# Patient Record
Sex: FEMALE | Race: BLACK OR AFRICAN AMERICAN | NOT HISPANIC OR LATINO | Employment: OTHER | URBAN - METROPOLITAN AREA
[De-identification: names, ages, dates, MRNs, and addresses within clinical notes are randomized per-mention and may not be internally consistent; named-entity substitution may affect disease eponyms.]

---

## 2023-10-02 ENCOUNTER — HOSPITAL ENCOUNTER (EMERGENCY)
Facility: HOSPITAL | Age: 33
Discharge: HOME | End: 2023-10-02
Attending: EMERGENCY MEDICINE
Payer: MEDICAID

## 2023-10-02 VITALS
OXYGEN SATURATION: 97 % | SYSTOLIC BLOOD PRESSURE: 147 MMHG | TEMPERATURE: 97.3 F | WEIGHT: 170 LBS | HEART RATE: 80 BPM | RESPIRATION RATE: 18 BRPM | DIASTOLIC BLOOD PRESSURE: 100 MMHG | BODY MASS INDEX: 32.1 KG/M2 | HEIGHT: 61 IN

## 2023-10-02 DIAGNOSIS — L73.2 SUPPURATIVE HIDRADENITIS: Primary | ICD-10-CM

## 2023-10-02 PROCEDURE — 2500000001 HC RX 250 WO HCPCS SELF ADMINISTERED DRUGS (ALT 637 FOR MEDICARE OP)

## 2023-10-02 PROCEDURE — 99284 EMERGENCY DEPT VISIT MOD MDM: CPT | Performed by: EMERGENCY MEDICINE

## 2023-10-02 PROCEDURE — 2500000004 HC RX 250 GENERAL PHARMACY W/ HCPCS (ALT 636 FOR OP/ED)

## 2023-10-02 PROCEDURE — 99283 EMERGENCY DEPT VISIT LOW MDM: CPT | Performed by: EMERGENCY MEDICINE

## 2023-10-02 RX ORDER — OXYCODONE AND ACETAMINOPHEN 5; 325 MG/1; MG/1
TABLET ORAL
Status: COMPLETED
Start: 2023-10-02 | End: 2023-10-02

## 2023-10-02 RX ORDER — CLINDAMYCIN HYDROCHLORIDE 150 MG/1
300 CAPSULE ORAL 2 TIMES DAILY
Qty: 28 CAPSULE | Refills: 0 | Status: SHIPPED | OUTPATIENT
Start: 2023-10-02 | End: 2023-10-09

## 2023-10-02 RX ORDER — CLINDAMYCIN HYDROCHLORIDE 300 MG/1
CAPSULE ORAL
Status: COMPLETED
Start: 2023-10-02 | End: 2023-10-02

## 2023-10-02 RX ORDER — SULFAMETHOXAZOLE AND TRIMETHOPRIM 800; 160 MG/1; MG/1
TABLET ORAL
Status: COMPLETED
Start: 2023-10-02 | End: 2023-10-02

## 2023-10-02 RX ORDER — SULFAMETHOXAZOLE AND TRIMETHOPRIM 800; 160 MG/1; MG/1
1 TABLET ORAL 2 TIMES DAILY
Qty: 28 TABLET | Refills: 0 | Status: SHIPPED | OUTPATIENT
Start: 2023-10-02 | End: 2023-10-16

## 2023-10-02 RX ORDER — CLINDAMYCIN HYDROCHLORIDE 300 MG/1
300 CAPSULE ORAL ONCE
Status: COMPLETED | OUTPATIENT
Start: 2023-10-02 | End: 2023-10-02

## 2023-10-02 RX ORDER — OXYCODONE AND ACETAMINOPHEN 5; 325 MG/1; MG/1
1 TABLET ORAL ONCE
Status: COMPLETED | OUTPATIENT
Start: 2023-10-02 | End: 2023-10-02

## 2023-10-02 RX ORDER — DOXYCYCLINE 100 MG/1
TABLET ORAL
Qty: 28 TABLET | Refills: 0 | Status: SHIPPED | OUTPATIENT
Start: 2023-10-02 | End: 2024-04-29 | Stop reason: HOSPADM

## 2023-10-02 RX ORDER — OXYCODONE AND ACETAMINOPHEN 10; 325 MG/1; MG/1
1 TABLET ORAL EVERY 6 HOURS PRN
Qty: 5 TABLET | Refills: 0 | Status: SHIPPED | OUTPATIENT
Start: 2023-10-02 | End: 2023-10-05

## 2023-10-02 RX ORDER — SULFAMETHOXAZOLE AND TRIMETHOPRIM 800; 160 MG/1; MG/1
1 TABLET ORAL ONCE
Status: COMPLETED | OUTPATIENT
Start: 2023-10-02 | End: 2023-10-02

## 2023-10-02 RX ADMIN — CLINDAMYCIN HYDROCHLORIDE 300 MG: 300 CAPSULE ORAL at 18:39

## 2023-10-02 RX ADMIN — OXYCODONE AND ACETAMINOPHEN 1 TABLET: 5; 325 TABLET ORAL at 18:38

## 2023-10-02 RX ADMIN — SULFAMETHOXAZOLE AND TRIMETHOPRIM 1 TABLET: 800; 160 TABLET ORAL at 18:39

## 2023-10-02 RX ADMIN — OXYCODONE HYDROCHLORIDE AND ACETAMINOPHEN 1 TABLET: 5; 325 TABLET ORAL at 18:38

## 2023-10-02 ASSESSMENT — LIFESTYLE VARIABLES
HAVE PEOPLE ANNOYED YOU BY CRITICIZING YOUR DRINKING: NO
HAVE YOU EVER FELT YOU SHOULD CUT DOWN ON YOUR DRINKING: NO
EVER FELT BAD OR GUILTY ABOUT YOUR DRINKING: NO

## 2023-10-02 ASSESSMENT — PAIN SCALES - GENERAL: PAINLEVEL_OUTOF10: 9

## 2023-10-02 ASSESSMENT — PAIN - FUNCTIONAL ASSESSMENT: PAIN_FUNCTIONAL_ASSESSMENT: 0-10

## 2023-10-02 ASSESSMENT — COLUMBIA-SUICIDE SEVERITY RATING SCALE - C-SSRS
2. HAVE YOU ACTUALLY HAD ANY THOUGHTS OF KILLING YOURSELF?: NO
1. IN THE PAST MONTH, HAVE YOU WISHED YOU WERE DEAD OR WISHED YOU COULD GO TO SLEEP AND NOT WAKE UP?: NO
6. HAVE YOU EVER DONE ANYTHING, STARTED TO DO ANYTHING, OR PREPARED TO DO ANYTHING TO END YOUR LIFE?: NO

## 2023-10-02 ASSESSMENT — PAIN DESCRIPTION - LOCATION: LOCATION: LEG

## 2023-10-02 NOTE — ED PROVIDER NOTES
HPI   Chief Complaint   Patient presents with    Leg Pain       HPI     Patient is a 33-year-old female with history of hidradenitis suppurativa, lupus, sickle cell anemia who presents with chronic pain.  Patient states she does not have a PCP and ran out of her antibiotic prescriptions for hidradenitis suppurativa approximately 3 days ago.  She states she was seen at [facility] a few weeks ago where they refilled her prescriptions.  She states she called someone to schedule a PCP appointment today however has not heard back.  She does not see a surgeon as well.  She states she has draining wounds of her bilateral axilla, buttock area.  She denies any fevers, chills.  She states she alternates between doxycycline and clindamycin and takes Bactrim daily.  She has been on these medications for 13 years she says.    External records reviewed: Multiple ED visits for similar complaints in the past few weeks                             Patient History   Past Medical History:   Diagnosis Date    Hidradenitis suppurativa 10/29/2020    Hidradenitis    Lupus (CMS/Formerly [facility])     Sickle cell anemia (CMS/Formerly [facility])      Past Surgical History:   Procedure Laterality Date    OTHER SURGICAL HISTORY  09/19/2022    Arm surgery     No family history on file.  Social History     Tobacco Use    Smoking status: Never    Smokeless tobacco: Never   Substance Use Topics    Alcohol use: Not on file    Drug use: Not on file       Physical Exam   ED Triage Vitals [10/02/23 1545]   Temp Heart Rate Resp BP   36.3 °C (97.3 °F) 76 20 114/75      SpO2 Temp src Heart Rate Source Patient Position   100 % -- -- --      BP Location FiO2 (%)     -- --       Physical Exam  Vitals and nursing note reviewed.   Constitutional:       General: She is not in acute distress.     Appearance: Normal appearance.   HENT:      Head: Normocephalic and atraumatic.   Eyes:      Conjunctiva/sclera: Conjunctivae normal.   Cardiovascular:      Rate and Rhythm: Normal rate and regular  rhythm.   Pulmonary:      Effort: Pulmonary effort is normal. No respiratory distress.   Abdominal:      General: Abdomen is flat.      Palpations: Abdomen is soft.   Musculoskeletal:         General: Normal range of motion.      Cervical back: Normal range of motion and neck supple.   Skin:     General: Skin is warm and dry.      Findings: Abscess and rash present. Rash is pustular.      Comments: Abscess and rash to bilateral axilla with minimal drainage and between the buttock region, no crepitus or bleeding   Neurological:      General: No focal deficit present.      Mental Status: She is alert and oriented to person, place, and time. Mental status is at baseline.   Psychiatric:         Mood and Affect: Mood normal.         Behavior: Behavior normal.         ED Course & MDM   Diagnoses as of 10/02/23 1836   Suppurative hidradenitis     Patient was seen and evaluated by the attending physician.     Medical Decision Making  Patient is a 33-year-old female who presents with chronic pain from hidradenitis suppurativa.  Vital signs stable, patient does not appear septic.  Based off stable clinical presentation and chronicity of wounds, I do not believe patient warrants any further labs or imaging today.  Unable to corroborate outpatient medications from EMR due to EMR transition however patient states she takes oxycodone 10-3 25, clindamycin 300 mg, doxycycline 400 mg and Bactrim double strength.  She states this week she is on clindamycin.  I prescribed these medications and gave dose of clindamycin, bactrim and pain medication in ED.  I discussed extensively with patient need for PCP follow-up and attempted to coordinate initial PCP visit in ED.  I also provided outpatient resources for scheduling with pain medicine.    Procedure  Procedures     Britni Andrew DO  Resident  10/02/23 1836

## 2023-10-02 NOTE — ED TRIAGE NOTES
PT having SSC in legs, arm and abdomen/chest since yesterday. PT also need referral for pain management clinic

## 2023-10-02 NOTE — ED TRIAGE NOTES
PT having flare up of Hidradenitis suppurativa , having pain in legs and axilla. Needs referral for pain management

## 2023-10-16 ENCOUNTER — TELEPHONE (OUTPATIENT)
Dept: PRIMARY CARE | Facility: CLINIC | Age: 33
End: 2023-10-16
Payer: MEDICAID

## 2023-10-20 ENCOUNTER — HOSPITAL ENCOUNTER (EMERGENCY)
Facility: HOSPITAL | Age: 33
Discharge: HOME | End: 2023-10-20
Attending: EMERGENCY MEDICINE
Payer: MEDICAID

## 2023-10-20 ENCOUNTER — APPOINTMENT (OUTPATIENT)
Dept: RADIOLOGY | Facility: HOSPITAL | Age: 33
End: 2023-10-20
Payer: MEDICAID

## 2023-10-20 VITALS
TEMPERATURE: 98.3 F | HEIGHT: 62 IN | SYSTOLIC BLOOD PRESSURE: 134 MMHG | RESPIRATION RATE: 17 BRPM | BODY MASS INDEX: 32.2 KG/M2 | WEIGHT: 175 LBS | DIASTOLIC BLOOD PRESSURE: 89 MMHG | HEART RATE: 109 BPM | OXYGEN SATURATION: 99 %

## 2023-10-20 LAB
ANION GAP SERPL CALC-SCNC: 14 MMOL/L (ref 10–20)
APPEARANCE UR: CLEAR
BASOPHILS # BLD AUTO: 0.04 X10*3/UL (ref 0–0.1)
BASOPHILS NFR BLD AUTO: 0.3 %
BILIRUB UR STRIP.AUTO-MCNC: NEGATIVE MG/DL
BUN SERPL-MCNC: 14 MG/DL (ref 6–23)
CALCIUM SERPL-MCNC: 8.9 MG/DL (ref 8.6–10.3)
CHLORIDE SERPL-SCNC: 102 MMOL/L (ref 98–107)
CO2 SERPL-SCNC: 23 MMOL/L (ref 21–32)
COLOR UR: NORMAL
CREAT SERPL-MCNC: 1.12 MG/DL (ref 0.5–1.05)
EOSINOPHIL # BLD AUTO: 0.16 X10*3/UL (ref 0–0.7)
EOSINOPHIL NFR BLD AUTO: 1.1 %
ERYTHROCYTE [DISTWIDTH] IN BLOOD BY AUTOMATED COUNT: 14.1 % (ref 11.5–14.5)
GFR SERPL CREATININE-BSD FRML MDRD: 66 ML/MIN/1.73M*2
GLUCOSE SERPL-MCNC: 88 MG/DL (ref 74–99)
GLUCOSE UR STRIP.AUTO-MCNC: NEGATIVE MG/DL
HCG UR QL IA.RAPID: NEGATIVE
HCT VFR BLD AUTO: 34.9 % (ref 36–46)
HGB BLD-MCNC: 11.5 G/DL (ref 12–16)
IMM GRANULOCYTES # BLD AUTO: 0.07 X10*3/UL (ref 0–0.7)
IMM GRANULOCYTES NFR BLD AUTO: 0.5 % (ref 0–0.9)
KETONES UR STRIP.AUTO-MCNC: NEGATIVE MG/DL
LEUKOCYTE ESTERASE UR QL STRIP.AUTO: NEGATIVE
LYMPHOCYTES # BLD AUTO: 3.52 X10*3/UL (ref 1.2–4.8)
LYMPHOCYTES NFR BLD AUTO: 23.8 %
MCH RBC QN AUTO: 24.9 PG (ref 26–34)
MCHC RBC AUTO-ENTMCNC: 33 G/DL (ref 32–36)
MCV RBC AUTO: 76 FL (ref 80–100)
MONOCYTES # BLD AUTO: 1.08 X10*3/UL (ref 0.1–1)
MONOCYTES NFR BLD AUTO: 7.3 %
NEUTROPHILS # BLD AUTO: 9.95 X10*3/UL (ref 1.2–7.7)
NEUTROPHILS NFR BLD AUTO: 67 %
NITRITE UR QL STRIP.AUTO: NEGATIVE
NRBC BLD-RTO: 0 /100 WBCS (ref 0–0)
PH UR STRIP.AUTO: 5 [PH]
PLATELET # BLD AUTO: 322 X10*3/UL (ref 150–450)
PMV BLD AUTO: 9.3 FL (ref 7.5–11.5)
POTASSIUM SERPL-SCNC: 3.8 MMOL/L (ref 3.5–5.3)
PROT UR STRIP.AUTO-MCNC: NEGATIVE MG/DL
RBC # BLD AUTO: 4.61 X10*6/UL (ref 4–5.2)
RBC # UR STRIP.AUTO: NEGATIVE /UL
SODIUM SERPL-SCNC: 135 MMOL/L (ref 136–145)
SP GR UR STRIP.AUTO: 1.01
UROBILINOGEN UR STRIP.AUTO-MCNC: <2 MG/DL
WBC # BLD AUTO: 14.8 X10*3/UL (ref 4.4–11.3)

## 2023-10-20 PROCEDURE — 80048 BASIC METABOLIC PNL TOTAL CA: CPT | Performed by: PHYSICIAN ASSISTANT

## 2023-10-20 PROCEDURE — 2550000001 HC RX 255 CONTRASTS: Performed by: EMERGENCY MEDICINE

## 2023-10-20 PROCEDURE — 74177 CT ABD & PELVIS W/CONTRAST: CPT | Mod: FOREIGN READ | Performed by: RADIOLOGY

## 2023-10-20 PROCEDURE — 2500000004 HC RX 250 GENERAL PHARMACY W/ HCPCS (ALT 636 FOR OP/ED): Performed by: PHYSICIAN ASSISTANT

## 2023-10-20 PROCEDURE — 85025 COMPLETE CBC W/AUTO DIFF WBC: CPT | Performed by: PHYSICIAN ASSISTANT

## 2023-10-20 PROCEDURE — 81025 URINE PREGNANCY TEST: CPT | Performed by: PHYSICIAN ASSISTANT

## 2023-10-20 PROCEDURE — 36415 COLL VENOUS BLD VENIPUNCTURE: CPT | Performed by: PHYSICIAN ASSISTANT

## 2023-10-20 PROCEDURE — 71260 CT THORAX DX C+: CPT

## 2023-10-20 PROCEDURE — 71260 CT THORAX DX C+: CPT | Mod: FOREIGN READ | Performed by: RADIOLOGY

## 2023-10-20 PROCEDURE — 96361 HYDRATE IV INFUSION ADD-ON: CPT

## 2023-10-20 PROCEDURE — 99284 EMERGENCY DEPT VISIT MOD MDM: CPT | Performed by: EMERGENCY MEDICINE

## 2023-10-20 PROCEDURE — 2500000001 HC RX 250 WO HCPCS SELF ADMINISTERED DRUGS (ALT 637 FOR MEDICARE OP): Performed by: PHYSICIAN ASSISTANT

## 2023-10-20 PROCEDURE — 81003 URINALYSIS AUTO W/O SCOPE: CPT | Performed by: PHYSICIAN ASSISTANT

## 2023-10-20 PROCEDURE — 96374 THER/PROPH/DIAG INJ IV PUSH: CPT

## 2023-10-20 RX ORDER — KETOROLAC TROMETHAMINE 30 MG/ML
30 INJECTION, SOLUTION INTRAMUSCULAR; INTRAVENOUS ONCE
Status: COMPLETED | OUTPATIENT
Start: 2023-10-20 | End: 2023-10-20

## 2023-10-20 RX ORDER — ALPRAZOLAM 0.5 MG/1
0.25 TABLET ORAL ONCE
Status: COMPLETED | OUTPATIENT
Start: 2023-10-20 | End: 2023-10-20

## 2023-10-20 RX ADMIN — SODIUM CHLORIDE 1000 ML: 9 INJECTION, SOLUTION INTRAVENOUS at 06:39

## 2023-10-20 RX ADMIN — ALPRAZOLAM 0.25 MG: 0.5 TABLET ORAL at 05:39

## 2023-10-20 RX ADMIN — KETOROLAC TROMETHAMINE 30 MG: 30 INJECTION, SOLUTION INTRAMUSCULAR; INTRAVENOUS at 05:39

## 2023-10-20 RX ADMIN — IOHEXOL 75 ML: 350 INJECTION, SOLUTION INTRAVENOUS at 07:01

## 2023-10-20 ASSESSMENT — PAIN DESCRIPTION - ORIENTATION
ORIENTATION_2: LEFT
ORIENTATION_3: LEFT
ORIENTATION: LOWER

## 2023-10-20 ASSESSMENT — PAIN SCALES - GENERAL
PAINLEVEL_OUTOF10: 9

## 2023-10-20 ASSESSMENT — PAIN DESCRIPTION - LOCATION
LOCATION_2: LEG
LOCATION_3: BREAST
LOCATION: BACK

## 2023-10-20 ASSESSMENT — COLUMBIA-SUICIDE SEVERITY RATING SCALE - C-SSRS
6. HAVE YOU EVER DONE ANYTHING, STARTED TO DO ANYTHING, OR PREPARED TO DO ANYTHING TO END YOUR LIFE?: NO
1. IN THE PAST MONTH, HAVE YOU WISHED YOU WERE DEAD OR WISHED YOU COULD GO TO SLEEP AND NOT WAKE UP?: NO
2. HAVE YOU ACTUALLY HAD ANY THOUGHTS OF KILLING YOURSELF?: NO

## 2023-10-20 ASSESSMENT — PAIN DESCRIPTION - FREQUENCY: FREQUENCY: INTERMITTENT

## 2023-10-20 ASSESSMENT — PAIN DESCRIPTION - DESCRIPTORS
DESCRIPTORS_2: SHOOTING
DESCRIPTORS: SHARP
DESCRIPTORS_3: OTHER (COMMENT)

## 2023-10-20 ASSESSMENT — PAIN DESCRIPTION - PAIN TYPE: TYPE: ACUTE PAIN

## 2023-10-20 ASSESSMENT — PAIN - FUNCTIONAL ASSESSMENT: PAIN_FUNCTIONAL_ASSESSMENT: 0-10
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
## 2023-10-20 NOTE — ED TRIAGE NOTES
Patient arrived to ED via EMS from home with concern for lump to left breast, wants US. Patient has had mammogram and was told there is a mass. Patient c/o left leg pain that radiates up and down leg. Patient c/o low back pain since yesterday. Patient also reports swelling in abd x2 days ago. Patient wants scans and US to find out if she has breast ca. Patient is stable at this time.

## 2023-11-10 ENCOUNTER — HOSPITAL ENCOUNTER (EMERGENCY)
Facility: HOSPITAL | Age: 33
Discharge: HOME | End: 2023-11-10
Attending: STUDENT IN AN ORGANIZED HEALTH CARE EDUCATION/TRAINING PROGRAM
Payer: MEDICAID

## 2023-11-10 VITALS
DIASTOLIC BLOOD PRESSURE: 70 MMHG | HEIGHT: 63 IN | OXYGEN SATURATION: 99 % | RESPIRATION RATE: 70 BRPM | WEIGHT: 175 LBS | SYSTOLIC BLOOD PRESSURE: 124 MMHG | BODY MASS INDEX: 31.01 KG/M2 | HEART RATE: 80 BPM | TEMPERATURE: 97.4 F

## 2023-11-10 DIAGNOSIS — L73.2 HIDRADENITIS SUPPURATIVA OF MULTIPLE SITES: Primary | ICD-10-CM

## 2023-11-10 DIAGNOSIS — R11.2 NAUSEA AND VOMITING, UNSPECIFIED VOMITING TYPE: ICD-10-CM

## 2023-11-10 LAB
ALBUMIN SERPL BCP-MCNC: 3.9 G/DL (ref 3.4–5)
ALP SERPL-CCNC: 61 U/L (ref 33–110)
ALT SERPL W P-5'-P-CCNC: 10 U/L (ref 7–45)
ANION GAP SERPL CALC-SCNC: 12 MMOL/L (ref 10–20)
AST SERPL W P-5'-P-CCNC: 11 U/L (ref 9–39)
B-HCG SERPL-ACNC: <2 MIU/ML
BASOPHILS # BLD AUTO: 0.03 X10*3/UL (ref 0–0.1)
BASOPHILS NFR BLD AUTO: 0.4 %
BILIRUB SERPL-MCNC: 0.4 MG/DL (ref 0–1.2)
BUN SERPL-MCNC: 10 MG/DL (ref 6–23)
CALCIUM SERPL-MCNC: 9 MG/DL (ref 8.6–10.3)
CHLORIDE SERPL-SCNC: 105 MMOL/L (ref 98–107)
CO2 SERPL-SCNC: 23 MMOL/L (ref 21–32)
CREAT SERPL-MCNC: 0.71 MG/DL (ref 0.5–1.05)
EOSINOPHIL # BLD AUTO: 0.12 X10*3/UL (ref 0–0.7)
EOSINOPHIL NFR BLD AUTO: 1.4 %
ERYTHROCYTE [DISTWIDTH] IN BLOOD BY AUTOMATED COUNT: 15.1 % (ref 11.5–14.5)
GFR SERPL CREATININE-BSD FRML MDRD: >90 ML/MIN/1.73M*2
GLUCOSE SERPL-MCNC: 88 MG/DL (ref 74–99)
HCT VFR BLD AUTO: 37.5 % (ref 36–46)
HGB BLD-MCNC: 12.4 G/DL (ref 12–16)
IMM GRANULOCYTES # BLD AUTO: 0.04 X10*3/UL (ref 0–0.7)
IMM GRANULOCYTES NFR BLD AUTO: 0.5 % (ref 0–0.9)
LYMPHOCYTES # BLD AUTO: 2.21 X10*3/UL (ref 1.2–4.8)
LYMPHOCYTES NFR BLD AUTO: 26.4 %
MCH RBC QN AUTO: 25.2 PG (ref 26–34)
MCHC RBC AUTO-ENTMCNC: 33.1 G/DL (ref 32–36)
MCV RBC AUTO: 76 FL (ref 80–100)
MONOCYTES # BLD AUTO: 0.5 X10*3/UL (ref 0.1–1)
MONOCYTES NFR BLD AUTO: 6 %
NEUTROPHILS # BLD AUTO: 5.48 X10*3/UL (ref 1.2–7.7)
NEUTROPHILS NFR BLD AUTO: 65.3 %
NRBC BLD-RTO: 0 /100 WBCS (ref 0–0)
PLATELET # BLD AUTO: 357 X10*3/UL (ref 150–450)
POTASSIUM SERPL-SCNC: 4.2 MMOL/L (ref 3.5–5.3)
PROT SERPL-MCNC: 7.1 G/DL (ref 6.4–8.2)
RBC # BLD AUTO: 4.92 X10*6/UL (ref 4–5.2)
SODIUM SERPL-SCNC: 136 MMOL/L (ref 136–145)
WBC # BLD AUTO: 8.4 X10*3/UL (ref 4.4–11.3)

## 2023-11-10 PROCEDURE — 80053 COMPREHEN METABOLIC PANEL: CPT | Performed by: STUDENT IN AN ORGANIZED HEALTH CARE EDUCATION/TRAINING PROGRAM

## 2023-11-10 PROCEDURE — 99284 EMERGENCY DEPT VISIT MOD MDM: CPT | Mod: 25

## 2023-11-10 PROCEDURE — 96374 THER/PROPH/DIAG INJ IV PUSH: CPT

## 2023-11-10 PROCEDURE — 84702 CHORIONIC GONADOTROPIN TEST: CPT | Performed by: STUDENT IN AN ORGANIZED HEALTH CARE EDUCATION/TRAINING PROGRAM

## 2023-11-10 PROCEDURE — 99285 EMERGENCY DEPT VISIT HI MDM: CPT | Performed by: STUDENT IN AN ORGANIZED HEALTH CARE EDUCATION/TRAINING PROGRAM

## 2023-11-10 PROCEDURE — 36415 COLL VENOUS BLD VENIPUNCTURE: CPT | Performed by: STUDENT IN AN ORGANIZED HEALTH CARE EDUCATION/TRAINING PROGRAM

## 2023-11-10 PROCEDURE — 2500000004 HC RX 250 GENERAL PHARMACY W/ HCPCS (ALT 636 FOR OP/ED): Performed by: STUDENT IN AN ORGANIZED HEALTH CARE EDUCATION/TRAINING PROGRAM

## 2023-11-10 PROCEDURE — 96375 TX/PRO/DX INJ NEW DRUG ADDON: CPT

## 2023-11-10 PROCEDURE — 85025 COMPLETE CBC W/AUTO DIFF WBC: CPT | Performed by: STUDENT IN AN ORGANIZED HEALTH CARE EDUCATION/TRAINING PROGRAM

## 2023-11-10 RX ORDER — ONDANSETRON 4 MG/1
4 TABLET, FILM COATED ORAL EVERY 6 HOURS
Qty: 12 TABLET | Refills: 0 | Status: SHIPPED | OUTPATIENT
Start: 2023-11-10 | End: 2023-11-13

## 2023-11-10 RX ORDER — PREDNISONE 20 MG/1
60 TABLET ORAL DAILY
Qty: 12 TABLET | Refills: 0 | Status: SHIPPED | OUTPATIENT
Start: 2023-11-11 | End: 2023-11-15

## 2023-11-10 RX ORDER — FENTANYL CITRATE 50 UG/ML
50 INJECTION, SOLUTION INTRAMUSCULAR; INTRAVENOUS ONCE
Status: COMPLETED | OUTPATIENT
Start: 2023-11-10 | End: 2023-11-10

## 2023-11-10 RX ORDER — ONDANSETRON HYDROCHLORIDE 2 MG/ML
4 INJECTION, SOLUTION INTRAVENOUS ONCE
Status: COMPLETED | OUTPATIENT
Start: 2023-11-10 | End: 2023-11-10

## 2023-11-10 RX ORDER — PREDNISONE 20 MG/1
60 TABLET ORAL ONCE
Status: DISCONTINUED | OUTPATIENT
Start: 2023-11-10 | End: 2023-11-10 | Stop reason: HOSPADM

## 2023-11-10 RX ADMIN — ONDANSETRON 4 MG: 2 INJECTION INTRAMUSCULAR; INTRAVENOUS at 10:57

## 2023-11-10 RX ADMIN — SODIUM CHLORIDE 1000 ML: 9 INJECTION, SOLUTION INTRAVENOUS at 10:56

## 2023-11-10 RX ADMIN — FENTANYL CITRATE 50 MCG: 50 INJECTION INTRAMUSCULAR; INTRAVENOUS at 10:56

## 2023-11-10 ASSESSMENT — PAIN SCALES - GENERAL
PAINLEVEL_OUTOF10: 9
PAINLEVEL_OUTOF10: 9

## 2023-11-10 NOTE — ED PROVIDER NOTES
EMERGENCY MEDICINE EVALUATION NOTE    History of Present Illness     Chief Complaint:   Chief Complaint   Patient presents with    lupus flare-up       HPI: Sandrita Adams is a 33 y.o. female with past medical history of lupus and hidradenitis suppurativa who presents with complaint of nausea, vomiting, and malaise.  Patient states over the past 2 to 3 days she has had worsening signs of possible infection.  She states she does have multiple open wounds involving her bilateral axilla as well as bilateral groin from her hidradenitis suppurativa.  She states she is on daily Bactrim and is alternate every 2 weeks with clindamycin and doxycycline for prophylaxis treatment.  She states she is following up with a dermatologist and has had multiple surgeries in regards to her hidradenitis.  She does believe is a possible infection admit some pus drainage from her left axilla.  Denies any fever, chills, abdominal pain.  She has also been nausea and mild episodes of nonbloody nonbilious emesis throughout today.  Denies any dysuria, materia, chest pain, shortness of breath, cough.    Previous History     Past Medical History:   Diagnosis Date    Hidradenitis suppurativa 10/29/2020    Hidradenitis    Lupus (CMS/HCC)      Past Surgical History:   Procedure Laterality Date    OTHER SURGICAL HISTORY  09/19/2022    Arm surgery     Social History     Tobacco Use    Smoking status: Some Days     Years: 20     Types: Cigarettes    Smokeless tobacco: Never     No family history on file.  Allergies   Allergen Reactions    Suboxone [Buprenorphine-Naloxone] Anaphylaxis, Hives and Itching    Haldol [Haloperidol] Psychosis    Keflex [Cephalexin] Psychosis    Reglan [Metoclopramide Hcl] Psychosis     Current Outpatient Medications   Medication Instructions    doxycycline (Adoxa) 100 mg tablet Take 2 tablets by mouth two times daily with a full glass of water and do not lie down for at least 30 minutes after. Alternate every 7 days with  clindamycin    ondansetron (ZOFRAN) 4 mg, oral, Every 6 hours    [START ON 11/11/2023] predniSONE (DELTASONE) 60 mg, oral, Daily       Physical Exam     Appearance: Alert, oriented , cooperative,  in acute distress. Well nourished & well hydrated.     Skin: Intact,  dry skin, rash, petechiae or purpura.  Multiple lesions and open wounds mainly involving the bilateral axilla and bilateral groin as well as upper buttocks, scarring involving the same areas, minimal warmth, erythema, and no significant purulent expressible drainage noted.     Eyes: PERRLA, EOMs intact,  Conjunctiva pink with no redness or exudates. Cornea & anterior chamber are clear, Eyelids without lesions. No scleral icterus.      ENT: Hearing grossly intact. External auditory canals patent, tympanic membranes intact with visible landmarks. Nares patent, mucus membranes moist. Dentition without lesions. Pharynx clear, uvula midline.      Neck: Supple, without meningismus. Thyroid not palpable. Trachea at midline. No lymphadenopathy.     Pulmonary: Clear bilaterally with good chest wall excursion. No rales, rhonchi or wheezing. No accessory muscle use or stridor.     Cardiac: Normal S1, S2 without murmur, rub, gallop or extrasystole. No JVD, Carotids without bruits.     Abdomen: Soft, nontender, active bowel sounds.  No palpable organomegaly.  No rebound or guarding.  No CVA tenderness.     Genitourinary: Exam deferred.     Musculoskeletal: Full range of motion. no pain, edema, or deformity. Pulses full and equal. No cyanosis or clubbing.      Neurological:  Cranial nerves II through XII are grossly intact, finger-nose touch is normal, normal sensation, no weakness, no focal findings identified.     Psychiatric: Appropriate mood and affect.      Results     Labs Reviewed   CBC WITH AUTO DIFFERENTIAL - Abnormal       Result Value    WBC 8.4      nRBC 0.0      RBC 4.92      Hemoglobin 12.4      Hematocrit 37.5      MCV 76 (*)     MCH 25.2 (*)     MCHC  "33.1      RDW 15.1 (*)     Platelets 357      Neutrophils % 65.3      Immature Granulocytes %, Automated 0.5      Lymphocytes % 26.4      Monocytes % 6.0      Eosinophils % 1.4      Basophils % 0.4      Neutrophils Absolute 5.48      Immature Granulocytes Absolute, Automated 0.04      Lymphocytes Absolute 2.21      Monocytes Absolute 0.50      Eosinophils Absolute 0.12      Basophils Absolute 0.03     COMPREHENSIVE METABOLIC PANEL - Normal    Glucose 88      Sodium 136      Potassium 4.2      Chloride 105      Bicarbonate 23      Anion Gap 12      Urea Nitrogen 10      Creatinine 0.71      eGFR >90      Calcium 9.0      Albumin 3.9      Alkaline Phosphatase 61      Total Protein 7.1      AST 11      Bilirubin, Total 0.4      ALT 10     HUMAN CHORIONIC GONADOTROPIN, SERUM QUANTITATIVE - Normal    HCG, Beta-Quantitative <2      Narrative:      Total HCG measurement is performed using the [employer]   Immunoassay which detects intact HCG and free beta HCG subunit.    This test is not indicated for use as a tumor marker.   HCG testing is performed using a different test methodology at [facility] than other [facility]. Direct result comparison   should only be made within the same method.         No orders to display         ED Course & Medical Decision Making     Medications   predniSONE (Deltasone) tablet 60 mg (has no administration in time range)   ondansetron (Zofran) injection 4 mg (4 mg intravenous Given 11/10/23 1057)   fentaNYL PF (Sublimaze) injection 50 mcg (50 mcg intravenous Given 11/10/23 1056)   sodium chloride 0.9 % bolus 1,000 mL (0 mL intravenous Stopped 11/10/23 1126)     Diagnoses as of 11/10/23 1156   Hidradenitis suppurativa of multiple sites   Nausea and vomiting, unspecified vomiting type     Heart Rate:  [80]   Temp:  [36.3 °C (97.4 °F)]   Resp:  [16]   BP: (125)/(73)   Height:  [160 cm (5' 3\")]   Weight:  [79.4 kg (175 lb)]   SpO2:  [99 %]      Sandrita S " Bryan is a 33 y.o. female with past medical history of lupus and hidradenitis suppurativa who presents with complaint of nausea, vomiting, and malaise.  Patient on examination otherwise is hemodynamically stable and afebrile.  She is well-appearing.  She does have significant hidradenitis suppurativa noted in the bilateral axillas as well as the bilateral groin, minimal active drainage noted although tender throughout these areas.  No significant warmth or erythema.  Low concern for active infection at this time although this is considered with possible underlying multiple small abscesses.  She has had surgery involving the sites although no need for incision and drainage.  She most likely needs to follow-up with a general surgeon and does have a dermatology follow-up this upcoming December for possible surgical management.  Low concern for sepsis although considered.  Patient given 1 L of normal saline as well as fentanyl and Zofran for treatment.  Initial lab work reassuring with no significant leukocytosis, anemia, lecture light abnormality, renal dysfunction.  Glucose normal.  Beta-hCG negative.  Patient did have relief in reexamination.  Low suspicion for active infection.  She is already on a course of daily Bactrim and alternating with clindamycin and doxycycline.  No need for additional antibiotic treatment.  She was given a treatment with prednisone as this could be related to the lupus flare or hidradenitis suppurativa flare.  She was informed she will need follow-up for possible surgical management with her dermatologist and general surgery and does have plain for this soon.  Otherwise will be discharged home with strict return precautions and primary care follow-up.  Given 5-day burst of steroids for home treatment as well as additional Zofran.  No reproducible abdominal pain looking for intra-abdominal pathology.  LFTs normal.    Procedures   Procedures    Diagnosis     1. Hidradenitis suppurativa  of multiple sites    2. Nausea and vomiting, unspecified vomiting type        Disposition     DISCHARGE.  The patient is discharged back to their place of residence.  Discharge diagnosis, instructions and plan were discussed and understood. At the time of discharge the patient was comfortable and was in no apparent distress. Patient is aware of diagnostic uncertainty and was notified though testing is negative here, there is a very small chance that pathology may be missed.  The patient understands these risks and the patient /family understood to return immediately to the emergency department if the symptoms worsen or if they have any additional concerns.    FOLLOW UP  Primary care provider in 1-2 days.        ED Prescriptions       Medication Sig Dispense Start Date End Date Auth. Provider    predniSONE (Deltasone) 20 mg tablet Take 3 tablets (60 mg) by mouth once daily for 4 days. Do not start before November 11, 2023. 12 tablet 11/11/2023 11/15/2023 Steve Borges,     ondansetron (Zofran) 4 mg tablet Take 1 tablet (4 mg) by mouth every 6 hours for 3 days. 12 tablet 11/10/2023 11/13/2023 Steve Borges, DO Steve Borges, DO  11/10/23 1156       Steve Borges, DO  11/10/23 1156       Steve Borges, DO  11/10/23 1157

## 2023-11-10 NOTE — DISCHARGE INSTRUCTIONS
You have been seen at a [facility].  Please follow-up with your primary care provider in the next 1 to 2 days for further evaluation and routine follow-up.  Please return to the emergency room if having any worsening symptoms.  Please follow-up with any specialists if discussed during your emergency room stay.

## 2023-11-10 NOTE — ED TRIAGE NOTES
Pt to ED for lupus flare-up, uncontrolled pain, and vomiting. Pt also states she has open wounds under her arm and on her leg.

## 2023-11-13 ENCOUNTER — APPOINTMENT (OUTPATIENT)
Dept: RADIOLOGY | Facility: HOSPITAL | Age: 33
End: 2023-11-13
Payer: MEDICAID

## 2023-11-13 ENCOUNTER — HOSPITAL ENCOUNTER (OUTPATIENT)
Facility: HOSPITAL | Age: 33
Setting detail: OBSERVATION
Discharge: HOME | End: 2023-11-13
Attending: EMERGENCY MEDICINE | Admitting: INTERNAL MEDICINE
Payer: MEDICAID

## 2023-11-13 VITALS
HEART RATE: 78 BPM | BODY MASS INDEX: 32.1 KG/M2 | DIASTOLIC BLOOD PRESSURE: 86 MMHG | TEMPERATURE: 99.1 F | WEIGHT: 170 LBS | OXYGEN SATURATION: 99 % | HEIGHT: 61 IN | RESPIRATION RATE: 18 BRPM | SYSTOLIC BLOOD PRESSURE: 102 MMHG

## 2023-11-13 DIAGNOSIS — L03.113 CELLULITIS OF ARM, RIGHT: Primary | ICD-10-CM

## 2023-11-13 DIAGNOSIS — Z78.9 FAILURE OF OUTPATIENT TREATMENT: ICD-10-CM

## 2023-11-13 PROBLEM — R52 INTRACTABLE PAIN: Status: ACTIVE | Noted: 2023-11-13

## 2023-11-13 LAB
ALBUMIN SERPL BCP-MCNC: 3.8 G/DL (ref 3.4–5)
ALP SERPL-CCNC: 58 U/L (ref 33–110)
ALT SERPL W P-5'-P-CCNC: 11 U/L (ref 7–45)
AMPHETAMINES UR QL SCN: ABNORMAL
ANION GAP SERPL CALC-SCNC: 12 MMOL/L (ref 10–20)
APPEARANCE UR: CLEAR
AST SERPL W P-5'-P-CCNC: 15 U/L (ref 9–39)
BARBITURATES UR QL SCN: ABNORMAL
BASOPHILS # BLD AUTO: 0.02 X10*3/UL (ref 0–0.1)
BASOPHILS NFR BLD AUTO: 0.2 %
BENZODIAZ UR QL SCN: ABNORMAL
BILIRUB SERPL-MCNC: 0.4 MG/DL (ref 0–1.2)
BILIRUB UR STRIP.AUTO-MCNC: NEGATIVE MG/DL
BUN SERPL-MCNC: 15 MG/DL (ref 6–23)
BZE UR QL SCN: ABNORMAL
CALCIUM SERPL-MCNC: 8.7 MG/DL (ref 8.6–10.3)
CANNABINOIDS UR QL SCN: ABNORMAL
CHLORIDE SERPL-SCNC: 108 MMOL/L (ref 98–107)
CO2 SERPL-SCNC: 21 MMOL/L (ref 21–32)
COLOR UR: NORMAL
CREAT SERPL-MCNC: 0.84 MG/DL (ref 0.5–1.05)
EOSINOPHIL # BLD AUTO: 0.05 X10*3/UL (ref 0–0.7)
EOSINOPHIL NFR BLD AUTO: 0.6 %
ERYTHROCYTE [DISTWIDTH] IN BLOOD BY AUTOMATED COUNT: 15.6 % (ref 11.5–14.5)
FENTANYL+NORFENTANYL UR QL SCN: ABNORMAL
GFR SERPL CREATININE-BSD FRML MDRD: >90 ML/MIN/1.73M*2
GLUCOSE SERPL-MCNC: 92 MG/DL (ref 74–99)
GLUCOSE UR STRIP.AUTO-MCNC: NEGATIVE MG/DL
HCG UR QL IA.RAPID: NEGATIVE
HCT VFR BLD AUTO: 38.4 % (ref 36–46)
HGB BLD-MCNC: 12.5 G/DL (ref 12–16)
IMM GRANULOCYTES # BLD AUTO: 0.02 X10*3/UL (ref 0–0.7)
IMM GRANULOCYTES NFR BLD AUTO: 0.2 % (ref 0–0.9)
KETONES UR STRIP.AUTO-MCNC: NEGATIVE MG/DL
LEUKOCYTE ESTERASE UR QL STRIP.AUTO: NEGATIVE
LYMPHOCYTES # BLD AUTO: 2.26 X10*3/UL (ref 1.2–4.8)
LYMPHOCYTES NFR BLD AUTO: 27.5 %
MCH RBC QN AUTO: 25.4 PG (ref 26–34)
MCHC RBC AUTO-ENTMCNC: 32.6 G/DL (ref 32–36)
MCV RBC AUTO: 78 FL (ref 80–100)
MONOCYTES # BLD AUTO: 0.62 X10*3/UL (ref 0.1–1)
MONOCYTES NFR BLD AUTO: 7.5 %
NEUTROPHILS # BLD AUTO: 5.25 X10*3/UL (ref 1.2–7.7)
NEUTROPHILS NFR BLD AUTO: 64 %
NITRITE UR QL STRIP.AUTO: NEGATIVE
NRBC BLD-RTO: 0 /100 WBCS (ref 0–0)
OPIATES UR QL SCN: ABNORMAL
OXYCODONE+OXYMORPHONE UR QL SCN: ABNORMAL
PCP UR QL SCN: ABNORMAL
PH UR STRIP.AUTO: 5.5 [PH]
PLATELET # BLD AUTO: 343 X10*3/UL (ref 150–450)
POTASSIUM SERPL-SCNC: 4.3 MMOL/L (ref 3.5–5.3)
PROT SERPL-MCNC: 7.3 G/DL (ref 6.4–8.2)
PROT UR STRIP.AUTO-MCNC: NEGATIVE MG/DL
RBC # BLD AUTO: 4.93 X10*6/UL (ref 4–5.2)
RBC # UR STRIP.AUTO: NEGATIVE /UL
SODIUM SERPL-SCNC: 137 MMOL/L (ref 136–145)
SP GR UR STRIP.AUTO: 1.02
UROBILINOGEN UR STRIP.AUTO-MCNC: NORMAL MG/DL
WBC # BLD AUTO: 8.2 X10*3/UL (ref 4.4–11.3)

## 2023-11-13 PROCEDURE — 80307 DRUG TEST PRSMV CHEM ANLYZR: CPT | Performed by: NURSE PRACTITIONER

## 2023-11-13 PROCEDURE — 2500000001 HC RX 250 WO HCPCS SELF ADMINISTERED DRUGS (ALT 637 FOR MEDICARE OP): Performed by: NURSE PRACTITIONER

## 2023-11-13 PROCEDURE — 99285 EMERGENCY DEPT VISIT HI MDM: CPT | Mod: 25 | Performed by: EMERGENCY MEDICINE

## 2023-11-13 PROCEDURE — 99239 HOSP IP/OBS DSCHRG MGMT >30: CPT | Performed by: NURSE PRACTITIONER

## 2023-11-13 PROCEDURE — A4217 STERILE WATER/SALINE, 500 ML: HCPCS | Performed by: NURSE PRACTITIONER

## 2023-11-13 PROCEDURE — 2500000004 HC RX 250 GENERAL PHARMACY W/ HCPCS (ALT 636 FOR OP/ED): Performed by: EMERGENCY MEDICINE

## 2023-11-13 PROCEDURE — 93971 EXTREMITY STUDY: CPT | Performed by: STUDENT IN AN ORGANIZED HEALTH CARE EDUCATION/TRAINING PROGRAM

## 2023-11-13 PROCEDURE — 2500000004 HC RX 250 GENERAL PHARMACY W/ HCPCS (ALT 636 FOR OP/ED): Performed by: NURSE PRACTITIONER

## 2023-11-13 PROCEDURE — 81003 URINALYSIS AUTO W/O SCOPE: CPT | Mod: 59 | Performed by: EMERGENCY MEDICINE

## 2023-11-13 PROCEDURE — 96376 TX/PRO/DX INJ SAME DRUG ADON: CPT

## 2023-11-13 PROCEDURE — 80053 COMPREHEN METABOLIC PANEL: CPT | Performed by: EMERGENCY MEDICINE

## 2023-11-13 PROCEDURE — 36415 COLL VENOUS BLD VENIPUNCTURE: CPT | Performed by: EMERGENCY MEDICINE

## 2023-11-13 PROCEDURE — G0378 HOSPITAL OBSERVATION PER HR: HCPCS

## 2023-11-13 PROCEDURE — 93971 EXTREMITY STUDY: CPT

## 2023-11-13 PROCEDURE — 81025 URINE PREGNANCY TEST: CPT | Performed by: EMERGENCY MEDICINE

## 2023-11-13 PROCEDURE — 96361 HYDRATE IV INFUSION ADD-ON: CPT

## 2023-11-13 PROCEDURE — 96375 TX/PRO/DX INJ NEW DRUG ADDON: CPT

## 2023-11-13 PROCEDURE — 96365 THER/PROPH/DIAG IV INF INIT: CPT

## 2023-11-13 PROCEDURE — 85025 COMPLETE CBC W/AUTO DIFF WBC: CPT | Performed by: EMERGENCY MEDICINE

## 2023-11-13 PROCEDURE — 99222 1ST HOSP IP/OBS MODERATE 55: CPT | Performed by: NURSE PRACTITIONER

## 2023-11-13 RX ORDER — ONDANSETRON 4 MG/1
4 TABLET, FILM COATED ORAL EVERY 8 HOURS PRN
Status: DISCONTINUED | OUTPATIENT
Start: 2023-11-13 | End: 2023-11-13 | Stop reason: HOSPADM

## 2023-11-13 RX ORDER — ZIPRASIDONE MESYLATE 20 MG/ML
10 INJECTION, POWDER, LYOPHILIZED, FOR SOLUTION INTRAMUSCULAR ONCE
Status: DISCONTINUED | OUTPATIENT
Start: 2023-11-13 | End: 2023-11-13

## 2023-11-13 RX ORDER — ONDANSETRON HYDROCHLORIDE 2 MG/ML
4 INJECTION, SOLUTION INTRAVENOUS EVERY 8 HOURS PRN
Status: DISCONTINUED | OUTPATIENT
Start: 2023-11-13 | End: 2023-11-13 | Stop reason: HOSPADM

## 2023-11-13 RX ORDER — OXYCODONE HYDROCHLORIDE 5 MG/1
5 TABLET ORAL EVERY 6 HOURS PRN
Status: DISCONTINUED | OUTPATIENT
Start: 2023-11-13 | End: 2023-11-13

## 2023-11-13 RX ORDER — ENOXAPARIN SODIUM 100 MG/ML
40 INJECTION SUBCUTANEOUS EVERY 24 HOURS
Status: DISCONTINUED | OUTPATIENT
Start: 2023-11-13 | End: 2023-11-13 | Stop reason: HOSPADM

## 2023-11-13 RX ORDER — KETOROLAC TROMETHAMINE 30 MG/ML
30 INJECTION, SOLUTION INTRAMUSCULAR; INTRAVENOUS EVERY 6 HOURS PRN
Status: DISCONTINUED | OUTPATIENT
Start: 2023-11-13 | End: 2023-11-13 | Stop reason: HOSPADM

## 2023-11-13 RX ORDER — ONDANSETRON HYDROCHLORIDE 2 MG/ML
4 INJECTION, SOLUTION INTRAVENOUS ONCE
Status: COMPLETED | OUTPATIENT
Start: 2023-11-13 | End: 2023-11-13

## 2023-11-13 RX ORDER — PANTOPRAZOLE SODIUM 40 MG/1
40 TABLET, DELAYED RELEASE ORAL
Status: DISCONTINUED | OUTPATIENT
Start: 2023-11-14 | End: 2023-11-13 | Stop reason: HOSPADM

## 2023-11-13 RX ORDER — MORPHINE SULFATE 4 MG/ML
4 INJECTION, SOLUTION INTRAMUSCULAR; INTRAVENOUS ONCE
Status: COMPLETED | OUTPATIENT
Start: 2023-11-13 | End: 2023-11-13

## 2023-11-13 RX ORDER — GABAPENTIN 400 MG/1
800 CAPSULE ORAL EVERY 8 HOURS SCHEDULED
Status: DISCONTINUED | OUTPATIENT
Start: 2023-11-13 | End: 2023-11-13 | Stop reason: HOSPADM

## 2023-11-13 RX ORDER — ACETAMINOPHEN 325 MG/1
650 TABLET ORAL ONCE
Status: COMPLETED | OUTPATIENT
Start: 2023-11-13 | End: 2023-11-13

## 2023-11-13 RX ORDER — POLYETHYLENE GLYCOL 3350 17 G/17G
17 POWDER, FOR SOLUTION ORAL DAILY PRN
Status: DISCONTINUED | OUTPATIENT
Start: 2023-11-13 | End: 2023-11-13 | Stop reason: HOSPADM

## 2023-11-13 RX ORDER — VANCOMYCIN HYDROCHLORIDE 1 G/200ML
1000 INJECTION, SOLUTION INTRAVENOUS EVERY 12 HOURS
Status: DISCONTINUED | OUTPATIENT
Start: 2023-11-14 | End: 2023-11-13 | Stop reason: HOSPADM

## 2023-11-13 RX ORDER — DOCUSATE SODIUM 100 MG/1
100 CAPSULE, LIQUID FILLED ORAL 2 TIMES DAILY
Status: DISCONTINUED | OUTPATIENT
Start: 2023-11-13 | End: 2023-11-13 | Stop reason: HOSPADM

## 2023-11-13 RX ORDER — OXYCODONE HYDROCHLORIDE 5 MG/1
10 TABLET ORAL ONCE
Status: COMPLETED | OUTPATIENT
Start: 2023-11-13 | End: 2023-11-13

## 2023-11-13 RX ADMIN — OXYCODONE HYDROCHLORIDE 5 MG: 5 TABLET ORAL at 20:21

## 2023-11-13 RX ADMIN — OXYCODONE HYDROCHLORIDE 5 MG: 5 TABLET ORAL at 13:14

## 2023-11-13 RX ADMIN — OXYCODONE HYDROCHLORIDE 10 MG: 5 TABLET ORAL at 21:25

## 2023-11-13 RX ADMIN — PIPERACILLIN SODIUM AND TAZOBACTAM SODIUM 3.38 G: 3; .375 INJECTION, SOLUTION INTRAVENOUS at 15:23

## 2023-11-13 RX ADMIN — VANCOMYCIN HYDROCHLORIDE 1250 MG: 10 INJECTION, POWDER, LYOPHILIZED, FOR SOLUTION INTRAVENOUS at 16:33

## 2023-11-13 RX ADMIN — ONDANSETRON 4 MG: 2 INJECTION INTRAMUSCULAR; INTRAVENOUS at 10:04

## 2023-11-13 RX ADMIN — ACETAMINOPHEN 650 MG: 325 TABLET ORAL at 21:25

## 2023-11-13 RX ADMIN — SODIUM CHLORIDE 1000 ML: 9 INJECTION, SOLUTION INTRAVENOUS at 10:05

## 2023-11-13 RX ADMIN — MORPHINE SULFATE 4 MG: 4 INJECTION, SOLUTION INTRAMUSCULAR; INTRAVENOUS at 10:04

## 2023-11-13 RX ADMIN — MORPHINE SULFATE 4 MG: 4 INJECTION, SOLUTION INTRAMUSCULAR; INTRAVENOUS at 11:31

## 2023-11-13 RX ADMIN — GABAPENTIN 800 MG: 400 CAPSULE ORAL at 15:22

## 2023-11-13 SDOH — HEALTH STABILITY: MENTAL HEALTH: HOW MANY STANDARD DRINKS CONTAINING ALCOHOL DO YOU HAVE ON A TYPICAL DAY?: PATIENT DOES NOT DRINK

## 2023-11-13 SDOH — ECONOMIC STABILITY: INCOME INSECURITY: IN THE PAST 12 MONTHS, HAS THE ELECTRIC, GAS, OIL, OR WATER COMPANY THREATENED TO SHUT OFF SERVICE IN YOUR HOME?: YES

## 2023-11-13 SDOH — SOCIAL STABILITY: SOCIAL INSECURITY: HAVE YOU HAD THOUGHTS OF HARMING ANYONE ELSE?: NO

## 2023-11-13 SDOH — ECONOMIC STABILITY: INCOME INSECURITY: HOW HARD IS IT FOR YOU TO PAY FOR THE VERY BASICS LIKE FOOD, HOUSING, MEDICAL CARE, AND HEATING?: SOMEWHAT HARD

## 2023-11-13 SDOH — ECONOMIC STABILITY: INCOME INSECURITY: IN THE LAST 12 MONTHS, WAS THERE A TIME WHEN YOU WERE NOT ABLE TO PAY THE MORTGAGE OR RENT ON TIME?: NO

## 2023-11-13 SDOH — SOCIAL STABILITY: SOCIAL INSECURITY
WITHIN THE LAST YEAR, HAVE YOU BEEN KICKED, HIT, SLAPPED, OR OTHERWISE PHYSICALLY HURT BY YOUR PARTNER OR EX-PARTNER?: YES

## 2023-11-13 SDOH — SOCIAL STABILITY: SOCIAL INSECURITY: DO YOU FEEL UNSAFE GOING BACK TO THE PLACE WHERE YOU ARE LIVING?: NO

## 2023-11-13 SDOH — SOCIAL STABILITY: SOCIAL NETWORK: ARE YOU MARRIED, WIDOWED, DIVORCED, SEPARATED, NEVER MARRIED, OR LIVING WITH A PARTNER?: SEPARATED

## 2023-11-13 SDOH — SOCIAL STABILITY: SOCIAL NETWORK
DO YOU BELONG TO ANY CLUBS OR ORGANIZATIONS SUCH AS CHURCH GROUPS UNIONS, FRATERNAL OR ATHLETIC GROUPS, OR SCHOOL GROUPS?: NO

## 2023-11-13 SDOH — SOCIAL STABILITY: SOCIAL INSECURITY: WITHIN THE LAST YEAR, HAVE YOU BEEN AFRAID OF YOUR PARTNER OR EX-PARTNER?: YES

## 2023-11-13 SDOH — SOCIAL STABILITY: SOCIAL NETWORK
IN A TYPICAL WEEK, HOW MANY TIMES DO YOU TALK ON THE PHONE WITH FAMILY, FRIENDS, OR NEIGHBORS?: MORE THAN THREE TIMES A WEEK

## 2023-11-13 SDOH — ECONOMIC STABILITY: HOUSING INSECURITY
IN THE LAST 12 MONTHS, WAS THERE A TIME WHEN YOU DID NOT HAVE A STEADY PLACE TO SLEEP OR SLEPT IN A SHELTER (INCLUDING NOW)?: NO

## 2023-11-13 SDOH — HEALTH STABILITY: MENTAL HEALTH: HOW OFTEN DO YOU HAVE A DRINK CONTAINING ALCOHOL?: NEVER

## 2023-11-13 SDOH — ECONOMIC STABILITY: FOOD INSECURITY: WITHIN THE PAST 12 MONTHS, THE FOOD YOU BOUGHT JUST DIDN'T LAST AND YOU DIDN'T HAVE MONEY TO GET MORE.: SOMETIMES TRUE

## 2023-11-13 SDOH — SOCIAL STABILITY: SOCIAL INSECURITY: HAS ANYONE EVER THREATENED TO HURT YOUR FAMILY OR YOUR PETS?: NO

## 2023-11-13 SDOH — SOCIAL STABILITY: SOCIAL INSECURITY: WERE YOU ABLE TO COMPLETE ALL THE BEHAVIORAL HEALTH SCREENINGS?: NO

## 2023-11-13 SDOH — ECONOMIC STABILITY: HOUSING INSECURITY: IN THE LAST 12 MONTHS, HOW MANY PLACES HAVE YOU LIVED?: 2

## 2023-11-13 SDOH — SOCIAL STABILITY: SOCIAL INSECURITY
WITHIN THE LAST YEAR, HAVE TO BEEN RAPED OR FORCED TO HAVE ANY KIND OF SEXUAL ACTIVITY BY YOUR PARTNER OR EX-PARTNER?: YES

## 2023-11-13 SDOH — ECONOMIC STABILITY: FOOD INSECURITY: WITHIN THE PAST 12 MONTHS, YOU WORRIED THAT YOUR FOOD WOULD RUN OUT BEFORE YOU GOT MONEY TO BUY MORE.: SOMETIMES TRUE

## 2023-11-13 SDOH — HEALTH STABILITY: MENTAL HEALTH
STRESS IS WHEN SOMEONE FEELS TENSE, NERVOUS, ANXIOUS, OR CAN'T SLEEP AT NIGHT BECAUSE THEIR MIND IS TROUBLED. HOW STRESSED ARE YOU?: RATHER MUCH

## 2023-11-13 SDOH — HEALTH STABILITY: MENTAL HEALTH: HOW OFTEN DO YOU HAVE 6 OR MORE DRINKS ON ONE OCCASION?: NEVER

## 2023-11-13 SDOH — SOCIAL STABILITY: SOCIAL INSECURITY: WITHIN THE LAST YEAR, HAVE YOU BEEN HUMILIATED OR EMOTIONALLY ABUSED IN OTHER WAYS BY YOUR PARTNER OR EX-PARTNER?: YES

## 2023-11-13 SDOH — HEALTH STABILITY: PHYSICAL HEALTH: ON AVERAGE, HOW MANY DAYS PER WEEK DO YOU ENGAGE IN MODERATE TO STRENUOUS EXERCISE (LIKE A BRISK WALK)?: 0 DAYS

## 2023-11-13 SDOH — SOCIAL STABILITY: SOCIAL NETWORK: HOW OFTEN DO YOU GET TOGETHER WITH FRIENDS OR RELATIVES?: MORE THAN THREE TIMES A WEEK

## 2023-11-13 SDOH — SOCIAL STABILITY: SOCIAL NETWORK: HOW OFTEN DO YOU ATTENT MEETINGS OF THE CLUB OR ORGANIZATION YOU BELONG TO?: NEVER

## 2023-11-13 SDOH — SOCIAL STABILITY: SOCIAL INSECURITY: ARE YOU OR HAVE YOU BEEN THREATENED OR ABUSED PHYSICALLY, EMOTIONALLY, OR SEXUALLY BY ANYONE?: NO

## 2023-11-13 SDOH — ECONOMIC STABILITY: TRANSPORTATION INSECURITY
IN THE PAST 12 MONTHS, HAS THE LACK OF TRANSPORTATION KEPT YOU FROM MEDICAL APPOINTMENTS OR FROM GETTING MEDICATIONS?: YES

## 2023-11-13 SDOH — SOCIAL STABILITY: SOCIAL INSECURITY: DO YOU FEEL ANYONE HAS EXPLOITED OR TAKEN ADVANTAGE OF YOU FINANCIALLY OR OF YOUR PERSONAL PROPERTY?: NO

## 2023-11-13 SDOH — SOCIAL STABILITY: SOCIAL INSECURITY: ABUSE: ADULT

## 2023-11-13 SDOH — SOCIAL STABILITY: SOCIAL INSECURITY: ARE THERE ANY APPARENT SIGNS OF INJURIES/BEHAVIORS THAT COULD BE RELATED TO ABUSE/NEGLECT?: NO

## 2023-11-13 SDOH — HEALTH STABILITY: PHYSICAL HEALTH: ON AVERAGE, HOW MANY MINUTES DO YOU ENGAGE IN EXERCISE AT THIS LEVEL?: 0 MIN

## 2023-11-13 SDOH — SOCIAL STABILITY: SOCIAL INSECURITY: DOES ANYONE TRY TO KEEP YOU FROM HAVING/CONTACTING OTHER FRIENDS OR DOING THINGS OUTSIDE YOUR HOME?: NO

## 2023-11-13 SDOH — SOCIAL STABILITY: SOCIAL NETWORK: HOW OFTEN DO YOU ATTEND CHURCH OR RELIGIOUS SERVICES?: NEVER

## 2023-11-13 ASSESSMENT — COGNITIVE AND FUNCTIONAL STATUS - GENERAL
MOBILITY SCORE: 24
MOBILITY SCORE: 24
DAILY ACTIVITIY SCORE: 24
DAILY ACTIVITIY SCORE: 24
PATIENT BASELINE BEDBOUND: NO

## 2023-11-13 ASSESSMENT — LIFESTYLE VARIABLES
AUDIT-C TOTAL SCORE: 0
HOW MANY STANDARD DRINKS CONTAINING ALCOHOL DO YOU HAVE ON A TYPICAL DAY: PATIENT DOES NOT DRINK
AUDIT-C TOTAL SCORE: 0
SKIP TO QUESTIONS 9-10: 1
SUBSTANCE_ABUSE_PAST_12_MONTHS: NO
HOW OFTEN DO YOU HAVE 6 OR MORE DRINKS ON ONE OCCASION: NEVER
SKIP TO QUESTIONS 9-10: 1
PRESCIPTION_ABUSE_PAST_12_MONTHS: NO
AUDIT-C TOTAL SCORE: 0
HOW OFTEN DO YOU HAVE A DRINK CONTAINING ALCOHOL: NEVER

## 2023-11-13 ASSESSMENT — PAIN SCALES - GENERAL
PAINLEVEL_OUTOF10: 8
PAINLEVEL_OUTOF10: 10 - WORST POSSIBLE PAIN
PAINLEVEL_OUTOF10: 9
PAINLEVEL_OUTOF10: 7
PAINLEVEL_OUTOF10: 8

## 2023-11-13 ASSESSMENT — ACTIVITIES OF DAILY LIVING (ADL)
ADEQUATE_TO_COMPLETE_ADL: YES
WALKS IN HOME: INDEPENDENT
HEARING - LEFT EAR: FUNCTIONAL
JUDGMENT_ADEQUATE_SAFELY_COMPLETE_DAILY_ACTIVITIES: YES
LACK_OF_TRANSPORTATION: YES
HEARING - RIGHT EAR: FUNCTIONAL
TOILETING: INDEPENDENT
GROOMING: INDEPENDENT
DRESSING YOURSELF: INDEPENDENT
FEEDING YOURSELF: INDEPENDENT
BATHING: INDEPENDENT
PATIENT'S MEMORY ADEQUATE TO SAFELY COMPLETE DAILY ACTIVITIES?: YES

## 2023-11-13 ASSESSMENT — PAIN DESCRIPTION - DESCRIPTORS: DESCRIPTORS: BURNING

## 2023-11-13 ASSESSMENT — PAIN - FUNCTIONAL ASSESSMENT
PAIN_FUNCTIONAL_ASSESSMENT: 0-10

## 2023-11-13 ASSESSMENT — COLUMBIA-SUICIDE SEVERITY RATING SCALE - C-SSRS
1. IN THE PAST MONTH, HAVE YOU WISHED YOU WERE DEAD OR WISHED YOU COULD GO TO SLEEP AND NOT WAKE UP?: NO
2. HAVE YOU ACTUALLY HAD ANY THOUGHTS OF KILLING YOURSELF?: NO
6. HAVE YOU EVER DONE ANYTHING, STARTED TO DO ANYTHING, OR PREPARED TO DO ANYTHING TO END YOUR LIFE?: NO

## 2023-11-13 ASSESSMENT — PATIENT HEALTH QUESTIONNAIRE - PHQ9
2. FEELING DOWN, DEPRESSED OR HOPELESS: NOT AT ALL
1. LITTLE INTEREST OR PLEASURE IN DOING THINGS: NOT AT ALL
SUM OF ALL RESPONSES TO PHQ9 QUESTIONS 1 & 2: 0

## 2023-11-13 ASSESSMENT — PAIN DESCRIPTION - ORIENTATION: ORIENTATION: RIGHT

## 2023-11-13 ASSESSMENT — PAIN DESCRIPTION - LOCATION: LOCATION: OTHER (COMMENT)

## 2023-11-13 ASSESSMENT — ENCOUNTER SYMPTOMS
WOUND: 1
MYALGIAS: 1

## 2023-11-13 ASSESSMENT — PAIN DESCRIPTION - PAIN TYPE: TYPE: ACUTE PAIN

## 2023-11-13 NOTE — PROGRESS NOTES
Transitional Care Coordination Progress Note:  Plan per Medical/Surgical team: treatment of CP with morphine, IV fluids  Status:obs  Payor source: [place] medicaid  Discharge disposition: home with dependent daughter  Potential Barriers: HX of lupus flares  ADOD: 11/14/2023  JOE Good RN, BSN Transitional Care Coordinator ED# [phone]      11/13/23 1149   Discharge Planning   Living Arrangements Children   Support Systems Parent   Assistance Needed cardio work up   Type of Residence Private residence   Number of Stairs to Enter Residence 12   Number of Stairs Within Residence 0   Do you have animals or pets at home? No   Home or Post Acute Services None   Patient expects to be discharged to: home with dependent daughter   Does the patient need discharge transport arranged? Yes   RoundTrip coordination needed? Yes   Has discharge transport been arranged? No   Financial Resource Strain   How hard is it for you to pay for the very basics like food, housing, medical care, and heating? Somewhat   Housing Stability   In the last 12 months, was there a time when you were not able to pay the mortgage or rent on time? N   In the last 12 months, how many places have you lived? 2   In the last 12 months, was there a time when you did not have a steady place to sleep or slept in a shelter (including now)? N   Transportation Needs   In the past 12 months, has lack of transportation kept you from medical appointments or from getting medications? yes   In the past 12 months, has lack of transportation kept you from meetings, work, or from getting things needed for daily living? Yes

## 2023-11-13 NOTE — ED TRIAGE NOTES
Pt BIBA with the R-upper ext pain, groin pain and pain in the axilla described as a 10/10 burning pain. Pt states that she is having a lupus flare up and her pain has become unbearable.

## 2023-11-13 NOTE — NURSING NOTE
1300 patient arrived to floor. C/O 8/10 pain. Medicated per mar. NP aware of pain and request for stronger meds. Call light within reach. All safety measures in place.

## 2023-11-13 NOTE — ED PROVIDER NOTES
HPI   Chief Complaint   Patient presents with   • Pain       Chief complaint: Arm pain    History of present illness: Patient is a 33-year-old female with history of head adenitis suppurativa, chronic pain presenting to the emergency department with complaints of worsening pain and swelling in the right upper extremity.  According to the patient, she has a long history of hidradenitis suppurativa.  The patient states that she has been taking antibiotics as prescribed.  The patient states that over the past several days she has noticed that she has had pain and swelling in her right upper extremity.  The patient denies any fever with this.  She has complaints of diffuse malaise.  Concern, the patient presents to the emergency department for further evaluation.        History provided by:  Patient   used: No                        Friendship Coma Scale Score: 15                  Patient History   Past Medical History:   Diagnosis Date   • Hidradenitis suppurativa 10/29/2020    Hidradenitis   • Lupus (CMS/HCC)      Past Surgical History:   Procedure Laterality Date   • OTHER SURGICAL HISTORY  09/19/2022    Arm surgery     No family history on file.  Social History     Tobacco Use   • Smoking status: Some Days     Years: 20     Types: Cigarettes   • Smokeless tobacco: Never   Substance Use Topics   • Alcohol use: Not on file   • Drug use: Not on file       Physical Exam   ED Triage Vitals [11/13/23 0815]   Temp Heart Rate Resp BP   36.7 °C (98.1 °F) 76 20 121/82      SpO2 Temp Source Heart Rate Source Patient Position   98 % Temporal -- --      BP Location FiO2 (%)     -- --       Physical Exam  Vitals and nursing note reviewed.   Constitutional:       General: She is not in acute distress.     Appearance: She is well-developed.   HENT:      Head: Normocephalic and atraumatic.   Eyes:      Conjunctiva/sclera: Conjunctivae normal.   Cardiovascular:      Rate and Rhythm: Normal rate and regular rhythm.       Heart sounds: No murmur heard.  Pulmonary:      Effort: Pulmonary effort is normal. No respiratory distress.      Breath sounds: Normal breath sounds.   Abdominal:      Palpations: Abdomen is soft.      Tenderness: There is no abdominal tenderness.   Musculoskeletal:         General: No swelling.      Cervical back: Neck supple.   Skin:     General: Skin is warm and dry.      Capillary Refill: Capillary refill takes less than 2 seconds.      Comments: Has erythema of her right upper extremity extending to the level of the elbow.   Neurological:      Mental Status: She is alert.   Psychiatric:         Mood and Affect: Mood normal.         ED Course & [facility]   Diagnoses as of 11/16/23 1407   Cellulitis of arm, right   Failure of outpatient treatment       Medical Decision Making  Medical decision making: Patient remained stable throughout her time in the emergency department.  CBC demonstrated no significant abnormalities.  Patient's Chem-7 was within normal limits.  Beta-hCG was normal.  Urinalysis was unremarkable.  Meanwhile venous duplex of the patient's right upper extremity demonstrated no significant abnormalities including no evidence of DVT of the right upper extremity.    Patient presents to the emergency department with complaints of pain and swelling of her right upper extremity.  Work-up above demonstrated no significant abnormalities.  Given the patient's complaints with antibiotics as an outpatient and given her worsening cellulitis of the right upper extremity, I feel the patient requires admission to the hospital for further evaluation and therapy.  I spoke with the hospitalist regarding this patient's case they expressed understanding and agreement with this course of action.  The patient was then admitted to the hospital in otherwise stable condition.    Amount and/or Complexity of Data Reviewed  Labs: ordered. Decision-making details documented in ED Course.  Radiology: ordered. Decision-making  details documented in ED Course.        Procedure  Procedures     Dakota Vergara MD  11/16/23 1820
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
## 2023-11-13 NOTE — H&P
History Of Present Illness  Sandrita Adams is a 33 y.o. female presenting with intractable pain and hidradentis suppurativa. She reports she has chronic pain due to the hydradentis suppurativa and the lupus. She reports she did come to the ER on Friday due to worsening pain and was discharged, however due to worsening pain she came back. She reports this morning her right arm was immobilized as she was unable to move due to pain. She endorses purulent drainage from abscesses. She endorses following with ID, and is currently on bactrim daily and alternating clindamycin and doxycyline every 2 weeks, reports she has been on this regimen for 13 years. She does appear to have chronic pain also, she does have a lengthy OARRS report showing 21 prescribing providers and 20 different pharmacies over the past two years. It does appear she was Rx'd suboxone in October of this year. It also appears she has seen pain management several times through the years. She saw Dr. Murphy in 2020 who recommended therapy and non narcotic options. She also saw Pain Management at [place] in 2021 who recommended that chronic opioid therapy is not appropriate for patient and can potentially worsen chronic pain, also questioned psychiatric comorbidity contributing.      Past Medical History  Past Medical History:   Diagnosis Date    Hidradenitis suppurativa 10/29/2020    Hidradenitis    Lupus (CMS/[facility])        Surgical History  Past Surgical History:   Procedure Laterality Date    OTHER SURGICAL HISTORY  09/19/2022    Arm surgery        Social History  She reports that she has been smoking cigarettes. She has never used smokeless tobacco. No history on file for alcohol use and drug use.    Family History  No family history on file.     Allergies  Suboxone [buprenorphine-naloxone], Clarithromycin, Levofloxacin, Clindamycin, Haloperidol, Keflex [cephalexin], and Reglan [metoclopramide hcl]    Review of Systems   Musculoskeletal:   "Positive for myalgias.   Skin:  Positive for wound.   All other systems reviewed and are negative.       Physical Exam  Constitutional:       Appearance: Normal appearance.   Cardiovascular:      Rate and Rhythm: Normal rate and regular rhythm.      Pulses: Normal pulses.      Heart sounds: Normal heart sounds. No murmur heard.     No gallop.   Pulmonary:      Effort: Pulmonary effort is normal. No respiratory distress.      Breath sounds: Normal breath sounds. No wheezing or rhonchi.   Abdominal:      General: Abdomen is flat. There is no distension.      Palpations: Abdomen is soft.      Tenderness: There is no abdominal tenderness. There is no guarding.   Musculoskeletal:         General: Normal range of motion.   Skin:     General: Skin is warm.      Capillary Refill: Capillary refill takes less than 2 seconds.      Findings: Abscess present.             Comments: To groin, right axilla, and groin    Neurological:      Mental Status: She is alert and oriented to person, place, and time.   Psychiatric:         Mood and Affect: Mood normal.         Thought Content: Thought content normal.         Judgment: Judgment normal.          Last Recorded Vitals  Blood pressure 126/77, pulse 68, temperature 36.7 °C (98.1 °F), temperature source Temporal, resp. rate 18, height 1.549 m (5' 1\"), weight 77.1 kg (170 lb), last menstrual period 10/06/2023, SpO2 100 %.    Relevant Results  Scheduled medications  docusate sodium, 100 mg, oral, BID  enoxaparin, 40 mg, subcutaneous, q24h  gabapentin, 800 mg, oral, q8h ANDREA  [START ON 11/14/2023] pantoprazole, 40 mg, oral, Daily before breakfast  piperacillin-tazobactam, 3.375 g, intravenous, q6h      Continuous medications     PRN medications  PRN medications: ketorolac, ondansetron **OR** ondansetron, oxyCODONE, polyethylene glycol    Vascular US upper extremity venous duplex right    Result Date: 11/13/2023  Interpreted By:  Art King, STUDY: VASC US UPPER EXTREMITY VENOUS " DUPLEX RIGHT  11/13/2023 10:21 am   INDICATION: 32 y/o   F with  Signs/Symptoms:Edema. Pain..   COMPARISON: None.   ACCESSION NUMBER(S): [id number]   ORDERING CLINICIAN: KEN ANDRES   TECHNIQUE: Routine ultrasound of the  right upper extremity was performed with duplex Doppler (color and spectral) evaluation.   Static images were obtained for remote interpretation.   FINDINGS: UPPER EXTREMITY VEINS:  Examination was performed with color and duplex Doppler.   The internal jugular, subclavian, and veins are patent and free of thrombus. Limited compression of the right axillary vein due to pain. However there is normal Doppler flow. The visualized brachiocephalic veins are patent.  The brachial, basilic, and cephalic veins are patent and compressible.       Negative study.  No thrombus in the central veins of the  right upper extremity.   MACRO: None   Signed by: Art King 11/13/2023 10:40 AM Dictation workstation:   [id number]    BI US breast complete left    Result Date: 10/30/2023  EXAM: Left US BREAST/AXILLA LEFT CLINICAL HISTORY: Patient is 33 years old and is seen for diagnostic exam. The patient has no personal history of cancer. The patient has the following family history of breast cancer:  female cousin, at age 45, breast cancer. COMPARISON: The present examination has been compared to a prior imaging study performed at [facility] on 10/12/2023. TECHNIQUE: Handheld real-time ultrasound study was done by registered technologist with images submitted for review and dictation. Examination was done in a directed fashion of the site(s) of interest. ULTRASOUND FINDINGS: Additional evaluation was performed for the palpable lump left breast for which the patient presented for a mammogram on 10/12/2023. There is no sonographic correlate in the area of the palpable lump in the left breast at 2 o'clock, 7 cm from the nipple. The area is soft on elastography. IMPRESSION: There is  no evidence of malignancy. No findings to explain the left palpable lump.  Recommend clinical follow-up. A return to screening at age 38 is recommended. BI-RADS Category 1: Negative    CT chest abdomen pelvis w IV contrast    Result Date: 10/20/2023  STUDY: CT Chest, Abdomen, and Pelvis with IV Contrast; 10/20/2023 7:02 AM INDICATION: Back and abdominal pain.  Breast mass. COMPARISON: CT AP 6/22/23, 4/24/23. ACCESSION NUMBER(S): [id number] ORDERING CLINICIAN: FADI RENEE TECHNIQUE: CT of the chest, abdomen, and pelvis was performed.  Contiguous axial images were obtained at 3 mm slice thickness through the chest, abdomen, and pelvis.  Coronal and sagittal reconstructions at 3 mm slice thickness were performed.  Omnipaque 350 100 mL was administered intravenously. FINDINGS: CHEST: MEDIASTINUM: The root of the mediastinum appears to be normal.  The thyroid gland region normal.  There appears to be some soft tissue in the anterior mediastinum that is triangular in shape and has the appearance of thymus but is more prominent than one normally sees in a patient this age. No significant adenopathy---- there are some small lymph nodes noted in the pretracheal region, precarinal region and just to the left of the aorta.  No subcarinal adenopathy.  No cardiomegaly.  No distinct pericardial effusion or cardiac chamber enlargement.  The airway appears normal LUNGS/PLEURA: No pneumothorax or effusion.  No focal pulmonary nodular opacity but there are some areas of patchy both linear and groundglass opacities scattered throughout the lung.  Nonspecific but can be seen with areas of hypersensitivity pneumonia ,  Covid pneumonia sequela and pneumonitis.  Even things such as hot tub lung  can present in this fashion.  Correlate with vaping  history. LYMPH NODES: Thoracic lymph nodes are not enlarged.--- However, scattered thoracic lymph nodes are identified. Bony Thorax: No fracture no focal bone lesion Bilateral dense  breast shadows with no distinct evidence of asymmetry of density in this patient reportedly has a history of breast nodules. ABDOMEN:  LIVER: No hepatomegaly.  Smooth surface contour.  Normal attenuation.  BILE DUCTS: No intrahepatic or extrahepatic biliary ductal dilatation.  GALLBLADDER: The gallbladder is normal appearing STOMACH: No abnormalities identified.  PANCREAS: No masses or ductal dilatation.  SPLEEN: No splenomegaly or focal splenic lesion.  ADRENAL GLANDS: No thickening or nodules.  KIDNEYS AND URETERS: Kidneys are normal in size and location.  No renal or ureteral calculi.  PELVIS:  BLADDER: No abnormalities identified.  No abnormal density  REPRODUCTIVE ORGANS: Uterus appears to be normal.  There is adnexal asymmetry ---There is a  prominent cystic lesion at the level of the right adnexa measuring as much as 3 x 5 cm.  Probable small cyst of the left ovary is well and there is free fluid slightly more than one typically sees for physiologic amounts.  The uterus appears to be normal as does the region of the endometrial area---- no gross abnormality at the level the cervix  BOWEL: No abnormalities identified.  Moderate stool throughout the colon--- no significant diverticulosis/diverticulitis.  VESSELS: No abnormalities identified.  Abdominal aorta is normal in caliber.  PERITONEUM/RETROPERITONEUM/LYMPH NODES: Free fluid noted in the depths of the pelvis somewhat more than physiologic amounts.   No pneumoperitoneum. No retroperitoneal lymphadenopathy.  ABDOMINAL WALL: No abnormalities identified. SOFT TISSUES: Scattered lymph nodes at the level of the inguinal region bilaterally BONES: Normal alignment of the lumbar vertebral bodies.  At most some very mild degenerative disc changes and facet degenerative changes.  Bony confines of the pelvis appear normal.    Chest CT: Mildly prominent thymus for age---- this appears be somewhat more prominent than it was on the previous chest CT scan Bilaterally  dense breasts with no distinct CT evidence of asymmetry---- correlate with mammography Scattered small lymph nodes of the mediastinum ---none appear to be size significant No distinct cardiomegaly or pericardial effusion ---minimal calcifications of the aorta incidentally noted Patchy areas of linear and groundglass opacities scattered about both lungs.  This is nonspecific but can be seen with things such as hypersensitivity pneumonitis, infectious pneumonitis and Covid sequela to name a few.  Correlate with fever history or environmental exposure history.  Correlate with fever history. No focal bony abnormality is identified.  No focal rib abnormality or thoracic spine abnormality seen ----at most minimal thoracic spine degenerative changes identified. Abd/Pelvis: There appears to be some prominence of the right adnexa with a dominant low-density area in the right adnexa likely cystic or complex cyst.  This measures over 5 x 3 cm and warrants follow-up to ensure resolution Suspect small probably follicular cysts of the left ovary There is a small amount of free fluid in the cul-de-sac slightly more than physiologic amounts No peribowel inflammatory process region of the appendix is normal Adenopathy at the level of both inguinal regions slightly more than one typically sees.  Question reactive adenopathy No retroperitoneal adenopathy is identified. No free air or bowel obstructive changes Signed by Garrick Ibarra MD     Results for orders placed or performed during the hospital encounter of 11/13/23 (from the past 24 hour(s))   Urinalysis with Reflex Microscopic   Result Value Ref Range    Color, Urine Straw Straw, Yellow    Appearance, Urine Clear Clear    Specific Gravity, Urine 1.025 1.005 - 1.035    pH, Urine 5.5 5.0, 5.5, 6.0, 6.5, 7.0, 7.5, 8.0    Protein, Urine NEGATIVE NEGATIVE, 10 (TRACE) mg/dL    Glucose, Urine NEGATIVE NEGATIVE mg/dL    Blood, Urine NEGATIVE NEGATIVE    Ketones, Urine NEGATIVE  NEGATIVE mg/dL    Bilirubin, Urine NEGATIVE NEGATIVE    Urobilinogen, Urine NORM NORM mg/dL    Nitrite, Urine NEGATIVE NEGATIVE    Leukocyte Esterase, Urine NEGATIVE NEGATIVE   hCG, Urine, Qualitative   Result Value Ref Range    HCG, Urine NEGATIVE NEGATIVE   CBC and Auto Differential   Result Value Ref Range    WBC 8.2 4.4 - 11.3 x10*3/uL    nRBC 0.0 0.0 - 0.0 /100 WBCs    RBC 4.93 4.00 - 5.20 x10*6/uL    Hemoglobin 12.5 12.0 - 16.0 g/dL    Hematocrit 38.4 36.0 - 46.0 %    MCV 78 (L) 80 - 100 fL    MCH 25.4 (L) 26.0 - 34.0 pg    MCHC 32.6 32.0 - 36.0 g/dL    RDW 15.6 (H) 11.5 - 14.5 %    Platelets 343 150 - 450 x10*3/uL    Neutrophils % 64.0 40.0 - 80.0 %    Immature Granulocytes %, Automated 0.2 0.0 - 0.9 %    Lymphocytes % 27.5 13.0 - 44.0 %    Monocytes % 7.5 2.0 - 10.0 %    Eosinophils % 0.6 0.0 - 6.0 %    Basophils % 0.2 0.0 - 2.0 %    Neutrophils Absolute 5.25 1.20 - 7.70 x10*3/uL    Immature Granulocytes Absolute, Automated 0.02 0.00 - 0.70 x10*3/uL    Lymphocytes Absolute 2.26 1.20 - 4.80 x10*3/uL    Monocytes Absolute 0.62 0.10 - 1.00 x10*3/uL    Eosinophils Absolute 0.05 0.00 - 0.70 x10*3/uL    Basophils Absolute 0.02 0.00 - 0.10 x10*3/uL   Comprehensive metabolic panel   Result Value Ref Range    Glucose 92 74 - 99 mg/dL    Sodium 137 136 - 145 mmol/L    Potassium 4.3 3.5 - 5.3 mmol/L    Chloride 108 (H) 98 - 107 mmol/L    Bicarbonate 21 21 - 32 mmol/L    Anion Gap 12 10 - 20 mmol/L    Urea Nitrogen 15 6 - 23 mg/dL    Creatinine 0.84 0.50 - 1.05 mg/dL    eGFR >90 >60 mL/min/1.73m*2    Calcium 8.7 8.6 - 10.3 mg/dL    Albumin 3.8 3.4 - 5.0 g/dL    Alkaline Phosphatase 58 33 - 110 U/L    Total Protein 7.3 6.4 - 8.2 g/dL    AST 15 9 - 39 U/L    Bilirubin, Total 0.4 0.0 - 1.2 mg/dL    ALT 11 7 - 45 U/L                Assessment/Plan   Principal Problem:    Intractable pain    Sandrita Adams is a 33 y.o. female presenting with intractable pain and hidradentis suppurativa. She reports she has chronic pain  due to the hydradentis suppurativa and the lupus. She reports she did come to the ER on Friday due to worsening pain and was discharged, however due to worsening pain she came back. She reports this morning her right arm was immobilized as she was unable to move due to pain. She endorses purulent drainage from abscesses. She endorses following with ID, and is currently on bactrim daily and alternating clindamycin and doxycyline every 2 weeks, reports she has been on this regimen for 13 years. She does appear to have chronic pain also, she does have a lengthy OARRS report showing 21 prescribing providers and 20 different pharmacies over the past two years. It does appear she was Rx'd suboxone in October of this year. It also appears she has seen pain management several times through the years. She saw Dr. Murphy in 2020 who recommended therapy and non narcotic options. She also saw Pain Management at [place] in 2021 who recommended that chronic opioid therapy is not appropriate for patient and can potentially worsen chronic pain, also questioned psychiatric comorbidity contributing. She also did see pain management at [facility] on 10/30/23    PMHX lupus, hidradenitis suppurative, PTSCD, depression     Hidradentis suppurtiva  -acute on chronic  -endorses worsening pain and immobility to move right arm due to abscess in right axilla  -abscess with purulent drainage noted to right axilla. There is an open abscess underneath right buttock. No open abscess noted to groin, but did note what appeared to be dried white drainage matted in pubic hair  -has been on bactrim daily and doxy and jennifer rotating every 2 weeks for 13 years  -no erythema noted, but given worsening pain and purulent drainage will place on IV zosyn and vanco  -ID consulted, appreciate recs  -no leukocytosis, a febrile    Chronic pain  -5 mg oxycodone Q6 PRN and Q6 PRN toradol  -limit narcotics  -consider pain management consult     DVT  prophylaxis:  Lovenox, SCD    DC plan:  DC home when medically stable         I spent 60 minutes in the professional and overall care of this patient.      Maricruz Portillo, APRN-CNP

## 2023-11-13 NOTE — CARE PLAN
The patient's goals for the shift include Manage pain    The clinical goals for the shift include Pain management

## 2023-11-13 NOTE — PROGRESS NOTES
Home with daughter     11/13/23 3296   Current Planned Discharge Disposition   Current Planned Discharge Disposition Home

## 2023-11-14 NOTE — PROGRESS NOTES
"Vancomycin Dosing by Pharmacy- INITIAL    Sandrita Adams is a 33 y.o. year old female who Pharmacy has been consulted for vancomycin dosing for cellulitis, skin and soft tissue. Based on the patient's indication and renal status this patient will be dosed based on a goal AUC of 400-600.     Renal function is currently stable.    Visit Vitals  /77   Pulse 68   Temp 36.7 °C (98.1 °F) (Temporal)   Resp 18        Lab Results   Component Value Date    CREATININE 0.84 11/13/2023    CREATININE 0.71 11/10/2023    CREATININE 1.12 (H) 10/20/2023    CREATININE 0.87 09/24/2023    CREATININE 0.80 08/22/2023    CREATININE 0.81 08/20/2023    CREATININE 0.71 06/22/2023        Patient weight is No results found for: \"PTWEIGHT\"    No results found for: \"CULTURE\"     No intake/output data recorded.  [unfilled]    No results found for: \"PATIENTTEMP\"       Assessment/Plan     Patient has already been given a loading dose of 1250 mg.  Will initiate vancomycin maintenance,  1000 mg every 12 hours.    This dosing regimen is predicted by [employer] to result in the following pharmacokinetic parameters:  Exposure target: AUC24 (range)400-600 mg/L.hr   AUC24,ss: 459 mg/L.hr  Probability of AUC24 > 400: 64 %  Ctrough,ss: 13.9 mg/L  Probability of Ctrough,ss > 20: 22 %  Probability of nephrotoxicity (Lodise POP 2009): 9 %    Follow-up level will be ordered on 11/14 at 1000 unless clinically indicated sooner.  Will continue to monitor renal function daily while on vancomycin and order serum creatinine at least every 48 hours if not already ordered.  Follow for continued vancomycin needs, clinical response, and signs/symptoms of toxicity.       Dania Palmer [facility]       "

## 2023-11-14 NOTE — DISCHARGE INSTRUCTIONS
Follow up with pain management tomorrow as scheduled at [facility]  Continue on all home antibiotics as prescribed until your appointment tomorrow

## 2023-11-14 NOTE — DISCHARGE SUMMARY
Discharge Diagnosis  Intractable pain    Issues Requiring Follow-Up  Hydradenitis with pain     Discharge Meds     Your medication list        CONTINUE taking these medications        Instructions Last Dose Given Next Dose Due   doxycycline 100 mg tablet  Commonly known as: Adoxa      Take 2 tablets by mouth two times daily with a full glass of water and do not lie down for at least 30 minutes after. Alternate every 7 days with clindamycin       ondansetron 4 mg tablet  Commonly known as: Zofran      Take 1 tablet (4 mg) by mouth every 6 hours for 3 days.       predniSONE 20 mg tablet  Commonly known as: Deltasone      Take 3 tablets (60 mg) by mouth once daily for 4 days. Do not start before November 11, 2023.                Test Results Pending At Discharge  Pending Labs       No current pending labs.            Hospital Course   Hidradentis suppurtiva  -acute on chronic  -endorses worsening pain and immobility to move right arm due to abscess in right axilla  -abscess with purulent drainage noted to right axilla. There is an open abscess underneath right buttock. No open abscess noted to groin, but did note what appeared to be dried white drainage matted in pubic hair  -has been on bactrim daily and doxy and jennifer rotating every 2 weeks for 13 years  -no erythema noted, but given worsening pain and purulent drainage will place on IV zosyn and vanco  -ID consulted, appreciate recs  -no leukocytosis, a febrile     Chronic pain  -5 mg oxycodone Q6 PRN and Q6 PRN toradol  -limit narcotics  -consider pain management consult      DVT prophylaxis:  Lovenox, SCD    Labs/Testing reviewed    Patient was able to get an appointment tomorrow with her pain management at [facility] at 2 PM.  She was able to show me the appointment on her phone under her MyChart from [facility].  She does not want to miss this appointment so she would like to be discharged home.  White blood count is normal, lab work did not show any abnormal findings  consistent with any current infection.  Patient was given 1 more dose of pain medication before she left with no further refills of pain medication for home.  She will follow-up with her pain management doctor tomorrow.  In the meantime she will continue on the antibiotics she is supposed to be on using the daily Bactrim and the doxycycline until any further changes with her doctors at [facility].  Patient will not be forced to sign out AMA at this time due to having normal lab work and doctors appointments tomorrow.    * 45 minutes total spent on patient's care today; >50% time spent on counseling/coordination of care     Pertinent Physical Exam At Time of Discharge  Physical Exam  General: Vital signs stable, Pt is alert, no acute distress  Eyes: Conjunctiva normal, PERRL, EOMs intact  HENMT: Normocephalic, atraumatic, external ears and nose normal, no scars or masses.  No mastoid tenderness. Trachea is midline. No meningeal signs, negative Kernig and Brudzinski, moves neck freely.  No sinus tenderness  Resp: Respiratory effort is normal, no retractions, no stridor. Lungs CTA, no wheezes or rhonchi  CV: Heart is regular rate and rhythm.   Skin: POSITIVE hidradenitis under her axilla and in her groin with some open areas that are draining but no large abscess formations  Skel: full range of motion of upper and lower extremities.   Neuro: Normal gait, CN II-XII intact, no motor or sensory changes.  Psych: Alert and oriented ×3, judgment is appropriate, normal mood and affect    Outpatient Follow-Up  No future appointments.      Honey Griffiths, APRN-CNP

## 2023-12-18 ENCOUNTER — HOSPITAL ENCOUNTER (EMERGENCY)
Facility: HOSPITAL | Age: 33
Discharge: HOME | End: 2023-12-18
Attending: STUDENT IN AN ORGANIZED HEALTH CARE EDUCATION/TRAINING PROGRAM
Payer: MEDICAID

## 2023-12-18 ENCOUNTER — APPOINTMENT (OUTPATIENT)
Dept: RADIOLOGY | Facility: HOSPITAL | Age: 33
End: 2023-12-18
Payer: MEDICAID

## 2023-12-18 VITALS
HEIGHT: 62 IN | BODY MASS INDEX: 33.13 KG/M2 | DIASTOLIC BLOOD PRESSURE: 72 MMHG | RESPIRATION RATE: 16 BRPM | OXYGEN SATURATION: 98 % | SYSTOLIC BLOOD PRESSURE: 134 MMHG | WEIGHT: 180 LBS | TEMPERATURE: 97 F | HEART RATE: 61 BPM

## 2023-12-18 DIAGNOSIS — K59.00 CONSTIPATION, UNSPECIFIED CONSTIPATION TYPE: Primary | ICD-10-CM

## 2023-12-18 LAB
ALBUMIN SERPL BCP-MCNC: 3.8 G/DL (ref 3.4–5)
ALP SERPL-CCNC: 56 U/L (ref 33–110)
ALT SERPL W P-5'-P-CCNC: 14 U/L (ref 7–45)
ANION GAP SERPL CALC-SCNC: 9 MMOL/L (ref 10–20)
APPEARANCE UR: CLEAR
AST SERPL W P-5'-P-CCNC: 14 U/L (ref 9–39)
B-HCG SERPL-ACNC: <2 MIU/ML
BASOPHILS # BLD AUTO: 0.02 X10*3/UL (ref 0–0.1)
BASOPHILS NFR BLD AUTO: 0.3 %
BILIRUB SERPL-MCNC: 0.2 MG/DL (ref 0–1.2)
BILIRUB UR STRIP.AUTO-MCNC: NEGATIVE MG/DL
BUN SERPL-MCNC: 14 MG/DL (ref 6–23)
CALCIUM SERPL-MCNC: 9 MG/DL (ref 8.6–10.3)
CHLORIDE SERPL-SCNC: 105 MMOL/L (ref 98–107)
CO2 SERPL-SCNC: 24 MMOL/L (ref 21–32)
COLOR UR: NORMAL
CREAT SERPL-MCNC: 0.85 MG/DL (ref 0.5–1.05)
EOSINOPHIL # BLD AUTO: 0.05 X10*3/UL (ref 0–0.7)
EOSINOPHIL NFR BLD AUTO: 0.6 %
ERYTHROCYTE [DISTWIDTH] IN BLOOD BY AUTOMATED COUNT: 15.9 % (ref 11.5–14.5)
GFR SERPL CREATININE-BSD FRML MDRD: >90 ML/MIN/1.73M*2
GLUCOSE SERPL-MCNC: 94 MG/DL (ref 74–99)
GLUCOSE UR STRIP.AUTO-MCNC: NEGATIVE MG/DL
HCT VFR BLD AUTO: 38.4 % (ref 36–46)
HGB BLD-MCNC: 12.4 G/DL (ref 12–16)
IMM GRANULOCYTES # BLD AUTO: 0.01 X10*3/UL (ref 0–0.7)
IMM GRANULOCYTES NFR BLD AUTO: 0.1 % (ref 0–0.9)
KETONES UR STRIP.AUTO-MCNC: NEGATIVE MG/DL
LEUKOCYTE ESTERASE UR QL STRIP.AUTO: NEGATIVE
LIPASE SERPL-CCNC: 21 U/L (ref 9–82)
LYMPHOCYTES # BLD AUTO: 1.49 X10*3/UL (ref 1.2–4.8)
LYMPHOCYTES NFR BLD AUTO: 19.1 %
MCH RBC QN AUTO: 24.9 PG (ref 26–34)
MCHC RBC AUTO-ENTMCNC: 32.3 G/DL (ref 32–36)
MCV RBC AUTO: 77 FL (ref 80–100)
MONOCYTES # BLD AUTO: 0.67 X10*3/UL (ref 0.1–1)
MONOCYTES NFR BLD AUTO: 8.6 %
NEUTROPHILS # BLD AUTO: 5.55 X10*3/UL (ref 1.2–7.7)
NEUTROPHILS NFR BLD AUTO: 71.3 %
NITRITE UR QL STRIP.AUTO: NEGATIVE
NRBC BLD-RTO: 0 /100 WBCS (ref 0–0)
PH UR STRIP.AUTO: 6 [PH]
PLATELET # BLD AUTO: 304 X10*3/UL (ref 150–450)
POTASSIUM SERPL-SCNC: 4.3 MMOL/L (ref 3.5–5.3)
PROT SERPL-MCNC: 6.6 G/DL (ref 6.4–8.2)
PROT UR STRIP.AUTO-MCNC: NEGATIVE MG/DL
RBC # BLD AUTO: 4.97 X10*6/UL (ref 4–5.2)
RBC # UR STRIP.AUTO: NEGATIVE /UL
SODIUM SERPL-SCNC: 134 MMOL/L (ref 136–145)
SP GR UR STRIP.AUTO: 1.02
UROBILINOGEN UR STRIP.AUTO-MCNC: <2 MG/DL
WBC # BLD AUTO: 7.8 X10*3/UL (ref 4.4–11.3)

## 2023-12-18 PROCEDURE — 2500000001 HC RX 250 WO HCPCS SELF ADMINISTERED DRUGS (ALT 637 FOR MEDICARE OP): Performed by: STUDENT IN AN ORGANIZED HEALTH CARE EDUCATION/TRAINING PROGRAM

## 2023-12-18 PROCEDURE — 74177 CT ABD & PELVIS W/CONTRAST: CPT

## 2023-12-18 PROCEDURE — 99284 EMERGENCY DEPT VISIT MOD MDM: CPT | Performed by: STUDENT IN AN ORGANIZED HEALTH CARE EDUCATION/TRAINING PROGRAM

## 2023-12-18 PROCEDURE — 96375 TX/PRO/DX INJ NEW DRUG ADDON: CPT

## 2023-12-18 PROCEDURE — 96374 THER/PROPH/DIAG INJ IV PUSH: CPT | Mod: 59

## 2023-12-18 PROCEDURE — 85025 COMPLETE CBC W/AUTO DIFF WBC: CPT | Performed by: STUDENT IN AN ORGANIZED HEALTH CARE EDUCATION/TRAINING PROGRAM

## 2023-12-18 PROCEDURE — 2550000001 HC RX 255 CONTRASTS: Performed by: STUDENT IN AN ORGANIZED HEALTH CARE EDUCATION/TRAINING PROGRAM

## 2023-12-18 PROCEDURE — 2500000004 HC RX 250 GENERAL PHARMACY W/ HCPCS (ALT 636 FOR OP/ED): Performed by: STUDENT IN AN ORGANIZED HEALTH CARE EDUCATION/TRAINING PROGRAM

## 2023-12-18 PROCEDURE — 83690 ASSAY OF LIPASE: CPT | Performed by: STUDENT IN AN ORGANIZED HEALTH CARE EDUCATION/TRAINING PROGRAM

## 2023-12-18 PROCEDURE — 81003 URINALYSIS AUTO W/O SCOPE: CPT | Performed by: STUDENT IN AN ORGANIZED HEALTH CARE EDUCATION/TRAINING PROGRAM

## 2023-12-18 PROCEDURE — 84702 CHORIONIC GONADOTROPIN TEST: CPT | Performed by: STUDENT IN AN ORGANIZED HEALTH CARE EDUCATION/TRAINING PROGRAM

## 2023-12-18 PROCEDURE — 80053 COMPREHEN METABOLIC PANEL: CPT | Performed by: STUDENT IN AN ORGANIZED HEALTH CARE EDUCATION/TRAINING PROGRAM

## 2023-12-18 PROCEDURE — 36415 COLL VENOUS BLD VENIPUNCTURE: CPT | Performed by: STUDENT IN AN ORGANIZED HEALTH CARE EDUCATION/TRAINING PROGRAM

## 2023-12-18 RX ORDER — FENTANYL CITRATE 50 UG/ML
100 INJECTION, SOLUTION INTRAMUSCULAR; INTRAVENOUS ONCE
Status: COMPLETED | OUTPATIENT
Start: 2023-12-18 | End: 2023-12-18

## 2023-12-18 RX ORDER — SYRING-NEEDL,DISP,INSUL,0.3 ML 29 G X1/2"
296 SYRINGE, EMPTY DISPOSABLE MISCELLANEOUS ONCE
Status: COMPLETED | OUTPATIENT
Start: 2023-12-18 | End: 2023-12-18

## 2023-12-18 RX ORDER — BISACODYL 5 MG
10 TABLET, DELAYED RELEASE (ENTERIC COATED) ORAL ONCE
Status: COMPLETED | OUTPATIENT
Start: 2023-12-18 | End: 2023-12-18

## 2023-12-18 RX ORDER — POLYETHYLENE GLYCOL 3350 17 G/17G
17 POWDER, FOR SOLUTION ORAL ONCE
Status: COMPLETED | OUTPATIENT
Start: 2023-12-18 | End: 2023-12-18

## 2023-12-18 RX ORDER — KETOROLAC TROMETHAMINE 30 MG/ML
15 INJECTION, SOLUTION INTRAMUSCULAR; INTRAVENOUS ONCE
Status: COMPLETED | OUTPATIENT
Start: 2023-12-18 | End: 2023-12-18

## 2023-12-18 RX ORDER — ONDANSETRON HYDROCHLORIDE 2 MG/ML
4 INJECTION, SOLUTION INTRAVENOUS ONCE
Status: COMPLETED | OUTPATIENT
Start: 2023-12-18 | End: 2023-12-18

## 2023-12-18 RX ORDER — BISACODYL 5 MG
5 TABLET, DELAYED RELEASE (ENTERIC COATED) ORAL 2 TIMES DAILY
Qty: 14 TABLET | Refills: 0 | Status: SHIPPED | OUTPATIENT
Start: 2023-12-18 | End: 2023-12-25

## 2023-12-18 RX ORDER — POLYETHYLENE GLYCOL 3350 17 G/17G
17 POWDER, FOR SOLUTION ORAL DAILY
Qty: 51 G | Refills: 0 | Status: SHIPPED | OUTPATIENT
Start: 2023-12-18 | End: 2023-12-21

## 2023-12-18 RX ADMIN — ONDANSETRON 4 MG: 2 INJECTION INTRAMUSCULAR; INTRAVENOUS at 14:01

## 2023-12-18 RX ADMIN — KETOROLAC TROMETHAMINE 15 MG: 30 INJECTION, SOLUTION INTRAMUSCULAR at 14:01

## 2023-12-18 RX ADMIN — BE HEALTH MAGNESIUM CITRATE ORAL SOLUTION - LEMON 296 ML: 1.75 LIQUID ORAL at 14:01

## 2023-12-18 RX ADMIN — BISACODYL 10 MG: 5 TABLET, COATED ORAL at 16:19

## 2023-12-18 RX ADMIN — POLYETHYLENE GLYCOL 3350 17 G: 17 POWDER, FOR SOLUTION ORAL at 16:19

## 2023-12-18 RX ADMIN — FENTANYL CITRATE 100 MCG: 50 INJECTION INTRAMUSCULAR; INTRAVENOUS at 15:44

## 2023-12-18 RX ADMIN — IOHEXOL 75 ML: 350 INJECTION, SOLUTION INTRAVENOUS at 15:11

## 2023-12-18 ASSESSMENT — PAIN - FUNCTIONAL ASSESSMENT
PAIN_FUNCTIONAL_ASSESSMENT: 0-10
PAIN_FUNCTIONAL_ASSESSMENT: 0-10

## 2023-12-18 ASSESSMENT — PAIN SCALES - GENERAL
PAINLEVEL_OUTOF10: 9
PAINLEVEL_OUTOF10: 8
PAINLEVEL_OUTOF10: 0 - NO PAIN

## 2023-12-18 ASSESSMENT — PAIN DESCRIPTION - LOCATION: LOCATION: ABDOMEN

## 2023-12-18 NOTE — ED PROVIDER NOTES
EMERGENCY MEDICINE EVALUATION NOTE    History of Present Illness     Chief Complaint:   Chief Complaint   Patient presents with    Abdominal Pain    Nausea    Constipation       HPI: Sandrita Adams is a 33 y.o. female with past medical history of hidradenitis suppurativa and lupus who presents with complaint of abdominal pain, nausea, and constipation.  Patient states her last bowel movement was around 1 week ago.  She states she has been constipated since then and over the past 24 hours is developed worsening generalized abdominal pain and discomfort mainly in the lower abdomen as well as the rectum.  She states she was straining prior to arrival in the toilet.  She states she has tried drinking water and has not tried any other laxatives or medications for treatment.  She has been some nausea without emesis.  Denies any fever, chills, chest pain or shortness of breath, prior abdominal surgeries.  Denies current pregnancy, vaginal discharge, dysuria, hematuria.  Denies any hematochezia.    Previous History     Past Medical History:   Diagnosis Date    Hidradenitis suppurativa 10/29/2020    Hidradenitis    Lupus (CMS/HCC)      Past Surgical History:   Procedure Laterality Date    OTHER SURGICAL HISTORY  09/19/2022    Arm surgery     Social History     Tobacco Use    Smoking status: Some Days     Years: 20     Types: Cigarettes    Smokeless tobacco: Never     No family history on file.  Allergies   Allergen Reactions    Suboxone [Buprenorphine-Naloxone] Anaphylaxis, Hives and Itching    Clarithromycin Hives     Other reaction(s): Unknown    Levofloxacin Swelling     Ankle Joint/tendon pain    Clindamycin Hives     Pt sts doesnot allergy to clindamycin and does not get hives    Haloperidol Psychosis and Hallucinations     Psychosis     psychosis    Keflex [Cephalexin] Psychosis    Reglan [Metoclopramide Hcl] Psychosis     Current Outpatient Medications   Medication Instructions    bisacodyl (DULCOLAX) 5 mg, oral,  2 times daily, Do not crush, chew, or split.    doxycycline (Adoxa) 100 mg tablet Take 2 tablets by mouth two times daily with a full glass of water and do not lie down for at least 30 minutes after. Alternate every 7 days with clindamycin    polyethylene glycol (GLYCOLAX, MIRALAX) 17 g, oral, Daily       Physical Exam     Appearance: Alert, oriented , cooperative,  in acute distress. Well nourished & well hydrated.     Skin: Intact,  dry skin, no lesions, rash, petechiae or purpura.      Eyes: PERRLA, EOMs intact,  Conjunctiva pink with no redness or exudates. Cornea & anterior chamber are clear, Eyelids without lesions. No scleral icterus.      ENT: Hearing grossly intact. External auditory canals patent, tympanic membranes intact with visible landmarks. Nares patent, mucus membranes moist. Dentition without lesions. Pharynx clear, uvula midline.      Neck: Supple, without meningismus. Thyroid not palpable. Trachea at midline. No lymphadenopathy.     Pulmonary: Clear bilaterally with good chest wall excursion. No rales, rhonchi or wheezing. No accessory muscle use or stridor.     Cardiac: Normal S1, S2 without murmur, rub, gallop or extrasystole. No JVD, Carotids without bruits.     Abdomen: Soft, moderate generalized abdominal tenderness with worsening in the suprapubic region, active bowel sounds.  No palpable organomegaly.  No rebound or guarding.  No CVA tenderness.     Genitourinary: Exam deferred.     Musculoskeletal: Full range of motion. no pain, edema, or deformity. Pulses full and equal. No cyanosis or clubbing.      Neurological:  Cranial nerves II through XII are grossly intact, finger-nose touch is normal, normal sensation, no weakness, no focal findings identified.     Psychiatric: Appropriate mood and affect.      Results     Labs Reviewed   COMPREHENSIVE METABOLIC PANEL - Abnormal       Result Value    Glucose 94      Sodium 134 (*)     Potassium 4.3      Chloride 105      Bicarbonate 24      Anion  Gap 9 (*)     Urea Nitrogen 14      Creatinine 0.85      eGFR >90      Calcium 9.0      Albumin 3.8      Alkaline Phosphatase 56      Total Protein 6.6      AST 14      Bilirubin, Total 0.2      ALT 14     CBC WITH AUTO DIFFERENTIAL - Abnormal    WBC 7.8      nRBC 0.0      RBC 4.97      Hemoglobin 12.4      Hematocrit 38.4      MCV 77 (*)     MCH 24.9 (*)     MCHC 32.3      RDW 15.9 (*)     Platelets 304      Neutrophils % 71.3      Immature Granulocytes %, Automated 0.1      Lymphocytes % 19.1      Monocytes % 8.6      Eosinophils % 0.6      Basophils % 0.3      Neutrophils Absolute 5.55      Immature Granulocytes Absolute, Automated 0.01      Lymphocytes Absolute 1.49      Monocytes Absolute 0.67      Eosinophils Absolute 0.05      Basophils Absolute 0.02     LIPASE - Normal    Lipase 21      Narrative:     Venipuncture immediately after or during the administration of Metamizole may lead to falsely low results. Testing should be performed immediately prior to Metamizole dosing.   URINALYSIS WITH REFLEX MICROSCOPIC AND CULTURE - Normal    Color, Urine Straw      Appearance, Urine Clear      Specific Gravity, Urine 1.025      pH, Urine 6.0      Protein, Urine NEGATIVE      Glucose, Urine NEGATIVE      Blood, Urine NEGATIVE      Ketones, Urine NEGATIVE      Bilirubin, Urine NEGATIVE      Urobilinogen, Urine <2.0      Nitrite, Urine NEGATIVE      Leukocyte Esterase, Urine NEGATIVE     HUMAN CHORIONIC GONADOTROPIN, SERUM QUANTITATIVE - Normal    HCG, Beta-Quantitative <2      Narrative:      Total HCG measurement is performed using the [employer] [employer]   Immunoassay which detects intact HCG and free beta HCG subunit.    This test is not indicated for use as a tumor marker.   HCG testing is performed using a different test methodology at [facility] than other [facility]. Direct result comparison   should only be made within the same method.       URINALYSIS WITH REFLEX MICROSCOPIC AND  "CULTURE    Narrative:     The following orders were created for panel order Urinalysis with Reflex Microscopic and Culture.  Procedure                               Abnormality         Status                     ---------                               -----------         ------                     Urinalysis with Reflex M...[[id number]]  Normal              Final result               Extra Urine Gray Tube[[id number]]                            In process                   Please view results for these tests on the individual orders.   EXTRA URINE GRAY TUBE     CT abdomen pelvis w IV contrast   Final Result   Large amount of rectal sigmoid feces could indicate constipation.   There is no obstructive dilatation of bowel proximal to this level.             MACRO:   None.        Signed by: Sparkle Domingo 12/18/2023 3:42 PM   Dictation workstation:   [id number]            ED Course & Medical Decision Making     Medications   ondansetron (Zofran) injection 4 mg (4 mg intravenous Given 12/18/23 1401)   ketorolac (Toradol) injection 15 mg (15 mg intravenous Given 12/18/23 1401)   magnesium citrate solution 296 mL (296 mL oral Given 12/18/23 1401)   iohexol (OMNIPaque) 350 mg iodine/mL solution 75 mL (75 mL intravenous Given 12/18/23 1511)   fentaNYL PF (Sublimaze) injection 100 mcg (100 mcg intravenous Given 12/18/23 1544)   polyethylene glycol (Glycolax, Miralax) packet 17 g (17 g oral Given 12/18/23 1619)   bisacodyl (Dulcolax) EC tablet 10 mg (10 mg oral Given 12/18/23 1619)     Diagnoses as of 12/18/23 1657   Constipation, unspecified constipation type     Heart Rate:  [58-61]   Temp:  [36.1 °C (97 °F)]   Resp:  [16-20]   BP: (130-134)/(72-75)   Height:  [157.5 cm (5' 2\")]   Weight:  [81.6 kg (180 lb)]   SpO2:  [98 %]      Sandrita Adams is a 33 y.o. female with past medical history of hidradenitis suppurativa and lupus who presents with complaint of abdominal pain, nausea, and constipation.  Patient is " hemodynamically stable and afebrile.  Minimal abdominal pain initial examination.  Patient mainly here secondary to constipation which could be functional in nature although concern for possible underlying malignancy, small bowel obstruction, or other potential intra-abdominal pathology.  Patient was given Zofran, Toradol, fentanyl for initial pain control and treatment.  She also did receive magnesium citrate as well as MiraLAX, Dulcolax, and a enema.  Lab work was otherwise reassuring with no significant electrolyte [place] renal dysfunction.  No significant anemia or leukocytosis.  Beta-hCG testing negative.  LFTs and lipase normal.  CT scan the abdomen pelvis did show large amount of rectal sigmoid feces which could indicate constipation with no obstruction or significant rectal impaction noted.  Patient was informed this is most likely self-limiting and she was unable to have a bowel movement while in the emergency room.  She was agreeable to discharge home at this time.  Urinalysis negative for any significant acute findings.  She will be discharged with a bowel regimen with daily MiraLAX and Dulcolax and was informed to obtain over-the-counter enemas as needed.  Otherwise will be discharged home with strict return precautions and primary care follow-up.    Procedures   Procedures    Diagnosis     1. Constipation, unspecified constipation type        Disposition     DISCHARGE.  The patient is discharged back to their place of residence.  Discharge diagnosis, instructions and plan were discussed and understood. At the time of discharge the patient was comfortable and was in no apparent distress. Patient is aware of diagnostic uncertainty and was notified though testing is negative here, there is a very small chance that pathology may be missed.  The patient understands these risks and the patient /family understood to return immediately to the emergency department if the symptoms worsen or if they have any  additional concerns.    FOLLOW UP  Primary care provider in 1-2 days.        ED Prescriptions       Medication Sig Dispense Start Date End Date Auth. Provider    polyethylene glycol (Glycolax, Miralax) 17 gram/dose powder Take 17 g by mouth once daily for 3 days. 51 g 12/18/2023 12/21/2023 Steve Borges DO    bisacodyl (Dulcolax) 5 mg EC tablet Take 1 tablet (5 mg) by mouth 2 times a day for 7 days. Do not crush, chew, or split. 14 tablet 12/18/2023 12/25/2023 DO Steve Cho DO  12/18/23 2754

## 2023-12-18 NOTE — ED TRIAGE NOTES
Pt arrived to triage for abd pain, constipation, N w/oV. Constipation started approx a week ago, all other symptoms started today. VSS in triage at this time. Pt sts she has attempted OTC medications w/o relief.

## 2023-12-19 LAB — HOLD SPECIMEN: NORMAL

## 2024-01-21 ENCOUNTER — HOSPITAL ENCOUNTER (EMERGENCY)
Facility: HOSPITAL | Age: 34
Discharge: HOME | End: 2024-01-21
Attending: STUDENT IN AN ORGANIZED HEALTH CARE EDUCATION/TRAINING PROGRAM
Payer: MEDICAID

## 2024-01-21 ENCOUNTER — APPOINTMENT (OUTPATIENT)
Dept: RADIOLOGY | Facility: HOSPITAL | Age: 34
End: 2024-01-21
Payer: MEDICAID

## 2024-01-21 VITALS
SYSTOLIC BLOOD PRESSURE: 134 MMHG | RESPIRATION RATE: 18 BRPM | HEART RATE: 68 BPM | BODY MASS INDEX: 32.9 KG/M2 | OXYGEN SATURATION: 100 % | DIASTOLIC BLOOD PRESSURE: 86 MMHG | WEIGHT: 179.9 LBS | TEMPERATURE: 98.6 F

## 2024-01-21 DIAGNOSIS — R10.84 ABDOMINAL PAIN, GENERALIZED: Primary | ICD-10-CM

## 2024-01-21 DIAGNOSIS — K59.00 CONSTIPATION, UNSPECIFIED CONSTIPATION TYPE: ICD-10-CM

## 2024-01-21 LAB
ALBUMIN SERPL BCP-MCNC: 3.5 G/DL (ref 3.4–5)
ALP SERPL-CCNC: 64 U/L (ref 33–110)
ALT SERPL W P-5'-P-CCNC: 13 U/L (ref 7–45)
ANION GAP SERPL CALC-SCNC: 10 MMOL/L (ref 10–20)
APPEARANCE UR: CLEAR
AST SERPL W P-5'-P-CCNC: 13 U/L (ref 9–39)
B-HCG SERPL-ACNC: <2 MIU/ML
BASOPHILS # BLD AUTO: 0.03 X10*3/UL (ref 0–0.1)
BASOPHILS NFR BLD AUTO: 0.4 %
BILIRUB SERPL-MCNC: 0.3 MG/DL (ref 0–1.2)
BILIRUB UR STRIP.AUTO-MCNC: NEGATIVE MG/DL
BUN SERPL-MCNC: 10 MG/DL (ref 6–23)
CALCIUM SERPL-MCNC: 8.9 MG/DL (ref 8.6–10.3)
CHLORIDE SERPL-SCNC: 107 MMOL/L (ref 98–107)
CO2 SERPL-SCNC: 25 MMOL/L (ref 21–32)
COLOR UR: ABNORMAL
CREAT SERPL-MCNC: 0.82 MG/DL (ref 0.5–1.05)
EGFRCR SERPLBLD CKD-EPI 2021: >90 ML/MIN/1.73M*2
EOSINOPHIL # BLD AUTO: 0.08 X10*3/UL (ref 0–0.7)
EOSINOPHIL NFR BLD AUTO: 1 %
ERYTHROCYTE [DISTWIDTH] IN BLOOD BY AUTOMATED COUNT: 14.6 % (ref 11.5–14.5)
GLUCOSE SERPL-MCNC: 98 MG/DL (ref 74–99)
GLUCOSE UR STRIP.AUTO-MCNC: NEGATIVE MG/DL
HCT VFR BLD AUTO: 40.5 % (ref 36–46)
HGB BLD-MCNC: 13.4 G/DL (ref 12–16)
IMM GRANULOCYTES # BLD AUTO: 0.03 X10*3/UL (ref 0–0.7)
IMM GRANULOCYTES NFR BLD AUTO: 0.4 % (ref 0–0.9)
KETONES UR STRIP.AUTO-MCNC: NEGATIVE MG/DL
LEUKOCYTE ESTERASE UR QL STRIP.AUTO: NEGATIVE
LIPASE SERPL-CCNC: 14 U/L (ref 9–82)
LYMPHOCYTES # BLD AUTO: 1.51 X10*3/UL (ref 1.2–4.8)
LYMPHOCYTES NFR BLD AUTO: 18.5 %
MCH RBC QN AUTO: 24.6 PG (ref 26–34)
MCHC RBC AUTO-ENTMCNC: 33.1 G/DL (ref 32–36)
MCV RBC AUTO: 74 FL (ref 80–100)
MONOCYTES # BLD AUTO: 0.51 X10*3/UL (ref 0.1–1)
MONOCYTES NFR BLD AUTO: 6.3 %
NEUTROPHILS # BLD AUTO: 5.99 X10*3/UL (ref 1.2–7.7)
NEUTROPHILS NFR BLD AUTO: 73.4 %
NITRITE UR QL STRIP.AUTO: NEGATIVE
NRBC BLD-RTO: 0 /100 WBCS (ref 0–0)
PH UR STRIP.AUTO: 6 [PH]
PLATELET # BLD AUTO: 303 X10*3/UL (ref 150–450)
POTASSIUM SERPL-SCNC: 4.1 MMOL/L (ref 3.5–5.3)
PROT SERPL-MCNC: 6.8 G/DL (ref 6.4–8.2)
PROT UR STRIP.AUTO-MCNC: NEGATIVE MG/DL
RBC # BLD AUTO: 5.44 X10*6/UL (ref 4–5.2)
RBC # UR STRIP.AUTO: NEGATIVE /UL
SODIUM SERPL-SCNC: 138 MMOL/L (ref 136–145)
SP GR UR STRIP.AUTO: 1
UROBILINOGEN UR STRIP.AUTO-MCNC: <2 MG/DL
WBC # BLD AUTO: 8.2 X10*3/UL (ref 4.4–11.3)

## 2024-01-21 PROCEDURE — 2550000001 HC RX 255 CONTRASTS: Performed by: STUDENT IN AN ORGANIZED HEALTH CARE EDUCATION/TRAINING PROGRAM

## 2024-01-21 PROCEDURE — 74177 CT ABD & PELVIS W/CONTRAST: CPT

## 2024-01-21 PROCEDURE — 96374 THER/PROPH/DIAG INJ IV PUSH: CPT | Mod: 59 | Performed by: STUDENT IN AN ORGANIZED HEALTH CARE EDUCATION/TRAINING PROGRAM

## 2024-01-21 PROCEDURE — 36415 COLL VENOUS BLD VENIPUNCTURE: CPT | Performed by: STUDENT IN AN ORGANIZED HEALTH CARE EDUCATION/TRAINING PROGRAM

## 2024-01-21 PROCEDURE — 2500000005 HC RX 250 GENERAL PHARMACY W/O HCPCS: Performed by: STUDENT IN AN ORGANIZED HEALTH CARE EDUCATION/TRAINING PROGRAM

## 2024-01-21 PROCEDURE — 2500000004 HC RX 250 GENERAL PHARMACY W/ HCPCS (ALT 636 FOR OP/ED): Performed by: STUDENT IN AN ORGANIZED HEALTH CARE EDUCATION/TRAINING PROGRAM

## 2024-01-21 PROCEDURE — 83690 ASSAY OF LIPASE: CPT | Performed by: STUDENT IN AN ORGANIZED HEALTH CARE EDUCATION/TRAINING PROGRAM

## 2024-01-21 PROCEDURE — 81003 URINALYSIS AUTO W/O SCOPE: CPT | Performed by: STUDENT IN AN ORGANIZED HEALTH CARE EDUCATION/TRAINING PROGRAM

## 2024-01-21 PROCEDURE — 85025 COMPLETE CBC W/AUTO DIFF WBC: CPT | Performed by: STUDENT IN AN ORGANIZED HEALTH CARE EDUCATION/TRAINING PROGRAM

## 2024-01-21 PROCEDURE — 96376 TX/PRO/DX INJ SAME DRUG ADON: CPT | Mod: 59 | Performed by: STUDENT IN AN ORGANIZED HEALTH CARE EDUCATION/TRAINING PROGRAM

## 2024-01-21 PROCEDURE — 99284 EMERGENCY DEPT VISIT MOD MDM: CPT | Performed by: STUDENT IN AN ORGANIZED HEALTH CARE EDUCATION/TRAINING PROGRAM

## 2024-01-21 PROCEDURE — 84702 CHORIONIC GONADOTROPIN TEST: CPT | Performed by: STUDENT IN AN ORGANIZED HEALTH CARE EDUCATION/TRAINING PROGRAM

## 2024-01-21 PROCEDURE — 96375 TX/PRO/DX INJ NEW DRUG ADDON: CPT | Mod: 59 | Performed by: STUDENT IN AN ORGANIZED HEALTH CARE EDUCATION/TRAINING PROGRAM

## 2024-01-21 PROCEDURE — 74177 CT ABD & PELVIS W/CONTRAST: CPT | Performed by: RADIOLOGY

## 2024-01-21 PROCEDURE — 2500000001 HC RX 250 WO HCPCS SELF ADMINISTERED DRUGS (ALT 637 FOR MEDICARE OP): Performed by: STUDENT IN AN ORGANIZED HEALTH CARE EDUCATION/TRAINING PROGRAM

## 2024-01-21 PROCEDURE — 80053 COMPREHEN METABOLIC PANEL: CPT | Performed by: STUDENT IN AN ORGANIZED HEALTH CARE EDUCATION/TRAINING PROGRAM

## 2024-01-21 RX ORDER — MORPHINE SULFATE 4 MG/ML
4 INJECTION, SOLUTION INTRAMUSCULAR; INTRAVENOUS ONCE
Status: COMPLETED | OUTPATIENT
Start: 2024-01-21 | End: 2024-01-21

## 2024-01-21 RX ORDER — BISACODYL 5 MG
5 TABLET, DELAYED RELEASE (ENTERIC COATED) ORAL 2 TIMES DAILY
Qty: 14 TABLET | Refills: 0 | Status: SHIPPED | OUTPATIENT
Start: 2024-01-21 | End: 2024-01-28

## 2024-01-21 RX ORDER — ONDANSETRON HYDROCHLORIDE 2 MG/ML
4 INJECTION, SOLUTION INTRAVENOUS ONCE
Status: COMPLETED | OUTPATIENT
Start: 2024-01-21 | End: 2024-01-21

## 2024-01-21 RX ORDER — POLYETHYLENE GLYCOL 3350 17 G/17G
17 POWDER, FOR SOLUTION ORAL DAILY
Qty: 51 G | Refills: 0 | Status: SHIPPED | OUTPATIENT
Start: 2024-01-21 | End: 2024-01-24

## 2024-01-21 RX ORDER — SYRING-NEEDL,DISP,INSUL,0.3 ML 29 G X1/2"
296 SYRINGE, EMPTY DISPOSABLE MISCELLANEOUS ONCE
Status: COMPLETED | OUTPATIENT
Start: 2024-01-21 | End: 2024-01-21

## 2024-01-21 RX ADMIN — MORPHINE SULFATE 4 MG: 4 INJECTION, SOLUTION INTRAMUSCULAR; INTRAVENOUS at 15:47

## 2024-01-21 RX ADMIN — BE HEALTH MAGNESIUM CITRATE ORAL SOLUTION - LEMON 296 ML: 1.75 LIQUID ORAL at 11:44

## 2024-01-21 RX ADMIN — ONDANSETRON 4 MG: 2 INJECTION INTRAMUSCULAR; INTRAVENOUS at 12:48

## 2024-01-21 RX ADMIN — IOHEXOL 75 ML: 350 INJECTION, SOLUTION INTRAVENOUS at 14:22

## 2024-01-21 RX ADMIN — MORPHINE SULFATE 4 MG: 4 INJECTION, SOLUTION INTRAMUSCULAR; INTRAVENOUS at 12:48

## 2024-01-21 RX ADMIN — WITCH HAZEL 1 EACH: 500 SOLUTION RECTAL; TOPICAL at 14:07

## 2024-01-21 ASSESSMENT — PAIN - FUNCTIONAL ASSESSMENT
PAIN_FUNCTIONAL_ASSESSMENT: 0-10
PAIN_FUNCTIONAL_ASSESSMENT: 0-10

## 2024-01-21 ASSESSMENT — PAIN SCALES - GENERAL
PAINLEVEL_OUTOF10: 2
PAINLEVEL_OUTOF10: 4

## 2024-01-21 NOTE — DISCHARGE INSTRUCTIONS
You have been seen at a [facility].  Please follow-up with your primary care provider in the next 1 to 2 days for further evaluation and routine follow-up.  Please return to the emergency room if having any worsening symptoms.  Please follow-up with any specialists if discussed during your emergency room stay.

## 2024-01-21 NOTE — ED PROVIDER NOTES
EMERGENCY MEDICINE EVALUATION NOTE    History of Present Illness     Chief Complaint:   Chief Complaint   Patient presents with    Constipation     Patient has lupus takes oxycodone is now constipated       HPI: Sandrita Adams is a 33 y.o. female with past medical history of lupus and hidradenitis suppurativa who presents with complaint of constipation and abdominal pain.  Patient states he sees with constipation in the past.  She notes that she recently started Cosentyx and does take Trulicity for her hidradenitis.  He states since she has been on this medication she has had troubles with her bowel movements worsening constipation over the past several days.  He states significant straining and occasional bright red blood noted in her stool from straining.  She states she believes she is constipated and did try a laxative yesterday and this morning without any relief.  She does admit some diffuse abdominal pain mainly involving her lower abdomen.  Admits nausea without emesis.  Denies any fever, chills.  She does admit generalized malaise and fatigue.    Previous History     Past Medical History:   Diagnosis Date    Hidradenitis suppurativa 10/29/2020    Hidradenitis    Lupus (CMS/HCC)      Past Surgical History:   Procedure Laterality Date    OTHER SURGICAL HISTORY  09/19/2022    Arm surgery     Social History     Tobacco Use    Smoking status: Some Days     Years: 20     Types: Cigarettes    Smokeless tobacco: Never     No family history on file.  Allergies   Allergen Reactions    Suboxone [Buprenorphine-Naloxone] Anaphylaxis, Hives and Itching    Clarithromycin Hives     Other reaction(s): Unknown    Levofloxacin Swelling     Ankle Joint/tendon pain    Clindamycin Hives     Pt sts doesnot allergy to clindamycin and does not get hives    Haloperidol Psychosis and Hallucinations     Psychosis     psychosis    Keflex [Cephalexin] Psychosis    Reglan [Metoclopramide Hcl] Psychosis     Current Outpatient  Medications   Medication Instructions    bisacodyl (DULCOLAX) 5 mg, oral, 2 times daily, Do not crush, chew, or split.    doxycycline (Adoxa) 100 mg tablet Take 2 tablets by mouth two times daily with a full glass of water and do not lie down for at least 30 minutes after. Alternate every 7 days with clindamycin    polyethylene glycol (GLYCOLAX, MIRALAX) 17 g, oral, Daily    witch hazel-glycerin (Tucks) pad Topical, As needed       Physical Exam     Appearance: Alert, oriented , cooperative,  in acute distress. Well nourished & well hydrated.     Skin: Intact,  dry skin, no lesions, rash, petechiae or purpura.      Eyes: PERRLA, EOMs intact,  Conjunctiva pink with no redness or exudates. Cornea & anterior chamber are clear, Eyelids without lesions. No scleral icterus.      ENT: Hearing grossly intact. External auditory canals patent, tympanic membranes intact with visible landmarks. Nares patent, mucus membranes moist. Dentition without lesions. Pharynx clear, uvula midline.      Neck: Supple, without meningismus. Thyroid not palpable. Trachea at midline. No lymphadenopathy.     Pulmonary: Clear bilaterally with good chest wall excursion. No rales, rhonchi or wheezing. No accessory muscle use or stridor.     Cardiac: Normal S1, S2 without murmur, rub, gallop or extrasystole. No JVD, Carotids without bruits.     Abdomen: Soft, moderate generalized abdominal pain with worsening in the lower abdomen, active bowel sounds.  No palpable organomegaly.  No rebound or guarding.  No CVA tenderness.     Genitourinary: Exam deferred.     Musculoskeletal: Full range of motion. no pain, edema, or deformity. Pulses full and equal. No cyanosis or clubbing.      Neurological:  Cranial nerves II through XII are grossly intact, finger-nose touch is normal, normal sensation, no weakness, no focal findings identified.     Psychiatric: Appropriate mood and affect.      Results     Labs Reviewed   CBC WITH AUTO DIFFERENTIAL - Abnormal        Result Value    WBC 8.2      nRBC 0.0      RBC 5.44 (*)     Hemoglobin 13.4      Hematocrit 40.5      MCV 74 (*)     MCH 24.6 (*)     MCHC 33.1      RDW 14.6 (*)     Platelets 303      Neutrophils % 73.4      Immature Granulocytes %, Automated 0.4      Lymphocytes % 18.5      Monocytes % 6.3      Eosinophils % 1.0      Basophils % 0.4      Neutrophils Absolute 5.99      Immature Granulocytes Absolute, Automated 0.03      Lymphocytes Absolute 1.51      Monocytes Absolute 0.51      Eosinophils Absolute 0.08      Basophils Absolute 0.03     URINALYSIS WITH REFLEX CULTURE AND MICROSCOPIC - Abnormal    Color, Urine Straw      Appearance, Urine Clear      Specific Gravity, Urine 1.004 (*)     pH, Urine 6.0      Protein, Urine NEGATIVE      Glucose, Urine NEGATIVE      Blood, Urine NEGATIVE      Ketones, Urine NEGATIVE      Bilirubin, Urine NEGATIVE      Urobilinogen, Urine <2.0      Nitrite, Urine NEGATIVE      Leukocyte Esterase, Urine NEGATIVE     COMPREHENSIVE METABOLIC PANEL - Normal    Glucose 98      Sodium 138      Potassium 4.1      Chloride 107      Bicarbonate 25      Anion Gap 10      Urea Nitrogen 10      Creatinine 0.82      eGFR >90      Calcium 8.9      Albumin 3.5      Alkaline Phosphatase 64      Total Protein 6.8      AST 13      Bilirubin, Total 0.3      ALT 13     LIPASE - Normal    Lipase 14      Narrative:     Venipuncture immediately after or during the administration of Metamizole may lead to falsely low results. Testing should be performed immediately prior to Metamizole dosing.   HUMAN CHORIONIC GONADOTROPIN, SERUM QUANTITATIVE - Normal    HCG, Beta-Quantitative <2      Narrative:      Total HCG measurement is performed using the [employer] Access   Immunoassay which detects intact HCG and free beta HCG subunit.    This test is not indicated for use as a tumor marker.   HCG testing is performed using a different test methodology at [facility] than other [facility].  Direct result comparison   should only be made within the same method.       URINALYSIS WITH REFLEX CULTURE AND MICROSCOPIC    Narrative:     The following orders were created for panel order Urinalysis with Reflex Culture and Microscopic.  Procedure                               Abnormality         Status                     ---------                               -----------         ------                     Urinalysis with Reflex C...[[id number]]  Abnormal            Final result               Extra Urine Gray Tube[[id number]]                                                         Please view results for these tests on the individual orders.   EXTRA URINE GRAY TUBE     CT abdomen pelvis w IV contrast   Final Result   No CT evidence of an acute intra-abdominal or pelvic process. Small   amount of free fluid in the pelvis, most likely physiologic.        MACRO:   None.        Signed by: Dustin Tatum 1/21/2024 3:27 PM   Dictation workstation:   [id number]            ED Course & Medical Decision Making     Medications   magnesium citrate solution 296 mL (296 mL oral Given 1/21/24 1144)   morphine injection 4 mg (4 mg intravenous Given 1/21/24 1248)   ondansetron (Zofran) injection 4 mg (4 mg intravenous Given 1/21/24 1248)   witch hazel (Tucks) pads 1 each (1 each Topical Given 1/21/24 1407)   iohexol (OMNIPaque) 350 mg iodine/mL solution 75 mL (75 mL intravenous Given 1/21/24 1422)   morphine injection 4 mg (4 mg intravenous Given 1/21/24 1547)     Diagnoses as of 01/21/24 1631   Abdominal pain, generalized   Constipation, unspecified constipation type     Heart Rate:  [66-82]   Temp:  [37 °C (98.6 °F)]   Respirations:  [16-18]   BP: (122-135)/(82-86)   Weight:  [81.6 kg (179 lb 14.3 oz)]   Pulse Ox:  [98 %-100 %]       Sandrita Adams is a 33 y.o. female with past medical history of lupus and hidradenitis suppurativa who presents with complaint of constipation and abdominal pain.  Patient does have generalized  abdominal pain initial examination with minimal peritonitic findings.  She is hemodynamically stable and afebrile.  Differential includes but is not limited to constipation, functional, IBS, hemorrhoids, viscus perforation, or other intra-abdominal pathology.  Patient's lab work reassuring with no leukocytosis, anemia, electrolyte normality, renal dysfunction.  LFTs normal.  Lipase normal.  Beta-hCG negative.  Urinalysis also nonacute.  CT scan of the abdomen pelvis also was nonacute although did show mostly constipation involving the transverse colon.  Patient was given magnesium citrate as well as an enema and did have a small bowel movement.  Also given a Tucks pad with some relief.  Treated with morphine.  She did feel stable for home discharge at this time.  She was given MiraLAX and Dulcolax for home treatment and Tucks pads.  She was informed to use sitz bath's over-the-counter for treatment of her rectal discomfort and did receive a referral for GI follow-up as an outpatient for further evaluation.    Procedures   Procedures    Diagnosis     1. Abdominal pain, generalized    2. Constipation, unspecified constipation type        Disposition     DISCHARGE.  The patient is discharged back to their place of residence.  Discharge diagnosis, instructions and plan were discussed and understood. At the time of discharge the patient was comfortable and was in no apparent distress. Patient is aware of diagnostic uncertainty and was notified though testing is negative here, there is a very small chance that pathology may be missed.  The patient understands these risks and the patient /family understood to return immediately to the emergency department if the symptoms worsen or if they have any additional concerns.    FOLLOW UP  Primary care provider in 1-2 days. GI       ED Prescriptions       Medication Sig Dispense Start Date End Date Auth. Provider    polyethylene glycol (Glycolax, Miralax) 17 gram/dose powder Take  17 g by mouth once daily for 3 days. 51 g 1/21/2024 1/24/2024 Steve Borges DO    bisacodyl (Dulcolax) 5 mg EC tablet Take 1 tablet (5 mg) by mouth 2 times a day for 7 days. Do not crush, chew, or split. 14 tablet 1/21/2024 1/28/2024 Steve Borgse DO    witch hazel-glycerin (Tucks) pad Apply topically if needed for irritation. 15 each 1/21/2024 -- Steve Borges, DO Steve Borges DO  01/21/24 3582

## 2024-01-26 ENCOUNTER — APPOINTMENT (OUTPATIENT)
Dept: GASTROENTEROLOGY | Facility: CLINIC | Age: 34
End: 2024-01-26
Payer: MEDICAID

## 2024-03-26 ENCOUNTER — HOSPITAL ENCOUNTER (EMERGENCY)
Facility: HOSPITAL | Age: 34
Discharge: HOME | End: 2024-03-27
Attending: EMERGENCY MEDICINE
Payer: MEDICAID
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
## 2024-03-26 DIAGNOSIS — L73.2 HIDRADENITIS SUPPURATIVA: ICD-10-CM

## 2024-03-26 PROCEDURE — 99285 EMERGENCY DEPT VISIT HI MDM: CPT

## 2024-03-26 ASSESSMENT — PAIN DESCRIPTION - LOCATION: LOCATION: HEAD

## 2024-03-26 ASSESSMENT — PAIN SCALES - GENERAL: PAINLEVEL_OUTOF10: 5 - MODERATE PAIN

## 2024-03-26 ASSESSMENT — PAIN DESCRIPTION - PAIN TYPE: TYPE: ACUTE PAIN

## 2024-03-26 ASSESSMENT — PAIN DESCRIPTION - DESCRIPTORS: DESCRIPTORS: SHARP;ACHING;THROBBING

## 2024-03-26 ASSESSMENT — PAIN DESCRIPTION - FREQUENCY: FREQUENCY: CONSTANT/CONTINUOUS

## 2024-03-26 ASSESSMENT — PAIN DESCRIPTION - DIRECTION: RADIATING_TOWARDS: NECK

## 2024-03-26 ASSESSMENT — PAIN - FUNCTIONAL ASSESSMENT: PAIN_FUNCTIONAL_ASSESSMENT: 0-10

## 2024-03-27 ENCOUNTER — SPECIALTY PHARMACY (OUTPATIENT)
Dept: PHARMACY | Facility: CLINIC | Age: 34
End: 2024-03-27

## 2024-03-27 ENCOUNTER — APPOINTMENT (OUTPATIENT)
Dept: RADIOLOGY | Facility: HOSPITAL | Age: 34
End: 2024-03-27
Payer: MEDICAID

## 2024-03-27 VITALS
TEMPERATURE: 98.1 F | BODY MASS INDEX: 33.61 KG/M2 | HEIGHT: 61 IN | DIASTOLIC BLOOD PRESSURE: 85 MMHG | OXYGEN SATURATION: 99 % | SYSTOLIC BLOOD PRESSURE: 122 MMHG | WEIGHT: 178 LBS | HEART RATE: 78 BPM | RESPIRATION RATE: 20 BRPM

## 2024-03-27 LAB — HCG UR QL IA.RAPID: NEGATIVE

## 2024-03-27 PROCEDURE — 70450 CT HEAD/BRAIN W/O DYE: CPT

## 2024-03-27 PROCEDURE — 2500000001 HC RX 250 WO HCPCS SELF ADMINISTERED DRUGS (ALT 637 FOR MEDICARE OP): Performed by: EMERGENCY MEDICINE

## 2024-03-27 PROCEDURE — 72125 CT NECK SPINE W/O DYE: CPT | Performed by: RADIOLOGY

## 2024-03-27 PROCEDURE — 81025 URINE PREGNANCY TEST: CPT | Performed by: EMERGENCY MEDICINE

## 2024-03-27 PROCEDURE — 72125 CT NECK SPINE W/O DYE: CPT

## 2024-03-27 PROCEDURE — 70450 CT HEAD/BRAIN W/O DYE: CPT | Performed by: RADIOLOGY

## 2024-03-27 RX ORDER — OXYCODONE HYDROCHLORIDE 10 MG/1
10 TABLET ORAL EVERY 6 HOURS PRN
COMMUNITY
Start: 2024-01-04 | End: 2024-03-27 | Stop reason: SDUPTHER

## 2024-03-27 RX ORDER — OXYCODONE AND ACETAMINOPHEN 5; 325 MG/1; MG/1
1 TABLET ORAL ONCE
Status: COMPLETED | OUTPATIENT
Start: 2024-03-27 | End: 2024-03-27

## 2024-03-27 RX ORDER — DULAGLUTIDE 1.5 MG/.5ML
1.5 INJECTION, SOLUTION SUBCUTANEOUS
Qty: 2 ML | Refills: 0 | Status: SHIPPED | OUTPATIENT
Start: 2024-03-27

## 2024-03-27 RX ORDER — GABAPENTIN 800 MG/1
800 TABLET ORAL 3 TIMES DAILY
COMMUNITY
End: 2024-03-27 | Stop reason: SDUPTHER

## 2024-03-27 RX ORDER — SECUKINUMAB 150 MG/ML
300 INJECTION SUBCUTANEOUS ONCE
COMMUNITY
Start: 2024-02-26 | End: 2024-03-27 | Stop reason: SDUPTHER

## 2024-03-27 RX ORDER — DULAGLUTIDE 1.5 MG/.5ML
1.5 INJECTION, SOLUTION SUBCUTANEOUS
COMMUNITY
Start: 2023-11-12 | End: 2024-03-27 | Stop reason: SDUPTHER

## 2024-03-27 RX ORDER — SECUKINUMAB 150 MG/ML
300 INJECTION SUBCUTANEOUS ONCE
Qty: 2 ML | Refills: 0 | Status: SHIPPED | OUTPATIENT
Start: 2024-03-27 | End: 2024-03-27

## 2024-03-27 RX ORDER — AMOXICILLIN AND CLAVULANATE POTASSIUM 875; 125 MG/1; MG/1
1 TABLET, FILM COATED ORAL EVERY 12 HOURS
Qty: 14 TABLET | Refills: 0 | Status: SHIPPED | OUTPATIENT
Start: 2024-03-27 | End: 2024-04-06

## 2024-03-27 RX ORDER — OXYCODONE HYDROCHLORIDE 10 MG/1
10 TABLET ORAL EVERY 6 HOURS PRN
Qty: 12 TABLET | Refills: 0 | Status: SHIPPED | OUTPATIENT
Start: 2024-03-27 | End: 2024-03-30

## 2024-03-27 RX ORDER — GABAPENTIN 800 MG/1
800 TABLET ORAL 3 TIMES DAILY
Qty: 90 TABLET | Refills: 0 | Status: SHIPPED | OUTPATIENT
Start: 2024-03-27 | End: 2024-05-21

## 2024-03-27 RX ORDER — CLINDAMYCIN HYDROCHLORIDE 150 MG/1
450 CAPSULE ORAL 4 TIMES DAILY
Qty: 90 CAPSULE | Refills: 0 | Status: SHIPPED | OUTPATIENT
Start: 2024-03-27 | End: 2024-04-06

## 2024-03-27 RX ORDER — BACLOFEN 10 MG/1
20 TABLET ORAL ONCE
Status: COMPLETED | OUTPATIENT
Start: 2024-03-27 | End: 2024-03-27

## 2024-03-27 RX ADMIN — OXYCODONE HYDROCHLORIDE AND ACETAMINOPHEN 1 TABLET: 5; 325 TABLET ORAL at 02:10

## 2024-03-27 RX ADMIN — OXYCODONE HYDROCHLORIDE AND ACETAMINOPHEN 1 TABLET: 5; 325 TABLET ORAL at 01:12

## 2024-03-27 RX ADMIN — BACLOFEN 20 MG: 10 TABLET ORAL at 01:12

## 2024-03-27 RX ADMIN — OXYCODONE HYDROCHLORIDE AND ACETAMINOPHEN 1 TABLET: 5; 325 TABLET ORAL at 03:54

## 2024-03-27 ASSESSMENT — PAIN DESCRIPTION - LOCATION: LOCATION: NECK

## 2024-03-27 ASSESSMENT — PAIN SCALES - GENERAL: PAINLEVEL_OUTOF10: 5 - MODERATE PAIN

## 2024-03-27 ASSESSMENT — PAIN DESCRIPTION - PAIN TYPE: TYPE: ACUTE PAIN

## 2024-03-27 ASSESSMENT — PAIN - FUNCTIONAL ASSESSMENT: PAIN_FUNCTIONAL_ASSESSMENT: 0-10
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
## 2024-03-29 ENCOUNTER — HOSPITAL ENCOUNTER (EMERGENCY)
Facility: HOSPITAL | Age: 34
Discharge: HOME | End: 2024-03-29
Payer: MEDICAID

## 2024-03-29 ENCOUNTER — PHARMACY VISIT (OUTPATIENT)
Dept: PHARMACY | Facility: CLINIC | Age: 34
End: 2024-03-29
Payer: MEDICAID

## 2024-03-29 VITALS
SYSTOLIC BLOOD PRESSURE: 112 MMHG | RESPIRATION RATE: 16 BRPM | DIASTOLIC BLOOD PRESSURE: 74 MMHG | HEART RATE: 86 BPM | TEMPERATURE: 98.4 F | WEIGHT: 178 LBS | HEIGHT: 61 IN | BODY MASS INDEX: 33.61 KG/M2 | OXYGEN SATURATION: 99 %

## 2024-03-29 DIAGNOSIS — L73.2 HIDRADENITIS SUPPURATIVA: ICD-10-CM
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
## 2024-03-29 PROCEDURE — 99285 EMERGENCY DEPT VISIT HI MDM: CPT | Performed by: PHYSICIAN ASSISTANT

## 2024-03-29 PROCEDURE — 99284 EMERGENCY DEPT VISIT MOD MDM: CPT

## 2024-03-29 PROCEDURE — RXMED WILLOW AMBULATORY MEDICATION CHARGE

## 2024-03-29 PROCEDURE — 99283 EMERGENCY DEPT VISIT LOW MDM: CPT | Mod: 25

## 2024-03-29 PROCEDURE — 2500000001 HC RX 250 WO HCPCS SELF ADMINISTERED DRUGS (ALT 637 FOR MEDICARE OP): Mod: SE | Performed by: PHYSICIAN ASSISTANT

## 2024-03-29 RX ORDER — OXYCODONE AND ACETAMINOPHEN 5; 325 MG/1; MG/1
2 TABLET ORAL ONCE
Status: COMPLETED | OUTPATIENT
Start: 2024-03-29 | End: 2024-03-29

## 2024-03-29 RX ORDER — OXYCODONE AND ACETAMINOPHEN 10; 325 MG/1; MG/1
1 TABLET ORAL EVERY 6 HOURS PRN
Qty: 16 TABLET | Refills: 0 | Status: SHIPPED | OUTPATIENT
Start: 2024-03-29 | End: 2024-04-02

## 2024-03-29 RX ORDER — OXYCODONE AND ACETAMINOPHEN 5; 325 MG/1; MG/1
2 TABLET ORAL EVERY 6 HOURS PRN
Status: DISCONTINUED | OUTPATIENT
Start: 2024-03-29 | End: 2024-03-29

## 2024-03-29 RX ADMIN — OXYCODONE HYDROCHLORIDE AND ACETAMINOPHEN 2 TABLET: 5; 325 TABLET ORAL at 13:04

## 2024-03-29 ASSESSMENT — PAIN SCALES - GENERAL
PAINLEVEL_OUTOF10: 8
PAINLEVEL_OUTOF10: 8

## 2024-03-29 ASSESSMENT — COLUMBIA-SUICIDE SEVERITY RATING SCALE - C-SSRS
2. HAVE YOU ACTUALLY HAD ANY THOUGHTS OF KILLING YOURSELF?: NO
6. HAVE YOU EVER DONE ANYTHING, STARTED TO DO ANYTHING, OR PREPARED TO DO ANYTHING TO END YOUR LIFE?: NO
1. IN THE PAST MONTH, HAVE YOU WISHED YOU WERE DEAD OR WISHED YOU COULD GO TO SLEEP AND NOT WAKE UP?: NO

## 2024-03-29 ASSESSMENT — PAIN - FUNCTIONAL ASSESSMENT
PAIN_FUNCTIONAL_ASSESSMENT: 0-10
PAIN_FUNCTIONAL_ASSESSMENT: 0-10
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
## 2024-03-30 ENCOUNTER — HOSPITAL ENCOUNTER (EMERGENCY)
Facility: HOSPITAL | Age: 34
Discharge: HOME | End: 2024-03-30
Attending: EMERGENCY MEDICINE
Payer: MEDICAID

## 2024-03-30 ENCOUNTER — APPOINTMENT (OUTPATIENT)
Dept: RADIOLOGY | Facility: HOSPITAL | Age: 34
End: 2024-03-30
Payer: MEDICAID

## 2024-03-30 VITALS
HEIGHT: 61 IN | DIASTOLIC BLOOD PRESSURE: 94 MMHG | HEART RATE: 104 BPM | OXYGEN SATURATION: 97 % | SYSTOLIC BLOOD PRESSURE: 157 MMHG | BODY MASS INDEX: 33.61 KG/M2 | RESPIRATION RATE: 18 BRPM | WEIGHT: 178 LBS | TEMPERATURE: 95.3 F

## 2024-03-30 DIAGNOSIS — T71.194D STRANGULATION OR SUFFOCATION BY MEANS, UNDETERMINED WHETHER ACCIDENTALLY OR PURPOSELY INFLICTED, SUBSEQUENT ENCOUNTER: ICD-10-CM

## 2024-03-30 DIAGNOSIS — R11.0 NAUSEA: Primary | ICD-10-CM

## 2024-03-30 PROCEDURE — 70498 CT ANGIOGRAPHY NECK: CPT | Performed by: STUDENT IN AN ORGANIZED HEALTH CARE EDUCATION/TRAINING PROGRAM

## 2024-03-30 PROCEDURE — 2500000001 HC RX 250 WO HCPCS SELF ADMINISTERED DRUGS (ALT 637 FOR MEDICARE OP): Mod: SE | Performed by: PHYSICIAN ASSISTANT

## 2024-03-30 PROCEDURE — 2550000001 HC RX 255 CONTRASTS: Mod: SE | Performed by: EMERGENCY MEDICINE

## 2024-03-30 PROCEDURE — 70498 CT ANGIOGRAPHY NECK: CPT

## 2024-03-30 PROCEDURE — 2500000005 HC RX 250 GENERAL PHARMACY W/O HCPCS: Mod: SE | Performed by: PHYSICIAN ASSISTANT

## 2024-03-30 RX ORDER — ONDANSETRON 4 MG/1
4 TABLET, ORALLY DISINTEGRATING ORAL ONCE
Status: COMPLETED | OUTPATIENT
Start: 2024-03-30 | End: 2024-03-30

## 2024-03-30 RX ORDER — OXYCODONE AND ACETAMINOPHEN 5; 325 MG/1; MG/1
2 TABLET ORAL ONCE
Status: COMPLETED | OUTPATIENT
Start: 2024-03-30 | End: 2024-03-30

## 2024-03-30 RX ORDER — ONDANSETRON 4 MG/1
4 TABLET, FILM COATED ORAL EVERY 8 HOURS PRN
Qty: 9 TABLET | Refills: 0 | Status: SHIPPED | OUTPATIENT
Start: 2024-03-30 | End: 2024-04-02

## 2024-03-30 RX ADMIN — OXYCODONE HYDROCHLORIDE AND ACETAMINOPHEN 2 TABLET: 5; 325 TABLET ORAL at 02:16

## 2024-03-30 RX ADMIN — IOHEXOL 80 ML: 350 INJECTION, SOLUTION INTRAVENOUS at 03:37

## 2024-03-30 RX ADMIN — ONDANSETRON 4 MG: 4 TABLET, ORALLY DISINTEGRATING ORAL at 02:16

## 2024-03-30 NOTE — DISCHARGE INSTRUCTIONS
Please follow-up with the primary care doctor regarding your symptoms.  I have ordered a referral and provided contact information for primary care clinic here if a doctor is needed.  Please return to the emergency department for any new or worsening symptoms or for any other concerns.
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
## 2024-04-28 ENCOUNTER — HOSPITAL ENCOUNTER (OUTPATIENT)
Facility: HOSPITAL | Age: 34
Setting detail: OBSERVATION
LOS: 1 days | Discharge: HOME | End: 2024-04-29
Attending: STUDENT IN AN ORGANIZED HEALTH CARE EDUCATION/TRAINING PROGRAM | Admitting: STUDENT IN AN ORGANIZED HEALTH CARE EDUCATION/TRAINING PROGRAM
Payer: MEDICAID

## 2024-04-28 DIAGNOSIS — L73.2 HIDRADENITIS: Primary | ICD-10-CM

## 2024-04-28 LAB
ABO GROUP (TYPE) IN BLOOD: NORMAL
ANION GAP SERPL CALC-SCNC: 12 MMOL/L (ref 10–20)
ANTIBODY SCREEN: NORMAL
APTT PPP: 28 SECONDS (ref 27–38)
BASOPHILS # BLD AUTO: 0.04 X10*3/UL (ref 0–0.1)
BASOPHILS NFR BLD AUTO: 0.6 %
BUN SERPL-MCNC: 11 MG/DL (ref 6–23)
CALCIUM SERPL-MCNC: 8.8 MG/DL (ref 8.6–10.3)
CHLORIDE SERPL-SCNC: 110 MMOL/L (ref 98–107)
CO2 SERPL-SCNC: 21 MMOL/L (ref 21–32)
CREAT SERPL-MCNC: 0.93 MG/DL (ref 0.5–1.05)
EGFRCR SERPLBLD CKD-EPI 2021: 82 ML/MIN/1.73M*2
EOSINOPHIL # BLD AUTO: 0.08 X10*3/UL (ref 0–0.7)
EOSINOPHIL NFR BLD AUTO: 1.2 %
ERYTHROCYTE [DISTWIDTH] IN BLOOD BY AUTOMATED COUNT: 14.3 % (ref 11.5–14.5)
GLUCOSE SERPL-MCNC: 91 MG/DL (ref 74–99)
HCT VFR BLD AUTO: 38.2 % (ref 36–46)
HGB BLD-MCNC: 12.9 G/DL (ref 12–16)
IMM GRANULOCYTES # BLD AUTO: 0.01 X10*3/UL (ref 0–0.7)
IMM GRANULOCYTES NFR BLD AUTO: 0.1 % (ref 0–0.9)
INR PPP: 1.2 (ref 0.9–1.1)
LACTATE SERPL-SCNC: 1.3 MMOL/L (ref 0.4–2)
LYMPHOCYTES # BLD AUTO: 2.54 X10*3/UL (ref 1.2–4.8)
LYMPHOCYTES NFR BLD AUTO: 38 %
MAGNESIUM SERPL-MCNC: 2.1 MG/DL (ref 1.6–2.4)
MCH RBC QN AUTO: 25.8 PG (ref 26–34)
MCHC RBC AUTO-ENTMCNC: 33.8 G/DL (ref 32–36)
MCV RBC AUTO: 76 FL (ref 80–100)
MONOCYTES # BLD AUTO: 0.8 X10*3/UL (ref 0.1–1)
MONOCYTES NFR BLD AUTO: 12 %
NEUTROPHILS # BLD AUTO: 3.22 X10*3/UL (ref 1.2–7.7)
NEUTROPHILS NFR BLD AUTO: 48.1 %
NRBC BLD-RTO: 0 /100 WBCS (ref 0–0)
PHOSPHATE SERPL-MCNC: 2.6 MG/DL (ref 2.5–4.9)
PLATELET # BLD AUTO: 309 X10*3/UL (ref 150–450)
POTASSIUM SERPL-SCNC: 4.1 MMOL/L (ref 3.5–5.3)
PROTHROMBIN TIME: 13.9 SECONDS (ref 9.8–12.8)
RBC # BLD AUTO: 5 X10*6/UL (ref 4–5.2)
RH FACTOR (ANTIGEN D): NORMAL
SODIUM SERPL-SCNC: 139 MMOL/L (ref 136–145)
WBC # BLD AUTO: 6.7 X10*3/UL (ref 4.4–11.3)

## 2024-04-28 PROCEDURE — 96365 THER/PROPH/DIAG IV INF INIT: CPT | Mod: 59

## 2024-04-28 PROCEDURE — 2500000001 HC RX 250 WO HCPCS SELF ADMINISTERED DRUGS (ALT 637 FOR MEDICARE OP): Performed by: NURSE PRACTITIONER

## 2024-04-28 PROCEDURE — 85025 COMPLETE CBC W/AUTO DIFF WBC: CPT | Performed by: STUDENT IN AN ORGANIZED HEALTH CARE EDUCATION/TRAINING PROGRAM

## 2024-04-28 PROCEDURE — 84100 ASSAY OF PHOSPHORUS: CPT | Performed by: STUDENT IN AN ORGANIZED HEALTH CARE EDUCATION/TRAINING PROGRAM

## 2024-04-28 PROCEDURE — 83735 ASSAY OF MAGNESIUM: CPT | Performed by: STUDENT IN AN ORGANIZED HEALTH CARE EDUCATION/TRAINING PROGRAM

## 2024-04-28 PROCEDURE — 99285 EMERGENCY DEPT VISIT HI MDM: CPT | Mod: 25

## 2024-04-28 PROCEDURE — 96375 TX/PRO/DX INJ NEW DRUG ADDON: CPT | Mod: 59

## 2024-04-28 PROCEDURE — 1100000001 HC PRIVATE ROOM DAILY

## 2024-04-28 PROCEDURE — 2500000006 HC RX 250 W HCPCS SELF ADMINISTERED DRUGS (ALT 637 FOR ALL PAYERS): Performed by: NURSE PRACTITIONER

## 2024-04-28 PROCEDURE — 83605 ASSAY OF LACTIC ACID: CPT | Performed by: STUDENT IN AN ORGANIZED HEALTH CARE EDUCATION/TRAINING PROGRAM

## 2024-04-28 PROCEDURE — 86901 BLOOD TYPING SEROLOGIC RH(D): CPT | Performed by: STUDENT IN AN ORGANIZED HEALTH CARE EDUCATION/TRAINING PROGRAM

## 2024-04-28 PROCEDURE — 36415 COLL VENOUS BLD VENIPUNCTURE: CPT | Performed by: STUDENT IN AN ORGANIZED HEALTH CARE EDUCATION/TRAINING PROGRAM

## 2024-04-28 PROCEDURE — 85730 THROMBOPLASTIN TIME PARTIAL: CPT | Performed by: STUDENT IN AN ORGANIZED HEALTH CARE EDUCATION/TRAINING PROGRAM

## 2024-04-28 PROCEDURE — 85610 PROTHROMBIN TIME: CPT | Performed by: STUDENT IN AN ORGANIZED HEALTH CARE EDUCATION/TRAINING PROGRAM

## 2024-04-28 PROCEDURE — 80048 BASIC METABOLIC PNL TOTAL CA: CPT | Performed by: STUDENT IN AN ORGANIZED HEALTH CARE EDUCATION/TRAINING PROGRAM

## 2024-04-28 PROCEDURE — 99223 1ST HOSP IP/OBS HIGH 75: CPT | Performed by: NURSE PRACTITIONER

## 2024-04-28 PROCEDURE — 2500000004 HC RX 250 GENERAL PHARMACY W/ HCPCS (ALT 636 FOR OP/ED): Performed by: NURSE PRACTITIONER

## 2024-04-28 PROCEDURE — 96376 TX/PRO/DX INJ SAME DRUG ADON: CPT | Mod: 59

## 2024-04-28 PROCEDURE — 2500000004 HC RX 250 GENERAL PHARMACY W/ HCPCS (ALT 636 FOR OP/ED): Performed by: STUDENT IN AN ORGANIZED HEALTH CARE EDUCATION/TRAINING PROGRAM

## 2024-04-28 RX ORDER — VANCOMYCIN HYDROCHLORIDE 1 G/20ML
INJECTION, POWDER, LYOPHILIZED, FOR SOLUTION INTRAVENOUS DAILY PRN
Status: DISCONTINUED | OUTPATIENT
Start: 2024-04-28 | End: 2024-04-29

## 2024-04-28 RX ORDER — OXYCODONE HYDROCHLORIDE 5 MG/1
10 TABLET ORAL EVERY 4 HOURS PRN
Status: DISCONTINUED | OUTPATIENT
Start: 2024-04-28 | End: 2024-04-29

## 2024-04-28 RX ORDER — OXYCODONE HYDROCHLORIDE 5 MG/1
5 TABLET ORAL EVERY 6 HOURS PRN
Status: DISCONTINUED | OUTPATIENT
Start: 2024-04-28 | End: 2024-04-29

## 2024-04-28 RX ORDER — SULFAMETHOXAZOLE AND TRIMETHOPRIM 800; 160 MG/1; MG/1
1 TABLET ORAL 2 TIMES DAILY
Status: DISCONTINUED | OUTPATIENT
Start: 2024-04-28 | End: 2024-04-29 | Stop reason: HOSPADM

## 2024-04-28 RX ORDER — OXYCODONE AND ACETAMINOPHEN 10; 325 MG/1; MG/1
1 TABLET ORAL EVERY 8 HOURS PRN
COMMUNITY
Start: 2023-10-02 | End: 2024-05-14 | Stop reason: HOSPADM

## 2024-04-28 RX ORDER — VANCOMYCIN HYDROCHLORIDE 1 G/20ML
INJECTION, POWDER, LYOPHILIZED, FOR SOLUTION INTRAVENOUS DAILY PRN
Status: DISCONTINUED | OUTPATIENT
Start: 2024-04-28 | End: 2024-04-28

## 2024-04-28 RX ORDER — SULFAMETHOXAZOLE AND TRIMETHOPRIM 800; 160 MG/1; MG/1
1 TABLET ORAL 2 TIMES DAILY
COMMUNITY
End: 2024-05-14 | Stop reason: HOSPADM

## 2024-04-28 RX ORDER — HYDRALAZINE HYDROCHLORIDE 20 MG/ML
10 INJECTION INTRAMUSCULAR; INTRAVENOUS ONCE
Status: DISCONTINUED | OUTPATIENT
Start: 2024-04-28 | End: 2024-04-29

## 2024-04-28 RX ORDER — HYDROMORPHONE HYDROCHLORIDE 1 MG/ML
1 INJECTION, SOLUTION INTRAMUSCULAR; INTRAVENOUS; SUBCUTANEOUS ONCE
Status: COMPLETED | OUTPATIENT
Start: 2024-04-28 | End: 2024-04-28

## 2024-04-28 RX ORDER — GABAPENTIN 400 MG/1
800 CAPSULE ORAL 3 TIMES DAILY
Status: DISCONTINUED | OUTPATIENT
Start: 2024-04-28 | End: 2024-04-29 | Stop reason: HOSPADM

## 2024-04-28 RX ORDER — ENOXAPARIN SODIUM 100 MG/ML
40 INJECTION SUBCUTANEOUS EVERY 24 HOURS
Status: DISCONTINUED | OUTPATIENT
Start: 2024-04-29 | End: 2024-04-29 | Stop reason: HOSPADM

## 2024-04-28 RX ORDER — AMOXICILLIN AND CLAVULANATE POTASSIUM 875; 125 MG/1; MG/1
1 TABLET, FILM COATED ORAL 2 TIMES DAILY
COMMUNITY
Start: 2024-04-18 | End: 2024-05-14 | Stop reason: HOSPADM

## 2024-04-28 RX ORDER — DIPHENHYDRAMINE HYDROCHLORIDE 50 MG/ML
25 INJECTION INTRAMUSCULAR; INTRAVENOUS EVERY 8 HOURS PRN
Status: DISCONTINUED | OUTPATIENT
Start: 2024-04-28 | End: 2024-04-29 | Stop reason: HOSPADM

## 2024-04-28 RX ORDER — ONDANSETRON HYDROCHLORIDE 2 MG/ML
4 INJECTION, SOLUTION INTRAVENOUS EVERY 6 HOURS PRN
Status: DISCONTINUED | OUTPATIENT
Start: 2024-04-28 | End: 2024-04-29 | Stop reason: HOSPADM

## 2024-04-28 RX ORDER — VANCOMYCIN HYDROCHLORIDE 1 G/200ML
1000 INJECTION, SOLUTION INTRAVENOUS EVERY 12 HOURS
Status: DISCONTINUED | OUTPATIENT
Start: 2024-04-29 | End: 2024-04-29

## 2024-04-28 RX ORDER — ACETAMINOPHEN 325 MG/1
650 TABLET ORAL EVERY 4 HOURS PRN
Status: DISCONTINUED | OUTPATIENT
Start: 2024-04-28 | End: 2024-04-29 | Stop reason: HOSPADM

## 2024-04-28 RX ADMIN — PIPERACILLIN SODIUM AND TAZOBACTAM SODIUM 4.5 G: 4; .5 INJECTION, SOLUTION INTRAVENOUS at 19:32

## 2024-04-28 RX ADMIN — ACETAMINOPHEN 650 MG: 325 TABLET ORAL at 22:22

## 2024-04-28 RX ADMIN — VANCOMYCIN HYDROCHLORIDE 1500 MG: 500 INJECTION, POWDER, LYOPHILIZED, FOR SOLUTION INTRAVENOUS at 19:05

## 2024-04-28 RX ADMIN — HYDROMORPHONE HYDROCHLORIDE 1 MG: 1 INJECTION, SOLUTION INTRAMUSCULAR; INTRAVENOUS; SUBCUTANEOUS at 20:44

## 2024-04-28 RX ADMIN — HYDROMORPHONE HYDROCHLORIDE 0.2 MG: 0.2 INJECTION, SOLUTION INTRAMUSCULAR; INTRAVENOUS; SUBCUTANEOUS at 23:28

## 2024-04-28 RX ADMIN — SULFAMETHOXAZOLE AND TRIMETHOPRIM 1 TABLET: 800; 160 TABLET ORAL at 23:28

## 2024-04-28 RX ADMIN — GABAPENTIN 800 MG: 400 CAPSULE ORAL at 23:28

## 2024-04-28 RX ADMIN — OXYCODONE HYDROCHLORIDE 10 MG: 5 TABLET ORAL at 22:23

## 2024-04-28 RX ADMIN — HYDROMORPHONE HYDROCHLORIDE 1 MG: 1 INJECTION, SOLUTION INTRAMUSCULAR; INTRAVENOUS; SUBCUTANEOUS at 19:33

## 2024-04-28 RX ADMIN — DIPHENHYDRAMINE HYDROCHLORIDE 25 MG: 50 INJECTION, SOLUTION INTRAMUSCULAR; INTRAVENOUS at 23:39

## 2024-04-28 SDOH — SOCIAL STABILITY: SOCIAL INSECURITY: ABUSE: ADULT

## 2024-04-28 SDOH — SOCIAL STABILITY: SOCIAL INSECURITY: DO YOU FEEL UNSAFE GOING BACK TO THE PLACE WHERE YOU ARE LIVING?: NO

## 2024-04-28 SDOH — SOCIAL STABILITY: SOCIAL INSECURITY: WERE YOU ABLE TO COMPLETE ALL THE BEHAVIORAL HEALTH SCREENINGS?: YES

## 2024-04-28 SDOH — SOCIAL STABILITY: SOCIAL INSECURITY: ARE THERE ANY APPARENT SIGNS OF INJURIES/BEHAVIORS THAT COULD BE RELATED TO ABUSE/NEGLECT?: NO

## 2024-04-28 SDOH — ECONOMIC STABILITY: HOUSING INSECURITY
IN THE LAST 12 MONTHS, WAS THERE A TIME WHEN YOU DID NOT HAVE A STEADY PLACE TO SLEEP OR SLEPT IN A SHELTER (INCLUDING NOW)?: PATIENT DECLINED

## 2024-04-28 SDOH — SOCIAL STABILITY: SOCIAL INSECURITY: HAS ANYONE EVER THREATENED TO HURT YOUR FAMILY OR YOUR PETS?: NO

## 2024-04-28 SDOH — ECONOMIC STABILITY: INCOME INSECURITY: HOW HARD IS IT FOR YOU TO PAY FOR THE VERY BASICS LIKE FOOD, HOUSING, MEDICAL CARE, AND HEATING?: PATIENT DECLINED

## 2024-04-28 SDOH — SOCIAL STABILITY: SOCIAL INSECURITY: ARE YOU OR HAVE YOU BEEN THREATENED OR ABUSED PHYSICALLY, EMOTIONALLY, OR SEXUALLY BY ANYONE?: NO

## 2024-04-28 SDOH — ECONOMIC STABILITY: INCOME INSECURITY: IN THE LAST 12 MONTHS, WAS THERE A TIME WHEN YOU WERE NOT ABLE TO PAY THE MORTGAGE OR RENT ON TIME?: PATIENT DECLINED

## 2024-04-28 SDOH — SOCIAL STABILITY: SOCIAL INSECURITY: DOES ANYONE TRY TO KEEP YOU FROM HAVING/CONTACTING OTHER FRIENDS OR DOING THINGS OUTSIDE YOUR HOME?: NO

## 2024-04-28 SDOH — SOCIAL STABILITY: SOCIAL INSECURITY: DO YOU FEEL ANYONE HAS EXPLOITED OR TAKEN ADVANTAGE OF YOU FINANCIALLY OR OF YOUR PERSONAL PROPERTY?: NO

## 2024-04-28 SDOH — ECONOMIC STABILITY: HOUSING INSECURITY: IN THE LAST 12 MONTHS, HOW MANY PLACES HAVE YOU LIVED?: 1

## 2024-04-28 SDOH — ECONOMIC STABILITY: TRANSPORTATION INSECURITY
IN THE PAST 12 MONTHS, HAS THE LACK OF TRANSPORTATION KEPT YOU FROM MEDICAL APPOINTMENTS OR FROM GETTING MEDICATIONS?: PATIENT DECLINED

## 2024-04-28 SDOH — SOCIAL STABILITY: SOCIAL INSECURITY: HAVE YOU HAD THOUGHTS OF HARMING ANYONE ELSE?: NO

## 2024-04-28 SDOH — SOCIAL STABILITY: SOCIAL INSECURITY: HAVE YOU HAD ANY THOUGHTS OF HARMING ANYONE ELSE?: NO

## 2024-04-28 SDOH — ECONOMIC STABILITY: TRANSPORTATION INSECURITY
IN THE PAST 12 MONTHS, HAS LACK OF TRANSPORTATION KEPT YOU FROM MEETINGS, WORK, OR FROM GETTING THINGS NEEDED FOR DAILY LIVING?: PATIENT DECLINED

## 2024-04-28 ASSESSMENT — ACTIVITIES OF DAILY LIVING (ADL)
ADEQUATE_TO_COMPLETE_ADL: YES
BATHING: INDEPENDENT
WALKS IN HOME: INDEPENDENT
FEEDING YOURSELF: INDEPENDENT
JUDGMENT_ADEQUATE_SAFELY_COMPLETE_DAILY_ACTIVITIES: YES
TOILETING: INDEPENDENT
DRESSING YOURSELF: INDEPENDENT
PATIENT'S MEMORY ADEQUATE TO SAFELY COMPLETE DAILY ACTIVITIES?: YES
GROOMING: INDEPENDENT
HEARING - LEFT EAR: FUNCTIONAL
HEARING - RIGHT EAR: FUNCTIONAL

## 2024-04-28 ASSESSMENT — COGNITIVE AND FUNCTIONAL STATUS - GENERAL
DAILY ACTIVITIY SCORE: 24
MOBILITY SCORE: 24
PATIENT BASELINE BEDBOUND: NO

## 2024-04-28 ASSESSMENT — PAIN - FUNCTIONAL ASSESSMENT
PAIN_FUNCTIONAL_ASSESSMENT: 0-10

## 2024-04-28 ASSESSMENT — LIFESTYLE VARIABLES
AUDIT-C TOTAL SCORE: 0
SKIP TO QUESTIONS 9-10: 1
HOW MANY STANDARD DRINKS CONTAINING ALCOHOL DO YOU HAVE ON A TYPICAL DAY: PATIENT DOES NOT DRINK
HOW OFTEN DO YOU HAVE 6 OR MORE DRINKS ON ONE OCCASION: NEVER
HOW OFTEN DO YOU HAVE A DRINK CONTAINING ALCOHOL: NEVER
AUDIT-C TOTAL SCORE: 0

## 2024-04-28 ASSESSMENT — PATIENT HEALTH QUESTIONNAIRE - PHQ9
1. LITTLE INTEREST OR PLEASURE IN DOING THINGS: NOT AT ALL
SUM OF ALL RESPONSES TO PHQ9 QUESTIONS 1 & 2: 0
2. FEELING DOWN, DEPRESSED OR HOPELESS: NOT AT ALL

## 2024-04-28 ASSESSMENT — PAIN DESCRIPTION - LOCATION: LOCATION: ARM

## 2024-04-28 ASSESSMENT — PAIN SCALES - GENERAL
PAINLEVEL_OUTOF10: 7
PAINLEVEL_OUTOF10: 10 - WORST POSSIBLE PAIN
PAINLEVEL_OUTOF10: 8
PAINLEVEL_OUTOF10: 9

## 2024-04-28 ASSESSMENT — PAIN DESCRIPTION - ORIENTATION: ORIENTATION: RIGHT;LEFT

## 2024-04-28 NOTE — ED PROVIDER NOTES
HPI:    History provided by: Patient    33-year-old female with past medical history of hidradenitis suppurativa presented to the emergency department for worsening symptoms of her at bedtime.  Patient endorses is that she is chronically on Bactrim and doxycycline for this.  She notes that despite this, she has had worsening symptoms in her bilateral armpits and left thigh.  She has had swelling in her armpits, along with a significant amount of pain despite taking pain medications at home.  She notes that she had a fever of 101 °F yesterday.  She has not seen any drainage from her sites.  Pain is currently 10 out of 10 on the pain scale, sharp in nature, located in the bilateral armpits and left thigh.  Notes that she has not missed any antibiotic doses.  She had wound culture obtained which was positive for staph, in conjunction with the fevers that she is having along with significant pain she went to be evaluated.  She has had numerous surgeries in the past for her at bedtime, but nothing recently.  No other symptoms at this time.      ROS: All pertinent positives and negatives reviewed in HPI    PMHx: Reviewed  PSHx: Reviewed  Social: no Etoh, no tobacco, no social drugs  Social Determinants of Health impacting care: NA  Allergies: Reviewed in EMR  FMHx: Noncontributory to patient's chief complaint  Medications: Reviewed        Vital signs and triage note reviewed per nursing documentation    Physical Exam:     GEN: Sitting in no acute distress. Well-appearing, appears stated age  HEAD: Atraumatic, normocephalic  CVS: Regular rate and rhythm, no murmurs, gallops, or rubs  PULM: Clear to auscultation bilaterally. No wheezes, rales, rhonchi.   ABD: Soft, nontender to palpation. No rigidity, guarding, or tympany  EXT: fluctuant HS in bilateral axillae with no active drainage. Left thigh HS  NEURO: Alert, keenly responsive. Moving all 4 extremities spontaneously with normal strength. Normal sensation in all 4  extremities     Emergency Department Course    Medications Ordered: IV vancomycin, IV zosyn, IV Dilaudid, IV LR    EKG: NA    MDM      33-year-old female past medical history of hidradenitis suppurativa who is required multiple operative interventions presented to the emergency department for worsening pain, fevers and in the outpatient setting despite antibiotic therapy.  My assessment reveals an uncomfortable female borderline tachycardic on initial arrival.  Does have bilateral hidradenitis that is somewhat fluctuant on my assessment.  Also has some fluctuance in the left thigh.  Given Dilaudid for pain control, and given that she has had a fever, is tachycardic despite antibiotic therapy, we will broaden out her antibiotics to vancomycin and Zosyn.  Laboratory testing relatively reassuring, but given the patient's inadequate pain control at home, fever at home she will be admitted for symptomatic management, and surgery consult in the a.m.    Consultations:    General surgery, Dr. Santiago. Will consult on patient in AM    Medications Prescribed:    NA    Disposition:    Admitted       Rick Bee MD  04/29/24 2492

## 2024-04-29 ENCOUNTER — PREP FOR PROCEDURE (OUTPATIENT)
Dept: SURGERY | Facility: HOSPITAL | Age: 34
End: 2024-04-29

## 2024-04-29 VITALS
WEIGHT: 176 LBS | OXYGEN SATURATION: 100 % | HEIGHT: 61 IN | DIASTOLIC BLOOD PRESSURE: 111 MMHG | TEMPERATURE: 98.4 F | RESPIRATION RATE: 16 BRPM | HEART RATE: 95 BPM | SYSTOLIC BLOOD PRESSURE: 152 MMHG | BODY MASS INDEX: 33.23 KG/M2

## 2024-04-29 DIAGNOSIS — L73.2 HIDRADENITIS AXILLARIS: Primary | ICD-10-CM

## 2024-04-29 LAB
ANION GAP SERPL CALC-SCNC: 13 MMOL/L (ref 10–20)
BUN SERPL-MCNC: 8 MG/DL (ref 6–23)
CALCIUM SERPL-MCNC: 8.3 MG/DL (ref 8.6–10.3)
CHLORIDE SERPL-SCNC: 109 MMOL/L (ref 98–107)
CO2 SERPL-SCNC: 21 MMOL/L (ref 21–32)
CREAT SERPL-MCNC: 0.93 MG/DL (ref 0.5–1.05)
EGFRCR SERPLBLD CKD-EPI 2021: 82 ML/MIN/1.73M*2
ERYTHROCYTE [DISTWIDTH] IN BLOOD BY AUTOMATED COUNT: 14.2 % (ref 11.5–14.5)
GLUCOSE SERPL-MCNC: 81 MG/DL (ref 74–99)
HCT VFR BLD AUTO: 38 % (ref 36–46)
HGB BLD-MCNC: 12.8 G/DL (ref 12–16)
MCH RBC QN AUTO: 26 PG (ref 26–34)
MCHC RBC AUTO-ENTMCNC: 33.7 G/DL (ref 32–36)
MCV RBC AUTO: 77 FL (ref 80–100)
NRBC BLD-RTO: 0 /100 WBCS (ref 0–0)
PLATELET # BLD AUTO: 282 X10*3/UL (ref 150–450)
POTASSIUM SERPL-SCNC: 3.3 MMOL/L (ref 3.5–5.3)
RBC # BLD AUTO: 4.92 X10*6/UL (ref 4–5.2)
SODIUM SERPL-SCNC: 140 MMOL/L (ref 136–145)
WBC # BLD AUTO: 6.3 X10*3/UL (ref 4.4–11.3)

## 2024-04-29 PROCEDURE — 99239 HOSP IP/OBS DSCHRG MGMT >30: CPT | Performed by: STUDENT IN AN ORGANIZED HEALTH CARE EDUCATION/TRAINING PROGRAM

## 2024-04-29 PROCEDURE — 2500000004 HC RX 250 GENERAL PHARMACY W/ HCPCS (ALT 636 FOR OP/ED): Performed by: NURSE PRACTITIONER

## 2024-04-29 PROCEDURE — 2500000006 HC RX 250 W HCPCS SELF ADMINISTERED DRUGS (ALT 637 FOR ALL PAYERS): Performed by: STUDENT IN AN ORGANIZED HEALTH CARE EDUCATION/TRAINING PROGRAM

## 2024-04-29 PROCEDURE — 99232 SBSQ HOSP IP/OBS MODERATE 35: CPT | Performed by: SURGERY

## 2024-04-29 PROCEDURE — 2500000001 HC RX 250 WO HCPCS SELF ADMINISTERED DRUGS (ALT 637 FOR MEDICARE OP): Performed by: NURSE PRACTITIONER

## 2024-04-29 PROCEDURE — 36415 COLL VENOUS BLD VENIPUNCTURE: CPT | Performed by: NURSE PRACTITIONER

## 2024-04-29 PROCEDURE — 85027 COMPLETE CBC AUTOMATED: CPT | Performed by: NURSE PRACTITIONER

## 2024-04-29 PROCEDURE — 2500000001 HC RX 250 WO HCPCS SELF ADMINISTERED DRUGS (ALT 637 FOR MEDICARE OP): Performed by: STUDENT IN AN ORGANIZED HEALTH CARE EDUCATION/TRAINING PROGRAM

## 2024-04-29 PROCEDURE — 82374 ASSAY BLOOD CARBON DIOXIDE: CPT | Performed by: NURSE PRACTITIONER

## 2024-04-29 PROCEDURE — G0378 HOSPITAL OBSERVATION PER HR: HCPCS

## 2024-04-29 RX ORDER — CEFAZOLIN SODIUM 2 G/100ML
2 INJECTION, SOLUTION INTRAVENOUS EVERY 8 HOURS
Status: DISCONTINUED | OUTPATIENT
Start: 2024-04-29 | End: 2024-04-29 | Stop reason: HOSPADM

## 2024-04-29 RX ORDER — OXYCODONE HYDROCHLORIDE 5 MG/1
5 TABLET ORAL EVERY 4 HOURS PRN
Status: DISCONTINUED | OUTPATIENT
Start: 2024-04-29 | End: 2024-04-29 | Stop reason: HOSPADM

## 2024-04-29 RX ORDER — POTASSIUM CHLORIDE 20 MEQ/1
40 TABLET, EXTENDED RELEASE ORAL ONCE
Status: COMPLETED | OUTPATIENT
Start: 2024-04-29 | End: 2024-04-29

## 2024-04-29 RX ORDER — DOXYCYCLINE 100 MG/1
100 CAPSULE ORAL 2 TIMES DAILY
Qty: 14 CAPSULE | Refills: 0 | Status: SHIPPED | OUTPATIENT
Start: 2024-04-29 | End: 2024-05-14 | Stop reason: HOSPADM

## 2024-04-29 RX ORDER — OXYCODONE HYDROCHLORIDE 5 MG/1
10 TABLET ORAL EVERY 4 HOURS PRN
Status: DISCONTINUED | OUTPATIENT
Start: 2024-04-29 | End: 2024-04-29 | Stop reason: HOSPADM

## 2024-04-29 RX ORDER — SODIUM CHLORIDE, SODIUM LACTATE, POTASSIUM CHLORIDE, CALCIUM CHLORIDE 600; 310; 30; 20 MG/100ML; MG/100ML; MG/100ML; MG/100ML
100 INJECTION, SOLUTION INTRAVENOUS CONTINUOUS
Status: CANCELLED | OUTPATIENT
Start: 2024-04-29

## 2024-04-29 RX ADMIN — ACETAMINOPHEN 650 MG: 325 TABLET ORAL at 13:03

## 2024-04-29 RX ADMIN — HYDROMORPHONE HYDROCHLORIDE 0.5 MG: 1 INJECTION, SOLUTION INTRAMUSCULAR; INTRAVENOUS; SUBCUTANEOUS at 06:02

## 2024-04-29 RX ADMIN — PIPERACILLIN SODIUM AND TAZOBACTAM SODIUM 3.38 G: 3; .375 INJECTION, SOLUTION INTRAVENOUS at 06:02

## 2024-04-29 RX ADMIN — ACETAMINOPHEN 650 MG: 325 TABLET ORAL at 02:40

## 2024-04-29 RX ADMIN — POTASSIUM CHLORIDE 40 MEQ: 1500 TABLET, EXTENDED RELEASE ORAL at 08:44

## 2024-04-29 RX ADMIN — HYDROMORPHONE HYDROCHLORIDE 0.5 MG: 1 INJECTION, SOLUTION INTRAMUSCULAR; INTRAVENOUS; SUBCUTANEOUS at 11:04

## 2024-04-29 RX ADMIN — OXYCODONE HYDROCHLORIDE 10 MG: 5 TABLET ORAL at 02:40

## 2024-04-29 RX ADMIN — OXYCODONE HYDROCHLORIDE 10 MG: 5 TABLET ORAL at 13:00

## 2024-04-29 RX ADMIN — GABAPENTIN 800 MG: 400 CAPSULE ORAL at 08:44

## 2024-04-29 RX ADMIN — PIPERACILLIN SODIUM AND TAZOBACTAM SODIUM 3.38 G: 3; .375 INJECTION, SOLUTION INTRAVENOUS at 00:27

## 2024-04-29 RX ADMIN — OXYCODONE HYDROCHLORIDE 10 MG: 5 TABLET ORAL at 08:44

## 2024-04-29 ASSESSMENT — COGNITIVE AND FUNCTIONAL STATUS - GENERAL
MOBILITY SCORE: 24
DAILY ACTIVITIY SCORE: 24

## 2024-04-29 ASSESSMENT — PAIN DESCRIPTION - DESCRIPTORS: DESCRIPTORS: NUMBNESS

## 2024-04-29 ASSESSMENT — ENCOUNTER SYMPTOMS
ABDOMINAL PAIN: 0
NEUROLOGICAL NEGATIVE: 1
GASTROINTESTINAL NEGATIVE: 1
RESPIRATORY NEGATIVE: 1
FEVER: 1
ACTIVITY CHANGE: 1
APPETITE CHANGE: 1
FEVER: 0
PSYCHIATRIC NEGATIVE: 1
CARDIOVASCULAR NEGATIVE: 1
CHILLS: 1
WOUND: 1
SHORTNESS OF BREATH: 0
VOMITING: 0
ABDOMINAL DISTENTION: 0

## 2024-04-29 ASSESSMENT — PAIN - FUNCTIONAL ASSESSMENT
PAIN_FUNCTIONAL_ASSESSMENT: 0-10

## 2024-04-29 ASSESSMENT — PAIN SCALES - PAIN ASSESSMENT IN ADVANCED DEMENTIA (PAINAD): TOTALSCORE: MEDICATION (SEE MAR)

## 2024-04-29 ASSESSMENT — PAIN SCALES - GENERAL
PAINLEVEL_OUTOF10: 7
PAINLEVEL_OUTOF10: 6
PAINLEVEL_OUTOF10: 8

## 2024-04-29 ASSESSMENT — ACTIVITIES OF DAILY LIVING (ADL): LACK_OF_TRANSPORTATION: NO

## 2024-04-29 NOTE — PROGRESS NOTES
Vancomycin Dosing by Pharmacy- Cessation of Therapy    Consult to pharmacy for vancomycin dosing has been discontinued by the prescriber, pharmacy will sign off at this time.    Please call pharmacy if there are further questions or re-enter a consult if vancomycin is resumed.     Suha Leavitt, PharmD
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
## 2024-04-29 NOTE — H&P
History Of Present Illness  Sandrita Adams is a 33 y.o. female with past medical history of lupus, hidradenitis suppurativa presenting with worsening symptoms from hidradenitis suppurativa.  Patient reports that 2 days prior to arrival patient developed excruciating bilateral axillary pain as well as left groin pain, fever, nausea, as well as decrease in appetite.  Patient reports that she is not able to lift up her arm without severe pain and is affecting her daily life.  Patient feels that her symptoms are worse in her left axillary and left groin, reported multiple nodular-like areas.  Patient with no current drainage.  Patient states that she has been taking doxycycline, Bactrim and recently Augmentin.  Patient seen her infectious disease doctor 10 days ago, she showed me her MyChart as they took a culture and it did show it was positive for Staph aureus.     Past Medical History  She has a past medical history of Hidradenitis suppurativa (10/29/2020) and Lupus (Multi).    Surgical History  She has a past surgical history that includes Other surgical history (09/19/2022).     Social History  She reports that she has been smoking cigarettes. She has never used smokeless tobacco. No history on file for alcohol use and drug use.    Family History  No family history on file.     Allergies  Suboxone [buprenorphine-naloxone], Clarithromycin, Levofloxacin, Clindamycin, Haloperidol, Keflex [cephalexin], and Reglan [metoclopramide hcl]    Review of Systems   Constitutional:  Positive for activity change, appetite change, chills and fever.   HENT: Negative.     Respiratory: Negative.     Cardiovascular: Negative.    Gastrointestinal: Negative.    Skin:         Hydradenitis suppurativa      Neurological: Negative.    Psychiatric/Behavioral: Negative.          Physical Exam  HENT:      Head: Normocephalic.      Mouth/Throat:      Mouth: Mucous membranes are dry.   Cardiovascular:      Rate and Rhythm: Normal rate and  regular rhythm.   Pulmonary:      Effort: Pulmonary effort is normal.   Abdominal:      General: Bowel sounds are normal.      Palpations: Abdomen is soft.   Musculoskeletal:         General: Normal range of motion.      Cervical back: Neck supple.   Skin:     General: Skin is warm.      Comments: Hidradenitis suppurativa affecting bilateral axillary areas and left groin   Neurological:      General: No focal deficit present.      Mental Status: She is alert.   Psychiatric:         Mood and Affect: Mood normal.         Behavior: Behavior normal.          Last Recorded Vitals  BP (!) 170/98   Pulse 79   Temp 36.6 °C (97.9 °F) (Oral)   Resp 18   Wt 79.8 kg (176 lb)   SpO2 100%     Relevant Results      Results for orders placed or performed during the hospital encounter of 04/28/24 (from the past 24 hour(s))   CBC and Auto Differential   Result Value Ref Range    WBC 6.7 4.4 - 11.3 x10*3/uL    nRBC 0.0 0.0 - 0.0 /100 WBCs    RBC 5.00 4.00 - 5.20 x10*6/uL    Hemoglobin 12.9 12.0 - 16.0 g/dL    Hematocrit 38.2 36.0 - 46.0 %    MCV 76 (L) 80 - 100 fL    MCH 25.8 (L) 26.0 - 34.0 pg    MCHC 33.8 32.0 - 36.0 g/dL    RDW 14.3 11.5 - 14.5 %    Platelets 309 150 - 450 x10*3/uL    Neutrophils % 48.1 40.0 - 80.0 %    Immature Granulocytes %, Automated 0.1 0.0 - 0.9 %    Lymphocytes % 38.0 13.0 - 44.0 %    Monocytes % 12.0 2.0 - 10.0 %    Eosinophils % 1.2 0.0 - 6.0 %    Basophils % 0.6 0.0 - 2.0 %    Neutrophils Absolute 3.22 1.20 - 7.70 x10*3/uL    Immature Granulocytes Absolute, Automated 0.01 0.00 - 0.70 x10*3/uL    Lymphocytes Absolute 2.54 1.20 - 4.80 x10*3/uL    Monocytes Absolute 0.80 0.10 - 1.00 x10*3/uL    Eosinophils Absolute 0.08 0.00 - 0.70 x10*3/uL    Basophils Absolute 0.04 0.00 - 0.10 x10*3/uL   Basic metabolic panel   Result Value Ref Range    Glucose 91 74 - 99 mg/dL    Sodium 139 136 - 145 mmol/L    Potassium 4.1 3.5 - 5.3 mmol/L    Chloride 110 (H) 98 - 107 mmol/L    Bicarbonate 21 21 - 32 mmol/L    Anion  Gap 12 10 - 20 mmol/L    Urea Nitrogen 11 6 - 23 mg/dL    Creatinine 0.93 0.50 - 1.05 mg/dL    eGFR 82 >60 mL/min/1.73m*2    Calcium 8.8 8.6 - 10.3 mg/dL   Phosphorus   Result Value Ref Range    Phosphorus 2.6 2.5 - 4.9 mg/dL   Magnesium   Result Value Ref Range    Magnesium 2.10 1.60 - 2.40 mg/dL   Lactate   Result Value Ref Range    Lactate 1.3 0.4 - 2.0 mmol/L   Protime-INR   Result Value Ref Range    Protime 13.9 (H) 9.8 - 12.8 seconds    INR 1.2 (H) 0.9 - 1.1   aPTT   Result Value Ref Range    aPTT 28 27 - 38 seconds   Type And Screen   Result Value Ref Range    ABO TYPE A     Rh TYPE POS     ANTIBODY SCREEN NEG           Assessment/Plan   Principal Problem:    Hidradenitis  Worsening hidradenitis suppurativa, without outpatient treatment    Plan:  IV vancomycin/IV Zosyn, continue home Bactrim  ID consult  General surgery consult  Pain control/antiemetics  Patient on secukinumab was originally working well when first started in January this year, patient has appointment with derm on May 15th     DVT prophylaxis- Lovenox        Kota Ceballos, APRN-CNP

## 2024-04-29 NOTE — PROGRESS NOTES
04/29/24 0940   Discharge Planning   Living Arrangements Children   Support Systems Children   Assistance Needed Independent prior to admission   Type of Residence Private residence   Number of Stairs to Enter Residence 12   Number of Stairs Within Residence 0   Do you have animals or pets at home? No   Who is requesting discharge planning? Other (Comment)  ([facility] admission assessment)   Home or Post Acute Services None   Patient expects to be discharged to: Home   Does the patient need discharge transport arranged? No   Financial Resource Strain   How hard is it for you to pay for the very basics like food, housing, medical care, and heating? Pt Declined   Housing Stability   In the last 12 months, was there a time when you were not able to pay the mortgage or rent on time? Pt Declined   In the last 12 months, how many places have you lived? 1   In the last 12 months, was there a time when you did not have a steady place to sleep or slept in a shelter (including now)? Pt Declined   Transportation Needs   In the past 12 months, has lack of transportation kept you from medical appointments or from getting medications? no   In the past 12 months, has lack of transportation kept you from meetings, work, or from getting things needed for daily living? No     Met with patient at the bedside to discuss discharge plan.  She is admitted for hifradenitis.  Patient denies any need for homecare at this time.  PLAN: ID and surgery consult  DISP: home  ADOD: 2-3 days  BARRIERS: waiting on consults and recs.  .js

## 2024-04-29 NOTE — CONSULTS
"INFECTIOUS DISEASE INPATIENT INITIAL CONSULTATION    Referred By: Kota Ceballos    Reason For Consult: hidradenitis suppurativa flair with no imoprovement on po antibiotics     HPI:  This is a 33 y.o. female with PMH of hydradenitis suppurativa, lupus who presented with bilateral axillary pain, left groin pain, fever, nausea, poor PO intake.    Worsening symptoms over the last 2 days. Cannot lift arms due to pain. Has been on Doxycycline, Bactrim, and Augmentin. Saw Dr. Grigsby with ID at [facility] on 4/18. Is on Secukinumab for HS. Is on Augmentin 875mg BID for suppressive therapy. Grew MSSA from wound in axilla on 4/18/24.    Here is afebrile. WBC normal. Lactate normal. Is on IV Vanc/Zosyn now.    Allergies:  Suboxone [buprenorphine-naloxone], Clarithromycin, Levofloxacin, Clindamycin, Haloperidol, Keflex [cephalexin], and Reglan [metoclopramide hcl]     Vitals (Last 24 Hours):  Heart Rate:  [79-86]   Temp:  [36.4 °C (97.6 °F)-36.6 °C (97.9 °F)]   Resp:  [16-18]   BP: (129-170)/()   Height:  [154.9 cm (5' 1\")]   Weight:  [79.8 kg (176 lb)]   SpO2:  [99 %-100 %]      PHYSICAL EXAM:  Gen - NAD  Heart - RRR, no murmurs  Bilateral Axilla - dry but swollen glands tender to touch, mild warmth, no surrounding erythema  Lungs - clear bilaterally, no wheezing  Abd - soft, no ttp, BS present  Left Thigh - medial thigh with some induration/swelling/scarred areas but no erythema  Skin - no rash    MEDS:    Current Facility-Administered Medications:     acetaminophen (Tylenol) tablet 650 mg, 650 mg, oral, q4h PRN, ZAFAR Samaniego-CNP, 650 mg at 04/29/24 0240    diphenhydrAMINE (BENADryl) injection 25 mg, 25 mg, intravenous, q8h PRN, ZAFAR Samaniego-CNP, 25 mg at 04/28/24 1879    enoxaparin (Lovenox) syringe 40 mg, 40 mg, subcutaneous, q24h, DEBBY Samaniego    gabapentin (Neurontin) capsule 800 mg, 800 mg, oral, TID, DEBBY Samaniego, 800 mg at 04/28/24 2328    hydrALAZINE (Apresoline) injection 10 " mg, 10 mg, intravenous, Once, DEBBY Samaniego    HYDROmorphone (Dilaudid) injection 0.5 mg, 0.5 mg, intravenous, q4h PRN, DEBBY Samaniego, 0.5 mg at 04/29/24 0602    ondansetron (Zofran) injection 4 mg, 4 mg, intravenous, q6h PRN, DEBBY Samaniego    oxyCODONE (Roxicodone) immediate release tablet 10 mg, 10 mg, oral, q4h PRN, DEBBY Samaniego, 10 mg at 04/29/24 0240    oxyCODONE (Roxicodone) immediate release tablet 5 mg, 5 mg, oral, q6h PRN, DEBBY Samaniego    piperacillin-tazobactam-dextrose (Zosyn) IV 3.375 g, 3.375 g, intravenous, q6h, DEBBY Samaniego, Stopped at 04/29/24 0632    sulfamethoxazole-trimethoprim (Bactrim DS) 800-160 mg per tablet 1 tablet, 1 tablet, oral, BID, DEBBY Samaniego, 1 tablet at 04/28/24 2328    vancomycin (Vancocin) 1,000 mg in dextrose 5% water 200 mL, 1,000 mg, intravenous, q12h, DEBBY Samaniego    vancomycin (Vancocin) pharmacy to dose - pharmacy monitoring, , miscellaneous, Daily PRN, DEBBY Samaniego     LABS:  Lab Results   Component Value Date    WBC 6.3 04/29/2024    HGB 12.8 04/29/2024    HCT 38.0 04/29/2024    MCV 77 (L) 04/29/2024     04/29/2024      Results from last 72 hours   Lab Units 04/29/24  0508   SODIUM mmol/L 140   POTASSIUM mmol/L 3.3*   CHLORIDE mmol/L 109*   CO2 mmol/L 21   BUN mg/dL 8   CREATININE mg/dL 0.93   GLUCOSE mg/dL 81   CALCIUM mg/dL 8.3*   ANION GAP mmol/L 13   EGFR mL/min/1.73m*2 82         Estimated Creatinine Clearance: 80.8 mL/min (by C-G formula based on SCr of 0.93 mg/dL).    CRP   Date/Time Value Ref Range Status   02/21/2022 01:43 PM 0.88 mg/dL Final     Comment:     REF VALUE  < 1.00     11/30/2020 12:12 PM 0.55 mg/dL Final     Comment:     REF VALUE  < 1.00     11/13/2020 11:36 AM 0.58 mg/dL Final     Comment:     REF VALUE  < 1.00       Sedimentation Rate   Date/Time Value Ref Range Status   02/21/2022 01:43 PM 8 0 - 20 mm/h Final   08/26/2020 09:47 AM 19 0 - 20 mm/h  Final   07/31/2020 08:56 AM 16 0 - 20 mm/h Final       ASSESSMENT/PLAN:    HS Flare with Bilateral Axillary and Left Thigh Involvement  Abx Allergies - Keflex/Levofloxacin/Clarithromycin, limits abx options    Recent wound cx with MSSA. Stopped IV Vanc/Zosyn.    Can be on IV Cefazolin 2g Q8H here. Listed Keflex allergy but says hives when she was 13 yo and not sure if allergic anymore. Has tolerated penicillin based abx without issues.    She says she needs to leave today as he sister is ill and in another hospital, critically ill intubated in an ICU. She does not need to stay here from my perspective as is non-toxic appearing and labs are fine. I do think she might benefit from 1-2 days of IV abx though. However she is OK to leave on PO Doxycycline 100mg BID for 7D and should follow up with dermatology and her ID physician Dr. Grigsby.    Will sign off. Please call back with questions. Thanks!    Casey Castanon MD  [facility]  Office #[phone]

## 2024-04-29 NOTE — CARE PLAN
The patient's goals for the shift include      The clinical goals for the shift include Patient will be safe throughout shift      Problem: Pain  Goal: My pain/discomfort is manageable  4/29/2024 1216 by Elma Matute RN  Outcome: Met  4/29/2024 1216 by Elma Matute RN  Outcome: Progressing     Problem: Safety  Goal: Patient will be injury free during hospitalization  4/29/2024 1216 by Elma Matute RN  Outcome: Met  4/29/2024 1216 by Elma Matute RN  Outcome: Progressing     Problem: Daily Care  Goal: Daily care needs are met  4/29/2024 1216 by Elma Matute RN  Outcome: Met  4/29/2024 1216 by Elma Matute RN  Outcome: Progressing

## 2024-04-29 NOTE — PROGRESS NOTES
04/29/24 0943   Current Planned Discharge Disposition   Current Planned Discharge Disposition Home

## 2024-04-29 NOTE — CARE PLAN
Problem: Pain  Goal: My pain/discomfort is manageable  Outcome: Progressing     Problem: Safety  Goal: Patient will be injury free during hospitalization  Outcome: Progressing     Problem: Daily Care  Goal: Daily care needs are met  Outcome: Progressing     Problem: Psychosocial Needs  Goal: Demonstrates ability to cope with hospitalization/illness  Outcome: Progressing  Goal: Collaborate with me, my family, and caregiver to identify my specific goals  Outcome: Progressing

## 2024-04-29 NOTE — PROGRESS NOTES
"Sandrita Adams is a 33 y.o. female on day 1 of admission presenting with Hidradenitis.    Assessment/Plan   Patient with Rand stage III hidradenitis bilateral axillae.  Really does not need to be admitted for this.  My office can work on scheduling her for outpatient elective surgery sometime in the near future.  Patient understands that primary closure of the resultant defect may not be possible and that wound care and prolonged healing might be necessary given the extent of her disease.  I would advise feeding her and sending her home and my office will take it from there.    Subjective   Patient with long history of hidradenitis suppurativa bilateral axilla.  She has had some surgery done on the right side.  Currently not taking any biologics.  She has been on suppressive antibiotics for a long time.       Objective     Physical Exam  NAD  A&Ox3  Non icteric  Bilateral axilla with stage III hidradenitis by Rand classification.  No purulent abscess that needs to be drained    Last Recorded Vitals  Blood pressure 122/81, pulse 92, temperature 36.7 °C (98.1 °F), temperature source Temporal, resp. rate 17, height 1.549 m (5' 1\"), weight 79.8 kg (176 lb), SpO2 97%.  Intake/Output last 3 Shifts:  I/O last 3 completed shifts:  In: 360 (4.5 mL/kg) [P.O.:360]  Out: - (0 mL/kg)   Weight: 79.8 kg     Relevant Results    Scheduled medications  ceFAZolin, 2 g, intravenous, q8h  enoxaparin, 40 mg, subcutaneous, q24h  gabapentin, 800 mg, oral, TID  [Held by provider] sulfamethoxazole-trimethoprim, 1 tablet, oral, BID      Continuous medications     PRN medications  PRN medications: acetaminophen, diphenhydrAMINE, HYDROmorphone, ondansetron, oxyCODONE **OR** oxyCODONE    Results for orders placed or performed during the hospital encounter of 04/28/24 (from the past 24 hour(s))   CBC and Auto Differential   Result Value Ref Range    WBC 6.7 4.4 - 11.3 x10*3/uL    nRBC 0.0 0.0 - 0.0 /100 WBCs    RBC 5.00 4.00 - 5.20 " x10*6/uL    Hemoglobin 12.9 12.0 - 16.0 g/dL    Hematocrit 38.2 36.0 - 46.0 %    MCV 76 (L) 80 - 100 fL    MCH 25.8 (L) 26.0 - 34.0 pg    MCHC 33.8 32.0 - 36.0 g/dL    RDW 14.3 11.5 - 14.5 %    Platelets 309 150 - 450 x10*3/uL    Neutrophils % 48.1 40.0 - 80.0 %    Immature Granulocytes %, Automated 0.1 0.0 - 0.9 %    Lymphocytes % 38.0 13.0 - 44.0 %    Monocytes % 12.0 2.0 - 10.0 %    Eosinophils % 1.2 0.0 - 6.0 %    Basophils % 0.6 0.0 - 2.0 %    Neutrophils Absolute 3.22 1.20 - 7.70 x10*3/uL    Immature Granulocytes Absolute, Automated 0.01 0.00 - 0.70 x10*3/uL    Lymphocytes Absolute 2.54 1.20 - 4.80 x10*3/uL    Monocytes Absolute 0.80 0.10 - 1.00 x10*3/uL    Eosinophils Absolute 0.08 0.00 - 0.70 x10*3/uL    Basophils Absolute 0.04 0.00 - 0.10 x10*3/uL   Basic metabolic panel   Result Value Ref Range    Glucose 91 74 - 99 mg/dL    Sodium 139 136 - 145 mmol/L    Potassium 4.1 3.5 - 5.3 mmol/L    Chloride 110 (H) 98 - 107 mmol/L    Bicarbonate 21 21 - 32 mmol/L    Anion Gap 12 10 - 20 mmol/L    Urea Nitrogen 11 6 - 23 mg/dL    Creatinine 0.93 0.50 - 1.05 mg/dL    eGFR 82 >60 mL/min/1.73m*2    Calcium 8.8 8.6 - 10.3 mg/dL   Phosphorus   Result Value Ref Range    Phosphorus 2.6 2.5 - 4.9 mg/dL   Magnesium   Result Value Ref Range    Magnesium 2.10 1.60 - 2.40 mg/dL   Lactate   Result Value Ref Range    Lactate 1.3 0.4 - 2.0 mmol/L   Protime-INR   Result Value Ref Range    Protime 13.9 (H) 9.8 - 12.8 seconds    INR 1.2 (H) 0.9 - 1.1   aPTT   Result Value Ref Range    aPTT 28 27 - 38 seconds   Type And Screen   Result Value Ref Range    ABO TYPE A     Rh TYPE POS     ANTIBODY SCREEN NEG    CBC   Result Value Ref Range    WBC 6.3 4.4 - 11.3 x10*3/uL    nRBC 0.0 0.0 - 0.0 /100 WBCs    RBC 4.92 4.00 - 5.20 x10*6/uL    Hemoglobin 12.8 12.0 - 16.0 g/dL    Hematocrit 38.0 36.0 - 46.0 %    MCV 77 (L) 80 - 100 fL    MCH 26.0 26.0 - 34.0 pg    MCHC 33.7 32.0 - 36.0 g/dL    RDW 14.2 11.5 - 14.5 %    Platelets 282 150 - 450  x10*3/uL   Basic metabolic panel   Result Value Ref Range    Glucose 81 74 - 99 mg/dL    Sodium 140 136 - 145 mmol/L    Potassium 3.3 (L) 3.5 - 5.3 mmol/L    Chloride 109 (H) 98 - 107 mmol/L    Bicarbonate 21 21 - 32 mmol/L    Anion Gap 13 10 - 20 mmol/L    Urea Nitrogen 8 6 - 23 mg/dL    Creatinine 0.93 0.50 - 1.05 mg/dL    eGFR 82 >60 mL/min/1.73m*2    Calcium 8.3 (L) 8.6 - 10.3 mg/dL           I spent 25 minutes in the professional and overall care of this patient.      Ted Coronel MD

## 2024-04-29 NOTE — CONSULTS
"Vancomycin Dosing by Pharmacy- INITIAL    Sandrita Adams is a 33 y.o. year old female who Pharmacy has been consulted for vancomycin dosing for cellulitis, skin and soft tissue. Based on the patient's indication and renal status this patient will be dosed based on a goal AUC of 400-600.     Renal function is currently stable.    Visit Vitals  /78   Pulse 83   Temp 36.4 °C (97.6 °F)   Resp 16        Lab Results   Component Value Date    CREATININE 0.93 04/28/2024    CREATININE 0.82 01/21/2024    CREATININE 0.85 12/18/2023    CREATININE 0.84 11/13/2023        Patient weight is No results found for: \"PTWEIGHT\"    No results found for: \"CULTURE\"     No intake/output data recorded.  [unfilled]    No results found for: \"PATIENTTEMP\"       Assessment/Plan     Patient has already been given a loading dose of 1500 mg.  Will initiate vancomycin maintenance,  1000 mg every 12 hours.    This dosing regimen is predicted by [employer] to result in the following pharmacokinetic parameters:  Regimen: 1000 mg IV every 12 hours.  Start time: 07:05 on 04/29/2024  Exposure target: AUC24 (range)400-600 mg/L.hr   AUC24,ss: 485 mg/L.hr  Probability of AUC24 > 400: 70 %  Ctrough,ss: 15.1 mg/L  Probability of Ctrough,ss > 20: 27 %  Probability of nephrotoxicity (Lodise POP 2009): 10 %    Follow-up level will be ordered on 4/30 at am lab unless clinically indicated sooner.  Will continue to monitor renal function daily while on vancomycin and order serum creatinine at least every 48 hours if not already ordered.  Follow for continued vancomycin needs, clinical response, and signs/symptoms of toxicity.       Rico Hickman, PharmD       "

## 2024-04-29 NOTE — NURSING NOTE
Discharge instructions provided using teach back method. Pt's health related  risk factors discussed with pt. pt educated to look for any worsening sign and symptoms. Pt educated to seek medical attention if experience any medical emergency. Pt aware to follow up with outpatient clinics as scheduled. Home going meds reviewed with pt. Pt verbalized understanding of disposition and discharge instructions. All questions answered to patient's satisfaction and within nursing scope of practice. Vitals stable, BP evelated. Notified RN. IV removed.   Jadiel Moran Discharge R.N.

## 2024-04-30 ENCOUNTER — TELEPHONE (OUTPATIENT)
Dept: SURGERY | Facility: CLINIC | Age: 34
End: 2024-04-30

## 2024-04-30 ENCOUNTER — HOSPITAL ENCOUNTER (OUTPATIENT)
Facility: HOSPITAL | Age: 34
Setting detail: OUTPATIENT SURGERY
End: 2024-04-30
Attending: SURGERY | Admitting: SURGERY
Payer: MEDICAID

## 2024-04-30 PROBLEM — L73.2 HIDRADENITIS AXILLARIS: Status: ACTIVE | Noted: 2024-04-29

## 2024-04-30 NOTE — TELEPHONE ENCOUNTER
Called patient to schedule her for surgery. Patient agreed to surgery date , May 13, 2024 at [facility].

## 2024-05-10 ENCOUNTER — HOSPITAL ENCOUNTER (INPATIENT)
Facility: HOSPITAL | Age: 34
LOS: 4 days | Discharge: HOME | End: 2024-05-14
Attending: EMERGENCY MEDICINE | Admitting: INTERNAL MEDICINE
Payer: MEDICAID

## 2024-05-10 DIAGNOSIS — L73.2 HIDRADENITIS: ICD-10-CM

## 2024-05-10 DIAGNOSIS — L73.2 HIDRADENITIS AXILLARIS: ICD-10-CM

## 2024-05-10 DIAGNOSIS — L73.2 HIDRADENITIS SUPPURATIVA: Primary | ICD-10-CM

## 2024-05-10 LAB
ALBUMIN SERPL BCP-MCNC: 3.8 G/DL (ref 3.4–5)
ALP SERPL-CCNC: 63 U/L (ref 33–110)
ALT SERPL W P-5'-P-CCNC: 10 U/L (ref 7–45)
ANION GAP SERPL CALC-SCNC: 13 MMOL/L (ref 10–20)
AST SERPL W P-5'-P-CCNC: 15 U/L (ref 9–39)
BASOPHILS # BLD AUTO: 0.03 X10*3/UL (ref 0–0.1)
BASOPHILS NFR BLD AUTO: 0.4 %
BILIRUB SERPL-MCNC: 0.3 MG/DL (ref 0–1.2)
BUN SERPL-MCNC: 13 MG/DL (ref 6–23)
CALCIUM SERPL-MCNC: 9.2 MG/DL (ref 8.6–10.3)
CHLORIDE SERPL-SCNC: 105 MMOL/L (ref 98–107)
CO2 SERPL-SCNC: 23 MMOL/L (ref 21–32)
CREAT SERPL-MCNC: 0.74 MG/DL (ref 0.5–1.05)
EGFRCR SERPLBLD CKD-EPI 2021: >90 ML/MIN/1.73M*2
EOSINOPHIL # BLD AUTO: 0.12 X10*3/UL (ref 0–0.7)
EOSINOPHIL NFR BLD AUTO: 1.6 %
ERYTHROCYTE [DISTWIDTH] IN BLOOD BY AUTOMATED COUNT: 13.7 % (ref 11.5–14.5)
GLUCOSE SERPL-MCNC: 83 MG/DL (ref 74–99)
HCT VFR BLD AUTO: 40.9 % (ref 36–46)
HGB BLD-MCNC: 13.4 G/DL (ref 12–16)
IMM GRANULOCYTES # BLD AUTO: 0.02 X10*3/UL (ref 0–0.7)
IMM GRANULOCYTES NFR BLD AUTO: 0.3 % (ref 0–0.9)
LYMPHOCYTES # BLD AUTO: 1.59 X10*3/UL (ref 1.2–4.8)
LYMPHOCYTES NFR BLD AUTO: 21.2 %
MAGNESIUM SERPL-MCNC: 2.1 MG/DL (ref 1.6–2.4)
MCH RBC QN AUTO: 25.5 PG (ref 26–34)
MCHC RBC AUTO-ENTMCNC: 32.8 G/DL (ref 32–36)
MCV RBC AUTO: 78 FL (ref 80–100)
MONOCYTES # BLD AUTO: 0.62 X10*3/UL (ref 0.1–1)
MONOCYTES NFR BLD AUTO: 8.3 %
NEUTROPHILS # BLD AUTO: 5.12 X10*3/UL (ref 1.2–7.7)
NEUTROPHILS NFR BLD AUTO: 68.2 %
NRBC BLD-RTO: 0 /100 WBCS (ref 0–0)
PLATELET # BLD AUTO: 354 X10*3/UL (ref 150–450)
POTASSIUM SERPL-SCNC: 4.6 MMOL/L (ref 3.5–5.3)
PROT SERPL-MCNC: 6.9 G/DL (ref 6.4–8.2)
RBC # BLD AUTO: 5.25 X10*6/UL (ref 4–5.2)
SODIUM SERPL-SCNC: 136 MMOL/L (ref 136–145)
WBC # BLD AUTO: 7.5 X10*3/UL (ref 4.4–11.3)

## 2024-05-10 PROCEDURE — 2500000004 HC RX 250 GENERAL PHARMACY W/ HCPCS (ALT 636 FOR OP/ED): Mod: JZ | Performed by: INTERNAL MEDICINE

## 2024-05-10 PROCEDURE — 85025 COMPLETE CBC W/AUTO DIFF WBC: CPT | Performed by: STUDENT IN AN ORGANIZED HEALTH CARE EDUCATION/TRAINING PROGRAM

## 2024-05-10 PROCEDURE — 96376 TX/PRO/DX INJ SAME DRUG ADON: CPT

## 2024-05-10 PROCEDURE — 83735 ASSAY OF MAGNESIUM: CPT | Performed by: STUDENT IN AN ORGANIZED HEALTH CARE EDUCATION/TRAINING PROGRAM

## 2024-05-10 PROCEDURE — 2500000001 HC RX 250 WO HCPCS SELF ADMINISTERED DRUGS (ALT 637 FOR MEDICARE OP): Performed by: INTERNAL MEDICINE

## 2024-05-10 PROCEDURE — 87181 SC STD AGAR DILUTION PER AGT: CPT | Mod: AHULAB | Performed by: STUDENT IN AN ORGANIZED HEALTH CARE EDUCATION/TRAINING PROGRAM

## 2024-05-10 PROCEDURE — 96375 TX/PRO/DX INJ NEW DRUG ADDON: CPT

## 2024-05-10 PROCEDURE — 96365 THER/PROPH/DIAG IV INF INIT: CPT

## 2024-05-10 PROCEDURE — 2500000004 HC RX 250 GENERAL PHARMACY W/ HCPCS (ALT 636 FOR OP/ED): Performed by: STUDENT IN AN ORGANIZED HEALTH CARE EDUCATION/TRAINING PROGRAM

## 2024-05-10 PROCEDURE — 99285 EMERGENCY DEPT VISIT HI MDM: CPT | Mod: 25

## 2024-05-10 PROCEDURE — 1100000001 HC PRIVATE ROOM DAILY

## 2024-05-10 PROCEDURE — 80053 COMPREHEN METABOLIC PANEL: CPT | Performed by: STUDENT IN AN ORGANIZED HEALTH CARE EDUCATION/TRAINING PROGRAM

## 2024-05-10 PROCEDURE — 36415 COLL VENOUS BLD VENIPUNCTURE: CPT | Performed by: STUDENT IN AN ORGANIZED HEALTH CARE EDUCATION/TRAINING PROGRAM

## 2024-05-10 RX ORDER — ACETAMINOPHEN 650 MG/1
650 SUPPOSITORY RECTAL EVERY 4 HOURS PRN
Status: DISCONTINUED | OUTPATIENT
Start: 2024-05-10 | End: 2024-05-14 | Stop reason: HOSPADM

## 2024-05-10 RX ORDER — HYDROMORPHONE HYDROCHLORIDE 1 MG/ML
1 INJECTION, SOLUTION INTRAMUSCULAR; INTRAVENOUS; SUBCUTANEOUS ONCE
Status: COMPLETED | OUTPATIENT
Start: 2024-05-10 | End: 2024-05-10

## 2024-05-10 RX ORDER — ACETAMINOPHEN 325 MG/1
650 TABLET ORAL EVERY 4 HOURS PRN
Status: DISCONTINUED | OUTPATIENT
Start: 2024-05-10 | End: 2024-05-14 | Stop reason: HOSPADM

## 2024-05-10 RX ORDER — ONDANSETRON 4 MG/1
4 TABLET, ORALLY DISINTEGRATING ORAL EVERY 8 HOURS PRN
COMMUNITY

## 2024-05-10 RX ORDER — SECUKINUMAB 150 MG/ML
150 INJECTION SUBCUTANEOUS
COMMUNITY

## 2024-05-10 RX ORDER — ACETAMINOPHEN 160 MG/5ML
650 SOLUTION ORAL EVERY 4 HOURS PRN
Status: DISCONTINUED | OUTPATIENT
Start: 2024-05-10 | End: 2024-05-14 | Stop reason: HOSPADM

## 2024-05-10 RX ORDER — CEFAZOLIN SODIUM 2 G/100ML
2 INJECTION, SOLUTION INTRAVENOUS ONCE
Status: COMPLETED | OUTPATIENT
Start: 2024-05-10 | End: 2024-05-10

## 2024-05-10 RX ORDER — ONDANSETRON 4 MG/1
4 TABLET, ORALLY DISINTEGRATING ORAL EVERY 8 HOURS PRN
Status: DISCONTINUED | OUTPATIENT
Start: 2024-05-10 | End: 2024-05-14 | Stop reason: HOSPADM

## 2024-05-10 RX ORDER — POLYETHYLENE GLYCOL 3350 17 G/17G
17 POWDER, FOR SOLUTION ORAL DAILY
Status: DISCONTINUED | OUTPATIENT
Start: 2024-05-11 | End: 2024-05-11

## 2024-05-10 RX ORDER — CEFAZOLIN SODIUM 2 G/100ML
2 INJECTION, SOLUTION INTRAVENOUS EVERY 8 HOURS
Status: DISCONTINUED | OUTPATIENT
Start: 2024-05-10 | End: 2024-05-11

## 2024-05-10 RX ORDER — ACETAMINOPHEN 325 MG/1
975 TABLET ORAL ONCE
Status: COMPLETED | OUTPATIENT
Start: 2024-05-10 | End: 2024-05-10

## 2024-05-10 RX ORDER — GABAPENTIN 400 MG/1
800 CAPSULE ORAL 3 TIMES DAILY
Status: DISCONTINUED | OUTPATIENT
Start: 2024-05-10 | End: 2024-05-14 | Stop reason: HOSPADM

## 2024-05-10 RX ORDER — HYDROMORPHONE HYDROCHLORIDE 1 MG/ML
1 INJECTION, SOLUTION INTRAMUSCULAR; INTRAVENOUS; SUBCUTANEOUS EVERY 4 HOURS PRN
Status: DISCONTINUED | OUTPATIENT
Start: 2024-05-10 | End: 2024-05-13

## 2024-05-10 RX ORDER — KETOROLAC TROMETHAMINE 30 MG/ML
15 INJECTION, SOLUTION INTRAMUSCULAR; INTRAVENOUS ONCE
Status: COMPLETED | OUTPATIENT
Start: 2024-05-10 | End: 2024-05-10

## 2024-05-10 RX ADMIN — KETOROLAC TROMETHAMINE 15 MG: 30 INJECTION, SOLUTION INTRAMUSCULAR at 13:03

## 2024-05-10 RX ADMIN — CEFAZOLIN SODIUM 2 G: 2 INJECTION, SOLUTION INTRAVENOUS at 22:53

## 2024-05-10 RX ADMIN — HYDROMORPHONE HYDROCHLORIDE 1 MG: 1 INJECTION, SOLUTION INTRAMUSCULAR; INTRAVENOUS; SUBCUTANEOUS at 21:30

## 2024-05-10 RX ADMIN — HYDROMORPHONE HYDROCHLORIDE 0.5 MG: 1 INJECTION, SOLUTION INTRAMUSCULAR; INTRAVENOUS; SUBCUTANEOUS at 13:03

## 2024-05-10 RX ADMIN — HYDROMORPHONE HYDROCHLORIDE 1 MG: 1 INJECTION, SOLUTION INTRAMUSCULAR; INTRAVENOUS; SUBCUTANEOUS at 17:00

## 2024-05-10 RX ADMIN — HYDROMORPHONE HYDROCHLORIDE 0.5 MG: 1 INJECTION, SOLUTION INTRAMUSCULAR; INTRAVENOUS; SUBCUTANEOUS at 11:30

## 2024-05-10 RX ADMIN — ACETAMINOPHEN 975 MG: 325 TABLET ORAL at 13:03

## 2024-05-10 RX ADMIN — CEFAZOLIN SODIUM 2 G: 2 INJECTION, SOLUTION INTRAVENOUS at 11:30

## 2024-05-10 RX ADMIN — GABAPENTIN 800 MG: 400 CAPSULE ORAL at 22:03

## 2024-05-10 ASSESSMENT — PAIN SCALES - GENERAL
PAINLEVEL_OUTOF10: 7
PAINLEVEL_OUTOF10: 10 - WORST POSSIBLE PAIN
PAINLEVEL_OUTOF10: 9
PAINLEVEL_OUTOF10: 4

## 2024-05-10 ASSESSMENT — PAIN - FUNCTIONAL ASSESSMENT
PAIN_FUNCTIONAL_ASSESSMENT: 0-10

## 2024-05-10 ASSESSMENT — PAIN DESCRIPTION - DESCRIPTORS: DESCRIPTORS: BURNING;ACHING

## 2024-05-10 ASSESSMENT — ACTIVITIES OF DAILY LIVING (ADL): LACK_OF_TRANSPORTATION: NO

## 2024-05-10 NOTE — PROGRESS NOTES
05/10/24 1251   [facility] Disability Status   Are you deaf or do you have serious difficulty hearing? N   Are you blind or do you have serious difficulty seeing, even when wearing glasses? N   Because of a physical, mental, or emotional condition, do you have serious difficulty concentrating, remembering, or making decisions? (5 years old or older) N   Do you have serious difficulty walking or climbing stairs? N   Do you have serious difficulty dressing or bathing? N   Because of a physical, mental, or emotional condition, do you have serious difficulty doing errands alone such as visiting the doctor? N

## 2024-05-10 NOTE — PROGRESS NOTES
Pharmacy Medication History Review    Sandrita Adams is a 33 y.o. female admitted for No Principal Problem: There is no principal problem currently on the Problem List. Please update the Problem List and refresh.. Pharmacy reviewed the patient's [id number] medications and allergies for accuracy.    The list below reflectives the updated PTA list. Please review each medication in order reconciliation for additional clarification and justification.  Prior to Admission Medications   Prescriptions Last Dose Informant   albuterol sulfate (Proair Digihaler) 90 mcg/actuation aero powdr breath act w/sensor inhaler Unknown Self   Sig: Inhale 240 puffs every 4 hours if needed for wheezing.   amoxicillin-pot clavulanate (Augmentin) 875-125 mg tablet Unknown Self   Sig: Take 1 tablet by mouth twice a day.   doxycycline (Vibramycin) 100 mg capsule 5/9/2024 at am Self   Sig: Take 1 capsule (100 mg) by mouth 2 times a day for 7 days. Take with at least 8 ounces (large glass) of water, do not lie down for 30 minutes after   dulaglutide (Trulicity) 1.5 mg/0.5 mL pen injector injection 5/6/2024 Self   Sig: Inject 1.5 mg under the skin 1 (one) time per week.   gabapentin (Neurontin) 800 mg tablet 5/9/2024 Self   Sig: Take 1 tablet (800 mg) by mouth 3 times a day.   ondansetron ODT (Zofran-ODT) 4 mg disintegrating tablet Unknown Self   Sig: Take 1 tablet (4 mg) by mouth every 8 hours if needed for nausea or vomiting.   oxyCODONE-acetaminophen (Percocet)  mg tablet Unknown    Sig: Take 1 tablet by mouth every 8 hours if needed for severe pain (7 - 10).   secukinumab (Cosentyx) 150 mg/mL syringe Past Month Self   Sig: Inject 1 mL (150 mg) under the skin every 30 (thirty) days.   sulfamethoxazole-trimethoprim (Bactrim DS) 800-160 mg tablet 5/9/2024 at am Self   Sig: Take 1 tablet by mouth 2 times a day.   witch hazel-glycerin (Tucks) pad Unknown Self   Sig: Apply topically if needed for irritation.       Facility-Administered Medications: None         The list below reflectives the updated allergy list. Please review each documented allergy for additional clarification and justification.  Allergies  Reviewed by Yessi Villafana RN on 5/10/2024        Severity Reactions Comments    Suboxone [buprenorphine-naloxone] High Anaphylaxis, Hives, Itching     Clarithromycin Medium Hives Other reaction(s): Unknown    Levofloxacin Medium Swelling Ankle Joint/tendon pain    Clindamycin Not Specified Hives Pt sts doesnot allergy to clindamycin and does not get hives    Haloperidol Not Specified Psychosis, Hallucinations Psychosis  psychosis    Keflex [cephalexin] Not Specified Psychosis     Reglan [metoclopramide Hcl] Not Specified Psychosis             Below are additional concerns with the patient's PTA list.  Spoke to patient    Silvino Field

## 2024-05-10 NOTE — PROGRESS NOTES
home with dependent daughter     05/10/24 5933   Current Planned Discharge Disposition   Current Planned Discharge Disposition Home

## 2024-05-10 NOTE — PROGRESS NOTES
Transitional Care Coordination Progress Note:  Plan per Medical/Surgical team: treatment of left axilla wound with IV Dilaudid, toradol, IV ATB & ID consult  Status: ED  Payor source: [employer] medicaid  Discharge disposition: home with dependent daughter  Potential Barriers: HX of lupus flares & hidradenitis supprativa  ADOD: 5/12/2024  JOE Good RN, BSN Transitional Care Coordinator ED# [phone]     05/10/24 1251   Discharge Planning   Living Arrangements Children   Support Systems Friends/neighbors   Assistance Needed ATB plan per ID consult   Type of Residence Private residence   Number of Stairs to Enter Residence 12   Number of Stairs Within Residence 0   Home or Post Acute Services None   Patient expects to be discharged to: Home with daughter   Does the patient need discharge transport arranged? Yes   RoundTrip coordination needed? Yes   Has discharge transport been arranged? No   Financial Resource Strain   How hard is it for you to pay for the very basics like food, housing, medical care, and heating? Not hard   Housing Stability   In the last 12 months, was there a time when you were not able to pay the mortgage or rent on time? N   In the last 12 months, how many places have you lived? 1   In the last 12 months, was there a time when you did not have a steady place to sleep or slept in a shelter (including now)? N   Transportation Needs   In the past 12 months, has lack of transportation kept you from medical appointments or from getting medications? no   In the past 12 months, has lack of transportation kept you from meetings, work, or from getting things needed for daily living? No

## 2024-05-10 NOTE — ED PROVIDER NOTES
HPI  Patient is a 33-year-old female with PMH of Rand stage III hidradenitis suppurativa currently on Biologics (secukinumab), Bactrim, doxycycline, and clindamycin patient fibromyalgia presenting to the emergency department due to pain in the left axilla.  Reports that she has an active MSSA infection for which she is supposed to transition to clindamycin after finishing her course doxycycline.  She states that the pain is gotten so bad to the point where she is unable to function.  She does notice some drainage from the left axilla over the past 2 days.  Does report some subjective low-grade fevers at home.  She was recently admitted towards the end of April for similar presentation, however unfortunately had to leave prior to completing her treatment due to a critically ill family member outside of the state.  Unfortunately was not able to follow-up with general surgery as an outpatient due to a family emergency and states that it will be approximately 3 to 4 weeks before she can get established as an outpatient again.  She does report intermittent numbness and tingling down that left arm and is concerned that her abscess is pressing against her nerve.    Physical Exam  VITALS: Vital signs reviewed in nursing triage note, EMR flow sheets, and at patient's bedside  CONSTITUTIONAL: Well-appearing, in no apparent distress  HEAD: NCAT  EYES: PERRL, EOMI  NECK: Supple, non-tender, full ROM  CARD: RRR, no m/r/g, no JVD  RESP: LCTAB, speaking full sentences, no increased work of breathing, no use of accessory respiratory muscles  ABD: Bowel sounds present, non-distended, soft and non-tender, no palpable organomegaly, no masses, no guarding or rebound tenderness  BACK: No vertebral point or CVA tenderness, no obvious bony deformities, no spinal step-offs   EXT: Normal ROM in the RUE and bilateral lower extremities, abduction at the left shoulder limited secondary to pain but is otherwise 5/5, 5/5 strength with elbow  flexion/extension and wrist flexion/extension of the left upper extremity, multiple spots of purulence appreciated within the left axilla, considerably tender to palpation, fluctuant masses are appreciated  SKIN: Dry with appropriate turgor, no apparent rashes, suspicious lesions, or masses   NEURO: CNs II-XII grossly intact, AAOx4, sensation intact bilaterally, strength 5/5 on UEs and LEs bilaterally, speech is fluent and comprehensible  PSYCH: Appropriate mood and affect, no HI/SI, not responding to internal stimuli    Vitals:    05/10/24 1056   BP: 103/71   Pulse: 78   Resp: 18   Temp: 37.6 °C (99.6 °F)   SpO2: 100%       Assessment/Plan/MDM  Patient clinically stable with normal vital signs upon presentation to the emergency department.  Does have a Tmax of 100 °F, which is not yet febrile, however given her history of MSSA as well as the fact that she is on Biologics and chronic antibiotics, concern for possible development of systemic infection.  As the patient is now having considerable amounts of pain, will obtain IV access and provide with Dilaudid 0.5 mg IV for symptomatic control.  After chart review, it was noted that ID would like her to have had 48 hours of IV antibiotics which unfortunately not able to be done during her last admission due to her family emergency, therefore do believe that the patient would benefit from admission at this time.  Will obtain laboratory workup as well as a wound culture for potential updated antibiotic management.  Started on Ancef 2 g every 8 hours per most recent ID no will consult ID for inpatient management.  Do believe that her surgical management can be performed as an outpatient basis, however if her appointment time is prolonged she may require something more immediate.     Results  *See section(s) entitled ``Lab Results´´, ``Diagnostic Imaging Results Review´´ for entirety.  Notable results listed below  - Labs: I independently interpreted as laboratory workup  largely unremarkable    Diagnoses as of 05/10/24 1346   Hidradenitis suppurativa        ED Course/Progress  Patient remains clinical stable.  Did not achieve much relief from pain with initial Dilaudid therefore redosed with additional medication.  Will admit to medicine for continued management of the above as well as IV antibiotics.    Clinical Impression  Hidradenitis suppurativa    Dispo  Admit to medicine    Patient seen and discussed with attending physician Dr. [place].    Aric Villegas MD  Emergency Medicine, PGY-3    Was dictated using Dragon dictation. Please excuse any errors found in the note.     Aric Villegas MD  Resident  05/10/24 1127

## 2024-05-10 NOTE — ED TRIAGE NOTES
Pt arrived via EMS from home with chief c/o of H.S> in L arm. Pt states this has been ongoing and she is receiving treatment for it as well as taking percocet at home, last taken at 0400, but the pain and abscesses are worsening. She states yesterday she felt feverish/chills and faint. She has additional hx of lupus.

## 2024-05-11 LAB
ANION GAP SERPL CALC-SCNC: 11 MMOL/L (ref 10–20)
BUN SERPL-MCNC: 9 MG/DL (ref 6–23)
CALCIUM SERPL-MCNC: 8.8 MG/DL (ref 8.6–10.3)
CHLORIDE SERPL-SCNC: 109 MMOL/L (ref 98–107)
CO2 SERPL-SCNC: 25 MMOL/L (ref 21–32)
CREAT SERPL-MCNC: 0.78 MG/DL (ref 0.5–1.05)
EGFRCR SERPLBLD CKD-EPI 2021: >90 ML/MIN/1.73M*2
ERYTHROCYTE [DISTWIDTH] IN BLOOD BY AUTOMATED COUNT: 13.6 % (ref 11.5–14.5)
GLUCOSE SERPL-MCNC: 66 MG/DL (ref 74–99)
HCT VFR BLD AUTO: 40.3 % (ref 36–46)
HGB BLD-MCNC: 13.3 G/DL (ref 12–16)
MCH RBC QN AUTO: 26 PG (ref 26–34)
MCHC RBC AUTO-ENTMCNC: 33 G/DL (ref 32–36)
MCV RBC AUTO: 79 FL (ref 80–100)
NRBC BLD-RTO: 0 /100 WBCS (ref 0–0)
PLATELET # BLD AUTO: 365 X10*3/UL (ref 150–450)
POTASSIUM SERPL-SCNC: 4.4 MMOL/L (ref 3.5–5.3)
RBC # BLD AUTO: 5.11 X10*6/UL (ref 4–5.2)
SODIUM SERPL-SCNC: 141 MMOL/L (ref 136–145)
WBC # BLD AUTO: 6.7 X10*3/UL (ref 4.4–11.3)

## 2024-05-11 PROCEDURE — 2500000001 HC RX 250 WO HCPCS SELF ADMINISTERED DRUGS (ALT 637 FOR MEDICARE OP): Performed by: INTERNAL MEDICINE

## 2024-05-11 PROCEDURE — 1100000001 HC PRIVATE ROOM DAILY

## 2024-05-11 PROCEDURE — 2500000004 HC RX 250 GENERAL PHARMACY W/ HCPCS (ALT 636 FOR OP/ED): Mod: JZ | Performed by: PHARMACIST

## 2024-05-11 PROCEDURE — 36415 COLL VENOUS BLD VENIPUNCTURE: CPT | Performed by: INTERNAL MEDICINE

## 2024-05-11 PROCEDURE — 85027 COMPLETE CBC AUTOMATED: CPT | Performed by: INTERNAL MEDICINE

## 2024-05-11 PROCEDURE — 82374 ASSAY BLOOD CARBON DIOXIDE: CPT | Performed by: INTERNAL MEDICINE

## 2024-05-11 PROCEDURE — 2500000004 HC RX 250 GENERAL PHARMACY W/ HCPCS (ALT 636 FOR OP/ED): Performed by: INTERNAL MEDICINE

## 2024-05-11 RX ORDER — OXYCODONE AND ACETAMINOPHEN 5; 325 MG/1; MG/1
2 TABLET ORAL EVERY 4 HOURS PRN
Status: DISCONTINUED | OUTPATIENT
Start: 2024-05-11 | End: 2024-05-14 | Stop reason: HOSPADM

## 2024-05-11 RX ORDER — OXYCODONE AND ACETAMINOPHEN 5; 325 MG/1; MG/1
2 TABLET ORAL EVERY 6 HOURS PRN
Status: DISCONTINUED | OUTPATIENT
Start: 2024-05-11 | End: 2024-05-11

## 2024-05-11 RX ORDER — POLYETHYLENE GLYCOL 3350 17 G/17G
17 POWDER, FOR SOLUTION ORAL 2 TIMES DAILY
Status: DISCONTINUED | OUTPATIENT
Start: 2024-05-11 | End: 2024-05-14 | Stop reason: HOSPADM

## 2024-05-11 RX ORDER — MULTIVIT-MIN/IRON FUM/FOLIC AC 7.5 MG-4
1 TABLET ORAL DAILY
Status: DISCONTINUED | OUTPATIENT
Start: 2024-05-12 | End: 2024-05-14 | Stop reason: HOSPADM

## 2024-05-11 RX ORDER — HYDROXYZINE HYDROCHLORIDE 25 MG/1
25 TABLET, FILM COATED ORAL EVERY 6 HOURS PRN
Status: DISCONTINUED | OUTPATIENT
Start: 2024-05-11 | End: 2024-05-14 | Stop reason: HOSPADM

## 2024-05-11 RX ORDER — VANCOMYCIN HYDROCHLORIDE 1 G/20ML
INJECTION, POWDER, LYOPHILIZED, FOR SOLUTION INTRAVENOUS DAILY PRN
Status: DISCONTINUED | OUTPATIENT
Start: 2024-05-11 | End: 2024-05-13

## 2024-05-11 RX ADMIN — POLYETHYLENE GLYCOL 3350 17 G: 17 POWDER, FOR SOLUTION ORAL at 10:17

## 2024-05-11 RX ADMIN — PIPERACILLIN SODIUM AND TAZOBACTAM SODIUM 3.38 G: 3; .375 INJECTION, SOLUTION INTRAVENOUS at 10:19

## 2024-05-11 RX ADMIN — CEFAZOLIN SODIUM 2 G: 2 INJECTION, SOLUTION INTRAVENOUS at 06:23

## 2024-05-11 RX ADMIN — PIPERACILLIN SODIUM AND TAZOBACTAM SODIUM 3.38 G: 3; .375 INJECTION, SOLUTION INTRAVENOUS at 14:10

## 2024-05-11 RX ADMIN — GABAPENTIN 800 MG: 400 CAPSULE ORAL at 14:10

## 2024-05-11 RX ADMIN — OXYCODONE HYDROCHLORIDE AND ACETAMINOPHEN 2 TABLET: 5; 325 TABLET ORAL at 14:10

## 2024-05-11 RX ADMIN — OXYCODONE HYDROCHLORIDE AND ACETAMINOPHEN 2 TABLET: 5; 325 TABLET ORAL at 06:22

## 2024-05-11 RX ADMIN — HYDROXYZINE HYDROCHLORIDE 25 MG: 25 TABLET ORAL at 20:24

## 2024-05-11 RX ADMIN — POLYETHYLENE GLYCOL 3350 17 G: 17 POWDER, FOR SOLUTION ORAL at 20:25

## 2024-05-11 RX ADMIN — HYDROMORPHONE HYDROCHLORIDE 1 MG: 1 INJECTION, SOLUTION INTRAMUSCULAR; INTRAVENOUS; SUBCUTANEOUS at 08:13

## 2024-05-11 RX ADMIN — VANCOMYCIN HYDROCHLORIDE 1250 MG: 1.25 INJECTION, POWDER, LYOPHILIZED, FOR SOLUTION INTRAVENOUS at 11:26

## 2024-05-11 RX ADMIN — GABAPENTIN 800 MG: 400 CAPSULE ORAL at 20:24

## 2024-05-11 RX ADMIN — HYDROMORPHONE HYDROCHLORIDE 1 MG: 1 INJECTION, SOLUTION INTRAMUSCULAR; INTRAVENOUS; SUBCUTANEOUS at 23:44

## 2024-05-11 RX ADMIN — OXYCODONE HYDROCHLORIDE AND ACETAMINOPHEN 2 TABLET: 5; 325 TABLET ORAL at 20:24

## 2024-05-11 RX ADMIN — HYDROMORPHONE HYDROCHLORIDE 1 MG: 1 INJECTION, SOLUTION INTRAMUSCULAR; INTRAVENOUS; SUBCUTANEOUS at 18:31

## 2024-05-11 RX ADMIN — HYDROMORPHONE HYDROCHLORIDE 1 MG: 1 INJECTION, SOLUTION INTRAMUSCULAR; INTRAVENOUS; SUBCUTANEOUS at 13:09

## 2024-05-11 RX ADMIN — VANCOMYCIN HYDROCHLORIDE 1250 MG: 1.25 INJECTION, POWDER, LYOPHILIZED, FOR SOLUTION INTRAVENOUS at 23:44

## 2024-05-11 RX ADMIN — PIPERACILLIN SODIUM AND TAZOBACTAM SODIUM 3.38 G: 3; .375 INJECTION, SOLUTION INTRAVENOUS at 20:25

## 2024-05-11 RX ADMIN — HYDROMORPHONE HYDROCHLORIDE 1 MG: 1 INJECTION, SOLUTION INTRAMUSCULAR; INTRAVENOUS; SUBCUTANEOUS at 01:31

## 2024-05-11 RX ADMIN — GABAPENTIN 800 MG: 400 CAPSULE ORAL at 10:16

## 2024-05-11 SDOH — SOCIAL STABILITY: SOCIAL INSECURITY: DO YOU FEEL ANYONE HAS EXPLOITED OR TAKEN ADVANTAGE OF YOU FINANCIALLY OR OF YOUR PERSONAL PROPERTY?: NO

## 2024-05-11 SDOH — SOCIAL STABILITY: SOCIAL INSECURITY: DOES ANYONE TRY TO KEEP YOU FROM HAVING/CONTACTING OTHER FRIENDS OR DOING THINGS OUTSIDE YOUR HOME?: NO

## 2024-05-11 SDOH — SOCIAL STABILITY: SOCIAL INSECURITY: HAVE YOU HAD THOUGHTS OF HARMING ANYONE ELSE?: NO

## 2024-05-11 SDOH — SOCIAL STABILITY: SOCIAL INSECURITY: HAVE YOU HAD ANY THOUGHTS OF HARMING ANYONE ELSE?: NO

## 2024-05-11 SDOH — SOCIAL STABILITY: SOCIAL INSECURITY: HAS ANYONE EVER THREATENED TO HURT YOUR FAMILY OR YOUR PETS?: NO

## 2024-05-11 SDOH — SOCIAL STABILITY: SOCIAL INSECURITY: ABUSE: ADULT

## 2024-05-11 SDOH — SOCIAL STABILITY: SOCIAL INSECURITY: DO YOU FEEL UNSAFE GOING BACK TO THE PLACE WHERE YOU ARE LIVING?: NO

## 2024-05-11 SDOH — SOCIAL STABILITY: SOCIAL INSECURITY: ARE THERE ANY APPARENT SIGNS OF INJURIES/BEHAVIORS THAT COULD BE RELATED TO ABUSE/NEGLECT?: NO

## 2024-05-11 SDOH — SOCIAL STABILITY: SOCIAL INSECURITY: WERE YOU ABLE TO COMPLETE ALL THE BEHAVIORAL HEALTH SCREENINGS?: YES

## 2024-05-11 SDOH — SOCIAL STABILITY: SOCIAL INSECURITY: ARE YOU OR HAVE YOU BEEN THREATENED OR ABUSED PHYSICALLY, EMOTIONALLY, OR SEXUALLY BY ANYONE?: NO

## 2024-05-11 ASSESSMENT — PAIN SCALES - GENERAL
PAINLEVEL_OUTOF10: 5 - MODERATE PAIN
PAINLEVEL_OUTOF10: 9
PAINLEVEL_OUTOF10: 8
PAINLEVEL_OUTOF10: 9
PAINLEVEL_OUTOF10: 9
PAINLEVEL_OUTOF10: 8
PAINLEVEL_OUTOF10: 6
PAINLEVEL_OUTOF10: 9
PAINLEVEL_OUTOF10: 6
PAINLEVEL_OUTOF10: 4
PAINLEVEL_OUTOF10: 8

## 2024-05-11 ASSESSMENT — COGNITIVE AND FUNCTIONAL STATUS - GENERAL
DAILY ACTIVITIY SCORE: 24
DAILY ACTIVITIY SCORE: 24
MOBILITY SCORE: 24
PATIENT BASELINE BEDBOUND: NO
MOBILITY SCORE: 24

## 2024-05-11 ASSESSMENT — PAIN DESCRIPTION - ORIENTATION
ORIENTATION: LEFT

## 2024-05-11 ASSESSMENT — ACTIVITIES OF DAILY LIVING (ADL)
JUDGMENT_ADEQUATE_SAFELY_COMPLETE_DAILY_ACTIVITIES: YES
PATIENT'S MEMORY ADEQUATE TO SAFELY COMPLETE DAILY ACTIVITIES?: YES
DRESSING YOURSELF: INDEPENDENT
BATHING: INDEPENDENT
GROOMING: INDEPENDENT
TOILETING: INDEPENDENT
WALKS IN HOME: INDEPENDENT
HEARING - RIGHT EAR: FUNCTIONAL
ADEQUATE_TO_COMPLETE_ADL: YES
FEEDING YOURSELF: INDEPENDENT
HEARING - LEFT EAR: FUNCTIONAL

## 2024-05-11 ASSESSMENT — LIFESTYLE VARIABLES
AUDIT-C TOTAL SCORE: 0
HOW MANY STANDARD DRINKS CONTAINING ALCOHOL DO YOU HAVE ON A TYPICAL DAY: PATIENT DOES NOT DRINK
SKIP TO QUESTIONS 9-10: 1
HOW OFTEN DO YOU HAVE 6 OR MORE DRINKS ON ONE OCCASION: NEVER
AUDIT-C TOTAL SCORE: 0
SUBSTANCE_ABUSE_PAST_12_MONTHS: NO
HOW OFTEN DO YOU HAVE A DRINK CONTAINING ALCOHOL: NEVER

## 2024-05-11 ASSESSMENT — ENCOUNTER SYMPTOMS
GASTROINTESTINAL NEGATIVE: 1
CONSTITUTIONAL NEGATIVE: 1
MUSCULOSKELETAL NEGATIVE: 1
CARDIOVASCULAR NEGATIVE: 1
RESPIRATORY NEGATIVE: 1
COLOR CHANGE: 1
EYES NEGATIVE: 1
NEUROLOGICAL NEGATIVE: 1
WOUND: 1

## 2024-05-11 ASSESSMENT — PAIN - FUNCTIONAL ASSESSMENT
PAIN_FUNCTIONAL_ASSESSMENT: 0-10

## 2024-05-11 ASSESSMENT — PAIN DESCRIPTION - LOCATION
LOCATION: ARM

## 2024-05-11 ASSESSMENT — PAIN DESCRIPTION - DESCRIPTORS
DESCRIPTORS: ACHING;BURNING
DESCRIPTORS: BURNING;ACHING

## 2024-05-11 NOTE — CARE PLAN
The patient's goals for the shift include      The clinical goals for the shift include pain control      Problem: Pain - Adult  Goal: Verbalizes/displays adequate comfort level or baseline comfort level  Outcome: Progressing     Problem: Safety - Adult  Goal: Free from fall injury  Outcome: Progressing     Problem: Pain  Goal: Takes deep breaths with improved pain control throughout the shift  Outcome: Progressing  Goal: Turns in bed with improved pain control throughout the shift  Outcome: Progressing  Goal: Walks with improved pain control throughout the shift  Outcome: Progressing  Goal: Performs ADL's with improved pain control throughout shift  Outcome: Progressing  Goal: Participates in PT with improved pain control throughout the shift  Outcome: Progressing  Goal: Free from opioid side effects throughout the shift  Outcome: Progressing  Goal: Free from acute confusion related to pain meds throughout the shift  Outcome: Progressing

## 2024-05-11 NOTE — CONSULTS
"Vancomycin Dosing by Pharmacy- INITIAL    Sandrita Adams is a 33 y.o. year old female who Pharmacy has been consulted for vancomycin dosing for cellulitis, skin and soft tissue. Based on the patient's indication and renal status this patient will be dosed based on a goal AUC of 400-600.     Renal function is currently stable.    Visit Vitals  /83 (BP Location: Right arm, Patient Position: Sitting)   Pulse 90   Temp 36.9 °C (98.4 °F) (Oral)   Resp 18        Lab Results   Component Value Date    CREATININE 0.78 05/11/2024    CREATININE 0.74 05/10/2024    CREATININE 0.93 04/29/2024    CREATININE 0.93 04/28/2024        Patient weight is No results found for: \"PTWEIGHT\"    No results found for: \"CULTURE\"     I/O last 3 completed shifts:  In: 200 (2.5 mL/kg) [IV Piggyback:200]  Out: - (0 mL/kg)   Weight: 79.4 kg   [unfilled]    No results found for: \"PATIENTTEMP\"       Assessment/Plan     Patient will not be given a loading dose.  Will initiate vancomycin maintenance,  1250 mg every 12 hours.    This dosing regimen is predicted by [employer] to result in the following pharmacokinetic parameters:  Exposure target: AUC24 (range)400-600 mg/L.hr   AUC24,ss: 526 mg/L.hr  Probability of AUC24 > 400: 78 %  Ctrough,ss: 15.8 mg/L  Probability of Ctrough,ss > 20: 31 %  Probability of nephrotoxicity (Lodise POP 2009): 11 %    Follow-up level will be ordered on 5/12 at AM labs unless clinically indicated sooner.  Will continue to monitor renal function daily while on vancomycin and order serum creatinine at least every 48 hours if not already ordered.  Follow for continued vancomycin needs, clinical response, and signs/symptoms of toxicity.       Nelson Garcia, PharmD       "

## 2024-05-11 NOTE — CONSULTS
Infectious Diseases Initial Consultation    Referred by: Danial Brown  Physician seeing patient: Bita Patterson  Primary MD: Lane Grigsby MD  Reason For Consult: Hidradenitis flare    History Of Present Illness  Sandrita Adams is a pleasant, 33 y.o. female with a past medical history of hidradenitis suppurativa who presented to the ED 5/10 with left axillary pain. Patient reports she was recently started on secukinumab and was doing really well with regard to her hidradenitis from Jan-March 2024. When dosing was de-escalated to every 4 weeks, she started to have pain and swelling in axilla and groin area. Pain and swelling worst left axilla. She was hospitalized here at [facility] for this 4/28 and started on IV antibiotics, but she had to leave 4/29 because of family emergency. Patient reports she has been on augmentin and doxycycline since that hospitalization, but left axillary pain has worsened. She has purulent drainage from that area. WBC count 7. Tmax 99. She was admitted and started on IV cefazolin.      Past Medical History  She has a past medical history of Hidradenitis suppurativa (10/29/2020) and Lupus (Multi).    Surgical History  She has a past surgical history that includes Other surgical history (09/19/2022).     Social History  She reports that she has been smoking cigarettes. She has never used smokeless tobacco. No history on file for alcohol use and drug use.   Travel Screening       Question Response    Do you have any of the following new or worsening symptoms? None of these    In the last 10 days, have you been in contact with someone who was confirmed or suspected to have Coronavirus/COVID-19? No / Unsure    Have you had a COVID-19 viral test in the last 10 days? No    Have you traveled internationally or domestically in the last month? No          Travel History   Travel since 04/11/24    No documented travel since 04/11/24         Family History  No family history on  "file.    Allergies  Suboxone [buprenorphine-naloxone], Clarithromycin, Levofloxacin, Clindamycin, Haloperidol, Keflex [cephalexin], and Reglan [metoclopramide hcl]     There is no immunization history on file for this patient.    Review of Systems  No fevers, chills, N/V/D, abd pain, SOB, chest pain, headache, sore throat, dysuria. Otherwise ROS is negative     Physical Exam  Vitals: Reviewed   General: awake, alert, no distress  Eyes: PERRL, no scleral icterus  ENT: no oral thrush, no cervical adenopathy   CV: RRR, no murmur  Resp: lungs clear to auscultation bilaterally, normal respiratory effort   Abd: soft, nontender, nondistended  Ext: no edema, no joint swelling   Skin: swelling and purulent drainage with tenderness left axilla; some swelling present right axilla, but no obvious drainage, minimal tenderness with scarring present bilateral inner thighs/groin     Range of Vitals (last 24 hours)  Heart Rate:  [76-90]   Temp:  [36.7 °C (98 °F)-37.6 °C (99.6 °F)]   Resp:  [17-18]   BP: (103-131)/(71-83)   Height:  [154.9 cm (5' 1\")]   Weight:  [79.4 kg (175 lb)]   SpO2:  [97 %-100 %]     Relevant Results  Lab Results   Component Value Date    WBC 6.7 05/11/2024    HGB 13.3 05/11/2024    HCT 40.3 05/11/2024    MCV 79 (L) 05/11/2024     05/11/2024      Results from last 72 hours   Lab Units 05/11/24  0557   SODIUM mmol/L 141   POTASSIUM mmol/L 4.4   CHLORIDE mmol/L 109*   CO2 mmol/L 25   BUN mg/dL 9   CREATININE mg/dL 0.78   GLUCOSE mg/dL 66*   CALCIUM mg/dL 8.8   ANION GAP mmol/L 11   EGFR mL/min/1.73m*2 >90     Results from last 72 hours   Lab Units 05/10/24  1124   ALK PHOS U/L 63   BILIRUBIN TOTAL mg/dL 0.3   PROTEIN TOTAL g/dL 6.9   ALT U/L 10   AST U/L 15   ALBUMIN g/dL 3.8     Estimated Creatinine Clearance: 96 mL/min (by C-G formula based on SCr of 0.78 mg/dL).    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 Monitor for rash, diarrhea. Check Cr daily. Pharmacy to assist with dosing/monitoring.    Follow-up wound culture    Bita Patterson MD  [facility]  Phone: [phone]  Fax: [phone]

## 2024-05-11 NOTE — H&P
INTERNAL MEDICINE     HISTORY AND PHYSICAL      Chief Complaint:  Left axillary pain    History Of Present Illness  Sandrita Adams is a 33 y.o. female presenting with left axillary pain and purulent drainage.  Patient has a longstanding history of hidradenitis suppurativa.  She was started on Biologics earlier this year.  She was doing well but in the past few weeks has had some progressing lesions especially in her left axilla.  She has been recently on Bactrim, doxycycline, and clindamycin.  She was admitted to the hospital about 2 weeks ago and states that she had to leave prior to completion of care due to a family emergency.  She returned for continued symptoms and increased drainage from her left axilla.  She reports some low-grade fevers in recent time also.  She has significant pain in the groin and axilla bilaterally.  She was admitted for further treatment.     Past Medical History  She has a past medical history of Hidradenitis suppurativa (10/29/2020) and Lupus (Multi).    Surgical History  Multiple incision and drainage procedures and right axilla surgery.     Social History  She reports that she has been smoking cigarettes. She has never used smokeless tobacco. No history on file for alcohol use and drug use.    Family History  Hypertension     Allergies  Suboxone [buprenorphine-naloxone], Clarithromycin, Levofloxacin, Clindamycin, Haloperidol, Keflex [cephalexin], and Reglan [metoclopramide hcl]    Home Medications:  Prior to Admission medications    Medication Sig Start Date End Date Taking? Authorizing Provider   doxycycline (Vibramycin) 100 mg capsule Take 1 capsule (100 mg) by mouth 2 times a day for 7 days. Take with at least 8 ounces (large glass) of water, do not lie down for 30 minutes after 4/29/24 5/10/24 Yes Kashif Kraft MD   gabapentin (Neurontin) 800 mg tablet Take 1 tablet (800 mg) by mouth 3 times a day. 3/27/24 5/10/24 Yes Dakota Vergara MD   secukinumab (Cosentyx) 150 mg/mL  "syringe Inject 1 mL (150 mg) under the skin every 30 (thirty) days.   Yes Historical Provider, MD   sulfamethoxazole-trimethoprim (Bactrim DS) 800-160 mg tablet Take 1 tablet by mouth 2 times a day.   Yes Historical Provider, MD   albuterol sulfate (Proair Digihaler) 90 mcg/actuation aero powdr breath act w/sensor inhaler Inhale 240 puffs every 4 hours if needed for wheezing.    Historical Provider, MD   amoxicillin-pot clavulanate (Augmentin) 875-125 mg tablet Take 1 tablet by mouth twice a day. 4/18/24 8/16/24  Historical Provider, MD   dulaglutide (Trulicity) 1.5 mg/0.5 mL pen injector injection Inject 1.5 mg under the skin 1 (one) time per week. 3/27/24   Dakota Vergara MD   ondansetron ODT (Zofran-ODT) 4 mg disintegrating tablet Take 1 tablet (4 mg) by mouth every 8 hours if needed for nausea or vomiting.    Historical Provider, MD   witch hazel-glycerin (Tucks) pad Apply topically if needed for irritation. 1/21/24   Steve Borges, DO        Review of Systems   Constitutional: Negative.    HENT: Negative.     Eyes: Negative.    Respiratory: Negative.     Cardiovascular: Negative.    Gastrointestinal: Negative.    Musculoskeletal: Negative.    Skin:  Positive for color change and wound.   Neurological: Negative.        Last Recorded Vitals  Blood pressure 115/80, pulse 89, temperature 37 °C (98.6 °F), temperature source Oral, resp. rate 20, height 1.549 m (5' 1\"), weight 79.4 kg (175 lb), SpO2 98%.    Physical Exam      Constitutional:       Appearance: Young, overweight, in mild distress secondary to pain  HENT:      Head: Normocephalic and atraumatic.   Eyes:      Extraocular Movements: Extraocular movements intact.      Pupils: Pupils are equal, round, and reactive to light.   Cardiovascular:      Rate and Rhythm: Normal rate and regular rhythm.      Pulses: Normal pulses.      Heart sounds: Normal heart sounds.   Pulmonary:      Effort: Pulmonary effort is normal.      Breath sounds: Normal breath " sounds.   Abdominal:      General: Abdomen is flat. Bowel sounds are normal.      Palpations: Abdomen is soft.   Musculoskeletal:         General: Normal range of motion.      Cervical back: Normal range of motion and neck supple.   Skin:     General: Skin is warm and dry.  Tender hidradenitis lesions in bilateral axilla and bilateral inguinal regions.  Some fluctuance of left axillary lesions.  No expressible pus.  Neurological:      General: No focal deficit present.      Mental Status: Alert and oriented x 3.     Relevant Results    Results for orders placed or performed during the hospital encounter of 05/10/24 (from the past 24 hour(s))   CBC   Result Value Ref Range    WBC 6.7 4.4 - 11.3 x10*3/uL    nRBC 0.0 0.0 - 0.0 /100 WBCs    RBC 5.11 4.00 - 5.20 x10*6/uL    Hemoglobin 13.3 12.0 - 16.0 g/dL    Hematocrit 40.3 36.0 - 46.0 %    MCV 79 (L) 80 - 100 fL    MCH 26.0 26.0 - 34.0 pg    MCHC 33.0 32.0 - 36.0 g/dL    RDW 13.6 11.5 - 14.5 %    Platelets 365 150 - 450 x10*3/uL   Basic metabolic panel   Result Value Ref Range    Glucose 66 (L) 74 - 99 mg/dL    Sodium 141 136 - 145 mmol/L    Potassium 4.4 3.5 - 5.3 mmol/L    Chloride 109 (H) 98 - 107 mmol/L    Bicarbonate 25 21 - 32 mmol/L    Anion Gap 11 10 - 20 mmol/L    Urea Nitrogen 9 6 - 23 mg/dL    Creatinine 0.78 0.50 - 1.05 mg/dL    eGFR >90 >60 mL/min/1.73m*2    Calcium 8.8 8.6 - 10.3 mg/dL           Principal Problem:    Hidradenitis suppurativa        Problem list:      ASSESSMENT AND PLAN:    Hidradenitis suppurativa with likely abscess in left axilla -IV antibiotics as per ID recommendations.  Cultures pending.  Will consult general surgery.  SLE-continue with home regimen.  Axillary pain-analgesics as per orders.      Danial Brown MD  05/11/243:35 PM

## 2024-05-12 LAB
ANION GAP SERPL CALC-SCNC: 10 MMOL/L (ref 10–20)
BASOPHILS # BLD AUTO: 0.05 X10*3/UL (ref 0–0.1)
BASOPHILS NFR BLD AUTO: 0.8 %
BUN SERPL-MCNC: 13 MG/DL (ref 6–23)
CALCIUM SERPL-MCNC: 8 MG/DL (ref 8.6–10.3)
CHLORIDE SERPL-SCNC: 110 MMOL/L (ref 98–107)
CO2 SERPL-SCNC: 24 MMOL/L (ref 21–32)
CREAT SERPL-MCNC: 1 MG/DL (ref 0.5–1.05)
EGFRCR SERPLBLD CKD-EPI 2021: 75 ML/MIN/1.73M*2
EOSINOPHIL # BLD AUTO: 0.2 X10*3/UL (ref 0–0.7)
EOSINOPHIL NFR BLD AUTO: 3.1 %
ERYTHROCYTE [DISTWIDTH] IN BLOOD BY AUTOMATED COUNT: 13.8 % (ref 11.5–14.5)
GLUCOSE SERPL-MCNC: 89 MG/DL (ref 74–99)
HCT VFR BLD AUTO: 38.4 % (ref 36–46)
HGB BLD-MCNC: 12.6 G/DL (ref 12–16)
IMM GRANULOCYTES # BLD AUTO: 0.02 X10*3/UL (ref 0–0.7)
IMM GRANULOCYTES NFR BLD AUTO: 0.3 % (ref 0–0.9)
LYMPHOCYTES # BLD AUTO: 2.73 X10*3/UL (ref 1.2–4.8)
LYMPHOCYTES NFR BLD AUTO: 43 %
MCH RBC QN AUTO: 25.8 PG (ref 26–34)
MCHC RBC AUTO-ENTMCNC: 32.8 G/DL (ref 32–36)
MCV RBC AUTO: 79 FL (ref 80–100)
MONOCYTES # BLD AUTO: 0.64 X10*3/UL (ref 0.1–1)
MONOCYTES NFR BLD AUTO: 10.1 %
NEUTROPHILS # BLD AUTO: 2.71 X10*3/UL (ref 1.2–7.7)
NEUTROPHILS NFR BLD AUTO: 42.7 %
NRBC BLD-RTO: 0 /100 WBCS (ref 0–0)
PLATELET # BLD AUTO: 318 X10*3/UL (ref 150–450)
POTASSIUM SERPL-SCNC: 4 MMOL/L (ref 3.5–5.3)
RBC # BLD AUTO: 4.88 X10*6/UL (ref 4–5.2)
SODIUM SERPL-SCNC: 140 MMOL/L (ref 136–145)
VANCOMYCIN SERPL-MCNC: 18.7 UG/ML (ref 5–20)
WBC # BLD AUTO: 6.4 X10*3/UL (ref 4.4–11.3)

## 2024-05-12 PROCEDURE — 80202 ASSAY OF VANCOMYCIN: CPT | Performed by: PHARMACIST

## 2024-05-12 PROCEDURE — 2500000004 HC RX 250 GENERAL PHARMACY W/ HCPCS (ALT 636 FOR OP/ED): Performed by: INTERNAL MEDICINE

## 2024-05-12 PROCEDURE — 2500000001 HC RX 250 WO HCPCS SELF ADMINISTERED DRUGS (ALT 637 FOR MEDICARE OP): Performed by: INTERNAL MEDICINE

## 2024-05-12 PROCEDURE — 1100000001 HC PRIVATE ROOM DAILY

## 2024-05-12 PROCEDURE — 36415 COLL VENOUS BLD VENIPUNCTURE: CPT | Performed by: INTERNAL MEDICINE

## 2024-05-12 PROCEDURE — 2500000004 HC RX 250 GENERAL PHARMACY W/ HCPCS (ALT 636 FOR OP/ED): Performed by: PHARMACIST

## 2024-05-12 PROCEDURE — 99232 SBSQ HOSP IP/OBS MODERATE 35: CPT | Performed by: SURGERY

## 2024-05-12 PROCEDURE — 85025 COMPLETE CBC W/AUTO DIFF WBC: CPT | Performed by: INTERNAL MEDICINE

## 2024-05-12 PROCEDURE — 80048 BASIC METABOLIC PNL TOTAL CA: CPT | Performed by: PHARMACIST

## 2024-05-12 RX ADMIN — HYDROMORPHONE HYDROCHLORIDE 1 MG: 1 INJECTION, SOLUTION INTRAMUSCULAR; INTRAVENOUS; SUBCUTANEOUS at 13:29

## 2024-05-12 RX ADMIN — VANCOMYCIN HYDROCHLORIDE 1500 MG: 1.5 INJECTION, POWDER, LYOPHILIZED, FOR SOLUTION INTRAVENOUS at 11:37

## 2024-05-12 RX ADMIN — OXYCODONE HYDROCHLORIDE AND ACETAMINOPHEN 2 TABLET: 5; 325 TABLET ORAL at 01:32

## 2024-05-12 RX ADMIN — HYDROMORPHONE HYDROCHLORIDE 1 MG: 1 INJECTION, SOLUTION INTRAMUSCULAR; INTRAVENOUS; SUBCUTANEOUS at 20:20

## 2024-05-12 RX ADMIN — HYDROXYZINE HYDROCHLORIDE 25 MG: 25 TABLET ORAL at 04:44

## 2024-05-12 RX ADMIN — HYDROXYZINE HYDROCHLORIDE 25 MG: 25 TABLET ORAL at 14:59

## 2024-05-12 RX ADMIN — PIPERACILLIN SODIUM AND TAZOBACTAM SODIUM 3.38 G: 3; .375 INJECTION, SOLUTION INTRAVENOUS at 14:52

## 2024-05-12 RX ADMIN — HYDROMORPHONE HYDROCHLORIDE 1 MG: 1 INJECTION, SOLUTION INTRAMUSCULAR; INTRAVENOUS; SUBCUTANEOUS at 08:47

## 2024-05-12 RX ADMIN — GABAPENTIN 800 MG: 400 CAPSULE ORAL at 14:52

## 2024-05-12 RX ADMIN — GABAPENTIN 800 MG: 400 CAPSULE ORAL at 10:22

## 2024-05-12 RX ADMIN — Medication 1 TABLET: at 10:22

## 2024-05-12 RX ADMIN — PIPERACILLIN SODIUM AND TAZOBACTAM SODIUM 3.38 G: 3; .375 INJECTION, SOLUTION INTRAVENOUS at 03:04

## 2024-05-12 RX ADMIN — PIPERACILLIN SODIUM AND TAZOBACTAM SODIUM 3.38 G: 3; .375 INJECTION, SOLUTION INTRAVENOUS at 10:22

## 2024-05-12 RX ADMIN — GABAPENTIN 800 MG: 400 CAPSULE ORAL at 20:20

## 2024-05-12 RX ADMIN — OXYCODONE HYDROCHLORIDE AND ACETAMINOPHEN 2 TABLET: 5; 325 TABLET ORAL at 10:22

## 2024-05-12 RX ADMIN — OXYCODONE HYDROCHLORIDE AND ACETAMINOPHEN 2 TABLET: 5; 325 TABLET ORAL at 14:52

## 2024-05-12 RX ADMIN — HYDROMORPHONE HYDROCHLORIDE 1 MG: 1 INJECTION, SOLUTION INTRAMUSCULAR; INTRAVENOUS; SUBCUTANEOUS at 04:42

## 2024-05-12 RX ADMIN — POLYETHYLENE GLYCOL 3350 17 G: 17 POWDER, FOR SOLUTION ORAL at 10:22

## 2024-05-12 RX ADMIN — PIPERACILLIN SODIUM AND TAZOBACTAM SODIUM 3.38 G: 3; .375 INJECTION, SOLUTION INTRAVENOUS at 20:20

## 2024-05-12 ASSESSMENT — COGNITIVE AND FUNCTIONAL STATUS - GENERAL
DAILY ACTIVITIY SCORE: 24
MOBILITY SCORE: 24

## 2024-05-12 ASSESSMENT — PAIN - FUNCTIONAL ASSESSMENT
PAIN_FUNCTIONAL_ASSESSMENT: 0-10

## 2024-05-12 ASSESSMENT — PAIN SCALES - GENERAL
PAINLEVEL_OUTOF10: 6
PAINLEVEL_OUTOF10: 9
PAINLEVEL_OUTOF10: 6
PAINLEVEL_OUTOF10: 2
PAINLEVEL_OUTOF10: 8
PAINLEVEL_OUTOF10: 9
PAINLEVEL_OUTOF10: 9
PAINLEVEL_OUTOF10: 6

## 2024-05-12 ASSESSMENT — PAIN DESCRIPTION - LOCATION
LOCATION: ARM
LOCATION: ARM
LOCATION: ABDOMEN
LOCATION: ABDOMEN
LOCATION: ARM

## 2024-05-12 ASSESSMENT — PAIN DESCRIPTION - ORIENTATION
ORIENTATION: LEFT
ORIENTATION: LEFT

## 2024-05-12 NOTE — CARE PLAN
The patient's goals for the shift include  Pain control     The clinical goals for the shift include pain control    Problem: Pain - Adult  Goal: Verbalizes/displays adequate comfort level or baseline comfort level  Outcome: Progressing     Problem: Safety - Adult  Goal: Free from fall injury  Outcome: Progressing

## 2024-05-12 NOTE — PROGRESS NOTES
INTERNAL MEDICINE PROGRESS NOTE      HPI:    Patient reports minimal drainage from axillary lesions and increased pain.  She has had no fever.    Vital signs in last 24 hours:  Temp:  [36.4 °C (97.6 °F)-37 °C (98.6 °F)] 36.4 °C (97.6 °F)  Heart Rate:  [68-89] 68  Resp:  [18-20] 18  BP: (105-129)/(69-90) 105/69    Physical Examination:  Physical Exam     Constitutional:       Appearance: Young, overweight, in mild distress secondary to pain  HENT:      Head: Normocephalic and atraumatic.   Eyes:      Extraocular Movements: Extraocular movements intact.      Pupils: Pupils are equal, round, and reactive to light.   Cardiovascular:      Rate and Rhythm: Normal rate and regular rhythm.      Pulses: Normal pulses.      Heart sounds: Normal heart sounds.   Pulmonary:      Effort: Pulmonary effort is normal.      Breath sounds: Normal breath sounds.   Abdominal:      General: Abdomen is flat. Bowel sounds are normal.      Palpations: Abdomen is soft.   Musculoskeletal:         General: Normal range of motion.      Cervical back: Normal range of motion and neck supple.   Skin:     General: Skin is warm and dry.  Tender hidradenitis lesions in bilateral axilla and bilateral inguinal regions.  Some fluctuance of left axillary lesions.  No expressible pus.  Neurological:      General: No focal deficit present.      Mental Status: Alert and oriented x 3.       Medications:    Current Facility-Administered Medications:     acetaminophen (Tylenol) tablet 650 mg, 650 mg, oral, q4h PRN **OR** acetaminophen (Tylenol) oral liquid 650 mg, 650 mg, oral, q4h PRN **OR** acetaminophen (Tylenol) suppository 650 mg, 650 mg, rectal, q4h PRN, Danial Brown MD    gabapentin (Neurontin) capsule 800 mg, 800 mg, oral, TID, Danial Brown MD, 800 mg at 05/11/24 2024    HYDROmorphone (Dilaudid) injection 1 mg, 1 mg, intravenous, q4h PRN, Danial Brown MD, 1 mg at 05/12/24 0847    hydrOXYzine HCL (Atarax) tablet 25 mg, 25 mg,  oral, q6h PRN, Danial Brown MD, 25 mg at 05/12/24 0444    multivitamin with minerals 1 tablet, 1 tablet, oral, Daily, Danial Brown MD    ondansetron ODT (Zofran-ODT) disintegrating tablet 4 mg, 4 mg, oral, q8h PRN, Danial Brown MD    oxyCODONE-acetaminophen (Percocet) 5-325 mg per tablet 2 tablet, 2 tablet, oral, q4h PRN, Danial Brown MD, 2 tablet at 05/12/24 0132    piperacillin-tazobactam-dextrose (Zosyn) IV 3.375 g, 3.375 g, intravenous, q6h, Bita Patterson MD MPH, Stopped at 05/12/24 0334    polyethylene glycol (Glycolax, Miralax) packet 17 g, 17 g, oral, BID, Danial Brown MD, 17 g at 05/11/24 2025    vancomycin (Vancocin) 1,500 mg in dextrose 5%  mL, 1,500 mg, intravenous, q24h, Nelson Garcia, PharmD    vancomycin (Vancocin) pharmacy to dose - pharmacy monitoring, , miscellaneous, Daily PRN, Bita Patterson MD MPH    Laboratory Findings:  Lab Results   Component Value Date    WBC 6.4 05/12/2024    HGB 12.6 05/12/2024    HCT 38.4 05/12/2024    MCV 79 (L) 05/12/2024     05/12/2024     Lab Results   Component Value Date    INR 1.2 (H) 04/28/2024    INR 1.3 (H) 09/15/2022    INR 1.2 (H) 07/20/2021    PROTIME 13.9 (H) 04/28/2024    PROTIME 15.0 (H) 09/15/2022    PROTIME 14.0 (H) 07/20/2021     Lab Results   Component Value Date    GLUCOSE 89 05/12/2024    CALCIUM 8.0 (L) 05/12/2024     05/12/2024    K 4.0 05/12/2024    CO2 24 05/12/2024     (H) 05/12/2024    BUN 13 05/12/2024    CREATININE 1.00 05/12/2024       Assessment and Plan:     Hidradenitis suppurativa with likely abscess in left axilla -IV antibiotics as per orders, cultures pending.  General surgery input appreciated.  Will likely need transfer to have incision and drainage done.  SLE-continue with preadmission medications.  Axillary pain-analgesics as per orders.       Danial Brown MD  05/12/24  10:09 AM

## 2024-05-12 NOTE — PROGRESS NOTES
"Vancomycin Dosing by Pharmacy- FOLLOW UP    Sandrita Adams is a 33 y.o. year old female who Pharmacy has been consulted for vancomycin dosing for cellulitis, skin and soft tissue. Based on the patient's indication and renal status this patient is being dosed based on a goal AUC of 400-600.     Renal function is currently declining.    Current vancomycin dose: 1250 mg given every 24 hours    Estimated vancomycin AUC on current dose: 735 mg/L.hr     Visit Vitals  /69 (BP Location: Right arm, Patient Position: Lying)   Pulse 68   Temp 36.4 °C (97.6 °F) (Oral)   Resp 18        Lab Results   Component Value Date    CREATININE 1.00 05/12/2024    CREATININE 0.78 05/11/2024    CREATININE 0.74 05/10/2024    CREATININE 0.93 04/29/2024        Patient weight is No results found for: \"PTWEIGHT\"    No results found for: \"CULTURE\"     I/O last 3 completed shifts:  In: 450 (5.7 mL/kg) [IV Piggyback:450]  Out: - (0 mL/kg)   Weight: 79.4 kg   [unfilled]    No results found for: \"PATIENTTEMP\"     Assessment/Plan    Above goal AUC. Somewhat concerned for potentially developing ALESSANDRA. Orders placed for new vancomcyin regimen of 1500mg every 24 hours to begin at 0900.    This dosing regimen is predicted by [employer] to result in the following pharmacokinetic parameters:  Exposure target: AUC24 (range)400-600 mg/L.hr   AUC24,ss: 450 mg/L.hr  Probability of AUC24 > 400: 72 %  Ctrough,ss: 11.1 mg/L  Probability of Ctrough,ss > 20: 4 %  Probability of nephrotoxicity (Lodise POP 2009): 7 %    The next level will be obtained on 5/13 at AM labs. May be obtained sooner if clinically indicated.   Will continue to monitor renal function daily while on vancomycin and order serum creatinine at least every 48 hours if not already ordered.  Follow for continued vancomycin needs, clinical response, and signs/symptoms of toxicity.       Nelson Garcia, PharmD           "

## 2024-05-12 NOTE — PROGRESS NOTES
"Sandrita Adams is a 33 y.o. female on day 2 of admission presenting with Hidradenitis suppurativa.    Assessment/Plan   Patient will need to follow-up with either [facility] or [facility] for her elective surgery on her stage III hidradenitis of the bilateral axillae.  Unfortunately she is out of network for  and she will be able to get financial clearance for surgery    Subjective   Patient comes in with worsening drainage and pain related to her hidradenitis.  I had seen her on her last admission we had actually scheduled her for elective outpatient surgery.  Unfortunately her insurance deemed that she was out of network with  and refused to sign off on it.       Objective     Physical Exam  NAD  A&Ox3  Non icteric  Rand stage III hidradenitis bilateral axillae    Last Recorded Vitals  Blood pressure 105/69, pulse 68, temperature 36.4 °C (97.6 °F), temperature source Oral, resp. rate 18, height 1.549 m (5' 1\"), weight 79.4 kg (175 lb), SpO2 100%.  Intake/Output last 3 Shifts:  I/O last 3 completed shifts:  In: 450 (5.7 mL/kg) [IV Piggyback:450]  Out: - (0 mL/kg)   Weight: 79.4 kg     Relevant Results    Scheduled medications  gabapentin, 800 mg, oral, TID  multivitamin with minerals, 1 tablet, oral, Daily  piperacillin-tazobactam, 3.375 g, intravenous, q6h  polyethylene glycol, 17 g, oral, BID  vancomycin, 1,500 mg, intravenous, q24h      Continuous medications     PRN medications  PRN medications: acetaminophen **OR** acetaminophen **OR** acetaminophen, HYDROmorphone, hydrOXYzine HCL, ondansetron ODT, oxyCODONE-acetaminophen, vancomycin    Results for orders placed or performed during the hospital encounter of 05/10/24 (from the past 24 hour(s))   Vancomycin   Result Value Ref Range    Vancomycin 18.7 5.0 - 20.0 ug/mL   Basic Metabolic Panel   Result Value Ref Range    Glucose 89 74 - 99 mg/dL    Sodium 140 136 - 145 mmol/L    Potassium 4.0 3.5 - 5.3 mmol/L    Chloride 110 (H) 98 - 107 mmol/L "    Bicarbonate 24 21 - 32 mmol/L    Anion Gap 10 10 - 20 mmol/L    Urea Nitrogen 13 6 - 23 mg/dL    Creatinine 1.00 0.50 - 1.05 mg/dL    eGFR 75 >60 mL/min/1.73m*2    Calcium 8.0 (L) 8.6 - 10.3 mg/dL   CBC and Auto Differential   Result Value Ref Range    WBC 6.4 4.4 - 11.3 x10*3/uL    nRBC 0.0 0.0 - 0.0 /100 WBCs    RBC 4.88 4.00 - 5.20 x10*6/uL    Hemoglobin 12.6 12.0 - 16.0 g/dL    Hematocrit 38.4 36.0 - 46.0 %    MCV 79 (L) 80 - 100 fL    MCH 25.8 (L) 26.0 - 34.0 pg    MCHC 32.8 32.0 - 36.0 g/dL    RDW 13.8 11.5 - 14.5 %    Platelets 318 150 - 450 x10*3/uL    Neutrophils % 42.7 40.0 - 80.0 %    Immature Granulocytes %, Automated 0.3 0.0 - 0.9 %    Lymphocytes % 43.0 13.0 - 44.0 %    Monocytes % 10.1 2.0 - 10.0 %    Eosinophils % 3.1 0.0 - 6.0 %    Basophils % 0.8 0.0 - 2.0 %    Neutrophils Absolute 2.71 1.20 - 7.70 x10*3/uL    Immature Granulocytes Absolute, Automated 0.02 0.00 - 0.70 x10*3/uL    Lymphocytes Absolute 2.73 1.20 - 4.80 x10*3/uL    Monocytes Absolute 0.64 0.10 - 1.00 x10*3/uL    Eosinophils Absolute 0.20 0.00 - 0.70 x10*3/uL    Basophils Absolute 0.05 0.00 - 0.10 x10*3/uL           I spent 25 minutes in the professional and overall care of this patient.      Ted Coronel MD

## 2024-05-13 LAB
ANION GAP SERPL CALC-SCNC: 12 MMOL/L (ref 10–20)
BASOPHILS # BLD AUTO: 0.05 X10*3/UL (ref 0–0.1)
BASOPHILS NFR BLD AUTO: 0.9 %
BUN SERPL-MCNC: 17 MG/DL (ref 6–23)
CALCIUM SERPL-MCNC: 8.5 MG/DL (ref 8.6–10.3)
CHLORIDE SERPL-SCNC: 109 MMOL/L (ref 98–107)
CO2 SERPL-SCNC: 25 MMOL/L (ref 21–32)
CREAT SERPL-MCNC: 0.98 MG/DL (ref 0.5–1.05)
EGFRCR SERPLBLD CKD-EPI 2021: 77 ML/MIN/1.73M*2
EOSINOPHIL # BLD AUTO: 0.2 X10*3/UL (ref 0–0.7)
EOSINOPHIL NFR BLD AUTO: 3.7 %
ERYTHROCYTE [DISTWIDTH] IN BLOOD BY AUTOMATED COUNT: 13.9 % (ref 11.5–14.5)
GLUCOSE SERPL-MCNC: 79 MG/DL (ref 74–99)
HCT VFR BLD AUTO: 43.3 % (ref 36–46)
HGB BLD-MCNC: 14.1 G/DL (ref 12–16)
IMM GRANULOCYTES # BLD AUTO: 0.01 X10*3/UL (ref 0–0.7)
IMM GRANULOCYTES NFR BLD AUTO: 0.2 % (ref 0–0.9)
LYMPHOCYTES # BLD AUTO: 2.35 X10*3/UL (ref 1.2–4.8)
LYMPHOCYTES NFR BLD AUTO: 43.8 %
MCH RBC QN AUTO: 25.9 PG (ref 26–34)
MCHC RBC AUTO-ENTMCNC: 32.6 G/DL (ref 32–36)
MCV RBC AUTO: 79 FL (ref 80–100)
MONOCYTES # BLD AUTO: 0.49 X10*3/UL (ref 0.1–1)
MONOCYTES NFR BLD AUTO: 9.1 %
NEUTROPHILS # BLD AUTO: 2.26 X10*3/UL (ref 1.2–7.7)
NEUTROPHILS NFR BLD AUTO: 42.3 %
NRBC BLD-RTO: 0 /100 WBCS (ref 0–0)
PLATELET # BLD AUTO: 356 X10*3/UL (ref 150–450)
POTASSIUM SERPL-SCNC: 4.5 MMOL/L (ref 3.5–5.3)
RBC # BLD AUTO: 5.45 X10*6/UL (ref 4–5.2)
SODIUM SERPL-SCNC: 141 MMOL/L (ref 136–145)
VANCOMYCIN SERPL-MCNC: 7.9 UG/ML (ref 5–20)
WBC # BLD AUTO: 5.4 X10*3/UL (ref 4.4–11.3)

## 2024-05-13 PROCEDURE — 36415 COLL VENOUS BLD VENIPUNCTURE: CPT | Performed by: PHARMACIST

## 2024-05-13 PROCEDURE — 2500000001 HC RX 250 WO HCPCS SELF ADMINISTERED DRUGS (ALT 637 FOR MEDICARE OP): Performed by: INTERNAL MEDICINE

## 2024-05-13 PROCEDURE — 2500000004 HC RX 250 GENERAL PHARMACY W/ HCPCS (ALT 636 FOR OP/ED): Performed by: PHARMACIST

## 2024-05-13 PROCEDURE — 2500000004 HC RX 250 GENERAL PHARMACY W/ HCPCS (ALT 636 FOR OP/ED): Performed by: INTERNAL MEDICINE

## 2024-05-13 PROCEDURE — 1100000001 HC PRIVATE ROOM DAILY

## 2024-05-13 PROCEDURE — 80202 ASSAY OF VANCOMYCIN: CPT | Performed by: PHARMACIST

## 2024-05-13 PROCEDURE — 85025 COMPLETE CBC W/AUTO DIFF WBC: CPT | Performed by: INTERNAL MEDICINE

## 2024-05-13 PROCEDURE — 82374 ASSAY BLOOD CARBON DIOXIDE: CPT | Performed by: PHARMACIST

## 2024-05-13 RX ORDER — VANCOMYCIN HYDROCHLORIDE 1 G/200ML
1000 INJECTION, SOLUTION INTRAVENOUS EVERY 12 HOURS
Status: DISCONTINUED | OUTPATIENT
Start: 2024-05-13 | End: 2024-05-13

## 2024-05-13 RX ORDER — HYDROMORPHONE HYDROCHLORIDE 2 MG/ML
2 INJECTION, SOLUTION INTRAMUSCULAR; INTRAVENOUS; SUBCUTANEOUS EVERY 4 HOURS PRN
Status: DISCONTINUED | OUTPATIENT
Start: 2024-05-13 | End: 2024-05-14 | Stop reason: HOSPADM

## 2024-05-13 RX ADMIN — OXYCODONE HYDROCHLORIDE AND ACETAMINOPHEN 2 TABLET: 5; 325 TABLET ORAL at 11:04

## 2024-05-13 RX ADMIN — GABAPENTIN 800 MG: 400 CAPSULE ORAL at 15:15

## 2024-05-13 RX ADMIN — HYDROMORPHONE HYDROCHLORIDE 2 MG: 2 INJECTION INTRAMUSCULAR; INTRAVENOUS; SUBCUTANEOUS at 13:14

## 2024-05-13 RX ADMIN — GABAPENTIN 800 MG: 400 CAPSULE ORAL at 08:18

## 2024-05-13 RX ADMIN — OXYCODONE HYDROCHLORIDE AND ACETAMINOPHEN 2 TABLET: 5; 325 TABLET ORAL at 19:51

## 2024-05-13 RX ADMIN — OXYCODONE HYDROCHLORIDE AND ACETAMINOPHEN 2 TABLET: 5; 325 TABLET ORAL at 06:21

## 2024-05-13 RX ADMIN — AMPICILLIN SODIUM AND SULBACTAM SODIUM 3 G: 2; 1 INJECTION, POWDER, FOR SOLUTION INTRAMUSCULAR; INTRAVENOUS at 18:47

## 2024-05-13 RX ADMIN — GABAPENTIN 800 MG: 400 CAPSULE ORAL at 22:02

## 2024-05-13 RX ADMIN — AMPICILLIN SODIUM AND SULBACTAM SODIUM 3 G: 2; 1 INJECTION, POWDER, FOR SOLUTION INTRAMUSCULAR; INTRAVENOUS at 13:16

## 2024-05-13 RX ADMIN — PIPERACILLIN SODIUM AND TAZOBACTAM SODIUM 3.38 G: 3; .375 INJECTION, SOLUTION INTRAVENOUS at 02:53

## 2024-05-13 RX ADMIN — OXYCODONE HYDROCHLORIDE AND ACETAMINOPHEN 2 TABLET: 5; 325 TABLET ORAL at 15:13

## 2024-05-13 RX ADMIN — HYDROMORPHONE HYDROCHLORIDE 1 MG: 1 INJECTION, SOLUTION INTRAMUSCULAR; INTRAVENOUS; SUBCUTANEOUS at 03:17

## 2024-05-13 RX ADMIN — HYDROXYZINE HYDROCHLORIDE 25 MG: 25 TABLET ORAL at 17:57

## 2024-05-13 RX ADMIN — HYDROXYZINE HYDROCHLORIDE 25 MG: 25 TABLET ORAL at 09:36

## 2024-05-13 RX ADMIN — HYDROMORPHONE HYDROCHLORIDE 1 MG: 1 INJECTION, SOLUTION INTRAMUSCULAR; INTRAVENOUS; SUBCUTANEOUS at 08:19

## 2024-05-13 RX ADMIN — HYDROMORPHONE HYDROCHLORIDE 2 MG: 2 INJECTION INTRAMUSCULAR; INTRAVENOUS; SUBCUTANEOUS at 22:02

## 2024-05-13 RX ADMIN — HYDROMORPHONE HYDROCHLORIDE 2 MG: 2 INJECTION INTRAMUSCULAR; INTRAVENOUS; SUBCUTANEOUS at 17:51

## 2024-05-13 RX ADMIN — VANCOMYCIN HYDROCHLORIDE 1000 MG: 1 INJECTION, SOLUTION INTRAVENOUS at 09:33

## 2024-05-13 RX ADMIN — PIPERACILLIN SODIUM AND TAZOBACTAM SODIUM 3.38 G: 3; .375 INJECTION, SOLUTION INTRAVENOUS at 08:20

## 2024-05-13 RX ADMIN — POLYETHYLENE GLYCOL 3350 17 G: 17 POWDER, FOR SOLUTION ORAL at 08:18

## 2024-05-13 RX ADMIN — Medication 1 TABLET: at 08:18

## 2024-05-13 ASSESSMENT — PAIN DESCRIPTION - ORIENTATION
ORIENTATION: LEFT

## 2024-05-13 ASSESSMENT — PAIN DESCRIPTION - LOCATION
LOCATION: ARM

## 2024-05-13 ASSESSMENT — PAIN SCALES - GENERAL
PAINLEVEL_OUTOF10: 5 - MODERATE PAIN
PAINLEVEL_OUTOF10: 7
PAINLEVEL_OUTOF10: 9
PAINLEVEL_OUTOF10: 9
PAINLEVEL_OUTOF10: 8
PAINLEVEL_OUTOF10: 6
PAINLEVEL_OUTOF10: 7
PAINLEVEL_OUTOF10: 9
PAINLEVEL_OUTOF10: 8
PAINLEVEL_OUTOF10: 9
PAINLEVEL_OUTOF10: 7
PAINLEVEL_OUTOF10: 9

## 2024-05-13 ASSESSMENT — PAIN SCALES - PAIN ASSESSMENT IN ADVANCED DEMENTIA (PAINAD)
TOTALSCORE: MEDICATION (SEE MAR)

## 2024-05-13 ASSESSMENT — PAIN DESCRIPTION - DESCRIPTORS
DESCRIPTORS: ACHING

## 2024-05-13 NOTE — PROGRESS NOTES
"Vancomycin Dosing by Pharmacy- FOLLOW UP    Sandrita Adams is a 33 y.o. year old female who Pharmacy has been consulted for vancomycin dosing for cellulitis, skin and soft tissue. Based on the patient's indication and renal status this patient is being dosed based on a goal AUC of 400-600.     Renal function is currently stable.    Current vancomycin dose: 1500 mg given every 24 hours    Estimated vancomycin AUC on current dose: 337 mg/L.hr     Visit Vitals  /74 (BP Location: Right arm, Patient Position: Lying)   Pulse 72   Temp 36.8 °C (98.2 °F) (Oral)   Resp 18        Lab Results   Component Value Date    CREATININE 0.98 05/13/2024    CREATININE 1.00 05/12/2024    CREATININE 0.78 05/11/2024    CREATININE 0.74 05/10/2024        Patient weight is No results found for: \"PTWEIGHT\"    No results found for: \"CULTURE\"     I/O last 3 completed shifts:  In: 695 (8.8 mL/kg) [P.O.:595; IV Piggyback:100]  Out: - (0 mL/kg)   Weight: 79.4 kg   [unfilled]    No results found for: \"PATIENTTEMP\"     Assessment/Plan    Below goal AUC. Orders placed for new vancomcyin regimen of 1000 every 12 hours to begin at 0900.     This dosing regimen is predicted by [employer] to result in the following pharmacokinetic parameters:  Exposure target: AUC24 (range)400-600 mg/L.hr   AUC24,ss: 447 mg/L.hr  Probability of AUC24 > 400: 85 %  Ctrough,ss: 11.1 mg/L  Probability of Ctrough,ss > 20: 0 %  Probability of nephrotoxicity (Lodise POP 2009): 7 %  The next level will be obtained on 5/15 at 0500. May be obtained sooner if clinically indicated.   Will continue to monitor renal function daily while on vancomycin and order serum creatinine at least every 48 hours if not already ordered.  Follow for continued vancomycin needs, clinical response, and signs/symptoms of toxicity.       Suha Leavitt, PharmD           "

## 2024-05-13 NOTE — PROGRESS NOTES
INTERNAL MEDICINE PROGRESS NOTE      HPI:    Patient reports that pain is not well-controlled.  She has new lesions on her gluteal region and in her right axilla.    Vital signs in last 24 hours:  Temp:  [36.2 °C (97.2 °F)-36.8 °C (98.2 °F)] 36.2 °C (97.2 °F)  Heart Rate:  [64-76] 64  Resp:  [17-18] 17  BP: (105-117)/(69-74) 117/74    Physical Examination:  Physical Exam     Constitutional:       Appearance: Young, overweight, in mild distress secondary to pain  HENT:      Head: Normocephalic and atraumatic.   Eyes:      Extraocular Movements: Extraocular movements intact.      Pupils: Pupils are equal, round, and reactive to light.   Cardiovascular:      Rate and Rhythm: Normal rate and regular rhythm.      Pulses: Normal pulses.      Heart sounds: Normal heart sounds.   Pulmonary:      Effort: Pulmonary effort is normal.      Breath sounds: Normal breath sounds.   Abdominal:      General: Abdomen is flat. Bowel sounds are normal.      Palpations: Abdomen is soft.   Musculoskeletal:         General: Normal range of motion.      Cervical back: Normal range of motion and neck supple.   Skin:     General: Skin is warm and dry.  Tender hidradenitis lesions in bilateral axilla and bilateral inguinal regions.  Some fluctuance of left axillary lesions.  No expressible pus.  Increased tenderness in right axillary region, erythematous lesion on gluteus.  Neurological:      General: No focal deficit present.      Mental Status: Alert and oriented x 3.       Medications:    Current Facility-Administered Medications:     acetaminophen (Tylenol) tablet 650 mg, 650 mg, oral, q4h PRN **OR** acetaminophen (Tylenol) oral liquid 650 mg, 650 mg, oral, q4h PRN **OR** acetaminophen (Tylenol) suppository 650 mg, 650 mg, rectal, q4h PRN, Danial Brown MD    ampicillin-sulbactam (Unasyn) in sodium chloride 0.9 % 100 mL 3 g, 3 g, intravenous, q6h, Casey Castanon MD    gabapentin (Neurontin) capsule 800 mg, 800 mg, oral, TID,  Dnaial Brown MD, 800 mg at 05/13/24 0818    HYDROmorphone (Dilaudid) injection 1 mg, 1 mg, intravenous, q4h PRN, Danial Brown MD, 1 mg at 05/13/24 0819    hydrOXYzine HCL (Atarax) tablet 25 mg, 25 mg, oral, q6h PRN, Danial Brown MD, 25 mg at 05/13/24 0936    multivitamin with minerals 1 tablet, 1 tablet, oral, Daily, Danial Brown MD, 1 tablet at 05/13/24 0818    ondansetron ODT (Zofran-ODT) disintegrating tablet 4 mg, 4 mg, oral, q8h PRN, Danial Brown MD    oxyCODONE-acetaminophen (Percocet) 5-325 mg per tablet 2 tablet, 2 tablet, oral, q4h PRN, Danial Brown MD, 2 tablet at 05/13/24 1104    polyethylene glycol (Glycolax, Miralax) packet 17 g, 17 g, oral, BID, Danial Brown MD, 17 g at 05/13/24 0818    Laboratory Findings:  Lab Results   Component Value Date    WBC 5.4 05/13/2024    HGB 14.1 05/13/2024    HCT 43.3 05/13/2024    MCV 79 (L) 05/13/2024     05/13/2024     Lab Results   Component Value Date    INR 1.2 (H) 04/28/2024    INR 1.3 (H) 09/15/2022    INR 1.2 (H) 07/20/2021    PROTIME 13.9 (H) 04/28/2024    PROTIME 15.0 (H) 09/15/2022    PROTIME 14.0 (H) 07/20/2021     Lab Results   Component Value Date    GLUCOSE 79 05/13/2024    CALCIUM 8.5 (L) 05/13/2024     05/13/2024    K 4.5 05/13/2024    CO2 25 05/13/2024     (H) 05/13/2024    BUN 17 05/13/2024    CREATININE 0.98 05/13/2024       Assessment and Plan:     Hidradenitis suppurativa with likely abscess in left axilla -IV antibiotics to be modified per ID, cultures noted.  Will need excisional surgery at [facility] in the future.  Will need to continue with [facility] as outpatient.  SLE-continue with preadmission medications.  Axillary pain-analgesics modified as per orders.       Danial Brown MD  05/13/24  12:40 PM

## 2024-05-13 NOTE — PROGRESS NOTES
05/13/24 1200   Discharge Planning   Patient expects to be discharged to: home with daughter     Per notes patient is not med ready, would culture +, receiving IV Unasyn ADOD 24-48hrs, ID following, once med ready patient plans on being discharged home.

## 2024-05-13 NOTE — CARE PLAN
The patient's goals for the shift include  Pain control     The clinical goals for the shift include Pt will remain safe    Problem: Pain - Adult  Goal: Verbalizes/displays adequate comfort level or baseline comfort level  Outcome: Progressing     Problem: Safety - Adult  Goal: Free from fall injury  Outcome: Progressing     Problem: Chronic Conditions and Co-morbidities  Goal: Patient's chronic conditions and co-morbidity symptoms are monitored and maintained or improved  Outcome: Progressing

## 2024-05-13 NOTE — PROGRESS NOTES
"  INFECTIOUS DISEASE DAILY PROGRESS NOTE    SUBJECTIVE:    No overnight events. No new complaints. Afebrile. No rash/itching/diarrhea. Still with significant pain in the axilla bilaterally. No fevers. We discussed IV abx another day or so and then eventual plan for PO abx.    OBJECTIVE:  VITALS (Last 24 Hours)  /74 (BP Location: Right arm, Patient Position: Lying)   Pulse 64   Temp 36.2 °C (97.2 °F) (Temporal)   Resp 17   Ht 1.549 m (5' 1\")   Wt 79.4 kg (175 lb)   SpO2 100%   BMI 33.07 kg/m²     PHYSICAL EXAM:  Gen - NAD  Heart - RRR  Axilla - nodular scarring with small abscess formation  Skin - no rash    ABX: IV Vanc/Zosyn    LABS:  Lab Results   Component Value Date    WBC 5.4 05/13/2024    HGB 14.1 05/13/2024    HCT 43.3 05/13/2024    MCV 79 (L) 05/13/2024     05/13/2024     Lab Results   Component Value Date    GLUCOSE 79 05/13/2024    CALCIUM 8.5 (L) 05/13/2024     05/13/2024    K 4.5 05/13/2024    CO2 25 05/13/2024     (H) 05/13/2024    BUN 17 05/13/2024    CREATININE 0.98 05/13/2024     Results from last 72 hours   Lab Units 05/10/24  1124   ALK PHOS U/L 63   BILIRUBIN TOTAL mg/dL 0.3   PROTEIN TOTAL g/dL 6.9   ALT U/L 10   AST U/L 15   ALBUMIN g/dL 3.8     Estimated Creatinine Clearance: 76.4 mL/min (by C-G formula based on SCr of 0.98 mg/dL).      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  Hydradenitis Suppurativa  Immunocompromised on secukinumab    Changing to IV Unasyn 3g Q6H. Monitoring for adverse effects of abx such as rash/itching/diarrhea.    Eventually will need a surgical excision type procedure for HS but will have to be at another hospital apparently due to insurance coverage. No I/D needed currently.    Another day of IV abx, hopefully pain will improve.    Will follow. Thanks! D/w Dr. Kevin Castanon MD  [facility]  Office #[phone]      "
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
## 2024-05-14 VITALS
WEIGHT: 175 LBS | HEIGHT: 61 IN | SYSTOLIC BLOOD PRESSURE: 138 MMHG | HEART RATE: 95 BPM | BODY MASS INDEX: 33.04 KG/M2 | RESPIRATION RATE: 18 BRPM | TEMPERATURE: 98.2 F | OXYGEN SATURATION: 100 % | DIASTOLIC BLOOD PRESSURE: 103 MMHG

## 2024-05-14 LAB
ANION GAP SERPL CALC-SCNC: 12 MMOL/L (ref 10–20)
BASOPHILS # BLD AUTO: 0.05 X10*3/UL (ref 0–0.1)
BASOPHILS NFR BLD AUTO: 0.9 %
BUN SERPL-MCNC: 12 MG/DL (ref 6–23)
CALCIUM SERPL-MCNC: 7.9 MG/DL (ref 8.6–10.3)
CHLORIDE SERPL-SCNC: 108 MMOL/L (ref 98–107)
CO2 SERPL-SCNC: 24 MMOL/L (ref 21–32)
CREAT SERPL-MCNC: 0.87 MG/DL (ref 0.5–1.05)
EGFRCR SERPLBLD CKD-EPI 2021: 89 ML/MIN/1.73M*2
EOSINOPHIL # BLD AUTO: 0.2 X10*3/UL (ref 0–0.7)
EOSINOPHIL NFR BLD AUTO: 3.4 %
ERYTHROCYTE [DISTWIDTH] IN BLOOD BY AUTOMATED COUNT: 13.7 % (ref 11.5–14.5)
GLUCOSE SERPL-MCNC: 89 MG/DL (ref 74–99)
HCT VFR BLD AUTO: 38.4 % (ref 36–46)
HGB BLD-MCNC: 12.4 G/DL (ref 12–16)
IMM GRANULOCYTES # BLD AUTO: 0.02 X10*3/UL (ref 0–0.7)
IMM GRANULOCYTES NFR BLD AUTO: 0.3 % (ref 0–0.9)
LYMPHOCYTES # BLD AUTO: 3.2 X10*3/UL (ref 1.2–4.8)
LYMPHOCYTES NFR BLD AUTO: 54.7 %
MCH RBC QN AUTO: 25.3 PG (ref 26–34)
MCHC RBC AUTO-ENTMCNC: 32.3 G/DL (ref 32–36)
MCV RBC AUTO: 78 FL (ref 80–100)
MONOCYTES # BLD AUTO: 0.45 X10*3/UL (ref 0.1–1)
MONOCYTES NFR BLD AUTO: 7.7 %
NEUTROPHILS # BLD AUTO: 1.93 X10*3/UL (ref 1.2–7.7)
NEUTROPHILS NFR BLD AUTO: 33 %
NRBC BLD-RTO: 0 /100 WBCS (ref 0–0)
PLATELET # BLD AUTO: 354 X10*3/UL (ref 150–450)
POTASSIUM SERPL-SCNC: 3.9 MMOL/L (ref 3.5–5.3)
RBC # BLD AUTO: 4.9 X10*6/UL (ref 4–5.2)
SODIUM SERPL-SCNC: 140 MMOL/L (ref 136–145)
WBC # BLD AUTO: 5.9 X10*3/UL (ref 4.4–11.3)

## 2024-05-14 PROCEDURE — 2500000001 HC RX 250 WO HCPCS SELF ADMINISTERED DRUGS (ALT 637 FOR MEDICARE OP): Performed by: INTERNAL MEDICINE

## 2024-05-14 PROCEDURE — 36415 COLL VENOUS BLD VENIPUNCTURE: CPT | Performed by: INTERNAL MEDICINE

## 2024-05-14 PROCEDURE — 82374 ASSAY BLOOD CARBON DIOXIDE: CPT | Performed by: INTERNAL MEDICINE

## 2024-05-14 PROCEDURE — 2500000001 HC RX 250 WO HCPCS SELF ADMINISTERED DRUGS (ALT 637 FOR MEDICARE OP): Performed by: NURSE PRACTITIONER

## 2024-05-14 PROCEDURE — 99223 1ST HOSP IP/OBS HIGH 75: CPT | Performed by: NURSE PRACTITIONER

## 2024-05-14 PROCEDURE — 85025 COMPLETE CBC W/AUTO DIFF WBC: CPT | Performed by: INTERNAL MEDICINE

## 2024-05-14 PROCEDURE — 2500000004 HC RX 250 GENERAL PHARMACY W/ HCPCS (ALT 636 FOR OP/ED): Performed by: INTERNAL MEDICINE

## 2024-05-14 RX ORDER — NAPROXEN 500 MG/1
250 TABLET ORAL ONCE
Status: COMPLETED | OUTPATIENT
Start: 2024-05-14 | End: 2024-05-14

## 2024-05-14 RX ORDER — AMOXICILLIN AND CLAVULANATE POTASSIUM 875; 125 MG/1; MG/1
1 TABLET, FILM COATED ORAL EVERY 12 HOURS SCHEDULED
Qty: 28 TABLET | Refills: 0 | Status: SHIPPED | OUTPATIENT
Start: 2024-05-14 | End: 2024-05-28

## 2024-05-14 RX ORDER — AMOXICILLIN AND CLAVULANATE POTASSIUM 875; 125 MG/1; MG/1
1 TABLET, FILM COATED ORAL EVERY 12 HOURS SCHEDULED
Status: DISCONTINUED | OUTPATIENT
Start: 2024-05-14 | End: 2024-05-14 | Stop reason: HOSPADM

## 2024-05-14 RX ORDER — OXYCODONE AND ACETAMINOPHEN 5; 325 MG/1; MG/1
2 TABLET ORAL EVERY 4 HOURS PRN
Start: 2024-05-14 | End: 2024-05-21

## 2024-05-14 RX ORDER — HYDROMORPHONE HYDROCHLORIDE 2 MG/1
2 TABLET ORAL ONCE
Status: COMPLETED | OUTPATIENT
Start: 2024-05-14 | End: 2024-05-14

## 2024-05-14 RX ADMIN — HYDROMORPHONE HYDROCHLORIDE 2 MG: 2 INJECTION INTRAMUSCULAR; INTRAVENOUS; SUBCUTANEOUS at 02:23

## 2024-05-14 RX ADMIN — OXYCODONE HYDROCHLORIDE AND ACETAMINOPHEN 2 TABLET: 5; 325 TABLET ORAL at 00:29

## 2024-05-14 RX ADMIN — AMPICILLIN SODIUM AND SULBACTAM SODIUM 3 G: 2; 1 INJECTION, POWDER, FOR SOLUTION INTRAMUSCULAR; INTRAVENOUS at 06:49

## 2024-05-14 RX ADMIN — HYDROMORPHONE HYDROCHLORIDE 2 MG: 2 INJECTION INTRAMUSCULAR; INTRAVENOUS; SUBCUTANEOUS at 06:49

## 2024-05-14 RX ADMIN — AMPICILLIN SODIUM AND SULBACTAM SODIUM 3 G: 2; 1 INJECTION, POWDER, FOR SOLUTION INTRAMUSCULAR; INTRAVENOUS at 00:25

## 2024-05-14 RX ADMIN — POLYETHYLENE GLYCOL 3350 17 G: 17 POWDER, FOR SOLUTION ORAL at 09:23

## 2024-05-14 RX ADMIN — HYDROXYZINE HYDROCHLORIDE 25 MG: 25 TABLET ORAL at 04:54

## 2024-05-14 RX ADMIN — OXYCODONE HYDROCHLORIDE AND ACETAMINOPHEN 2 TABLET: 5; 325 TABLET ORAL at 13:34

## 2024-05-14 RX ADMIN — NAPROXEN 250 MG: 500 TABLET ORAL at 16:52

## 2024-05-14 RX ADMIN — GABAPENTIN 800 MG: 400 CAPSULE ORAL at 09:23

## 2024-05-14 RX ADMIN — OXYCODONE HYDROCHLORIDE AND ACETAMINOPHEN 2 TABLET: 5; 325 TABLET ORAL at 09:22

## 2024-05-14 RX ADMIN — GABAPENTIN 800 MG: 400 CAPSULE ORAL at 16:26

## 2024-05-14 RX ADMIN — Medication 1 TABLET: at 09:22

## 2024-05-14 RX ADMIN — HYDROXYZINE HYDROCHLORIDE 25 MG: 25 TABLET ORAL at 11:26

## 2024-05-14 RX ADMIN — HYDROMORPHONE HYDROCHLORIDE 2 MG: 2 INJECTION INTRAMUSCULAR; INTRAVENOUS; SUBCUTANEOUS at 11:14

## 2024-05-14 RX ADMIN — OXYCODONE HYDROCHLORIDE AND ACETAMINOPHEN 2 TABLET: 5; 325 TABLET ORAL at 04:53

## 2024-05-14 RX ADMIN — HYDROMORPHONE HYDROCHLORIDE 2 MG: 2 TABLET ORAL at 16:51

## 2024-05-14 ASSESSMENT — PAIN SCALES - GENERAL
PAINLEVEL_OUTOF10: 8
PAINLEVEL_OUTOF10: 9
PAINLEVEL_OUTOF10: 8
PAINLEVEL_OUTOF10: 9
PAINLEVEL_OUTOF10: 6
PAINLEVEL_OUTOF10: 8
PAINLEVEL_OUTOF10: 9
PAINLEVEL_OUTOF10: 7
PAINLEVEL_OUTOF10: 6
PAINLEVEL_OUTOF10: 7
PAINLEVEL_OUTOF10: 7

## 2024-05-14 ASSESSMENT — COGNITIVE AND FUNCTIONAL STATUS - GENERAL
MOBILITY SCORE: 24
DAILY ACTIVITIY SCORE: 24

## 2024-05-14 ASSESSMENT — PAIN - FUNCTIONAL ASSESSMENT
PAIN_FUNCTIONAL_ASSESSMENT: 0-10

## 2024-05-14 ASSESSMENT — PAIN DESCRIPTION - LOCATION
LOCATION: OTHER (COMMENT)
LOCATION: ARM
LOCATION: ARM
LOCATION: BUTTOCKS
LOCATION: ARM

## 2024-05-14 ASSESSMENT — PAIN SCALES - PAIN ASSESSMENT IN ADVANCED DEMENTIA (PAINAD)
TOTALSCORE: MEDICATION (SEE MAR)

## 2024-05-14 ASSESSMENT — PAIN DESCRIPTION - ORIENTATION
ORIENTATION: LEFT
ORIENTATION: RIGHT;LEFT
ORIENTATION: LEFT;RIGHT
ORIENTATION: RIGHT;LEFT
ORIENTATION: LEFT
ORIENTATION: RIGHT;LEFT

## 2024-05-14 NOTE — CARE PLAN
The patient's goals for the shift include  managing pain    The clinical goals for the shift include pain management & free from falls    Pt. making progress toward the following goals.
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
## 2024-05-14 NOTE — CARE PLAN
The patient's goals for the shift include  pain control     The clinical goals for the shift include Pt will remain HDS    Problem: Pain - Adult  Goal: Verbalizes/displays adequate comfort level or baseline comfort level  Outcome: Progressing     Problem: Safety - Adult  Goal: Free from fall injury  Outcome: Progressing

## 2024-05-14 NOTE — CONSULTS
Inpatient consult to Palliative Care  Consult performed by: DEBBY Baker  Consult ordered by: DEBBY Montejo  Reason for consult: Hidradenitis, acute on chronic pain      History Of Present Illness  Sandrita Adams is a 33 y.o. female with past medical history of Rand stage III hidradenitis suppurativa and lupus.  She presented to the hospital with worsening left axillary pain recently started on secukinumab and was doing really well with regard to her retinitis suppurative from January to March 2024 she reports she was de-escalated to 4 weeks she started having increased pain and swelling in the left axilla and her groin worst pain is in the axilla this erythema and swelling in this area into her scapula and the upper rib cage.  She has been having significantly increased pain since worsening of her infection.  She tells me she has had drainage from some of these lesions specifically in the left axilla less so in the right axilla under the abdominal pannus and on the buttocks.  OARRS has been reviewed she is on gabapentin 800 mg 3 times daily as well as Percocet 10 mg/325.  Percocet was filled 4/29 for 90 tablets 30 days, gabapentin was filled on 4/15 for 90 tablets 30 days.  She also tells me she was taking naproxen at home 250 mg twice a day alternating ice and warm compresses   Not taking the naproxen here.    She denied any fever chills no headache no dizziness no lightheadedness no appetite changes without any nausea or vomiting she tells me that there is no chest pain shortness of breath no coughing or wheezing no sputum expectoration no recent travel no sick contacts no easy bleeding and bruising no night sweats no dysphagia no odynophagia no appetite abnormalities or taste changes she has not lost any weight unintentionally.    Basically her only complaint at present is that she is having significant uncontrolled pain and she is worried about discharge pain meds plan.    She has  been receiving IV Dilaudid as well as oral Percocet here the Dilaudid works well but does not last she tells me the 5 mg/325 mg Percocet is not holding her.     States that she has a dermatology appointment with [facility] tomorrow.    She follows with liam Uriarte CNP  Follows with ID Dr Lane Grigsby seen 4/19/2024 was on augmentin 875 mg bid at that point with duration to be determined, was to follow up 1 month.     [facility] Dr Bobo telemedicine 4/16/2024  Recs at that time were to continue to follow with providers in [facility].     Dr Leila Mabry  pain medicine note 3/15/2024 reviewed     Dr Yony Coronel [facility] Surgery note reviewed from 5/12/2024   She was set for surgery but their were insurance issues, out of network.  Advised at that time she needs to follow up with [facility] or [facility].     Palliative care was consulted to assist with pain control.  Per my conversation with the nurse she can be discharged to when she is more comfortable and we have a proper follow-up plan.    She was examined in the complete presence of ANIBAL Covarrubias when I examined her lesions to her axilla, buttocks, inner thighs and under the pannus.       Symptoms (0 - 10, Best to Worst)  Fraser Symptom Assessment System  Pain Score: 6    BM in last 48 hours? yes    Emotional/Psychological/Spiritual Needs  Over the past two weeks, how often has the patient been bothered by having little interest or pleasure in doing things?  occurrence (last two weeks): not at all    Over the past two weeks, how often has the patient been bothered by feeling down, depressed, or hopeless?  occurrence (last two weeks): not at all     Personal/Social History   She reports that she has been smoking cigarettes. She has never used smokeless tobacco. No history on file for alcohol use and drug use.    Functional Status    Tells me she is disabled because of debilitating pain due to her hidradenitis.       Caregiving/Caregiver Support  Does the patient require assistance in some or all components of his care, including coordination of medical care? Yes  If Yes, which person serves that role?    Family  Caregiver emotional or practical needs:      Past Medical History  She has a past medical history of Hidradenitis suppurativa (10/29/2020) and Lupus (Multi).    Surgical History  She has a past surgical history that includes Other surgical history (09/19/2022).     Family History  No family history on file.  Allergies  Suboxone [buprenorphine-naloxone], Clarithromycin, Levofloxacin, Clindamycin, Haloperidol, Keflex [cephalexin], and Reglan [metoclopramide hcl]    Review of Systems  As per the HPI otherwise negative. 10 systems reviewed,     Physical Exam  Nurses notes and chart reviewed  Young -American female completely lucid ANO x 3 pleasant cooperative in significant discomfort from her lesions in her axilla buttocks and groin and under her pannus.  No confusion pleasant cooperative outside of the pain no other significant issues she complains of.  Normocephalic atraumatic pupils equal round reactive to light extraocular movements are intact there is no eye drainage or discharge nose was clear mucous membranes moist no erythema or exudates  Neck supple no obvious JVD no carotid bruits carotid pulses were +2 no cervical spine tenderness with palpation normal range of movement  Bilateral axilla have hidradenitis suppurative the left being worse than the right there seems to be erythema warmth and significant tenderness and swelling of the left axilla some swelling into the scapular region also some tenderness in the upper left rib cage, presently no active drainage from either the right or left axilla.  She also has lesions on her buttocks as well as under her abdominal pannus and inner thighs and groin.  *She was examined in the complete presence of ANIBAL Covarrubias when I examined her lesions to her right and  "left axilla, buttocks, inner thighs and under the pannus*  Lungs are clear to auscultation no accessory muscle use no conversational dyspnea no adventitious sounds.  Rhythm and rate regular S1-S2 without any murmurs gallops or rubs PMI was not displaced  Bowel sounds are positive no rebound or guarding no distention no tenderness  Lower extremities free of any significant edema supple calves pulses intact good cap refill in the fingertips  Neurologically she is completely intact no confusion moves all extremities able to ambulate without any issues stable gait no dizziness or lightheadedness  There is no focal weakness grasps are strong and therapeutic physical  Pleasant and cooperative but seems to be very focused on pain meds and pain control she seems to be quite uncomfortable she definitely winces when these lesions are palpated.    Last Recorded Vitals  Blood pressure (!) 138/103, pulse 95, temperature 36.8 °C (98.2 °F), temperature source Oral, resp. rate 18, height 1.549 m (5' 1\"), weight 79.4 kg (175 lb), SpO2 100%.    Relevant Results  No echocardiogram results found for the past 12 months    Current Facility-Administered Medications:     acetaminophen (Tylenol) tablet 650 mg, 650 mg, oral, q4h PRN **OR** acetaminophen (Tylenol) oral liquid 650 mg, 650 mg, oral, q4h PRN **OR** acetaminophen (Tylenol) suppository 650 mg, 650 mg, rectal, q4h PRN, Danial Brown MD    amoxicillin-pot clavulanate (Augmentin) 875-125 mg per tablet 1 tablet, 1 tablet, oral, q12h [facility], Casey Castanon MD    gabapentin (Neurontin) capsule 800 mg, 800 mg, oral, TID, Danial Brown MD, 800 mg at 05/14/24 1626    HYDROmorphone (Dilaudid) injection 2 mg, 2 mg, intravenous, q4h PRN, Danial Brown MD, 2 mg at 05/14/24 1114    hydrOXYzine HCL (Atarax) tablet 25 mg, 25 mg, oral, q6h PRN, Danial Brown MD, 25 mg at 05/14/24 1126    multivitamin with minerals 1 tablet, 1 tablet, oral, Daily, Danial Brown MD, 1 tablet at " 05/14/24 0922    ondansetron ODT (Zofran-ODT) disintegrating tablet 4 mg, 4 mg, oral, q8h PRN, Danial Brown MD    oxyCODONE-acetaminophen (Percocet) 5-325 mg per tablet 2 tablet, 2 tablet, oral, q4h PRN, Danial Brown MD, 2 tablet at 05/14/24 1334    polyethylene glycol (Glycolax, Miralax) packet 17 g, 17 g, oral, BID, Danial Brown MD, 17 g at 05/14/24 0923    Results for orders placed or performed during the hospital encounter of 05/10/24 (from the past 96 hour(s))   CBC   Result Value Ref Range    WBC 6.7 4.4 - 11.3 x10*3/uL    nRBC 0.0 0.0 - 0.0 /100 WBCs    RBC 5.11 4.00 - 5.20 x10*6/uL    Hemoglobin 13.3 12.0 - 16.0 g/dL    Hematocrit 40.3 36.0 - 46.0 %    MCV 79 (L) 80 - 100 fL    MCH 26.0 26.0 - 34.0 pg    MCHC 33.0 32.0 - 36.0 g/dL    RDW 13.6 11.5 - 14.5 %    Platelets 365 150 - 450 x10*3/uL   Basic metabolic panel   Result Value Ref Range    Glucose 66 (L) 74 - 99 mg/dL    Sodium 141 136 - 145 mmol/L    Potassium 4.4 3.5 - 5.3 mmol/L    Chloride 109 (H) 98 - 107 mmol/L    Bicarbonate 25 21 - 32 mmol/L    Anion Gap 11 10 - 20 mmol/L    Urea Nitrogen 9 6 - 23 mg/dL    Creatinine 0.78 0.50 - 1.05 mg/dL    eGFR >90 >60 mL/min/1.73m*2    Calcium 8.8 8.6 - 10.3 mg/dL   Vancomycin   Result Value Ref Range    Vancomycin 18.7 5.0 - 20.0 ug/mL   Basic Metabolic Panel   Result Value Ref Range    Glucose 89 74 - 99 mg/dL    Sodium 140 136 - 145 mmol/L    Potassium 4.0 3.5 - 5.3 mmol/L    Chloride 110 (H) 98 - 107 mmol/L    Bicarbonate 24 21 - 32 mmol/L    Anion Gap 10 10 - 20 mmol/L    Urea Nitrogen 13 6 - 23 mg/dL    Creatinine 1.00 0.50 - 1.05 mg/dL    eGFR 75 >60 mL/min/1.73m*2    Calcium 8.0 (L) 8.6 - 10.3 mg/dL   CBC and Auto Differential   Result Value Ref Range    WBC 6.4 4.4 - 11.3 x10*3/uL    nRBC 0.0 0.0 - 0.0 /100 WBCs    RBC 4.88 4.00 - 5.20 x10*6/uL    Hemoglobin 12.6 12.0 - 16.0 g/dL    Hematocrit 38.4 36.0 - 46.0 %    MCV 79 (L) 80 - 100 fL    MCH 25.8 (L) 26.0 - 34.0 pg    MCHC 32.8 32.0  - 36.0 g/dL    RDW 13.8 11.5 - 14.5 %    Platelets 318 150 - 450 x10*3/uL    Neutrophils % 42.7 40.0 - 80.0 %    Immature Granulocytes %, Automated 0.3 0.0 - 0.9 %    Lymphocytes % 43.0 13.0 - 44.0 %    Monocytes % 10.1 2.0 - 10.0 %    Eosinophils % 3.1 0.0 - 6.0 %    Basophils % 0.8 0.0 - 2.0 %    Neutrophils Absolute 2.71 1.20 - 7.70 x10*3/uL    Immature Granulocytes Absolute, Automated 0.02 0.00 - 0.70 x10*3/uL    Lymphocytes Absolute 2.73 1.20 - 4.80 x10*3/uL    Monocytes Absolute 0.64 0.10 - 1.00 x10*3/uL    Eosinophils Absolute 0.20 0.00 - 0.70 x10*3/uL    Basophils Absolute 0.05 0.00 - 0.10 x10*3/uL   Basic Metabolic Panel   Result Value Ref Range    Glucose 79 74 - 99 mg/dL    Sodium 141 136 - 145 mmol/L    Potassium 4.5 3.5 - 5.3 mmol/L    Chloride 109 (H) 98 - 107 mmol/L    Bicarbonate 25 21 - 32 mmol/L    Anion Gap 12 10 - 20 mmol/L    Urea Nitrogen 17 6 - 23 mg/dL    Creatinine 0.98 0.50 - 1.05 mg/dL    eGFR 77 >60 mL/min/1.73m*2    Calcium 8.5 (L) 8.6 - 10.3 mg/dL   Vancomycin   Result Value Ref Range    Vancomycin 7.9 5.0 - 20.0 ug/mL   CBC and Auto Differential   Result Value Ref Range    WBC 5.4 4.4 - 11.3 x10*3/uL    nRBC 0.0 0.0 - 0.0 /100 WBCs    RBC 5.45 (H) 4.00 - 5.20 x10*6/uL    Hemoglobin 14.1 12.0 - 16.0 g/dL    Hematocrit 43.3 36.0 - 46.0 %    MCV 79 (L) 80 - 100 fL    MCH 25.9 (L) 26.0 - 34.0 pg    MCHC 32.6 32.0 - 36.0 g/dL    RDW 13.9 11.5 - 14.5 %    Platelets 356 150 - 450 x10*3/uL    Neutrophils % 42.3 40.0 - 80.0 %    Immature Granulocytes %, Automated 0.2 0.0 - 0.9 %    Lymphocytes % 43.8 13.0 - 44.0 %    Monocytes % 9.1 2.0 - 10.0 %    Eosinophils % 3.7 0.0 - 6.0 %    Basophils % 0.9 0.0 - 2.0 %    Neutrophils Absolute 2.26 1.20 - 7.70 x10*3/uL    Immature Granulocytes Absolute, Automated 0.01 0.00 - 0.70 x10*3/uL    Lymphocytes Absolute 2.35 1.20 - 4.80 x10*3/uL    Monocytes Absolute 0.49 0.10 - 1.00 x10*3/uL    Eosinophils Absolute 0.20 0.00 - 0.70 x10*3/uL    Basophils Absolute  0.05 0.00 - 0.10 x10*3/uL   CBC and Auto Differential   Result Value Ref Range    WBC 5.9 4.4 - 11.3 x10*3/uL    nRBC 0.0 0.0 - 0.0 /100 WBCs    RBC 4.90 4.00 - 5.20 x10*6/uL    Hemoglobin 12.4 12.0 - 16.0 g/dL    Hematocrit 38.4 36.0 - 46.0 %    MCV 78 (L) 80 - 100 fL    MCH 25.3 (L) 26.0 - 34.0 pg    MCHC 32.3 32.0 - 36.0 g/dL    RDW 13.7 11.5 - 14.5 %    Platelets 354 150 - 450 x10*3/uL    Neutrophils % 33.0 40.0 - 80.0 %    Immature Granulocytes %, Automated 0.3 0.0 - 0.9 %    Lymphocytes % 54.7 13.0 - 44.0 %    Monocytes % 7.7 2.0 - 10.0 %    Eosinophils % 3.4 0.0 - 6.0 %    Basophils % 0.9 0.0 - 2.0 %    Neutrophils Absolute 1.93 1.20 - 7.70 x10*3/uL    Immature Granulocytes Absolute, Automated 0.02 0.00 - 0.70 x10*3/uL    Lymphocytes Absolute 3.20 1.20 - 4.80 x10*3/uL    Monocytes Absolute 0.45 0.10 - 1.00 x10*3/uL    Eosinophils Absolute 0.20 0.00 - 0.70 x10*3/uL    Basophils Absolute 0.05 0.00 - 0.10 x10*3/uL   Basic Metabolic Panel   Result Value Ref Range    Glucose 89 74 - 99 mg/dL    Sodium 140 136 - 145 mmol/L    Potassium 3.9 3.5 - 5.3 mmol/L    Chloride 108 (H) 98 - 107 mmol/L    Bicarbonate 24 21 - 32 mmol/L    Anion Gap 12 10 - 20 mmol/L    Urea Nitrogen 12 6 - 23 mg/dL    Creatinine 0.87 0.50 - 1.05 mg/dL    eGFR 89 >60 mL/min/1.73m*2    Calcium 7.9 (L) 8.6 - 10.3 mg/dL       Symptom Management  Pain: Significant primarily in the the axilla buttocks groin and under abdominal pannus worst pain is in the left axilla  Medications recommended for pain?  Yes oral 2 mg Dilaudid x 1 now naproxen 250 mg to be taken twice a day and give a dose now no further IV Dilaudid  She already has Percocet 10/3/2025 at home she can take an extra 1-2 tabs daily until seen by dermatology she has an appointment tomorrow I confirmed this with [facility] through the call service needs to follow-up with Brisa Uriarte CNP, ASAP the message through the answering service with my number so that she can call me to review the case  for good coordination of care.  Tiredness: Minimal  Nausea: Denied  Depression: Admits to but no present problem no SI or HI feels safe at home  Anxiety: Related to pain she tells me primarily  Drowsiness: Denied  Appetite: Good  Wellbeing: Tells me her overall wellbeing is good once her meds were working as they had been January through March before she had a flareup now not so much  Dyspnea: Denied any dyspnea  Intervention recommended for dyspnea?  no  Intervention recommended for constipation?  Denied any constipation No    Provider estimate of survival: 6+ months  Patient Prognostic Awareness: Yes - congruent with provider estimation    Patient/proxy preference for information  Prefers full information    Goals of Care  Main focus at this point is better pain control  She most certainly is in the aggressive disease modifying model of care    Is the patient hospice-eligible?   No  Was a discussion held re hospice services?   no  Was a decision made re hospice services?  Aggressive treatment model of care she is not appropriate for hospice        Assessment/Plan   Hidradenitis suppurative  -Antibiotics per infectious disease switching to p.o. Augmentin for discharge  Pain-primarily worsening of the left axilla pain, less so right axilla buttocks and under her pannus and her groin.  Lupus  Palliative care    CODE STATUS: Full code    Extensive conversation with the patient after detailed review of notes available in epic.  Basically comes in with worsening pain from flareup of hidradenitis suppurative received IV antibiotics got started on IV pain meds palliative medicine now asked to help out with pain control for discharge.    Tells me that the IV Dilaudid is helping well for her severe pain but it does not last long  The 5/325 mg Percocet was not holding her.  She takes percocet 10/325mg TID outpatient also on gabapentin 800 mg TID  Reviewed OARRS.    A long conversation with the patient that I really do not  "want to change her meds very much and she needs to follow with Brisa Uriarte CNP with whom she was prescribed Percocet with [facility].  Pt tells me she has been on \"basically everything for this\" and is presently miserable because of the flared up infection.     To that end I will give her a dose of oral Dilaudid 2 mg x 1 now started on naproxen 250 mg twice daily with a dose now (she tells me she is taking naproxen at home but has not done so inpatient) she can take this with food to prevent some GI upset  No more IV Dilaudid as she is going to be discharged home this evening   per my conversation with Mayela Sanchez CNP    Patient does have Percocet 10/325 mg at home, as well as the prescribed gabapentin I advised her that because she told me she [facility] she can take an extra 1-2 doses of this Percocet over the next day or 2 only to try and get through this acute pain due to infection.  We reviewed red flags in terms of excess sedation worsening constipation dizziness lightheadedness confusion etc..  Reviewed that we need to be careful with the total acetaminophen dose pertaining to her liver.  Advised her yet again that she needs to contact [facility] and follow-up with them ASAP regarding her medications pertaining to this pain plan.    She is advised that she must follow-up with Brisa Uriarte CNP regarding further prescribing pain meds.  She voices understanding and appreciates our involvement.  She can continue to use nonpharmacologic means such as warm compresses or ice packs whichever feels better to her.    Does tell me that she has not followed up with palliative outpatient at [facility] advised her I think this is fine she needs to follow-up with them.  Also needs to get in with surgery as she apparently has been scheduled for procedures with Dr. Coronel but due to insurance issues this was not able to move forward.    I have placed a call to [facility] to speak with Ivy Gee " and update her on the situation and for coordination of care    Discussed in detail with the nurse Adonay who is caring for her.    Total time over 90 minutes extensive chart review as well as lengthy conversation with the patient regarding current situation and pain plan I have also communicated with [facility] I left a message with their answering service and I spoke to directly they were going to send a message Ivy Uriarte CNP so that she may call me back and we may review the case    Case reviewed in detail with Mayela Sanchez CNP this afternoon, and early evening.    Very much for the consultation goals of care and pain plan established patient should be DC him this evening as per what I am told by nursing.    Confirmed that she has a dermatology appointment with [facility] tomorrow which is why the plan will be to DC tonight very important that she follow-up with them pertaining to the next steps with her hidradenitis suppurativa treatment(s).    Thank you for the consult  5:41 PM  I discussed with both Dr Brown and Mayela Sanchez CNP.    Anticipate dc this evening, no need for palliative follow up while here. Needs to follow up with [facility] team.     Mychal Fernandes, ZAFAR-CNP

## 2024-05-14 NOTE — PROGRESS NOTES
Sandrita Adams is a 33 y.o. female on day 4 of admission presenting with Hidradenitis suppurativa.      Subjective   C/o ongoing pain, little improvement in pain with atbs and IV analgesics.        Objective     Last Recorded Vitals  /75 (BP Location: Right arm, Patient Position: Lying)   Pulse 81   Temp 36.7 °C (98.1 °F) (Temporal)   Resp 18   Wt 79.4 kg (175 lb)   SpO2 98%   Intake/Output last 3 Shifts:  No intake or output data in the 24 hours ending 05/14/24 1125    Admission Weight  Weight: 79.4 kg (175 lb) (05/10/24 1056)    Daily Weight  05/10/24 : 79.4 kg (175 lb)    Image Results  CT angio neck  Narrative: Interpreted By:  Fito Pulido and Stephens Katherine   STUDY:  CT ANGIO NECK;  3/30/2024 3:32 am      INDICATION:  Signs/Symptoms:strangulation.      COMPARISON:  CT cervical spine 03/27/2024      ACCESSION NUMBER(S):  [id number]      ORDERING CLINICIAN:  JAGJIT COTO      TECHNIQUE:  80 mL Omnipaque 350 was administered intravenously and axial images  of the neck were acquired. Coronal and sagittal MIPs and 3-D  reconstructions were created on an independent workstation and  provided for review. The estimate of the degree of stenosis included  in this report is based on the NASCET method for calculating  stenosis, using the internal carotid artery distal to the stenosis as  the reference point.      FINDINGS:  The visualized intracranial circulation is unremarkable.      CTA neck:      Right carotid vessels: The common carotid artery is normal. The  carotid bifurcation is normal. The internal carotid artery in the  neck is normal. 0% stenosis per NASCET criteria.      Left carotid vessels: The common carotid artery is normal. The  carotid bifurcation is normal. The internal carotid artery in the  neck is normal. 0% stenosis per NASCET criteria.      Vertebral vessels:  The cervical vertebral arteries are patent and  normal in caliber.      No evidence of prevertebral edema and no  evidence of soft tissue  hematoma in the neck.. The visualized lungs are clear. There is  degenerative disc space narrowing and central disc protrusion at  C5-C6 contributing to mild-to-moderate central canal stenosis.      Impression: No flow-limiting stenosis or dissection in the neck.      Incidental note is made of a central disc protrusion at C5-C6 with  suspected mild-to-moderate central canal stenosis at that level, not  well evaluated on CT scan.      I personally reviewed the images/study and I agree with Liss Vicente DO's (radiology resident) findings as stated. This study  was interpreted at [place], [place].      MACRO:  None      Signed by: Fito Pulido 3/30/2024 5:06 AM  Dictation workstation:   [id number]    Physical Examination:  Physical Exam     Constitutional:       Appearance: Young, overweight, in mild distress secondary to pain  HENT:      Head: Normocephalic and atraumatic.   Eyes:      Extraocular Movements: Extraocular movements intact.      Pupils: Pupils are equal, round, and reactive to light.   Cardiovascular:      Rate and Rhythm: Normal rate and regular rhythm.      Pulses: Normal pulses.      Heart sounds: Normal heart sounds.   Pulmonary:      Effort: Pulmonary effort is normal.      Breath sounds: Normal breath sounds.   Abdominal:      General: Abdomen is flat. Bowel sounds are normal.      Palpations: Abdomen is soft.   Musculoskeletal:         General: Normal range of motion.      Cervical back: Normal range of motion and neck supple.   Skin:     General: Skin is warm and dry.  Tender hidradenitis lesions in bilateral axilla and bilateral inguinal regions.  No expressible pus.    Neurological:      General: No focal deficit present.      Mental Status: Alert and oriented x 3.               Assessment/Plan      Hidradenitis suppurativa with likely abscess in left axilla -IV antibiotics per ID, will start transfer to [facility]  for further sx eval/intervention. Patient is out of network at  and normally follows with [facility].   SLE-continue with current regimen.   Axillary pain-analgesics as ordered, pall med consulted for further help with pain control. Patient reports to following with [facility] outpatient.        DC planning bed availability and acceptance to [facility].             Mayela Sanchez, APRN-CNP

## 2024-05-14 NOTE — NURSING NOTE
1800 went over discharge paperwork with patient. Patient verbalizes understanding of where medication is being sent and when to take medications. Pt. was set up with an uber to go home.

## 2024-05-14 NOTE — PROGRESS NOTES
"  INFECTIOUS DISEASE DAILY PROGRESS NOTE    SUBJECTIVE:    No overnight issues. Has a few new areas of nodular tenderness - pannus area and also the left buttock. No drainage. Armpit feels full/swollen also. No fevers. Discussed change to PO abx. She is mainly requesting pain control.    OBJECTIVE:  VITALS (Last 24 Hours)  /75 (BP Location: Right arm, Patient Position: Lying)   Pulse 81   Temp 36.7 °C (98.1 °F) (Temporal)   Resp 18   Ht 1.549 m (5' 1\")   Wt 79.4 kg (175 lb)   SpO2 98%   BMI 33.07 kg/m²     PHYSICAL EXAM:  Gen - NAD  Heart - RRR  Axilla - nodular scarring with small abscess formation  Skin - no rash    ABX: IV Unasyn    LABS:  Lab Results   Component Value Date    WBC 5.9 05/14/2024    HGB 12.4 05/14/2024    HCT 38.4 05/14/2024    MCV 78 (L) 05/14/2024     05/14/2024     Lab Results   Component Value Date    GLUCOSE 89 05/14/2024    CALCIUM 7.9 (L) 05/14/2024     05/14/2024    K 3.9 05/14/2024    CO2 24 05/14/2024     (H) 05/14/2024    BUN 12 05/14/2024    CREATININE 0.87 05/14/2024           Estimated Creatinine Clearance: 86.1 mL/min (by C-G formula based on SCr of 0.87 mg/dL).      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  Hydradenitis Suppurativa  Immunocompromised on secukinumab    Will place on PO Augmentin 875mg BID for at least 2 weeks. I don't think she will improve with either a different medication for HS from dermatology or perhaps surgical procedure. Has appt with dermatology tomorrow. I think she should discharge with pain control to make to that appt and then determine further plans from there and maybe schedule appt for surgical intervention through [facility] soon.    Will sign off. Please call back with questions. Thanks! D/w RN    Casey Castanon MD  [facility]  Office #[phone]      "

## 2024-05-15 NOTE — DISCHARGE SUMMARY
Discharge Diagnosis  Hidradenitis suppurativa    Issues Requiring Follow-Up      Discharge Meds     Your medication list        START taking these medications        Instructions Last Dose Given Next Dose Due   oxyCODONE-acetaminophen 5-325 mg tablet  Commonly known as: Percocet  Replaces: oxyCODONE-acetaminophen  mg tablet      Take 2 tablets by mouth every 4 hours if needed for severe pain (7 - 10).              CHANGE how you take these medications        Instructions Last Dose Given Next Dose Due   amoxicillin-pot clavulanate 875-125 mg tablet  Commonly known as: Augmentin  What changed: when to take this      Take 1 tablet by mouth every 12 hours for 28 doses.              CONTINUE taking these medications        Instructions Last Dose Given Next Dose Due   Cosentyx 150 mg/mL syringe  Generic drug: secukinumab           gabapentin 800 mg tablet  Commonly known as: Neurontin      Take 1 tablet (800 mg) by mouth 3 times a day.       ondansetron ODT 4 mg disintegrating tablet  Commonly known as: Zofran-ODT           Trulicity 1.5 mg/0.5 mL pen injector injection  Generic drug: dulaglutide      Inject 1.5 mg under the skin 1 (one) time per week.       witch hazel-glycerin pad  Commonly known as: Tucks      Apply topically if needed for irritation.              STOP taking these medications      albuterol sulfate 90 mcg/actuation aero powdr breath act w/sensor inhaler  Commonly known as: Proair Digihaler        Bactrim -160 mg tablet  Generic drug: sulfamethoxazole-trimethoprim        doxycycline 100 mg capsule  Commonly known as: Vibramycin        oxyCODONE-acetaminophen  mg tablet  Commonly known as: Percocet  Replaced by: oxyCODONE-acetaminophen 5-325 mg tablet                  Where to Get Your Medications        These medications were sent to [id number] - [address] AT [address] [address]      Phone: [phone]    amoxicillin-pot clavulanate 875-125 mg tablet       Information about where to get these medications is not yet available    Ask your nurse or doctor about these medications  oxyCODONE-acetaminophen 5-325 mg tablet         Test Results Pending At Discharge  Pending Labs       Order Current Status    Tissue/Wound Culture/Smear Preliminary result            Hospital Course     Sandrita Adams is a 33 y.o. female presenting with left axillary pain and purulent drainage.  Patient has a longstanding history of hidradenitis suppurativa.  She was started on Biologics earlier this year.  She was doing well but in the past few weeks has had some progressing lesions especially in her left axilla.  She has been recently on Bactrim, doxycycline, and clindamycin.  She was admitted to the hospital about 2 weeks ago and states that she had to leave prior to completion of care due to a family emergency.  She returned for continued symptoms and increased drainage from her left axilla.  She reports some low-grade fevers in recent time also.  She has significant pain in the groin and axilla bilaterally.  She was admitted for further treatment.     The patient was seen by ID/timbo/pall med, she was given IV atbs and analgesics. She was transitioned to oral atbs, recommended outpatient follow up.     Pertinent Physical Exam At Time of Discharge      Outpatient Follow-Up  No future appointments.    Time and care for discharge management > 30 minutes.     Mayela Sanchez, ZAFAR-CNP

## 2024-05-16 LAB
BACTERIA SPEC CULT: ABNORMAL
BACTERIA SPEC CULT: ABNORMAL
GRAM STN SPEC: ABNORMAL
GRAM STN SPEC: ABNORMAL

## 2024-05-18 ENCOUNTER — HOSPITAL ENCOUNTER (OUTPATIENT)
Facility: HOSPITAL | Age: 34
Setting detail: OBSERVATION
Discharge: HOME | End: 2024-05-21
Attending: EMERGENCY MEDICINE | Admitting: INTERNAL MEDICINE
Payer: MEDICAID

## 2024-05-18 DIAGNOSIS — L73.2 HYDRADENITIS: ICD-10-CM

## 2024-05-18 DIAGNOSIS — L73.2 HIDRADENITIS: ICD-10-CM

## 2024-05-18 DIAGNOSIS — L73.2 HIDRADENITIS AXILLARIS: ICD-10-CM

## 2024-05-18 DIAGNOSIS — R50.9 FEVER, UNSPECIFIED FEVER CAUSE: ICD-10-CM

## 2024-05-18 DIAGNOSIS — R11.2 NAUSEA AND VOMITING, UNSPECIFIED VOMITING TYPE: ICD-10-CM

## 2024-05-18 DIAGNOSIS — L73.2 HIDRADENITIS SUPPURATIVA: ICD-10-CM

## 2024-05-18 DIAGNOSIS — D64.9 ANEMIA, UNSPECIFIED TYPE: Primary | ICD-10-CM

## 2024-05-18 LAB
ALBUMIN SERPL BCP-MCNC: 4.4 G/DL (ref 3.4–5)
ALP SERPL-CCNC: 70 U/L (ref 33–110)
ALT SERPL W P-5'-P-CCNC: 26 U/L (ref 7–45)
ANION GAP SERPL CALC-SCNC: 16 MMOL/L (ref 10–20)
AST SERPL W P-5'-P-CCNC: 28 U/L (ref 9–39)
BILIRUB SERPL-MCNC: 0.4 MG/DL (ref 0–1.2)
BUN SERPL-MCNC: 10 MG/DL (ref 6–23)
CALCIUM SERPL-MCNC: 9.6 MG/DL (ref 8.6–10.3)
CHLORIDE SERPL-SCNC: 102 MMOL/L (ref 98–107)
CO2 SERPL-SCNC: 21 MMOL/L (ref 21–32)
CREAT SERPL-MCNC: 0.86 MG/DL (ref 0.5–1.05)
EGFRCR SERPLBLD CKD-EPI 2021: 90 ML/MIN/1.73M*2
GLUCOSE SERPL-MCNC: 81 MG/DL (ref 74–99)
LIPASE SERPL-CCNC: 52 U/L (ref 9–82)
POTASSIUM SERPL-SCNC: 4.7 MMOL/L (ref 3.5–5.3)
PROT SERPL-MCNC: 8.3 G/DL (ref 6.4–8.2)
SODIUM SERPL-SCNC: 134 MMOL/L (ref 136–145)

## 2024-05-18 PROCEDURE — 36415 COLL VENOUS BLD VENIPUNCTURE: CPT | Performed by: EMERGENCY MEDICINE

## 2024-05-18 PROCEDURE — 83690 ASSAY OF LIPASE: CPT | Performed by: EMERGENCY MEDICINE

## 2024-05-18 PROCEDURE — 80053 COMPREHEN METABOLIC PANEL: CPT | Performed by: EMERGENCY MEDICINE

## 2024-05-18 PROCEDURE — 99285 EMERGENCY DEPT VISIT HI MDM: CPT

## 2024-05-18 PROCEDURE — 82728 ASSAY OF FERRITIN: CPT | Mod: AHULAB | Performed by: EMERGENCY MEDICINE

## 2024-05-18 PROCEDURE — 83540 ASSAY OF IRON: CPT | Performed by: EMERGENCY MEDICINE

## 2024-05-18 RX ORDER — ONDANSETRON HYDROCHLORIDE 2 MG/ML
4 INJECTION, SOLUTION INTRAVENOUS ONCE
Status: COMPLETED | OUTPATIENT
Start: 2024-05-18 | End: 2024-05-19

## 2024-05-18 ASSESSMENT — PAIN - FUNCTIONAL ASSESSMENT: PAIN_FUNCTIONAL_ASSESSMENT: 0-10

## 2024-05-18 ASSESSMENT — PAIN SCALES - GENERAL: PAINLEVEL_OUTOF10: 9

## 2024-05-18 ASSESSMENT — PAIN DESCRIPTION - PAIN TYPE: TYPE: ACUTE PAIN

## 2024-05-18 ASSESSMENT — PAIN DESCRIPTION - LOCATION: LOCATION: GENERALIZED

## 2024-05-18 NOTE — ED TRIAGE NOTES
Patient presents to ED c/o being weak, throwing up, fever. Patient states that she was here Tuesday for infections. Patient states that she was on antibiotics and is continuing to take them.

## 2024-05-19 ENCOUNTER — APPOINTMENT (OUTPATIENT)
Dept: RADIOLOGY | Facility: HOSPITAL | Age: 34
End: 2024-05-19
Payer: MEDICAID

## 2024-05-19 LAB
APPEARANCE UR: CLEAR
BILIRUB UR STRIP.AUTO-MCNC: NEGATIVE MG/DL
COLOR UR: YELLOW
ERYTHROCYTE [DISTWIDTH] IN BLOOD BY AUTOMATED COUNT: 13.5 % (ref 11.5–14.5)
FERRITIN SERPL-MCNC: 383 NG/ML (ref 8–150)
GLUCOSE UR STRIP.AUTO-MCNC: NORMAL MG/DL
HCG UR QL IA.RAPID: NEGATIVE
HCT VFR BLD AUTO: 29.7 % (ref 36–46)
HGB BLD-MCNC: 9.9 G/DL (ref 12–16)
IRON SATN MFR SERPL: 22 % (ref 25–45)
IRON SERPL-MCNC: 80 UG/DL (ref 35–150)
KETONES UR STRIP.AUTO-MCNC: NEGATIVE MG/DL
LEUKOCYTE ESTERASE UR QL STRIP.AUTO: NEGATIVE
MCH RBC QN AUTO: 26.4 PG (ref 26–34)
MCHC RBC AUTO-ENTMCNC: 33.3 G/DL (ref 32–36)
MCV RBC AUTO: 79 FL (ref 80–100)
NITRITE UR QL STRIP.AUTO: NEGATIVE
NRBC BLD-RTO: 0 /100 WBCS (ref 0–0)
PH UR STRIP.AUTO: 6 [PH]
PLATELET # BLD AUTO: 267 X10*3/UL (ref 150–450)
PROT UR STRIP.AUTO-MCNC: NEGATIVE MG/DL
RBC # BLD AUTO: 3.75 X10*6/UL (ref 4–5.2)
RBC # UR STRIP.AUTO: NEGATIVE /UL
SP GR UR STRIP.AUTO: 1.02
TIBC SERPL-MCNC: 365 UG/DL (ref 240–445)
UIBC SERPL-MCNC: 285 UG/DL (ref 110–370)
UROBILINOGEN UR STRIP.AUTO-MCNC: NORMAL MG/DL
WBC # BLD AUTO: 5.2 X10*3/UL (ref 4.4–11.3)

## 2024-05-19 PROCEDURE — 81025 URINE PREGNANCY TEST: CPT | Performed by: EMERGENCY MEDICINE

## 2024-05-19 PROCEDURE — 2500000001 HC RX 250 WO HCPCS SELF ADMINISTERED DRUGS (ALT 637 FOR MEDICARE OP): Performed by: EMERGENCY MEDICINE

## 2024-05-19 PROCEDURE — 74177 CT ABD & PELVIS W/CONTRAST: CPT

## 2024-05-19 PROCEDURE — 96376 TX/PRO/DX INJ SAME DRUG ADON: CPT | Mod: 59

## 2024-05-19 PROCEDURE — 2500000004 HC RX 250 GENERAL PHARMACY W/ HCPCS (ALT 636 FOR OP/ED): Performed by: EMERGENCY MEDICINE

## 2024-05-19 PROCEDURE — 36415 COLL VENOUS BLD VENIPUNCTURE: CPT | Performed by: EMERGENCY MEDICINE

## 2024-05-19 PROCEDURE — 2550000001 HC RX 255 CONTRASTS: Performed by: EMERGENCY MEDICINE

## 2024-05-19 PROCEDURE — 81003 URINALYSIS AUTO W/O SCOPE: CPT | Performed by: EMERGENCY MEDICINE

## 2024-05-19 PROCEDURE — 87040 BLOOD CULTURE FOR BACTERIA: CPT | Mod: AHULAB | Performed by: EMERGENCY MEDICINE

## 2024-05-19 PROCEDURE — 96375 TX/PRO/DX INJ NEW DRUG ADDON: CPT | Mod: 59

## 2024-05-19 PROCEDURE — 2500000004 HC RX 250 GENERAL PHARMACY W/ HCPCS (ALT 636 FOR OP/ED): Performed by: PHARMACIST

## 2024-05-19 PROCEDURE — 96365 THER/PROPH/DIAG IV INF INIT: CPT | Mod: 59

## 2024-05-19 PROCEDURE — 85027 COMPLETE CBC AUTOMATED: CPT | Performed by: EMERGENCY MEDICINE

## 2024-05-19 RX ORDER — VANCOMYCIN HYDROCHLORIDE 1 G/200ML
1000 INJECTION, SOLUTION INTRAVENOUS ONCE
Status: DISCONTINUED | OUTPATIENT
Start: 2024-05-19 | End: 2024-05-19 | Stop reason: DRUGHIGH

## 2024-05-19 RX ORDER — OXYCODONE AND ACETAMINOPHEN 5; 325 MG/1; MG/1
1 TABLET ORAL ONCE
Status: COMPLETED | OUTPATIENT
Start: 2024-05-19 | End: 2024-05-19

## 2024-05-19 RX ORDER — OXYCODONE AND ACETAMINOPHEN 5; 325 MG/1; MG/1
1 TABLET ORAL EVERY 6 HOURS PRN
Status: DISCONTINUED | OUTPATIENT
Start: 2024-05-19 | End: 2024-05-20

## 2024-05-19 RX ORDER — PROCHLORPERAZINE EDISYLATE 5 MG/ML
10 INJECTION INTRAMUSCULAR; INTRAVENOUS ONCE
Status: COMPLETED | OUTPATIENT
Start: 2024-05-19 | End: 2024-05-19

## 2024-05-19 RX ORDER — ONDANSETRON HYDROCHLORIDE 2 MG/ML
4 INJECTION, SOLUTION INTRAVENOUS ONCE
Status: COMPLETED | OUTPATIENT
Start: 2024-05-19 | End: 2024-05-19

## 2024-05-19 RX ORDER — MORPHINE SULFATE 4 MG/ML
4 INJECTION, SOLUTION INTRAMUSCULAR; INTRAVENOUS ONCE
Status: COMPLETED | OUTPATIENT
Start: 2024-05-19 | End: 2024-05-19

## 2024-05-19 RX ORDER — HYDROMORPHONE HYDROCHLORIDE 1 MG/ML
1 INJECTION, SOLUTION INTRAMUSCULAR; INTRAVENOUS; SUBCUTANEOUS ONCE
Status: COMPLETED | OUTPATIENT
Start: 2024-05-19 | End: 2024-05-19

## 2024-05-19 RX ADMIN — OXYCODONE HYDROCHLORIDE AND ACETAMINOPHEN 1 TABLET: 5; 325 TABLET ORAL at 16:34

## 2024-05-19 RX ADMIN — MORPHINE SULFATE 4 MG: 4 INJECTION, SOLUTION INTRAMUSCULAR; INTRAVENOUS at 09:56

## 2024-05-19 RX ADMIN — ONDANSETRON 4 MG: 2 INJECTION INTRAMUSCULAR; INTRAVENOUS at 02:36

## 2024-05-19 RX ADMIN — IOHEXOL 75 ML: 350 INJECTION, SOLUTION INTRAVENOUS at 03:40

## 2024-05-19 RX ADMIN — MORPHINE SULFATE 4 MG: 4 INJECTION, SOLUTION INTRAMUSCULAR; INTRAVENOUS at 02:36

## 2024-05-19 RX ADMIN — OXYCODONE HYDROCHLORIDE AND ACETAMINOPHEN 1 TABLET: 5; 325 TABLET ORAL at 21:10

## 2024-05-19 RX ADMIN — HYDROMORPHONE HYDROCHLORIDE 1 MG: 1 INJECTION, SOLUTION INTRAMUSCULAR; INTRAVENOUS; SUBCUTANEOUS at 04:11

## 2024-05-19 RX ADMIN — PROCHLORPERAZINE EDISYLATE 10 MG: 5 INJECTION INTRAMUSCULAR; INTRAVENOUS at 04:27

## 2024-05-19 RX ADMIN — VANCOMYCIN HYDROCHLORIDE 1500 MG: 500 INJECTION, POWDER, LYOPHILIZED, FOR SOLUTION INTRAVENOUS at 02:00

## 2024-05-19 RX ADMIN — ONDANSETRON 4 MG: 2 INJECTION INTRAMUSCULAR; INTRAVENOUS at 00:37

## 2024-05-19 ASSESSMENT — PAIN SCALES - GENERAL: PAINLEVEL_OUTOF10: 8

## 2024-05-19 NOTE — ED PROVIDER NOTES
Chief Complaint   Patient presents with    Vomiting     Patient states that she has been vomiting since this morning. Patient states that she's still able to take in as much fluids as she can.    Generalized Body Aches     Patient is stating that she has generalized body pains     History of Present Illness   Sandrita Adams   MRN [id number]    33-year-old presents for evaluation of multiple symptoms.  She describes fever at home associated with generalized weakness and fatigue, abdominal discomfort that she believes is due to multiple episodes of emesis, poorly controlled pain of both axilla that she attributes to hidradenitis despite taking oral antibiotics.  No chest pain or shortness of breath.      External records reviewed including discharge summary from 5/14/2024 when patient was admitted with hidradenitis worst left axillary with purulent drainage.  Previously had taking doxycycline, Bactrim, Augmentin.  Discharged with prescription for Augmentin twice daily for 14 days.    Physical Exam     ED Triage Vitals [05/18/24 1913]   Temperature Heart Rate Respirations BP   37.1 °C (98.7 °F) 72 20 (!) 124/111      Pulse Ox Temp Source Heart Rate Source Patient Position   99 % Oral -- Sitting      BP Location FiO2 (%)     Left arm --         General:  Lying prone on stretcher on initial evaluation. Nontoxic appearing.  Head: Atraumatic.  Eyes: No conjunctival injection.   Ears Nose Throat: Hearing grossly intact. No epistaxis. Oral mucosa moist.  Neck: Supple.  Cardiovascular: Extremities warm.   Pulmonary: No stridor. Normal respiratory effort.  Abdominal: No distention.  Soft.  No focal tenderness and no rebound or guarding.   Musculoskeletal: No deformity or joint swelling.  Skin: Skin changes consistent with chronic hidradenitis of both axilla without abnormal warmth or active drainage.  Psychiatric: Does not appear to respond to internal stimuli.  Neurologic: Alert. Follows directions. Face symmetric. No  aphasia or dysarthria. Symmetric movement of upper and lower extremities.  Ambulates with normal gait.    ED Course & Medical Decision Making   Initial vital signs within normal limits.  Patient nontoxic-appearing.  Examination of bilateral axilla notable for skin changes consistent with hidradenitis.  Tenderness but no active drainage.  Due to reported fever and systemic symptoms despite oral antibiotics, ordered blood cultures and vancomycin.  Labs notable for anemia hemoglobin 9.9 compared to 12.4 several days ago on 5/14/2024.  No reported or observed bleeding and no anticoagulation.  With regard to nausea and vomiting, abdominal exam was reassuring.  Symptoms addressed with multiple doses of Zofran, morphine, Dilaudid, Compazine.  CT demonstrated urinary bladder wall thickening however urinalysis was not consistent with cystitis.  Questionable slight gallbladder wall thickening however no focal right upper quadrant tenderness therefore lower suspicion for cholecystitis.  Plan for admission for poorly controlled symptoms, IV antibiotics for reported fever and systemic symptoms despite oral antibiotics while cultures pending, and further work up of anemia.    ED Course as of 05/19/24 0559   Sun May 19, 2024   0227 HEMOGLOBIN(!): 9.9  Hemoglobin 9.9 compared to 12.4 on 5/14/2024. [MC]      ED Course User Index  [MC] Merlin Stone MD         Diagnoses as of 05/19/24 0559   Anemia, unspecified type   Fever, unspecified fever cause   Hidradenitis   Nausea and vomiting, unspecified vomiting type            Merlin Stone MD  05/20/24 0563

## 2024-05-19 NOTE — PROGRESS NOTES
Patient is a 33-year-old female with a past medical history significant for hidradenitis suppurativa who presented for vomiting, generalized bodyaches.  She was initially seen and evaluated by Dr. Stone and signed out to me pending admission.  Please see his initial physician note for details.  Per my discussion with admitting team it appears that patient is covered for insurance only at [facility] and should be sent to [facility] as a had attempted to transfer her out during previous hospitalization to ensure proper coverage.  As such patient was discussed with Dr. Xiong at [facility] who accepted for transport.  Patient was redosed pain medication.  She remains in stable condition at this time awaiting transfer.      Ratna Aldridge MD

## 2024-05-20 PROCEDURE — 2500000001 HC RX 250 WO HCPCS SELF ADMINISTERED DRUGS (ALT 637 FOR MEDICARE OP): Performed by: EMERGENCY MEDICINE

## 2024-05-20 PROCEDURE — 2500000004 HC RX 250 GENERAL PHARMACY W/ HCPCS (ALT 636 FOR OP/ED): Performed by: EMERGENCY MEDICINE

## 2024-05-20 RX ORDER — OXYCODONE AND ACETAMINOPHEN 5; 325 MG/1; MG/1
2 TABLET ORAL EVERY 6 HOURS PRN
Status: DISCONTINUED | OUTPATIENT
Start: 2024-05-20 | End: 2024-05-21

## 2024-05-20 RX ORDER — OXYCODONE AND ACETAMINOPHEN 5; 325 MG/1; MG/1
2 TABLET ORAL ONCE
Status: COMPLETED | OUTPATIENT
Start: 2024-05-20 | End: 2024-05-20

## 2024-05-20 RX ADMIN — VANCOMYCIN HYDROCHLORIDE 1500 MG: 1.5 INJECTION, POWDER, LYOPHILIZED, FOR SOLUTION INTRAVENOUS at 12:40

## 2024-05-20 RX ADMIN — OXYCODONE HYDROCHLORIDE AND ACETAMINOPHEN 1 TABLET: 5; 325 TABLET ORAL at 04:04

## 2024-05-20 RX ADMIN — OXYCODONE HYDROCHLORIDE AND ACETAMINOPHEN 2 TABLET: 5; 325 TABLET ORAL at 18:49

## 2024-05-20 RX ADMIN — OXYCODONE HYDROCHLORIDE AND ACETAMINOPHEN 2 TABLET: 5; 325 TABLET ORAL at 12:05

## 2024-05-20 RX ADMIN — VANCOMYCIN HYDROCHLORIDE 1500 MG: 1.5 INJECTION, POWDER, LYOPHILIZED, FOR SOLUTION INTRAVENOUS at 02:20

## 2024-05-20 ASSESSMENT — ACTIVITIES OF DAILY LIVING (ADL): LACK_OF_TRANSPORTATION: NO

## 2024-05-20 ASSESSMENT — PAIN SCALES - GENERAL
PAINLEVEL_OUTOF10: 8
PAINLEVEL_OUTOF10: 9
PAINLEVEL_OUTOF10: 9

## 2024-05-20 ASSESSMENT — PAIN DESCRIPTION - PROGRESSION
CLINICAL_PROGRESSION: GRADUALLY WORSENING
CLINICAL_PROGRESSION: GRADUALLY WORSENING

## 2024-05-20 ASSESSMENT — PAIN - FUNCTIONAL ASSESSMENT
PAIN_FUNCTIONAL_ASSESSMENT: 0-10
PAIN_FUNCTIONAL_ASSESSMENT: 0-10

## 2024-05-20 NOTE — PROGRESS NOTES
Transitional Care Coordination Progress Note:  Plan per Medical/Surgical team: treatment of weakness, N/V, fever with IV ATB & percocet  Status: ED  [place] source[place]- [place], awaiting transfer to [facility]  Discharge disposition: home with dependent daughter  Potential Barriers: HX of lupus flares & hidradenitis supprativa  ADOD: 5/21/2024  JOE Good RN, BSN Transitional Care Coordinator ED# [phone]     05/20/24 1423   Discharge Planning   Living Arrangements Children   Support Systems Family members   Assistance Needed ATB & pain management   Type of Residence Private residence   Number of Stairs to Enter Residence 12   Number of Stairs Within Residence 0   Home or Post Acute Services Community services   Patient expects to be discharged to: home with dependent daughter   Does the patient need discharge transport arranged? Yes   RoundTrip coordination needed? Yes   Has discharge transport been arranged? No   Financial Resource Strain   How hard is it for you to pay for the very basics like food, housing, medical care, and heating? Not hard   Housing Stability   In the last 12 months, was there a time when you were not able to pay the mortgage or rent on time? N   In the last 12 months, how many places have you lived? 1   In the last 12 months, was there a time when you did not have a steady place to sleep or slept in a shelter (including now)? N   Transportation Needs   In the past 12 months, has lack of transportation kept you from medical appointments or from getting medications? no   In the past 12 months, has lack of transportation kept you from meetings, work, or from getting things needed for daily living? No

## 2024-05-21 ENCOUNTER — PHARMACY VISIT (OUTPATIENT)
Dept: PHARMACY | Facility: CLINIC | Age: 34
End: 2024-05-21
Payer: MEDICAID

## 2024-05-21 VITALS
OXYGEN SATURATION: 98 % | HEIGHT: 61 IN | WEIGHT: 175 LBS | TEMPERATURE: 97.3 F | DIASTOLIC BLOOD PRESSURE: 113 MMHG | HEART RATE: 73 BPM | RESPIRATION RATE: 18 BRPM | BODY MASS INDEX: 33.04 KG/M2 | SYSTOLIC BLOOD PRESSURE: 136 MMHG

## 2024-05-21 LAB
ALBUMIN SERPL BCP-MCNC: 4.2 G/DL (ref 3.4–5)
ALP SERPL-CCNC: 69 U/L (ref 33–110)
ALT SERPL W P-5'-P-CCNC: 16 U/L (ref 7–45)
ANION GAP SERPL CALC-SCNC: 13 MMOL/L (ref 10–20)
AST SERPL W P-5'-P-CCNC: 12 U/L (ref 9–39)
BASOPHILS # BLD AUTO: 0.03 X10*3/UL (ref 0–0.1)
BASOPHILS NFR BLD AUTO: 0.5 %
BILIRUB SERPL-MCNC: 0.4 MG/DL (ref 0–1.2)
BUN SERPL-MCNC: 13 MG/DL (ref 6–23)
CALCIUM SERPL-MCNC: 9.3 MG/DL (ref 8.6–10.3)
CHLORIDE SERPL-SCNC: 104 MMOL/L (ref 98–107)
CO2 SERPL-SCNC: 23 MMOL/L (ref 21–32)
CREAT SERPL-MCNC: 0.9 MG/DL (ref 0.5–1.05)
EGFRCR SERPLBLD CKD-EPI 2021: 86 ML/MIN/1.73M*2
EOSINOPHIL # BLD AUTO: 0.1 X10*3/UL (ref 0–0.7)
EOSINOPHIL NFR BLD AUTO: 1.8 %
ERYTHROCYTE [DISTWIDTH] IN BLOOD BY AUTOMATED COUNT: 13.1 % (ref 11.5–14.5)
GLUCOSE SERPL-MCNC: 101 MG/DL (ref 74–99)
HCT VFR BLD AUTO: 41.6 % (ref 36–46)
HGB BLD-MCNC: 14 G/DL (ref 12–16)
IMM GRANULOCYTES # BLD AUTO: 0.03 X10*3/UL (ref 0–0.7)
IMM GRANULOCYTES NFR BLD AUTO: 0.5 % (ref 0–0.9)
LYMPHOCYTES # BLD AUTO: 2.63 X10*3/UL (ref 1.2–4.8)
LYMPHOCYTES NFR BLD AUTO: 47.7 %
MCH RBC QN AUTO: 25.9 PG (ref 26–34)
MCHC RBC AUTO-ENTMCNC: 33.7 G/DL (ref 32–36)
MCV RBC AUTO: 77 FL (ref 80–100)
MONOCYTES # BLD AUTO: 0.39 X10*3/UL (ref 0.1–1)
MONOCYTES NFR BLD AUTO: 7.1 %
NEUTROPHILS # BLD AUTO: 2.33 X10*3/UL (ref 1.2–7.7)
NEUTROPHILS NFR BLD AUTO: 42.4 %
NRBC BLD-RTO: 0 /100 WBCS (ref 0–0)
PLATELET # BLD AUTO: 355 X10*3/UL (ref 150–450)
POTASSIUM SERPL-SCNC: 4.4 MMOL/L (ref 3.5–5.3)
PROT SERPL-MCNC: 7.4 G/DL (ref 6.4–8.2)
RBC # BLD AUTO: 5.4 X10*6/UL (ref 4–5.2)
SODIUM SERPL-SCNC: 136 MMOL/L (ref 136–145)
WBC # BLD AUTO: 5.5 X10*3/UL (ref 4.4–11.3)

## 2024-05-21 PROCEDURE — 2500000001 HC RX 250 WO HCPCS SELF ADMINISTERED DRUGS (ALT 637 FOR MEDICARE OP): Performed by: INTERNAL MEDICINE

## 2024-05-21 PROCEDURE — G0378 HOSPITAL OBSERVATION PER HR: HCPCS

## 2024-05-21 PROCEDURE — 2500000004 HC RX 250 GENERAL PHARMACY W/ HCPCS (ALT 636 FOR OP/ED): Performed by: INTERNAL MEDICINE

## 2024-05-21 PROCEDURE — 36415 COLL VENOUS BLD VENIPUNCTURE: CPT | Performed by: EMERGENCY MEDICINE

## 2024-05-21 PROCEDURE — 2500000001 HC RX 250 WO HCPCS SELF ADMINISTERED DRUGS (ALT 637 FOR MEDICARE OP): Performed by: EMERGENCY MEDICINE

## 2024-05-21 PROCEDURE — 96366 THER/PROPH/DIAG IV INF ADDON: CPT

## 2024-05-21 PROCEDURE — 85025 COMPLETE CBC W/AUTO DIFF WBC: CPT | Performed by: EMERGENCY MEDICINE

## 2024-05-21 PROCEDURE — 2500000004 HC RX 250 GENERAL PHARMACY W/ HCPCS (ALT 636 FOR OP/ED): Mod: JZ | Performed by: EMERGENCY MEDICINE

## 2024-05-21 PROCEDURE — 99222 1ST HOSP IP/OBS MODERATE 55: CPT | Performed by: INTERNAL MEDICINE

## 2024-05-21 PROCEDURE — RXMED WILLOW AMBULATORY MEDICATION CHARGE

## 2024-05-21 PROCEDURE — 80053 COMPREHEN METABOLIC PANEL: CPT | Performed by: EMERGENCY MEDICINE

## 2024-05-21 RX ORDER — KETOROLAC TROMETHAMINE 30 MG/ML
30 INJECTION, SOLUTION INTRAMUSCULAR; INTRAVENOUS ONCE
Status: COMPLETED | OUTPATIENT
Start: 2024-05-21 | End: 2024-05-21

## 2024-05-21 RX ORDER — GABAPENTIN 400 MG/1
800 CAPSULE ORAL 3 TIMES DAILY
Status: DISCONTINUED | OUTPATIENT
Start: 2024-05-21 | End: 2024-05-21 | Stop reason: HOSPADM

## 2024-05-21 RX ORDER — ACETAMINOPHEN 160 MG/5ML
650 SOLUTION ORAL EVERY 6 HOURS PRN
Status: DISCONTINUED | OUTPATIENT
Start: 2024-05-21 | End: 2024-05-21 | Stop reason: HOSPADM

## 2024-05-21 RX ORDER — AMOXICILLIN AND CLAVULANATE POTASSIUM 875; 125 MG/1; MG/1
1 TABLET, FILM COATED ORAL ONCE
Status: COMPLETED | OUTPATIENT
Start: 2024-05-21 | End: 2024-05-21

## 2024-05-21 RX ORDER — POLYETHYLENE GLYCOL 3350 17 G/17G
17 POWDER, FOR SOLUTION ORAL DAILY
Status: DISCONTINUED | OUTPATIENT
Start: 2024-05-21 | End: 2024-05-21 | Stop reason: HOSPADM

## 2024-05-21 RX ORDER — OXYCODONE HYDROCHLORIDE 10 MG/1
10 TABLET ORAL EVERY 8 HOURS PRN
Qty: 15 TABLET | Refills: 0 | Status: SHIPPED | OUTPATIENT
Start: 2024-05-21

## 2024-05-21 RX ORDER — OXYCODONE HYDROCHLORIDE 5 MG/1
10 TABLET ORAL EVERY 8 HOURS PRN
Status: DISCONTINUED | OUTPATIENT
Start: 2024-05-21 | End: 2024-05-21 | Stop reason: HOSPADM

## 2024-05-21 RX ORDER — GABAPENTIN 800 MG/1
800 TABLET ORAL 3 TIMES DAILY
Start: 2024-05-21

## 2024-05-21 RX ORDER — ONDANSETRON HYDROCHLORIDE 2 MG/ML
4 INJECTION, SOLUTION INTRAVENOUS EVERY 6 HOURS PRN
Status: DISCONTINUED | OUTPATIENT
Start: 2024-05-21 | End: 2024-05-21 | Stop reason: HOSPADM

## 2024-05-21 RX ORDER — CLINDAMYCIN PHOSPHATE 600 MG/50ML
600 INJECTION, SOLUTION INTRAVENOUS ONCE
Status: COMPLETED | OUTPATIENT
Start: 2024-05-21 | End: 2024-05-21

## 2024-05-21 RX ORDER — TALC
6 POWDER (GRAM) TOPICAL NIGHTLY
Status: DISCONTINUED | OUTPATIENT
Start: 2024-05-21 | End: 2024-05-21 | Stop reason: HOSPADM

## 2024-05-21 RX ORDER — HYDROMORPHONE HYDROCHLORIDE 1 MG/ML
1 INJECTION, SOLUTION INTRAMUSCULAR; INTRAVENOUS; SUBCUTANEOUS ONCE
Status: COMPLETED | OUTPATIENT
Start: 2024-05-21 | End: 2024-05-21

## 2024-05-21 RX ORDER — ACETAMINOPHEN 325 MG/1
975 TABLET ORAL EVERY 8 HOURS PRN
Status: DISCONTINUED | OUTPATIENT
Start: 2024-05-21 | End: 2024-05-21 | Stop reason: HOSPADM

## 2024-05-21 RX ORDER — ACETAMINOPHEN 650 MG/1
650 SUPPOSITORY RECTAL EVERY 4 HOURS PRN
Status: DISCONTINUED | OUTPATIENT
Start: 2024-05-21 | End: 2024-05-21 | Stop reason: HOSPADM

## 2024-05-21 RX ORDER — OXYCODONE HYDROCHLORIDE 5 MG/1
5 TABLET ORAL EVERY 8 HOURS PRN
Status: DISCONTINUED | OUTPATIENT
Start: 2024-05-21 | End: 2024-05-21

## 2024-05-21 RX ADMIN — CLINDAMYCIN PHOSPHATE 600 MG: 600 INJECTION, SOLUTION INTRAVENOUS at 09:33

## 2024-05-21 RX ADMIN — HYDROMORPHONE HYDROCHLORIDE 1 MG: 1 INJECTION, SOLUTION INTRAMUSCULAR; INTRAVENOUS; SUBCUTANEOUS at 11:09

## 2024-05-21 RX ADMIN — KETOROLAC TROMETHAMINE 30 MG: 30 INJECTION, SOLUTION INTRAMUSCULAR at 10:37

## 2024-05-21 RX ADMIN — OXYCODONE HYDROCHLORIDE 10 MG: 5 TABLET ORAL at 15:00

## 2024-05-21 RX ADMIN — GABAPENTIN 800 MG: 400 CAPSULE ORAL at 11:46

## 2024-05-21 RX ADMIN — AMOXICILLIN AND CLAVULANATE POTASSIUM 1 TABLET: 875; 125 TABLET, FILM COATED ORAL at 11:46

## 2024-05-21 RX ADMIN — OXYCODONE HYDROCHLORIDE AND ACETAMINOPHEN 2 TABLET: 5; 325 TABLET ORAL at 06:44

## 2024-05-21 RX ADMIN — OXYCODONE HYDROCHLORIDE AND ACETAMINOPHEN 2 TABLET: 5; 325 TABLET ORAL at 00:50

## 2024-05-21 RX ADMIN — ONDANSETRON 4 MG: 2 INJECTION INTRAMUSCULAR; INTRAVENOUS at 11:46

## 2024-05-21 RX ADMIN — GABAPENTIN 800 MG: 400 CAPSULE ORAL at 15:00

## 2024-05-21 ASSESSMENT — ENCOUNTER SYMPTOMS
MYALGIAS: 1
PSYCHIATRIC NEGATIVE: 1
RESPIRATORY NEGATIVE: 1
CARDIOVASCULAR NEGATIVE: 1
FATIGUE: 1
WOUND: 1
GASTROINTESTINAL NEGATIVE: 1
FEVER: 1
ACTIVITY CHANGE: 1
NEUROLOGICAL NEGATIVE: 1

## 2024-05-21 ASSESSMENT — PAIN SCALES - GENERAL
PAINLEVEL_OUTOF10: 9
PAINLEVEL_OUTOF10: 5 - MODERATE PAIN
PAINLEVEL_OUTOF10: 9
PAINLEVEL_OUTOF10: 10 - WORST POSSIBLE PAIN

## 2024-05-21 ASSESSMENT — PAIN - FUNCTIONAL ASSESSMENT
PAIN_FUNCTIONAL_ASSESSMENT: 0-10

## 2024-05-21 ASSESSMENT — PAIN DESCRIPTION - LOCATION
LOCATION: ARM
LOCATION: ARM

## 2024-05-21 ASSESSMENT — PAIN DESCRIPTION - ORIENTATION: ORIENTATION: RIGHT;LEFT

## 2024-05-21 NOTE — PROGRESS NOTES
Pharmacy Medication History Review   Spoke to the patient, no changes    Sandrita Adams is a 33 y.o. female admitted for Hidradenitis. Pharmacy reviewed the patient's [id number] medications and allergies for accuracy.    The list below reflectives the updated PTA list. Please review each medication in order reconciliation for additional clarification and justification.  Prior to Admission Medications   Prescriptions Last Dose Informant     amoxicillin-pot clavulanate (Augmentin) 875-125 mg tablet 5/20/2024      Sig: Take 1 tablet by mouth every 12 hours for 28 doses.   dulaglutide (Trulicity) 1.5 mg/0.5 mL pen injector injection 5/20/2024 Self     Sig: Inject 1.5 mg under the skin 1 (one) time per week.   gabapentin (Neurontin) 800 mg tablet 5/20/2024 Self     Sig: Take 1 tablet (800 mg) by mouth 3 times a day.   Patient taking differently: Take 1 tablet (800 mg) by mouth 3 times a day. Pt has a more current Rx   ondansetron ODT (Zofran-ODT) 4 mg disintegrating tablet  Self     Sig: Take 1 tablet (4 mg) by mouth every 8 hours if needed for nausea or vomiting.   oxyCODONE-acetaminophen (Percocet) 5-325 mg tablet       Sig: Take 2 tablets by mouth every 4 hours if needed for severe pain (7 - 10).   Patient taking differently: Take 2 tablets by mouth every 4 hours if needed for severe pain (7 - 10). Current Rx   secukinumab (Cosentyx) 150 mg/mL syringe  Self     Sig: Inject 1 mL (150 mg) under the skin every 30 (thirty) days.   witch hazel-glycerin (Tucks) pad  Self     Sig: Apply topically if needed for irritation.      Facility-Administered Medications: None       The list below reflectives the updated allergy list. Please review each documented allergy for additional clarification and justification.  Allergies  Reviewed by Serene Delong RN on 5/18/2024        Severity Reactions Comments    Suboxone [buprenorphine-naloxone] High Anaphylaxis, Hives, Itching     Clarithromycin Medium Hives Other  reaction(s): Unknown    Levofloxacin Medium Swelling Ankle Joint/tendon pain    Clindamycin Not Specified Hives Pt sts doesnot allergy to clindamycin and does not get hives    Haloperidol Not Specified Psychosis, Hallucinations Psychosis  psychosis    Keflex [cephalexin] Not Specified Psychosis     Reglan [metoclopramide Hcl] Not Specified Psychosis             Below are additional concerns with the patient's PTA list.      Honey Tavarez

## 2024-05-21 NOTE — DISCHARGE INSTR - OTHER ORDERS
[facility]  [address] [place] [address]   [phone]    [facility] [place]  [address] , [place], [place] [id number] [phone]    Please use HealthCare.gov to obtain affordable healthcare coverage- www.healthcare.gov       independent/supervision

## 2024-05-21 NOTE — H&P
History Of Present Illness  Sandrita Adams is a 33 y.o. female presenting with worsening axilla and groin pain.  Past Medical History:   Diagnosis Date    Hidradenitis suppurativa 10/29/2020    Hidradenitis    Lupus (Multi)      She was recently here for similar issues; cultures grew several bacteremia; ID recommended course of Augmentin on dc. Due to insurance issues, she was advised to follow up with her [facility] providers; being treated at  would lead to high bills for her. She has chronic pain (at least partly related to her hidradenitis) and follows with a pain management team; she gets oxycodone 10 mg every month. Since discharge last week, she has had ongoing pain; she has gone through her oxycodone faster than she usually does and has run out. She saw her dermatologist last week, who increased her biologic injection (Cosentyx) from every 4 weeks to every 2 weeks. She says she initially was taking it every week at beginning of the year and was doing much better; had gone back to work. Around March time, her symptoms worsened again; she thinks because she got an infection. Since then, she has not fully been feeling well. She came back to our ED on 5/18 for symptoms of: weakness, emesis, fever. Work-up of VS and labs have been unremarkable.     Past Surgical History:   Procedure Laterality Date    OTHER SURGICAL HISTORY  09/19/2022    Arm surgery     Social History     Tobacco Use    Smoking status: Former     Types: Cigarettes    Smokeless tobacco: Never   Substance Use Topics    Drug use: Never     No family history on file.  Allergies  Suboxone [buprenorphine-naloxone], Clarithromycin, Levofloxacin, Clindamycin, Haloperidol, Keflex [cephalexin], and Reglan [metoclopramide hcl]    Review of Systems   Constitutional:  Positive for activity change, fatigue and fever.   HENT: Negative.     Respiratory: Negative.     Cardiovascular: Negative.    Gastrointestinal: Negative.    Genitourinary: Negative.   "  Musculoskeletal:  Positive for myalgias.   Skin:  Positive for rash and wound.   Neurological: Negative.    Psychiatric/Behavioral: Negative.         Last Recorded Vitals  Blood pressure (!) 134/94, pulse 80, temperature 36.3 °C (97.3 °F), resp. rate 19, height 1.549 m (5' 1\"), weight 79.4 kg (175 lb), SpO2 99%.  Physical Exam  Cardiovascular:      Rate and Rhythm: Normal rate and regular rhythm.      Heart sounds: Normal heart sounds.   Pulmonary:      Breath sounds: Normal breath sounds.   Abdominal:      General: Bowel sounds are normal.      Palpations: Abdomen is soft.   Musculoskeletal:         General: Normal range of motion.      Comments: Tender nodules in both axillae and groins   Neurological:      General: No focal deficit present.      Mental Status: She is alert and oriented to person, place, and time.   Psychiatric:         Mood and Affect: Mood normal.           Relevant Results           Assessment/Plan   Principal Problem:    Hidradenitis  Active Problems:    Intractable pain  - discussed with ID; dont think her pain/symptoms are mainly driven by infection; can continue her Augmentin  - her pain is better today  - I told her ID would see her today, but she apparently left before they saw her  - will give short course of oxy till she gets to her pain provider later this week  - I do not see need for inpatient care at this time; she needs to follow up with her [facility] providers (derm, plastics) to determine plan for hidradenitis itself         I spent 50 minutes in the professional and overall care of this patient.      Jalen Pulido MD  "

## 2024-05-21 NOTE — PROGRESS NOTES
Transitional Care Coordination Progress Note:  Plan per Medical/Surgical team: treatment of weakness, N/V, fever with IV ATB, toradol & percocet, regular diet   Status: Observation   [place]or source: [place]- [place] of [place], awaiting transfer to [facility]  Discharge disposition: home with dependent daughter  Potential Barriers: HX of lupus flares & hidradenitis supprativa  ADOD: 5/21/2024  JOE Good RN, BSN Transitional Care Coordinator ED# [phone]    Reinforced need to follow up with in insurance network providers:    [facility]  [address], [place] [id number] [phone]    [facility] [place]  [address] [place] [address] [phone]    Please use HealthCare.gov to obtain affordable healthcare coverage- www.healthcare.gov     05/21/24 1051   Discharge Planning   Assistance Needed [facility] transfer, pain management

## 2024-05-21 NOTE — PROGRESS NOTES
Patient is a 33-year-old female with a past medical history significant for hidradenitis suppurativa who presented to the emergency room for worsening pain and concern for infection.  He had body pain and generalized bodyaches.  She was signed out to me again this morning awaiting a bed at [facility] which has yet to become available.  She continues to report a lot of discomfort and is receiving IV antibiotics.  I discussed her antibiotics with pharmacy this morning per our assessment of most recent available sensitivities will narrow down her spectrum to clindamycin as she states she has done well with it in the past.  We have contacted [facility] multiple times and they are advising patient not to leave the emergency room while they arrange for a bed.  They do not have any available beds at this time and patient has already been here for a prolonged period of time.  As such she is discussed with admission team and accepted by Dr. Pulido here at [facility] for further workup and management including infectious disease consult.    Ratna Aldridge MD

## 2024-05-23 LAB
BACTERIA BLD CULT: NORMAL
BACTERIA BLD CULT: NORMAL

## 2024-06-16 ENCOUNTER — HOSPITAL ENCOUNTER (OUTPATIENT)
Facility: HOSPITAL | Age: 34
Setting detail: OBSERVATION
End: 2024-06-16
Attending: INTERNAL MEDICINE | Admitting: INTERNAL MEDICINE
Payer: COMMERCIAL

## 2024-06-16 ENCOUNTER — APPOINTMENT (OUTPATIENT)
Dept: RADIOLOGY | Facility: HOSPITAL | Age: 34
End: 2024-06-16
Payer: COMMERCIAL

## 2024-06-16 VITALS
HEART RATE: 82 BPM | RESPIRATION RATE: 18 BRPM | SYSTOLIC BLOOD PRESSURE: 90 MMHG | WEIGHT: 173 LBS | DIASTOLIC BLOOD PRESSURE: 48 MMHG | HEIGHT: 61 IN | BODY MASS INDEX: 32.66 KG/M2 | TEMPERATURE: 97.3 F | OXYGEN SATURATION: 100 %

## 2024-06-16 DIAGNOSIS — L73.2 AXILLARY HIDRADENITIS SUPPURATIVA: Primary | ICD-10-CM

## 2024-06-16 DIAGNOSIS — L73.2 HIDRADENITIS SUPPURATIVA: ICD-10-CM

## 2024-06-16 DIAGNOSIS — O20.9 VAGINAL BLEEDING IN PREGNANCY, FIRST TRIMESTER (HHS-HCC): ICD-10-CM

## 2024-06-16 PROBLEM — M79.7 FIBROMYALGIA: Status: ACTIVE | Noted: 2024-06-16

## 2024-06-16 PROBLEM — E55.9 VITAMIN D DEFICIENCY: Status: ACTIVE | Noted: 2024-06-16

## 2024-06-16 PROBLEM — D64.9 ANEMIA: Status: ACTIVE | Noted: 2024-06-16

## 2024-06-16 PROBLEM — F43.10 POST TRAUMATIC STRESS DISORDER (PTSD): Status: ACTIVE | Noted: 2021-04-21

## 2024-06-16 PROBLEM — R52 INTRACTABLE PAIN: Status: RESOLVED | Noted: 2023-11-13 | Resolved: 2024-06-16

## 2024-06-16 PROBLEM — L03.90 CELLULITIS: Status: ACTIVE | Noted: 2024-06-16

## 2024-06-16 PROBLEM — M32.9 SLE (SYSTEMIC LUPUS ERYTHEMATOSUS) (MULTI): Status: ACTIVE | Noted: 2021-05-05

## 2024-06-16 PROBLEM — Z3A.01 5 WEEKS GESTATION OF PREGNANCY (HHS-HCC): Status: ACTIVE | Noted: 2024-06-16

## 2024-06-16 PROBLEM — J45.909 ASTHMA (HHS-HCC): Status: ACTIVE | Noted: 2024-06-16

## 2024-06-16 PROBLEM — N93.9 VAGINAL SPOTTING: Status: ACTIVE | Noted: 2024-06-16

## 2024-06-16 PROBLEM — L68.0 HIRSUTISM: Status: ACTIVE | Noted: 2021-01-12

## 2024-06-16 LAB
ABO GROUP (TYPE) IN BLOOD: NORMAL
ALBUMIN SERPL BCP-MCNC: 4.4 G/DL (ref 3.4–5)
ALP SERPL-CCNC: 70 U/L (ref 33–110)
ALT SERPL W P-5'-P-CCNC: 11 U/L (ref 7–45)
ANION GAP SERPL CALC-SCNC: 12 MMOL/L (ref 10–20)
ANTIBODY SCREEN: NORMAL
AST SERPL W P-5'-P-CCNC: 13 U/L (ref 9–39)
B-HCG SERPL-ACNC: 7515 MIU/ML
BACTERIA BLD CULT: NORMAL
BACTERIA BLD CULT: NORMAL
BASOPHILS # BLD AUTO: 0.03 X10*3/UL (ref 0–0.1)
BASOPHILS NFR BLD AUTO: 0.3 %
BILIRUB SERPL-MCNC: 0.3 MG/DL (ref 0–1.2)
BUN SERPL-MCNC: 12 MG/DL (ref 6–23)
CALCIUM SERPL-MCNC: 9.6 MG/DL (ref 8.6–10.3)
CHLORIDE SERPL-SCNC: 105 MMOL/L (ref 98–107)
CO2 SERPL-SCNC: 23 MMOL/L (ref 21–32)
CREAT SERPL-MCNC: 0.85 MG/DL (ref 0.5–1.05)
CRP SERPL-MCNC: 1.61 MG/DL
EGFRCR SERPLBLD CKD-EPI 2021: >90 ML/MIN/1.73M*2
EOSINOPHIL # BLD AUTO: 0.09 X10*3/UL (ref 0–0.7)
EOSINOPHIL NFR BLD AUTO: 0.8 %
ERYTHROCYTE [DISTWIDTH] IN BLOOD BY AUTOMATED COUNT: 14.9 % (ref 11.5–14.5)
ERYTHROCYTE [SEDIMENTATION RATE] IN BLOOD BY WESTERGREN METHOD: 27 MM/H (ref 0–20)
GLUCOSE SERPL-MCNC: 77 MG/DL (ref 74–99)
HCT VFR BLD AUTO: 37.6 % (ref 36–46)
HGB BLD-MCNC: 12.7 G/DL (ref 12–16)
IMM GRANULOCYTES # BLD AUTO: 0.04 X10*3/UL (ref 0–0.7)
IMM GRANULOCYTES NFR BLD AUTO: 0.4 % (ref 0–0.9)
LYMPHOCYTES # BLD AUTO: 2.49 X10*3/UL (ref 1.2–4.8)
LYMPHOCYTES NFR BLD AUTO: 23.3 %
MAGNESIUM SERPL-MCNC: 2.1 MG/DL (ref 1.6–2.4)
MCH RBC QN AUTO: 25.7 PG (ref 26–34)
MCHC RBC AUTO-ENTMCNC: 33.8 G/DL (ref 32–36)
MCV RBC AUTO: 76 FL (ref 80–100)
MONOCYTES # BLD AUTO: 0.76 X10*3/UL (ref 0.1–1)
MONOCYTES NFR BLD AUTO: 7.1 %
NEUTROPHILS # BLD AUTO: 7.27 X10*3/UL (ref 1.2–7.7)
NEUTROPHILS NFR BLD AUTO: 68.1 %
NRBC BLD-RTO: 0 /100 WBCS (ref 0–0)
PLATELET # BLD AUTO: 367 X10*3/UL (ref 150–450)
POTASSIUM SERPL-SCNC: 3.7 MMOL/L (ref 3.5–5.3)
PROT SERPL-MCNC: 8.4 G/DL (ref 6.4–8.2)
RBC # BLD AUTO: 4.94 X10*6/UL (ref 4–5.2)
RH FACTOR (ANTIGEN D): NORMAL
SODIUM SERPL-SCNC: 136 MMOL/L (ref 136–145)
WBC # BLD AUTO: 10.7 X10*3/UL (ref 4.4–11.3)

## 2024-06-16 PROCEDURE — 96365 THER/PROPH/DIAG IV INF INIT: CPT

## 2024-06-16 PROCEDURE — 84075 ASSAY ALKALINE PHOSPHATASE: CPT | Performed by: INTERNAL MEDICINE

## 2024-06-16 PROCEDURE — 96376 TX/PRO/DX INJ SAME DRUG ADON: CPT

## 2024-06-16 PROCEDURE — 76801 OB US < 14 WKS SINGLE FETUS: CPT | Performed by: RADIOLOGY

## 2024-06-16 PROCEDURE — 85025 COMPLETE CBC W/AUTO DIFF WBC: CPT | Performed by: INTERNAL MEDICINE

## 2024-06-16 PROCEDURE — 84702 CHORIONIC GONADOTROPIN TEST: CPT | Performed by: INTERNAL MEDICINE

## 2024-06-16 PROCEDURE — 2500000001 HC RX 250 WO HCPCS SELF ADMINISTERED DRUGS (ALT 637 FOR MEDICARE OP): Performed by: NURSE PRACTITIONER

## 2024-06-16 PROCEDURE — 99285 EMERGENCY DEPT VISIT HI MDM: CPT | Mod: 25

## 2024-06-16 PROCEDURE — 2500000004 HC RX 250 GENERAL PHARMACY W/ HCPCS (ALT 636 FOR OP/ED): Performed by: INTERNAL MEDICINE

## 2024-06-16 PROCEDURE — 36415 COLL VENOUS BLD VENIPUNCTURE: CPT | Performed by: INTERNAL MEDICINE

## 2024-06-16 PROCEDURE — 96367 TX/PROPH/DG ADDL SEQ IV INF: CPT

## 2024-06-16 PROCEDURE — 96375 TX/PRO/DX INJ NEW DRUG ADDON: CPT

## 2024-06-16 PROCEDURE — 80053 COMPREHEN METABOLIC PANEL: CPT | Performed by: INTERNAL MEDICINE

## 2024-06-16 PROCEDURE — 86901 BLOOD TYPING SEROLOGIC RH(D): CPT | Performed by: INTERNAL MEDICINE

## 2024-06-16 PROCEDURE — 85652 RBC SED RATE AUTOMATED: CPT | Performed by: INTERNAL MEDICINE

## 2024-06-16 PROCEDURE — G0378 HOSPITAL OBSERVATION PER HR: HCPCS

## 2024-06-16 PROCEDURE — 76817 TRANSVAGINAL US OBSTETRIC: CPT | Performed by: RADIOLOGY

## 2024-06-16 PROCEDURE — 86140 C-REACTIVE PROTEIN: CPT | Performed by: INTERNAL MEDICINE

## 2024-06-16 PROCEDURE — 2500000004 HC RX 250 GENERAL PHARMACY W/ HCPCS (ALT 636 FOR OP/ED): Performed by: NURSE PRACTITIONER

## 2024-06-16 PROCEDURE — 87040 BLOOD CULTURE FOR BACTERIA: CPT | Mod: AHULAB | Performed by: INTERNAL MEDICINE

## 2024-06-16 PROCEDURE — 83735 ASSAY OF MAGNESIUM: CPT | Performed by: INTERNAL MEDICINE

## 2024-06-16 PROCEDURE — 76801 OB US < 14 WKS SINGLE FETUS: CPT

## 2024-06-16 RX ORDER — POLYETHYLENE GLYCOL 3350 17 G/17G
17 POWDER, FOR SOLUTION ORAL DAILY PRN
Status: DISCONTINUED | OUTPATIENT
Start: 2024-06-16 | End: 2024-06-19 | Stop reason: HOSPADM

## 2024-06-16 RX ORDER — VANCOMYCIN HYDROCHLORIDE 1 G/200ML
1000 INJECTION, SOLUTION INTRAVENOUS EVERY 12 HOURS
Status: DISCONTINUED | OUTPATIENT
Start: 2024-06-17 | End: 2024-06-18

## 2024-06-16 RX ORDER — ACETAMINOPHEN 325 MG/1
650 TABLET ORAL EVERY 6 HOURS PRN
Status: DISCONTINUED | OUTPATIENT
Start: 2024-06-16 | End: 2024-06-18

## 2024-06-16 RX ORDER — OXYCODONE AND ACETAMINOPHEN 5; 325 MG/1; MG/1
1 TABLET ORAL EVERY 6 HOURS PRN
Status: DISCONTINUED | OUTPATIENT
Start: 2024-06-16 | End: 2024-06-17

## 2024-06-16 RX ORDER — VANCOMYCIN HYDROCHLORIDE 1 G/20ML
INJECTION, POWDER, LYOPHILIZED, FOR SOLUTION INTRAVENOUS DAILY PRN
Status: DISCONTINUED | OUTPATIENT
Start: 2024-06-16 | End: 2024-06-18

## 2024-06-16 RX ORDER — MORPHINE SULFATE 2 MG/ML
2 INJECTION, SOLUTION INTRAMUSCULAR; INTRAVENOUS EVERY 4 HOURS PRN
Status: DISCONTINUED | OUTPATIENT
Start: 2024-06-16 | End: 2024-06-17

## 2024-06-16 RX ORDER — MORPHINE SULFATE 4 MG/ML
4 INJECTION, SOLUTION INTRAMUSCULAR; INTRAVENOUS ONCE
Status: COMPLETED | OUTPATIENT
Start: 2024-06-16 | End: 2024-06-16

## 2024-06-16 RX ORDER — ONDANSETRON HYDROCHLORIDE 2 MG/ML
4 INJECTION, SOLUTION INTRAVENOUS EVERY 8 HOURS PRN
Status: DISCONTINUED | OUTPATIENT
Start: 2024-06-16 | End: 2024-06-19 | Stop reason: HOSPADM

## 2024-06-16 RX ORDER — TALC
3 POWDER (GRAM) TOPICAL NIGHTLY PRN
Status: DISCONTINUED | OUTPATIENT
Start: 2024-06-16 | End: 2024-06-17

## 2024-06-16 RX ORDER — VANCOMYCIN HYDROCHLORIDE 1 G/200ML
1000 INJECTION, SOLUTION INTRAVENOUS ONCE
Status: COMPLETED | OUTPATIENT
Start: 2024-06-16 | End: 2024-06-16

## 2024-06-16 RX ORDER — PANTOPRAZOLE SODIUM 40 MG/1
40 TABLET, DELAYED RELEASE ORAL
Status: DISCONTINUED | OUTPATIENT
Start: 2024-06-17 | End: 2024-06-19 | Stop reason: HOSPADM

## 2024-06-16 RX ORDER — GABAPENTIN 400 MG/1
800 CAPSULE ORAL 3 TIMES DAILY
Status: DISCONTINUED | OUTPATIENT
Start: 2024-06-16 | End: 2024-06-19 | Stop reason: HOSPADM

## 2024-06-16 RX ORDER — OXYCODONE AND ACETAMINOPHEN 10; 325 MG/1; MG/1
1 TABLET ORAL EVERY 8 HOURS PRN
COMMUNITY
Start: 2024-05-29 | End: 2024-06-28

## 2024-06-16 RX ORDER — OXYCODONE AND ACETAMINOPHEN 5; 325 MG/1; MG/1
2 TABLET ORAL EVERY 6 HOURS PRN
Status: DISCONTINUED | OUTPATIENT
Start: 2024-06-16 | End: 2024-06-17

## 2024-06-16 RX ADMIN — MORPHINE SULFATE 2 MG: 2 INJECTION, SOLUTION INTRAMUSCULAR; INTRAVENOUS at 19:31

## 2024-06-16 RX ADMIN — MORPHINE SULFATE 2 MG: 2 INJECTION, SOLUTION INTRAMUSCULAR; INTRAVENOUS at 23:34

## 2024-06-16 RX ADMIN — MORPHINE SULFATE 4 MG: 4 INJECTION, SOLUTION INTRAMUSCULAR; INTRAVENOUS at 15:26

## 2024-06-16 RX ADMIN — MORPHINE SULFATE 4 MG: 4 INJECTION, SOLUTION INTRAMUSCULAR; INTRAVENOUS at 14:02

## 2024-06-16 RX ADMIN — GABAPENTIN 800 MG: 400 CAPSULE ORAL at 21:29

## 2024-06-16 RX ADMIN — VANCOMYCIN HYDROCHLORIDE 1000 MG: 1 INJECTION, SOLUTION INTRAVENOUS at 19:01

## 2024-06-16 RX ADMIN — PIPERACILLIN SODIUM AND TAZOBACTAM SODIUM 3.38 G: 3; .375 INJECTION, SOLUTION INTRAVENOUS at 17:26

## 2024-06-16 RX ADMIN — Medication 3 MG: at 23:34

## 2024-06-16 RX ADMIN — OXYCODONE HYDROCHLORIDE AND ACETAMINOPHEN 2 TABLET: 5; 325 TABLET ORAL at 21:16

## 2024-06-16 RX ADMIN — PIPERACILLIN SODIUM AND TAZOBACTAM SODIUM 3.38 G: 3; .375 INJECTION, SOLUTION INTRAVENOUS at 23:13

## 2024-06-16 SDOH — SOCIAL STABILITY: SOCIAL INSECURITY: HAVE YOU HAD ANY THOUGHTS OF HARMING ANYONE ELSE?: NO

## 2024-06-16 SDOH — SOCIAL STABILITY: SOCIAL INSECURITY: DOES ANYONE TRY TO KEEP YOU FROM HAVING/CONTACTING OTHER FRIENDS OR DOING THINGS OUTSIDE YOUR HOME?: NO

## 2024-06-16 SDOH — SOCIAL STABILITY: SOCIAL INSECURITY: DO YOU FEEL UNSAFE GOING BACK TO THE PLACE WHERE YOU ARE LIVING?: NO

## 2024-06-16 SDOH — SOCIAL STABILITY: SOCIAL INSECURITY: DO YOU FEEL ANYONE HAS EXPLOITED OR TAKEN ADVANTAGE OF YOU FINANCIALLY OR OF YOUR PERSONAL PROPERTY?: NO

## 2024-06-16 SDOH — SOCIAL STABILITY: SOCIAL INSECURITY: HAVE YOU HAD THOUGHTS OF HARMING ANYONE ELSE?: NO

## 2024-06-16 SDOH — SOCIAL STABILITY: SOCIAL INSECURITY: HAS ANYONE EVER THREATENED TO HURT YOUR FAMILY OR YOUR PETS?: NO

## 2024-06-16 SDOH — SOCIAL STABILITY: SOCIAL INSECURITY: WERE YOU ABLE TO COMPLETE ALL THE BEHAVIORAL HEALTH SCREENINGS?: YES

## 2024-06-16 SDOH — SOCIAL STABILITY: SOCIAL INSECURITY: ARE YOU OR HAVE YOU BEEN THREATENED OR ABUSED PHYSICALLY, EMOTIONALLY, OR SEXUALLY BY ANYONE?: NO

## 2024-06-16 SDOH — SOCIAL STABILITY: SOCIAL INSECURITY: ARE THERE ANY APPARENT SIGNS OF INJURIES/BEHAVIORS THAT COULD BE RELATED TO ABUSE/NEGLECT?: NO

## 2024-06-16 SDOH — SOCIAL STABILITY: SOCIAL INSECURITY: ABUSE: ADULT

## 2024-06-16 ASSESSMENT — ENCOUNTER SYMPTOMS
CONFUSION: 0
DIZZINESS: 0
COUGH: 0
FACIAL SWELLING: 0
NUMBNESS: 0
SHORTNESS OF BREATH: 0
FEVER: 0
UNEXPECTED WEIGHT CHANGE: 0
NERVOUS/ANXIOUS: 0
NAUSEA: 0
ACTIVITY CHANGE: 0
AGITATION: 0
ABDOMINAL PAIN: 0
FREQUENCY: 0
DIFFICULTY URINATING: 0
LIGHT-HEADEDNESS: 0
CHILLS: 0
RHINORRHEA: 0
CONSTIPATION: 0
BRUISES/BLEEDS EASILY: 0
JOINT SWELLING: 0
BLOOD IN STOOL: 0
WOUND: 0
COLOR CHANGE: 0
EYE REDNESS: 0
ABDOMINAL DISTENTION: 0
SEIZURES: 0
MYALGIAS: 0
FATIGUE: 1
WHEEZING: 0
WEAKNESS: 0
TROUBLE SWALLOWING: 0
SINUS PRESSURE: 0
VOMITING: 0
HEMATURIA: 0
SORE THROAT: 0
DIARRHEA: 0
HEADACHES: 0
BACK PAIN: 0
CHEST TIGHTNESS: 0
DIAPHORESIS: 0
EYE DISCHARGE: 0
DYSURIA: 0
APPETITE CHANGE: 0
ARTHRALGIAS: 0
SLEEP DISTURBANCE: 0
FLANK PAIN: 0

## 2024-06-16 ASSESSMENT — COLUMBIA-SUICIDE SEVERITY RATING SCALE - C-SSRS
6. HAVE YOU EVER DONE ANYTHING, STARTED TO DO ANYTHING, OR PREPARED TO DO ANYTHING TO END YOUR LIFE?: NO
2. HAVE YOU ACTUALLY HAD ANY THOUGHTS OF KILLING YOURSELF?: NO
1. IN THE PAST MONTH, HAVE YOU WISHED YOU WERE DEAD OR WISHED YOU COULD GO TO SLEEP AND NOT WAKE UP?: NO

## 2024-06-16 ASSESSMENT — ACTIVITIES OF DAILY LIVING (ADL)
HEARING - LEFT EAR: FUNCTIONAL
TOILETING: INDEPENDENT
GROOMING: INDEPENDENT
DRESSING YOURSELF: INDEPENDENT
JUDGMENT_ADEQUATE_SAFELY_COMPLETE_DAILY_ACTIVITIES: YES
FEEDING YOURSELF: INDEPENDENT
ADEQUATE_TO_COMPLETE_ADL: YES
WALKS IN HOME: INDEPENDENT
LACK_OF_TRANSPORTATION: NO
PATIENT'S MEMORY ADEQUATE TO SAFELY COMPLETE DAILY ACTIVITIES?: YES
HEARING - RIGHT EAR: FUNCTIONAL
BATHING: INDEPENDENT

## 2024-06-16 ASSESSMENT — COGNITIVE AND FUNCTIONAL STATUS - GENERAL
MOBILITY SCORE: 24
PATIENT BASELINE BEDBOUND: NO
DAILY ACTIVITIY SCORE: 24

## 2024-06-16 ASSESSMENT — PAIN SCALES - GENERAL
PAINLEVEL_OUTOF10: 6
PAINLEVEL_OUTOF10: 9
PAINLEVEL_OUTOF10: 9
PAINLEVEL_OUTOF10: 10 - WORST POSSIBLE PAIN
PAINLEVEL_OUTOF10: 9
PAINLEVEL_OUTOF10: 6
PAINLEVEL_OUTOF10: 9

## 2024-06-16 ASSESSMENT — LIFESTYLE VARIABLES
SKIP TO QUESTIONS 9-10: 1
AUDIT-C TOTAL SCORE: 0
AUDIT-C TOTAL SCORE: 0
HOW OFTEN DO YOU HAVE 6 OR MORE DRINKS ON ONE OCCASION: NEVER
HOW MANY STANDARD DRINKS CONTAINING ALCOHOL DO YOU HAVE ON A TYPICAL DAY: PATIENT DOES NOT DRINK
HOW OFTEN DO YOU HAVE A DRINK CONTAINING ALCOHOL: NEVER

## 2024-06-16 ASSESSMENT — PAIN DESCRIPTION - ORIENTATION
ORIENTATION: RIGHT

## 2024-06-16 ASSESSMENT — PATIENT HEALTH QUESTIONNAIRE - PHQ9
1. LITTLE INTEREST OR PLEASURE IN DOING THINGS: NOT AT ALL
2. FEELING DOWN, DEPRESSED OR HOPELESS: NOT AT ALL
SUM OF ALL RESPONSES TO PHQ9 QUESTIONS 1 & 2: 0

## 2024-06-16 ASSESSMENT — PAIN - FUNCTIONAL ASSESSMENT
PAIN_FUNCTIONAL_ASSESSMENT: 0-10

## 2024-06-16 ASSESSMENT — PAIN DESCRIPTION - LOCATION
LOCATION: ARM

## 2024-06-16 NOTE — ED PROVIDER NOTES
HPI     CC: Hidradenitis Suppurativa     HPI: Sandrita Adams is a 33 y.o. female with a history of stage III hidradenitis suppurativa and SLE, currently approximately 5 WGA, presents with right axillary pain and vaginal spotting.  She states that she was told she was pregnant on 6/13 when she presented for spotting.  She had blood work and urine but not an ultrasound yet.  She has had daily vaginal spotting since 6/7.  She was told to stop her biologic, Cosentyx, due to the pregnancy.  She denies abdominal discomfort/cramping.  She denies abnormal vaginal discharge. She is on daily suppressive antibiotics for her hidradenitis suppurativa and is currently having a flare in her right axilla.  She has noticed swelling and severe pain, no drainage.  She denies systemic complaints of fever, chills, dizziness, or other complaints.  She is taking 12 mg Percocet  and gabapentin with no relief of pain.  She states they stopped draining her hidradenitis flares the emergency department because she keeps getting recurrent infections.  She has had multiple hospital admissions for similar presentations.      ROS: 10-point review of systems was performed and is otherwise negative except as noted in HPI.    Limitations to history: N/A    Independent Historians: N/A    External Records Reviewed: Multiple prior ED notes and hospitalizations    Past Medical History: Noncontributory except per HPI     Past Surgical History: Noncontributory except per HPI     Family History: Reviewed and noncontributory     Social History:  Denies tobacco. Denies ETOH. Denies illicit drugs.    Social Determinants Affecting Care: N/A    Allergies   Allergen Reactions    Suboxone [Buprenorphine-Naloxone] Anaphylaxis, Hives and Itching    Clarithromycin Hives     Other reaction(s): Unknown    Levofloxacin Swelling     Ankle Joint/tendon pain    Metoclopramide Hallucinations, Headache and Other     Psychosis     psychosis    Other Reaction(s):  "Hallucinations, Headache, Mental Status Change, Psychosis      psychosis      Psychosis    Clindamycin Hives     Pt sts doesnot allergy to clindamycin and does not get hives    Haloperidol Psychosis and Hallucinations     Psychosis     psychosis    Keflex [Cephalexin] Psychosis    Reglan [Metoclopramide Hcl] Psychosis       Home Meds:   Current Outpatient Medications   Medication Instructions    Cosentyx 150 mg, subcutaneous, Every 30 days    gabapentin (NEURONTIN) 800 mg, oral, 3 times daily, Pt has a more current Rx    ondansetron ODT (ZOFRAN-ODT) 4 mg, oral, Every 8 hours PRN    oxyCODONE (ROXICODONE) 10 mg, oral, Every 8 hours PRN    Trulicity 1.5 mg, subcutaneous, Once Weekly    witch hazel-glycerin (Tucks) pad Topical, As needed        Physical Exam     ED Triage Vitals [06/16/24 1315]   Temperature Heart Rate Respirations BP   37.8 °C (100 °F) 96 18 127/86      Pulse Ox Temp src Heart Rate Source Patient Position   100 % -- -- --      BP Location FiO2 (%)     -- --         Heart Rate:  [73-96]   Temperature:  [37.8 °C (100 °F)]   Respirations:  [16-18]   BP: (106-127)/()   Height:  [154.9 cm (5' 1\")]   Weight:  [78.5 kg (173 lb)]   Pulse Ox:  [98 %-100 %]      Physical Exam  Vitals and nursing note reviewed.     CONSTITUTIONAL: Well appearing, well nourished, in no acute distress.   HENMT: Head atraumatic. Airway patent. Nasal mucosa clear. Mouth with normal mucosa, clear oropharynx. Uvula midline. Neck supple.    EYES: Clear bilaterally, pupils equally round and reactive to light.   CARDIOVASCULAR: Normal rate, regular rhythm.  Heart sounds S1, S2.  No murmurs, rubs or gallops. Normal pulses. Capillary refill < 2 sec.   RESPIRATORY: No increased work of breathing. Breath sounds clear and equal bilaterally.  GASTROINTESTINAL: Abdomen soft, non-distended, non-tender. No rebound, no guarding. Normal bowel sounds. No palpable masses.  GENITOURINARY:  No CVA tenderness.  MUSCULOSKELETAL: Spine appears " normal, range of motion is not limited, no muscle or joint tenderness. No edema.   NEUROLOGICAL: Alert and oriented, no asymmetry, moving all extremities equally.  SKIN: Indurated, slightly focally fluctuant and tender right axilla, no drainage.  Additional areas of induration without tenderness or fluctuance in the left axilla and bilateral inguinal region.  Warm, dry and intact. No rash or notable lesions.  PSYCHIATRIC: Normal mood and affect.  HEME/LYMPH: No adenopathy or splenomegaly.    Diagnostic Results      ECG: None performed    Labs Reviewed   CBC WITH AUTO DIFFERENTIAL - Abnormal       Result Value    WBC 10.7      nRBC 0.0      RBC 4.94      Hemoglobin 12.7      Hematocrit 37.6      MCV 76 (*)     MCH 25.7 (*)     MCHC 33.8      RDW 14.9 (*)     Platelets 367      Neutrophils % 68.1      Immature Granulocytes %, Automated 0.4      Lymphocytes % 23.3      Monocytes % 7.1      Eosinophils % 0.8      Basophils % 0.3      Neutrophils Absolute 7.27      Immature Granulocytes Absolute, Automated 0.04      Lymphocytes Absolute 2.49      Monocytes Absolute 0.76      Eosinophils Absolute 0.09      Basophils Absolute 0.03     COMPREHENSIVE METABOLIC PANEL - Abnormal    Glucose 77      Sodium 136      Potassium 3.7      Chloride 105      Bicarbonate 23      Anion Gap 12      Urea Nitrogen 12      Creatinine 0.85      eGFR >90      Calcium 9.6      Albumin 4.4      Alkaline Phosphatase 70      Total Protein 8.4 (*)     AST 13      Bilirubin, Total 0.3      ALT 11     HUMAN CHORIONIC GONADOTROPIN, SERUM QUANTITATIVE - Abnormal    HCG, Beta-Quantitative 7,515 (*)     Narrative:      Total HCG measurement is performed using the [employer] Access   Immunoassay which detects intact HCG and free beta HCG subunit.    This test is not indicated for use as a tumor marker.   HCG testing is performed using a different test methodology at [facility] than other [facility]. Direct result comparison    should only be made within the same method.       SEDIMENTATION RATE, AUTOMATED - Abnormal    Sedimentation Rate 27 (*)    C-REACTIVE PROTEIN - Abnormal    C-Reactive Protein 1.61 (*)    MAGNESIUM - Normal    Magnesium 2.10     BLOOD CULTURE   BLOOD CULTURE   TYPE AND SCREEN    ABO TYPE A      Rh TYPE POS      ANTIBODY SCREEN NEG     URINALYSIS WITH REFLEX CULTURE AND MICROSCOPIC    Narrative:     The following orders were created for panel order Urinalysis with Reflex Culture and Microscopic.  Procedure                               Abnormality         Status                     ---------                               -----------         ------                     Urinalysis with Reflex C...[[id number]]                                                 Extra Urine Gray Tube[[id number]]                                                         Please view results for these tests on the individual orders.   URINALYSIS WITH REFLEX CULTURE AND MICROSCOPIC   EXTRA URINE GRAY TUBE         US PELVIS OB TRANSABDOMINAL W TRANSVAGINAL UP TO 1ST TRIMESTER   Final Result   1. Single intrauterine pregnancy noted with estimated gestational age   of 5 weeks, 3 days based on mean gestational sac diameter. A definite   fetal pole and or fetal heart tones not identified which may be   secondary to early gestational state.   2. Follow up examination is recommended at 18-20 weeks gestation for   evaluation of fetal anatomy.                  Signed by: Rachel Johns 6/16/2024 4:32 PM   Dictation workstation:   [id number]                    Monik Coma Scale Score: 15                  Procedure  Procedures    ED Course & MDM   Assessment/Plan:   Sandrita Adams is a 33 y.o. female with a history of stage III hidradenitis suppurativa and SLE, currently approximately 5 WGA, presents with right axillary pain and vaginal spotting.  Right axillary pain is concerning for hidradenitis suppurativa flare, currently off her Biologics due to  pregnancy.  She appears to have a flared hidradenitis lesion in the right axilla.  She is not interested in drainage in the ED at this time.  She is borderline febrile at 37.8, otherwise hemodynamically stable.  She is denying systemic complaints at this time.  Workup was initiated with labs, transvaginal ultrasound to assess the pregnancy given her daily spotting.  Differential for this is chemical pregnancy, threatened , or ectopic.  Treatment was initiated with 4 mg IV morphine. See below for details of ED course and ultimate disposition.    Medications   vancomycin (Vancocin) 1,000 mg in dextrose 5% water 200 mL (has no administration in time range)   morphine injection 4 mg (4 mg intravenous Given 24 1402)   morphine injection 4 mg (4 mg intravenous Given 24 1526)   piperacillin-tazobactam-dextrose (Zosyn) IV 3.375 g (0 g intravenous Stopped 24 1745)        ED Course as of 24   Sun 2024   1358 Surgery TIM spoke with Dr. Maria who states they won't plan any surgery for her. She may need to come in for IV antibiotics but that would not change the surgical plan.  [CG]   1459 Labs are notable for CBC without leukocytosis, normal H&H, normal platelets, CMP unremarkable, mildly elevated ESR 27 and CRP 1.61, Ro positive, hCG 7515 [CG]   1507 Spoke with ID Dr. Serrano who recommends admit for 24 hours of IV antibiotics vanc and zosyn. She has to stop doxycycline due to pregnancy. [CG]   1634 TVUS Single intrauterine pregnancy noted with estimated gestational age of 5 weeks, 3 days based on mean gestational sac diameter. A definite fetal pole and or fetal heart tones not identified which may be secondary to early gestational state.   [CG]   1805 Patient admitted to the observation unit for further management. [CG]      ED Course User Index  [CG] Arlen Gaviria MD         Diagnoses as of 24   Axillary hidradenitis suppurativa   Vaginal bleeding in pregnancy, first  trimester ([facility])       Disposition:   Admitted to OBS team, discussed differential and findings with patient as well as any family members at bedside.      ED Prescriptions    None         Arlen Gaviria MD  EM/IM/Peds    This note was dictated by speech recognition. Minor errors in transcription may be present.     Arlen Gaviria MD  06/16/24 2303

## 2024-06-16 NOTE — CONSULTS
Vancomycin Dosing by Pharmacy- INITIAL    Sandrita Adams is a 33 y.o. year old female who Pharmacy has been consulted for vancomycin dosing for cellulitis, skin and soft tissue. Based on the patient's indication and renal status this patient will be dosed based on a goal AUC of 400-600.     Renal function is currently stable.    Visit Vitals  /80   Pulse 82   Temp 37.8 °C (100 °F)   Resp 16        Lab Results   Component Value Date    CREATININE 0.85 2024    CREATININE 0.90 2024    CREATININE 0.86 2024    CREATININE 0.87 2024        Patient weight is as follows:   Vitals:    24 1315   Weight: 78.5 kg (173 lb)       Cultures:  No results found for the encounter in last 14 days.        I/O last 3 completed shifts:  In: 50 (0.6 mL/kg) [IV Piggyback:50]  Out: - (0 mL/kg)   Weight: 78.5 kg   I/O during current shift:  No intake/output data recorded.    Temp (24hrs), Av.8 °C (100 °F), Min:37.8 °C (100 °F), Max:37.8 °C (100 °F)         Assessment/Plan     Patient will not be given a loading dose.  Will initiate vancomycin maintenance,  1000 mg every 12 hours.    This dosing regimen is predicted by [employer] to result in the following pharmacokinetic parameters:  Loading dose: N/A  Regimen: 1000 mg IV every 12 hours.  Start time: 07:01 on 2024  Exposure target: AUC24 (range)400-600 mg/L.hr   AUC24,ss: 457 mg/L.hr  Probability of AUC24 > 400: 64 %  Ctrough,ss: 13.9 mg/L  Probability of Ctrough,ss > 20: 22 %  Probability of nephrotoxicity (Lodise POP ): 9 %      Follow-up level will be ordered on  at AM lab unless clinically indicated sooner.  Will continue to monitor renal function daily while on vancomycin and order serum creatinine at least every 48 hours if not already ordered.  Follow for continued vancomycin needs, clinical response, and signs/symptoms of toxicity.       [place], PharmD

## 2024-06-16 NOTE — ED TRIAGE NOTES
PT PRESENTS TO THE ED FOR Hidradenitis Suppurativa  FLARE UP. PT ENDORSES THAT IT IS IN HER RIGHT ARMPIT. PT ENDORSES THAT SHE IS ALSO 5 WEEKS PREGNANT AND HAD TO GET OFF HER BIOLOGICS DUE TO BEING PREGNANT. PT ENDORSES THAT SINCE THIS FLARE UP HAS STARTED. PT DENIES FEVERS OR CHILLS. PT DENIES CHEST PAIN OR SOB. PT ENDORSES THAT IT IS PAINFUL TO THE TOUCH. PT ENDORSES THAT SHE IS HAVING SMALL AMOUNTS OF SPOTTING NOTED.

## 2024-06-16 NOTE — H&P
[facility] OBSERVATION H&P    Admitting Physician: Aparna Blevins MD  Admitting Dx: Axillary hidradenitis suppurativa    HPI: Sandrita Adams is a 33 y.o. female, with a PMH of HS and SLE, who presented to [facility] ED on 2024 for HS flare in her R armpit. Patient is currently 5 weeks pregnant and off biologics [was on Cosentyx]. Since then she has noticed this flare with progressive worsening, area being tender to touch and swollen but wo drainage. She also reports mild vaginal spotting, without cramping or abd discomfort. Patient denies recent fever, chills, chest pain, palpations, leg edema, SOB, cough, abd pain, urinary sx, bloody vomit or stools,  headaches, lightheadedness or syncope, weakness, or trauma/travel/sick contacts. In addition, no severe abd cramping, large amounts of vaginal bleeding, or back/shoulder pain. Patient is on chronic suppressive abx for her HS and has also been taking Percocet and Gabapentin with no relief in pain. She has previously had these areas drained but not recently d/t recurrent infections and has had multiple admissions for similar presentation.    In the ED, VSS. Labs notable for mild elevation in CRP and no leukocytosis. Blood cx sent and patient given IV Vancomycin, as well as pain medication. Concern for persistent vaginal bleeding/spotting since , known +pregnancy, has seen OB as outpatient, reports heavier bleeding today with once small clot. , hCG level appropriately rising and TVUS normal for 5w gestation. Patient was admitted for further evaluation.    Subjective   Past Medical History:   Diagnosis Date    Hidradenitis suppurativa 10/29/2020    Hidradenitis    Lupus (Multi)      Past Surgical History:   Procedure Laterality Date    OTHER SURGICAL HISTORY  2022    Arm surgery     Social History     Tobacco Use    Smoking status: Former     Types: Cigarettes    Smokeless tobacco: Never   Substance Use Topics    Drug use: Never   No family history  on file.    Allergies   Allergen Reactions    Suboxone [Buprenorphine-Naloxone] Anaphylaxis, Hives and Itching    Clarithromycin Hives    Haloperidol Hallucinations and Psychosis    Keflex [Cephalexin] Psychosis    Levofloxacin Swelling     Ankle Joint/tendon pain    Metoclopramide Headache, Hallucinations and Psychosis    Reglan [Metoclopramide Hcl] Psychosis       Prior to Admission medications    Medication Sig Start Date End Date Taking? Authorizing Provider   dulaglutide (Trulicity) 1.5 mg/0.5 mL pen injector injection Inject 1.5 mg under the skin 1 (one) time per week. 3/27/24   Dakota Vergara MD   gabapentin (Neurontin) 800 mg tablet Take 1 tablet (800 mg) by mouth 3 times a day. Pt has a more current Rx 5/21/24   Jalen Pulido MD   ondansetron ODT (Zofran-ODT) 4 mg disintegrating tablet Take 1 tablet (4 mg) by mouth every 8 hours if needed for nausea or vomiting.    Historical Provider, MD   oxyCODONE (Roxicodone) 10 mg immediate release tablet Take 1 tablet (10 mg) by mouth every 8 hours if needed for severe pain (7 - 10) for up to 15 doses. 5/21/24   Jalen Pulido MD   secukinumab (Cosentyx) 150 mg/mL syringe Inject 1 mL (150 mg) under the skin every 30 (thirty) days.    Historical Provider, MD   witch hazel-glycerin (Tucks) pad Apply topically if needed for irritation. 1/21/24   Steve Borges, DO     Review of Systems   Constitutional:  Positive for fatigue. Negative for activity change, appetite change, chills, diaphoresis, fever and unexpected weight change.   HENT:  Negative for congestion, dental problem, facial swelling, mouth sores, nosebleeds, rhinorrhea, sinus pressure, sore throat and trouble swallowing.    Eyes:  Negative for discharge, redness and visual disturbance.   Respiratory:  Negative for cough, chest tightness, shortness of breath and wheezing.    Cardiovascular:  Negative for chest pain and leg swelling.   Gastrointestinal:  Negative for abdominal distention, abdominal pain,  "blood in stool, constipation, diarrhea, nausea and vomiting.   Endocrine: Negative for cold intolerance and heat intolerance.   Genitourinary:  Positive for vaginal bleeding. Negative for decreased urine volume, difficulty urinating, dysuria, flank pain, frequency, hematuria and urgency.   Musculoskeletal:  Negative for arthralgias, back pain, gait problem, joint swelling and myalgias.   Skin:  Negative for color change, pallor, rash and wound.        Swelling/pain R armpit   Allergic/Immunologic: Negative for immunocompromised state.   Neurological:  Negative for dizziness, seizures, syncope, weakness, light-headedness, numbness and headaches.   Hematological:  Does not bruise/bleed easily.   Psychiatric/Behavioral:  Negative for agitation, confusion and sleep disturbance. The patient is not nervous/anxious.    All other systems reviewed and are negative.        Objective   Heart Rate:  [73-96]   Temp:  [35.8 °C (96.4 °F)-37.8 °C (100 °F)]   Resp:  [16-18]   BP: (104-127)/()   Height:  [154.9 cm (5' 1\")]   Weight:  [78.5 kg (173 lb)]   SpO2:  [98 %-100 %]      Pain Score: 9   Vitals:    06/16/24 2133   Weight: 78.5 kg (173 lb)        Intake/Output Summary (Last 24 hours) at 6/16/2024 2222  Last data filed at 6/16/2024 2028  Gross per 24 hour   Intake 250 ml   Output --   Net 250 ml       Physical Exam  Vitals and nursing note reviewed.   Constitutional:       General: She is awake. She is not in acute distress.     Appearance: Normal appearance. She is not ill-appearing.   HENT:      Head: Normocephalic.      Right Ear: External ear normal.      Left Ear: External ear normal.      Nose: Nose normal.      Mouth/Throat:      Lips: Pink.      Mouth: Mucous membranes are moist.      Pharynx: Oropharynx is clear.   Eyes:      General: Lids are normal.      Pupils: Pupils are equal, round, and reactive to light.   Neck:      Vascular: No JVD.   Cardiovascular:      Rate and Rhythm: Normal rate and regular rhythm. " No extrasystoles are present.     Pulses: Normal pulses.      Heart sounds: Normal heart sounds, S1 normal and S2 normal. No murmur heard.  Pulmonary:      Effort: Pulmonary effort is normal. No respiratory distress.      Breath sounds: Normal breath sounds and air entry.   Abdominal:      General: Bowel sounds are normal. There is no distension.      Palpations: Abdomen is soft.      Tenderness: There is no abdominal tenderness.      Hernia: No hernia is present.   Musculoskeletal:         General: No swelling. Normal range of motion.      Cervical back: Normal range of motion and neck supple.      Right lower leg: No edema.      Left lower leg: No edema.   Skin:     General: Skin is warm and dry.      Findings: Erythema and lesion present.      Comments: R armpit with changes consistent with HS, tender with light palpation and mild erythema   Neurological:      General: No focal deficit present.      Mental Status: She is alert and oriented to person, place, and time.      Sensory: Sensation is intact.      Motor: Motor function is intact. No weakness.      Coordination: Coordination is intact.      Gait: Gait is intact.   Psychiatric:         Attention and Perception: Attention and perception normal.         Mood and Affect: Mood and affect normal.         Speech: Speech normal.         Behavior: Behavior normal. Behavior is cooperative.         Thought Content: Thought content normal.         Cognition and Memory: Cognition and memory normal.         Judgment: Judgment normal.       Medications  gabapentin, 800 mg, oral, TID  [START ON 6/17/2024] pantoprazole, 40 mg, oral, Daily before breakfast  [START ON 6/17/2024] prenatal vitamin (iron-folic), 1 tablet, oral, Daily  [START ON 6/17/2024] vancomycin, 1,000 mg, intravenous, q12h     PRN medications: acetaminophen, melatonin, morphine, ondansetron, oxyCODONE-acetaminophen **OR** oxyCODONE-acetaminophen, polyethylene glycol, vancomycin    Labs  Results for orders  placed or performed during the hospital encounter of 06/16/24 (from the past 24 hour(s))   CBC and Auto Differential   Result Value Ref Range    WBC 10.7 4.4 - 11.3 x10*3/uL    nRBC 0.0 0.0 - 0.0 /100 WBCs    RBC 4.94 4.00 - 5.20 x10*6/uL    Hemoglobin 12.7 12.0 - 16.0 g/dL    Hematocrit 37.6 36.0 - 46.0 %    MCV 76 (L) 80 - 100 fL    MCH 25.7 (L) 26.0 - 34.0 pg    MCHC 33.8 32.0 - 36.0 g/dL    RDW 14.9 (H) 11.5 - 14.5 %    Platelets 367 150 - 450 x10*3/uL    Neutrophils % 68.1 40.0 - 80.0 %    Immature Granulocytes %, Automated 0.4 0.0 - 0.9 %    Lymphocytes % 23.3 13.0 - 44.0 %    Monocytes % 7.1 2.0 - 10.0 %    Eosinophils % 0.8 0.0 - 6.0 %    Basophils % 0.3 0.0 - 2.0 %    Neutrophils Absolute 7.27 1.20 - 7.70 x10*3/uL    Immature Granulocytes Absolute, Automated 0.04 0.00 - 0.70 x10*3/uL    Lymphocytes Absolute 2.49 1.20 - 4.80 x10*3/uL    Monocytes Absolute 0.76 0.10 - 1.00 x10*3/uL    Eosinophils Absolute 0.09 0.00 - 0.70 x10*3/uL    Basophils Absolute 0.03 0.00 - 0.10 x10*3/uL   Comprehensive metabolic panel   Result Value Ref Range    Glucose 77 74 - 99 mg/dL    Sodium 136 136 - 145 mmol/L    Potassium 3.7 3.5 - 5.3 mmol/L    Chloride 105 98 - 107 mmol/L    Bicarbonate 23 21 - 32 mmol/L    Anion Gap 12 10 - 20 mmol/L    Urea Nitrogen 12 6 - 23 mg/dL    Creatinine 0.85 0.50 - 1.05 mg/dL    eGFR >90 >60 mL/min/1.73m*2    Calcium 9.6 8.6 - 10.3 mg/dL    Albumin 4.4 3.4 - 5.0 g/dL    Alkaline Phosphatase 70 33 - 110 U/L    Total Protein 8.4 (H) 6.4 - 8.2 g/dL    AST 13 9 - 39 U/L    Bilirubin, Total 0.3 0.0 - 1.2 mg/dL    ALT 11 7 - 45 U/L   Magnesium   Result Value Ref Range    Magnesium 2.10 1.60 - 2.40 mg/dL   Blood Culture    Specimen: Peripheral Venipuncture; Blood culture   Result Value Ref Range    Blood Culture Loaded on Instrument - Culture in progress    Blood Culture    Specimen: Peripheral Venipuncture; Blood culture   Result Value Ref Range    Blood Culture Loaded on Instrument - Culture in  progress    Human Chorionic Gonadotropin, Serum Quantitative   Result Value Ref Range    HCG, Beta-Quantitative 7,515 (H) <5 mIU/mL   Type And Screen   Result Value Ref Range    ABO TYPE A     Rh TYPE POS     ANTIBODY SCREEN NEG    Sedimentation rate, automated   Result Value Ref Range    Sedimentation Rate 27 (H) 0 - 20 mm/h   C-reactive protein   Result Value Ref Range    C-Reactive Protein 1.61 (H) <1.00 mg/dL     Imaging  US PELVIS OB TRANSABDOMINAL W TRANSVAGINAL UP TO 1ST TRIMESTER 2024  1. Single intrauterine pregnancy noted with estimated gestational age of 5 weeks, 3 days based on mean gestational sac diameter. A definite fetal pole and or fetal heart tones not identified which may be secondary to early gestational state.  2. Follow up examination is recommended at 18-20 weeks gestation for evaluation of fetal anatomy.      Assessment/Plan   Ms.Elexis MONICA Adams is a 33 y.o. female who p/w HS flare and is admitted for further management. She is currently 5w pregnant. PMH includes HS, SLE, PTSD, anemia, and fibromyalgia.    HS Axillaris Flare  -Acute worsening in the past few weeks since stopping Cosentyx  -Area tender but no areas with drainage or excessive erythema/edema  -Broad spectrum abx w/ IV Vancomycin and Zosyn  -ID consult  -Supportive care for pain    5 Weeks Gestation  Vaginal Spotting  -Hx of spontaneous  in   -Vaginal bleeding/spotting since , no other sx  -hCG appropriately increased, repeat in AM to trend  -TVUS as above  -OB consult if worsening bleeding occurs  -Prenatal vitamin ordered    Other comorbidities as above  -Continue meds as ordered and adjust based on clinical course       GI/VTE PPX: PPI, SCDs  Code Status: Full Code    Chart, medical history, and labs/testing reviewed in detail.   Case and plan of care to be discussed with attending provider.      Disposition: Discharge home once medically cleared and stable, pending ID evaluation    Laverne Bingham,  APRN-CNP   Observation/Internal Med TIM  [facility]  06/16/24  05:57 PM  Total time of 45 minutes spent on professional and overall care, with >50% of time dedicated to counseling/coordination of care.

## 2024-06-17 LAB
ANION GAP SERPL CALC-SCNC: 12 MMOL/L (ref 10–20)
APPEARANCE UR: ABNORMAL
B-HCG SERPL-ACNC: 7310 MIU/ML
BACTERIA #/AREA URNS AUTO: ABNORMAL /HPF
BILIRUB UR STRIP.AUTO-MCNC: NEGATIVE MG/DL
BUN SERPL-MCNC: 13 MG/DL (ref 6–23)
CALCIUM SERPL-MCNC: 8.4 MG/DL (ref 8.6–10.3)
CHLORIDE SERPL-SCNC: 107 MMOL/L (ref 98–107)
CO2 SERPL-SCNC: 20 MMOL/L (ref 21–32)
COLOR UR: ABNORMAL
CREAT SERPL-MCNC: 0.87 MG/DL (ref 0.5–1.05)
EGFRCR SERPLBLD CKD-EPI 2021: 89 ML/MIN/1.73M*2
ERYTHROCYTE [DISTWIDTH] IN BLOOD BY AUTOMATED COUNT: 15 % (ref 11.5–14.5)
GLUCOSE SERPL-MCNC: 121 MG/DL (ref 74–99)
GLUCOSE UR STRIP.AUTO-MCNC: NORMAL MG/DL
HCT VFR BLD AUTO: 35.6 % (ref 36–46)
HGB BLD-MCNC: 11.7 G/DL (ref 12–16)
HOLD SPECIMEN: NORMAL
KETONES UR STRIP.AUTO-MCNC: NEGATIVE MG/DL
LEUKOCYTE ESTERASE UR QL STRIP.AUTO: ABNORMAL
MCH RBC QN AUTO: 25.1 PG (ref 26–34)
MCHC RBC AUTO-ENTMCNC: 32.9 G/DL (ref 32–36)
MCV RBC AUTO: 76 FL (ref 80–100)
NITRITE UR QL STRIP.AUTO: NEGATIVE
NRBC BLD-RTO: 0 /100 WBCS (ref 0–0)
PH UR STRIP.AUTO: 5.5 [PH]
PLATELET # BLD AUTO: 319 X10*3/UL (ref 150–450)
POTASSIUM SERPL-SCNC: 3.5 MMOL/L (ref 3.5–5.3)
PROT UR STRIP.AUTO-MCNC: ABNORMAL MG/DL
RBC # BLD AUTO: 4.66 X10*6/UL (ref 4–5.2)
RBC # UR STRIP.AUTO: ABNORMAL /UL
RBC #/AREA URNS AUTO: >20 /HPF
SODIUM SERPL-SCNC: 135 MMOL/L (ref 136–145)
SP GR UR STRIP.AUTO: 1.02
SQUAMOUS #/AREA URNS AUTO: ABNORMAL /HPF
UROBILINOGEN UR STRIP.AUTO-MCNC: NORMAL MG/DL
VANCOMYCIN SERPL-MCNC: 5.7 UG/ML (ref 5–20)
WBC # BLD AUTO: 8.8 X10*3/UL (ref 4.4–11.3)
WBC #/AREA URNS AUTO: >50 /HPF
WBC CLUMPS #/AREA URNS AUTO: ABNORMAL /HPF

## 2024-06-17 PROCEDURE — 81001 URINALYSIS AUTO W/SCOPE: CPT | Performed by: INTERNAL MEDICINE

## 2024-06-17 PROCEDURE — G0378 HOSPITAL OBSERVATION PER HR: HCPCS

## 2024-06-17 PROCEDURE — 36415 COLL VENOUS BLD VENIPUNCTURE: CPT | Performed by: NURSE PRACTITIONER

## 2024-06-17 PROCEDURE — 84702 CHORIONIC GONADOTROPIN TEST: CPT | Performed by: NURSE PRACTITIONER

## 2024-06-17 PROCEDURE — 2500000004 HC RX 250 GENERAL PHARMACY W/ HCPCS (ALT 636 FOR OP/ED): Performed by: NURSE PRACTITIONER

## 2024-06-17 PROCEDURE — 80202 ASSAY OF VANCOMYCIN: CPT | Performed by: NURSE PRACTITIONER

## 2024-06-17 PROCEDURE — 2500000001 HC RX 250 WO HCPCS SELF ADMINISTERED DRUGS (ALT 637 FOR MEDICARE OP): Performed by: NURSE PRACTITIONER

## 2024-06-17 PROCEDURE — 85027 COMPLETE CBC AUTOMATED: CPT | Performed by: NURSE PRACTITIONER

## 2024-06-17 PROCEDURE — 99222 1ST HOSP IP/OBS MODERATE 55: CPT | Performed by: OBSTETRICS & GYNECOLOGY

## 2024-06-17 PROCEDURE — 2500000001 HC RX 250 WO HCPCS SELF ADMINISTERED DRUGS (ALT 637 FOR MEDICARE OP): Performed by: INTERNAL MEDICINE

## 2024-06-17 PROCEDURE — 87086 URINE CULTURE/COLONY COUNT: CPT | Mod: AHULAB | Performed by: INTERNAL MEDICINE

## 2024-06-17 PROCEDURE — 80048 BASIC METABOLIC PNL TOTAL CA: CPT | Performed by: NURSE PRACTITIONER

## 2024-06-17 PROCEDURE — 2500000004 HC RX 250 GENERAL PHARMACY W/ HCPCS (ALT 636 FOR OP/ED): Performed by: INTERNAL MEDICINE

## 2024-06-17 RX ORDER — SULFAMETHOXAZOLE AND TRIMETHOPRIM 800; 160 MG/1; MG/1
1 TABLET ORAL 2 TIMES DAILY
COMMUNITY
End: 2024-06-19 | Stop reason: HOSPADM

## 2024-06-17 RX ORDER — OXYCODONE HYDROCHLORIDE 5 MG/1
10 TABLET ORAL EVERY 6 HOURS PRN
Status: DISCONTINUED | OUTPATIENT
Start: 2024-06-17 | End: 2024-06-19 | Stop reason: HOSPADM

## 2024-06-17 RX ORDER — OXYCODONE HYDROCHLORIDE 5 MG/1
15 TABLET ORAL EVERY 6 HOURS PRN
Status: DISCONTINUED | OUTPATIENT
Start: 2024-06-17 | End: 2024-06-18

## 2024-06-17 RX ORDER — HYDROMORPHONE HYDROCHLORIDE 0.2 MG/ML
0.2 INJECTION INTRAMUSCULAR; INTRAVENOUS; SUBCUTANEOUS
Status: DISCONTINUED | OUTPATIENT
Start: 2024-06-17 | End: 2024-06-17

## 2024-06-17 RX ORDER — MULTIVIT-MIN/IRON FUM/FOLIC AC 7.5 MG-4
1 TABLET ORAL DAILY
COMMUNITY

## 2024-06-17 RX ORDER — HYDROMORPHONE HYDROCHLORIDE 1 MG/ML
1 INJECTION, SOLUTION INTRAMUSCULAR; INTRAVENOUS; SUBCUTANEOUS EVERY 4 HOURS PRN
Status: DISCONTINUED | OUTPATIENT
Start: 2024-06-17 | End: 2024-06-18

## 2024-06-17 RX ORDER — SODIUM CHLORIDE 9 MG/ML
100 INJECTION, SOLUTION INTRAVENOUS CONTINUOUS
Status: DISCONTINUED | OUTPATIENT
Start: 2024-06-17 | End: 2024-06-17

## 2024-06-17 RX ORDER — AMOXICILLIN AND CLAVULANATE POTASSIUM 875; 125 MG/1; MG/1
875 TABLET, FILM COATED ORAL 2 TIMES DAILY
COMMUNITY
End: 2024-06-19 | Stop reason: HOSPADM

## 2024-06-17 RX ORDER — HYDROMORPHONE HYDROCHLORIDE 1 MG/ML
0.6 INJECTION, SOLUTION INTRAMUSCULAR; INTRAVENOUS; SUBCUTANEOUS EVERY 4 HOURS PRN
Status: DISCONTINUED | OUTPATIENT
Start: 2024-06-17 | End: 2024-06-17

## 2024-06-17 RX ORDER — TALC
3 POWDER (GRAM) TOPICAL NIGHTLY
Status: DISCONTINUED | OUTPATIENT
Start: 2024-06-17 | End: 2024-06-19 | Stop reason: HOSPADM

## 2024-06-17 RX ORDER — MORPHINE SULFATE 2 MG/ML
1 INJECTION, SOLUTION INTRAMUSCULAR; INTRAVENOUS ONCE
Status: COMPLETED | OUTPATIENT
Start: 2024-06-17 | End: 2024-06-17

## 2024-06-17 RX ADMIN — HYDROMORPHONE HYDROCHLORIDE 1 MG: 1 INJECTION, SOLUTION INTRAMUSCULAR; INTRAVENOUS; SUBCUTANEOUS at 18:21

## 2024-06-17 RX ADMIN — MORPHINE SULFATE 1 MG: 2 INJECTION, SOLUTION INTRAMUSCULAR; INTRAVENOUS at 08:34

## 2024-06-17 RX ADMIN — VANCOMYCIN HYDROCHLORIDE 1000 MG: 1 INJECTION, SOLUTION INTRAVENOUS at 06:34

## 2024-06-17 RX ADMIN — OXYCODONE HYDROCHLORIDE 15 MG: 5 TABLET ORAL at 15:48

## 2024-06-17 RX ADMIN — PIPERACILLIN SODIUM AND TAZOBACTAM SODIUM 3.38 G: 3; .375 INJECTION, SOLUTION INTRAVENOUS at 16:13

## 2024-06-17 RX ADMIN — VANCOMYCIN HYDROCHLORIDE 1000 MG: 1 INJECTION, SOLUTION INTRAVENOUS at 19:59

## 2024-06-17 RX ADMIN — GABAPENTIN 800 MG: 400 CAPSULE ORAL at 08:32

## 2024-06-17 RX ADMIN — GABAPENTIN 800 MG: 400 CAPSULE ORAL at 20:00

## 2024-06-17 RX ADMIN — Medication 3 MG: at 22:23

## 2024-06-17 RX ADMIN — OXYCODONE HYDROCHLORIDE AND ACETAMINOPHEN 2 TABLET: 5; 325 TABLET ORAL at 03:52

## 2024-06-17 RX ADMIN — SODIUM CHLORIDE 100 ML/HR: 9 INJECTION, SOLUTION INTRAVENOUS at 08:36

## 2024-06-17 RX ADMIN — MORPHINE SULFATE 2 MG: 2 INJECTION, SOLUTION INTRAMUSCULAR; INTRAVENOUS at 04:36

## 2024-06-17 RX ADMIN — MORPHINE SULFATE 2 MG: 2 INJECTION, SOLUTION INTRAMUSCULAR; INTRAVENOUS at 08:32

## 2024-06-17 RX ADMIN — PANTOPRAZOLE SODIUM 40 MG: 40 TABLET, DELAYED RELEASE ORAL at 06:34

## 2024-06-17 RX ADMIN — OXYCODONE HYDROCHLORIDE 15 MG: 5 TABLET ORAL at 21:46

## 2024-06-17 RX ADMIN — GABAPENTIN 800 MG: 400 CAPSULE ORAL at 15:47

## 2024-06-17 RX ADMIN — HYDROMORPHONE HYDROCHLORIDE 0.6 MG: 1 INJECTION, SOLUTION INTRAMUSCULAR; INTRAVENOUS; SUBCUTANEOUS at 11:47

## 2024-06-17 RX ADMIN — HYDROMORPHONE HYDROCHLORIDE 1 MG: 1 INJECTION, SOLUTION INTRAMUSCULAR; INTRAVENOUS; SUBCUTANEOUS at 13:45

## 2024-06-17 RX ADMIN — SODIUM CHLORIDE, POTASSIUM CHLORIDE, SODIUM LACTATE AND CALCIUM CHLORIDE 500 ML: 600; 310; 30; 20 INJECTION, SOLUTION INTRAVENOUS at 13:38

## 2024-06-17 RX ADMIN — PIPERACILLIN SODIUM AND TAZOBACTAM SODIUM 3.38 G: 3; .375 INJECTION, SOLUTION INTRAVENOUS at 04:36

## 2024-06-17 RX ADMIN — ACETAMINOPHEN 650 MG: 325 TABLET ORAL at 19:59

## 2024-06-17 RX ADMIN — PRENATAL VIT W/ FE FUMARATE-FA TAB 27-0.8 MG 1 TABLET: 27-0.8 TAB at 08:31

## 2024-06-17 RX ADMIN — PIPERACILLIN SODIUM AND TAZOBACTAM SODIUM 3.38 G: 3; .375 INJECTION, SOLUTION INTRAVENOUS at 10:29

## 2024-06-17 RX ADMIN — OXYCODONE HYDROCHLORIDE AND ACETAMINOPHEN 2 TABLET: 5; 325 TABLET ORAL at 10:29

## 2024-06-17 ASSESSMENT — PAIN SCALES - GENERAL
PAINLEVEL_OUTOF10: 9
PAINLEVEL_OUTOF10: 9
PAINLEVEL_OUTOF10: 7
PAINLEVEL_OUTOF10: 7
PAINLEVEL_OUTOF10: 6
PAINLEVEL_OUTOF10: 8
PAINLEVEL_OUTOF10: 8
PAINLEVEL_OUTOF10: 9
PAINLEVEL_OUTOF10: 9
PAINLEVEL_OUTOF10: 10 - WORST POSSIBLE PAIN
PAINLEVEL_OUTOF10: 8
PAINLEVEL_OUTOF10: 5 - MODERATE PAIN
PAINLEVEL_OUTOF10: 4

## 2024-06-17 ASSESSMENT — PAIN DESCRIPTION - ORIENTATION
ORIENTATION: RIGHT

## 2024-06-17 ASSESSMENT — PAIN DESCRIPTION - LOCATION
LOCATION: ARM
LOCATION: HEAD

## 2024-06-17 ASSESSMENT — COGNITIVE AND FUNCTIONAL STATUS - GENERAL
DAILY ACTIVITIY SCORE: 24
MOBILITY SCORE: 24
DAILY ACTIVITIY SCORE: 24
MOBILITY SCORE: 24

## 2024-06-17 ASSESSMENT — PAIN SCALES - PAIN ASSESSMENT IN ADVANCED DEMENTIA (PAINAD)
BODYLANGUAGE: RELAXED
BREATHING: NORMAL
CONSOLABILITY: NO NEED TO CONSOLE
FACIALEXPRESSION: SMILING OR INEXPRESSIVE
TOTALSCORE: 0

## 2024-06-17 ASSESSMENT — PAIN SCALES - WONG BAKER: WONGBAKER_NUMERICALRESPONSE: HURTS LITTLE MORE

## 2024-06-17 ASSESSMENT — ACTIVITIES OF DAILY LIVING (ADL)
LACK_OF_TRANSPORTATION: NO
LACK_OF_TRANSPORTATION: NO

## 2024-06-17 ASSESSMENT — PAIN - FUNCTIONAL ASSESSMENT: PAIN_FUNCTIONAL_ASSESSMENT: 0-10

## 2024-06-17 NOTE — NURSING NOTE
Pt reports passing small clot (vaginally) while using the restroom. Pt states she's been spotting throughout the night.

## 2024-06-17 NOTE — PROGRESS NOTES
"Subjective:  Seen in am.   Requiring around the clock IV opioids for axillary pain (R>L).   Reports ongoing vaginal spotting and passing 2 clots yesterday and today.   Soft BP noted, pt asymptomatic.     Vitals (Last 24 Hours):  Heart Rate:  [70-82]   Temp:  [35.8 °C (96.4 °F)-36.6 °C (97.9 °F)]   Resp:  [16-24]   BP: ()/(48-80)   Height:  [154.9 cm (5' 1\")]   Weight:  [78.5 kg (173 lb)]   SpO2:  [99 %-100 %]     I have reviewed all imaging reports and labs pertinent to this visit / presenting problem    PHYSICAL EXAM:  Constitutional: NAD, alert and cooperative  Eyes: no icterus  ENMT: mucous membranes moist, no lesions  Head/Neck: supple  Respiratory/Thorax: CTA bilaterally, non-labored breathing, no cough, on RA  Cardiovascular: RRR, no murmurs heard  Gastrointestinal: ND/S/NT  : no Urban, no SP/flank discomfort  Musculoskeletal: no joint swelling, ROM intact  Extremities: mild RUE edema  Neurological: non-focal  Skin: bilat axillary HS lesions (draining on the right), L groin and buttock hyperpigmented lesions / scarring   Psych: calm, stable mood     MEDS:  Scheduled meds  gabapentin, 800 mg, oral, TID  pantoprazole, 40 mg, oral, Daily before breakfast  piperacillin-tazobactam, 3.375 g, intravenous, q6h  prenatal vitamin (iron-folic), 1 tablet, oral, Daily  vancomycin, 1,000 mg, intravenous, q12h    PRN meds  PRN medications: acetaminophen, HYDROmorphone, ondansetron, oxyCODONE, oxyCODONE, polyethylene glycol, vancomycin    ASSESSMENT/PLAN:  33 y.o. pregnant female with h/o HS on Cosentyx previously, SLE, PTSD, anemia, fibromyalgia, p/w   Right axillary HS flare. Pt is currently 5w pregnant.   Patient is on chronic suppressive abx for her HS and has also been taking Percocet and Gabapentin with no relief in pain at home.   In the ED, VSS. Labs notable for mild elevation in CRP and no leukocytosis. Blood cx sent and patient given IV Vancomycin, as well as pain medication. Concern for persistent vaginal " bleeding/spotting since , known +pregnancy, has seen OB as outpatient, reports heavier bleeding today with once small clot. , hCG level appropriately rising and TVUS normal for 5w gestation.      R axillary HS flare / abscess   -Acute worsening in the past few weeks since stopping Cosentyx  -Broad spectrum abx w/ IV Vancomycin and Zosyn, ID / surgery consult pending   -Supportive care for pain, continue gabapentin, opioid regimen adjusted after d/w pharmacy and obs MD, de-escalate as able, monitor BP closely   -monitor RUE (trace edema), consider further imaging based on course     Hypotension   -s/p LR bolus, BP improved at present, pt asymptomatic   -monitor closely on opioids      5 weeks gestation  Vaginal Spotting  -Hx of spontaneous  in   -Vaginal bleeding/spotting since , no other sx  -hCG and TVUS noted, Ob consulted  -Prenatal vitamin ordered  -mildly elevated BG noted, check A1c/FU with Ob as OP     Pyuria, possible contamination   -asymptomatic, already on abx      Other comorbidities as above  -Continue meds as ordered and adjust based on clinical course      GI/VTE PPX: PPI, SCDs  Code Status: Full Code     Chart, medical history, and labs/testing reviewed in detail.   Case and plan of care to be discussed with attending provider.      Disposition: Discharge home once medically cleared and stable, pending ID evaluation    Discussed with Dr. Pulido and the interdisciplinary team     Li Carreon, APRN-CNP

## 2024-06-17 NOTE — CONSULTS
Consults    Reason For Consult  IP GYN HPI/REASON FOR CONSULT: Threatened     History Of Present Illness  Sandrita Adams is a 33 y.o. female presenting with IP GYN HPI/REASON FOR CONSULT: Severe pain secondary to hidradenitis of bilateral axilla.  Patient has a long history and had been on Biologics in the past but stopped recently.  Patient had recent diagnosis of pregnancy in the women's health clinic.  Was told that she was approximately 5 weeks pregnant by dates.  During this hospitalization patient notes that she is passed a very large clot yesterday and had severe cramping which is since resolved.  Only some mild spotting since then.  This is an unplanned but wanted pregnancy .     Past Medical History  She has a past medical history of Hidradenitis suppurativa (10/29/2020) and Lupus (Multi).    Surgical History  She has a past surgical history that includes Other surgical history (2022).    OB History  OB History    Para Term  AB Living   2 0 0 0 1 0   SAB IAB Ectopic Multiple Live Births   1 0 0 0 0      # Outcome Date GA Lbr Anoop/2nd Weight Sex Delivery Anes PTL Lv   2 Current            1 SAB                Sexual History  Social History     Substance and Sexual Activity   Sexual Activity Not on file        Social History  She reports that she has quit smoking. Her smoking use included cigarettes. She has never used smokeless tobacco. She reports that she does not use drugs. No history on file for alcohol use.    Family History  No family history on file.     Allergies  Suboxone [buprenorphine-naloxone], Clarithromycin, Haloperidol, Keflex [cephalexin], Levofloxacin, Metoclopramide, and Reglan [metoclopramide hcl]    Review of Systems   Positive for axillary pain, vaginal bleeding and cramping.  All other systems negative    Physical Exam  Constitutional:       Appearance: Normal appearance. She is obese.   Cardiovascular:      Rate and Rhythm: Normal rate and regular rhythm.  "  Pulmonary:      Breath sounds: Normal breath sounds.   Abdominal:      General: Abdomen is flat. Bowel sounds are normal. There is no distension.      Palpations: Abdomen is soft.      Tenderness: There is no abdominal tenderness. There is no guarding.   Genitourinary:     Comments: No gross vaginal bleeding noted on pad.  Declining pelvic exam at this point.  Neurological:      General: No focal deficit present.      Mental Status: She is alert.   Psychiatric:         Mood and Affect: Mood normal.         Behavior: Behavior normal.         Thought Content: Thought content normal.         Judgment: Judgment normal.          Last Recorded Vitals  Blood pressure 104/67, pulse 76, temperature 36.6 °C (97.9 °F), temperature source Temporal, resp. rate 16, height 1.549 m (5' 1\"), weight 78.5 kg (173 lb), SpO2 100%.    Relevant Results  Ultrasound yesterday compatible with 5.3-week pregnancy.  Empty sac.  No fetal pole noted.  Quantitative hCG noted to have dropped from 7515 yesterday to 7310 today     Assessment/Plan     Patient admitted for treatment of bilateral hidradenitis in the axilla.  Extremely painful.  Being treated with antibiotics.  [facility] ago and told that she was approximately 5 weeks pregnant.  This is an unplanned but wanted pregnancy.  Since then patient notes she is passed a large clot of blood yesterday with a lot of cramping.  Cramping is resolved and she has noticed some very light spotting.  Ultrasound done yesterday shows a 5.3 cm empty sac with no pulse or heartbeat.  Quantitative hCG is also notable drop from approximately 70 500-70 300.  Discussed findings with patient.  With suspect this is probably missed  or recent nonviable pregnancy.  Patient currently stable is not bleeding.  Would recommend repeat hCG in 24 to 48 hours.  May be done as outpatient.  Should be seen in the office in 48 hours as well.  If levels dropping would recommend " either medical or surgical treatment.  This was discussed with patient.  Risks and benefits reviewed.  Patient may also elect to treat this conservatively and wait for spontaneous termination.  Should level start to rise would repeat ultrasound.  Patient may have this workup done as outpatient.  Will make appointment in office either at the [facility] or with our office in 48 hours    I spent 60 minutes in the professional and overall care of this patient.

## 2024-06-17 NOTE — PROGRESS NOTES
Vancomycin Dosing by Pharmacy- FOLLOW UP    Sandrita Adams is a 33 y.o. year old female who Pharmacy has been consulted for vancomycin dosing for cellulitis, skin and soft tissue. Based on the patient's indication and renal status this patient is being dosed based on a goal AUC of 400-600.     Renal function is currently stable.    Current vancomycin dose: 1000 mg given every 12 hours    Estimated vancomycin AUC on current dose: 451 mg/L.hr     Visit Vitals  BP 90/60 (BP Location: Left arm, Patient Position: Lying)   Pulse 72   Temp 35.8 °C (96.4 °F) (Temporal)   Resp 18        Lab Results   Component Value Date    CREATININE 0.87 2024    CREATININE 0.85 2024    CREATININE 0.90 2024    CREATININE 0.86 2024        Patient weight is as follows:   Vitals:    24 2133   Weight: 78.5 kg (173 lb)       Cultures:  No results found for the encounter in last 14 days.       I/O last 3 completed shifts:  In: 500 (6.4 mL/kg) [IV Piggyback:500]  Out: - (0 mL/kg)   Weight: 78.5 kg   I/O during current shift:  No intake/output data recorded.    Temp (24hrs), Av.4 °C (97.5 °F), Min:35.8 °C (96.4 °F), Max:37.8 °C (100 °F)      Assessment/Plan    Within goal AUC range. Continue current vancomycin regimen.    This dosing regimen is predicted by [employer] to result in the following pharmacokinetic parameters:  Exposure target: AUC24 (range)400-600 mg/L.hr   AUC24,ss: 451 mg/L.hr  Probability of AUC24 > 400: 70 %  Ctrough,ss: 13.9 mg/L  Probability of Ctrough,ss > 20: 14 %  Probability of nephrotoxicity (Lodise POP ): 9 %  The next level will be obtained on  at 0500. May be obtained sooner if clinically indicated.   Will continue to monitor renal function daily while on vancomycin and order serum creatinine at least every 48 hours if not already ordered.  Follow for continued vancomycin needs, clinical response, and signs/symptoms of toxicity.       Suha Leavitt, PharmD

## 2024-06-17 NOTE — PROGRESS NOTES
Transitional Care Coordination Progress Note:  Plan per Medical/Surgical team: treatment of Hidradenitis Suppurativa  flare with IV ATB, IV morphine, ID consult   Status: Observation  Payor source: [employer] dual complete  Discharge disposition: Home with daughter   Potential Barriers: 5 weeks pregnant   ADOD: 6/18/2024   JOE Good RN, BSN Transitional Care Coordinator ED# [phone]      06/17/24 0807   Discharge Planning   Living Arrangements Children   Support Systems Family members   Assistance Needed ATB plan, wound care, pain management   Type of Residence Private residence   Number of Stairs to Enter Residence 12   Number of Stairs Within Residence 0   Do you have animals or pets at home? Yes   Patient expects to be discharged to: Home with daughter   Does the patient need discharge transport arranged? No   Financial Resource Strain   How hard is it for you to pay for the very basics like food, housing, medical care, and heating? Not hard   Transportation Needs   In the past 12 months, has lack of transportation kept you from medical appointments or from getting medications? no   In the past 12 months, has lack of transportation kept you from meetings, work, or from getting things needed for daily living? No

## 2024-06-17 NOTE — PROGRESS NOTES
06/17/24 0807   ACS Disability Status   Are you deaf or do you have serious difficulty hearing? N   Are you blind or do you have serious difficulty seeing, even when wearing glasses? N   Because of a physical, mental, or emotional condition, do you have serious difficulty concentrating, remembering, or making decisions? (5 years old or older) N   Do you have serious difficulty walking or climbing stairs? N   Do you have serious difficulty dressing or bathing? N   Because of a physical, mental, or emotional condition, do you have serious difficulty doing errands alone such as visiting the doctor? N

## 2024-06-17 NOTE — PROGRESS NOTES
Sandrita Adams is a 33 y.o. female on day 0 of admission presenting with Hidradenitis axillaris.     06/17/24 1138   Discharge Planning   Living Arrangements Friends   Support Systems Friends/neighbors   Assistance Needed independent   Type of Residence Private residence   Number of Stairs to Enter Residence 12   Number of Stairs Within Residence 0   Do you have animals or pets at home? No   Who is requesting discharge planning? Provider   Home or Post Acute Services None   Patient expects to be discharged to: Home w friend   Does the patient need discharge transport arranged? No   Financial Resource Strain   How hard is it for you to pay for the very basics like food, housing, medical care, and heating? Not hard   Transportation Needs   In the past 12 months, has lack of transportation kept you from medical appointments or from getting medications? no   In the past 12 months, has lack of transportation kept you from meetings, work, or from getting things needed for daily living? No     Spoke with patient to discuss her preferences for care. Discussed how patient manages health at home. Lives at a friends home currently. Independent in all ADLS.  No home O2, dialysis, or assistive devices. Patient denies any problems getting to appointments or obtaining/affording medications.  No additional resources or needs identified.    Plan: concern for hidradenitis, awaiting ID recommendations as patient is 5 weeks pregnant. States she has an appointment with [facility] at the beginning of July, but has been spotting.   Status: obs  Payor: [employer]  Disposition: Home  Barrier: ID recs, pharmacy recs  ADOD:   today/tomorrow      Marni Gerardo RN

## 2024-06-17 NOTE — PROGRESS NOTES
Home with daughter      06/17/24 0807   Current Planned Discharge Disposition   Current Planned Discharge Disposition Home

## 2024-06-17 NOTE — CARE PLAN
The clinical goals for the shift include Pt will have decreased pain throughout shift <9/10 (Pt did not meet goal)     Problem: Safety - Adult  Goal: Free from fall injury  Outcome: Progressing     Problem: Fall/Injury  Goal: Not fall by end of shift  Outcome: Met  Goal: Be free from injury by end of the shift  Outcome: Met     Over the shift, the patient did not make progress toward the following goals. Barriers to progression include acute pain.     Problem: Pain - Adult  Goal: Verbalizes/displays adequate comfort level or baseline comfort level  Outcome: Not Progressing

## 2024-06-17 NOTE — TREATMENT PLAN
Discussed patient case with Dr. Maria yesterday. Due to her being 5 weeks pregnant we advised no surgical intervention at this time. Would likely be able to clear this flare with just ABX. She should follow -up Outpatient with Dr. Coronel to discuss surgical options down the road.

## 2024-06-18 PROBLEM — L73.2 AXILLARY HIDRADENITIS SUPPURATIVA: Status: ACTIVE | Noted: 2024-06-18

## 2024-06-18 LAB
ANION GAP SERPL CALC-SCNC: 11 MMOL/L (ref 10–20)
B-HCG SERPL-ACNC: 7877 MIU/ML
BACTERIA UR CULT: NORMAL
BUN SERPL-MCNC: 18 MG/DL (ref 6–23)
CALCIUM SERPL-MCNC: 8.6 MG/DL (ref 8.6–10.3)
CHLORIDE SERPL-SCNC: 106 MMOL/L (ref 98–107)
CO2 SERPL-SCNC: 22 MMOL/L (ref 21–32)
CREAT SERPL-MCNC: 1.05 MG/DL (ref 0.5–1.05)
EGFRCR SERPLBLD CKD-EPI 2021: 71 ML/MIN/1.73M*2
ERYTHROCYTE [DISTWIDTH] IN BLOOD BY AUTOMATED COUNT: 14.9 % (ref 11.5–14.5)
GLUCOSE SERPL-MCNC: 126 MG/DL (ref 74–99)
HCT VFR BLD AUTO: 34 % (ref 36–46)
HGB BLD-MCNC: 11.3 G/DL (ref 12–16)
MCH RBC QN AUTO: 25.8 PG (ref 26–34)
MCHC RBC AUTO-ENTMCNC: 33.2 G/DL (ref 32–36)
MCV RBC AUTO: 78 FL (ref 80–100)
NRBC BLD-RTO: 0 /100 WBCS (ref 0–0)
PLATELET # BLD AUTO: 305 X10*3/UL (ref 150–450)
POTASSIUM SERPL-SCNC: 3.9 MMOL/L (ref 3.5–5.3)
RBC # BLD AUTO: 4.38 X10*6/UL (ref 4–5.2)
SODIUM SERPL-SCNC: 135 MMOL/L (ref 136–145)
WBC # BLD AUTO: 7.4 X10*3/UL (ref 4.4–11.3)

## 2024-06-18 PROCEDURE — 2500000001 HC RX 250 WO HCPCS SELF ADMINISTERED DRUGS (ALT 637 FOR MEDICARE OP): Performed by: NURSE PRACTITIONER

## 2024-06-18 PROCEDURE — 99233 SBSQ HOSP IP/OBS HIGH 50: CPT | Performed by: OBSTETRICS & GYNECOLOGY

## 2024-06-18 PROCEDURE — 1100000001 HC PRIVATE ROOM DAILY

## 2024-06-18 PROCEDURE — 36415 COLL VENOUS BLD VENIPUNCTURE: CPT | Performed by: NURSE PRACTITIONER

## 2024-06-18 PROCEDURE — 84702 CHORIONIC GONADOTROPIN TEST: CPT | Performed by: NURSE PRACTITIONER

## 2024-06-18 PROCEDURE — 2500000004 HC RX 250 GENERAL PHARMACY W/ HCPCS (ALT 636 FOR OP/ED): Performed by: NURSE PRACTITIONER

## 2024-06-18 PROCEDURE — 2500000001 HC RX 250 WO HCPCS SELF ADMINISTERED DRUGS (ALT 637 FOR MEDICARE OP): Performed by: INTERNAL MEDICINE

## 2024-06-18 PROCEDURE — 85027 COMPLETE CBC AUTOMATED: CPT | Performed by: NURSE PRACTITIONER

## 2024-06-18 PROCEDURE — G0425 INPT/ED TELECONSULT30: HCPCS | Performed by: STUDENT IN AN ORGANIZED HEALTH CARE EDUCATION/TRAINING PROGRAM

## 2024-06-18 PROCEDURE — 80048 BASIC METABOLIC PNL TOTAL CA: CPT | Performed by: NURSE PRACTITIONER

## 2024-06-18 PROCEDURE — 2500000004 HC RX 250 GENERAL PHARMACY W/ HCPCS (ALT 636 FOR OP/ED): Performed by: INTERNAL MEDICINE

## 2024-06-18 RX ORDER — HYDROMORPHONE HYDROCHLORIDE 2 MG/1
1 TABLET ORAL EVERY 4 HOURS PRN
Status: DISCONTINUED | OUTPATIENT
Start: 2024-06-18 | End: 2024-06-18

## 2024-06-18 RX ORDER — ACETAMINOPHEN 325 MG/1
975 TABLET ORAL EVERY 8 HOURS
Status: DISCONTINUED | OUTPATIENT
Start: 2024-06-18 | End: 2024-06-19 | Stop reason: HOSPADM

## 2024-06-18 RX ORDER — CLINDAMYCIN HYDROCHLORIDE 150 MG/1
300 CAPSULE ORAL 2 TIMES DAILY
Status: DISCONTINUED | OUTPATIENT
Start: 2024-06-18 | End: 2024-06-19 | Stop reason: HOSPADM

## 2024-06-18 RX ORDER — HYDROMORPHONE HYDROCHLORIDE 2 MG/1
2 TABLET ORAL EVERY 4 HOURS PRN
Status: DISCONTINUED | OUTPATIENT
Start: 2024-06-18 | End: 2024-06-19 | Stop reason: HOSPADM

## 2024-06-18 RX ORDER — HYDROMORPHONE HYDROCHLORIDE 2 MG/1
2 TABLET ORAL EVERY 6 HOURS PRN
Status: DISCONTINUED | OUTPATIENT
Start: 2024-06-18 | End: 2024-06-18

## 2024-06-18 RX ORDER — HYDROMORPHONE HYDROCHLORIDE 2 MG/1
2 TABLET ORAL ONCE
Status: DISCONTINUED | OUTPATIENT
Start: 2024-06-18 | End: 2024-06-19 | Stop reason: HOSPADM

## 2024-06-18 RX ORDER — HYDROMORPHONE HYDROCHLORIDE 2 MG/1
2 TABLET ORAL ONCE
Status: COMPLETED | OUTPATIENT
Start: 2024-06-18 | End: 2024-06-18

## 2024-06-18 RX ORDER — HYDROMORPHONE HYDROCHLORIDE 1 MG/ML
1 INJECTION, SOLUTION INTRAMUSCULAR; INTRAVENOUS; SUBCUTANEOUS EVERY 4 HOURS PRN
Status: DISCONTINUED | OUTPATIENT
Start: 2024-06-18 | End: 2024-06-18

## 2024-06-18 RX ORDER — CHLORHEXIDINE GLUCONATE 40 MG/ML
SOLUTION TOPICAL DAILY
Status: DISCONTINUED | OUTPATIENT
Start: 2024-06-18 | End: 2024-06-19 | Stop reason: HOSPADM

## 2024-06-18 RX ADMIN — HYDROMORPHONE HYDROCHLORIDE 1 MG: 1 INJECTION, SOLUTION INTRAMUSCULAR; INTRAVENOUS; SUBCUTANEOUS at 08:30

## 2024-06-18 RX ADMIN — OXYCODONE HYDROCHLORIDE 10 MG: 5 TABLET ORAL at 02:23

## 2024-06-18 RX ADMIN — HYDROMORPHONE HYDROCHLORIDE 2 MG: 2 TABLET ORAL at 20:50

## 2024-06-18 RX ADMIN — OXYCODONE HYDROCHLORIDE 10 MG: 5 TABLET ORAL at 17:13

## 2024-06-18 RX ADMIN — GABAPENTIN 800 MG: 400 CAPSULE ORAL at 16:34

## 2024-06-18 RX ADMIN — HYDROMORPHONE HYDROCHLORIDE 1 MG: 1 INJECTION, SOLUTION INTRAMUSCULAR; INTRAVENOUS; SUBCUTANEOUS at 04:05

## 2024-06-18 RX ADMIN — PIPERACILLIN SODIUM AND TAZOBACTAM SODIUM 3.38 G: 3; .375 INJECTION, SOLUTION INTRAVENOUS at 11:00

## 2024-06-18 RX ADMIN — PIPERACILLIN SODIUM AND TAZOBACTAM SODIUM 3.38 G: 3; .375 INJECTION, SOLUTION INTRAVENOUS at 00:44

## 2024-06-18 RX ADMIN — HYDROMORPHONE HYDROCHLORIDE 1 MG: 1 INJECTION, SOLUTION INTRAMUSCULAR; INTRAVENOUS; SUBCUTANEOUS at 16:36

## 2024-06-18 RX ADMIN — Medication: at 22:22

## 2024-06-18 RX ADMIN — GABAPENTIN 800 MG: 400 CAPSULE ORAL at 20:51

## 2024-06-18 RX ADMIN — HYDROMORPHONE HYDROCHLORIDE 1 MG: 1 INJECTION, SOLUTION INTRAMUSCULAR; INTRAVENOUS; SUBCUTANEOUS at 00:43

## 2024-06-18 RX ADMIN — CLINDAMYCIN HYDROCHLORIDE 300 MG: 150 CAPSULE ORAL at 20:50

## 2024-06-18 RX ADMIN — OXYCODONE HYDROCHLORIDE 10 MG: 5 TABLET ORAL at 23:27

## 2024-06-18 RX ADMIN — PANTOPRAZOLE SODIUM 40 MG: 40 TABLET, DELAYED RELEASE ORAL at 08:29

## 2024-06-18 RX ADMIN — HYDROMORPHONE HYDROCHLORIDE 2 MG: 2 TABLET ORAL at 10:35

## 2024-06-18 RX ADMIN — PIPERACILLIN SODIUM AND TAZOBACTAM SODIUM 3.38 G: 3; .375 INJECTION, SOLUTION INTRAVENOUS at 06:02

## 2024-06-18 RX ADMIN — OXYCODONE HYDROCHLORIDE 15 MG: 5 TABLET ORAL at 12:49

## 2024-06-18 RX ADMIN — VANCOMYCIN HYDROCHLORIDE 1000 MG: 1 INJECTION, SOLUTION INTRAVENOUS at 06:45

## 2024-06-18 RX ADMIN — PRENATAL VIT W/ FE FUMARATE-FA TAB 27-0.8 MG 1 TABLET: 27-0.8 TAB at 08:29

## 2024-06-18 RX ADMIN — Medication 3 MG: at 20:51

## 2024-06-18 RX ADMIN — PIPERACILLIN SODIUM AND TAZOBACTAM SODIUM 3.38 G: 3; .375 INJECTION, SOLUTION INTRAVENOUS at 16:33

## 2024-06-18 RX ADMIN — ACETAMINOPHEN 975 MG: 325 TABLET ORAL at 20:51

## 2024-06-18 RX ADMIN — GABAPENTIN 800 MG: 400 CAPSULE ORAL at 08:29

## 2024-06-18 ASSESSMENT — PAIN DESCRIPTION - ORIENTATION
ORIENTATION: RIGHT
ORIENTATION: RIGHT;LEFT
ORIENTATION: RIGHT;LEFT
ORIENTATION: RIGHT
ORIENTATION: RIGHT;LEFT

## 2024-06-18 ASSESSMENT — COGNITIVE AND FUNCTIONAL STATUS - GENERAL
MOBILITY SCORE: 24
DAILY ACTIVITIY SCORE: 24

## 2024-06-18 ASSESSMENT — PAIN SCALES - GENERAL
PAINLEVEL_OUTOF10: 6
PAINLEVEL_OUTOF10: 10 - WORST POSSIBLE PAIN
PAINLEVEL_OUTOF10: 8
PAINLEVEL_OUTOF10: 9
PAINLEVEL_OUTOF10: 6
PAINLEVEL_OUTOF10: 5 - MODERATE PAIN

## 2024-06-18 ASSESSMENT — ENCOUNTER SYMPTOMS
WOUND: 1
ACTIVITY CHANGE: 1
FEVER: 0
CHILLS: 0

## 2024-06-18 ASSESSMENT — PAIN - FUNCTIONAL ASSESSMENT
PAIN_FUNCTIONAL_ASSESSMENT: 0-10

## 2024-06-18 ASSESSMENT — PAIN DESCRIPTION - LOCATION
LOCATION: ARM
LOCATION: ARM
LOCATION: SHOULDER
LOCATION: ARM

## 2024-06-18 ASSESSMENT — PAIN SCALES - WONG BAKER
WONGBAKER_NUMERICALRESPONSE: HURTS LITTLE MORE
WONGBAKER_NUMERICALRESPONSE: NO HURT

## 2024-06-18 NOTE — PROGRESS NOTES
"Sandrita Adams is a 33 y.o. female on day 0 of admission presenting with Hidradenitis axillaris.    Subjective   Patient still complain of axillary pain.  Is using pain medication.  On IV antibiotics.  Denies any vaginal bleeding or pain at this point in the lower pelvis     Objective     Physical Exam  Vital signs are stable  Lungs are clear to exam shows normal sinus rhythm  Abdomen without masses or tenderness.    Hidradenitis noted both axilla's.  Pelvic exam deferred  Extremities edema or cyanosis   Psychological assessment is normal    Last Recorded Vitals  Blood pressure 100/62, pulse 88, temperature 36.8 °C (98.2 °F), resp. rate 16, height 1.549 m (5' 1\"), weight 78.5 kg (173 lb), SpO2 99%.  Intake/Output last 3 Shifts:  I/O last 3 completed shifts:  In: 1345 (17.1 mL/kg) [I.V.:395 (5 mL/kg); IV Piggyback:950]  Out: - (0 mL/kg)   Weight: 78.5 kg     Relevant Results  hCG has increased this 7800 approximately     Assessment/Plan   Principal Problem:    Hidradenitis axillaris  Active Problems:    Hidradenitis suppurativa    Fibromyalgia    SLE (systemic lupus erythematosus) ([facility])    5 weeks gestation of pregnancy ([facility])    Vaginal spotting    Cellulitis    Patient admitted for treatment of axillary hidradenitis due to pain.  Patient also noted to be approximately 5 to 6 weeks pregnant by ultrasound with no definitive diagnosis of viable pregnancy.  Quantitative hCG has increased from approximately 70 300-70 800.  Short time interval.  Reviewed options with patient.  Would still be most likely a missed  however as the level is still rising would repeat in 48 hours to assess.  Would also repeat ultrasound.  If missed  is confirmed would offer either conservative management, medical therapy, surgical therapy, risks and benefits of each of these procedures reviewed with patient.  Patient will be followed up as an outpatient.  May come to the office Thursday or Friday for follow-up if " discharge.  30-minute total visit with exam and discussion patient as to options     Faraz Horowitz MD

## 2024-06-18 NOTE — PROGRESS NOTES
Sandrita Adams is a 33 y.o. female on day 0 of admission presenting with Hidradenitis axillaris.    Subjective      Reports ongoing pain.  Requiring frequent pain medications.  Feels that IV dilaudid doesn't last as long.  Had 2 mg PO dilaudid recommended by palliative care in the past, which helped.     Had lesion in right axilla open up yesterday, draining some.    Still with vaginal spotting. Denies pain or cramping.     Objective     Physical Exam  Vitals reviewed.   Constitutional:       General: She is not in acute distress.     Appearance: Normal appearance. She is not ill-appearing, toxic-appearing or diaphoretic.   Eyes:      General: No scleral icterus.     Conjunctiva/sclera: Conjunctivae normal.   Cardiovascular:      Rate and Rhythm: Normal rate and regular rhythm.      Pulses: Normal pulses.      Heart sounds: Normal heart sounds. No murmur heard.  Pulmonary:      Effort: Pulmonary effort is normal.      Breath sounds: Normal breath sounds and air entry.   Abdominal:      General: Abdomen is flat. Bowel sounds are normal.      Palpations: Abdomen is soft.      Tenderness: There is no abdominal tenderness.   Genitourinary:     Comments: + bright red vaginal spotting noted on underwear.   Musculoskeletal:         General: Normal range of motion.      Right lower leg: No edema.      Left lower leg: No edema.   Skin:     General: Skin is warm and dry.      Capillary Refill: Capillary refill takes less than 2 seconds.      Findings: Lesion present.      Comments: Abscesses clustered in right axilla.  One area open and draining trace amount purulent fluid.  Surrounding edema.     Left axilla with clustered nodular abscesses, tender to touch. No open areas or drainage.      Bilateral groin with small nodular areas, tender to touch. No open areas or drainage.    Neurological:      General: No focal deficit present.      Mental Status: She is alert and oriented to person, place, and time.      Motor: Motor  "function is intact.      Coordination: Coordination is intact.   Psychiatric:         Attention and Perception: Attention normal.         Mood and Affect: Mood normal.         Speech: Speech normal.         Behavior: Behavior normal. Behavior is cooperative.         Last Recorded Vitals  Blood pressure 120/66, pulse 78, temperature 36.6 °C (97.9 °F), resp. rate 16, height 1.549 m (5' 1\"), weight 78.5 kg (173 lb), SpO2 99%.  Intake/Output last 3 Shifts:  I/O last 3 completed shifts:  In: 1345 (17.1 mL/kg) [I.V.:395 (5 mL/kg); IV Piggyback:950]  Out: - (0 mL/kg)   Weight: 78.5 kg     Relevant Results                Scheduled medications  acetaminophen, 975 mg, oral, q8h  gabapentin, 800 mg, oral, TID  HYDROmorphone, 2 mg, oral, Once  melatonin, 3 mg, oral, Nightly  pantoprazole, 40 mg, oral, Daily before breakfast  piperacillin-tazobactam, 3.375 g, intravenous, q6h  prenatal vitamin (iron-folic), 1 tablet, oral, Daily  vancomycin, 1,000 mg, intravenous, q12h      Continuous medications     PRN medications  PRN medications: HYDROmorphone, ondansetron, oxyCODONE, polyethylene glycol, vancomycin     Results for orders placed or performed during the hospital encounter of 06/16/24 (from the past 24 hour(s))   CBC   Result Value Ref Range    WBC 7.4 4.4 - 11.3 x10*3/uL    nRBC 0.0 0.0 - 0.0 /100 WBCs    RBC 4.38 4.00 - 5.20 x10*6/uL    Hemoglobin 11.3 (L) 12.0 - 16.0 g/dL    Hematocrit 34.0 (L) 36.0 - 46.0 %    MCV 78 (L) 80 - 100 fL    MCH 25.8 (L) 26.0 - 34.0 pg    MCHC 33.2 32.0 - 36.0 g/dL    RDW 14.9 (H) 11.5 - 14.5 %    Platelets 305 150 - 450 x10*3/uL   Basic metabolic panel   Result Value Ref Range    Glucose 126 (H) 74 - 99 mg/dL    Sodium 135 (L) 136 - 145 mmol/L    Potassium 3.9 3.5 - 5.3 mmol/L    Chloride 106 98 - 107 mmol/L    Bicarbonate 22 21 - 32 mmol/L    Anion Gap 11 10 - 20 mmol/L    Urea Nitrogen 18 6 - 23 mg/dL    Creatinine 1.05 0.50 - 1.05 mg/dL    eGFR 71 >60 mL/min/1.73m*2    Calcium 8.6 8.6 - 10.3 " mg/dL   hCG, quantitative, pregnancy   Result Value Ref Range    HCG, Beta-Quantitative 7,877 (H) <5 mIU/mL     US PELVIS OB TRANSABDOMINAL W TRANSVAGINAL UP TO 1ST TRIMESTER    Result Date: 6/16/2024  Interpreted By:  Rachel Johns, STUDY: US PELVIS OB TRANSABDOMINAL W TRANSVAGINAL UP TO 1ST TRIMESTER 6/16/2024 2:58 pm   INDICATION: Signs/Symptoms:spotting during pregnancy.   COMPARISON: CT abdomen pelvis 05/19/2024   ACCESSION NUMBER(S): [id number]   ORDERING CLINICIAN: JOHNSON ADRIAN   TECHNIQUE: Grayscale  transabdominal and transvaginal pelvic ultrasound. Color, spectral, and M-Mode Doppler evaluation.   FINDINGS: UTERUS AND GESTATIONAL SAC: Intrauterine gestation(s):  Single. Yolk Sac:  Normal. Crown Rump Length: Fetal pole is not clearly identified. Mean gestational sac diameter measures up to 8 mm corresponding with estimated gestational age of 5 weeks, 3 days. Subchorionic hemorrhage:  None.   ADNEXA/OVARIES: The right ovary measures 2.9 x 2.2 x 2.8 cm. The left ovary measures up to 2.3 x 1.3 x 2.2 cm. Bilateral ovarian blood flow noted.   FREE FLUID:   None.       1. Single intrauterine pregnancy noted with estimated gestational age of 5 weeks, 3 days based on mean gestational sac diameter. A definite fetal pole and or fetal heart tones not identified which may be secondary to early gestational state. 2. Follow up examination is recommended at 18-20 weeks gestation for evaluation of fetal anatomy.       Signed by: Rachel Johns 6/16/2024 4:32 PM Dictation workstation:   [id number]                Assessment/Plan     33 y.o. pregnant female with h/o HS on Cosentyx previously, SLE, PTSD, anemia, fibromyalgia, p/w   Right axillary HS flare. Pt is currently 5w pregnant.   Patient is on chronic suppressive abx (augmentin 875 BID) for her HS and has also been taking Percocet and Gabapentin with no relief in pain at home.   In the ED, VSS. Labs notable for mild elevation in CRP and no leukocytosis. Blood  cx sent and patient given IV Vancomycin, as well as pain medication. Concern for persistent vaginal bleeding/spotting since , known +pregnancy, has seen OB as outpatient, reports heavier bleeding today with once small clot. , hCG level appropriately rising and TVUS normal for 5w gestation.      R axillary HS flare / abscess   -Acute worsening in the past few weeks since stopping Cosentyx  -Broad spectrum abx w/ IV Vancomycin and Zosyn  -appreciate ID consultation; difficult as antibiotics alone may not control the problem.  Will ask for dermatology input-->planned telehealth visit this afternoon   - evaluated by general surgery- no surgical plans due to pregnancy  -Supportive care for pain, continue gabapentin, opioid regimen adjusted to oral dilaudid and oxycodone, scheduled tylenol, de-escalate as able, monitor BP closely     5 weeks gestation  Vaginal Spotting  -Hx of spontaneous  in   -Vaginal bleeding/spotting since , no other sx  -hCG and TVUS noted  -OB consulted--possibly missed , though not confirmed-->trend HcG levels.  Repeat ultrasound in one week.   -Prenatal vitamin ordered  -mildly elevated BG noted, check A1c/FU with Ob as OP      Hypotension, resolved   -s/p LR bolus, BP improved at present, pt asymptomatic   -monitor closely on opioids     Pyuria, possible contamination   -asymptomatic, already on abx   -follow urine culture      Other comorbidities as above  -Continue meds as ordered and adjust based on clinical course      GI/VTE PPX: PPI, SCDs  Code Status: Full Code     Chart, medical history, and labs/testing reviewed in detail.   Case and plan of care to be discussed with attending provider.      Disposition: Discharge home once medically cleared and stable     Discussed with Dr. Pulido and the interdisciplinary team        I spent 50 minutes in the professional and overall care of this patient.      Gardenia Moseley, APRN-CNP

## 2024-06-18 NOTE — PROGRESS NOTES
Vancomycin Dosing by Pharmacy- Cessation of Therapy    Consult to pharmacy for vancomycin dosing has been discontinued by the prescriber, pharmacy will sign off at this time.    Please call pharmacy if there are further questions or re-enter a consult if vancomycin is resumed.     [place], PharmD

## 2024-06-18 NOTE — SIGNIFICANT EVENT
"Discussed plan of care with Dr. Garcia (dermatology at [facility]) after telehealth visit:    Derm recommendations:   \"We recommend starting Hibiclens daily and clindamycin 300mg BID x 3 months. We will try to get certolizumab approved (some case reports have some efficacy in HS), but will need prior authorization so this will be more of a long term treatment option that will be initiated outpatient. Patient has mentioned that her pain has been uncontrolled with recent flares, we would recommend engaging pain management/palliative during her hospitalization to discuss safe options for pain control during pregnancy.\"     Outpatient follow up to be arranged in 1-2 weeks post discharge.          "

## 2024-06-19 ENCOUNTER — APPOINTMENT (OUTPATIENT)
Dept: CARDIOLOGY | Facility: HOSPITAL | Age: 34
End: 2024-06-19
Payer: COMMERCIAL

## 2024-06-19 VITALS
RESPIRATION RATE: 18 BRPM | BODY MASS INDEX: 32.66 KG/M2 | HEIGHT: 61 IN | HEART RATE: 74 BPM | DIASTOLIC BLOOD PRESSURE: 74 MMHG | SYSTOLIC BLOOD PRESSURE: 108 MMHG | WEIGHT: 173 LBS | OXYGEN SATURATION: 100 % | TEMPERATURE: 97.9 F

## 2024-06-19 PROBLEM — L73.2 HIDRADENITIS SUPPURATIVA: Status: RESOLVED | Noted: 2024-05-10 | Resolved: 2024-06-19

## 2024-06-19 LAB
ANION GAP SERPL CALC-SCNC: 11 MMOL/L (ref 10–20)
B-HCG SERPL-ACNC: 9927 MIU/ML
BUN SERPL-MCNC: 16 MG/DL (ref 6–23)
CALCIUM SERPL-MCNC: 8.6 MG/DL (ref 8.6–10.3)
CHLORIDE SERPL-SCNC: 105 MMOL/L (ref 98–107)
CO2 SERPL-SCNC: 23 MMOL/L (ref 21–32)
CREAT SERPL-MCNC: 1.07 MG/DL (ref 0.5–1.05)
EGFRCR SERPLBLD CKD-EPI 2021: 70 ML/MIN/1.73M*2
ERYTHROCYTE [DISTWIDTH] IN BLOOD BY AUTOMATED COUNT: 15.1 % (ref 11.5–14.5)
GLUCOSE SERPL-MCNC: 115 MG/DL (ref 74–99)
HCT VFR BLD AUTO: 36.2 % (ref 36–46)
HGB BLD-MCNC: 11.6 G/DL (ref 12–16)
MCH RBC QN AUTO: 24.9 PG (ref 26–34)
MCHC RBC AUTO-ENTMCNC: 32 G/DL (ref 32–36)
MCV RBC AUTO: 78 FL (ref 80–100)
NRBC BLD-RTO: 0 /100 WBCS (ref 0–0)
PLATELET # BLD AUTO: 321 X10*3/UL (ref 150–450)
POTASSIUM SERPL-SCNC: 4 MMOL/L (ref 3.5–5.3)
RBC # BLD AUTO: 4.66 X10*6/UL (ref 4–5.2)
SODIUM SERPL-SCNC: 135 MMOL/L (ref 136–145)
WBC # BLD AUTO: 7.7 X10*3/UL (ref 4.4–11.3)

## 2024-06-19 PROCEDURE — 2500000001 HC RX 250 WO HCPCS SELF ADMINISTERED DRUGS (ALT 637 FOR MEDICARE OP): Performed by: NURSE PRACTITIONER

## 2024-06-19 PROCEDURE — 80048 BASIC METABOLIC PNL TOTAL CA: CPT | Performed by: NURSE PRACTITIONER

## 2024-06-19 PROCEDURE — 36415 COLL VENOUS BLD VENIPUNCTURE: CPT | Performed by: NURSE PRACTITIONER

## 2024-06-19 PROCEDURE — 93005 ELECTROCARDIOGRAM TRACING: CPT

## 2024-06-19 PROCEDURE — 84702 CHORIONIC GONADOTROPIN TEST: CPT | Performed by: INTERNAL MEDICINE

## 2024-06-19 PROCEDURE — 85027 COMPLETE CBC AUTOMATED: CPT | Performed by: NURSE PRACTITIONER

## 2024-06-19 RX ORDER — CLINDAMYCIN HYDROCHLORIDE 300 MG/1
300 CAPSULE ORAL 2 TIMES DAILY
Qty: 60 CAPSULE | Refills: 0 | Status: SHIPPED | OUTPATIENT
Start: 2024-06-19

## 2024-06-19 RX ORDER — CERTOLIZUMAB PEGOL 200 MG/ML
400 INJECTION, SOLUTION SUBCUTANEOUS ONCE
Qty: 2 ML | Refills: 0 | Status: SHIPPED | OUTPATIENT
Start: 2024-06-19 | End: 2024-06-19 | Stop reason: ALTCHOICE

## 2024-06-19 RX ORDER — HYDROMORPHONE HYDROCHLORIDE 2 MG/1
2 TABLET ORAL ONCE
Status: COMPLETED | OUTPATIENT
Start: 2024-06-19 | End: 2024-06-19

## 2024-06-19 RX ADMIN — ACETAMINOPHEN 975 MG: 325 TABLET ORAL at 06:37

## 2024-06-19 RX ADMIN — GABAPENTIN 800 MG: 400 CAPSULE ORAL at 09:45

## 2024-06-19 RX ADMIN — HYDROMORPHONE HYDROCHLORIDE 2 MG: 2 TABLET ORAL at 02:16

## 2024-06-19 RX ADMIN — HYDROMORPHONE HYDROCHLORIDE 2 MG: 2 TABLET ORAL at 06:37

## 2024-06-19 RX ADMIN — HYDROMORPHONE HYDROCHLORIDE 2 MG: 2 TABLET ORAL at 10:10

## 2024-06-19 RX ADMIN — PRENATAL VIT W/ FE FUMARATE-FA TAB 27-0.8 MG 1 TABLET: 27-0.8 TAB at 09:45

## 2024-06-19 RX ADMIN — PANTOPRAZOLE SODIUM 40 MG: 40 TABLET, DELAYED RELEASE ORAL at 06:37

## 2024-06-19 ASSESSMENT — PAIN - FUNCTIONAL ASSESSMENT
PAIN_FUNCTIONAL_ASSESSMENT: 0-10

## 2024-06-19 ASSESSMENT — PAIN SCALES - GENERAL
PAINLEVEL_OUTOF10: 6
PAINLEVEL_OUTOF10: 5 - MODERATE PAIN
PAINLEVEL_OUTOF10: 10 - WORST POSSIBLE PAIN
PAINLEVEL_OUTOF10: 9

## 2024-06-19 ASSESSMENT — PAIN DESCRIPTION - ORIENTATION
ORIENTATION: RIGHT
ORIENTATION: RIGHT

## 2024-06-19 ASSESSMENT — PAIN DESCRIPTION - LOCATION
LOCATION: SHOULDER
LOCATION: SHOULDER
LOCATION: ARM

## 2024-06-19 NOTE — NURSING NOTE
Discharge instructions provided using teach back method. Pt's health related  risk factors discussed with pt. pt educated to look for any worsening sign and symptoms. Pt educated to seek medical attention if experience any medical emergency. Pt aware to follow up with outpatient clinics as scheduled. Home going meds reviewed with pt. Pt verbalized understanding of disposition and discharge instructions. All questions answered to patient's satisfaction and within nursing scope of practice. Vitals stable, IV removed.  Pt requested pain med prior to leaving, bedside RN made aware. Pt also requested uber set up. Bedside RN and floor  aware to set up uber when pt is ready to leave.

## 2024-06-19 NOTE — CARE PLAN
Problem: Pain - Adult  Goal: Verbalizes/displays adequate comfort level or baseline comfort level  Outcome: Progressing     Problem: Safety - Adult  Goal: Free from fall injury  Outcome: Progressing     Problem: Discharge Planning  Goal: Discharge to home or other facility with appropriate resources  Outcome: Progressing     Problem: Fall/Injury  Goal: Not fall by end of shift  Outcome: Progressing  Goal: Be free from injury by end of the shift  Outcome: Progressing     Problem: Pain  Goal: Performs ADL's with improved pain control throughout shift  Outcome: Progressing  Goal: Free from opioid side effects throughout the shift  Outcome: Progressing     Problem: Safety - Adult  Goal: Free from fall injury  Outcome: Progressing     Problem: Discharge Planning  Goal: Discharge to home or other facility with appropriate resources  Outcome: Progressing     Problem: Fall/Injury  Goal: Not fall by end of shift  Outcome: Progressing     Problem: Fall/Injury  Goal: Be free from injury by end of the shift  Outcome: Progressing     Problem: Pain  Goal: Performs ADL's with improved pain control throughout shift  Outcome: Progressing     Problem: Pain  Goal: Free from opioid side effects throughout the shift  Outcome: Progressing   The patient's goals for the shift include      The clinical goals for the shift include Pt will remain hemodynamically stable

## 2024-06-19 NOTE — DISCHARGE SUMMARY
Discharge Diagnosis  Hidradenitis axillaris  Pregnancy status.        Issues Requiring Follow-Up  She already has an appointment with dermatologist.  PCP  She already has an appointment with gynecologist.      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  She felt better, she was discharged home, she said she has pain medication at home,.  She was explained that her kidney function needs to be repeated in 1 or 2 days, which she will do with her PCP,.  She says she will see her own dermatologist.  She also told me that she will see her phone gynecologist.      Pertinent Physical Exam At Time of Discharge  Physical Exam  Alert 23.  HEENT unremarkable.  Neck no JVD.  Heart S1-S2.  Lungs clear.  Abdomen soft nontender  EXTR no edema  Home Medications     Medication List      ASK your doctor about these medications     amoxicillin-pot clavulanate 875-125 mg tablet; Commonly known as:   Augmentin   gabapentin 800 mg tablet; Commonly known as: Neurontin; Take 1 tablet   (800 mg) by mouth 3 times a day. Pt has a more current Rx   multivitamin with minerals tablet   ondansetron ODT 4 mg disintegrating tablet; Commonly known as:   Zofran-ODT   oxyCODONE 10 mg immediate release tablet; Commonly known as: Roxicodone;   Take 1 tablet (10 mg) by mouth every 8 hours if needed for severe pain (7   - 10) for up to 15 doses.   oxyCODONE-acetaminophen  mg tablet; Commonly known as: Percocet   sulfamethoxazole-trimethoprim 800-160 mg tablet; Commonly known as:   Bactrim DS    Trulicity 1.5 mg/0.5 mL pen injector injection; Generic drug:   dulaglutide; Inject 1.5 mg under the skin 1 (one) time per week.   witch hazel-glycerin pad; Commonly known as: Tucks; Apply topically if   needed for irritation.       Outpatient Follow-Up  No future appointments.    Pancho Caldera MD

## 2024-06-19 NOTE — SIGNIFICANT EVENT
Consulted for pain management.  This AM, patient reports she is discharging, feels regimen is effective.  She is interested in establishing with out patient  palliative care non malignant clinic  which she has been told is starting sometime this summer at [facility].  She has an appt with [facility] on Monday but wants to switch to all  care as it is closer to home.  I encouraged her to call and make an appt with  Pain med ASAP but to keep [facility] appt as it is unlikely she will get into pain management that quickly.  She agreed.  As patient desires no changes to pain regimen, appears comfortable at rest and while moving about, and is expecting discharge today, will not write up as full consult.

## 2024-06-19 NOTE — CONSULTS
"Inpatient consult to Dermatology Telehealth  Consult performed by: Angi Garcia DO  Consult ordered by: ZAFAR Dumont-RANDI        DERMATOLOGY DEPARTMENT CONSULTATION NOTE  Name: Sandrita Adams  MRN: [id number]  : 1990    Reason for consultation: Hidradenitis flare in pregnancy    History of Present Illness  Sandrita Adams is a 33 y.o. female with a past medical history of hidradenitis suppurativa, SLE, PTSD, anemia, and fibromyalgia presented on 2024  with right axillary pain and vaginal spotting and was admitted for hidradenitis suppurativa flare. Dermatology was consulted for hidradenitis suppurativa flare during pregnancy.     Patient reports that she developed her first \"boil\" at 7 years of age.   She states that she was officially diagnosed with hidradenitis suppurativa in 2016. Her mother and father were alsoaffected by \"boils\". Patient currently follows with dermatology, Dr. Waters, at [facility]. She was last seen 2024. She has previously been treated with hibiclens, clindamycin, isotretinoin, infliximab, doxycycline, clindamycin + rifampin, augmentin, levofloxacin, intralesional kenalog, I&Ds, and excision. Patient has most recently been on Cosentyx. She reports the cosentyx was working well for her when she was taking it q2 weeks. Her last injection was 2 weeks ago. Patient has stopped cosentyx 2 weeks ago when she learned that she was pregnant.     Previous Treatment Summary:  1. Isotretinoin - helped acne but did not improve HS   2. Adalimumab - developed hives/bumps  3. Infliximab - missed 2 infusions due to personal family issues   4.  I&D  5. Excision  6. Apremilast: No improvement  7. Spironolactone - stopped due to higher potassium and tingling in hands/ arms. Note hx of PCOS and elevated testosterone.   8. Multiple antibiotics: Doxycycline, bactrim, clindamycin, levaquin, rifampin - none of these helped. Had previously been on antibiotics but they gave her " frequent yeast infections.   9. ILK    Patient has been experiencing vaginal bleeding. OBGYN is consulted and concerned for missed .     Review of Systems  Review of Systems   Constitutional:  Positive for activity change. Negative for chills and fever.   Skin:  Positive for wound.       Past Medical History  Past Medical History:   Diagnosis Date    Hidradenitis suppurativa 10/29/2020    Hidradenitis    Lupus (Multi)        Past Surgical History   has a past surgical history that includes Other surgical history (2022).     Allergies  Allergies   Allergen Reactions    Suboxone [Buprenorphine-Naloxone] Anaphylaxis, Hives and Itching    Clarithromycin Hives    Haloperidol Hallucinations and Psychosis    Keflex [Cephalexin] Psychosis    Levofloxacin Swelling     Ankle Joint/tendon pain    Metoclopramide Headache, Hallucinations and Psychosis    Reglan [Metoclopramide Hcl] Psychosis       Medications  Scheduled Meds: acetaminophen, 975 mg, oral, q8h  chlorhexidine, , Topical, Daily  clindamycin, 300 mg, oral, BID  gabapentin, 800 mg, oral, TID  HYDROmorphone, 2 mg, oral, Once  melatonin, 3 mg, oral, Nightly  pantoprazole, 40 mg, oral, Daily before breakfast  prenatal vitamin (iron-folic), 1 tablet, oral, Daily       Continuous Infusions:     PRN Meds: PRN medications: HYDROmorphone, ondansetron, oxyCODONE, polyethylene glycol     Family History  No family history on file.    Social History   reports that she has quit smoking. Her smoking use included cigarettes. She has never used smokeless tobacco. She reports that she does not use drugs. No history on file for alcohol use.     Objective    Vitals:    24 0738 24 1118 24 1546 24 2056   BP: 107/71 120/66 124/74 104/54   BP Location:   Left arm    Patient Position:   Lying    Pulse: 71 78 94 88   Resp: 17 16 18 18   Temp: 36 °C (96.8 °F) 36.6 °C (97.9 °F) 36.7 °C (98.1 °F) 36.6 °C (97.9 °F)   TempSrc:   Oral Oral   SpO2: 100% 99% 100%  100%   Weight:       Height:            Exam    GEN: no acute distress  NEURO: moving all extremities   EYES: conjunctiva and eyelids normal. No conjunctival injection or erosions appreciated  ENT:   - Lips: normal  NECK: normal and symmetric.   SKIN: A full body skin exam including scalp, face, eyes, ears, neck, trunk, bilateral upper & lower extremities, toenails and fingernails were examined with the following findings:  -On the bilateral axillae (R>L), there are erythematous to hyperpigmented nodules and scarred plaques. Right axilla is draining purulent fluid.   -On the bilateral medial upper thighs, there is scattered nodules and pitted scars.     Laboratory and Data  Results for orders placed or performed during the hospital encounter of 06/16/24 (from the past 24 hour(s))   CBC   Result Value Ref Range    WBC 7.4 4.4 - 11.3 x10*3/uL    nRBC 0.0 0.0 - 0.0 /100 WBCs    RBC 4.38 4.00 - 5.20 x10*6/uL    Hemoglobin 11.3 (L) 12.0 - 16.0 g/dL    Hematocrit 34.0 (L) 36.0 - 46.0 %    MCV 78 (L) 80 - 100 fL    MCH 25.8 (L) 26.0 - 34.0 pg    MCHC 33.2 32.0 - 36.0 g/dL    RDW 14.9 (H) 11.5 - 14.5 %    Platelets 305 150 - 450 x10*3/uL   Basic metabolic panel   Result Value Ref Range    Glucose 126 (H) 74 - 99 mg/dL    Sodium 135 (L) 136 - 145 mmol/L    Potassium 3.9 3.5 - 5.3 mmol/L    Chloride 106 98 - 107 mmol/L    Bicarbonate 22 21 - 32 mmol/L    Anion Gap 11 10 - 20 mmol/L    Urea Nitrogen 18 6 - 23 mg/dL    Creatinine 1.05 0.50 - 1.05 mg/dL    eGFR 71 >60 mL/min/1.73m*2    Calcium 8.6 8.6 - 10.3 mg/dL   hCG, quantitative, pregnancy   Result Value Ref Range    HCG, Beta-Quantitative 7,877 (H) <5 mIU/mL        Assessment/Plan   Sandrita Adams is a 33 y.o. female with a past medical history of hidradenitis suppurativa (on Cosentyx), SLE, PTSD, anemia, and fibromyalgia presented on 6/16/2024  with right axillary pain and vaginal spotting and was admitted for hidradenitis suppurativa flare. Dermatology was consulted  for hidradenitis suppurativa flare during pregnancy.     Differential diagnoses: Hidradenitis Suppurativa, Rand Stage 3 with flare in pregnancy.     Impression:      Hidradenitis suppurativa (HS), or acne inversa, is a chronic destructive inflammatory disorder of the terminal follicular epithelium. We discussed that HS treatment options during pregnancy are limited. Limited data is available on the safety of cosentyx during pregnancy. Discussed alternative options including low dose oral prednisone, ILK, and oral clindamycin. At this time, patient defers treatment with oral prednisone. May consider ILK in the outpatient setting for inflamed HS lesions, but unable to perform today due to virtual nature of the visit. Additionally, there are case reports that have shown efficacy of certolizumab in the treatment of HS. certolizumab has little to no transfer across the placenta. Will attempt approval for certrolizumab as a long-term treatment option for the patient during pregnancy.     Recommendations:  -Start Hibiclens daily.   -Start Clindamycin 300mg BID x 3 months.   -Case reports suggest efficacy of certolizumab in hidradenitis suppurativa with little to no transfer across the placenta. Will discuss with pharmacy team and attempt to obtain insurance approval for patient.   -Requested outpatient follow up in 1-2 weeks.     The patient was seen and discussed with attending physician Dr. Castanon. The assessment and plan was communicated to the care team.    Thank you for the consultation and for the opportunity to contribute to the care of this patient.      Angi Garcia,    PGY2, Dermatology  Epic chat (preferred)  Team pager [phone]     I saw and evaluated the patient. I personally obtained the key and critical portions of the history and physical exam or was physically present for key and critical portions performed by the resident. I reviewed the resident's documentation and discussed the patient with the  resident. I agree with the resident's medical decision making as documented in the note.    Ivan Castanon MD

## 2024-06-20 LAB
BACTERIA BLD CULT: NORMAL
BACTERIA BLD CULT: NORMAL

## 2024-06-22 ENCOUNTER — HOSPITAL ENCOUNTER (EMERGENCY)
Facility: HOSPITAL | Age: 34
Discharge: HOME | End: 2024-06-22
Attending: EMERGENCY MEDICINE
Payer: MEDICAID

## 2024-06-22 VITALS
SYSTOLIC BLOOD PRESSURE: 123 MMHG | BODY MASS INDEX: 32.02 KG/M2 | HEART RATE: 79 BPM | RESPIRATION RATE: 16 BRPM | TEMPERATURE: 99 F | WEIGHT: 174 LBS | HEIGHT: 62 IN | OXYGEN SATURATION: 100 % | DIASTOLIC BLOOD PRESSURE: 72 MMHG

## 2024-06-22 DIAGNOSIS — O46.90 VAGINAL BLEEDING IN PREGNANCY (HHS-HCC): ICD-10-CM

## 2024-06-22 DIAGNOSIS — G89.29 OTHER CHRONIC PAIN: ICD-10-CM

## 2024-06-22 DIAGNOSIS — L73.2 HIDRADENITIS SUPPURATIVA: Primary | ICD-10-CM

## 2024-06-22 LAB
ABO GROUP (TYPE) IN BLOOD: NORMAL
ALBUMIN SERPL BCP-MCNC: 4 G/DL (ref 3.4–5)
ALP SERPL-CCNC: 57 U/L (ref 33–110)
ALT SERPL W P-5'-P-CCNC: 22 U/L (ref 7–45)
ANION GAP SERPL CALC-SCNC: 14 MMOL/L (ref 10–20)
ANTIBODY SCREEN: NORMAL
AST SERPL W P-5'-P-CCNC: 13 U/L (ref 9–39)
B-HCG SERPL-ACNC: ABNORMAL MIU/ML
BASOPHILS # BLD AUTO: 0.04 X10*3/UL (ref 0–0.1)
BASOPHILS NFR BLD AUTO: 0.4 %
BILIRUB SERPL-MCNC: 0.2 MG/DL (ref 0–1.2)
BUN SERPL-MCNC: 10 MG/DL (ref 6–23)
CALCIUM SERPL-MCNC: 9.5 MG/DL (ref 8.6–10.3)
CHLORIDE SERPL-SCNC: 104 MMOL/L (ref 98–107)
CO2 SERPL-SCNC: 22 MMOL/L (ref 21–32)
CREAT SERPL-MCNC: 0.73 MG/DL (ref 0.5–1.05)
EGFRCR SERPLBLD CKD-EPI 2021: >90 ML/MIN/1.73M*2
EOSINOPHIL # BLD AUTO: 0.08 X10*3/UL (ref 0–0.7)
EOSINOPHIL NFR BLD AUTO: 0.8 %
ERYTHROCYTE [DISTWIDTH] IN BLOOD BY AUTOMATED COUNT: 14.6 % (ref 11.5–14.5)
GLUCOSE SERPL-MCNC: 87 MG/DL (ref 74–99)
HCT VFR BLD AUTO: 37 % (ref 36–46)
HGB BLD-MCNC: 12.3 G/DL (ref 12–16)
IMM GRANULOCYTES # BLD AUTO: 0.04 X10*3/UL (ref 0–0.7)
IMM GRANULOCYTES NFR BLD AUTO: 0.4 % (ref 0–0.9)
INR PPP: 1.1 (ref 0.9–1.1)
LYMPHOCYTES # BLD AUTO: 2.49 X10*3/UL (ref 1.2–4.8)
LYMPHOCYTES NFR BLD AUTO: 24.2 %
MCH RBC QN AUTO: 25.5 PG (ref 26–34)
MCHC RBC AUTO-ENTMCNC: 33.2 G/DL (ref 32–36)
MCV RBC AUTO: 77 FL (ref 80–100)
MONOCYTES # BLD AUTO: 1.07 X10*3/UL (ref 0.1–1)
MONOCYTES NFR BLD AUTO: 10.4 %
NEUTROPHILS # BLD AUTO: 6.57 X10*3/UL (ref 1.2–7.7)
NEUTROPHILS NFR BLD AUTO: 63.8 %
NRBC BLD-RTO: 0 /100 WBCS (ref 0–0)
PLATELET # BLD AUTO: 319 X10*3/UL (ref 150–450)
POTASSIUM SERPL-SCNC: 4 MMOL/L (ref 3.5–5.3)
PROT SERPL-MCNC: 7.6 G/DL (ref 6.4–8.2)
PROTHROMBIN TIME: 12.8 SECONDS (ref 9.8–12.8)
RBC # BLD AUTO: 4.83 X10*6/UL (ref 4–5.2)
RH FACTOR (ANTIGEN D): NORMAL
SODIUM SERPL-SCNC: 136 MMOL/L (ref 136–145)
WBC # BLD AUTO: 10.3 X10*3/UL (ref 4.4–11.3)

## 2024-06-22 PROCEDURE — 2500000002 HC RX 250 W HCPCS SELF ADMINISTERED DRUGS (ALT 637 FOR MEDICARE OP, ALT 636 FOR OP/ED): Performed by: EMERGENCY MEDICINE

## 2024-06-22 PROCEDURE — 85610 PROTHROMBIN TIME: CPT | Performed by: NURSE PRACTITIONER

## 2024-06-22 PROCEDURE — 80053 COMPREHEN METABOLIC PANEL: CPT | Performed by: NURSE PRACTITIONER

## 2024-06-22 PROCEDURE — 85025 COMPLETE CBC W/AUTO DIFF WBC: CPT | Performed by: NURSE PRACTITIONER

## 2024-06-22 PROCEDURE — 99284 EMERGENCY DEPT VISIT MOD MDM: CPT | Mod: 25

## 2024-06-22 PROCEDURE — 2500000004 HC RX 250 GENERAL PHARMACY W/ HCPCS (ALT 636 FOR OP/ED): Performed by: EMERGENCY MEDICINE

## 2024-06-22 PROCEDURE — 84702 CHORIONIC GONADOTROPIN TEST: CPT | Performed by: NURSE PRACTITIONER

## 2024-06-22 PROCEDURE — 36415 COLL VENOUS BLD VENIPUNCTURE: CPT | Performed by: NURSE PRACTITIONER

## 2024-06-22 PROCEDURE — 86901 BLOOD TYPING SEROLOGIC RH(D): CPT | Performed by: NURSE PRACTITIONER

## 2024-06-22 PROCEDURE — 96374 THER/PROPH/DIAG INJ IV PUSH: CPT

## 2024-06-22 RX ORDER — SULFAMETHOXAZOLE AND TRIMETHOPRIM 800; 160 MG/1; MG/1
1 TABLET ORAL 2 TIMES DAILY
Qty: 28 TABLET | Refills: 0 | OUTPATIENT
Start: 2024-06-22 | End: 2024-06-23

## 2024-06-22 RX ORDER — SULFAMETHOXAZOLE AND TRIMETHOPRIM 800; 160 MG/1; MG/1
1 TABLET ORAL ONCE
Status: COMPLETED | OUTPATIENT
Start: 2024-06-22 | End: 2024-06-22

## 2024-06-22 RX ORDER — HYDROMORPHONE HYDROCHLORIDE 1 MG/ML
1 INJECTION, SOLUTION INTRAMUSCULAR; INTRAVENOUS; SUBCUTANEOUS ONCE
Status: COMPLETED | OUTPATIENT
Start: 2024-06-22 | End: 2024-06-22

## 2024-06-22 RX ADMIN — HYDROMORPHONE HYDROCHLORIDE 1 MG: 1 INJECTION, SOLUTION INTRAMUSCULAR; INTRAVENOUS; SUBCUTANEOUS at 17:05

## 2024-06-22 RX ADMIN — SULFAMETHOXAZOLE AND TRIMETHOPRIM 1 TABLET: 800; 160 TABLET ORAL at 17:05

## 2024-06-22 ASSESSMENT — PAIN - FUNCTIONAL ASSESSMENT: PAIN_FUNCTIONAL_ASSESSMENT: 0-10

## 2024-06-22 ASSESSMENT — LIFESTYLE VARIABLES
TOTAL SCORE: 0
HAVE YOU EVER FELT YOU SHOULD CUT DOWN ON YOUR DRINKING: NO
HAVE PEOPLE ANNOYED YOU BY CRITICIZING YOUR DRINKING: NO
EVER FELT BAD OR GUILTY ABOUT YOUR DRINKING: NO
EVER HAD A DRINK FIRST THING IN THE MORNING TO STEADY YOUR NERVES TO GET RID OF A HANGOVER: NO

## 2024-06-22 ASSESSMENT — PAIN DESCRIPTION - LOCATION
LOCATION: GROIN
LOCATION: OTHER (COMMENT)

## 2024-06-22 ASSESSMENT — PAIN SCALES - PAIN ASSESSMENT IN ADVANCED DEMENTIA (PAINAD): TOTALSCORE: MEDICATION (SEE MAR)

## 2024-06-22 ASSESSMENT — PAIN SCALES - GENERAL
PAINLEVEL_OUTOF10: 9
PAINLEVEL_OUTOF10: 9

## 2024-06-22 NOTE — ED TRIAGE NOTES
Pt c/o several abscises in bilat axillary and groin area. Pt was on a biologic med but had to stop it due to being 7 weeks pregnant. Pt having 9/10 pain.
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
## 2024-06-23 ENCOUNTER — HOSPITAL ENCOUNTER (EMERGENCY)
Facility: HOSPITAL | Age: 34
Discharge: HOME | End: 2024-06-23
Attending: EMERGENCY MEDICINE
Payer: MEDICAID

## 2024-06-23 VITALS
BODY MASS INDEX: 29.71 KG/M2 | OXYGEN SATURATION: 98 % | HEIGHT: 64 IN | WEIGHT: 174 LBS | DIASTOLIC BLOOD PRESSURE: 84 MMHG | RESPIRATION RATE: 18 BRPM | SYSTOLIC BLOOD PRESSURE: 120 MMHG | HEART RATE: 57 BPM | TEMPERATURE: 97.9 F

## 2024-06-23 DIAGNOSIS — R11.0 NAUSEA: ICD-10-CM

## 2024-06-23 DIAGNOSIS — L73.2 HIDRADENITIS SUPPURATIVA: Primary | ICD-10-CM

## 2024-06-23 PROCEDURE — 99281 EMR DPT VST MAYX REQ PHY/QHP: CPT

## 2024-06-23 RX ORDER — SULFAMETHOXAZOLE AND TRIMETHOPRIM 800; 160 MG/1; MG/1
1 TABLET ORAL 2 TIMES DAILY
Qty: 20 TABLET | Refills: 0 | Status: SHIPPED | OUTPATIENT
Start: 2024-06-23

## 2024-06-23 RX ORDER — ONDANSETRON 4 MG/1
4 TABLET, ORALLY DISINTEGRATING ORAL EVERY 8 HOURS PRN
Qty: 16 TABLET | Refills: 0 | Status: SHIPPED | OUTPATIENT
Start: 2024-06-23

## 2024-06-23 ASSESSMENT — PAIN - FUNCTIONAL ASSESSMENT: PAIN_FUNCTIONAL_ASSESSMENT: 0-10

## 2024-06-23 ASSESSMENT — LIFESTYLE VARIABLES
EVER HAD A DRINK FIRST THING IN THE MORNING TO STEADY YOUR NERVES TO GET RID OF A HANGOVER: NO
EVER FELT BAD OR GUILTY ABOUT YOUR DRINKING: NO
HAVE PEOPLE ANNOYED YOU BY CRITICIZING YOUR DRINKING: NO
HAVE YOU EVER FELT YOU SHOULD CUT DOWN ON YOUR DRINKING: NO
TOTAL SCORE: 0

## 2024-06-23 ASSESSMENT — PAIN DESCRIPTION - LOCATION: LOCATION: ABDOMEN

## 2024-06-23 ASSESSMENT — PAIN SCALES - GENERAL: PAINLEVEL_OUTOF10: 1

## 2024-06-23 NOTE — ED PROVIDER NOTES
HPI   No chief complaint on file.      Sandrita is a 33-year-old woman who is pregnant and has history of hidradenitis suppurativa.  She was in the emergency room yesterday and was started on antibiotics.  She went back to [facility] and her roommate took her prescriptions.  She presents today saying that she is leaving [facility] because she has an appointment with her high risk OB doctor tomorrow and they cannot get her there.  She is interested in getting a paper prescription for Bactrim and Zofran.  She denies any fever chills cough or cold.  She denies any recent trauma or falls.                          [place] Coma Scale Score: 15                     Patient History   Past Medical History:   Diagnosis Date    Hidradenitis suppurativa 10/29/2020    Hidradenitis    Lupus (Multi)      Past Surgical History:   Procedure Laterality Date    OTHER SURGICAL HISTORY  09/19/2022    Arm surgery     No family history on file.  Social History     Tobacco Use    Smoking status: Former     Types: Cigarettes    Smokeless tobacco: Never   Vaping Use    Vaping status: Never Used   Substance Use Topics    Alcohol use: Not on file    Drug use: Never       Physical Exam   ED Triage Vitals   Temp Pulse Resp BP   -- -- -- --      SpO2 Temp src Heart Rate Source Patient Position   -- -- -- --      BP Location FiO2 (%)     -- --       Physical Exam  Constitutional:       Appearance: Normal appearance.   HENT:      Nose: Nose normal.   Eyes:      Conjunctiva/sclera: Conjunctivae normal.   Cardiovascular:      Rate and Rhythm: Normal rate and regular rhythm.   Pulmonary:      Effort: Pulmonary effort is normal.      Breath sounds: Normal breath sounds.   Abdominal:      Comments: Gravid   Musculoskeletal:      Comments: Moves all extremities no acute distress.   Neurological:      Mental Status: She is alert.         ED Course & MDM   ED Course as of 06/23/24 1717   Sun Jun 23, 2024 1714 Patient was worked up yesterday with full  evaluation.  Today she requests we write a prescription for the Bactrim and the Zofran since she is leaving [facility] and her prescriptions were taken by her roommate.  She is willing to allow me to write prescriptions but her ride is arriving and she must leave right away before we can do further tests and evaluation.  She has an appointment tomorrow with her high risk OB doctor.  She is eager to leave. [RZ]   1717 Patient had mentioned in passing that she had some light spotting yesterday.  I talked about doing a pelvic exam and labs.  She was initially considering it but then her ride arrived and she decided to leave.  I will honor her wishes and I wrote prescriptions per her request. [RZ]      ED Course User Index  [RZ] Lane Parker MD         Diagnoses as of 06/23/24 1717   Hidradenitis suppurativa   Nausea       Medical Decision Making      Procedure  Procedures     Lane Parker MD  06/23/24 1717

## 2024-06-25 ENCOUNTER — DOCUMENTATION (OUTPATIENT)
Dept: DERMATOLOGY | Facility: CLINIC | Age: 34
End: 2024-06-25
Payer: MEDICAID

## 2024-06-25 NOTE — PROGRESS NOTES
Update 7/3/24: appeal overturned and Cimzia approved until 9/29/24. Patient to fill with  Specialty and will  med from pharmacy on 7/5/24.     Update 7/1/24: Dr. Castanon would like to appeal. Appeal faxed to [facility]. Awaiting determination.     Cimzia denied by insurance for off-label use for HS. Per Dr. Garcia, will not pursue appeal at this time. Will update pharmacy if plan changes.

## 2024-07-02 PROCEDURE — RXMED WILLOW AMBULATORY MEDICATION CHARGE

## 2024-07-03 ENCOUNTER — SPECIALTY PHARMACY (OUTPATIENT)
Dept: PHARMACY | Facility: CLINIC | Age: 34
End: 2024-07-03

## 2024-07-09 ENCOUNTER — PHARMACY VISIT (OUTPATIENT)
Dept: PHARMACY | Facility: CLINIC | Age: 34
End: 2024-07-09
Payer: MEDICAID

## 2024-07-10 ENCOUNTER — SPECIALTY PHARMACY (OUTPATIENT)
Dept: PHARMACY | Facility: CLINIC | Age: 34
End: 2024-07-10

## 2024-07-10 NOTE — PROGRESS NOTES
[facility] Clinical Note    Sandrita Adams is a 33 y.o. female, who is on the specialty pharmacy service for management of:  Dermatology Core.    Sandrita Adams is taking: Cimzia.    Medication Receipt Date: 07/10/2024  Medication Start Date (planned or actual): 07/10/2024    Sandrita was contacted on 7/10/2024 at 2:38 PM for a virtual pharmacy visit with encounter number [phone] from:   [facility]  [address] [address]  Dept: [phone]  Dept Fax: [phone]    Sandrita was offered a Telemedicine Video visit or Telephone visit.  Sandrita consented to a telephone visit, which was performed.    The most recent encounter visit with the referring prescriber Dr. Ivan Castanon on 6/18/24 was reviewed.  Pharmacy will continue to collaborate in the care of this patient with the referring prescriber Dr. Ivan Castanon.    General Assessment      Impression/Plan  IMPRESSION/PLAN:  Is patient high risk (potential patients:  pregnancy, geriatric, pediatric)?  pregnant  Is laboratory follow-up needed? no  Is a clinical intervention needed? no  Next reassessment date? 4 months  Additional comments:    Refer to the encounter summary report for documentation details about patient counseling and education.      Medication Adherence    The importance of adherence was discussed with the patient and they were advised to take the medication as prescribed by their provider. Patient was encouraged to call their physician's office if they have a question regarding a missed dose.        Patient was advised to contact the pharmacy if there are any changes to their medication list, including prescriptions, OTC medications, herbal products, or supplements. Patient was advised of [facility]'s dispensing process, refill timeline, contact information ([phone]), and patient management follow up. Patient confirmed understanding  of education conducted during assessment. All patient questions and concerns were addressed to the best of my ability. Patient was encouraged to contact the specialty pharmacy with any questions or concerns.    Confirmed follow-up outreaches are properly scheduled and reviewed goals of therapy with the patient.        Adonay Hinds, PharmD

## 2024-07-15 PROCEDURE — 99285 EMERGENCY DEPT VISIT HI MDM: CPT

## 2024-07-15 ASSESSMENT — PAIN - FUNCTIONAL ASSESSMENT: PAIN_FUNCTIONAL_ASSESSMENT: 0-10

## 2024-07-15 ASSESSMENT — PAIN DESCRIPTION - PAIN TYPE: TYPE: ACUTE PAIN

## 2024-07-15 ASSESSMENT — PAIN SCALES - GENERAL: PAINLEVEL_OUTOF10: 10 - WORST POSSIBLE PAIN

## 2024-07-16 ENCOUNTER — HOSPITAL ENCOUNTER (OUTPATIENT)
Facility: HOSPITAL | Age: 34
Setting detail: OBSERVATION
LOS: 1 days | Discharge: HOME | End: 2024-07-16
Attending: EMERGENCY MEDICINE | Admitting: OBSTETRICS & GYNECOLOGY
Payer: MEDICAID

## 2024-07-16 ENCOUNTER — OFFICE VISIT (OUTPATIENT)
Dept: PAIN MEDICINE | Facility: CLINIC | Age: 34
End: 2024-07-16
Payer: MEDICAID

## 2024-07-16 ENCOUNTER — TELEPHONE (OUTPATIENT)
Dept: PAIN MEDICINE | Facility: CLINIC | Age: 34
End: 2024-07-16

## 2024-07-16 ENCOUNTER — APPOINTMENT (OUTPATIENT)
Dept: RADIOLOGY | Facility: HOSPITAL | Age: 34
End: 2024-07-16
Payer: MEDICAID

## 2024-07-16 VITALS
BODY MASS INDEX: 32.85 KG/M2 | HEIGHT: 61 IN | RESPIRATION RATE: 12 BRPM | OXYGEN SATURATION: 99 % | HEART RATE: 84 BPM | WEIGHT: 174 LBS | SYSTOLIC BLOOD PRESSURE: 124 MMHG | DIASTOLIC BLOOD PRESSURE: 79 MMHG | TEMPERATURE: 98.2 F

## 2024-07-16 VITALS
SYSTOLIC BLOOD PRESSURE: 125 MMHG | RESPIRATION RATE: 15 BRPM | TEMPERATURE: 97.5 F | HEART RATE: 84 BPM | DIASTOLIC BLOOD PRESSURE: 84 MMHG | BODY MASS INDEX: 32.85 KG/M2 | OXYGEN SATURATION: 100 % | WEIGHT: 174 LBS | HEIGHT: 61 IN

## 2024-07-16 DIAGNOSIS — L73.2 HIDRADENITIS SUPPURATIVA: ICD-10-CM

## 2024-07-16 DIAGNOSIS — O23.41 UTI (URINARY TRACT INFECTION) DURING PREGNANCY, FIRST TRIMESTER (HHS-HCC): ICD-10-CM

## 2024-07-16 DIAGNOSIS — F11.20 UNCOMPLICATED OPIOID DEPENDENCE (MULTI): ICD-10-CM

## 2024-07-16 DIAGNOSIS — Z3A.09 9 WEEKS GESTATION OF PREGNANCY (HHS-HCC): ICD-10-CM

## 2024-07-16 DIAGNOSIS — O23.00: Primary | ICD-10-CM

## 2024-07-16 DIAGNOSIS — Z79.891 CHRONICALLY ON OPIATE THERAPY: ICD-10-CM

## 2024-07-16 DIAGNOSIS — L73.2 HIDRADENITIS AXILLARIS: Primary | ICD-10-CM

## 2024-07-16 DIAGNOSIS — N39.0 COMPLICATED UTI (URINARY TRACT INFECTION): ICD-10-CM

## 2024-07-16 PROBLEM — M79.7 FIBROMYALGIA: Status: RESOLVED | Noted: 2024-06-16 | Resolved: 2024-07-16

## 2024-07-16 LAB
ALBUMIN SERPL BCP-MCNC: 4.3 G/DL (ref 3.4–5)
ALP SERPL-CCNC: 53 U/L (ref 33–110)
ALT SERPL W P-5'-P-CCNC: 13 U/L (ref 7–45)
ANION GAP SERPL CALC-SCNC: 12 MMOL/L (ref 10–20)
APPEARANCE UR: ABNORMAL
AST SERPL W P-5'-P-CCNC: 31 U/L (ref 9–39)
BASOPHILS # BLD AUTO: 0.04 X10*3/UL (ref 0–0.1)
BASOPHILS NFR BLD AUTO: 0.3 %
BILIRUB SERPL-MCNC: 0.1 MG/DL (ref 0–1.2)
BILIRUB UR STRIP.AUTO-MCNC: NEGATIVE MG/DL
BUN SERPL-MCNC: 15 MG/DL (ref 6–23)
CALCIUM SERPL-MCNC: 9.6 MG/DL (ref 8.6–10.6)
CHLORIDE SERPL-SCNC: 105 MMOL/L (ref 98–107)
CO2 SERPL-SCNC: 19 MMOL/L (ref 21–32)
COLOR UR: COLORLESS
CREAT SERPL-MCNC: 0.71 MG/DL (ref 0.5–1.05)
EGFRCR SERPLBLD CKD-EPI 2021: >90 ML/MIN/1.73M*2
EOSINOPHIL # BLD AUTO: 0.13 X10*3/UL (ref 0–0.7)
EOSINOPHIL NFR BLD AUTO: 1 %
ERYTHROCYTE [DISTWIDTH] IN BLOOD BY AUTOMATED COUNT: 14.6 % (ref 11.5–14.5)
GLUCOSE SERPL-MCNC: 105 MG/DL (ref 74–99)
GLUCOSE UR STRIP.AUTO-MCNC: NORMAL MG/DL
HCT VFR BLD AUTO: 35.1 % (ref 36–46)
HGB BLD-MCNC: 12.3 G/DL (ref 12–16)
HOLD SPECIMEN: NORMAL
IMM GRANULOCYTES # BLD AUTO: 0.08 X10*3/UL (ref 0–0.7)
IMM GRANULOCYTES NFR BLD AUTO: 0.6 % (ref 0–0.9)
KETONES UR STRIP.AUTO-MCNC: NEGATIVE MG/DL
LACTATE SERPL-SCNC: 0.6 MMOL/L (ref 0.4–2)
LEUKOCYTE ESTERASE UR QL STRIP.AUTO: ABNORMAL
LYMPHOCYTES # BLD AUTO: 4.59 X10*3/UL (ref 1.2–4.8)
LYMPHOCYTES NFR BLD AUTO: 34.7 %
MCH RBC QN AUTO: 25.8 PG (ref 26–34)
MCHC RBC AUTO-ENTMCNC: 35 G/DL (ref 32–36)
MCV RBC AUTO: 74 FL (ref 80–100)
MONOCYTES # BLD AUTO: 1.09 X10*3/UL (ref 0.1–1)
MONOCYTES NFR BLD AUTO: 8.2 %
NEUTROPHILS # BLD AUTO: 7.29 X10*3/UL (ref 1.2–7.7)
NEUTROPHILS NFR BLD AUTO: 55.2 %
NITRITE UR QL STRIP.AUTO: NEGATIVE
NRBC BLD-RTO: 0 /100 WBCS (ref 0–0)
PH UR STRIP.AUTO: 5.5 [PH]
PLATELET # BLD AUTO: 291 X10*3/UL (ref 150–450)
POTASSIUM SERPL-SCNC: 5.1 MMOL/L (ref 3.5–5.3)
PROT SERPL-MCNC: 7.8 G/DL (ref 6.4–8.2)
PROT UR STRIP.AUTO-MCNC: NEGATIVE MG/DL
RBC # BLD AUTO: 4.77 X10*6/UL (ref 4–5.2)
RBC # UR STRIP.AUTO: ABNORMAL /UL
RBC #/AREA URNS AUTO: >20 /HPF
SODIUM SERPL-SCNC: 131 MMOL/L (ref 136–145)
SP GR UR STRIP.AUTO: 1.01
SQUAMOUS #/AREA URNS AUTO: ABNORMAL /HPF
UROBILINOGEN UR STRIP.AUTO-MCNC: NORMAL MG/DL
WBC # BLD AUTO: 13.2 X10*3/UL (ref 4.4–11.3)
WBC #/AREA URNS AUTO: >50 /HPF

## 2024-07-16 PROCEDURE — 96365 THER/PROPH/DIAG IV INF INIT: CPT

## 2024-07-16 PROCEDURE — 2500000004 HC RX 250 GENERAL PHARMACY W/ HCPCS (ALT 636 FOR OP/ED): Mod: SE

## 2024-07-16 PROCEDURE — 76815 OB US LIMITED FETUS(S): CPT

## 2024-07-16 PROCEDURE — 99214 OFFICE O/P EST MOD 30 MIN: CPT | Performed by: ANESTHESIOLOGY

## 2024-07-16 PROCEDURE — 96374 THER/PROPH/DIAG INJ IV PUSH: CPT | Mod: 59

## 2024-07-16 PROCEDURE — 83605 ASSAY OF LACTIC ACID: CPT | Performed by: EMERGENCY MEDICINE

## 2024-07-16 PROCEDURE — 85025 COMPLETE CBC W/AUTO DIFF WBC: CPT | Performed by: EMERGENCY MEDICINE

## 2024-07-16 PROCEDURE — 2500000001 HC RX 250 WO HCPCS SELF ADMINISTERED DRUGS (ALT 637 FOR MEDICARE OP)

## 2024-07-16 PROCEDURE — G0378 HOSPITAL OBSERVATION PER HR: HCPCS

## 2024-07-16 PROCEDURE — 2500000004 HC RX 250 GENERAL PHARMACY W/ HCPCS (ALT 636 FOR OP/ED)

## 2024-07-16 PROCEDURE — 96376 TX/PRO/DX INJ SAME DRUG ADON: CPT

## 2024-07-16 PROCEDURE — 76770 US EXAM ABDO BACK WALL COMP: CPT

## 2024-07-16 PROCEDURE — 99254 IP/OBS CNSLTJ NEW/EST MOD 60: CPT

## 2024-07-16 PROCEDURE — 76770 US EXAM ABDO BACK WALL COMP: CPT | Performed by: STUDENT IN AN ORGANIZED HEALTH CARE EDUCATION/TRAINING PROGRAM

## 2024-07-16 PROCEDURE — 96375 TX/PRO/DX INJ NEW DRUG ADDON: CPT

## 2024-07-16 PROCEDURE — 81001 URINALYSIS AUTO W/SCOPE: CPT

## 2024-07-16 PROCEDURE — 36415 COLL VENOUS BLD VENIPUNCTURE: CPT | Performed by: EMERGENCY MEDICINE

## 2024-07-16 PROCEDURE — 84075 ASSAY ALKALINE PHOSPHATASE: CPT | Performed by: EMERGENCY MEDICINE

## 2024-07-16 PROCEDURE — 36415 COLL VENOUS BLD VENIPUNCTURE: CPT

## 2024-07-16 PROCEDURE — 87086 URINE CULTURE/COLONY COUNT: CPT

## 2024-07-16 RX ORDER — OXYCODONE HYDROCHLORIDE 5 MG/1
5 TABLET ORAL ONCE
Status: COMPLETED | OUTPATIENT
Start: 2024-07-16 | End: 2024-07-16

## 2024-07-16 RX ORDER — CEFTRIAXONE 1 G/50ML
1 INJECTION, SOLUTION INTRAVENOUS EVERY 24 HOURS
Status: DISCONTINUED | OUTPATIENT
Start: 2024-07-17 | End: 2024-07-16 | Stop reason: HOSPADM

## 2024-07-16 RX ORDER — CLINDAMYCIN PHOSPHATE 11.9 MG/ML
SOLUTION TOPICAL 2 TIMES DAILY
Status: DISCONTINUED | OUTPATIENT
Start: 2024-07-16 | End: 2024-07-16 | Stop reason: HOSPADM

## 2024-07-16 RX ORDER — ONDANSETRON HYDROCHLORIDE 2 MG/ML
4 INJECTION, SOLUTION INTRAVENOUS ONCE
Status: COMPLETED | OUTPATIENT
Start: 2024-07-16 | End: 2024-07-16

## 2024-07-16 RX ORDER — ALBUTEROL SULFATE 90 UG/1
AEROSOL, METERED RESPIRATORY (INHALATION)
COMMUNITY
Start: 2024-07-16

## 2024-07-16 RX ORDER — OXYCODONE HYDROCHLORIDE 5 MG/1
5 TABLET ORAL EVERY 6 HOURS PRN
Status: DISCONTINUED | OUTPATIENT
Start: 2024-07-16 | End: 2024-07-16 | Stop reason: HOSPADM

## 2024-07-16 RX ORDER — BISACODYL 10 MG/1
10 SUPPOSITORY RECTAL DAILY PRN
Status: DISCONTINUED | OUTPATIENT
Start: 2024-07-16 | End: 2024-07-16 | Stop reason: HOSPADM

## 2024-07-16 RX ORDER — OXYCODONE HYDROCHLORIDE 5 MG/1
10 TABLET ORAL EVERY 6 HOURS PRN
Status: DISCONTINUED | OUTPATIENT
Start: 2024-07-16 | End: 2024-07-16 | Stop reason: HOSPADM

## 2024-07-16 RX ORDER — ADHESIVE BANDAGE
10 BANDAGE TOPICAL
Status: DISCONTINUED | OUTPATIENT
Start: 2024-07-16 | End: 2024-07-16 | Stop reason: HOSPADM

## 2024-07-16 RX ORDER — GRANULES FOR ORAL 3 G/1
3 POWDER ORAL ONCE
Status: DISCONTINUED | OUTPATIENT
Start: 2024-07-16 | End: 2024-07-16 | Stop reason: HOSPADM

## 2024-07-16 RX ORDER — LIDOCAINE HYDROCHLORIDE 10 MG/ML
0.5 INJECTION INFILTRATION; PERINEURAL ONCE AS NEEDED
Status: DISCONTINUED | OUTPATIENT
Start: 2024-07-16 | End: 2024-07-16 | Stop reason: HOSPADM

## 2024-07-16 RX ORDER — ONDANSETRON 4 MG/1
4 TABLET, FILM COATED ORAL EVERY 6 HOURS PRN
Status: DISCONTINUED | OUTPATIENT
Start: 2024-07-16 | End: 2024-07-16 | Stop reason: HOSPADM

## 2024-07-16 RX ORDER — POLYETHYLENE GLYCOL 3350 17 G/17G
17 POWDER, FOR SOLUTION ORAL 2 TIMES DAILY PRN
Status: DISCONTINUED | OUTPATIENT
Start: 2024-07-16 | End: 2024-07-16 | Stop reason: HOSPADM

## 2024-07-16 RX ORDER — NIFEDIPINE 10 MG/1
10 CAPSULE ORAL ONCE AS NEEDED
Status: DISCONTINUED | OUTPATIENT
Start: 2024-07-16 | End: 2024-07-16 | Stop reason: HOSPADM

## 2024-07-16 RX ORDER — CEFTRIAXONE 1 G/50ML
1 INJECTION, SOLUTION INTRAVENOUS ONCE
Status: COMPLETED | OUTPATIENT
Start: 2024-07-16 | End: 2024-07-16

## 2024-07-16 RX ORDER — OXYCODONE HYDROCHLORIDE 10 MG/1
TABLET ORAL
COMMUNITY
Start: 2024-05-21

## 2024-07-16 RX ORDER — CHLORHEXIDINE GLUCONATE 40 MG/ML
SOLUTION TOPICAL DAILY PRN
Status: DISCONTINUED | OUTPATIENT
Start: 2024-07-16 | End: 2024-07-16 | Stop reason: HOSPADM

## 2024-07-16 RX ORDER — GRANULES FOR ORAL 3 G/1
POWDER ORAL
Qty: 3 G | Refills: 0 | Status: SHIPPED | OUTPATIENT
Start: 2024-07-16

## 2024-07-16 RX ORDER — AMOXICILLIN AND CLAVULANATE POTASSIUM 875; 125 MG/1; MG/1
TABLET, FILM COATED ORAL EVERY 12 HOURS
COMMUNITY
Start: 2023-12-29 | End: 2024-08-16

## 2024-07-16 RX ORDER — SIMETHICONE 80 MG
80 TABLET,CHEWABLE ORAL 4 TIMES DAILY PRN
Status: DISCONTINUED | OUTPATIENT
Start: 2024-07-16 | End: 2024-07-16 | Stop reason: HOSPADM

## 2024-07-16 RX ORDER — HYDRALAZINE HYDROCHLORIDE 20 MG/ML
5 INJECTION INTRAMUSCULAR; INTRAVENOUS ONCE AS NEEDED
Status: DISCONTINUED | OUTPATIENT
Start: 2024-07-16 | End: 2024-07-16 | Stop reason: HOSPADM

## 2024-07-16 RX ORDER — ACETAMINOPHEN 325 MG/1
975 TABLET ORAL EVERY 6 HOURS PRN
Status: DISCONTINUED | OUTPATIENT
Start: 2024-07-16 | End: 2024-07-16 | Stop reason: HOSPADM

## 2024-07-16 RX ORDER — HYDROMORPHONE HYDROCHLORIDE 1 MG/ML
1 INJECTION, SOLUTION INTRAMUSCULAR; INTRAVENOUS; SUBCUTANEOUS ONCE
Status: COMPLETED | OUTPATIENT
Start: 2024-07-16 | End: 2024-07-16

## 2024-07-16 RX ORDER — LIDOCAINE 560 MG/1
1 PATCH PERCUTANEOUS; TOPICAL; TRANSDERMAL
Status: DISCONTINUED | OUTPATIENT
Start: 2024-07-16 | End: 2024-07-16 | Stop reason: HOSPADM

## 2024-07-16 RX ORDER — LABETALOL HYDROCHLORIDE 5 MG/ML
20 INJECTION, SOLUTION INTRAVENOUS ONCE AS NEEDED
Status: DISCONTINUED | OUTPATIENT
Start: 2024-07-16 | End: 2024-07-16 | Stop reason: HOSPADM

## 2024-07-16 RX ORDER — SODIUM CHLORIDE, SODIUM LACTATE, POTASSIUM CHLORIDE, CALCIUM CHLORIDE 600; 310; 30; 20 MG/100ML; MG/100ML; MG/100ML; MG/100ML
125 INJECTION, SOLUTION INTRAVENOUS CONTINUOUS
Status: DISCONTINUED | OUTPATIENT
Start: 2024-07-16 | End: 2024-07-16 | Stop reason: HOSPADM

## 2024-07-16 RX ORDER — ONDANSETRON HYDROCHLORIDE 2 MG/ML
4 INJECTION, SOLUTION INTRAVENOUS EVERY 6 HOURS PRN
Status: DISCONTINUED | OUTPATIENT
Start: 2024-07-16 | End: 2024-07-16 | Stop reason: HOSPADM

## 2024-07-16 ASSESSMENT — PAIN DESCRIPTION - DESCRIPTORS: DESCRIPTORS: ACHING;DISCOMFORT;SORE

## 2024-07-16 ASSESSMENT — PAIN SCALES - GENERAL
PAINLEVEL: 8
PAINLEVEL_OUTOF10: 10 - WORST POSSIBLE PAIN
PAINLEVEL_OUTOF10: 8

## 2024-07-16 ASSESSMENT — PATIENT HEALTH QUESTIONNAIRE - PHQ9
1. LITTLE INTEREST OR PLEASURE IN DOING THINGS: NOT AT ALL
SUM OF ALL RESPONSES TO PHQ9 QUESTIONS 1 AND 2: 0
2. FEELING DOWN, DEPRESSED OR HOPELESS: NOT AT ALL

## 2024-07-16 ASSESSMENT — COLUMBIA-SUICIDE SEVERITY RATING SCALE - C-SSRS
1. IN THE PAST MONTH, HAVE YOU WISHED YOU WERE DEAD OR WISHED YOU COULD GO TO SLEEP AND NOT WAKE UP?: NO
6. HAVE YOU EVER DONE ANYTHING, STARTED TO DO ANYTHING, OR PREPARED TO DO ANYTHING TO END YOUR LIFE?: NO
2. HAVE YOU ACTUALLY HAD ANY THOUGHTS OF KILLING YOURSELF?: NO

## 2024-07-16 ASSESSMENT — ENCOUNTER SYMPTOMS: OCCASIONAL FEELINGS OF UNSTEADINESS: 0

## 2024-07-16 ASSESSMENT — PAIN - FUNCTIONAL ASSESSMENT: PAIN_FUNCTIONAL_ASSESSMENT: 0-10

## 2024-07-16 NOTE — DISCHARGE SUMMARY
Discharge Summary    Admission Date: 2024  Discharge Date: 2024    Discharge Diagnosis  Complicated UTI (urinary tract infection)    Hospital Course  Sandrita Adams is a 33 y.o.  at 9w4d who initially presented to the ED for worsening Hidradenitis Suppurativa, found to have concern for complicated UTI vs pyelo, and was kept for further management and workup.     Patient previously on antibiotic and biologic regimen that was discontinued at onset of pregnancy. Since then, patient has had worsening HS symptoms, particularly in groin/inguinal and axillary regions. Her home pain regimen (Tylenol and Oxycodone) as well as Dilaudid for breakthrough pain was continued throughout admission with mild relief.     While in the ED, patient's labs were initially notable for a mild leukocytosis to 13.2 and elevated urinary leukocyte esterase to 500. She additionally reported subjective fevers at home and right-sided flank pain, thus, was started on Ceftriaxone in the ED given concern for Pyelo. Urine culture remains pending at this time. Throughout admission, patient remained afebrile and without localizing flank pain, which made concern for pyelonephritis less likely. On HD#1, she underwent renal US to evaluate for potential ureteral stone as etiology for right-sided flank pain which is pending at this time. Will follow-up with patient if results are abnormal.     Patient had a pre-existing initial Pain Management appointment on  and strongly desired to attend. She was discharged home on  in stable condition with plans to attend  Pain Medicine appointment and outpatient M follow-up on . Fetal heart tones were obtained on admission and patient remained obstetrically asymptomatic.      Pertinent Physical Exam At Time of Discharge  General: no acute distress  HEENT: normocephalic, atraumatic  CV: warm and well perfused  Lungs: breathing comfortably on room air  Abdomen: NT, mild distension. (-)  CVAT, (+) Ride-sided abdominal tenderness   Extremities: moving all extremities spontaneously  Neuro: awake and conversant  Psych: appropriate mood and affect      Discharge Meds     Your medication list        ASK your doctor about these medications        Instructions Last Dose Given Next Dose Due   Cimzia injection  Generic drug: certolizumab pegol      Inject 2 mL (400 mg) under the skin every 14 (fourteen) days.       clindamycin 300 mg capsule  Commonly known as: Cleocin      Take 1 capsule (300 mg) by mouth 2 times a day.       gabapentin 800 mg tablet  Commonly known as: Neurontin      Take 1 tablet (800 mg) by mouth 3 times a day. Pt has a more current Rx       multivitamin with minerals tablet           ondansetron ODT 4 mg disintegrating tablet  Commonly known as: Zofran-ODT      Take 1 tablet (4 mg) by mouth every 8 hours if needed for nausea or vomiting.       sulfamethoxazole-trimethoprim 800-160 mg tablet  Commonly known as: Bactrim DS      Take 1 tablet by mouth 2 times a day.       witch hazel-glycerin pad  Commonly known as: Tucks      Apply topically if needed for irritation.                 Complications Requiring Follow-Up  None     Test Results Pending At Discharge  Pending Labs       Order Current Status    Extra Urine Gray Tube In process    Type And Screen In process    Urinalysis with Reflex Culture and Microscopic In process    Urine Culture In process            Outpatient Follow-Up  Future Appointments   Date Time Provider Department Center   7/16/2024  2:45 PM MD [facility] MEGHA Dill MD  PGY-2, Obstetrics & Gynecology   [facility]

## 2024-07-16 NOTE — ED PROCEDURE NOTE
Procedure    Performed by: Jadiel Horn DO  Authorized by: Jc Sanchez MD        Genitourinary Indications: positive pregnancy test          Procedure: Pelvic Ultrasound    Exam: transabdominal exam  Findings:  IUP: The pelvis was visualized and there was an INTRAUTERINE PREGNANCY visualized (a yolk sac, fetal pole or fetus was seen).  Fetal heart rate (bpm): Unable to measure.  Pelvic Free Fluid: The pelvis was visualized and was NEGATIVE for free fluid.    Impression:  Pelvis: The focused pelvic ultrasound was INDETERMINATE given inadequate views.    Comments: Indeterminate as fetal heart rate was unable to be measured               Jadiel Horn DO  Resident  07/16/24 [id number]
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
## 2024-07-16 NOTE — H&P
Obstetrical Admission History and Physical     Sandrita Adams is a 33 y.o.  at 9w2d by 7wk US admitted for complicated UTI with concern for pyelonephritis.    Chief Complaint: Wound Check    Assessment/Plan     Sandrita Adams is a 33 y.o.  at 9w2d by 7wk US admitted for complicated UTI with concern for pyelonephritis.    Complicated UTI with concern for pyelonephritis  - WBC of 13.2 with R flank pain, subjective fevers, and trace blood & LE on UA  - Ucx pending  - IV fluids  - s/p CTX 1g in the ED. Plan to monitor for 24 hours and discharge on oral abx if clinical improvement    Hidradenitis Suppuritiva Flare  - worsening symptoms since stopping biologic 3 weeks ago  - No concern for infection/abscess on exam  - Tylenol & home gabapentin, oxycodone for pain  - Will start Hibiclens wash and clindamycin  - Next pain management appt     Lupus  - In remission, no medications    Asthma  - no home meds  - asymptomatic    IUP  - prenatal labs reviewed and wnl  - Has initial OB visit scheduled   - Had 1st trimester bleeding, started on vaginal progesterone by outside provider. No current bleeding.  - BSUS with positive fetal cardiac activity    Dispo: Will admit for observation and IV abx    Discussed with Dr. Nat Connolly MD, PGY-2  MFM team pager [phone]    Gabbie Remy is a 33 y.o.  at 9w2d by 7wk US, here for flare of her hidradenitis suppurativa. Patient states she was previously well controlled on a biologic, which she was told to stop 3 weeks ago d/t pregnancy. Since then, she says her HS has worsened and her BL axilla and groin are swollen and tender. She also reports R intermittent sharp flank pain since , but thought it was gas pain. Subjective fevers/chils at home. No CP, SOB, dysuria, hematuria.            Obstetrical History   OB History    Para Term  AB Living   2 0 0 0 1 0   SAB IAB Ectopic Multiple Live Births   1 0 0 0 0      #  Outcome Date GA Lbr Anoop/2nd Weight Sex Type Anes PTL Lv   2 Current            1 SAB                Past Medical History  Past Medical History:   Diagnosis Date    Hidradenitis suppurativa 10/29/2020    Hidradenitis    Lupus (Multi)         Past Surgical History   Past Surgical History:   Procedure Laterality Date    OTHER SURGICAL HISTORY  09/19/2022    Arm surgery       Social History  Social History     Tobacco Use    Smoking status: Former     Types: Cigarettes    Smokeless tobacco: Never   Substance Use Topics    Alcohol use: Not on file     Substance and Sexual Activity   Drug Use Never       Allergies  Suboxone [buprenorphine-naloxone], Clarithromycin, Haloperidol, Levofloxacin, Metoclopramide, and Reglan [metoclopramide hcl]     Medications  (Not in a hospital admission)      Objective    Last Vitals  Temp Pulse Resp BP MAP O2 Sat   36.2 °C (97.2 °F) 92 18 115/71   100 %     Physical Examination  General: Alert and oriented, in NAD  /Abdomen: Positive suprapubic tenderness and R flank tenderness. No rebound or guarding. No CVA tenderness. Small subcentimeter swollen and tender masses in BL groin with no fluctuance, drainage, or erythema.  Breast/Axilla: Multiple tender 1 cm areas of swelling in bilateral armpits. No erythema, drainage., or fluctuance    Neuro: Awake, alert, conversational  CV: Regular rate  Respiratory: Even and unlabored on RA  Abdominal: soft, tender over R flank, nondistended. No rebound or guarding.  Extremities: Full ROM  Psych: appropriate mood and affect      Lab Review  Lab Results   Component Value Date    WBC 13.2 (H) 07/16/2024    HGB 12.3 07/16/2024    HCT 35.1 (L) 07/16/2024     07/16/2024

## 2024-07-16 NOTE — TELEPHONE ENCOUNTER
[employer] called and inquired if this appointment was made by Emergency Dept and what date. I told them appt wasn't made by us and it was made on 7/10/24 and she said okay. Not sure if patient will show up.. - 7/16/24 hugo

## 2024-07-16 NOTE — CARE PLAN
Problem: Skin  Goal: Decreased wound size/increased tissue granulation at next dressing change  Outcome: Adequate for Discharge  Goal: Participates in plan/prevention/treatment measures  Outcome: Adequate for Discharge  Goal: Prevent/manage excess moisture  Outcome: Adequate for Discharge  Goal: Prevent/minimize sheer/friction injuries  Outcome: Adequate for Discharge  Goal: Promote/optimize nutrition  Outcome: Adequate for Discharge  Goal: Promote skin healing  Outcome: Adequate for Discharge     Problem: Pain  Goal: Takes deep breaths with improved pain control throughout the shift  Outcome: Adequate for Discharge  Goal: Turns in bed with improved pain control throughout the shift  Outcome: Adequate for Discharge  Goal: Walks with improved pain control throughout the shift  Outcome: Adequate for Discharge  Goal: Performs ADL's with improved pain control throughout shift  Outcome: Adequate for Discharge  Goal: Participates in PT with improved pain control throughout the shift  Outcome: Adequate for Discharge  Goal: Free from opioid side effects throughout the shift  Outcome: Adequate for Discharge  Goal: Free from acute confusion related to pain meds throughout the shift  Outcome: Adequate for Discharge     The patient's goals for the shift include      The clinical goals for the shift include Adequate pain control    Pt met milestones adequate for discharge, pain controlled with ordered medications. RN unable to dopple pt's baby prior to discharge d/t pt needing to get Lyft ride home to prepare for her appt at [facility] this afternoon, MD Horvath aware. Pt has follow up appt scheduled for later this week. RN went over DC instructions with pt and informed her that all info can be found on her  mychart.

## 2024-07-16 NOTE — ED PROVIDER NOTES
HPI   Chief Complaint   Patient presents with    Wound Check       HPI  Patient is a 33-year-old female with a past medical history of lupus, asthma, hirsutism, obesity, hydradenitis suppurativa who presents for concern of an HS flare.  Patient states that she has a long history of HS and has been tried numerous medications including Augmentin, clindamycin, and Bactrim but has been taking all of those due to failure of medication.  Patient states that he had been on Cocentix, which helped for a few months but was taken off 2 weeks ago due to pregnancy and states that she is now approximately 9 weeks pregnant.  Patient states that she also tried Cimzia but stopped that recently, as well.  Patient states that she has a pain management appointment tomorrow at [facility], but she was staying at a pregnancy home and they told her they wanted her to come in because she was complaining of so much pain today.  Patient states that she has seen infectious disease and dermatology for many years and has seen palliative in the past as well.  Patient states that her pain is under both armpits, around her groin bilaterally, spreading down her legs distally, and on her backside.  She states that they stopped draining them in the ED as she has had numerous issues with tunneling and they kept returning.  Patient denies any other systemic symptoms including fever, chest pain, shortness of breath, abdominal pain, headache.  Patient does state that she has been a little nauseous due to the pain throughout the day.                  No data recorded                   Patient History   Past Medical History:   Diagnosis Date    Hidradenitis suppurativa 10/29/2020    Hidradenitis    Lupus (Multi)      Past Surgical History:   Procedure Laterality Date    OTHER SURGICAL HISTORY  09/19/2022    Arm surgery     No family history on file.  Social History     Tobacco Use    Smoking status: Former     Types: Cigarettes    Smokeless tobacco: Never    Vaping Use    Vaping status: Never Used   Substance Use Topics    Alcohol use: Not on file    Drug use: Never       Physical Exam   ED Triage Vitals [07/15/24 2315]   Temperature Heart Rate Respirations BP   36.2 °C (97.2 °F) 92 18 115/71      Pulse Ox Temp src Heart Rate Source Patient Position   100 % -- -- --      BP Location FiO2 (%)     -- --       Physical Exam  Vitals and nursing note reviewed. Exam conducted with a chaperone present.   Constitutional:       General: She is not in acute distress.     Appearance: She is well-developed.   HENT:      Head: Normocephalic and atraumatic.   Eyes:      Conjunctiva/sclera: Conjunctivae normal.   Cardiovascular:      Rate and Rhythm: Normal rate and regular rhythm.      Heart sounds: No murmur heard.  Pulmonary:      Effort: Pulmonary effort is normal. No respiratory distress.      Breath sounds: Normal breath sounds.   Chest:      Comments: A tender area of at bedtime inflammation to the center of the chest  Abdominal:      Palpations: Abdomen is soft.      Tenderness: There is no abdominal tenderness.   Musculoskeletal:         General: No swelling.      Cervical back: Neck supple.   Skin:     General: Skin is warm and dry.      Capillary Refill: Capillary refill takes less than 2 seconds.      Comments: Inflammation/HS flare to bilateral axilla  Inflammation/HS flare to bilateral groin, upper inner thighs, and buttocks   Neurological:      Mental Status: She is alert.   Psychiatric:         Mood and Affect: Mood normal.         ED Course & MDM   Diagnoses as of 07/16/24 0251   Pyelonephritis during pregnancy ([facility])   Hidradenitis suppurativa       Medical Decision Making  Patient is a 33-year-old female with a past medical history of lupus, asthma, hirsutism, obesity, hydradenitis suppurativa who presents for concern of an HS flare.  Patient's exam notable for hidradenitis suppurativa flare to bilateral axilla, groin, proximal thighs, and buttocks.  Patient's  vitals were stable within normal limits.  Patient given Zofran for nausea control and 1 mg Dilaudid for pain control.  Patient stated she has an appointment with pain management doctor later today and just needs to get her pain under control as she could not sleep or even shower.  Patient CMP showed slight hyponatremia with a sodium of 131 with other labs unremarkable.  Patient's lactate was within normal range at 0.6.  Patient CBC showed evidence of a leukocytosis at 13.2 with no concern for anemia with a hemoglobin of 12.3.  Upon recheck, patient still in significant pain and another 2 mg Dilaudid was ordered, which was recommended by her palliative doctor in a prior note.  Patient's urinalysis showed evidence of UTI with leukocyte esterase 500.  Upon recheck, patient stated that she has had some right-sided flank pain and subjective fever yesterday.  Given these facts along with a leukocytosis, pyelonephritis is likely.  Patient given 1 g ceftriaxone and OB was consulted and saw the patient.  Patient admitted to [facility] under Dr. Johns in stable condition for continued treatment of pyelonephritis during pregnancy.  Patient understood and was agreeable with the plan.    Procedure  Procedures none     Jadiel Horn DO  Resident  07/16/24 6207

## 2024-07-16 NOTE — ED TRIAGE NOTES
Pt presented with c/o bilateral groin and axilla pain, pt states that she has a  history of HS stage 3 and was taken off her medication and now is having a flare with extreme pain, pt states that she is 9 weeks pregnant

## 2024-07-16 NOTE — PROGRESS NOTES
History Of Present Illness  Sandrita Adams is a 33 y.o. female presenting with   Chief Complaint   Patient presents with    Pain       Patient presents with complaints of chronic hidradenitis and had her sweat glands removed via surgical procedure in August 15, 2017 which complicated by a post operative infection. She has been on antibiotics since 2010. She also bilateral groin pain as well due to hidradenitis.  The pain is constant, worse with activity and better with rest. The pain is sharp, stabbing and shooting to the B/L LE. To manage this pain the patient has attempted Percocet 10/325mg three times a day. The patients chronic SLE are stable on medication management.     PAIN SCORE: 10/10.    PCP: Lane Grigsby       Past Medical History  She has a past medical history of Hidradenitis suppurativa (10/29/2020) and Lupus (Multi).    Surgical History  She has a past surgical history that includes Other surgical history (09/19/2022).     Social History  She reports that she has quit smoking. Her smoking use included cigarettes. She has never used smokeless tobacco. She reports that she does not currently use alcohol. She reports that she does not use drugs.    Is currently on disability due to Hidradenitis.     Family History  No family history on file.     Allergies  Suboxone [buprenorphine-naloxone], Clarithromycin, Haloperidol, Levofloxacin, Metoclopramide, and Reglan [metoclopramide hcl]    Review of Systems    All other systems reviewed and negative for any deficits. Pertinent positives and negatives were considered in the medical decision making process.        Physical Exam  /84   Pulse 84   Temp 36.4 °C (97.5 °F)   Resp 15   Wt 78.9 kg (174 lb)   SpO2 100%     General: Pt appears stated age, intelligent, articulate.     Eyes: Conjunctiva non-icteric and lids without obvious rash or drooping. Pupils are symmetric.    ENT: External ears and nose appear to be without deformity or rash. No  lesions or masses noted. Hearing is grossly intact.    Respiratory: No gasping or shortness of breath noted. No use of accessory muscles noted.    CVS: Extremities show no edema or varicosities    Skin: No rashes or open lesions/ulcers identified on skin. No induration/tightening noted with palpation of skin.     Musculoskeletal: [place] is grossly normal.    Stability: No subluxation noted on movement of bilateral upper extremities or head/neck.     Range of Motion: WNL    Sensation: WNL    Neurologic: Cranial Nerves II thru XII are grossly intact    Psychiatric: Pt is alert and oriented to time, person, and place       Assessment/Plan   1. Hidradenitis axillaris        2. Uncomplicated opioid dependence (Multi)        3. 9 weeks gestation of pregnancy ([facility])        4. Chronically on opiate therapy             We did discuss the risks, benefits, and alternatives to chronic opioid therapy and that usage can be a danger to life. We also discussed proper and safe storage of medication to prevent unauthorized usage. The patient understands and will use the medicine responsibly.    [place]AR[place] was reviewed and was consistent with the history.  A urine or saliva specimen should be obtained for toxicology screening by prescribing physician.  The patient and I discussed the nature of this medication and its side effects.  We discussed tolerance, physical dependence, psychological dependence, addiction and opioid-induced hyperalgesia.  We discussed the potential need to wean from this medication.  We discussed the availability of programs that can help with this process if necessary.  We discussed safety issues related to opioids including safe storage.  We discussed the fact that the patient should not drive an automobile or operate heavy machinery while taking this medication.  A prescription for naloxone should be prescribed to the patient.     Given that she is currently 9 weeks pregnant and has been on chronic opiate therapy  for the past several months her fetus is at risk for teratogenicity, respiratory depression in adult and , and opiate withdrawal Pt also understands she is at risk of opiate overdosing and death.     At greater than 30mg/day of Percocet use there is risk of  respiratory depression and death through lactation. This was also discussed the patient at length.     3. Pt understands that our office does not manage opiates FOR CHRONIC NON-CANCER PAIN.  Pt is welcome to follow up with our office in the future for any spine or joint pain. She understands that there is no specific pain procedure for pain due to Hidradenitis. She is also going to f/u with her Plastic surgeon at [facility] regarding additional treatment options.     Prabhakar Mcpherson MD    I spent time with the patient reviewing their imaging and discussing the risks benefits and alternatives to the above plan. A total of 45 minutes was spent reviewing the data and greater than 50% of that time was with the patient during the face to face encounter discussing treatment options both surgical, non-surgical, and minimally invasive techniques.

## 2024-07-17 LAB — BACTERIA UR CULT: NORMAL

## 2024-07-17 PROCEDURE — RXMED WILLOW AMBULATORY MEDICATION CHARGE

## 2024-07-18 ENCOUNTER — PHARMACY VISIT (OUTPATIENT)
Dept: PHARMACY | Facility: CLINIC | Age: 34
End: 2024-07-18
Payer: MEDICAID

## 2024-07-21 ENCOUNTER — HOSPITAL ENCOUNTER (EMERGENCY)
Facility: HOSPITAL | Age: 34
Discharge: HOME | End: 2024-07-21
Attending: EMERGENCY MEDICINE
Payer: MEDICAID

## 2024-07-21 VITALS
OXYGEN SATURATION: 97 % | HEART RATE: 80 BPM | DIASTOLIC BLOOD PRESSURE: 68 MMHG | BODY MASS INDEX: 33.23 KG/M2 | SYSTOLIC BLOOD PRESSURE: 107 MMHG | HEIGHT: 61 IN | WEIGHT: 176 LBS | RESPIRATION RATE: 16 BRPM | TEMPERATURE: 97.7 F

## 2024-07-21 DIAGNOSIS — L73.2 HIDRADENITIS SUPPURATIVA: Primary | ICD-10-CM

## 2024-07-21 LAB
ALBUMIN SERPL BCP-MCNC: 3.9 G/DL (ref 3.4–5)
ALP SERPL-CCNC: 51 U/L (ref 33–110)
ALT SERPL W P-5'-P-CCNC: 10 U/L (ref 7–45)
ANION GAP SERPL CALC-SCNC: 14 MMOL/L (ref 10–20)
APPEARANCE UR: CLEAR
AST SERPL W P-5'-P-CCNC: 18 U/L (ref 9–39)
BASOPHILS # BLD AUTO: 0.03 X10*3/UL (ref 0–0.1)
BASOPHILS NFR BLD AUTO: 0.3 %
BILIRUB SERPL-MCNC: 0.2 MG/DL (ref 0–1.2)
BILIRUB UR STRIP.AUTO-MCNC: NEGATIVE MG/DL
BUN SERPL-MCNC: 11 MG/DL (ref 6–23)
CALCIUM SERPL-MCNC: 9.1 MG/DL (ref 8.6–10.6)
CHLORIDE SERPL-SCNC: 104 MMOL/L (ref 98–107)
CO2 SERPL-SCNC: 19 MMOL/L (ref 21–32)
COLOR UR: ABNORMAL
CREAT SERPL-MCNC: 0.63 MG/DL (ref 0.5–1.05)
EGFRCR SERPLBLD CKD-EPI 2021: >90 ML/MIN/1.73M*2
EOSINOPHIL # BLD AUTO: 0.1 X10*3/UL (ref 0–0.7)
EOSINOPHIL NFR BLD AUTO: 1 %
ERYTHROCYTE [DISTWIDTH] IN BLOOD BY AUTOMATED COUNT: 14.3 % (ref 11.5–14.5)
GLUCOSE SERPL-MCNC: 91 MG/DL (ref 74–99)
GLUCOSE UR STRIP.AUTO-MCNC: NORMAL MG/DL
HCT VFR BLD AUTO: 34.5 % (ref 36–46)
HGB BLD-MCNC: 11.9 G/DL (ref 12–16)
IMM GRANULOCYTES # BLD AUTO: 0.04 X10*3/UL (ref 0–0.7)
IMM GRANULOCYTES NFR BLD AUTO: 0.4 % (ref 0–0.9)
KETONES UR STRIP.AUTO-MCNC: NEGATIVE MG/DL
LEUKOCYTE ESTERASE UR QL STRIP.AUTO: ABNORMAL
LYMPHOCYTES # BLD AUTO: 3.27 X10*3/UL (ref 1.2–4.8)
LYMPHOCYTES NFR BLD AUTO: 32.4 %
MCH RBC QN AUTO: 25.3 PG (ref 26–34)
MCHC RBC AUTO-ENTMCNC: 34.5 G/DL (ref 32–36)
MCV RBC AUTO: 73 FL (ref 80–100)
MONOCYTES # BLD AUTO: 0.79 X10*3/UL (ref 0.1–1)
MONOCYTES NFR BLD AUTO: 7.8 %
NEUTROPHILS # BLD AUTO: 5.85 X10*3/UL (ref 1.2–7.7)
NEUTROPHILS NFR BLD AUTO: 58.1 %
NITRITE UR QL STRIP.AUTO: NEGATIVE
NRBC BLD-RTO: 0 /100 WBCS (ref 0–0)
PH UR STRIP.AUTO: 6 [PH]
PLATELET # BLD AUTO: 262 X10*3/UL (ref 150–450)
POTASSIUM SERPL-SCNC: 4.1 MMOL/L (ref 3.5–5.3)
PROT SERPL-MCNC: 7.1 G/DL (ref 6.4–8.2)
PROT UR STRIP.AUTO-MCNC: NEGATIVE MG/DL
RBC # BLD AUTO: 4.7 X10*6/UL (ref 4–5.2)
RBC # UR STRIP.AUTO: ABNORMAL /UL
RBC #/AREA URNS AUTO: NORMAL /HPF
SODIUM SERPL-SCNC: 133 MMOL/L (ref 136–145)
SP GR UR STRIP.AUTO: 1.01
SQUAMOUS #/AREA URNS AUTO: NORMAL /HPF
UROBILINOGEN UR STRIP.AUTO-MCNC: NORMAL MG/DL
WBC # BLD AUTO: 10.1 X10*3/UL (ref 4.4–11.3)
WBC #/AREA URNS AUTO: NORMAL /HPF

## 2024-07-21 PROCEDURE — 36415 COLL VENOUS BLD VENIPUNCTURE: CPT | Performed by: PHYSICIAN ASSISTANT

## 2024-07-21 PROCEDURE — 2500000004 HC RX 250 GENERAL PHARMACY W/ HCPCS (ALT 636 FOR OP/ED): Mod: SE | Performed by: PHYSICIAN ASSISTANT

## 2024-07-21 PROCEDURE — 80053 COMPREHEN METABOLIC PANEL: CPT | Performed by: PHYSICIAN ASSISTANT

## 2024-07-21 PROCEDURE — 99284 EMERGENCY DEPT VISIT MOD MDM: CPT

## 2024-07-21 PROCEDURE — 99285 EMERGENCY DEPT VISIT HI MDM: CPT | Performed by: PHYSICIAN ASSISTANT

## 2024-07-21 PROCEDURE — 2500000001 HC RX 250 WO HCPCS SELF ADMINISTERED DRUGS (ALT 637 FOR MEDICARE OP): Mod: SE | Performed by: PHYSICIAN ASSISTANT

## 2024-07-21 PROCEDURE — 96376 TX/PRO/DX INJ SAME DRUG ADON: CPT

## 2024-07-21 PROCEDURE — 85025 COMPLETE CBC W/AUTO DIFF WBC: CPT | Performed by: PHYSICIAN ASSISTANT

## 2024-07-21 PROCEDURE — 81001 URINALYSIS AUTO W/SCOPE: CPT | Performed by: PHYSICIAN ASSISTANT

## 2024-07-21 PROCEDURE — 96374 THER/PROPH/DIAG INJ IV PUSH: CPT

## 2024-07-21 PROCEDURE — 87086 URINE CULTURE/COLONY COUNT: CPT | Performed by: PHYSICIAN ASSISTANT

## 2024-07-21 RX ORDER — CLINDAMYCIN HYDROCHLORIDE 300 MG/1
300 CAPSULE ORAL ONCE
Status: COMPLETED | OUTPATIENT
Start: 2024-07-21 | End: 2024-07-21

## 2024-07-21 RX ORDER — OXYCODONE AND ACETAMINOPHEN 5; 325 MG/1; MG/1
2 TABLET ORAL ONCE
Status: COMPLETED | OUTPATIENT
Start: 2024-07-21 | End: 2024-07-21

## 2024-07-21 RX ORDER — CLINDAMYCIN HYDROCHLORIDE 150 MG/1
300 CAPSULE ORAL 2 TIMES DAILY
Qty: 40 CAPSULE | Refills: 0 | Status: ON HOLD | OUTPATIENT
Start: 2024-07-21 | End: 2024-07-24

## 2024-07-21 RX ORDER — HYDROMORPHONE HYDROCHLORIDE 1 MG/ML
1 INJECTION, SOLUTION INTRAMUSCULAR; INTRAVENOUS; SUBCUTANEOUS ONCE
Status: DISCONTINUED | OUTPATIENT
Start: 2024-07-21 | End: 2024-07-21

## 2024-07-21 RX ORDER — HYDROMORPHONE HYDROCHLORIDE 1 MG/ML
1 INJECTION, SOLUTION INTRAMUSCULAR; INTRAVENOUS; SUBCUTANEOUS ONCE
Status: COMPLETED | OUTPATIENT
Start: 2024-07-21 | End: 2024-07-21

## 2024-07-21 ASSESSMENT — LIFESTYLE VARIABLES
HAVE YOU EVER FELT YOU SHOULD CUT DOWN ON YOUR DRINKING: NO
HAVE PEOPLE ANNOYED YOU BY CRITICIZING YOUR DRINKING: NO
TOTAL SCORE: 0
EVER FELT BAD OR GUILTY ABOUT YOUR DRINKING: NO
EVER HAD A DRINK FIRST THING IN THE MORNING TO STEADY YOUR NERVES TO GET RID OF A HANGOVER: NO

## 2024-07-21 ASSESSMENT — PAIN SCALES - GENERAL
PAINLEVEL_OUTOF10: 8
PAINLEVEL_OUTOF10: 10 - WORST POSSIBLE PAIN
PAINLEVEL_OUTOF10: 8

## 2024-07-21 ASSESSMENT — PAIN DESCRIPTION - LOCATION: LOCATION: ARM

## 2024-07-21 ASSESSMENT — PAIN DESCRIPTION - PAIN TYPE
TYPE: ACUTE PAIN;CHRONIC PAIN
TYPE: CHRONIC PAIN

## 2024-07-21 ASSESSMENT — PAIN DESCRIPTION - FREQUENCY: FREQUENCY: CONSTANT/CONTINUOUS

## 2024-07-21 ASSESSMENT — PAIN DESCRIPTION - ORIENTATION: ORIENTATION: RIGHT

## 2024-07-21 ASSESSMENT — PAIN - FUNCTIONAL ASSESSMENT
PAIN_FUNCTIONAL_ASSESSMENT: 0-10
PAIN_FUNCTIONAL_ASSESSMENT: 0-10

## 2024-07-21 NOTE — ED PROVIDER NOTES
HPI   Chief Complaint   Patient presents with    Abscess       Patient is a 33-year-old female with history of SLE, asthma, hirsutism, obesity, hidradenitis suppurativa who presents today for evaluation of an at bedtime flare, patient was seen here on the 15th and admitted through the 16th for the same reason in addition to concern for a kidney infection.  Patient states that she fell with the at bedtime is getting worse in the last 2 days, patient states that it started bothering her again yesterday.  Patient states she was previously on Biologics Cosentix however upon her becoming pregnant she was taken off of it and instead is on Cimzia.  Patient is currently 10 weeks pregnant, she states that because of her pregnancy she is not able to be on a lot of her antibiotics, she is currently only on Augmentin.  She states that she has had subjective fevers and chills at home with a chest clear, she states her right armpit is the worst but the left armpit and the groin are both also affected.  She denies any pregnancy related complaints such as bleeding or abdominal pain, patient requests an ultrasound today to make sure that the baby is okay because she is worried about how many pain meds she is on.  Patient takes Percocet 10 mg 3 times daily, this was last prescribed and filled on June 26.  Patient follows with pain management, states that the pain management doctor that she just saw specializes in low back pain and so they wanted her to see somebody different, patient is also supposed to see high risk OB to come up with a plan for her at bedtime management as well as for pain control.  Patient states she sees dermatology, the last time she saw them was within the last month, she states that they stopped draining her at bedtime as it was causing tunneling and they kept coming back.  She denies any other new symptoms.  Per review of records during her most recent visit to the ED there was concern for patient having a  kidney infection due to flank pain and a urinalysis with leukocyte esterase, patient was started on Rocephin and admitted under OB/GYN.  She is currently on Augmentin, they do not believe that she ever had a true UTI.  Urine culture from the 16th showed no significant growth.  She denies burning frequency urgency with urination or flank pain, states she just has low back pain.              Patient History   Past Medical History:   Diagnosis Date    Hidradenitis suppurativa 10/29/2020    Hidradenitis    Lupus (Multi)      Past Surgical History:   Procedure Laterality Date    OTHER SURGICAL HISTORY  09/19/2022    Arm surgery     No family history on file.  Social History     Tobacco Use    Smoking status: Former     Types: Cigarettes    Smokeless tobacco: Never   Vaping Use    Vaping status: Never Used   Substance Use Topics    Alcohol use: Not Currently    Drug use: Never       Physical Exam   ED Triage Vitals [07/21/24 1406]   Temperature Heart Rate Respirations BP   36.5 °C (97.7 °F) 85 17 107/68      Pulse Ox Temp Source Heart Rate Source Patient Position   97 % Temporal Monitor Sitting      BP Location FiO2 (%)     Left arm --       Physical Exam  Vitals and nursing note reviewed.   Constitutional:       General: She is not in acute distress.     Appearance: Normal appearance. She is not toxic-appearing.   HENT:      Head: Normocephalic and atraumatic.      Nose: Nose normal.   Eyes:      Extraocular Movements: Extraocular movements intact.   Cardiovascular:      Rate and Rhythm: Normal rate and regular rhythm.   Pulmonary:      Effort: Pulmonary effort is normal.      Breath sounds: Normal breath sounds. No wheezing, rhonchi or rales.   Abdominal:      Palpations: Abdomen is soft.      Tenderness: There is no abdominal tenderness. There is no guarding.      Comments: Bedside pocus confirmed fetal movement and 171 bpm fetal hearttones   Musculoskeletal:         General: Normal range of motion.      Cervical back:  Normal range of motion and neck supple.   Skin:     General: Skin is warm and dry.      Comments: Multiple indurated areas to bilateral axilla and scarring to bilateral axilla and groin consistent with diagnosis of at bedtime.  No obvious erythema or warmth or obvious drainable fluid collection.   Neurological:      General: No focal deficit present.      Mental Status: She is alert.   Psychiatric:         Mood and Affect: Mood normal.         Thought Content: Thought content normal.       No orders to display     Labs Reviewed   CBC WITH AUTO DIFFERENTIAL - Abnormal       Result Value    WBC 10.1      nRBC 0.0      RBC 4.70      Hemoglobin 11.9 (*)     Hematocrit 34.5 (*)     MCV 73 (*)     MCH 25.3 (*)     MCHC 34.5      RDW 14.3      Platelets 262      Neutrophils % 58.1      Immature Granulocytes %, Automated 0.4      Lymphocytes % 32.4      Monocytes % 7.8      Eosinophils % 1.0      Basophils % 0.3      Neutrophils Absolute 5.85      Immature Granulocytes Absolute, Automated 0.04      Lymphocytes Absolute 3.27      Monocytes Absolute 0.79      Eosinophils Absolute 0.10      Basophils Absolute 0.03     COMPREHENSIVE METABOLIC PANEL - Abnormal    Glucose 91      Sodium 133 (*)     Potassium 4.1      Chloride 104      Bicarbonate 19 (*)     Anion Gap 14      Urea Nitrogen 11      Creatinine 0.63      eGFR >90      Calcium 9.1      Albumin 3.9      Alkaline Phosphatase 51      Total Protein 7.1      AST 18      Bilirubin, Total 0.2      ALT 10     URINALYSIS WITH REFLEX CULTURE AND MICROSCOPIC - Abnormal    Color, Urine Light-Yellow      Appearance, Urine Clear      Specific Gravity, Urine 1.013      pH, Urine 6.0      Protein, Urine NEGATIVE      Glucose, Urine Normal      Blood, Urine 0.03 (TRACE) (*)     Ketones, Urine NEGATIVE      Bilirubin, Urine NEGATIVE      Urobilinogen, Urine Normal      Nitrite, Urine NEGATIVE      Leukocyte Esterase, Urine 25 Estefania/µL (*)    URINE CULTURE   MICROSCOPIC ONLY, URINE     WBC, Urine 1-5      RBC, Urine 1-2      Squamous Epithelial Cells, Urine 1-9 (SPARSE)     URINALYSIS WITH REFLEX CULTURE AND MICROSCOPIC    Narrative:     The following orders were created for panel order Urinalysis with Reflex Culture and Microscopic.  Procedure                               Abnormality         Status                     ---------                               -----------         ------                     Urinalysis with Reflex C...[[id number]]  Abnormal            Final result               Extra Urine Gray Tube[[id number]]                            In process                   Please view results for these tests on the individual orders.   EXTRA URINE GRAY TUBE           ED Course & MDM   ED Course as of 07/22/24 0007   Sun Jul 21, 2024   1529 Point of Care Ultrasound  Bedside pocus confirmed iup with fetal movement and fetal heart tones of 171 bpm. []   1613 LEUKOCYTES (10*3/UL) IN BLOOD BY AUTOMATED COUNT, Libyan: 10.1  Downtrending from 5 days ago []   1707 Leukocyte Esterase, Urine(!): 25 Estefania/µL  Not consistent with UTI, pt currently on augmentin with negative urine culture on 16th, no indication for treatment, no bacteriuria. []      ED Course User Index  [MK] Josefina Gomez PA-C         Diagnoses as of 07/22/24 0007   Hidradenitis suppurativa                       Monik Coma Scale Score: 15                        Medical Decision Making      MDM: Patient is a 33-year-old female who presents today for increasing pain due to an at bedtime flare, she does feel like it is getting worse, per review of records she recently had an elevated white blood cell count of 13 and possible UTI that was ultimately culture negative.  She has no flank pain or abdominal pain today, bedside ultrasound did confirm the presence of an IUP with normal fetal heart tones 171 bpm.  I did repeat labs including CBC CMP and urinalysis, patient was given 2 mg of IV Dilaudid which is what she normally has for  pain.  Case discussed with Dr. Blakely. Patient's white blood cell count is downtrending, today at this time, her CMP is unremarkable, urinalysis showed 25 leukocyte esterase with only 1-5 white blood cells and no bacteria, no indication for treatment as she is currently already on Augmentin.  She was given pain medication with improvement in her pain, given that she does have worsening at bedtime and she is currently only on Augmentin, will add clindamycin to cover for MRSA.  Patient encouraged to follow-up with her OB/GYN and dermatology for definitive management.  Return precautions to the ED were discussed.  Do not feel that admission is indicated at this time.            Procedure  Procedures     Josefina Gomez PA-C  07/22/24 0007

## 2024-07-21 NOTE — ED TRIAGE NOTES
Pt to ED with c/o abscess to bilateral armpits and groin. Pt has hydradenitis and was seen recently for abscesses. Pt also stating she is 10 weeks OB.

## 2024-07-22 ENCOUNTER — HOSPITAL ENCOUNTER (OUTPATIENT)
Facility: HOSPITAL | Age: 34
Setting detail: OBSERVATION
Discharge: HOME | End: 2024-07-24
Attending: STUDENT IN AN ORGANIZED HEALTH CARE EDUCATION/TRAINING PROGRAM | Admitting: STUDENT IN AN ORGANIZED HEALTH CARE EDUCATION/TRAINING PROGRAM
Payer: MEDICAID

## 2024-07-22 ENCOUNTER — APPOINTMENT (OUTPATIENT)
Dept: RADIOLOGY | Facility: HOSPITAL | Age: 34
End: 2024-07-22
Payer: MEDICAID

## 2024-07-22 ENCOUNTER — CLINICAL SUPPORT (OUTPATIENT)
Dept: EMERGENCY MEDICINE | Facility: HOSPITAL | Age: 34
End: 2024-07-22
Payer: MEDICAID

## 2024-07-22 DIAGNOSIS — L73.2 AXILLARY HIDRADENITIS SUPPURATIVA: ICD-10-CM

## 2024-07-22 DIAGNOSIS — L73.2 HIDRADENITIS SUPPURATIVA: Primary | ICD-10-CM

## 2024-07-22 LAB
ALBUMIN SERPL BCP-MCNC: 3.8 G/DL (ref 3.4–5)
ALP SERPL-CCNC: 51 U/L (ref 33–110)
ALT SERPL W P-5'-P-CCNC: 11 U/L (ref 7–45)
ANION GAP SERPL CALC-SCNC: 13 MMOL/L (ref 10–20)
AST SERPL W P-5'-P-CCNC: 20 U/L (ref 9–39)
BASOPHILS # BLD AUTO: 0.04 X10*3/UL (ref 0–0.1)
BASOPHILS NFR BLD AUTO: 0.4 %
BILIRUB SERPL-MCNC: 0.2 MG/DL (ref 0–1.2)
BUN SERPL-MCNC: 11 MG/DL (ref 6–23)
CALCIUM SERPL-MCNC: 9.2 MG/DL (ref 8.6–10.6)
CHLORIDE SERPL-SCNC: 107 MMOL/L (ref 98–107)
CO2 SERPL-SCNC: 18 MMOL/L (ref 21–32)
CREAT SERPL-MCNC: 0.6 MG/DL (ref 0.5–1.05)
EGFRCR SERPLBLD CKD-EPI 2021: >90 ML/MIN/1.73M*2
EOSINOPHIL # BLD AUTO: 0.1 X10*3/UL (ref 0–0.7)
EOSINOPHIL NFR BLD AUTO: 0.9 %
ERYTHROCYTE [DISTWIDTH] IN BLOOD BY AUTOMATED COUNT: 14.4 % (ref 11.5–14.5)
GLUCOSE SERPL-MCNC: 97 MG/DL (ref 74–99)
HCT VFR BLD AUTO: 33.9 % (ref 36–46)
HGB BLD-MCNC: 11.9 G/DL (ref 12–16)
HOLD SPECIMEN: NORMAL
IMM GRANULOCYTES # BLD AUTO: 0.04 X10*3/UL (ref 0–0.7)
IMM GRANULOCYTES NFR BLD AUTO: 0.4 % (ref 0–0.9)
LYMPHOCYTES # BLD AUTO: 3.56 X10*3/UL (ref 1.2–4.8)
LYMPHOCYTES NFR BLD AUTO: 32 %
MCH RBC QN AUTO: 25.2 PG (ref 26–34)
MCHC RBC AUTO-ENTMCNC: 35.1 G/DL (ref 32–36)
MCV RBC AUTO: 72 FL (ref 80–100)
MONOCYTES # BLD AUTO: 0.92 X10*3/UL (ref 0.1–1)
MONOCYTES NFR BLD AUTO: 8.3 %
NEUTROPHILS # BLD AUTO: 6.48 X10*3/UL (ref 1.2–7.7)
NEUTROPHILS NFR BLD AUTO: 58 %
NRBC BLD-RTO: 0 /100 WBCS (ref 0–0)
PLATELET # BLD AUTO: 278 X10*3/UL (ref 150–450)
POTASSIUM SERPL-SCNC: 4.1 MMOL/L (ref 3.5–5.3)
PROT SERPL-MCNC: 7.1 G/DL (ref 6.4–8.2)
RBC # BLD AUTO: 4.73 X10*6/UL (ref 4–5.2)
SODIUM SERPL-SCNC: 134 MMOL/L (ref 136–145)
WBC # BLD AUTO: 11.1 X10*3/UL (ref 4.4–11.3)

## 2024-07-22 PROCEDURE — 96367 TX/PROPH/DG ADDL SEQ IV INF: CPT

## 2024-07-22 PROCEDURE — 2500000004 HC RX 250 GENERAL PHARMACY W/ HCPCS (ALT 636 FOR OP/ED): Mod: SE | Performed by: PHARMACIST

## 2024-07-22 PROCEDURE — 80053 COMPREHEN METABOLIC PANEL: CPT | Performed by: STUDENT IN AN ORGANIZED HEALTH CARE EDUCATION/TRAINING PROGRAM

## 2024-07-22 PROCEDURE — 96365 THER/PROPH/DIAG IV INF INIT: CPT

## 2024-07-22 PROCEDURE — 93005 ELECTROCARDIOGRAM TRACING: CPT

## 2024-07-22 PROCEDURE — 93971 EXTREMITY STUDY: CPT | Performed by: RADIOLOGY

## 2024-07-22 PROCEDURE — 96376 TX/PRO/DX INJ SAME DRUG ADON: CPT

## 2024-07-22 PROCEDURE — 2500000004 HC RX 250 GENERAL PHARMACY W/ HCPCS (ALT 636 FOR OP/ED): Mod: SE | Performed by: PHYSICIAN ASSISTANT

## 2024-07-22 PROCEDURE — 96375 TX/PRO/DX INJ NEW DRUG ADDON: CPT

## 2024-07-22 PROCEDURE — 93971 EXTREMITY STUDY: CPT

## 2024-07-22 PROCEDURE — 99285 EMERGENCY DEPT VISIT HI MDM: CPT | Mod: 25

## 2024-07-22 PROCEDURE — 2500000004 HC RX 250 GENERAL PHARMACY W/ HCPCS (ALT 636 FOR OP/ED): Mod: SE

## 2024-07-22 PROCEDURE — 36415 COLL VENOUS BLD VENIPUNCTURE: CPT | Performed by: STUDENT IN AN ORGANIZED HEALTH CARE EDUCATION/TRAINING PROGRAM

## 2024-07-22 PROCEDURE — 85025 COMPLETE CBC W/AUTO DIFF WBC: CPT | Performed by: STUDENT IN AN ORGANIZED HEALTH CARE EDUCATION/TRAINING PROGRAM

## 2024-07-22 RX ORDER — VANCOMYCIN HYDROCHLORIDE 1 G/20ML
INJECTION, POWDER, LYOPHILIZED, FOR SOLUTION INTRAVENOUS DAILY PRN
Status: DISCONTINUED | OUTPATIENT
Start: 2024-07-22 | End: 2024-07-23

## 2024-07-22 RX ORDER — ACETAMINOPHEN 325 MG/1
975 TABLET ORAL ONCE
Status: COMPLETED | OUTPATIENT
Start: 2024-07-22 | End: 2024-07-22

## 2024-07-22 RX ORDER — HYDROMORPHONE HYDROCHLORIDE 1 MG/ML
0.5 INJECTION, SOLUTION INTRAMUSCULAR; INTRAVENOUS; SUBCUTANEOUS ONCE
Status: COMPLETED | OUTPATIENT
Start: 2024-07-22 | End: 2024-07-22

## 2024-07-22 RX ORDER — HYDROMORPHONE HYDROCHLORIDE 1 MG/ML
1 INJECTION, SOLUTION INTRAMUSCULAR; INTRAVENOUS; SUBCUTANEOUS ONCE
Status: COMPLETED | OUTPATIENT
Start: 2024-07-22 | End: 2024-07-22

## 2024-07-22 RX ORDER — HYDROMORPHONE HYDROCHLORIDE 1 MG/ML
INJECTION, SOLUTION INTRAMUSCULAR; INTRAVENOUS; SUBCUTANEOUS
Status: COMPLETED
Start: 2024-07-22 | End: 2024-07-22

## 2024-07-22 RX ORDER — VANCOMYCIN HYDROCHLORIDE 1 G/200ML
1000 INJECTION, SOLUTION INTRAVENOUS EVERY 8 HOURS
Status: DISCONTINUED | OUTPATIENT
Start: 2024-07-22 | End: 2024-07-23

## 2024-07-22 ASSESSMENT — PAIN SCALES - GENERAL
PAINLEVEL_OUTOF10: 10 - WORST POSSIBLE PAIN

## 2024-07-22 ASSESSMENT — PAIN DESCRIPTION - FREQUENCY: FREQUENCY: CONSTANT/CONTINUOUS

## 2024-07-22 ASSESSMENT — LIFESTYLE VARIABLES
EVER FELT BAD OR GUILTY ABOUT YOUR DRINKING: NO
HAVE YOU EVER FELT YOU SHOULD CUT DOWN ON YOUR DRINKING: NO
TOTAL SCORE: 0
EVER HAD A DRINK FIRST THING IN THE MORNING TO STEADY YOUR NERVES TO GET RID OF A HANGOVER: NO
HAVE PEOPLE ANNOYED YOU BY CRITICIZING YOUR DRINKING: NO

## 2024-07-22 ASSESSMENT — PAIN DESCRIPTION - PAIN TYPE: TYPE: ACUTE PAIN

## 2024-07-22 ASSESSMENT — PAIN DESCRIPTION - ORIENTATION
ORIENTATION: RIGHT

## 2024-07-22 ASSESSMENT — PAIN DESCRIPTION - LOCATION
LOCATION: ARM

## 2024-07-22 ASSESSMENT — PAIN DESCRIPTION - DESCRIPTORS: DESCRIPTORS: ACHING

## 2024-07-22 ASSESSMENT — PAIN DESCRIPTION - PROGRESSION: CLINICAL_PROGRESSION: NOT CHANGED

## 2024-07-22 ASSESSMENT — PAIN - FUNCTIONAL ASSESSMENT: PAIN_FUNCTIONAL_ASSESSMENT: 0-10

## 2024-07-22 NOTE — ED TRIAGE NOTES
Yesterday had numbness in fingertips, was here yesterday. Today around 12pm had no feeling in right arm, hydradenitis, abscesses to bilateral armpits and groin. Reporting feels like abscesses like bigger. Reports feeling hot, dizziness, nausea no vomiting, SOB. Right radial pulse intact. Possibly feeling dehydrated. Decreased sensation on right side     10 weeks 3 days OB

## 2024-07-23 ENCOUNTER — APPOINTMENT (OUTPATIENT)
Dept: RADIOLOGY | Facility: HOSPITAL | Age: 34
End: 2024-07-23
Payer: MEDICAID

## 2024-07-23 PROBLEM — L73.2 HIDRADENITIS SUPPURATIVA: Status: ACTIVE | Noted: 2024-07-23

## 2024-07-23 PROBLEM — Z3A.10 10 WEEKS GESTATION OF PREGNANCY (HHS-HCC): Status: ACTIVE | Noted: 2024-06-16

## 2024-07-23 LAB
ABO GROUP (TYPE) IN BLOOD: NORMAL
ANTIBODY SCREEN: NORMAL
BACTERIA UR CULT: NO GROWTH
RH FACTOR (ANTIGEN D): NORMAL
VANCOMYCIN SERPL-MCNC: 4.4 UG/ML (ref 5–20)

## 2024-07-23 PROCEDURE — 2500000004 HC RX 250 GENERAL PHARMACY W/ HCPCS (ALT 636 FOR OP/ED)

## 2024-07-23 PROCEDURE — G0378 HOSPITAL OBSERVATION PER HR: HCPCS

## 2024-07-23 PROCEDURE — 2500000001 HC RX 250 WO HCPCS SELF ADMINISTERED DRUGS (ALT 637 FOR MEDICARE OP)

## 2024-07-23 PROCEDURE — 80202 ASSAY OF VANCOMYCIN: CPT | Performed by: PHARMACIST

## 2024-07-23 PROCEDURE — 96367 TX/PROPH/DG ADDL SEQ IV INF: CPT

## 2024-07-23 PROCEDURE — 99255 IP/OBS CONSLTJ NEW/EST HI 80: CPT

## 2024-07-23 PROCEDURE — 96376 TX/PRO/DX INJ SAME DRUG ADON: CPT

## 2024-07-23 PROCEDURE — 87070 CULTURE OTHR SPECIMN AEROBIC: CPT

## 2024-07-23 PROCEDURE — 2500000005 HC RX 250 GENERAL PHARMACY W/O HCPCS

## 2024-07-23 PROCEDURE — 76882 US LMTD JT/FCL EVL NVASC XTR: CPT | Performed by: STUDENT IN AN ORGANIZED HEALTH CARE EDUCATION/TRAINING PROGRAM

## 2024-07-23 PROCEDURE — 76881 US COMPL JOINT R-T W/IMG: CPT

## 2024-07-23 PROCEDURE — 2500000004 HC RX 250 GENERAL PHARMACY W/ HCPCS (ALT 636 FOR OP/ED): Mod: SE | Performed by: PHYSICIAN ASSISTANT

## 2024-07-23 PROCEDURE — 96366 THER/PROPH/DIAG IV INF ADDON: CPT

## 2024-07-23 PROCEDURE — 36415 COLL VENOUS BLD VENIPUNCTURE: CPT

## 2024-07-23 PROCEDURE — 99254 IP/OBS CNSLTJ NEW/EST MOD 60: CPT | Performed by: STUDENT IN AN ORGANIZED HEALTH CARE EDUCATION/TRAINING PROGRAM

## 2024-07-23 PROCEDURE — 2500000002 HC RX 250 W HCPCS SELF ADMINISTERED DRUGS (ALT 637 FOR MEDICARE OP, ALT 636 FOR OP/ED)

## 2024-07-23 PROCEDURE — 86901 BLOOD TYPING SEROLOGIC RH(D): CPT

## 2024-07-23 RX ORDER — HYDROMORPHONE HYDROCHLORIDE 1 MG/ML
1 INJECTION, SOLUTION INTRAMUSCULAR; INTRAVENOUS; SUBCUTANEOUS ONCE
Status: DISCONTINUED | OUTPATIENT
Start: 2024-07-23 | End: 2024-07-23

## 2024-07-23 RX ORDER — ONDANSETRON 4 MG/1
4 TABLET, FILM COATED ORAL EVERY 6 HOURS PRN
Status: DISCONTINUED | OUTPATIENT
Start: 2024-07-23 | End: 2024-07-24 | Stop reason: HOSPADM

## 2024-07-23 RX ORDER — OXYCODONE HYDROCHLORIDE 5 MG/1
10 TABLET ORAL EVERY 4 HOURS
Status: DISCONTINUED | OUTPATIENT
Start: 2024-07-23 | End: 2024-07-24 | Stop reason: HOSPADM

## 2024-07-23 RX ORDER — HYDROMORPHONE HYDROCHLORIDE 1 MG/ML
1 INJECTION, SOLUTION INTRAMUSCULAR; INTRAVENOUS; SUBCUTANEOUS ONCE
Status: COMPLETED | OUTPATIENT
Start: 2024-07-23 | End: 2024-07-23

## 2024-07-23 RX ORDER — HYDROMORPHONE HYDROCHLORIDE 1 MG/ML
1 INJECTION, SOLUTION INTRAMUSCULAR; INTRAVENOUS; SUBCUTANEOUS EVERY 2 HOUR PRN
Status: DISCONTINUED | OUTPATIENT
Start: 2024-07-23 | End: 2024-07-23

## 2024-07-23 RX ORDER — LIDOCAINE HYDROCHLORIDE 10 MG/ML
0.5 INJECTION INFILTRATION; PERINEURAL ONCE AS NEEDED
Status: DISCONTINUED | OUTPATIENT
Start: 2024-07-23 | End: 2024-07-24 | Stop reason: HOSPADM

## 2024-07-23 RX ORDER — ONDANSETRON HYDROCHLORIDE 2 MG/ML
4 INJECTION, SOLUTION INTRAVENOUS EVERY 6 HOURS PRN
Status: DISCONTINUED | OUTPATIENT
Start: 2024-07-23 | End: 2024-07-24 | Stop reason: HOSPADM

## 2024-07-23 RX ORDER — NIFEDIPINE 10 MG/1
10 CAPSULE ORAL ONCE AS NEEDED
Status: DISCONTINUED | OUTPATIENT
Start: 2024-07-23 | End: 2024-07-24 | Stop reason: HOSPADM

## 2024-07-23 RX ORDER — DIPHENHYDRAMINE HCL 25 MG
25 CAPSULE ORAL NIGHTLY PRN
Status: DISCONTINUED | OUTPATIENT
Start: 2024-07-23 | End: 2024-07-24 | Stop reason: HOSPADM

## 2024-07-23 RX ORDER — CHLORHEXIDINE GLUCONATE 40 MG/ML
SOLUTION TOPICAL DAILY PRN
Status: DISCONTINUED | OUTPATIENT
Start: 2024-07-23 | End: 2024-07-24 | Stop reason: HOSPADM

## 2024-07-23 RX ORDER — ADHESIVE BANDAGE
10 BANDAGE TOPICAL
Status: DISCONTINUED | OUTPATIENT
Start: 2024-07-23 | End: 2024-07-24 | Stop reason: HOSPADM

## 2024-07-23 RX ORDER — SIMETHICONE 80 MG
80 TABLET,CHEWABLE ORAL 4 TIMES DAILY PRN
Status: DISCONTINUED | OUTPATIENT
Start: 2024-07-23 | End: 2024-07-24 | Stop reason: HOSPADM

## 2024-07-23 RX ORDER — LABETALOL HYDROCHLORIDE 5 MG/ML
20 INJECTION, SOLUTION INTRAVENOUS ONCE AS NEEDED
Status: DISCONTINUED | OUTPATIENT
Start: 2024-07-23 | End: 2024-07-24 | Stop reason: HOSPADM

## 2024-07-23 RX ORDER — SODIUM CHLORIDE, SODIUM LACTATE, POTASSIUM CHLORIDE, CALCIUM CHLORIDE 600; 310; 30; 20 MG/100ML; MG/100ML; MG/100ML; MG/100ML
125 INJECTION, SOLUTION INTRAVENOUS CONTINUOUS
Status: DISCONTINUED | OUTPATIENT
Start: 2024-07-23 | End: 2024-07-23

## 2024-07-23 RX ORDER — POLYETHYLENE GLYCOL 3350 17 G/17G
17 POWDER, FOR SOLUTION ORAL 2 TIMES DAILY PRN
Status: DISCONTINUED | OUTPATIENT
Start: 2024-07-23 | End: 2024-07-24 | Stop reason: HOSPADM

## 2024-07-23 RX ORDER — HYDRALAZINE HYDROCHLORIDE 20 MG/ML
5 INJECTION INTRAMUSCULAR; INTRAVENOUS ONCE AS NEEDED
Status: DISCONTINUED | OUTPATIENT
Start: 2024-07-23 | End: 2024-07-24 | Stop reason: HOSPADM

## 2024-07-23 RX ORDER — BISACODYL 10 MG/1
10 SUPPOSITORY RECTAL DAILY PRN
Status: DISCONTINUED | OUTPATIENT
Start: 2024-07-23 | End: 2024-07-24 | Stop reason: HOSPADM

## 2024-07-23 RX ORDER — CLINDAMYCIN IN PERCENT DEXTROSE 6 MG/ML
300 INJECTION, SOLUTION INTRAVENOUS EVERY 12 HOURS
Status: DISCONTINUED | OUTPATIENT
Start: 2024-07-23 | End: 2024-07-23

## 2024-07-23 RX ORDER — CLINDAMYCIN PHOSPHATE 600 MG/50ML
600 INJECTION, SOLUTION INTRAVENOUS EVERY 8 HOURS
Status: DISCONTINUED | OUTPATIENT
Start: 2024-07-23 | End: 2024-07-24 | Stop reason: HOSPADM

## 2024-07-23 RX ORDER — PROGESTERONE 100 MG/1
400 CAPSULE ORAL NIGHTLY
Status: DISCONTINUED | OUTPATIENT
Start: 2024-07-23 | End: 2024-07-24 | Stop reason: HOSPADM

## 2024-07-23 RX ORDER — FAMOTIDINE 20 MG/1
20 TABLET, FILM COATED ORAL 2 TIMES DAILY
Status: DISCONTINUED | OUTPATIENT
Start: 2024-07-23 | End: 2024-07-24 | Stop reason: HOSPADM

## 2024-07-23 RX ORDER — HYDROMORPHONE HYDROCHLORIDE 1 MG/ML
2 INJECTION, SOLUTION INTRAMUSCULAR; INTRAVENOUS; SUBCUTANEOUS ONCE
Status: COMPLETED | OUTPATIENT
Start: 2024-07-23 | End: 2024-07-23

## 2024-07-23 RX ORDER — CALCIUM CARBONATE 200(500)MG
500 TABLET,CHEWABLE ORAL DAILY
Status: DISCONTINUED | OUTPATIENT
Start: 2024-07-23 | End: 2024-07-24 | Stop reason: HOSPADM

## 2024-07-23 RX ORDER — OXYCODONE HYDROCHLORIDE 5 MG/1
5 TABLET ORAL EVERY 6 HOURS PRN
Status: DISCONTINUED | OUTPATIENT
Start: 2024-07-23 | End: 2024-07-23

## 2024-07-23 RX ORDER — OXYCODONE HYDROCHLORIDE 5 MG/1
10 TABLET ORAL EVERY 6 HOURS PRN
Status: DISCONTINUED | OUTPATIENT
Start: 2024-07-23 | End: 2024-07-23

## 2024-07-23 RX ORDER — HYDROMORPHONE HYDROCHLORIDE 1 MG/ML
0.6 INJECTION, SOLUTION INTRAMUSCULAR; INTRAVENOUS; SUBCUTANEOUS EVERY 2 HOUR PRN
Status: DISCONTINUED | OUTPATIENT
Start: 2024-07-23 | End: 2024-07-23

## 2024-07-23 RX ORDER — GABAPENTIN 300 MG/1
300 CAPSULE ORAL EVERY 8 HOURS SCHEDULED
Status: DISCONTINUED | OUTPATIENT
Start: 2024-07-23 | End: 2024-07-24 | Stop reason: HOSPADM

## 2024-07-23 RX ORDER — ACETAMINOPHEN 325 MG/1
975 TABLET ORAL EVERY 6 HOURS PRN
Status: DISCONTINUED | OUTPATIENT
Start: 2024-07-23 | End: 2024-07-24 | Stop reason: HOSPADM

## 2024-07-23 RX ORDER — VANCOMYCIN HYDROCHLORIDE 1 G/200ML
1000 INJECTION, SOLUTION INTRAVENOUS EVERY 12 HOURS
Status: DISCONTINUED | OUTPATIENT
Start: 2024-07-23 | End: 2024-07-23

## 2024-07-23 SDOH — SOCIAL STABILITY: SOCIAL INSECURITY: DO YOU FEEL UNSAFE GOING BACK TO THE PLACE WHERE YOU ARE LIVING?: NO

## 2024-07-23 SDOH — SOCIAL STABILITY: SOCIAL INSECURITY: DOES ANYONE TRY TO KEEP YOU FROM HAVING/CONTACTING OTHER FRIENDS OR DOING THINGS OUTSIDE YOUR HOME?: YES

## 2024-07-23 SDOH — SOCIAL STABILITY: SOCIAL INSECURITY: HAVE YOU HAD THOUGHTS OF HARMING ANYONE ELSE?: NO

## 2024-07-23 SDOH — SOCIAL STABILITY: SOCIAL INSECURITY: ARE YOU OR HAVE YOU BEEN THREATENED OR ABUSED PHYSICALLY, EMOTIONALLY, OR SEXUALLY BY ANYONE?: YES

## 2024-07-23 SDOH — SOCIAL STABILITY: SOCIAL INSECURITY: HAS ANYONE EVER THREATENED TO HURT YOUR FAMILY OR YOUR PETS?: NO

## 2024-07-23 SDOH — SOCIAL STABILITY: SOCIAL INSECURITY: DO YOU FEEL ANYONE HAS EXPLOITED OR TAKEN ADVANTAGE OF YOU FINANCIALLY OR OF YOUR PERSONAL PROPERTY?: NO

## 2024-07-23 SDOH — SOCIAL STABILITY: SOCIAL INSECURITY: ABUSE: ADULT

## 2024-07-23 SDOH — SOCIAL STABILITY: SOCIAL INSECURITY: WERE YOU ABLE TO COMPLETE ALL THE BEHAVIORAL HEALTH SCREENINGS?: YES

## 2024-07-23 SDOH — SOCIAL STABILITY: SOCIAL INSECURITY: ARE THERE ANY APPARENT SIGNS OF INJURIES/BEHAVIORS THAT COULD BE RELATED TO ABUSE/NEGLECT?: NO

## 2024-07-23 ASSESSMENT — PAIN - FUNCTIONAL ASSESSMENT
PAIN_FUNCTIONAL_ASSESSMENT: 0-10

## 2024-07-23 ASSESSMENT — ACTIVITIES OF DAILY LIVING (ADL)
WALKS IN HOME: INDEPENDENT
LACK_OF_TRANSPORTATION: YES
PATIENT'S MEMORY ADEQUATE TO SAFELY COMPLETE DAILY ACTIVITIES?: YES
FEEDING YOURSELF: INDEPENDENT
GROOMING: INDEPENDENT
HEARING - LEFT EAR: FUNCTIONAL
HEARING - RIGHT EAR: FUNCTIONAL
BATHING: INDEPENDENT
JUDGMENT_ADEQUATE_SAFELY_COMPLETE_DAILY_ACTIVITIES: YES
ADEQUATE_TO_COMPLETE_ADL: YES
TOILETING: INDEPENDENT
DRESSING YOURSELF: INDEPENDENT

## 2024-07-23 ASSESSMENT — COGNITIVE AND FUNCTIONAL STATUS - GENERAL
MOBILITY SCORE: 24
DAILY ACTIVITIY SCORE: 24
PATIENT BASELINE BEDBOUND: NO

## 2024-07-23 ASSESSMENT — PATIENT HEALTH QUESTIONNAIRE - PHQ9
1. LITTLE INTEREST OR PLEASURE IN DOING THINGS: SEVERAL DAYS
SUM OF ALL RESPONSES TO PHQ9 QUESTIONS 1 & 2: 1
2. FEELING DOWN, DEPRESSED OR HOPELESS: NOT AT ALL

## 2024-07-23 ASSESSMENT — PAIN SCALES - GENERAL
PAINLEVEL_OUTOF10: 8
PAINLEVEL_OUTOF10: 7
PAINLEVEL_OUTOF10: 8
PAINLEVEL_OUTOF10: 10 - WORST POSSIBLE PAIN
PAINLEVEL_OUTOF10: 7

## 2024-07-23 ASSESSMENT — ENCOUNTER SYMPTOMS
HEMATOLOGIC/LYMPHATIC NEGATIVE: 1
GASTROINTESTINAL NEGATIVE: 1
PSYCHIATRIC NEGATIVE: 1
EYES NEGATIVE: 1
NEUROLOGICAL NEGATIVE: 1
ALLERGIC/IMMUNOLOGIC NEGATIVE: 1
RESPIRATORY NEGATIVE: 1
CONSTITUTIONAL NEGATIVE: 1
WOUND: 1
CARDIOVASCULAR NEGATIVE: 1
MUSCULOSKELETAL NEGATIVE: 1
ROS SKIN COMMENTS: PAIN
ENDOCRINE NEGATIVE: 1

## 2024-07-23 ASSESSMENT — PAIN DESCRIPTION - ORIENTATION: ORIENTATION: RIGHT

## 2024-07-23 ASSESSMENT — LIFESTYLE VARIABLES
AUDIT-C TOTAL SCORE: 0
AUDIT-C TOTAL SCORE: 0
HOW OFTEN DO YOU HAVE A DRINK CONTAINING ALCOHOL: NEVER
SKIP TO QUESTIONS 9-10: 1
HOW OFTEN DO YOU HAVE 6 OR MORE DRINKS ON ONE OCCASION: NEVER
HOW MANY STANDARD DRINKS CONTAINING ALCOHOL DO YOU HAVE ON A TYPICAL DAY: PATIENT DOES NOT DRINK

## 2024-07-23 ASSESSMENT — PAIN DESCRIPTION - LOCATION: LOCATION: ARM

## 2024-07-23 NOTE — PROGRESS NOTES
Fellow Addendum Note:    Sandrita Adams is a 33 y.o.  at 10w4d who presented for right axillary pain in the setting of chronic hidradenitis suppurativa.    Patient currently in axillary pain that has been in moderate control with pain regimen. We discussed with the patient at bedside in regards to previous treatment that were beneficial for the patient's HS. Patient had been previously on cosentyx (Secukinumab) with significant improvement in her pain. Patient has been previously following with pain specialist at [facility] and had significant reduction in pain regimen while on this biologic. In regards to this medication, we discussed that there is current limited data on this particular biologic in pregnancy. However, there have been animal studies that did not show an increase risk in fetal malformations. We discussed that this medication, while limited data, can be used in pregnancy with shared decision making regarding unknown risks. We are optimistic that this medication will help control chronic pain and stress of the HS.     We discussed that chronic pain and HS flairs can increase the risk of infection, sepsis and  labor. In regards to her current pain regimen the plan outlined will be oxy 10 mg Q4 hours with addition of gabapentin 300 mg TID. Patient had been previously on 800 mg TID but was taken off medication when finding out she was pregnant. We counseled the patient that gabapentin use much like opioid use has an increased risk of LEEANNE but that dosage and duration has not been predictive of this risk. Patient was counseled on the risk and benefits of these medications and would like to proceed with gabapentin with the plan to uptitrate to previous dose while continuing oxy 10 mg.    Lastly, in regards to her hospital stay we discussed the plan for on going treatment. We will consult dermatology for assistance regarding restarting the Cosentyx and follow up outpatient. Patient  previously had concern for MRSA in other hospitlization but was negative for MRSA. We discussed the plan to discontinue IV vanc/zosyn and switch to IV clindamycin. Plan for a one time dose and then switch to PO clindamycin for outpatient regimen. We also will order an axillary US to assess for abscess and evaluate the need for possible I&D. We will reach out to pain management team at  to assist with pain regimen for the duration of this pregnancy.    Patient had a good understanding of the above information and counseling. Patient is very determined to get healthier for this pregnancy. Patient accepts the risks and agreeable with the plan as outlined above.     Wale Singleton MD  Fellow, Maternal Fetal Medicine

## 2024-07-23 NOTE — PROGRESS NOTES
Vancomycin Dosing by Pharmacy- Cessation of Therapy    Consult to pharmacy for vancomycin dosing has been discontinued by the prescriber, pharmacy will sign off at this time.    Please call pharmacy if there are further questions or re-enter a consult if vancomycin is resumed.     Timothy Rosales, PharmD

## 2024-07-23 NOTE — H&P
Obstetrical Admission History and Physical    Chief Complaint: Numbness    Assessment/Plan       Sandrita Adams is a 33 y.o.  at 10w4d by 7wk US with PMH of hidradenitis suppurativa who presents with right axillary pain and noted to have rupture of abscess in the right axilla    Hidradenitis suppurativa flare  - Patient with rupture of R axillary abscess on exam, culture from wound sent  - Failed treatment with Augmentin and Clindamycin outpatient, started on IV Vancomycin and Zosyn ( - )  - Tylenol and Oxycodone for pain  - Can consider dermatology consult in the AM    Vaginal Bleeding  - Minimal bleeding noted on speculum exam  - Hemodynamically stable, Hgb 11.9  - Cervical polyp noted on exam, likely etiology of bleeding however will continue to monitor symptoms    Lupus  - In remission, no medications     Asthma  - no home meds  - asymptomatic     Fetal Status   -   on admission   - prenatal labs reviewed and wnl  - Had 1st trimester bleeding, started on vaginal progesterone by outside provider.    Seen and discussed with Dr. Gould-Matheus Guerin MD PGY-3  OBGYN      Principal Problem:    Hidradenitis suppurativa      Pregnancy Problems (from 22 to present)       Problem Noted Resolved    UTI (urinary tract infection) during pregnancy, first trimester ([facility]) 2024 by Jana Villaseñor RN No          Subjective   33 y.o.  at 10w4d by 7wk US, here for flare of her hidradenitis suppurativa. Patient reports increased pain in the right axilla starting yesterday, she was evaluated in the ED and discharge with close outpatient follow-up however symptoms worsened overnight prompting her to  represent to the ED. She reports subjective fevers and nausea at home. Denies chest pain or SOB.    Also having some vaginal spotting. Reports spotting occurred early in the month and then stopped after a week. She then noticed vaginal spotting today.          Obstetrical History   OB  History    Para Term  AB Living   2 0 0 0 1 0   SAB IAB Ectopic Multiple Live Births   1 0 0 0 0      # Outcome Date GA Lbr Anoop/2nd Weight Sex Type Anes PTL Lv   2 Current            1 SAB                Past Medical History  Past Medical History:   Diagnosis Date    Hidradenitis suppurativa 10/29/2020    Hidradenitis    Lupus (Multi)         Past Surgical History   Past Surgical History:   Procedure Laterality Date    OTHER SURGICAL HISTORY  2022    Arm surgery       Social History  Social History     Tobacco Use    Smoking status: Former     Types: Cigarettes    Smokeless tobacco: Never   Substance Use Topics    Alcohol use: Not Currently     Substance and Sexual Activity   Drug Use Never       Allergies  Suboxone [buprenorphine-naloxone], Clarithromycin, Haloperidol, Levofloxacin, Metoclopramide, and Reglan [metoclopramide hcl]     Medications  (Not in a hospital admission)      Objective    Last Vitals  Temp Pulse Resp BP MAP O2 Sat   36.6 °C (97.9 °F) 99 16 108/79   100 %     Physical Examination  General: no acute distress  HEENT: normocephalic, atraumatic  Heart: warm and well perfused  Lungs: breathing comfortably on room air  Abdomen: gravid  Extremities: moving all extremities, opaque yellow-white discharge from 3 cm raised area in the right axilla, exam limited due to pain   Neuro: awake and conversant  Psych: appropriate mood and affect  SSE: 5 ml dark red blood in vaginal vault, cervical polyp at the external cervical os  FHT: 166 per ED      Lab Review  Labs in chart were reviewed.

## 2024-07-23 NOTE — CONSULTS
"Vancomycin Dosing by Pharmacy- Initial    Sandrita Adams is a 33 y.o. year old female who Pharmacy has been consulted for vancomycin dosing for cellulitis, skin and soft tissue. Based on the patient's indication and renal status this patient is being dosed based on a goal AUC of 400-600.     Renal function can be described as Normal Renal Function    Renal function is at patient's baseline      Visit Vitals  /79   Pulse 99   Temp 36.6 °C (97.9 °F) (Temporal)   Resp 16        Lab Results   Component Value Date    CREATININE 0.63 07/21/2024    CREATININE 0.71 07/16/2024    CREATININE 0.73 06/22/2024    CREATININE 1.07 (H) 06/19/2024        Patient weight is No results found for: \"PTWEIGHT\"    Lab Results   Component Value Date    VANCORANDOM 5.7 06/17/2024    VANCORANDOM 7.9 05/13/2024    VANCORANDOM 18.7 05/12/2024        No intake/output data recorded.    No results found for: \"PATIENTTEMP\"       Assessment/Plan    Patient will not be given a loading dose.  Will initiate vancomycin maintenance,  1000 mg every 8 hours.    This dosing regimen is predicted by [employer] to result in the following pharmacokinetic parameters:    Loading dose: N/A  Regimen: 1000 mg IV every 8 hours.  Start time: 20:18 on 07/22/2024  Exposure target: AUC24 (range)400-600 mg/L.hr   AUC24,ss: 551 mg/L.hr  Probability of AUC24 > 400: 80 %  Ctrough,ss: 17.7 mg/L  Probability of Ctrough,ss > 20: 40 %  Probability of nephrotoxicity (Lodise POP 2009): 14 %      Follow-up level will be ordered on 7/23 at mid-AM labs (1000) unless clinically indicated sooner.  Will continue to monitor renal function daily while on vancomycin and order serum creatinine at least every 48 hours if not already ordered.  Follow for continued vancomycin needs, clinical response, and signs/symptoms of toxicity.     Thank you for allowing me to participate in the care of this patient.     Maggy Naranjo, PharmD             "

## 2024-07-23 NOTE — CONSULTS
Obstetrical Consult    Reason for Consult: Hidradenitis Suppurativa Flare in Pregnancy     Assessment/Plan    Sandrita Adams is a 33 y.o.  at 10w4d by 7wk US with PMH of hidradenitis suppurativa and Lupus who presents with worsening right axillary pain and vaginal spotting.     Hidradenitis suppurativa flare  Patient with extensive history of HS, s/p removal of axillary sweat glands in 2017, which did not significantly improve her pain. Patient follows with Dermatology, Pain Management, and Infectious Disease at [facility] and was previously well-controlled on Cosentix, Percocet, Gabapentin, and Napoxen which allowed her to complete her activities of daily living, including being able to work. Since Pregnancy, patient has been on Cimizia and Oxycodone without adequate control.   S/p RUE Vascular US in ED given swelling/numbness- negative for thrombus, prominent likely reactive right axillary lymph nodes.  Will plan for inpatient Dermatology consult today. Appreciate recommendations  For RUE US today to evaluate for fluid collection. If present, consider ACS consult for drainage  Patient with rupture of R axillary abscess on exam in ED, culture from wound sent - pending  Started on Vanc/ Zosyn in the ED, . Now discontinued. Will discontinue and start on IV Clindamycin today   Plan to restart Cosentix given proven benefit.  Discontinue IV Dilaudid. For Oxycodone 10mg q6 + Gabapentin 300mg TID (plan to slowly up-titrate to max dose)  Please see MFM fellow and attending attestation regarding extensive discussion regarding plan of care regarding HS pain & symptom management.      Vaginal Spotting  Patient with vaginal spotting since onset of pregnancy. Previously started on Vaginal progesterone with outside provider.   Hemodynamically stable, Hgb 11.9  Minimal bleeding noted on speculum exam in ED. None coming from cervical ox  Cervical polyp noted on exam, likely etiology of bleeding however will continue to  monitor symptoms     Lupus (?)  Self-reported. Patient reports history of SLE, diagnosed by her PCP in 2017 based on symptoms of joint pain and facial rash. She states she is now in remission. Saw [facility] Rheumatology in 2024 with reported unclear basis of diagnosis for SLE. Reported no signs of SLE on exam at that time. Had nonspecific hair loss, sicca, diffuse pain w/o inflammatory features. Hidradentitis suppurativa related arthritis is possible. Rheumatologic labs done at that time notable for negative KEITH, ESR wnl, C3 wnl and elevated C4 (would be low with SLE flare). Neg SSA/SSB.   No medications     Asthma  Stable without medications  Asymptomatic at this time      Fetal Status   Desired pregnancy.  on admission    [facility] TVUS- CRL measuring 8.4wks w LUIS MIGUEL 2025  Prenatal labs reviewed and wnl  Had 1st trimester bleeding, started on vaginal progesterone, as above.     Dispo: Inpatient admission pending pain control     Seen & dw. DAVID Chambers. Senia Horvath MD  PGY-2, Obstetrics & Gynecology   [facility]        Fellow Addendum Note:     Sandrita Adams is a 33 y.o.  at 10w4d who presented for right axillary pain in the setting of chronic hidradenitis suppurativa.     Patient currently in axillary pain that has been in moderate control with pain regimen. We discussed with the patient at bedside in regards to previous treatment that were beneficial for the patient's HS. Patient had been previously on cosentyx (Secukinumab) with significant improvement in her pain. Patient has been previously following with pain specialist at [facility] and had significant reduction in pain regimen while on this biologic. In regards to this medication, we discussed that there is current limited data on this particular biologic in pregnancy. However, there have been animal studies that did not show an increase risk in fetal malformations. We discussed that this medication,  while limited data, can be used in pregnancy with shared decision making regarding unknown risks. We are optimistic that this medication will help control chronic pain and stress of the HS.      We discussed that chronic pain and HS flairs can increase the risk of infection, sepsis and  labor. In regards to her current pain regimen the plan outlined will be oxy 10 mg Q4 hours with addition of gabapentin 300 mg TID. Patient had been previously on 800 mg TID but was taken off medication when finding out she was pregnant. We counseled the patient that gabapentin use much like opioid use has an increased risk of LEEANNE but that dosage and duration has not been predictive of this risk. Patient was counseled on the risk and benefits of these medications and would like to proceed with gabapentin with the plan to uptitrate to previous dose while continuing oxy 10 mg.     Lastly, in regards to her hospital stay we discussed the plan for on going treatment. We will consult dermatology for assistance regarding restarting the Cosentyx and follow up outpatient. Patient previously had concern for MRSA in other hospitlization but was negative for MRSA. We discussed the plan to discontinue IV vanc/zosyn and switch to IV clindamycin. Plan for a one time dose and then switch to PO clindamycin for outpatient regimen. We also will order an axillary US to assess for abscess and evaluate the need for possible I&D. We will reach out to pain management team at  to assist with pain regimen for the duration of this pregnancy.     Patient had a good understanding of the above information and counseling. Patient is very determined to get healthier for this pregnancy. Patient accepts the risks and agreeable with the plan as outlined above.      Wale Singleton MD  Fellow, Maternal Fetal Medicine       [facility] is a 33 y.o.  at 10w4d by 7wk US with PMH of hidradenitis suppurativa and Lupus who presents with  worsening right axillary pain and vaginal spotting.    From  HPI:  Patient reports increased pain in the right axilla starting yesterday, she was evaluated in the ED and discharge with close outpatient follow-up however symptoms worsened overnight prompting her to  represent to the ED. She reports subjective fevers and nausea at home. Denies chest pain or SOB.     Also having some vaginal spotting. Reports spotting occurred early in the month and then stopped after a week. She then noticed vaginal spotting today.     Interval Update:   Patient continues to be in significant pain this morning, mostly in her right arm and axillary region.  Immediately prior to pregnancy, she was on Cosentix, Percocet, Gabapentin, and Napoxen which allowed her to complete her activities of daily living, including being able to work. At onset of pregnancy, she was transitioned from Cosentix to Cimizia; Gabapentin and Naproxen were discontinued due to pregnancy while Oxycodone was continued. Patient continues to be in significant pain that impairs daily functioning. She reports recent onset of right-arm swelling with right hand/finger numbness that is somewhat improved this AM. Given RUE swelling, patient underwent doppler evaluation in the ED which caused an axillary HS lesion to open up and begin to drain due to pressure from the ultrasound probe.     Patient continues to reports vaginal spotting with wiping. Denies cramping/contractions.        Obstetrical History   OB History    Para Term  AB Living   2 0 0 0 1 0   SAB IAB Ectopic Multiple Live Births   1 0 0 0 0      # Outcome Date GA Lbr Anoop/2nd Weight Sex Type Anes PTL Lv   2 Current            1 SAB                Past Medical History  Past Medical History:   Diagnosis Date    Hidradenitis suppurativa 10/29/2020    Hidradenitis    Lupus (Multi)         Past Surgical History   Past Surgical History:   Procedure Laterality Date    OTHER SURGICAL HISTORY   09/19/2022    Arm surgery       Social History  Social History     Tobacco Use    Smoking status: Former     Types: Cigarettes    Smokeless tobacco: Never   Substance Use Topics    Alcohol use: Not Currently     Substance and Sexual Activity   Drug Use Never       Allergies  Suboxone [buprenorphine-naloxone], Clarithromycin, Haloperidol, Levofloxacin, Metoclopramide, and Reglan [metoclopramide hcl]     Medications  Medications Prior to Admission   Medication Sig Dispense Refill Last Dose    albuterol 90 mcg/actuation inhaler        amoxicillin-pot clavulanate (Augmentin) 875-125 mg tablet Take by mouth every 12 hours.       clindamycin (Cleocin) 150 mg capsule Take 2 capsules (300 mg) by mouth 2 times a day for 10 days. 40 capsule 0     fosfomycin (Monurol) 3 gram packet Pour contents of 1 packet into cold water and stir to dissolve as directed on package 3 g 0     multivitamin with minerals tablet Take 1 tablet by mouth once daily.       ondansetron ODT (Zofran-ODT) 4 mg disintegrating tablet Take 1 tablet (4 mg) by mouth every 8 hours if needed for nausea or vomiting. 16 tablet 0     oxyCODONE (Roxicodone) 10 mg immediate release tablet           Objective    Last Vitals  Temp Pulse Resp BP MAP O2 Sat   36.3 °C (97.3 °F) 72 18 100/64   100 %     Physical Examination  General: no acute distress  HEENT: normocephalic, atraumatic  Heart: warm and well perfused  Lungs: breathing comfortably on room air  Abdomen: NTND  Extremities: moving all extremities, opaque yellow-white discharge from 3 cm raised area in the right axilla, exam limited due to pain   Neuro: awake and conversant  Psych: appropriate mood and affect  SSE (per overnight MFM resident): 5 ml dark red blood in vaginal vault, cervical polyp at the external cervical os  FHT: 166 per ED    Lab Review  Results for orders placed or performed during the hospital encounter of 07/22/24 (from the past 24 hour(s))   CBC and Auto Differential   Result Value Ref Range     WBC 11.1 4.4 - 11.3 x10*3/uL    nRBC 0.0 0.0 - 0.0 /100 WBCs    RBC 4.73 4.00 - 5.20 x10*6/uL    Hemoglobin 11.9 (L) 12.0 - 16.0 g/dL    Hematocrit 33.9 (L) 36.0 - 46.0 %    MCV 72 (L) 80 - 100 fL    MCH 25.2 (L) 26.0 - 34.0 pg    MCHC 35.1 32.0 - 36.0 g/dL    RDW 14.4 11.5 - 14.5 %    Platelets 278 150 - 450 x10*3/uL    Neutrophils % 58.0 40.0 - 80.0 %    Immature Granulocytes %, Automated 0.4 0.0 - 0.9 %    Lymphocytes % 32.0 13.0 - 44.0 %    Monocytes % 8.3 2.0 - 10.0 %    Eosinophils % 0.9 0.0 - 6.0 %    Basophils % 0.4 0.0 - 2.0 %    Neutrophils Absolute 6.48 1.20 - 7.70 x10*3/uL    Immature Granulocytes Absolute, Automated 0.04 0.00 - 0.70 x10*3/uL    Lymphocytes Absolute 3.56 1.20 - 4.80 x10*3/uL    Monocytes Absolute 0.92 0.10 - 1.00 x10*3/uL    Eosinophils Absolute 0.10 0.00 - 0.70 x10*3/uL    Basophils Absolute 0.04 0.00 - 0.10 x10*3/uL   Comprehensive metabolic panel   Result Value Ref Range    Glucose 97 74 - 99 mg/dL    Sodium 134 (L) 136 - 145 mmol/L    Potassium 4.1 3.5 - 5.3 mmol/L    Chloride 107 98 - 107 mmol/L    Bicarbonate 18 (L) 21 - 32 mmol/L    Anion Gap 13 10 - 20 mmol/L    Urea Nitrogen 11 6 - 23 mg/dL    Creatinine 0.60 0.50 - 1.05 mg/dL    eGFR >90 >60 mL/min/1.73m*2    Calcium 9.2 8.6 - 10.6 mg/dL    Albumin 3.8 3.4 - 5.0 g/dL    Alkaline Phosphatase 51 33 - 110 U/L    Total Protein 7.1 6.4 - 8.2 g/dL    AST 20 9 - 39 U/L    Bilirubin, Total 0.2 0.0 - 1.2 mg/dL    ALT 11 7 - 45 U/L   Tissue/Wound Culture/Smear    Specimen: Wound/Tissue; Tissue/Biopsy   Result Value Ref Range    Gram Stain No polymorphonuclear leukocytes seen     Gram Stain No organisms seen    Vancomycin   Result Value Ref Range    Vancomycin 4.4 (L) 5.0 - 20.0 ug/mL

## 2024-07-23 NOTE — CONSULTS
DERMATOLOGY DEPARTMENT CONSULTATION NOTE  Name: Sandrita Adams  MRN: [id number]  : 1990    Reason for consultation: Hidradenitis in pregnancy    History of Present Illness  Sandrita Adams is a 33 y.o. female  at 10w4d by 7 wk ultrasound with a past medical history of hidradenitis suppurativa presented on 2024 with worsening right axillary pain and vaginal spotting and was admitted for hidradenitis flare. Dermatology was consulted for hidradenitis in pregnancy.    Patient reports that Cosentyx worked great for the HS but she stopped this when she found out she was pregnant. She was hospitalized last month and was seen by dermatology. Cimzia has been approved as this is safe in pregnancy and she has had one dose. She is due for her second dose tomorrow. It has not improved her symptoms yet. She currently has pain in the axillae but has also had flares in the groin. She also reports that clindamycin has been sent to her house.    Per previous note from :    Previous Treatment Summary:  1. Isotretinoin - helped acne but did not improve HS   2. Adalimumab - developed hives/bumps  3. Infliximab - missed 2 infusions due to personal family issues   4.  I&D  5. Excision  6. Apremilast: No improvement  7. Spironolactone - stopped due to higher potassium and tingling in hands/ arms. Note hx of PCOS and elevated testosterone.   8. Multiple antibiotics: Doxycycline, bactrim, clindamycin, levaquin, rifampin - none of these helped. Had previously been on antibiotics but they gave her frequent yeast infections.   9. ILK  10. Secukinumab - best efficacy per patient but stopped when she found out she was pregnant    Review of Systems  Review of Systems   Skin:         pain   All other systems reviewed and are negative.       Past Medical History  Past Medical History:   Diagnosis Date    Hidradenitis suppurativa 10/29/2020    Hidradenitis    Lupus (Multi)        Past Surgical History   has a past  "surgical history that includes Other surgical history (09/19/2022).     Allergies  Allergies   Allergen Reactions    Suboxone [Buprenorphine-Naloxone] Anaphylaxis, Hives and Itching    Clarithromycin Hives    Haloperidol Hallucinations and Psychosis    Levofloxacin Swelling     Ankle Joint/tendon pain    Metoclopramide Headache, Hallucinations and Psychosis    Reglan [Metoclopramide Hcl] Psychosis       Medications  Scheduled Meds: clindamycin, 600 mg, intravenous, q8h  gabapentin, 300 mg, oral, q8h ANDREA  oxyCODONE, 10 mg, oral, q4h  prenatal vitamin (iron-folic), 1 tablet, oral, Daily       Continuous Infusions:     PRN Meds: PRN medications: acetaminophen, bisacodyl, chlorhexidine, diphenhydrAMINE, hydrALAZINE, labetaloL, lidocaine, magnesium hydroxide, NIFEdipine, ondansetron **OR** ondansetron, polyethylene glycol, psyllium, simethicone     Family History  No family history on file.    Social History   reports that she has quit smoking. Her smoking use included cigarettes. She has never used smokeless tobacco. She reports that she does not currently use alcohol. She reports that she does not use drugs.     Objective    Vitals:    07/22/24 1844 07/23/24 0310 07/23/24 0731 07/23/24 1158   BP: 108/79 129/83 100/64 121/82   Pulse: 99 64 72 81   Resp: 16 16 18 16   Temp: 36.6 °C (97.9 °F) 36.1 °C (97 °F) 36.3 °C (97.3 °F) 36.5 °C (97.7 °F)   TempSrc: Temporal Temporal Temporal Temporal   SpO2: 100% 99% 100% 100%   Weight: 79.8 kg (176 lb)      Height: 1.549 m (5' 1\")           Exam    GEN: no acute distress  NEURO: moving all extremities   EYES: conjunctiva and eyelids normal. No conjunctival injection or erosions appreciated  ENT:   - Lips: normal  NECK: normal and symmetric.   EXTREMITIES: no distal digital clubbing, cyanosis, petechiae   SKIN: A full body skin exam including scalp, face, eyes, ears, neck, trunk, bilateral upper & lower extremities, toenails and fingernails were examined with the following " findings:  - Involving the bilateral axillae are tender subcutaneous nodules, atrophic scars and sinus tracts  - Involving the bilateral groin, medial proximal thighs and lower abdomen are small atrophic scars and mildly indurated subcutaneous plaques      Laboratory and Data  Results for orders placed or performed during the hospital encounter of 07/22/24 (from the past 24 hour(s))   CBC and Auto Differential   Result Value Ref Range    WBC 11.1 4.4 - 11.3 x10*3/uL    nRBC 0.0 0.0 - 0.0 /100 WBCs    RBC 4.73 4.00 - 5.20 x10*6/uL    Hemoglobin 11.9 (L) 12.0 - 16.0 g/dL    Hematocrit 33.9 (L) 36.0 - 46.0 %    MCV 72 (L) 80 - 100 fL    MCH 25.2 (L) 26.0 - 34.0 pg    MCHC 35.1 32.0 - 36.0 g/dL    RDW 14.4 11.5 - 14.5 %    Platelets 278 150 - 450 x10*3/uL    Neutrophils % 58.0 40.0 - 80.0 %    Immature Granulocytes %, Automated 0.4 0.0 - 0.9 %    Lymphocytes % 32.0 13.0 - 44.0 %    Monocytes % 8.3 2.0 - 10.0 %    Eosinophils % 0.9 0.0 - 6.0 %    Basophils % 0.4 0.0 - 2.0 %    Neutrophils Absolute 6.48 1.20 - 7.70 x10*3/uL    Immature Granulocytes Absolute, Automated 0.04 0.00 - 0.70 x10*3/uL    Lymphocytes Absolute 3.56 1.20 - 4.80 x10*3/uL    Monocytes Absolute 0.92 0.10 - 1.00 x10*3/uL    Eosinophils Absolute 0.10 0.00 - 0.70 x10*3/uL    Basophils Absolute 0.04 0.00 - 0.10 x10*3/uL   Comprehensive metabolic panel   Result Value Ref Range    Glucose 97 74 - 99 mg/dL    Sodium 134 (L) 136 - 145 mmol/L    Potassium 4.1 3.5 - 5.3 mmol/L    Chloride 107 98 - 107 mmol/L    Bicarbonate 18 (L) 21 - 32 mmol/L    Anion Gap 13 10 - 20 mmol/L    Urea Nitrogen 11 6 - 23 mg/dL    Creatinine 0.60 0.50 - 1.05 mg/dL    eGFR >90 >60 mL/min/1.73m*2    Calcium 9.2 8.6 - 10.6 mg/dL    Albumin 3.8 3.4 - 5.0 g/dL    Alkaline Phosphatase 51 33 - 110 U/L    Total Protein 7.1 6.4 - 8.2 g/dL    AST 20 9 - 39 U/L    Bilirubin, Total 0.2 0.0 - 1.2 mg/dL    ALT 11 7 - 45 U/L   Tissue/Wound Culture/Smear    Specimen: Wound/Tissue; Tissue/Biopsy    Result Value Ref Range    Tissue/Wound Culture/Smear Culture in progress     Gram Stain No polymorphonuclear leukocytes seen     Gram Stain No organisms seen    Vancomycin   Result Value Ref Range    Vancomycin 4.4 (L) 5.0 - 20.0 ug/mL   Type And Screen   Result Value Ref Range    ABO TYPE A     Rh TYPE POS     ANTIBODY SCREEN NEG         Assessment/Plan   Sandrita Adams is a 33 y.o. female  at 10w4d by 7 wk ultrasound with a past medical history of hidradenitis suppurativa presented on 2024 with worsening right axillary pain and vaginal spotting and was admitted for hidradenitis flare. Dermatology was consulted for hidradenitis in pregnancy.    Differential diagnoses: Hidradenitis Suppurativa, Rand Stage 3 with flare in pregnancy.     Impression: Hidradenitis suppurativa (HS), is a chronic destructive inflammatory disorder of the terminal follicular epithelium. We reviewed today that treatment options during pregnancy are limited. The patient has been approved for certrolizumab (Cimzia) and has received one dose. This medication has little to no transfer across the placenta. Efficacy may not be noted for a few months. The patient notes that secukinumab (Cosentyx) had the best efficacy for her disease. We reviewed that while this may be a safe option in pregnancy, there is little data to classify this as certain. Per the documented reports, pregnancy or birth complications seem to be similar to that of the general population, although data is limited. Secukinumab would be expected to cross the placenta in the third trimester but requires further research. (https://doi.org/10.1111/bjd.69059) We discussed with the patient that risks and benefits of possible harm to the fetus must be weighed with each medication required to control pain and HS flares.     Recommendations:  - Discussed with the patient that given the known minimal risk to the fetus with use of Cimzia, would recommend trying this  medication for the next few months to determine efficacy for patient's HS  - Reviewed that risks and benefits should be weighed with HS medications and the need for higher levels of pain control and she should be following closely with her obstetrician  - Patient reports no improvement in disease with antibiotics but she may take PO clindamycin 300 mg BID and/or clindamycin 1% lotion BID if desired  - We will schedule an outpatient dermatology follow up for HS medication management    The patient was seen and discussed with attending physician Dr. Castanon. The assessment and plan was communicated to the care team.    Thank you for the consultation and for the opportunity to contribute to the care of this patient.    Tangela Aponte MD   PGY3, Dermatology  Epic chat (preferred)  Team pager [phone]     I saw and evaluated the patient. I personally obtained the key and critical portions of the history and physical exam or was physically present for key and critical portions performed by the resident. I reviewed the resident's documentation and discussed the patient with the resident. I agree with the resident's medical decision making as documented in the note.    Ivan Castanon MD

## 2024-07-23 NOTE — ED PROVIDER NOTES
HPI   Chief Complaint   Patient presents with    Numbness       Patient is a 33-year-old female whom I saw personally yesterday with history of SLE, asthma, hirsutism, obesity, hidradenitis suppurativa for several years who presents today for evaluation of a worsening hidradenitis suppurativa flare.  Patient states that since being here yesterday she started started feeling like her right arm is becoming more tingly and swollen, she also states that she has been having increasing pain, she is been taking the clindamycin but has not been on it for 48 hours.  Patient's white blood cell count yesterday was normal, her pain was able to be controlled with 3 mg of Dilaudid, she is chronically on Percocet 10 mg.  She was discharged with prescription for clindamycin to take in addition to Augmentin but she states despite this she is getting worse.  She states that she is in so much pain that she cannot even function.  She states that the abscess on the right armpit is getting worse, the remainder are not as bad but are still flaring up including her left axilla and groin.  Patient is currently 10 weeks 3 days pregnant, she had an ultrasound yesterday at bedside which confirmed fetal heart tones.  Patient denies history of blood clots malignancy, she denies hemoptysis or pleuritic chest pain.              Patient History   Past Medical History:   Diagnosis Date    Hidradenitis suppurativa 10/29/2020    Hidradenitis    Lupus (Multi)      Past Surgical History:   Procedure Laterality Date    OTHER SURGICAL HISTORY  09/19/2022    Arm surgery     No family history on file.  Social History     Tobacco Use    Smoking status: Former     Types: Cigarettes    Smokeless tobacco: Never   Vaping Use    Vaping status: Never Used   Substance Use Topics    Alcohol use: Not Currently    Drug use: Never       Physical Exam   ED Triage Vitals [07/22/24 1844]   Temperature Heart Rate Respirations BP   36.6 °C (97.9 °F) 99 16 108/79      Pulse  Ox Temp Source Heart Rate Source Patient Position   100 % Temporal Monitor --      BP Location FiO2 (%)     -- --       Physical Exam  Vitals and nursing note reviewed.   Constitutional:       General: She is not in acute distress.     Appearance: Normal appearance. She is not toxic-appearing.   HENT:      Head: Normocephalic and atraumatic.      Nose: Nose normal.   Eyes:      Extraocular Movements: Extraocular movements intact.   Cardiovascular:      Rate and Rhythm: Normal rate and regular rhythm.   Pulmonary:      Effort: Pulmonary effort is normal.      Breath sounds: Normal breath sounds.   Abdominal:      Palpations: Abdomen is soft.   Musculoskeletal:         General: Normal range of motion.      Cervical back: Normal range of motion and neck supple.      Comments:   Minimal swelling is present to the right upper arm, there is no warmth or erythema, 2+ radial pulse, patient endorses diminished sensation approximately 65% when compared with the left side with sensation to the upper extremities, this is new since yesterday.       Skin:     General: Skin is warm and dry.      Comments:     Abscesses present to right armpit, patient very tender to palpation has a lot of pain with lifting her arm up.   Neurological:      General: No focal deficit present.      Mental Status: She is alert.   Psychiatric:         Mood and Affect: Mood normal.         Thought Content: Thought content normal.       Vascular US upper extremity venous duplex right   Final Result   1. No sonographic evidence for thrombus in the visualized vessels of   the right upper extremity.   2. Prominent likely reactive right axillary lymph nodes.        I personally reviewed the images/study, and I agree with the findings   as stated above. This study was interpreted at [facility], [place], [place].        MACRO:   None        Signed by: Evan Finkelstein 7/22/2024 11:29 PM   Dictation workstation:   [id number]       Point of Care Ultrasound    (Results Pending)     Labs Reviewed   CBC WITH AUTO DIFFERENTIAL - Abnormal       Result Value    WBC 11.1      nRBC 0.0      RBC 4.73      Hemoglobin 11.9 (*)     Hematocrit 33.9 (*)     MCV 72 (*)     MCH 25.2 (*)     MCHC 35.1      RDW 14.4      Platelets 278      Neutrophils % 58.0      Immature Granulocytes %, Automated 0.4      Lymphocytes % 32.0      Monocytes % 8.3      Eosinophils % 0.9      Basophils % 0.4      Neutrophils Absolute 6.48      Immature Granulocytes Absolute, Automated 0.04      Lymphocytes Absolute 3.56      Monocytes Absolute 0.92      Eosinophils Absolute 0.10      Basophils Absolute 0.04     COMPREHENSIVE METABOLIC PANEL - Abnormal    Glucose 97      Sodium 134 (*)     Potassium 4.1      Chloride 107      Bicarbonate 18 (*)     Anion Gap 13      Urea Nitrogen 11      Creatinine 0.60      eGFR >90      Calcium 9.2      Albumin 3.8      Alkaline Phosphatase 51      Total Protein 7.1      AST 20      Bilirubin, Total 0.2      ALT 11     TISSUE/WOUND CULTURE/SMEAR   TYPE AND SCREEN           ED Course & MDM   ED Course as of 07/23/24 0226 Mon Jul 22, 2024 2014 ECG 12 lead  NSR rate of 89, , QRS 76, QTc 438, normal axis, no ischemic changes. [MK]   2111 Patient refusing chest xray per xray tech []   e Jul 23, 2024   0032 Point of Care Ultrasound  166 bpm, fetal movement present []      ED Course User Index  [MK] Josefina Gomez PA-C         Diagnoses as of 07/23/24 0226   Hidradenitis suppurativa                       No data recorded                        Medical Decision Making    MDM: Patient is a 33-year-old female who presents today as a bounce back from yesterday, she has hidradenitis suppurativa and is not responding to the clindamycin and Augmentin, she is also complaining of right upper extremity swelling and increasing pain, given that she is pregnant I did order a right upper quadrant ultrasound, chest x-ray was obtained as well in  the setting of shortness of breath, CBC CMP was repeated.  I did discuss case with pharmacist Sho, recommended vancomycin and Zosyn which was ordered in addition to 2 mg of IV Dilaudid.  Given that this is patient's second visit, do feel that she would benefit from admission for IV antibiotics and intractable pain. Right upper extremity ultrasound was negative, lab work is reassuring, bedside ultrasound did confirm the presence of a IUP.  Patient will be admitted to OB/GYN service for further evaluation management.            Procedure  Procedures     Josefina Gomez PA-C  07/23/24 0226

## 2024-07-24 ENCOUNTER — APPOINTMENT (OUTPATIENT)
Dept: LAB | Facility: LAB | Age: 34
End: 2024-07-24
Payer: MEDICAID

## 2024-07-24 ENCOUNTER — PHARMACY VISIT (OUTPATIENT)
Dept: PHARMACY | Facility: CLINIC | Age: 34
End: 2024-07-24
Payer: MEDICAID

## 2024-07-24 VITALS
BODY MASS INDEX: 33.23 KG/M2 | HEIGHT: 61 IN | OXYGEN SATURATION: 98 % | SYSTOLIC BLOOD PRESSURE: 134 MMHG | RESPIRATION RATE: 16 BRPM | DIASTOLIC BLOOD PRESSURE: 86 MMHG | TEMPERATURE: 97.5 F | HEART RATE: 93 BPM | WEIGHT: 176 LBS

## 2024-07-24 DIAGNOSIS — O09.511 PRIMIGRAVIDA OF ADVANCED MATERNAL AGE IN FIRST TRIMESTER (HHS-HCC): Primary | ICD-10-CM

## 2024-07-24 LAB
ATRIAL RATE: 89 BPM
P AXIS: 67 DEGREES
P OFFSET: 206 MS
P ONSET: 150 MS
PR INTERVAL: 146 MS
Q ONSET: 223 MS
QRS COUNT: 15 BEATS
QRS DURATION: 76 MS
QT INTERVAL: 360 MS
QTC CALCULATION(BAZETT): 438 MS
QTC FREDERICIA: 410 MS
R AXIS: 67 DEGREES
T AXIS: 46 DEGREES
T OFFSET: 403 MS
VENTRICULAR RATE: 89 BPM

## 2024-07-24 PROCEDURE — 2500000004 HC RX 250 GENERAL PHARMACY W/ HCPCS (ALT 636 FOR OP/ED): Mod: JZ

## 2024-07-24 PROCEDURE — 2500000001 HC RX 250 WO HCPCS SELF ADMINISTERED DRUGS (ALT 637 FOR MEDICARE OP)

## 2024-07-24 PROCEDURE — RXMED WILLOW AMBULATORY MEDICATION CHARGE

## 2024-07-24 PROCEDURE — 99239 HOSP IP/OBS DSCHRG MGMT >30: CPT

## 2024-07-24 PROCEDURE — 96372 THER/PROPH/DIAG INJ SC/IM: CPT

## 2024-07-24 PROCEDURE — G0378 HOSPITAL OBSERVATION PER HR: HCPCS

## 2024-07-24 RX ORDER — CLINDAMYCIN HYDROCHLORIDE 150 MG/1
300 CAPSULE ORAL 2 TIMES DAILY
Qty: 84 CAPSULE | Refills: 0 | Status: SHIPPED | OUTPATIENT
Start: 2024-07-24

## 2024-07-24 RX ORDER — OXYCODONE AND ACETAMINOPHEN 10; 325 MG/1; MG/1
1 TABLET ORAL EVERY 4 HOURS PRN
Qty: 60 TABLET | Refills: 0 | Status: SHIPPED | OUTPATIENT
Start: 2024-07-24 | End: 2024-08-03

## 2024-07-24 RX ORDER — GABAPENTIN 300 MG/1
300 CAPSULE ORAL EVERY 8 HOURS SCHEDULED
Qty: 60 CAPSULE | Refills: 3 | Status: SHIPPED | OUTPATIENT
Start: 2024-07-24 | End: 2024-10-12

## 2024-07-24 RX ORDER — NALOXONE HYDROCHLORIDE 4 MG/.1ML
4 SPRAY NASAL AS NEEDED
Qty: 2 EACH | Refills: 11 | Status: SHIPPED | OUTPATIENT
Start: 2024-07-24

## 2024-07-24 ASSESSMENT — PAIN SCALES - GENERAL
PAINLEVEL_OUTOF10: 9
PAINLEVEL_OUTOF10: 7
PAINLEVEL_OUTOF10: 9

## 2024-07-24 ASSESSMENT — PAIN - FUNCTIONAL ASSESSMENT
PAIN_FUNCTIONAL_ASSESSMENT: 0-10

## 2024-07-24 ASSESSMENT — PAIN DESCRIPTION - LOCATION: LOCATION: ARM

## 2024-07-24 ASSESSMENT — PAIN DESCRIPTION - DESCRIPTORS: DESCRIPTORS: ACHING;SORE

## 2024-07-24 ASSESSMENT — PAIN DESCRIPTION - ORIENTATION: ORIENTATION: RIGHT

## 2024-07-24 NOTE — CARE PLAN
The patient's goals for the shift include minimal pain so she can get some rest    The clinical goals for the shift include pain managment      Problem: Antepartum  Goal: Maintain pregnancy as long as maternal and/or fetal condition is stable  Outcome: Progressing  Goal: Avoid/minimize constipation  Outcome: Progressing  Goal: No decrease in circulation/VTE  Outcome: Progressing  Goal: Minimize anxiety/maximize coping  Outcome: Progressing     Problem: Pain  Goal: Takes deep breaths with improved pain control throughout the shift  Outcome: Progressing  Goal: Turns in bed with improved pain control throughout the shift  Outcome: Progressing  Goal: Walks with improved pain control throughout the shift  Outcome: Progressing  Goal: Performs ADL's with improved pain control throughout shift  Outcome: Progressing  Goal: Participates in PT with improved pain control throughout the shift  Outcome: Progressing  Goal: Free from opioid side effects throughout the shift  Outcome: Progressing  Goal: Free from acute confusion related to pain meds throughout the shift  Outcome: Progressing     Problem: Skin  Goal: Participates in plan/prevention/treatment measures  Outcome: Progressing  Goal: Prevent/manage excess moisture  Outcome: Progressing  Goal: Promote skin healing  Outcome: Progressing

## 2024-07-24 NOTE — DISCHARGE SUMMARY
Discharge Summary    Admission Date: 7/22/2024  Discharge Date: 7/24/2024    Discharge Diagnosis  Hidradenitis suppurativa    Hospital Course    Sandrita Adams is a 33 y.o. 10w5d who was admitted for a Hidradenitis Suppurativa flare.     During admission, patient was started on antibiotics and was treated symptomatically for pain. Dermatology was consulted for management recommendations and recommended continuation of Cimzia (vs. Cosentyx) due to minimal risks of pregnancy and continuation of PO Clindamycin 300mg BID and/or Clindamycin 1% lotion. Patient underwent RUQ US of soft tissue which demonstrated ~3cm pocket, concerning for abscess. Acute Care Surgery was subsequently consulted who recommended supportive care without acute surgical intervention given evidence that lesions were draining spontaneously. Patient remained hemodynamically and vitally stable, and without an elevated WBC count throughout admission.    Patient was extensively counseled on options for HS management in pregnancy. Risks, benefits and alternatives were discussed Please see [facility] consult note on 7/23 for additional details. Ultimately, patient opted to continue with Cimizia with a pain regimen consisting of Oxycodone 10mg q4h and Gabapentin 300mg TID (with plans to gradually up-titrate outpatient). She was discharged home on this regimen in addition to PO Clindamycin.     She was discharged home in stable condition. Fetal heart tones were obtained on admission and at time of discharge and she remained obstetrically asymptomatic. She is scheduled to follow-up with [facility] fellow, Dr. Kaur.       Pertinent Physical Exam At Time of Discharge  General: no acute distress  HEENT: normocephalic, atraumatic  CV: warm and well perfused  Lungs: breathing comfortably on room air  Abdomen: NTND  Extremities: moving all extremities spontaneously  Neuro: awake and conversant  Psych: appropriate mood and affect      Last Vitals:  Temp Pulse Resp BP MAP  Pulse Ox   36.4 °C (97.5 °F) 93 16 134/86 102 98 %     Discharge Meds     Your medication list        START taking these medications        Instructions Last Dose Given Next Dose Due   gabapentin 300 mg capsule  Commonly known as: Neurontin      Take 1 capsule (300 mg) by mouth every 8 hours.       naloxone 4 mg/0.1 mL nasal spray  Commonly known as: Narcan      Administer 1 spray (4 mg) into affected nostril(s) if needed for opioid reversal or respiratory depression. May repeat every 2-3 minutes if needed, alternating nostrils, until medical assistance becomes available.       oxyCODONE-acetaminophen  mg tablet  Commonly known as: Percocet      Take 1 tablet by mouth every 4 hours if needed for severe pain (7 - 10) for up to 10 days.              CONTINUE taking these medications        Instructions Last Dose Given Next Dose Due   albuterol 90 mcg/actuation inhaler           clindamycin 150 mg capsule  Commonly known as: Cleocin      Take 2 capsules (300 mg) by mouth 2 times a day.       multivitamin with minerals tablet           ondansetron ODT 4 mg disintegrating tablet  Commonly known as: Zofran-ODT      Take 1 tablet (4 mg) by mouth every 8 hours if needed for nausea or vomiting.              STOP taking these medications      amoxicillin-pot clavulanate 875-125 mg tablet  Commonly known as: Augmentin        fosfomycin 3 gram packet  Commonly known as: Monurol        oxyCODONE 10 mg immediate release tablet  Commonly known as: Roxicodone                  Where to Get Your Medications        These medications were sent to [facility]  [address], [address], [place] [address]      Hours: 8AM to 6PM Mon-Fri, 8AM to 4PM Sat, 9AM to 1PM Sun Phone: [phone]   clindamycin 150 mg capsule  gabapentin 300 mg capsule  naloxone 4 mg/0.1 mL nasal spray  oxyCODONE-acetaminophen  mg tablet          Complications Requiring Follow-Up  None     Test Results Pending At  Discharge  Pending Labs       Order Current Status    Tissue/Wound Culture/Smear Preliminary result            Outpatient Follow-Up  Future Appointments   Date Time Provider Department Center   8/22/2024 12:00 PM Spenser Elizalde MD [id number] Academic       Seen & dw. MEGHA Chambers MD  PGY-II, Obstetrics & Gynecology   [facility]

## 2024-07-24 NOTE — PROGRESS NOTES
[facility]  ACUTE CARE SURGERY - PROGRESS NOTE    Patient Name: Sandrita Adams  MRN: [id number]  Admit Date: 722  : 1990  AGE: 33 y.o.   GENDER: female  ==============================================================================  TODAY'S ASSESSMENT AND PLAN OF CARE:  33F with hx of Rand stage 3 hidradenitis and lupus, currently 10w4d who presents with R axillary abscess, now spontaneously draining. Currently on Cimzia. No obvious fluid collections amenable to drainage at this point, R axillary collection spontaneously draining.    Recommendations  - continue supportive care with hot compresses  - continue abx  - no surgical intervention is indicated at this time    Acute care surgery will sign off at this time. Please reach out with additional questions or concerns.     Plans discussed with Chief Resident Dr. MERRILL Vega, PGY-5 and Attending Physician Dr. Godoy.    Mary Sanford MD PGY-2  Acute Care Surgery [id number]    ==============================================================================  CHIEF COMPLAINT / EVENTS LAST 24HRS / HPI:  Feels that her R axillary fluid collection has grown in size overnight. Collection continues to have drainage. Also complaining of L groin pain and swelling consistent with previous HS flares.     MEDICAL HISTORY / ROS:   Admission history and ROS reviewed. Pertinent changes as follows:  none    PHYSICAL EXAM:  Heart Rate:  [75-91]   Temp:  [36.3 °C (97.3 °F)-37 °C (98.6 °F)]   Resp:  [16-17]   BP: (107-126)/(71-86)   SpO2:  [98 %-100 %]   Physical Exam  Awake, alert, conversive  Abdomen soft, nontender  R axilla with previous HS scars and area of induration with erythema and small amount of purulent drainage, exquisitely tender to exam, no fluctuance  L groin with scarring, tender to palpation, no obvious fluid collection or area of fluctuance    IMAGING SUMMARY:   US nonvascular extremity US extremity nonvascular real time w  image documentation complete   Final Result   *Within the area of concern in the right axilla, there is a 3.0 x 1.0   x 3.0 cm well-circumscribed superficial complex echogenic collection   with hyperemia, concerning for an abscess.             I personally reviewed the images/study and I agree with Dr. Mirna Leonard findings as stated. This study was interpreted at [address], [place]        MACRO:   None        Signed by: Jaylen Beltran 7/23/2024 8:47 PM   Dictation workstation:   [id number]      Vascular US upper extremity venous duplex right   Final Result   1. No sonographic evidence for thrombus in the visualized vessels of   the right upper extremity.   2. Prominent likely reactive right axillary lymph nodes.        I personally reviewed the images/study, and I agree with the findings   as stated above. This study was interpreted at [address], [place].        MACRO:   None        Signed by: Evan Finkelstein 7/22/2024 11:29 PM   Dictation workstation:   [id number]      Point of Care Ultrasound    (Results Pending)         LABS:  Results from last 7 days   Lab Units 07/22/24  1932 07/21/24  1544   WBC AUTO x10*3/uL 11.1 10.1   HEMOGLOBIN g/dL 11.9* 11.9*   HEMATOCRIT % 33.9* 34.5*   PLATELETS AUTO x10*3/uL 278 262   NEUTROS PCT AUTO % 58.0 58.1   LYMPHS PCT AUTO % 32.0 32.4   MONOS PCT AUTO % 8.3 7.8   EOS PCT AUTO % 0.9 1.0         Results from last 7 days   Lab Units 07/22/24 1932 07/21/24  1544   SODIUM mmol/L 134* 133*   POTASSIUM mmol/L 4.1 4.1   CHLORIDE mmol/L 107 104   CO2 mmol/L 18* 19*   BUN mg/dL 11 11   CREATININE mg/dL 0.60 0.63   CALCIUM mg/dL 9.2 9.1   PROTEIN TOTAL g/dL 7.1 7.1   BILIRUBIN TOTAL mg/dL 0.2 0.2   ALK PHOS U/L 51 51   ALT U/L 11 10   AST U/L 20 18   GLUCOSE mg/dL 97 91     Results from last 7 days   Lab Units 07/22/24 1932 07/21/24  1544   BILIRUBIN TOTAL mg/dL 0.2 0.2           I have  reviewed all medications, laboratory results, and imaging pertinent for today's encounter.

## 2024-07-24 NOTE — PROGRESS NOTES
"ANTEPARTUM PROGRESS NOTE   2024, 8:24 AM     SUBJECTIVE: Patient with breakthrough pain, requiring IV Dilaudid overnight. Currently reports significant pain; states she is doing \"OK,\" but would like to go home today. She denies contractions, VB, or LOF.    OBJECTIVE:   /71   Pulse 85   Temp 36.3 °C (97.3 °F) (Temporal)   Resp 16   Ht 1.549 m (5' 1\")   Wt 79.8 kg (176 lb)   LMP 2024   SpO2 100%   BMI 33.25 kg/m²    Temp  Min: 36.3 °C (97.3 °F)  Max: 37 °C (98.6 °F)  Pulse  Min: 75  Max: 91  BP  Min: 107/71  Max: 126/81    General: no acute distress  HEENT: normocephalic, atraumatic  CV: warm and well perfused  Lungs: breathing comfortably on room air  Abdomen: NTND  Extremities: moving all extremities spontaneously  Neuro: awake and conversant  Psych: appropriate mood and affect      Labs:   Results for orders placed or performed during the hospital encounter of 24 (from the past 24 hour(s))   Vancomycin   Result Value Ref Range    Vancomycin 4.4 (L) 5.0 - 20.0 ug/mL   Type And Screen   Result Value Ref Range    ABO TYPE A     Rh TYPE POS     ANTIBODY SCREEN NEG         ASSESSMENT AND PLAN:   Sandrita Adams is a 33 y.o.  at 10w5d by 7wk US with PMH of hidradenitis suppurativa and Lupus who presents with worsening right axillary pain and vaginal spotting.     Hidradenitis suppurativa flare  Patient with extensive history of HS, s/p removal of axillary sweat glands in 2017, which did not significantly improve her pain. Patient follows with Dermatology, Pain Management, and Infectious Disease at [facility] and was previously well-controlled on Cosentyx, Percocet, Gabapentin, and Napoxen which allowed her to complete her activities of daily living, including being able to work. Since Pregnancy, patient has been on Cimizia and Oxycodone without adequate control.   S/p RUE Vascular US in ED given swelling/numbness- negative for thrombus, prominent likely reactive right axillary lymph " nodes.  S/p inpatient Dermatology consult - recommend continuation of Cimzia (vs. Cosentyx) given minimal risk to fetus. Derm to schedule outpatient dermatology follow up for HS medication management   S/p RUE US to evaluate for fluid collection. Notable for  3.0 x 1.0 x 3.0 cm well-circumscribed superficial complex echogenic collection with hyperemia, concerning for an abscess. ACS consulted: No acute surgical intervention at this time. Continue supportive care, hot compresses   Patient with rupture of R axillary abscess on exam in ED, culture from wound sent - pending  Started on Vanc/ Zosyn in the ED, 7/22. Now discontinued. Currently on IV Clindamycin. Will plan to transition to PO ABX today vs on discharge  Cimzia now restarted, pt provided Rx from home  Dilaudid PRN now discontinued, though did have to receive one-time dose overnight. Continue Oxycodone 10mg q6 + Gabapentin 300mg TID (plan to slowly up-titrate to max dose)  Please see previous MFM fellow and attending attestation regarding extensive discussion regarding plan of care regarding HS pain & symptom management.   Patient desires discharge today     Vaginal Spotting  Patient with vaginal spotting since onset of pregnancy. Previously started on Vaginal progesterone with outside provider.   Hemodynamically stable, Hgb 11.9  Minimal bleeding noted on speculum exam in ED. None coming from cervical ox  Cervical polyp noted on exam, likely etiology of bleeding however will continue to monitor symptoms     Lupus (?)  Self-reported. Patient reports history of SLE, diagnosed by her PCP in 2017 based on symptoms of joint pain and facial rash. She states she is now in remission. [facility] with reported unclear basis of diagnosis for SLE. Reported no signs of SLE on exam at that time. Had nonspecific hair loss, sicca, diffuse pain w/o inflammatory features. Hidradentitis suppurativa related arthritis is possible. Rheumatologic labs done  at that time notable for negative KEITH, ESR wnl, C3 wnl and elevated C4 (would be low with SLE flare). Neg SSA/SSB.   No medications     Asthma  Stable without medications  Asymptomatic at this time      Fetal Status   Desired pregnancy.  on admission   7/9 [facility] TVUS- CRL measuring 8.4wks w LUIS MIGUEL 2/14/2025  Prenatal labs reviewed and wnl  Had 1st trimester bleeding, started on vaginal progesterone, as above.      Dispo: For discharge today, per pt. request     To be seen & dw. MEGHA Chambers MD  PGY-II, Obstetrics & Gynecology   [facility]  Pager [phone]     Principal Problem:    Hidradenitis suppurativa  Active Problems:    10 weeks gestation of pregnancy ([facility])

## 2024-07-24 NOTE — CONSULTS
[facility]  ACUTE CARE SURGERY - HISTORY AND PHYSICAL / CONSULT    Patient Name: Sandrita Adams  MRN: [id number]  Admit Date: 722  : 1990  AGE: 33 y.o.   GENDER: female  ==============================================================================  TODAY'S ASSESSMENT AND PLAN OF CARE:  33F with hx of Rand stage 3 hidradenitis and lupus, currently 10w4d who presents with R axillary abscess, now spontaneously draining.    Patient with complicated hx of hidradenitis requiring multiple I&Ds and R axillary sweat gland excision in 2017. Previously controlled on Cosentyx but was stopped due to pregnancy. R axilla with area of induration and exquisite tenderness but without any palpable fluctuance. Abscess has been draining throughout the day and likely has no drainable collection remaining at this time.     Recommendations:  No acute surgical intervention at this time. Patient with no drainable abscesses on exam at this time. May require drainage procedures in the future.  Would recommend continued supportive care including antibiotics and hot compresses    Patient discussed with attending Dr. Waqar Siddiqui MD  PGY-4 General Surgery  [facility] [id number]    ==============================================================================  CHIEF COMPLAINT/REASON FOR CONSULT:  33F with PMH of Rand stage 3 hidradenitis suppurativa, lupus who presents to [facility] for R axillary pain. Currently 10w4d, admitted to OB for spotting.    Patient with extensive hidradenitis history, including removal of axillary sweat glands in 2017. Previously well controlled on Cosentyx, however since pregnancy, has been on Cimizia and requiring oxycodone which she states has not been helping. Has tried multiple antibiotics (including augmentin, clinda, bactrim) but has failed therapy with all of those. Only Cosentyx has helped. Follows with [facility] and pain management for her HS pain.  Previously seen by gen surg at [facility] in June 2024 where she was scheduled for outpatient surgery (described as a wide excision of affected areas), but insurance denied as she is out of network.    Axillary abscess began draining green purulent drainage yesterday and today. Also notes area of developing concern in L groin. Denies fevers, chills.    PAST MEDICAL HISTORY:   PMH:   Past Medical History:   Diagnosis Date    Hidradenitis suppurativa 10/29/2020    Hidradenitis    Lupus (Multi)      PSH:   Past Surgical History:   Procedure Laterality Date    OTHER SURGICAL HISTORY  09/19/2022    Arm surgery     FH:   No family history on file.  SOCIAL HISTORY:  Quit tobacco 2012, former vaping, previous marijuana    MEDICATIONS:   Prior to Admission medications    Medication Sig Start Date End Date Taking? Authorizing Provider   albuterol 90 mcg/actuation inhaler  7/16/24   Historical Provider, MD   amoxicillin-pot clavulanate (Augmentin) 875-125 mg tablet Take by mouth every 12 hours. 12/29/23 8/16/24  Historical Provider, MD   clindamycin (Cleocin) 150 mg capsule Take 2 capsules (300 mg) by mouth 2 times a day for 10 days. 7/21/24 7/31/24  Josefina Gomez PA-C   fosfomycin (Monurol) 3 gram packet Pour contents of 1 packet into cold water and stir to dissolve as directed on package 7/16/24   Kelsie Horvath MD   multivitamin with minerals tablet Take 1 tablet by mouth once daily.    Historical Provider, MD   ondansetron ODT (Zofran-ODT) 4 mg disintegrating tablet Take 1 tablet (4 mg) by mouth every 8 hours if needed for nausea or vomiting. 6/23/24   Lane Parker MD   oxyCODONE (Roxicodone) 10 mg immediate release tablet  5/21/24   Historical Provider, MD     ALLERGIES:   Allergies   Allergen Reactions    Suboxone [Buprenorphine-Naloxone] Anaphylaxis, Hives and Itching    Clarithromycin Hives    Haloperidol Hallucinations and Psychosis    Levofloxacin Swelling     Ankle Joint/tendon pain    Metoclopramide  Headache, Hallucinations and Psychosis    Reglan [Metoclopramide Hcl] Psychosis       REVIEW OF SYSTEMS:  Review of Systems   Constitutional: Negative.    HENT: Negative.     Eyes: Negative.    Respiratory: Negative.     Cardiovascular: Negative.    Gastrointestinal: Negative.    Endocrine: Negative.    Genitourinary: Negative.    Musculoskeletal: Negative.    Skin:  Positive for wound.   Allergic/Immunologic: Negative.    Neurological: Negative.    Hematological: Negative.    Psychiatric/Behavioral: Negative.       PHYSICAL EXAM:  Physical Exam  Constitutional:       General: She is not in acute distress.     Appearance: Normal appearance. She is not ill-appearing or toxic-appearing.   HENT:      Head: Normocephalic.      Nose: Nose normal.      Mouth/Throat:      Mouth: Mucous membranes are moist.      Pharynx: No posterior oropharyngeal erythema.   Eyes:      Extraocular Movements: Extraocular movements intact.      Pupils: Pupils are equal, round, and reactive to light.   Cardiovascular:      Rate and Rhythm: Normal rate and regular rhythm.   Pulmonary:      Effort: Pulmonary effort is normal.      Breath sounds: Normal breath sounds.   Abdominal:      General: Abdomen is flat.      Palpations: Abdomen is soft.   Musculoskeletal:         General: Normal range of motion.   Skin:     General: Skin is warm.      Findings: Lesion present.      Comments: R axilla: area of [id number] induration, no fluctuance on exam. Exquisitely tender with visible skin opening.  L groin: multiple areas of scarring and sinus tracts. No palpable area of fluctuance     Neurological:      General: No focal deficit present.      Mental Status: She is alert and oriented to person, place, and time.   Psychiatric:         Mood and Affect: Mood normal.         Behavior: Behavior normal.       IMAGING SUMMARY:    R axillary US: [id number] area of fluid collection    LABS:  Results from last 7 days   Lab Units 07/22/24  1932 07/21/24  1544   WBC  AUTO x10*3/uL 11.1 10.1   HEMOGLOBIN g/dL 11.9* 11.9*   HEMATOCRIT % 33.9* 34.5*   PLATELETS AUTO x10*3/uL 278 262   NEUTROS PCT AUTO % 58.0 58.1   LYMPHS PCT AUTO % 32.0 32.4   MONOS PCT AUTO % 8.3 7.8   EOS PCT AUTO % 0.9 1.0         Results from last 7 days   Lab Units 07/22/24  1932 07/21/24  1544   SODIUM mmol/L 134* 133*   POTASSIUM mmol/L 4.1 4.1   CHLORIDE mmol/L 107 104   CO2 mmol/L 18* 19*   BUN mg/dL 11 11   CREATININE mg/dL 0.60 0.63   CALCIUM mg/dL 9.2 9.1   PROTEIN TOTAL g/dL 7.1 7.1   BILIRUBIN TOTAL mg/dL 0.2 0.2   ALK PHOS U/L 51 51   ALT U/L 11 10   AST U/L 20 18   GLUCOSE mg/dL 97 91     Results from last 7 days   Lab Units 07/22/24 1932 07/21/24  1544   BILIRUBIN TOTAL mg/dL 0.2 0.2             I have reviewed all laboratory and imaging results ordered/pertinent for this encounter.

## 2024-07-25 LAB
B-LACTAMASE ORGANISM ISLT: POSITIVE
BACTERIA SPEC CULT: ABNORMAL
GRAM STN SPEC: ABNORMAL
GRAM STN SPEC: ABNORMAL

## 2024-07-26 NOTE — PROGRESS NOTES
7/26/2024    SW contacted by NP requesting assistance with scheduling transportation for patient's upcoming appointment.  This SW requested a Lyft for Monday, 7/29 at 12:45 pm.  Patient's appointment is at [facility] at 1:30.  TC placed to patient to inform her that transport has been scheduled.      Kylie Hampton [facility]

## 2024-07-29 ENCOUNTER — PHARMACY VISIT (OUTPATIENT)
Dept: PHARMACY | Facility: CLINIC | Age: 34
End: 2024-07-29
Payer: COMMERCIAL

## 2024-07-29 ENCOUNTER — ROUTINE PRENATAL (OUTPATIENT)
Dept: MATERNAL FETAL MEDICINE | Facility: CLINIC | Age: 34
End: 2024-07-29
Payer: MEDICAID

## 2024-07-29 VITALS — BODY MASS INDEX: 33.44 KG/M2 | SYSTOLIC BLOOD PRESSURE: 139 MMHG | WEIGHT: 177 LBS | DIASTOLIC BLOOD PRESSURE: 83 MMHG

## 2024-07-29 DIAGNOSIS — J45.909 ASTHMA (HHS-HCC): ICD-10-CM

## 2024-07-29 DIAGNOSIS — L73.2 AXILLARY HIDRADENITIS SUPPURATIVA: Primary | ICD-10-CM

## 2024-07-29 DIAGNOSIS — O09.529 AMA (ADVANCED MATERNAL AGE) MULTIGRAVIDA 35+ (HHS-HCC): ICD-10-CM

## 2024-07-29 DIAGNOSIS — Z3A.11 11 WEEKS GESTATION OF PREGNANCY (HHS-HCC): ICD-10-CM

## 2024-07-29 LAB
POC APPEARANCE, URINE: CLEAR
POC BILIRUBIN, URINE: NEGATIVE
POC BLOOD, URINE: ABNORMAL
POC COLOR, URINE: YELLOW
POC GLUCOSE, URINE: NEGATIVE MG/DL
POC KETONES, URINE: NEGATIVE MG/DL
POC LEUKOCYTES, URINE: NEGATIVE
POC NITRITE,URINE: NEGATIVE
POC PH, URINE: 6 PH
POC PROTEIN, URINE: NEGATIVE MG/DL
POC SPECIFIC GRAVITY, URINE: >=1.03
POC UROBILINOGEN, URINE: 0.2 EU/DL

## 2024-07-29 PROCEDURE — RXMED WILLOW AMBULATORY MEDICATION CHARGE

## 2024-07-29 PROCEDURE — 99215 OFFICE O/P EST HI 40 MIN: CPT | Performed by: STUDENT IN AN ORGANIZED HEALTH CARE EDUCATION/TRAINING PROGRAM

## 2024-07-29 PROCEDURE — 81003 URINALYSIS AUTO W/O SCOPE: CPT | Mod: QW | Performed by: STUDENT IN AN ORGANIZED HEALTH CARE EDUCATION/TRAINING PROGRAM

## 2024-07-29 RX ORDER — ASPIRIN 81 MG/1
162 TABLET ORAL DAILY
Qty: 60 TABLET | Refills: 11 | Status: SHIPPED | OUTPATIENT
Start: 2024-07-29 | End: 2025-07-29

## 2024-07-29 RX ORDER — OXYCODONE AND ACETAMINOPHEN 10; 325 MG/1; MG/1
1 TABLET ORAL EVERY 4 HOURS PRN
Qty: 42 TABLET | Refills: 0 | Status: SHIPPED | OUTPATIENT
Start: 2024-07-29

## 2024-07-29 RX ORDER — FAMOTIDINE 20 MG/1
20 TABLET, FILM COATED ORAL 2 TIMES DAILY
Qty: 60 TABLET | Refills: 11 | Status: SHIPPED | OUTPATIENT
Start: 2024-07-29 | End: 2025-07-29

## 2024-07-29 ASSESSMENT — EDINBURGH POSTNATAL DEPRESSION SCALE (EPDS)
I HAVE FELT SCARED OR PANICKY FOR NO GOOD REASON: NO, NOT MUCH
I HAVE BEEN SO UNHAPPY THAT I HAVE BEEN CRYING: ONLY OCCASIONALLY
I HAVE BEEN SO UNHAPPY THAT I HAVE HAD DIFFICULTY SLEEPING: NOT VERY OFTEN
I HAVE BLAMED MYSELF UNNECESSARILY WHEN THINGS WENT WRONG: NOT VERY OFTEN
TOTAL SCORE: 10
I HAVE BEEN ANXIOUS OR WORRIED FOR NO GOOD REASON: YES, SOMETIMES
THE THOUGHT OF HARMING MYSELF HAS OCCURRED TO ME: NEVER
THINGS HAVE BEEN GETTING ON TOP OF ME: YES, SOMETIMES I HAVEN'T BEEN COPING AS WELL AS USUAL
I HAVE FELT SAD OR MISERABLE: NOT VERY OFTEN
I HAVE BEEN ABLE TO LAUGH AND SEE THE FUNNY SIDE OF THINGS: AS MUCH AS I ALWAYS COULD
I HAVE LOOKED FORWARD WITH ENJOYMENT TO THINGS: RATHER LESS THAN I USED TO

## 2024-07-29 ASSESSMENT — ENCOUNTER SYMPTOMS
GASTROINTESTINAL NEGATIVE: 0
NEUROLOGICAL NEGATIVE: 0
CONSTITUTIONAL NEGATIVE: 0
ALLERGIC/IMMUNOLOGIC NEGATIVE: 0
HEMATOLOGIC/LYMPHATIC NEGATIVE: 0
RESPIRATORY NEGATIVE: 0
PSYCHIATRIC NEGATIVE: 0
MUSCULOSKELETAL NEGATIVE: 0
ENDOCRINE NEGATIVE: 0
EYES NEGATIVE: 0
CARDIOVASCULAR NEGATIVE: 0

## 2024-07-29 NOTE — PROGRESS NOTES
MF Consultation/New OB Note  24    33 year old  11w3d presenting to establish [facility] outpatient care, s/p admission to inpatient [facility] serivce for Hidradenitis Supprativa.     Patient denies pelvic pain, no current spotting but has had intermittent small amount of spotting throughout the first trimester. Has known cervical polyp.     Patient was admitted last week, discharged , had right axillary abscess which was conservatively managed, ongoing spontaneous drainage. Started on Cimzia for control after gen surg and dermatology consultation inpatient. Pain regimen at time of discharge was oxycodone/acetaminophen 10/325 q4h PRN and gabapentin 300mg TID, with plan for increase to max gabapentin and weaning of percocet as tolerated.     Reports ongoing HS related pain worst at right axilla. States improved from time of discharge, though is still requiring oxycodone q4 hours. States the gabapentin was never distributed by the pharmacy (marked as filled in OARRS report). States she has a few days of oxycodone left. Has not been able to space doses at this time. Had Cimzia in the hospital, planned for q2 week infusions, though unable to confirm next scheduled appointment.     Patient reports she is going to the [place] tomorrow to visit her father. She plans to be abroad for 1 month at this time.     PMH: hidradenitis, reported lupus  PSH: wrist surgery, right sweat glands  Soc Hx: quit smnoking 7 weeks ago, no etoh, no drug use  Fam Hx: mother with hiddradenitis, lupus in maternal aunts  All: keflex (hives), reglad=n (psychosis) suboxone, throat close  Meds: percocet, clindamycin, cimzia,   Gyn Hx: no abn pap, updated december  OB Hx: one prior SAB    Objective:    Vitals:    24 1300   BP: 139/83   Weight: 80.3 kg (177 lb)     Physical Exam:   Gen - no acute distress  Resp - Non labored breathing  Cardio - Warm and well perfused  Neuro - Alert and oriented  Psych - Normal mood and  affect    Assessment and Plan:     33 year old  11w3d presenting to establish [facility] outpatient care, s/p admission to inpatient [facility] serivce for Hidradenitis Supprativa.     Hidradenitis Suppurative  - Diagnosed with stage 3 HS, history of axillary sweat gland excision in 2017 without significant improvement in disease activity  - Recent admission for right axillary flare, abscess which spontaneously started draining, no surgical intervention performed, re-started on cimzia inpatient, discharged on clindamycin and pain regimen  - Cimzia planned for infusion q14 days, reports next is scheduled but no appointment seen in EMR, patient reports she will be out of the country at this time  - Current pain regimen: gabapentin 300mg TID, oxycodone-acetaminophen (percocet)10mg/325mg q4 PRN  - Extensively counseled inpatient regarding HS considerations in pregnancy as well as risk of LEEANNE with current pain regimen as prescribed  - Has been continuing to need percocet scheduled q4 hours. Reports gabapentin never filled, though marked as filled in OARRS. Patient called pharmacy. Seemingly there was a distribution error and their team is evaluating this, has identified gabapentin prescription. Patient reports no doses of gabapentin since discharge.   - Discussed long term pain management plan. Pain management unable to provide narcotics at this time, currently planned for [facility] to manage pain regimen.   - Discussed with patient that we cannot fill a script for a one month trip outside of the country. We are able to fill a 1 week prescription at this time, at which time we would recommend a follow up visit to continue adjustment of medications. Given gabapentin never started given possible distribution error from pharmacy, no adjustments to regimen now. Patient will  original gabapentin dose and start at 300mg TID. Discussed timeframe for slow up-titration.   - Outpatient follow up with dermatology, scheduled for ,  patient unsure if she will be back in the country for this  - Reviewed that at this time, a trip out of the country is likely to negatively impact the control of her HS, given lapses in Cimzia anticipated over this timeframe. Again discussed inability to provide large quantities of percocet to cover the anticipated duration of this trip. Patient understands. She may consider truncating her trip, though is hesitant to do so.   - Patient plans to alert our team when she makes a decision regarding a return date so ongoing care can be scheduled    Reported history of SLE  - Reported diagnosis, states she was diagnosed by prior PCP given joint pain and facial rash, was told she is in remission  - Saw [facility] rheumatology in 2024, discussed unclear diagnostic criteria for SLE at that time  - Autoimmune antibody panel without abnormal findings  - No medications  - At this time, do not suspect clinical SLE, continue to monitor symptoms throughout pregnancy, re-draw autoimmune panel if clinically indicated    Asthma  - Current regimen: rare albuterol, very infrequent use at this time  - No recent symptoms, reports warm temperatures traditionally cause symptoms  - Reviewed variable course of asthma in pregnancy, and indications to escalate treatment regimens. No indication for additional medications at this time.    Advanced Maternal Age  - Reviewed maternal and fetal risks in pregnancy. Maternal risks include increased risk of preeclampsia, gestational diabetes, and requirement of  section. Fetal risks include aneuploidy, growth abnormalities, and  delivery.   - Timing of delivery to be dictated by medical co-morbidities. For advanced maternal age, would recommend delivery by the patient's due date.  - Patient elected for cell free DNA for aneuploidy screening, in process     Prenatal Care  - New OB labs completed and within normal limits  - A+, RI  - Had vaginal spotting early in first trimester,  reports she was started on vaginal progesterone by an outside provider for this. Also has a known cervical polyp. Discussed likely limited utility to continuing this medication, patient desires to continue.  - Aspirin for preeclampsia prophylaxis planned for 12 weeks, rx today  - NT unable to be scheduled given patient plans to be out of the country for ~1 month    Patient seen and counseled with Dr. Storey    Percocet prescription filled by MFM attending Dr. Mccann, as Dr. Storey and Dr. Bustos were unable to complete the electronic prescription at this time.     Patient to call/alert office when return date is established to schedule ongoing care    Danai Bustos MD  MFM Fellow

## 2024-07-29 NOTE — PROGRESS NOTES
Patient seen and evaluated by Dr. Gonzales and Dr. Storey.  Due to error in EMR making it so that Rx could not be sent for opiates prescribed and so this was sent by myself in accordance to the currently documented MFM team plan for her pain management.

## 2024-07-30 ENCOUNTER — TELEPHONE (OUTPATIENT)
Dept: MATERNAL FETAL MEDICINE | Facility: HOSPITAL | Age: 34
End: 2024-07-30

## 2024-07-30 ENCOUNTER — SPECIALTY PHARMACY (OUTPATIENT)
Dept: PHARMACY | Facility: CLINIC | Age: 34
End: 2024-07-30

## 2024-07-31 PROBLEM — O09.529 AMA (ADVANCED MATERNAL AGE) MULTIGRAVIDA 35+ (HHS-HCC): Status: ACTIVE | Noted: 2024-07-31

## 2024-08-02 DIAGNOSIS — Z3A.10 10 WEEKS GESTATION OF PREGNANCY (HHS-HCC): ICD-10-CM

## 2024-08-05 ENCOUNTER — HOSPITAL ENCOUNTER (EMERGENCY)
Facility: HOSPITAL | Age: 34
Discharge: HOME | End: 2024-08-05
Attending: EMERGENCY MEDICINE
Payer: MEDICAID

## 2024-08-05 VITALS
HEIGHT: 61 IN | RESPIRATION RATE: 18 BRPM | HEART RATE: 83 BPM | TEMPERATURE: 98.4 F | SYSTOLIC BLOOD PRESSURE: 104 MMHG | OXYGEN SATURATION: 99 % | WEIGHT: 176 LBS | BODY MASS INDEX: 33.23 KG/M2 | DIASTOLIC BLOOD PRESSURE: 73 MMHG

## 2024-08-05 DIAGNOSIS — O09.91 HIGH-RISK PREGNANCY IN FIRST TRIMESTER (HHS-HCC): Primary | ICD-10-CM

## 2024-08-05 DIAGNOSIS — M54.50 ACUTE LEFT-SIDED LOW BACK PAIN WITHOUT SCIATICA: ICD-10-CM

## 2024-08-05 PROBLEM — N39.0 COMPLICATED UTI (URINARY TRACT INFECTION): Status: RESOLVED | Noted: 2024-07-16 | Resolved: 2024-08-05

## 2024-08-05 PROBLEM — N93.9 VAGINAL SPOTTING: Status: RESOLVED | Noted: 2024-06-16 | Resolved: 2024-08-05

## 2024-08-05 PROBLEM — M32.9 SLE (SYSTEMIC LUPUS ERYTHEMATOSUS) (MULTI): Status: RESOLVED | Noted: 2021-05-05 | Resolved: 2024-08-05

## 2024-08-05 PROBLEM — F43.10 POST TRAUMATIC STRESS DISORDER (PTSD): Status: RESOLVED | Noted: 2021-04-21 | Resolved: 2024-08-05

## 2024-08-05 PROBLEM — L03.90 CELLULITIS: Status: RESOLVED | Noted: 2024-06-16 | Resolved: 2024-08-05

## 2024-08-05 LAB
ALBUMIN SERPL BCP-MCNC: 4 G/DL (ref 3.4–5)
ALP SERPL-CCNC: 42 U/L (ref 33–110)
ALT SERPL W P-5'-P-CCNC: 14 U/L (ref 7–45)
ANION GAP SERPL CALC-SCNC: 14 MMOL/L (ref 10–20)
APPEARANCE UR: CLEAR
AST SERPL W P-5'-P-CCNC: 31 U/L (ref 9–39)
B-HCG SERPL-ACNC: ABNORMAL MIU/ML
BASOPHILS # BLD AUTO: 0.03 X10*3/UL (ref 0–0.1)
BASOPHILS NFR BLD AUTO: 0.4 %
BILIRUB SERPL-MCNC: 0.2 MG/DL (ref 0–1.2)
BILIRUB UR STRIP.AUTO-MCNC: NEGATIVE MG/DL
BUN SERPL-MCNC: 9 MG/DL (ref 6–23)
CALCIUM SERPL-MCNC: 9.1 MG/DL (ref 8.6–10.6)
CHLORIDE SERPL-SCNC: 104 MMOL/L (ref 98–107)
CO2 SERPL-SCNC: 21 MMOL/L (ref 21–32)
COLOR UR: NORMAL
CREAT SERPL-MCNC: 0.63 MG/DL (ref 0.5–1.05)
EGFRCR SERPLBLD CKD-EPI 2021: >90 ML/MIN/1.73M*2
EOSINOPHIL # BLD AUTO: 0.13 X10*3/UL (ref 0–0.7)
EOSINOPHIL NFR BLD AUTO: 1.5 %
ERYTHROCYTE [DISTWIDTH] IN BLOOD BY AUTOMATED COUNT: 14.5 % (ref 11.5–14.5)
GLUCOSE SERPL-MCNC: 87 MG/DL (ref 74–99)
GLUCOSE UR STRIP.AUTO-MCNC: NORMAL MG/DL
HCT VFR BLD AUTO: 35.2 % (ref 36–46)
HGB BLD-MCNC: 12 G/DL (ref 12–16)
IMM GRANULOCYTES # BLD AUTO: 0.08 X10*3/UL (ref 0–0.7)
IMM GRANULOCYTES NFR BLD AUTO: 0.9 % (ref 0–0.9)
KETONES UR STRIP.AUTO-MCNC: NEGATIVE MG/DL
LEUKOCYTE ESTERASE UR QL STRIP.AUTO: NEGATIVE
LYMPHOCYTES # BLD AUTO: 3.21 X10*3/UL (ref 1.2–4.8)
LYMPHOCYTES NFR BLD AUTO: 37.7 %
MCH RBC QN AUTO: 25.2 PG (ref 26–34)
MCHC RBC AUTO-ENTMCNC: 34.1 G/DL (ref 32–36)
MCV RBC AUTO: 74 FL (ref 80–100)
MONOCYTES # BLD AUTO: 0.73 X10*3/UL (ref 0.1–1)
MONOCYTES NFR BLD AUTO: 8.6 %
NEUTROPHILS # BLD AUTO: 4.33 X10*3/UL (ref 1.2–7.7)
NEUTROPHILS NFR BLD AUTO: 50.9 %
NITRITE UR QL STRIP.AUTO: NEGATIVE
NRBC BLD-RTO: 0 /100 WBCS (ref 0–0)
PH UR STRIP.AUTO: 7 [PH]
PLATELET # BLD AUTO: 245 X10*3/UL (ref 150–450)
POTASSIUM SERPL-SCNC: 5.2 MMOL/L (ref 3.5–5.3)
PROT SERPL-MCNC: 7.2 G/DL (ref 6.4–8.2)
PROT UR STRIP.AUTO-MCNC: NEGATIVE MG/DL
RBC # BLD AUTO: 4.76 X10*6/UL (ref 4–5.2)
RBC # UR STRIP.AUTO: NEGATIVE /UL
SODIUM SERPL-SCNC: 134 MMOL/L (ref 136–145)
SP GR UR STRIP.AUTO: 1.01
UROBILINOGEN UR STRIP.AUTO-MCNC: NORMAL MG/DL
WBC # BLD AUTO: 8.5 X10*3/UL (ref 4.4–11.3)

## 2024-08-05 PROCEDURE — 2500000004 HC RX 250 GENERAL PHARMACY W/ HCPCS (ALT 636 FOR OP/ED): Mod: SE

## 2024-08-05 PROCEDURE — 96375 TX/PRO/DX INJ NEW DRUG ADDON: CPT

## 2024-08-05 PROCEDURE — 85025 COMPLETE CBC W/AUTO DIFF WBC: CPT

## 2024-08-05 PROCEDURE — 99285 EMERGENCY DEPT VISIT HI MDM: CPT

## 2024-08-05 PROCEDURE — 96361 HYDRATE IV INFUSION ADD-ON: CPT

## 2024-08-05 PROCEDURE — 99284 EMERGENCY DEPT VISIT MOD MDM: CPT | Mod: 59,25

## 2024-08-05 PROCEDURE — 76815 OB US LIMITED FETUS(S): CPT | Performed by: EMERGENCY MEDICINE

## 2024-08-05 PROCEDURE — 96374 THER/PROPH/DIAG INJ IV PUSH: CPT

## 2024-08-05 PROCEDURE — 81003 URINALYSIS AUTO W/O SCOPE: CPT

## 2024-08-05 PROCEDURE — 36415 COLL VENOUS BLD VENIPUNCTURE: CPT

## 2024-08-05 PROCEDURE — 96376 TX/PRO/DX INJ SAME DRUG ADON: CPT

## 2024-08-05 PROCEDURE — 84702 CHORIONIC GONADOTROPIN TEST: CPT

## 2024-08-05 PROCEDURE — 80053 COMPREHEN METABOLIC PANEL: CPT

## 2024-08-05 RX ORDER — ONDANSETRON HYDROCHLORIDE 2 MG/ML
4 INJECTION, SOLUTION INTRAVENOUS ONCE
Status: COMPLETED | OUTPATIENT
Start: 2024-08-05 | End: 2024-08-05

## 2024-08-05 RX ORDER — HYDROMORPHONE HYDROCHLORIDE 1 MG/ML
1 INJECTION, SOLUTION INTRAMUSCULAR; INTRAVENOUS; SUBCUTANEOUS ONCE
Status: DISCONTINUED | OUTPATIENT
Start: 2024-08-05 | End: 2024-08-05

## 2024-08-05 ASSESSMENT — LIFESTYLE VARIABLES
HAVE PEOPLE ANNOYED YOU BY CRITICIZING YOUR DRINKING: NO
EVER FELT BAD OR GUILTY ABOUT YOUR DRINKING: NO
EVER HAD A DRINK FIRST THING IN THE MORNING TO STEADY YOUR NERVES TO GET RID OF A HANGOVER: NO
HAVE YOU EVER FELT YOU SHOULD CUT DOWN ON YOUR DRINKING: NO
TOTAL SCORE: 0

## 2024-08-05 ASSESSMENT — PAIN SCALES - GENERAL: PAINLEVEL_OUTOF10: 8

## 2024-08-05 ASSESSMENT — PAIN DESCRIPTION - PAIN TYPE: TYPE: ACUTE PAIN

## 2024-08-05 ASSESSMENT — PAIN - FUNCTIONAL ASSESSMENT: PAIN_FUNCTIONAL_ASSESSMENT: 0-10

## 2024-08-05 ASSESSMENT — PAIN DESCRIPTION - LOCATION: LOCATION: ABDOMEN

## 2024-08-05 ASSESSMENT — PAIN DESCRIPTION - ORIENTATION: ORIENTATION: LEFT

## 2024-08-05 ASSESSMENT — PAIN DESCRIPTION - DESCRIPTORS: DESCRIPTORS: SHARP;BURNING

## 2024-08-05 NOTE — ED PROVIDER NOTES
HPI   Chief Complaint   Patient presents with    Flank Pain    Pregnancy Problem       Patient is a 33-year-old female with a past medical history significant for lupus, asthma, hidradenitis suppurativa, and obesity presenting to the ED with back pain.  Patient states she woke up this morning with burning, stabbing pain in the left side of her low back.  She denies inciting injury, falls, or trauma.  Patient also endorses nausea/vomiting and urinary frequency.  She denies hematuria, dysuria, or urgency.  Patient takes she takes progesterone and routinely experiences off and on spotting.  She denies any more significant vaginal bleeding secondary to her pregnancy.  Patient otherwise denies any fevers, chills, flulike symptoms, chest pain, shortness of breath, or abdominal pain.              Patient History   Past Medical History:   Diagnosis Date    Hidradenitis suppurativa 10/29/2020    Hidradenitis    Hidradenitis suppurativa     Lupus (Multi)      Past Surgical History:   Procedure Laterality Date    OTHER SURGICAL HISTORY  09/19/2022    Arm surgery    OTHER SURGICAL HISTORY Left 2011    Left wrist    OTHER SURGICAL HISTORY Left 2021    Left wrist procedure     No family history on file.  Social History     Tobacco Use    Smoking status: Former     Types: Cigarettes    Smokeless tobacco: Never   Vaping Use    Vaping status: Never Used   Substance Use Topics    Alcohol use: Not Currently    Drug use: Never       Physical Exam   ED Triage Vitals [08/05/24 1328]   Temperature Heart Rate Respirations BP   36.9 °C (98.4 °F) 83 18 104/73      Pulse Ox Temp src Heart Rate Source Patient Position   99 % -- -- --      BP Location FiO2 (%)     -- --       Physical Exam  Vitals reviewed.   Constitutional:       General: She is not in acute distress.     Appearance: She is not ill-appearing.   Cardiovascular:      Rate and Rhythm: Normal rate and regular rhythm.   Pulmonary:      Effort: Pulmonary effort is normal.       Breath sounds: Normal breath sounds.   Abdominal:      General: Bowel sounds are normal.      Palpations: Abdomen is soft.      Tenderness: There is no abdominal tenderness. There is left CVA tenderness. There is no right CVA tenderness, guarding or rebound.   Skin:     General: Skin is warm and dry.   Neurological:      General: No focal deficit present.      Mental Status: She is alert.           ED Course & MDM   Diagnoses as of 08/05/24 1804   High-risk pregnancy in first trimester (HHS-HCC)   Acute left-sided low back pain without sciatica     Labs Reviewed   HUMAN CHORIONIC GONADOTROPIN, SERUM QUANTITATIVE - Abnormal       Result Value    HCG, Beta-Quantitative 57,273 (*)     Narrative:     Total HCG measurement is performed using the [employer] immunoassay which detects intact HCG and free beta HCG subunit.  This test is not indicated for use as a tumor marker.  HCG testing is performed using a different test methodology at [facility] than other [facility]. Direct result comparison  should only be made within the same method.          CBC WITH AUTO DIFFERENTIAL - Abnormal    WBC 8.5      nRBC 0.0      RBC 4.76      Hemoglobin 12.0      Hematocrit 35.2 (*)     MCV 74 (*)     MCH 25.2 (*)     MCHC 34.1      RDW 14.5      Platelets 245      Neutrophils % 50.9      Immature Granulocytes %, Automated 0.9      Lymphocytes % 37.7      Monocytes % 8.6      Eosinophils % 1.5      Basophils % 0.4      Neutrophils Absolute 4.33      Immature Granulocytes Absolute, Automated 0.08      Lymphocytes Absolute 3.21      Monocytes Absolute 0.73      Eosinophils Absolute 0.13      Basophils Absolute 0.03     COMPREHENSIVE METABOLIC PANEL - Abnormal    Glucose 87      Sodium 134 (*)     Potassium 5.2      Chloride 104      Bicarbonate 21      Anion Gap 14      Urea Nitrogen 9      Creatinine 0.63      eGFR >90      Calcium 9.1      Albumin 4.0      Alkaline Phosphatase 42      Total Protein 7.2       AST 31      Bilirubin, Total 0.2      ALT 14     URINALYSIS WITH REFLEX CULTURE AND MICROSCOPIC - Normal    Color, Urine Light-Yellow      Appearance, Urine Clear      Specific Gravity, Urine 1.010      pH, Urine 7.0      Protein, Urine NEGATIVE      Glucose, Urine Normal      Blood, Urine NEGATIVE      Ketones, Urine NEGATIVE      Bilirubin, Urine NEGATIVE      Urobilinogen, Urine Normal      Nitrite, Urine NEGATIVE      Leukocyte Esterase, Urine NEGATIVE     URINALYSIS WITH REFLEX CULTURE AND MICROSCOPIC    Narrative:     The following orders were created for panel order Urinalysis with Reflex Culture and Microscopic.  Procedure                               Abnormality         Status                     ---------                               -----------         ------                     Urinalysis with Reflex C...[[id number]]  Normal              Final result               Extra Urine Gray Tube[[id number]]                            In process                   Please view results for these tests on the individual orders.   EXTRA URINE GRAY TUBE     Point of Care Ultrasound    (Results Pending)                     Monik Coma Scale Score: 15                        Medical Decision Making  Patient is a 33-year-old female with a past medical history significant for lupus, asthma, hidradenitis suppurativa, and obesity presenting to the ED with back pain.  History was obtained from the patient.  Endorses waking up this morning with atraumatic, severe lower left-sided back pain.  Endorses some associated nausea/vomiting and urinary frequency.  Takes progesterone daily and routinely experiences off/on spotting.  Denies further vaginal bleeding or other vaginal concerns.  Denies other associated symptoms such as abdominal pain, fevers, or flulike symptoms.  On physical exam, patient is sitting comfortably and in no apparent distress.  Abdomen is soft with normal bowel sounds.  There is left-sided CVA tenderness.  No  right-sided CVA tenderness.  No rebound or guarding.  Remainder of exam as noted above.  Vital signs stable.    Differential diagnosis considered includes UTI versus pyelonephritis.  Low suspicion for intra-abdominal or pelvic etiology of symptoms such as appendicitis, abscess, or miscarriage.  Therefore, will defer pelvic exam or further imaging at this time.  Point-of-care ultrasound obtained which revealed a viable intrauterine pregnancy with a fetal heart rate of 160.  Given patient's high risk pregnancy as well as other comorbidities, CBC, CMP, urinalysis, and quantitative hCG ordered for further evaluation of her symptoms.  Patient was treated in the ED with fluids, Zofran, and Dilaudid.  Patient is followed by pain management and is prescribed Percocet.  She routinely receives Dilaudid when here in the ED.  Urinalysis completely unremarkable without evidence of acute abnormalities or infection.  CBC grossly unremarkable.  No leukocytosis.  CMP within normal limits.  hCG elevated at 57,273.  Patient seen tolerating oral intake in the form of peanut butter crackers and ginger ale.  She continued to endorse severe left-sided back pain, and was ordered another dose of Dilaudid.  A consult was placed to the GYN team for their recommendations and hopeful admission to their service for pain control.  Unfortunately, the patient left the ED with treatment incomplete.  I am hopeful she will return for any new or worsening symptoms.  Prior to leaving, she did tell me she has an appointment scheduled with the high risk pregnancy team this coming Thursday.  I am also hopeful she will present to that appointment for further management of her pregnancy and concerns .        Procedure  Procedures     Eliz Pierson PA-C  08/05/24 6844

## 2024-08-05 NOTE — ED TRIAGE NOTES
(1 miscarriage) 12 weeks pregnant coming in for left flank pain and nausea since this morning. Patient denies any other urinary symptoms

## 2024-08-06 ENCOUNTER — HOSPITAL ENCOUNTER (EMERGENCY)
Facility: HOSPITAL | Age: 34
Discharge: HOME | End: 2024-08-07
Attending: EMERGENCY MEDICINE
Payer: MEDICAID

## 2024-08-06 VITALS
TEMPERATURE: 98.5 F | BODY MASS INDEX: 33.42 KG/M2 | HEART RATE: 91 BPM | RESPIRATION RATE: 20 BRPM | SYSTOLIC BLOOD PRESSURE: 125 MMHG | OXYGEN SATURATION: 98 % | DIASTOLIC BLOOD PRESSURE: 78 MMHG | WEIGHT: 177 LBS | HEIGHT: 61 IN

## 2024-08-06 DIAGNOSIS — M54.89 LEFT PARASPINAL BACK PAIN: Primary | ICD-10-CM

## 2024-08-06 LAB
APPEARANCE UR: CLEAR
BACTERIA #/AREA URNS AUTO: ABNORMAL /HPF
BASOPHILS # BLD AUTO: 0.03 X10*3/UL (ref 0–0.1)
BASOPHILS NFR BLD AUTO: 0.3 %
BILIRUB UR STRIP.AUTO-MCNC: NEGATIVE MG/DL
COLOR UR: ABNORMAL
EOSINOPHIL # BLD AUTO: 0.08 X10*3/UL (ref 0–0.7)
EOSINOPHIL NFR BLD AUTO: 0.7 %
ERYTHROCYTE [DISTWIDTH] IN BLOOD BY AUTOMATED COUNT: 14 % (ref 11.5–14.5)
GLUCOSE UR STRIP.AUTO-MCNC: NORMAL MG/DL
HCT VFR BLD AUTO: 35.1 % (ref 36–46)
HGB BLD-MCNC: 12.2 G/DL (ref 12–16)
HOLD SPECIMEN: NORMAL
IMM GRANULOCYTES # BLD AUTO: 0.07 X10*3/UL (ref 0–0.7)
IMM GRANULOCYTES NFR BLD AUTO: 0.6 % (ref 0–0.9)
KETONES UR STRIP.AUTO-MCNC: NEGATIVE MG/DL
LEUKOCYTE ESTERASE UR QL STRIP.AUTO: NEGATIVE
LYMPHOCYTES # BLD AUTO: 3.34 X10*3/UL (ref 1.2–4.8)
LYMPHOCYTES NFR BLD AUTO: 29.7 %
MCH RBC QN AUTO: 25.1 PG (ref 26–34)
MCHC RBC AUTO-ENTMCNC: 34.8 G/DL (ref 32–36)
MCV RBC AUTO: 72 FL (ref 80–100)
MONOCYTES # BLD AUTO: 1 X10*3/UL (ref 0.1–1)
MONOCYTES NFR BLD AUTO: 8.9 %
MUCOUS THREADS #/AREA URNS AUTO: ABNORMAL /LPF
NEUTROPHILS # BLD AUTO: 6.74 X10*3/UL (ref 1.2–7.7)
NEUTROPHILS NFR BLD AUTO: 59.8 %
NITRITE UR QL STRIP.AUTO: NEGATIVE
NRBC BLD-RTO: 0 /100 WBCS (ref 0–0)
PH UR STRIP.AUTO: 6 [PH]
PLATELET # BLD AUTO: 332 X10*3/UL (ref 150–450)
PROT UR STRIP.AUTO-MCNC: NEGATIVE MG/DL
RBC # BLD AUTO: 4.86 X10*6/UL (ref 4–5.2)
RBC # UR STRIP.AUTO: ABNORMAL /UL
RBC #/AREA URNS AUTO: ABNORMAL /HPF
SP GR UR STRIP.AUTO: 1.02
SQUAMOUS #/AREA URNS AUTO: ABNORMAL /HPF
UROBILINOGEN UR STRIP.AUTO-MCNC: NORMAL MG/DL
WBC # BLD AUTO: 11.3 X10*3/UL (ref 4.4–11.3)
WBC #/AREA URNS AUTO: ABNORMAL /HPF

## 2024-08-06 PROCEDURE — 84702 CHORIONIC GONADOTROPIN TEST: CPT

## 2024-08-06 PROCEDURE — 83690 ASSAY OF LIPASE: CPT

## 2024-08-06 PROCEDURE — 81003 URINALYSIS AUTO W/O SCOPE: CPT

## 2024-08-06 PROCEDURE — 2500000001 HC RX 250 WO HCPCS SELF ADMINISTERED DRUGS (ALT 637 FOR MEDICARE OP): Mod: SE

## 2024-08-06 PROCEDURE — 99285 EMERGENCY DEPT VISIT HI MDM: CPT | Mod: 25

## 2024-08-06 PROCEDURE — 85025 COMPLETE CBC W/AUTO DIFF WBC: CPT

## 2024-08-06 PROCEDURE — 80053 COMPREHEN METABOLIC PANEL: CPT

## 2024-08-06 PROCEDURE — 99285 EMERGENCY DEPT VISIT HI MDM: CPT

## 2024-08-06 PROCEDURE — 2500000005 HC RX 250 GENERAL PHARMACY W/O HCPCS: Mod: SE

## 2024-08-06 PROCEDURE — 81001 URINALYSIS AUTO W/SCOPE: CPT

## 2024-08-06 PROCEDURE — 36415 COLL VENOUS BLD VENIPUNCTURE: CPT

## 2024-08-06 RX ORDER — ONDANSETRON HYDROCHLORIDE 2 MG/ML
4 INJECTION, SOLUTION INTRAVENOUS ONCE
Status: DISCONTINUED | OUTPATIENT
Start: 2024-08-06 | End: 2024-08-06

## 2024-08-06 RX ORDER — OXYCODONE AND ACETAMINOPHEN 5; 325 MG/1; MG/1
1 TABLET ORAL ONCE
Status: COMPLETED | OUTPATIENT
Start: 2024-08-06 | End: 2024-08-06

## 2024-08-06 RX ORDER — ONDANSETRON 4 MG/1
4 TABLET, ORALLY DISINTEGRATING ORAL ONCE
Status: COMPLETED | OUTPATIENT
Start: 2024-08-06 | End: 2024-08-06

## 2024-08-06 ASSESSMENT — PAIN DESCRIPTION - LOCATION: LOCATION: ABDOMEN

## 2024-08-06 ASSESSMENT — PAIN SCALES - GENERAL: PAINLEVEL_OUTOF10: 8

## 2024-08-06 ASSESSMENT — PAIN - FUNCTIONAL ASSESSMENT: PAIN_FUNCTIONAL_ASSESSMENT: 0-10

## 2024-08-06 ASSESSMENT — PAIN DESCRIPTION - PAIN TYPE: TYPE: ACUTE PAIN

## 2024-08-07 ENCOUNTER — APPOINTMENT (OUTPATIENT)
Dept: RADIOLOGY | Facility: HOSPITAL | Age: 34
End: 2024-08-07
Payer: MEDICAID

## 2024-08-07 DIAGNOSIS — L73.2 AXILLARY HIDRADENITIS SUPPURATIVA: Primary | ICD-10-CM

## 2024-08-07 PROBLEM — Z3A.12 12 WEEKS GESTATION OF PREGNANCY (HHS-HCC): Status: ACTIVE | Noted: 2024-06-16

## 2024-08-07 PROBLEM — D64.9 ANEMIA: Status: RESOLVED | Noted: 2024-06-16 | Resolved: 2024-08-07

## 2024-08-07 PROBLEM — E55.9 VITAMIN D DEFICIENCY: Status: RESOLVED | Noted: 2024-06-16 | Resolved: 2024-08-07

## 2024-08-07 LAB
ALBUMIN SERPL BCP-MCNC: 4.2 G/DL (ref 3.4–5)
ALP SERPL-CCNC: 45 U/L (ref 33–110)
ALT SERPL W P-5'-P-CCNC: 14 U/L (ref 7–45)
ANION GAP SERPL CALC-SCNC: 14 MMOL/L (ref 10–20)
AST SERPL W P-5'-P-CCNC: 59 U/L (ref 9–39)
B-HCG SERPL-ACNC: ABNORMAL MIU/ML
BILIRUB SERPL-MCNC: 0.1 MG/DL (ref 0–1.2)
BUN SERPL-MCNC: 12 MG/DL (ref 6–23)
C TRACH RRNA SPEC QL NAA+PROBE: NEGATIVE
CALCIUM SERPL-MCNC: 9.5 MG/DL (ref 8.6–10.6)
CHLORIDE SERPL-SCNC: 108 MMOL/L (ref 98–107)
CLUE CELLS SPEC QL WET PREP: NORMAL
CO2 SERPL-SCNC: 15 MMOL/L (ref 21–32)
CREAT SERPL-MCNC: 0.59 MG/DL (ref 0.5–1.05)
EGFRCR SERPLBLD CKD-EPI 2021: >90 ML/MIN/1.73M*2
GLUCOSE SERPL-MCNC: 80 MG/DL (ref 74–99)
HOLD SPECIMEN: NORMAL
LIPASE SERPL-CCNC: 38 U/L (ref 9–82)
N GONORRHOEA DNA SPEC QL PROBE+SIG AMP: NEGATIVE
POTASSIUM SERPL-SCNC: 3.7 MMOL/L (ref 3.5–5.3)
POTASSIUM SERPL-SCNC: 6.3 MMOL/L (ref 3.5–5.3)
PROT SERPL-MCNC: 8 G/DL (ref 6.4–8.2)
SODIUM SERPL-SCNC: 131 MMOL/L (ref 136–145)
T VAGINALIS RRNA SPEC QL NAA+PROBE: NEGATIVE
T VAGINALIS SPEC QL WET PREP: NORMAL
TRICHOMONAS REFLEX COMMENT: NORMAL
WBC VAG QL WET PREP: NORMAL
YEAST VAG QL WET PREP: NORMAL

## 2024-08-07 PROCEDURE — 76817 TRANSVAGINAL US OBSTETRIC: CPT | Performed by: RADIOLOGY

## 2024-08-07 PROCEDURE — 87661 TRICHOMONAS VAGINALIS AMPLIF: CPT

## 2024-08-07 PROCEDURE — 76815 OB US LIMITED FETUS(S): CPT | Performed by: RADIOLOGY

## 2024-08-07 PROCEDURE — 2500000005 HC RX 250 GENERAL PHARMACY W/O HCPCS: Mod: SE

## 2024-08-07 PROCEDURE — 36415 COLL VENOUS BLD VENIPUNCTURE: CPT

## 2024-08-07 PROCEDURE — 76770 US EXAM ABDO BACK WALL COMP: CPT | Performed by: RADIOLOGY

## 2024-08-07 PROCEDURE — 76770 US EXAM ABDO BACK WALL COMP: CPT

## 2024-08-07 PROCEDURE — 84132 ASSAY OF SERUM POTASSIUM: CPT

## 2024-08-07 PROCEDURE — 96374 THER/PROPH/DIAG INJ IV PUSH: CPT

## 2024-08-07 PROCEDURE — 96375 TX/PRO/DX INJ NEW DRUG ADDON: CPT

## 2024-08-07 PROCEDURE — 76801 OB US < 14 WKS SINGLE FETUS: CPT

## 2024-08-07 PROCEDURE — 2500000004 HC RX 250 GENERAL PHARMACY W/ HCPCS (ALT 636 FOR OP/ED): Mod: SE

## 2024-08-07 PROCEDURE — 87491 CHLMYD TRACH DNA AMP PROBE: CPT

## 2024-08-07 PROCEDURE — 87210 SMEAR WET MOUNT SALINE/INK: CPT

## 2024-08-07 RX ORDER — LIDOCAINE 560 MG/1
1 PATCH PERCUTANEOUS; TOPICAL; TRANSDERMAL ONCE
Status: DISCONTINUED | OUTPATIENT
Start: 2024-08-07 | End: 2024-08-07 | Stop reason: HOSPADM

## 2024-08-07 RX ORDER — OXYCODONE AND ACETAMINOPHEN 5; 325 MG/1; MG/1
1 TABLET ORAL EVERY 6 HOURS PRN
Qty: 5 TABLET | Refills: 0 | Status: SHIPPED | OUTPATIENT
Start: 2024-08-07 | End: 2024-08-07 | Stop reason: ENTERED-IN-ERROR

## 2024-08-07 RX ORDER — METOCLOPRAMIDE HYDROCHLORIDE 5 MG/ML
10 INJECTION INTRAMUSCULAR; INTRAVENOUS ONCE
Status: DISCONTINUED | OUTPATIENT
Start: 2024-08-07 | End: 2024-08-07

## 2024-08-07 RX ORDER — MORPHINE SULFATE 4 MG/ML
4 INJECTION INTRAVENOUS ONCE
Status: COMPLETED | OUTPATIENT
Start: 2024-08-07 | End: 2024-08-07

## 2024-08-07 RX ORDER — ONDANSETRON HYDROCHLORIDE 2 MG/ML
4 INJECTION, SOLUTION INTRAVENOUS ONCE
Status: COMPLETED | OUTPATIENT
Start: 2024-08-07 | End: 2024-08-07

## 2024-08-07 RX ORDER — LIDOCAINE 50 MG/G
1 PATCH TOPICAL DAILY
Qty: 5 PATCH | Refills: 0 | Status: SHIPPED | OUTPATIENT
Start: 2024-08-07 | End: 2024-08-12

## 2024-08-07 RX ORDER — CYCLOBENZAPRINE HCL 10 MG
10 TABLET ORAL 2 TIMES DAILY PRN
Qty: 10 TABLET | Refills: 0 | Status: SHIPPED | OUTPATIENT
Start: 2024-08-07

## 2024-08-07 RX ORDER — MORPHINE SULFATE 4 MG/ML
4 INJECTION INTRAVENOUS ONCE
Status: DISCONTINUED | OUTPATIENT
Start: 2024-08-07 | End: 2024-08-07

## 2024-08-07 RX ORDER — OXYCODONE AND ACETAMINOPHEN 10; 325 MG/1; MG/1
1 TABLET ORAL EVERY 4 HOURS PRN
Qty: 42 TABLET | Refills: 0 | Status: SHIPPED | OUTPATIENT
Start: 2024-08-07

## 2024-08-07 RX ORDER — DIPHENHYDRAMINE HCL 25 MG
25 TABLET ORAL NIGHTLY PRN
Qty: 5 TABLET | Refills: 0 | Status: SHIPPED | OUTPATIENT
Start: 2024-08-07 | End: 2024-08-12

## 2024-08-07 NOTE — PROGRESS NOTES
PT presented to the  as she ran out of her prescription of Percocet. Has an appointment tomorrow. Initially recommended that patient present to the ED for pain, however, [place]. She has an appointment tomorrow. I will prescribe a week of Percocet for her. Her provider was made aware.    Alonso Lockwood MD  Maternal-Fetal Medicine

## 2024-08-07 NOTE — ED TRIAGE NOTES
Pt is 12 weeks OB. Pt to ed with c/o left and right flank pain. States she sees high risk OB (d/t age and hydradentitis) Pt states she was seen three weeks ago and states that she was told she has a kidney infection. States she completed her  antibiotics and is still having pain. States she was seen yesterday and they wanted her to stay and see high risk but that she felt too disrespected to stay and continue treatement.

## 2024-08-07 NOTE — CONSULTS
OB/GYN Consult    Reason for Consult: Back pain in pregnancy    Assessment/Plan    32yo  @ 12w5d who presented to ED for back pain and groin pain. Underwent extensive workup in ED thus far including normal labs, renal US, obstetrics US.    Recommendations:  - Most likely cause of her back pain is muscular. Not truly located at CVA, and no signs of infection with no leukocytosis, fever, or nitrites on UA. Pain is directly over paraspinal muscles. Recommend giving small amount of flexeril for this pain at home. Pt also desires some benadryl to help with sleep, can also rx short course of benadryl.  - Groin pain due to HS. Pt reports this is her normal HS pain. Continue home meds for this: PO cipro, oxy, injections with derm. Has outpatient follow up already established.  - Continue routine outpatient obstetric care    D/w Dr. Brendan Villalobos MD PGY-3  GYN, pager [phone]      Subjective   32yo  @ 12w5d who presented to ED for back pain and groin pain. Pt reports ongoing pain issues due to HS, but her low back pain continued to get more and more painful yesterday which prompted her to come in. Denies constipation, diarrhea, N/V, dysuria. Had some light vaginal spotting recently but no current VB. Reports she also has pain in groin but this is due to HS. Taking oxycodone at home for pain. Also takes PO cipro.    ED course: Afebrile, vitals wnl. Pelvic exam with closed cervix visually, no bleeding seen. No leukocytosis or anemia on CBC. CMP initially only n/f K 6.3, but marked hemolysis detected, repeat 3.7. UA and microscopy with 1+ bacteria but no LE or nitrites. Lipase wnl. Wet prep neg. GC/CT, trich pending. Renal US without acute findings. Obstetric US wnl.    Obstetrical History   OB History    Para Term  AB Living   2 0 0 0 1 0   SAB IAB Ectopic Multiple Live Births   1 0 0 0 0      # Outcome Date GA Lbr Anoop/2nd Weight Sex Type Anes PTL Lv   2 Current            1 SAB                 Past Medical History  Past Medical History:   Diagnosis Date    Hidradenitis suppurativa 10/29/2020    Hidradenitis    Hidradenitis suppurativa     Lupus (Multi)         Past Surgical History   Past Surgical History:   Procedure Laterality Date    OTHER SURGICAL HISTORY  09/19/2022    Arm surgery    OTHER SURGICAL HISTORY Left 2011    Left wrist    OTHER SURGICAL HISTORY Left 2021    Left wrist procedure       Social History  Social History     Tobacco Use    Smoking status: Former     Types: Cigarettes    Smokeless tobacco: Never   Substance Use Topics    Alcohol use: Not Currently     Substance and Sexual Activity   Drug Use Never       Allergies  Suboxone [buprenorphine-naloxone], Clarithromycin, Haloperidol, Levofloxacin, Metoclopramide, and Reglan [metoclopramide hcl]     Medications  (Not in a hospital admission)      Objective    Last Vitals  Temp Pulse Resp BP MAP O2 Sat   36.9 °C (98.5 °F) 91 20 125/78 94 98 %     Physical Examination  Constitutional: No visible distress, alert and cooperative  Respiratory/Thorax: Normal respiratory effort on RA  Cardiovascular: Reg rate  Gastrointestinal: soft, nondistended, nontender  Back: No CVA tenderness bilaterally. Pain to palpation of paraspinal muscles on lower back.  Skin: Scars and active lesions c/w HS in R axilla and bilateral inguinal folds  Neurological: A&Ox3  Psychological: Appropriate mood and behavior      Lab Review  Lab Results   Component Value Date    WBC 11.3 08/06/2024    HGB 12.2 08/06/2024    HCT 35.1 (L) 08/06/2024     08/06/2024     Lab Results   Component Value Date    GLUCOSE 80 08/06/2024     (L) 08/06/2024    K 3.7 08/07/2024     (H) 08/06/2024    CO2 15 (L) 08/06/2024    ANIONGAP 14 08/06/2024    BUN 12 08/06/2024    CREATININE 0.59 08/06/2024    EGFR >90 08/06/2024    CALCIUM 9.5 08/06/2024    ALBUMIN 4.2 08/06/2024    PROT 8.0 08/06/2024    ALKPHOS 45 08/06/2024    ALT 14 08/06/2024    AST 59 (H) 08/06/2024     BILITOT 0.1 08/06/2024

## 2024-08-07 NOTE — ED PROVIDER NOTES
"HPI   Chief Complaint   Patient presents with    Abdominal Pain       Patient is a 33 year old  12w gest female with PMH for lupus, asthma, hidradenitis suppurativa, and obesity presenting today with complaints of left sided low back/flank pain. Patient was seen in same ED yesterday with similar complaint but stated she \"had to get up out of here because I felt disrespected\" and left without treatment complete. Complaints of low left sided back/flank pain with radiation into her groin.  Does endorse a history of pyelonephritis which she was treated for with antibiotics a few weeks prior.  She states she was feeling very sick today so she came back for treatment.  Endorses worsening nausea and back pain since leaving yesterday.  Flank pain reproduced with urination and bowel movements but denies burning with urination.  No current vaginal bleeding though does report that she has had intermittent spotting in the past.  Denies any vomiting events on this date but is presenting with nausea and chills earlier during the day. No fever present. Additional complaints of sensitivity with progesterone insertion into vaginal vault x10 days.     Of note, patient was admitted on  for hidradenitis suppurativa flare/abscess for which she was treated with IV antibiotics.    Patient takes Percocet 10 Mg 3 times a day.  Patient follows pain management and states the pain management doctor that she saw specializes in low back pain.  She sees high risk OB.  Was also admitted on  where the patient was experiencing low back pain for concern of a kidney infection.  They do not believe she ever had a true UTI.  Urine culture from the  showed no significant growth.  Patient received renal ultrasound on  and which was unremarkable without evidence of nephrolithiasis or hydronephrosis.              Patient History   Past Medical History:   Diagnosis Date    Hidradenitis suppurativa 10/29/2020    Hidradenitis    " Hidradenitis suppurativa     Lupus (Multi)      Past Surgical History:   Procedure Laterality Date    OTHER SURGICAL HISTORY  09/19/2022    Arm surgery    OTHER SURGICAL HISTORY Left 2011    Left wrist    OTHER SURGICAL HISTORY Left 2021    Left wrist procedure     No family history on file.  Social History     Tobacco Use    Smoking status: Former     Types: Cigarettes    Smokeless tobacco: Never   Vaping Use    Vaping status: Never Used   Substance Use Topics    Alcohol use: Not Currently    Drug use: Never       Physical Exam   ED Triage Vitals   Temperature Heart Rate Respirations BP   08/06/24 2019 08/06/24 2019 08/06/24 2019 08/06/24 2019   36.9 °C (98.5 °F) 91 20 125/78      Pulse Ox Temp Source Heart Rate Source Patient Position   08/06/24 2020 08/06/24 2019 08/06/24 2019 --   98 % Oral Monitor       BP Location FiO2 (%)     -- 08/06/24 2019      21 %       Physical Exam      ED Course & MDM   ED Course as of 08/07/24 0215   Wed Aug 07, 2024   0209 HCG, Beta-Quantitative(!): 59,219 [ML]   0209 LIPASE: 38 [ML]   0209 LEUKOCYTES (10*3/UL) IN BLOOD BY AUTOMATED COUNT, Portuguese: 11.3 [ML]   0209 Bacteria, Urine(!): 1+ [ML]   0210 US PELVIS OB TRANSABDOMINAL W TRANSVAGINAL UP TO 1ST TRIMESTER  Small amount of free fluid is seen near the left ovary, likely  physiologic in nature.      IMPRESSION:  Single live intrauterine gestation corresponding to 12 weeks, 6 days.  Follow up examination is recommended at 18-20 weeks gestation for  evaluation of fetal anatomy.   [ML]   0210 US renal complete  Unremarkable renal ultrasound. [ML]      ED Course User Index  [ML] Abiola Stack PA-C                 No data recorded     Monik Coma Scale Score: 15 (08/06/24 2022 : Krissy Hutchins, EVELYN)                           Medical Decision Making  Patient is a 33-year-old female presenting today for left flank pain.  On my exam, no external abdominal pain to palpation.  Mild tenderness on palpation of the left sided lumbar  "back/left flank.  No guarding noted with palpation of the flank.  No midline L-spine tenderness, no radiation down her leg though she reports that the pain is now radiating to her groin.  Per chart review, patient has had multiple visits in this ER for the similar complaint.  She reports that she was diagnosed with pyelonephritis on 7/16 though per chart review, they are not necessarily sure that she truly did have a UTI/[place] and urine culture had no significant growth.    She was seen here yesterday for similar complaints.  She received point-of-care ultrasounds of her baby in addition to renal ultrasound which were nonrevealing.  She left prior to OB coming down to see and evaluate her as she reports \"they were insulting me and I just had to leave\".  She states that since she left yesterday, the pain is worsened and now radiating to her groin.     exam was performed which did reveal left-sided adnexal pain but no masses.  No CMT.  She did have some mild vaginal discharge which she reports is \"her progesterone\".  Some mild vaginal bleeding noted but os is closed.    Patient was given a dose of oral Zofran and Percocet for symptomatic control.  She reports unrelieved symptoms.  Considering her continued pain and new onset of groin pain, will proceed with OB/GYN consult for further evaluation.    Patient did receive ultrasounds.  ED wet read showed live intrauterine gestation.  She does have a small amount of free fluid near her left ovary which is where she is experiencing the pain.  Wet read of the renal ultrasound is unremarkable without any signs of nephrolithiasis or hydronephrosis.  OB/GYN was consulted.      At the end of my shift, currently awaiting final reads of ultrasounds and OB/GYN consultation and recommendations.          Procedure  Procedures     Abiola Stack PA-C  08/07/24 0253    "

## 2024-08-07 NOTE — ASSESSMENT & PLAN NOTE
- Pt self titrated gabapentin to 600 TID, recommend increasing to 800 TID next week to be back at her baseline dose of 2400 daily  - discussed pain regimen moving forward, reviewed concern for usage of percocet q4hr PRN, generally recommend using q8hrs. Upon review of [facility], has been prescribed percocet q8hrs for the last 1 year, discussed that needing percocet more often than q8hrs increases concern for worsening flare or infection and we would recommend evaluation at the hospital in that situation. Reviewed that given she is in an active flare, but without any systemic signs of infection, would be tentatively ok with continuing current dose with strict precautions to monitor for signs of infection - fevers, chills, nausea, vomiting at home.   - derm f/u scheduled 8/22 - needs cimzia refill, has missed dose due to gap in care. We attempted to reach out to try and move up appointment without success  - discussed Cimzia vs Cogentix for HS symptom management. Discussed that both have limited data regarding use in pregnancy. We also discussed implications of poorly controlled chronic HS in pregnancy. Reviewed that given her reportedly improved symptom management, may be reasonable to consider trialing Cogentix. Again reviewed limited data for both agents, but that both have been demonstrated to not be associated with birth defects. Plan to reach out to derm to discuss length of time to trial Cimzia before considering alternate regimens.   - pain mgmt f/u scheduled 8/30   - plan to continue q1week visits with MFM to allow for weekly script refills.

## 2024-08-07 NOTE — PROGRESS NOTES
Emergency Medicine Transition of Care Note.    I received Sandrita Adams in signout from Abiola Hernandez.  Please see the previous ED provider note for all HPI, PE and MDM up to the time of signout at 0300. This is in addition to the primary record.    In brief Sandrita Adams is an 33 y.o. female presenting for   Chief Complaint   Patient presents with    Abdominal Pain     At the time of signout we were awaiting: OB/GYN evaluation final recommendations.    ED Course as of 08/07/24 0403   Wed Aug 07, 2024   0209 HCG, Beta-Quantitative(!): 59,219 [ML]   0209 LIPASE: 38 [ML]   0209 LEUKOCYTES (10*3/UL) IN BLOOD BY AUTOMATED COUNT, Chinese: 11.3 [ML]   0209 Bacteria, Urine(!): 1+ [ML]   0210 US PELVIS OB TRANSABDOMINAL W TRANSVAGINAL UP TO 1ST TRIMESTER  Small amount of free fluid is seen near the left ovary, likely  physiologic in nature.      IMPRESSION:  Single live intrauterine gestation corresponding to 12 weeks, 6 days.  Follow up examination is recommended at 18-20 weeks gestation for  evaluation of fetal anatomy.   [ML]   0210 US renal complete  Unremarkable renal ultrasound. [ML]   0354 Obgyn thinks its more paraspinal and recommended benadryl and flexeril approx 10 and has follow up has set up already. She's good to go home from their perspective. [KD]      ED Course User Index  [KD] Jeanne Gambino DO  [ML] Abiola Stack, MANDA         Diagnoses as of 08/07/24 0403   Left paraspinal back pain       Medical Decision Making  Patient is a 33-year-old G2, P0 approximately 12-week pregnant female coming in with concern for left flank pain/lower back pain.  At the time of signout we were pending recommendations by OB/GYN in addition to ultrasound reads.  Ultrasound showed no concern for hydronephrosis.  OB/GYN came and evaluated the patient at bedside and noted that the pain was more located in the region of the paraspinal muscle and not flank.  Recommended giving the patient Flexeril, Benadryl in addition  "to lidocaine patches for symptomatic relief.  Patient was driving herself home so Benadryl and Flexeril was not given in the department however was given a prescription for this for patient to take at home when she is able to pick it up tomorrow.  Patient was provided with return precautions in addition to follow-up instructions that she is agreeable with.    Final diagnoses:   [M54.89] Left paraspinal back pain       Jeanne Gambino DO     Discharge    Jeanne \"Pedrito\" DO Immanuel  [facility]  Emergency Medicine PGY-2  "

## 2024-08-07 NOTE — ASSESSMENT & PLAN NOTE
- 1st tri anatomy/NT today wnl  - cont ldASA   - pt shared she is thinking about primary C/S for delivery. Plan to review risks vs benefits at future visit.

## 2024-08-08 ENCOUNTER — ROUTINE PRENATAL (OUTPATIENT)
Dept: MATERNAL FETAL MEDICINE | Facility: CLINIC | Age: 34
End: 2024-08-08
Payer: MEDICAID

## 2024-08-08 ENCOUNTER — HOSPITAL ENCOUNTER (OUTPATIENT)
Dept: RADIOLOGY | Facility: CLINIC | Age: 34
Discharge: HOME | End: 2024-08-08
Payer: MEDICAID

## 2024-08-08 VITALS
DIASTOLIC BLOOD PRESSURE: 58 MMHG | HEART RATE: 103 BPM | BODY MASS INDEX: 34.07 KG/M2 | SYSTOLIC BLOOD PRESSURE: 111 MMHG | WEIGHT: 180.3 LBS

## 2024-08-08 DIAGNOSIS — Z3A.10 10 WEEKS GESTATION OF PREGNANCY (HHS-HCC): ICD-10-CM

## 2024-08-08 DIAGNOSIS — Z65.4 VICTIM OF INTIMATE PARTNER ABUSE DURING PREGNANCY: ICD-10-CM

## 2024-08-08 DIAGNOSIS — O09.521 AMA (ADVANCED MATERNAL AGE) MULTIGRAVIDA 35+, FIRST TRIMESTER (HHS-HCC): ICD-10-CM

## 2024-08-08 DIAGNOSIS — L73.2 HIDRADENITIS SUPPURATIVA: ICD-10-CM

## 2024-08-08 DIAGNOSIS — J45.20 MILD INTERMITTENT ASTHMA, UNSPECIFIED WHETHER COMPLICATED (HHS-HCC): ICD-10-CM

## 2024-08-08 DIAGNOSIS — Z3A.12 12 WEEKS GESTATION OF PREGNANCY (HHS-HCC): Primary | ICD-10-CM

## 2024-08-08 DIAGNOSIS — O09.521 MULTIGRAVIDA OF ADVANCED MATERNAL AGE IN FIRST TRIMESTER (HHS-HCC): ICD-10-CM

## 2024-08-08 LAB — SCAN RESULT: NORMAL

## 2024-08-08 PROCEDURE — 99417 PROLNG OP E/M EACH 15 MIN: CPT | Mod: GC | Performed by: OBSTETRICS & GYNECOLOGY

## 2024-08-08 PROCEDURE — 76801 OB US < 14 WKS SINGLE FETUS: CPT

## 2024-08-08 PROCEDURE — 76801 OB US < 14 WKS SINGLE FETUS: CPT | Performed by: OBSTETRICS & GYNECOLOGY

## 2024-08-08 PROCEDURE — 76813 OB US NUCHAL MEAS 1 GEST: CPT

## 2024-08-08 PROCEDURE — 76813 OB US NUCHAL MEAS 1 GEST: CPT | Performed by: OBSTETRICS & GYNECOLOGY

## 2024-08-08 ASSESSMENT — PATIENT HEALTH QUESTIONNAIRE - PHQ9
2. FEELING DOWN, DEPRESSED OR HOPELESS: NOT AT ALL
SUM OF ALL RESPONSES TO PHQ9 QUESTIONS 1 AND 2: 0
1. LITTLE INTEREST OR PLEASURE IN DOING THINGS: NOT AT ALL

## 2024-08-08 ASSESSMENT — ENCOUNTER SYMPTOMS
CONSTITUTIONAL NEGATIVE: 0
ENDOCRINE NEGATIVE: 0
EYES NEGATIVE: 0
GASTROINTESTINAL NEGATIVE: 0
MUSCULOSKELETAL NEGATIVE: 0
NEUROLOGICAL NEGATIVE: 0
ALLERGIC/IMMUNOLOGIC NEGATIVE: 0
PSYCHIATRIC NEGATIVE: 0
RESPIRATORY NEGATIVE: 0
HEMATOLOGIC/LYMPHATIC NEGATIVE: 0
CARDIOVASCULAR NEGATIVE: 0

## 2024-08-08 ASSESSMENT — PAIN SCALES - GENERAL: PAINLEVEL_OUTOF10: 8

## 2024-08-08 ASSESSMENT — PAIN - FUNCTIONAL ASSESSMENT: PAIN_FUNCTIONAL_ASSESSMENT: 0-10
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
## 2024-08-11 NOTE — ED PROCEDURE NOTE
Procedure    Performed by: Haley Pacheco MD  Authorized by: Haley Pacheco MD        Genitourinary Indications: positive pregnancy test and flank pain          Procedure: Pelvic Ultrasound    Exam: transabdominal exam  Findings:  IUP: The pelvis was visualized and there was an INTRAUTERINE PREGNANCY visualized (a yolk sac, fetal pole or fetus was seen).  Fetal Heart Rate: 165 bpm  Pelvic Free Fluid: The pelvis was visualized and was NEGATIVE for free fluid.    Impression:  Pelvis: The focused pelvic ultrasound showed an INTRAUTERINE PREGNANCY.  Procedure: Renal Ultrasound    Findings:  Right Kidney: The RIGHT kidney was visualized and was NEGATIVE for hydronephrosis.  Left Kidney: The LEFT kidney was visualized and was NEGATIVE for hydronephrosis.  Bladder: The bladder was visualized and was positive for DISTENTION.    Impression:  Renal: normal exam                   Haley Pacheco MD  08/11/24 4093

## 2024-08-12 ENCOUNTER — TELEPHONE (OUTPATIENT)
Dept: PEDIATRICS | Facility: CLINIC | Age: 34
End: 2024-08-12
Payer: MEDICAID

## 2024-08-12 NOTE — TELEPHONE ENCOUNTER
SW received referral from women's health to discuss resources. SW spoke with pt Sandrita Adams, at [phone] introduced self, and explained reason for phone call. SW further assessed needs. Pt states she is working with [facility] and they are assisting with necessary resources. Pt states she is interested in an appt for this Thursday. SW gave scheduling number and encouraged pt to contact provider via [employer] but she said she has not gotten a response with either of those methods and did not schedule an appt after her last visit. SW will update provider. No further SW needs at this time. SW contact info provided if needs arise.    Mojgan Gallegos, MSW, LSW

## 2024-08-13 ENCOUNTER — SPECIALTY PHARMACY (OUTPATIENT)
Dept: PHARMACY | Facility: CLINIC | Age: 34
End: 2024-08-13

## 2024-08-14 PROBLEM — Z3A.13 13 WEEKS GESTATION OF PREGNANCY (HHS-HCC): Status: ACTIVE | Noted: 2024-06-16

## 2024-08-15 ENCOUNTER — ROUTINE PRENATAL (OUTPATIENT)
Dept: MATERNAL FETAL MEDICINE | Facility: CLINIC | Age: 34
End: 2024-08-15
Payer: MEDICAID

## 2024-08-15 VITALS — BODY MASS INDEX: 34.73 KG/M2 | SYSTOLIC BLOOD PRESSURE: 131 MMHG | WEIGHT: 183.8 LBS | DIASTOLIC BLOOD PRESSURE: 82 MMHG

## 2024-08-15 DIAGNOSIS — Z3A.13 13 WEEKS GESTATION OF PREGNANCY (HHS-HCC): ICD-10-CM

## 2024-08-15 DIAGNOSIS — Z65.4 VICTIM OF INTIMATE PARTNER ABUSE DURING PREGNANCY: ICD-10-CM

## 2024-08-15 DIAGNOSIS — L73.2 AXILLARY HIDRADENITIS SUPPURATIVA: ICD-10-CM

## 2024-08-15 DIAGNOSIS — L73.2 HIDRADENITIS SUPPURATIVA: Primary | ICD-10-CM

## 2024-08-15 PROCEDURE — 99215 OFFICE O/P EST HI 40 MIN: CPT | Performed by: OBSTETRICS & GYNECOLOGY

## 2024-08-15 PROCEDURE — 99215 OFFICE O/P EST HI 40 MIN: CPT | Mod: GC | Performed by: OBSTETRICS & GYNECOLOGY

## 2024-08-15 RX ORDER — GABAPENTIN 400 MG/1
800 CAPSULE ORAL EVERY 8 HOURS SCHEDULED
Qty: 60 CAPSULE | Refills: 3 | Status: SHIPPED | OUTPATIENT
Start: 2024-08-15 | End: 2024-11-03

## 2024-08-15 RX ORDER — OXYCODONE AND ACETAMINOPHEN 10; 325 MG/1; MG/1
1 TABLET ORAL EVERY 4 HOURS PRN
Qty: 42 TABLET | Refills: 0 | Status: SHIPPED | OUTPATIENT
Start: 2024-08-15 | End: 2024-08-22

## 2024-08-15 ASSESSMENT — ENCOUNTER SYMPTOMS
MUSCULOSKELETAL NEGATIVE: 0
RESPIRATORY NEGATIVE: 0
ALLERGIC/IMMUNOLOGIC NEGATIVE: 0
NEUROLOGICAL NEGATIVE: 0
GASTROINTESTINAL NEGATIVE: 0
HEMATOLOGIC/LYMPHATIC NEGATIVE: 0
CONSTITUTIONAL NEGATIVE: 0
CARDIOVASCULAR NEGATIVE: 0
ENDOCRINE NEGATIVE: 0
PSYCHIATRIC NEGATIVE: 0
EYES NEGATIVE: 0

## 2024-08-15 ASSESSMENT — PATIENT HEALTH QUESTIONNAIRE - PHQ9
1. LITTLE INTEREST OR PLEASURE IN DOING THINGS: NOT AT ALL
2. FEELING DOWN, DEPRESSED OR HOPELESS: NOT AT ALL
SUM OF ALL RESPONSES TO PHQ9 QUESTIONS 1 AND 2: 0
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
## 2024-08-15 NOTE — ASSESSMENT & PLAN NOTE
Reviewed and need an A1c - to be ordered next. Patient was prescribed vaginal progesterone previously and has continued. We discussed again the minimal utility of it. Patient wants to continue with what she has.
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
## 2024-08-15 NOTE — ASSESSMENT & PLAN NOTE
- Pain remains poorly controlled, overdue for Cimzia dose but unable to get into Dermatology visit earlier than 8/22. Unable to be rescheduled by office staff. Patient reviewed documents provided by us at the last visit and would prefer to remain on Cimzia  - Continue percocet  q4h and gabapentin 800 TID in s/o lapse in biologic dosing.   - Refilled percocet x 1 week today , gabapentin refilled.

## 2024-08-16 ENCOUNTER — PHARMACY VISIT (OUTPATIENT)
Dept: PHARMACY | Facility: CLINIC | Age: 34
End: 2024-08-16
Payer: MEDICAID

## 2024-08-16 ENCOUNTER — SPECIALTY PHARMACY (OUTPATIENT)
Dept: PHARMACY | Facility: CLINIC | Age: 34
End: 2024-08-16

## 2024-08-16 PROCEDURE — RXMED WILLOW AMBULATORY MEDICATION CHARGE

## 2024-08-19 ENCOUNTER — APPOINTMENT (OUTPATIENT)
Dept: RADIOLOGY | Facility: HOSPITAL | Age: 34
End: 2024-08-19
Payer: MEDICAID

## 2024-08-19 ENCOUNTER — HOSPITAL ENCOUNTER (EMERGENCY)
Facility: HOSPITAL | Age: 34
Discharge: HOME | End: 2024-08-19
Attending: EMERGENCY MEDICINE
Payer: MEDICAID

## 2024-08-19 VITALS
HEIGHT: 61 IN | SYSTOLIC BLOOD PRESSURE: 125 MMHG | TEMPERATURE: 97.2 F | WEIGHT: 180 LBS | HEART RATE: 92 BPM | RESPIRATION RATE: 15 BRPM | DIASTOLIC BLOOD PRESSURE: 84 MMHG | BODY MASS INDEX: 33.99 KG/M2 | OXYGEN SATURATION: 100 %

## 2024-08-19 DIAGNOSIS — L73.2 HIDRADENITIS SUPPURATIVA: Primary | ICD-10-CM

## 2024-08-19 LAB
ABO GROUP (TYPE) IN BLOOD: NORMAL
ALBUMIN SERPL BCP-MCNC: 3.8 G/DL (ref 3.4–5)
ALP SERPL-CCNC: 47 U/L (ref 33–110)
ALT SERPL W P-5'-P-CCNC: 10 U/L (ref 7–45)
ANION GAP SERPL CALC-SCNC: 15 MMOL/L (ref 10–20)
ANTIBODY SCREEN: NORMAL
APPEARANCE UR: ABNORMAL
APTT PPP: 27 SECONDS (ref 27–38)
AST SERPL W P-5'-P-CCNC: 14 U/L (ref 9–39)
BASOPHILS # BLD AUTO: 0.03 X10*3/UL (ref 0–0.1)
BASOPHILS NFR BLD AUTO: 0.3 %
BILIRUB SERPL-MCNC: 0.2 MG/DL (ref 0–1.2)
BILIRUB UR STRIP.AUTO-MCNC: NEGATIVE MG/DL
BUN SERPL-MCNC: 12 MG/DL (ref 6–23)
CALCIUM SERPL-MCNC: 9 MG/DL (ref 8.6–10.6)
CHLORIDE SERPL-SCNC: 106 MMOL/L (ref 98–107)
CO2 SERPL-SCNC: 20 MMOL/L (ref 21–32)
COLOR UR: COLORLESS
CREAT SERPL-MCNC: 0.78 MG/DL (ref 0.5–1.05)
EGFRCR SERPLBLD CKD-EPI 2021: >90 ML/MIN/1.73M*2
EOSINOPHIL # BLD AUTO: 0.2 X10*3/UL (ref 0–0.7)
EOSINOPHIL NFR BLD AUTO: 1.8 %
ERYTHROCYTE [DISTWIDTH] IN BLOOD BY AUTOMATED COUNT: 14.1 % (ref 11.5–14.5)
GLUCOSE SERPL-MCNC: 79 MG/DL (ref 74–99)
GLUCOSE UR STRIP.AUTO-MCNC: NORMAL MG/DL
HCT VFR BLD AUTO: 34.3 % (ref 36–46)
HGB BLD-MCNC: 11.7 G/DL (ref 12–16)
HOLD SPECIMEN: NORMAL
IMM GRANULOCYTES # BLD AUTO: 0.05 X10*3/UL (ref 0–0.7)
IMM GRANULOCYTES NFR BLD AUTO: 0.4 % (ref 0–0.9)
INR PPP: 1 (ref 0.9–1.1)
KETONES UR STRIP.AUTO-MCNC: NEGATIVE MG/DL
LEUKOCYTE ESTERASE UR QL STRIP.AUTO: NEGATIVE
LYMPHOCYTES # BLD AUTO: 3.83 X10*3/UL (ref 1.2–4.8)
LYMPHOCYTES NFR BLD AUTO: 33.6 %
MCH RBC QN AUTO: 25.7 PG (ref 26–34)
MCHC RBC AUTO-ENTMCNC: 34.1 G/DL (ref 32–36)
MCV RBC AUTO: 75 FL (ref 80–100)
MONOCYTES # BLD AUTO: 0.83 X10*3/UL (ref 0.1–1)
MONOCYTES NFR BLD AUTO: 7.3 %
NEUTROPHILS # BLD AUTO: 6.46 X10*3/UL (ref 1.2–7.7)
NEUTROPHILS NFR BLD AUTO: 56.6 %
NITRITE UR QL STRIP.AUTO: NEGATIVE
NRBC BLD-RTO: 0 /100 WBCS (ref 0–0)
PH UR STRIP.AUTO: 5.5 [PH]
PLATELET # BLD AUTO: 263 X10*3/UL (ref 150–450)
POTASSIUM SERPL-SCNC: 3.6 MMOL/L (ref 3.5–5.3)
PREGNANCY TEST URINE, POC: POSITIVE
PROT SERPL-MCNC: 7 G/DL (ref 6.4–8.2)
PROT UR STRIP.AUTO-MCNC: NEGATIVE MG/DL
PROTHROMBIN TIME: 10.9 SECONDS (ref 9.8–12.8)
RBC # BLD AUTO: 4.55 X10*6/UL (ref 4–5.2)
RBC # UR STRIP.AUTO: NEGATIVE /UL
RH FACTOR (ANTIGEN D): NORMAL
SODIUM SERPL-SCNC: 137 MMOL/L (ref 136–145)
SP GR UR STRIP.AUTO: 1
UROBILINOGEN UR STRIP.AUTO-MCNC: NORMAL MG/DL
WBC # BLD AUTO: 11.4 X10*3/UL (ref 4.4–11.3)

## 2024-08-19 PROCEDURE — 76815 OB US LIMITED FETUS(S): CPT | Performed by: EMERGENCY MEDICINE

## 2024-08-19 PROCEDURE — 93971 EXTREMITY STUDY: CPT | Performed by: RADIOLOGY

## 2024-08-19 PROCEDURE — 86901 BLOOD TYPING SEROLOGIC RH(D): CPT | Performed by: EMERGENCY MEDICINE

## 2024-08-19 PROCEDURE — 93971 EXTREMITY STUDY: CPT

## 2024-08-19 PROCEDURE — 96374 THER/PROPH/DIAG INJ IV PUSH: CPT

## 2024-08-19 PROCEDURE — 80053 COMPREHEN METABOLIC PANEL: CPT | Performed by: NURSE PRACTITIONER

## 2024-08-19 PROCEDURE — 85025 COMPLETE CBC W/AUTO DIFF WBC: CPT | Performed by: NURSE PRACTITIONER

## 2024-08-19 PROCEDURE — 2500000004 HC RX 250 GENERAL PHARMACY W/ HCPCS (ALT 636 FOR OP/ED): Mod: SE | Performed by: NURSE PRACTITIONER

## 2024-08-19 PROCEDURE — 36415 COLL VENOUS BLD VENIPUNCTURE: CPT | Performed by: EMERGENCY MEDICINE

## 2024-08-19 PROCEDURE — 85610 PROTHROMBIN TIME: CPT | Performed by: EMERGENCY MEDICINE

## 2024-08-19 PROCEDURE — 96375 TX/PRO/DX INJ NEW DRUG ADDON: CPT

## 2024-08-19 PROCEDURE — 99285 EMERGENCY DEPT VISIT HI MDM: CPT | Mod: 25

## 2024-08-19 PROCEDURE — 81003 URINALYSIS AUTO W/O SCOPE: CPT | Performed by: NURSE PRACTITIONER

## 2024-08-19 PROCEDURE — 81025 URINE PREGNANCY TEST: CPT | Performed by: NURSE PRACTITIONER

## 2024-08-19 PROCEDURE — 36415 COLL VENOUS BLD VENIPUNCTURE: CPT | Performed by: NURSE PRACTITIONER

## 2024-08-19 RX ORDER — SODIUM CHLORIDE, SODIUM LACTATE, POTASSIUM CHLORIDE, CALCIUM CHLORIDE 600; 310; 30; 20 MG/100ML; MG/100ML; MG/100ML; MG/100ML
125 INJECTION, SOLUTION INTRAVENOUS CONTINUOUS
Status: DISCONTINUED | OUTPATIENT
Start: 2024-08-19 | End: 2024-08-19 | Stop reason: HOSPADM

## 2024-08-19 RX ORDER — CLINDAMYCIN HYDROCHLORIDE 150 MG/1
300 CAPSULE ORAL 2 TIMES DAILY
Qty: 56 CAPSULE | Refills: 0 | Status: SHIPPED | OUTPATIENT
Start: 2024-08-19 | End: 2024-09-02

## 2024-08-19 RX ORDER — MORPHINE SULFATE 4 MG/ML
4 INJECTION INTRAVENOUS ONCE
Status: COMPLETED | OUTPATIENT
Start: 2024-08-19 | End: 2024-08-19

## 2024-08-19 RX ORDER — HYDROMORPHONE HYDROCHLORIDE 1 MG/ML
0.5 INJECTION, SOLUTION INTRAMUSCULAR; INTRAVENOUS; SUBCUTANEOUS ONCE
Status: COMPLETED | OUTPATIENT
Start: 2024-08-19 | End: 2024-08-19

## 2024-08-19 RX ORDER — CHLORHEXIDINE GLUCONATE 40 MG/ML
SOLUTION TOPICAL 2 TIMES DAILY
Qty: 473 ML | Refills: 0 | Status: SHIPPED | OUTPATIENT
Start: 2024-08-19 | End: 2024-08-19

## 2024-08-19 RX ORDER — HYDROMORPHONE HYDROCHLORIDE 1 MG/ML
1 INJECTION, SOLUTION INTRAMUSCULAR; INTRAVENOUS; SUBCUTANEOUS ONCE
Status: COMPLETED | OUTPATIENT
Start: 2024-08-19 | End: 2024-08-19

## 2024-08-19 RX ORDER — CHLORHEXIDINE GLUCONATE 40 MG/ML
SOLUTION TOPICAL 2 TIMES DAILY
Qty: 473 ML | Refills: 0 | Status: SHIPPED | OUTPATIENT
Start: 2024-08-19

## 2024-08-19 ASSESSMENT — PAIN DESCRIPTION - ORIENTATION: ORIENTATION: LEFT

## 2024-08-19 ASSESSMENT — PAIN SCALES - GENERAL
PAINLEVEL_OUTOF10: 8
PAINLEVEL_OUTOF10: 8

## 2024-08-19 ASSESSMENT — PAIN - FUNCTIONAL ASSESSMENT: PAIN_FUNCTIONAL_ASSESSMENT: 0-10

## 2024-08-19 ASSESSMENT — PAIN DESCRIPTION - LOCATION: LOCATION: OTHER (COMMENT)

## 2024-08-19 ASSESSMENT — PAIN DESCRIPTION - PAIN TYPE: TYPE: ACUTE PAIN

## 2024-08-19 NOTE — ED TRIAGE NOTES
PT presents to ED via EMS to triage for chief complaint of abscess. PT states she has a history of Hidradenitis suppurativa and has current abscess in her left armpit and groin. PT states they have not be draining them because she is 14 weeks pregnant. PT states she has gotten IV antibiotics for treatment. PT is Aox4.

## 2024-08-19 NOTE — Clinical Note
Sandrita Adams was seen and treated in our emergency department on 8/19/2024.  She may return to work on 08/20/2024.  Attention Pattie  Patient was seen and evaluated in the emergency department at  on 8-19-24.     If you have any questions or concerns, please don't hesitate to call.      Haley Pacheco MD

## 2024-08-19 NOTE — ED PROCEDURE NOTE
Procedure    Performed by: Haley Pacheco MD  Authorized by: Haley Pacheco MD        Genitourinary Indications: positive pregnancy test          Procedure: Pelvic Ultrasound    Exam: transabdominal exam  Findings:  IUP: The pelvis was visualized and there was an INTRAUTERINE PREGNANCY visualized (a yolk sac, fetal pole or fetus was seen).  Fetal Heart Rate: 161 bpm  Pelvic Free Fluid: The pelvis was visualized and was NEGATIVE for free fluid.    Impression:  Pelvis: The focused pelvic ultrasound showed an INTRAUTERINE PREGNANCY.                   Haley Pacheco MD  08/19/24 2029

## 2024-08-19 NOTE — ED PROVIDER NOTES
Emergency Department Encounter  [facility] EMERGENCY MEDICINE    Patient: Sandrita Adams  MRN: [id number]  : 1990  Date of Evaluation: 2024  ED Provider: DEBBY Gaming      Chief Complaint       Chief Complaint   Patient presents with    Abscess        Limitations to History: none  Historian: patient  Records reviewed: EMR inpatient and outpatient notes, Care Everywhere    This is a 34-year-old female who is approximately 14 weeks pregnant who presents to the emergency room for a hidradenitis suppurativa flareup.  Patient states that she was previously on Cimvia but the injections were recently discontinued.  Denies any drainage or chills.  Patient states that she had a subjective fever yesterday.  Patient states the pain is a sharp, constant pain rating it a 8 out of 10.  Patient has been taking her prescribed oxycodone without any relief of symptoms.  Patient states that she is supposed to see dermatology on 2024.  Patient states that she is having decreased sensation to her left arm and feels as if the abscess is putting pressure on the nerve.  Patient states that she was recently admitted in July for similar symptoms and was started on IV antibiotics. Patient states she is having a flare up in her groin and axilla region.    PMH: Hidradenitis suppurativa  PSH: Left wrist surgery  Allergies: Suboxone, clarithromycin, haldol, Reglan  Social HX: + smoker, denies alcohol or drug use.  Family HX: No family history pertinent to current presenting problem  Medications: Reviewed per EMR    ROS:     Review of Systems   Skin:         + skin changes     14 systems reviewed and otherwise acutely negative except as in the [place].        Past History     Past Medical History:   Diagnosis Date    Anemia 2024    Hidradenitis suppurativa 10/29/2020    Hidradenitis    Hidradenitis suppurativa     Lupus ([facility])     Vitamin D deficiency 2024     Past Surgical History:    Procedure Laterality Date    OTHER SURGICAL HISTORY  09/19/2022    Arm surgery    OTHER SURGICAL HISTORY Left 2011    Left wrist    OTHER SURGICAL HISTORY Left 2021    Left wrist procedure         Medications/Allergies     Previous Medications    ALBUTEROL 90 MCG/ACTUATION INHALER        ASPIRIN 81 MG EC TABLET    Take 2 tablets (162 mg) by mouth once daily.    CLINDAMYCIN (CLEOCIN) 150 MG CAPSULE    Take 2 capsules (300 mg) by mouth 2 times a day.    CYCLOBENZAPRINE (FLEXERIL) 10 MG TABLET    Take 1 tablet (10 mg) by mouth 2 times a day as needed for muscle spasms for up to 10 doses.    DIPHENHYDRAMINE (SOMINEX) 25 MG TABLET    Take 1 tablet (25 mg) by mouth as needed at bedtime for sleep for up to 5 days.    FAMOTIDINE (PEPCID) 20 MG TABLET    Take 1 tablet (20 mg) by mouth 2 times a day.    GABAPENTIN (NEURONTIN) 400 MG CAPSULE    Take 2 capsules (800 mg) by mouth every 8 hours.    LIDOCAINE (LIDODERM) 5 % PATCH    Place 1 patch over 12 hours on the skin once daily for 5 doses. Remove & discard patch within 12 hours or as directed by MD.    MULTIVITAMIN WITH MINERALS TABLET    Take 1 tablet by mouth once daily.    NALOXONE (NARCAN) 4 MG/0.1 ML NASAL SPRAY    Administer 1 spray (4 mg) into affected nostril(s) if needed for opioid reversal or respiratory depression. May repeat every 2-3 minutes if needed, alternating nostrils, until medical assistance becomes available.    ONDANSETRON ODT (ZOFRAN-ODT) 4 MG DISINTEGRATING TABLET    Take 1 tablet (4 mg) by mouth every 8 hours if needed for nausea or vomiting.    OXYCODONE-ACETAMINOPHEN (PERCOCET)  MG TABLET    Take 1 tablet by mouth every 4 hours if needed for moderate pain (4 - 6) or severe pain (7 - 10) for up to 7 days.    PRENATAL /IRON/FOLIC ACID (PRENATAL 19 ORAL)    Take by mouth.    PROGESTERONE VAGINAL SUPPOSITORY 200 MG    Insert 2 suppositories (400 mg) into the vagina.     Allergies   Allergen Reactions    Suboxone [Buprenorphine-Naloxone]  Anaphylaxis, Hives and Itching    Clarithromycin Hives    Haloperidol Hallucinations and Psychosis    Levofloxacin Swelling     Ankle Joint/tendon pain    Metoclopramide Headache, Hallucinations and Psychosis    Reglan [Metoclopramide Hcl] Psychosis        Physical Exam       ED Triage Vitals     Temperature Heart Rate Respirations BP   36.2 °C (97.2 °F) 95 18 133/85      Pulse Ox Temp Source Heart Rate Source Patient Position   98 % Temporal Monitor Sitting      BP Location FiO2 (%)     -- --       Physical Exam:    Appearance: Alert, oriented , cooperative,  in no acute distress.     Skin: Chronic skin changes to the left axilla and left groin. No surrounding erythema. No drainage.    ENT: Hearing grossly intact. External auditory canals patent, tympanic membranes intact with visible landmarks.     Neck: Supple, without meningismus.    Pulmonary: Clear bilaterally with good chest wall excursion. No rales, rhonchi or wheezing. No accessory muscle use or stridor.    Cardiac: Normal S1, S2 without murmur, rub, gallop or extrasystole. No JVD, Carotids without bruits.    Abdomen: Soft, nontender, active bowel sounds.  No palpable organomegaly.  No rebound or guarding.     Musculoskeletal: Full range of motion. no pain, edema, or deformity. Pulses full and equal. No cyanosis, clubbing, or edema.    Neurological:  Normal sensation, no weakness, no focal findings identified.    Psychiatric: Appropriate mood and affect.       Diagnostics   Labs:  Results for orders placed or performed during the hospital encounter of 08/19/24 (from the past 24 hour(s))   CBC and Auto Differential   Result Value Ref Range    WBC 11.4 (H) 4.4 - 11.3 x10*3/uL    nRBC 0.0 0.0 - 0.0 /100 WBCs    RBC 4.55 4.00 - 5.20 x10*6/uL    Hemoglobin 11.7 (L) 12.0 - 16.0 g/dL    Hematocrit 34.3 (L) 36.0 - 46.0 %    MCV 75 (L) 80 - 100 fL    MCH 25.7 (L) 26.0 - 34.0 pg    MCHC 34.1 32.0 - 36.0 g/dL    RDW 14.1 11.5 - 14.5 %    Platelets 263 150 - 450  x10*3/uL    Neutrophils % 56.6 40.0 - 80.0 %    Immature Granulocytes %, Automated 0.4 0.0 - 0.9 %    Lymphocytes % 33.6 13.0 - 44.0 %    Monocytes % 7.3 2.0 - 10.0 %    Eosinophils % 1.8 0.0 - 6.0 %    Basophils % 0.3 0.0 - 2.0 %    Neutrophils Absolute 6.46 1.20 - 7.70 x10*3/uL    Immature Granulocytes Absolute, Automated 0.05 0.00 - 0.70 x10*3/uL    Lymphocytes Absolute 3.83 1.20 - 4.80 x10*3/uL    Monocytes Absolute 0.83 0.10 - 1.00 x10*3/uL    Eosinophils Absolute 0.20 0.00 - 0.70 x10*3/uL    Basophils Absolute 0.03 0.00 - 0.10 x10*3/uL   Comprehensive metabolic panel   Result Value Ref Range    Glucose 79 74 - 99 mg/dL    Sodium 137 136 - 145 mmol/L    Potassium 3.6 3.5 - 5.3 mmol/L    Chloride 106 98 - 107 mmol/L    Bicarbonate 20 (L) 21 - 32 mmol/L    Anion Gap 15 10 - 20 mmol/L    Urea Nitrogen 12 6 - 23 mg/dL    Creatinine 0.78 0.50 - 1.05 mg/dL    eGFR >90 >60 mL/min/1.73m*2    Calcium 9.0 8.6 - 10.6 mg/dL    Albumin 3.8 3.4 - 5.0 g/dL    Alkaline Phosphatase 47 33 - 110 U/L    Total Protein 7.0 6.4 - 8.2 g/dL    AST 14 9 - 39 U/L    Bilirubin, Total 0.2 0.0 - 1.2 mg/dL    ALT 10 7 - 45 U/L   Urinalysis with Reflex Culture and Microscopic   Result Value Ref Range    Color, Urine Colorless (N) Light-Yellow, Yellow, Dark-Yellow    Appearance, Urine Turbid (N) Clear    Specific Gravity, Urine 1.004 (N) 1.005 - 1.035    pH, Urine 5.5 5.0, 5.5, 6.0, 6.5, 7.0, 7.5, 8.0    Protein, Urine NEGATIVE NEGATIVE, 10 (TRACE), 20 (TRACE) mg/dL    Glucose, Urine Normal Normal mg/dL    Blood, Urine NEGATIVE NEGATIVE    Ketones, Urine NEGATIVE NEGATIVE mg/dL    Bilirubin, Urine NEGATIVE NEGATIVE    Urobilinogen, Urine Normal Normal mg/dL    Nitrite, Urine NEGATIVE NEGATIVE    Leukocyte Esterase, Urine NEGATIVE NEGATIVE   POCT pregnancy, urine   Result Value Ref Range    Preg Test, Ur Positive (A) Negative   Type and Screen   Result Value Ref Range    ABO TYPE A     Rh TYPE POS     ANTIBODY SCREEN NEG    Coagulation Screen  "  Result Value Ref Range    Protime 10.9 9.8 - 12.8 seconds    INR 1.0 0.9 - 1.1    aPTT 27 27 - 38 seconds      Radiographs:  Vascular US upper extremity venous duplex left   Preliminary Result   No evidence of deep venous thrombosis in the left upper extremity   from the axilla to the antecubital fossa, in addition to the   visualized internal jugular and subclavian veins.        I personally reviewed the images/study and I agree with the findings   as stated by Dr. Chriss Nixon. This study was interpreted at   [place], [place].        MACRO:   None             Dictation workstation:   [id number]      Point of Care Ultrasound   Final Result              Assessment   In brief, Sandrita Adams is a 34 y.o. female who presented to the emergency department with pain to the left axilla and left groin.          ED Course/MDM     Diagnoses as of 08/19/24 1425   Hidradenitis suppurativa      Visit Vitals  /85 (Patient Position: Sitting)   Pulse 95   Temp 36.2 °C (97.2 °F) (Temporal)   Resp 18   Ht 1.549 m (5' 1\")   Wt 81.6 kg (180 lb)   LMP 05/18/2024   SpO2 98%   BMI 34.01 kg/m²   OB Status Pregnant   Smoking Status Former   BSA 1.87 m²       Medications   lactated Ringer's infusion (125 mL/hr intravenous Not Given 8/19/24 0625)   morphine injection 4 mg (4 mg intravenous Given 8/19/24 0832)   HYDROmorphone (Dilaudid) injection 0.5 mg (0.5 mg intravenous Given 8/19/24 0935)   HYDROmorphone (Dilaudid) injection 1 mg (1 mg intravenous Given 8/19/24 1001)       Patient remained stable while in the emergency department. Previous outpatient and ED records were reviewed. Outside records were reviewed.  Differentials include abscess, hidradenitis, infection.  ACS was consulted, please see ACS consult note for complete details.  IV was established and labs obtained.  CBC with a elevated white blood cell count of 11.4, hemoglobin of 11.7 and hematocrit of 34.3.  " Comprehensive metabolic panel within normal limits.  Urinalysis was negative for infection.  Urine pregnancy test was positive.  Bedside ultrasound was obtained, please see ultrasound note for complete details.  No obvious fluid collections on the ultrasound of the left axilla region.  Patient received IV fluids as well as Dilaudid for pain control.  Formal ultrasound of the left upper extremity was obtained which was negative for a DVT.  There is no obvious signs of erythema, drainage or infection on exam today.  No indication for IV antibiotics.  Pain was controlled with IV Dilaudid and patient states that she has Percocet at home.  Patient reports that she ran out of her oral clindamycin and was restarted on the oral clindamycin.  Patient was prescribed Hibiclens.  Patient was discharged home, advised to follow-up with M and surgery and return the emergency room with worsening symptoms.    Final Impression      1. Hidradenitis suppurativa          DISPOSITION  Disposition: Discharged home    Comment: Please note this report has been produced using speech recognition software and may contain errors related to that system including errors in grammar, punctuation, and spelling, as well as words and phrases that may be inappropriate.  If there are any questions or concerns please feel free to contact the dictating provider for clarification.    DEBBY Gaming APRN-CNP  08/19/24 6384

## 2024-08-19 NOTE — CONSULTS
[facility]  ACUTE CARE SURGERY - HISTORY AND PHYSICAL / CONSULT    Patient Name: Sandrita Adams  MRN: [id number]  Admit Date: 819  : 1990  AGE: 34 y.o.   GENDER: female  ==============================================================================  TODAY'S ASSESSMENT AND PLAN OF CARE:  Patient is a 33 yo female who is approximately 14 weeks pregnant, with PMHx of lupus, asthma, chronic HS  who presents to the ED for HS flareup. Patient states that she has had increased pain in her left axilla and decreased sensation to her LUE.  ACS was consulted for evaluation of LUE axilla.  Axillary region noted to have minimal induration, without any erythema, cellulitis or drainage noted. Of note, patient has only taken two doses of her biologics, and has not continued it since due to prescription issues. Point of care ultrasound not notable for drainable fluid pocket. Patient has an upcoming appointment with dermatology on . Would not recommend any drainage at this time, as there does not appear to be an infected fluid pocket, and patient's HS pathophysiology would be better managed by re-starting biologics at this time.    Plan:  - can obtained formal ultrasound of LUE, although there does not appear to be infected fluid pocket at this time in patients LUE axillary region  - continue daily suppressive abx regimen  - follow up with dermatology regarding re-initiation of biologic therapy   - please reach out with any questions or concerns     D/w attending surgeon Dr. Rosario.     Shaneka Emanuel PGY2  Acute Care Surgery [id number]       ==============================================================================  CHIEF COMPLAINT/REASON FOR CONSULT:  Patient is a 33 yo female who is approximately 14 weeks pregnant, with PMHx of lupus, asthma, chronic HS  who presents to the ED for HS flareup. Patient states that she has had increased pain in her left axilla and decreased  sensation to her LUE.  ACS was consulted for evaluation of LUE axilla.    Patient has extensive HS history, including removal of axillary sweat glands in 2017,  previously followed by dermatology with upcoming appointment scheduled on 8/12. Patient had previously received two doses of Cosentyx, which dermatology had deemed safe during pregnancy, but did not take any further doses due to issues with obtaining prescription. Patient states that she has been on suppressive abx therapy for years, and has been taking clindamycin daily. Patient's previous treatment summary obtained from derm note is below:     Previous Treatment Summary:  1. Isotretinoin - helped acne but did not improve HS   2. Adalimumab - developed hives/bumps  3. Infliximab - missed 2 infusions due to personal family issues   4.  I&D  5. Excision  6. Apremilast: No improvement  7. Spironolactone - stopped due to higher potassium and tingling in hands/ arms. Note hx of PCOS and elevated testosterone.   8. Multiple antibiotics: Doxycycline, bactrim, clindamycin, levaquin, rifampin - none of these helped. Had previously been on antibiotics but they gave her frequent yeast infections.   9. ILK  10. Secukinumab - best efficacy per patient but stopped when she found out she was pregnant    Patient also endorses pain around her left groin, which she has had prior HS flares. Had a subjective fever yesterday. Denies any nausea, vomiting, chest pain or SOB.       PAST MEDICAL HISTORY:   PMH:   Past Medical History:   Diagnosis Date    Anemia 06/16/2024    Hidradenitis suppurativa 10/29/2020    Hidradenitis    Hidradenitis suppurativa     Lupus (Multi)     Vitamin D deficiency 06/16/2024     Last menstrual period:     PSH:   Past Surgical History:   Procedure Laterality Date    OTHER SURGICAL HISTORY  09/19/2022    Arm surgery    OTHER SURGICAL HISTORY Left 2011    Left wrist    OTHER SURGICAL HISTORY Left 2021    Left wrist procedure     FH:   No family history  on file.  SOCIAL HISTORY:    Smoking:   Social History     Tobacco Use   Smoking Status Former    Types: Cigarettes   Smokeless Tobacco Never       Alcohol:   Social History     Substance and Sexual Activity   Alcohol Use Not Currently       MEDICATIONS:   Prior to Admission medications    Medication Sig Start Date End Date Taking? Authorizing Provider   albuterol 90 mcg/actuation inhaler  7/16/24   Historical Provider, MD   aspirin 81 mg EC tablet Take 2 tablets (162 mg) by mouth once daily. 7/29/24 7/29/25  Dania Bustos MD   clindamycin (Cleocin) 150 mg capsule Take 2 capsules (300 mg) by mouth 2 times a day. 7/24/24   Kelsie Horvath MD   cyclobenzaprine (Flexeril) 10 mg tablet Take 1 tablet (10 mg) by mouth 2 times a day as needed for muscle spasms for up to 10 doses. 8/7/24   Jeanne Gambino DO   diphenhydrAMINE (Sominex) 25 mg tablet Take 1 tablet (25 mg) by mouth as needed at bedtime for sleep for up to 5 days. 8/7/24 8/21/24  Jeanne Gambino DO   famotidine (Pepcid) 20 mg tablet Take 1 tablet (20 mg) by mouth 2 times a day. 7/29/24 7/29/25  Dania Bustos MD   gabapentin (Neurontin) 400 mg capsule Take 2 capsules (800 mg) by mouth every 8 hours. 8/15/24 11/3/24  Mary Blackburn MD   lidocaine (Lidoderm) 5 % patch Place 1 patch over 12 hours on the skin once daily for 5 doses. Remove & discard patch within 12 hours or as directed by MD. 8/7/24 8/21/24  Jeanne Gambino DO   multivitamin with minerals tablet Take 1 tablet by mouth once daily.    Historical Provider, MD   naloxone (Narcan) 4 mg/0.1 mL nasal spray Administer 1 spray (4 mg) into affected nostril(s) if needed for opioid reversal or respiratory depression. May repeat every 2-3 minutes if needed, alternating nostrils, until medical assistance becomes available. 7/24/24   Klesie Horvath MD   ondansetron ODT (Zofran-ODT) 4 mg disintegrating tablet Take 1 tablet (4 mg) by mouth every 8 hours if needed for nausea or vomiting.  6/23/24   Lane Parker MD   oxyCODONE-acetaminophen (Percocet)  mg tablet Take 1 tablet by mouth every 4 hours if needed for moderate pain (4 - 6) or severe pain (7 - 10) for up to 7 days. 8/15/24 8/22/24  Mary Blackburn MD   prenatal no115/iron/folic acid (PRENATAL 19 ORAL) Take by mouth.    Historical Provider, MD   progesterone vaginal suppository 200 mg Insert 2 suppositories (400 mg) into the vagina. 6/28/24   Historical Provider, MD   gabapentin (Neurontin) 300 mg capsule Take 1 capsule (300 mg) by mouth every 8 hours. 7/24/24 8/15/24  Kelsie Horvath MD   oxyCODONE-acetaminophen (Percocet)  mg tablet Take 1 tablet by mouth every 4 hours if needed for severe pain (7 - 10) for up to 42 doses. 8/7/24 8/15/24  Alonso Lockwood MD     ALLERGIES:   Allergies   Allergen Reactions    Suboxone [Buprenorphine-Naloxone] Anaphylaxis, Hives and Itching    Clarithromycin Hives    Haloperidol Hallucinations and Psychosis    Levofloxacin Swelling     Ankle Joint/tendon pain    Metoclopramide Headache, Hallucinations and Psychosis    Reglan [Metoclopramide Hcl] Psychosis       REVIEW OF SYSTEMS:  Negative except as per HPI.     PHYSICAL EXAM:  Constitutional: NAD  Respiratory: unlabored breathing on room air  Cardiovascular: nontachycardic  Abdomen: soft, nontender  MSK: some induration, without fluctuance present in LUE axillary region, no cellulitis, erythema or drainage pockets noted    IMAGING SUMMARY:  (summary of findings, not a copy of dictation)  Pending LUE ultrasound    LABS:  Results for orders placed or performed during the hospital encounter of 08/19/24 (from the past 24 hour(s))   CBC and Auto Differential   Result Value Ref Range    WBC 11.4 (H) 4.4 - 11.3 x10*3/uL    nRBC 0.0 0.0 - 0.0 /100 WBCs    RBC 4.55 4.00 - 5.20 x10*6/uL    Hemoglobin 11.7 (L) 12.0 - 16.0 g/dL    Hematocrit 34.3 (L) 36.0 - 46.0 %    MCV 75 (L) 80 - 100 fL    MCH 25.7 (L) 26.0 - 34.0 pg    MCHC 34.1 32.0 - 36.0 g/dL     RDW 14.1 11.5 - 14.5 %    Platelets 263 150 - 450 x10*3/uL    Neutrophils % 56.6 40.0 - 80.0 %    Immature Granulocytes %, Automated 0.4 0.0 - 0.9 %    Lymphocytes % 33.6 13.0 - 44.0 %    Monocytes % 7.3 2.0 - 10.0 %    Eosinophils % 1.8 0.0 - 6.0 %    Basophils % 0.3 0.0 - 2.0 %    Neutrophils Absolute 6.46 1.20 - 7.70 x10*3/uL    Immature Granulocytes Absolute, Automated 0.05 0.00 - 0.70 x10*3/uL    Lymphocytes Absolute 3.83 1.20 - 4.80 x10*3/uL    Monocytes Absolute 0.83 0.10 - 1.00 x10*3/uL    Eosinophils Absolute 0.20 0.00 - 0.70 x10*3/uL    Basophils Absolute 0.03 0.00 - 0.10 x10*3/uL   Comprehensive metabolic panel   Result Value Ref Range    Glucose 79 74 - 99 mg/dL    Sodium 137 136 - 145 mmol/L    Potassium 3.6 3.5 - 5.3 mmol/L    Chloride 106 98 - 107 mmol/L    Bicarbonate 20 (L) 21 - 32 mmol/L    Anion Gap 15 10 - 20 mmol/L    Urea Nitrogen 12 6 - 23 mg/dL    Creatinine 0.78 0.50 - 1.05 mg/dL    eGFR >90 >60 mL/min/1.73m*2    Calcium 9.0 8.6 - 10.6 mg/dL    Albumin 3.8 3.4 - 5.0 g/dL    Alkaline Phosphatase 47 33 - 110 U/L    Total Protein 7.0 6.4 - 8.2 g/dL    AST 14 9 - 39 U/L    Bilirubin, Total 0.2 0.0 - 1.2 mg/dL    ALT 10 7 - 45 U/L   Urinalysis with Reflex Culture and Microscopic   Result Value Ref Range    Color, Urine Colorless (N) Light-Yellow, Yellow, Dark-Yellow    Appearance, Urine Turbid (N) Clear    Specific Gravity, Urine 1.004 (N) 1.005 - 1.035    pH, Urine 5.5 5.0, 5.5, 6.0, 6.5, 7.0, 7.5, 8.0    Protein, Urine NEGATIVE NEGATIVE, 10 (TRACE), 20 (TRACE) mg/dL    Glucose, Urine Normal Normal mg/dL    Blood, Urine NEGATIVE NEGATIVE    Ketones, Urine NEGATIVE NEGATIVE mg/dL    Bilirubin, Urine NEGATIVE NEGATIVE    Urobilinogen, Urine Normal Normal mg/dL    Nitrite, Urine NEGATIVE NEGATIVE    Leukocyte Esterase, Urine NEGATIVE NEGATIVE   POCT pregnancy, urine   Result Value Ref Range    Preg Test, Ur Positive (A) Negative   Coagulation Screen   Result Value Ref Range    Protime 10.9 9.8 -  12.8 seconds    INR 1.0 0.9 - 1.1    aPTT 27 27 - 38 seconds       I have reviewed all laboratory and imaging results ordered/pertinent for this encounter.

## 2024-08-22 ENCOUNTER — APPOINTMENT (OUTPATIENT)
Dept: DERMATOLOGY | Facility: CLINIC | Age: 34
End: 2024-08-22
Payer: MEDICAID

## 2024-08-22 ENCOUNTER — ROUTINE PRENATAL (OUTPATIENT)
Dept: MATERNAL FETAL MEDICINE | Facility: CLINIC | Age: 34
End: 2024-08-22
Payer: MEDICAID

## 2024-08-22 VITALS
BODY MASS INDEX: 33.65 KG/M2 | HEART RATE: 84 BPM | WEIGHT: 178.1 LBS | DIASTOLIC BLOOD PRESSURE: 67 MMHG | SYSTOLIC BLOOD PRESSURE: 112 MMHG

## 2024-08-22 DIAGNOSIS — R11.0 NAUSEA: ICD-10-CM

## 2024-08-22 DIAGNOSIS — L73.2 HIDRADENITIS SUPPURATIVA: Primary | ICD-10-CM

## 2024-08-22 DIAGNOSIS — O09.522 MULTIGRAVIDA OF ADVANCED MATERNAL AGE IN SECOND TRIMESTER (HHS-HCC): ICD-10-CM

## 2024-08-22 DIAGNOSIS — Z65.4 VICTIM OF INTIMATE PARTNER ABUSE DURING PREGNANCY: ICD-10-CM

## 2024-08-22 DIAGNOSIS — Z3A.13 13 WEEKS GESTATION OF PREGNANCY (HHS-HCC): ICD-10-CM

## 2024-08-22 DIAGNOSIS — O23.41 UTI (URINARY TRACT INFECTION) DURING PREGNANCY, FIRST TRIMESTER (HHS-HCC): ICD-10-CM

## 2024-08-22 DIAGNOSIS — L73.2 HIDRADENITIS SUPPURATIVA: ICD-10-CM

## 2024-08-22 DIAGNOSIS — O21.9 NAUSEA AND VOMITING IN PREGNANCY PRIOR TO 22 WEEKS GESTATION (HHS-HCC): Primary | ICD-10-CM

## 2024-08-22 PROCEDURE — 99214 OFFICE O/P EST MOD 30 MIN: CPT | Performed by: OBSTETRICS & GYNECOLOGY

## 2024-08-22 PROCEDURE — 99214 OFFICE O/P EST MOD 30 MIN: CPT | Performed by: STUDENT IN AN ORGANIZED HEALTH CARE EDUCATION/TRAINING PROGRAM

## 2024-08-22 RX ORDER — OXYCODONE AND ACETAMINOPHEN 10; 325 MG/1; MG/1
1 TABLET ORAL EVERY 4 HOURS PRN
Qty: 42 TABLET | Refills: 0 | Status: SHIPPED | OUTPATIENT
Start: 2024-08-22 | End: 2024-08-29

## 2024-08-22 RX ORDER — LACTOSE-REDUCED FOOD 0.04G-1.05
1 LIQUID (ML) ORAL DAILY
Qty: 1659 ML | Refills: 1 | Status: SHIPPED | OUTPATIENT
Start: 2024-08-22 | End: 2024-09-05

## 2024-08-22 RX ORDER — ONDANSETRON 4 MG/1
4 TABLET, ORALLY DISINTEGRATING ORAL EVERY 8 HOURS PRN
Qty: 16 TABLET | Refills: 0 | Status: SHIPPED | OUTPATIENT
Start: 2024-08-22

## 2024-08-22 ASSESSMENT — ENCOUNTER SYMPTOMS
CONSTITUTIONAL NEGATIVE: 0
RESPIRATORY NEGATIVE: 0
NEUROLOGICAL NEGATIVE: 0
ALLERGIC/IMMUNOLOGIC NEGATIVE: 0
CARDIOVASCULAR NEGATIVE: 0
HEMATOLOGIC/LYMPHATIC NEGATIVE: 0
MUSCULOSKELETAL NEGATIVE: 0
GASTROINTESTINAL NEGATIVE: 0
ENDOCRINE NEGATIVE: 0
PSYCHIATRIC NEGATIVE: 0
EYES NEGATIVE: 0

## 2024-08-22 ASSESSMENT — PAIN - FUNCTIONAL ASSESSMENT: PAIN_FUNCTIONAL_ASSESSMENT: 0-10

## 2024-08-22 ASSESSMENT — DERMATOLOGY QUALITY OF LIFE (QOL) ASSESSMENT
ARE THERE EXCLUSIONS OR EXCEPTIONS FOR THE QUALITY OF LIFE ASSESSMENT: NO
RATE HOW EMOTIONALLY BOTHERED YOU ARE BY YOUR SKIN PROBLEM (FOR EXAMPLE, WORRY, EMBARRASSMENT, FRUSTRATION): 6 - ALWAYS BOTHERED
RATE HOW BOTHERED YOU ARE BY EFFECTS OF YOUR SKIN PROBLEMS ON YOUR ACTIVITIES (EG, GOING OUT, ACCOMPLISHING WHAT YOU WANT, WORK ACTIVITIES OR YOUR RELATIONSHIPS WITH OTHERS): 6 - ALWAYS BOTHERED
RATE HOW BOTHERED YOU ARE BY SYMPTOMS OF YOUR SKIN PROBLEM (EG, ITCHING, STINGING BURNING, HURTING OR SKIN IRRITATION): 6 - ALWAYS BOTHERED

## 2024-08-22 ASSESSMENT — PAIN SCALES - GENERAL: PAINLEVEL_OUTOF10: 8

## 2024-08-22 ASSESSMENT — DERMATOLOGY PATIENT ASSESSMENT: DO YOU HAVE ANY NEW OR CHANGING LESIONS: NO

## 2024-08-22 ASSESSMENT — ITCH NUMERIC RATING SCALE: HOW SEVERE IS YOUR ITCHING?: 6

## 2024-08-22 NOTE — ASSESSMENT & PLAN NOTE
- tolerating most meals at home, not yet on an antiemetic.  - zofran ODT sent to pharmacy per pt request  - Boost Rx sent per pt request

## 2024-08-22 NOTE — LETTER
August 22, 2024    Re: Sandrita Adams  YOB: 1990    To Whom it May Concern,    For medical reasons, I have instructed my patient, Sandrita Adams, to refrain from eating deli meat during pregnancy  until further notice.      Should you have any questions, please feel free to contact our office at Dept: [phone].         Sincerely,    Jadiel Storey MD    CC:Sandrita Adams  [address] [place], [place] [id number]

## 2024-08-22 NOTE — PROGRESS NOTES
Subjective     Sandrita Adams is a 34 y.o. female who presents for the following: Hidradenitis Suppurativa (Pt currently pregnant. Active flares at Bilat Groin and Axilla. Pt was using Cosentyx, so pt not taking, last use was June 8th, 2024. Pt used Cimzia.).     Review of Systems:  No other skin or systemic complaints other than what is documented elsewhere in the note.    The following portions of the chart were reviewed this encounter and updated as appropriate:          Skin Cancer History  No skin cancer on file.      Specialty Problems          Dermatology Problems    Hidradenitis suppurativa     -Extensive history of HS, s/p removal of axillary sweat glands in 2017, which did not significantly improve her pain. Patient previously followed with Dermatology, Pain Management, and Infectious Disease at [facility] and was previously well-controlled on Cosentix, Percocet, Gabapentin, and Naproxen which allowed her to complete her activities of daily living, including being able to work.   - s/p admission 7/22-24 for flare - RUE US demonstrated 3cm pocket in axilla, evaluated by ACS that admission, indurated without anything to drain     - Current pain regimen: oxycodone-acetaminophen 10/325 q4hr PRN, keflex.    Discontinued gabapentin due to parasthesias    Derm recs: continue Cimzia q2wks, clindamycin. Cimzia to be prescribed by derm pending pt's T-spot.   Plastics recs: defer definitive surgery of axillary or groin until postpartum   Pain recs: signed off in pregnancy, MFM team currently working to re-establish care with pain management. FUV scheduled 8/30.            Hidradenitis axillaris        Objective   Well appearing patient in no apparent distress; mood and affect are within normal limits.    A focused skin examination was performed. All findings within normal limits unless otherwise noted below.    Assessment/Plan   1. Hidradenitis suppurativa  See recent note by Dr. Ivan Castanon with unchanged history      Patient requested evaluation with me today to re-discuss risk of using Cimzia during pregnancy    Prior disease that was very well controlled on cosentyx but she was taken off and switched to Cimzia  She is here for evaluation and Cimzia injection  She hasn't done induction dosing with it and her hidradenitis is flaring today    On bilateral axilla there is inflamed nodules and papules    She asked me if its safe with pregnancy  I explained its category B    Patient hasn't had cimzia for one month    Dosing is supposed to be:    400 mg (given as 2 subcutaneous injections of 200 mg each) initially and at week 2 and 4, followed by 200 mg every other week    I was unable to verify with her exactly why she medication was not continued- it seems like she did take 400mg at 0 and 2 weeks- not sure if she got the 4 week one, and it seems that she did not get the 200mg afterwards    Related Procedures  T-Spot TB    Related Medications  clindamycin (Cleocin) 150 mg capsule  Take 2 capsules (300 mg) by mouth 2 times a day for 14 days.    chlorhexidine (Hibiclens) 4 % external liquid  Apply topically 2 times a day. Bathe in Hibiclens BID    oxyCODONE-acetaminophen (Percocet)  mg tablet  Take 1 tablet by mouth every 4 hours if needed for moderate pain (4 - 6) or severe pain (7 - 10) for up to 7 days.      Patient will get her T-spot test today and I will re-initiate Cimzia once the blood test is back  Ill send the prescription for 200mg every other week

## 2024-08-22 NOTE — ASSESSMENT & PLAN NOTE
- continue oxycodone-acetaminophen 10/325 q4hr. Will hold gabapentin given side effects and patient report of minimal symptom improvement. Satisfied with current control.  - dermatology visit today 8/22, for Cimzia dose today.
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
## 2024-08-25 ENCOUNTER — APPOINTMENT (OUTPATIENT)
Dept: RADIOLOGY | Facility: HOSPITAL | Age: 34
End: 2024-08-25
Payer: MEDICAID

## 2024-08-25 ENCOUNTER — HOSPITAL ENCOUNTER (EMERGENCY)
Facility: HOSPITAL | Age: 34
Discharge: HOME | End: 2024-08-26
Payer: MEDICAID

## 2024-08-25 VITALS
SYSTOLIC BLOOD PRESSURE: 106 MMHG | BODY MASS INDEX: 33.42 KG/M2 | OXYGEN SATURATION: 97 % | WEIGHT: 177 LBS | DIASTOLIC BLOOD PRESSURE: 68 MMHG | TEMPERATURE: 97.9 F | RESPIRATION RATE: 16 BRPM | HEIGHT: 61 IN | HEART RATE: 87 BPM

## 2024-08-25 DIAGNOSIS — O46.92 VAGINAL BLEEDING IN PREGNANCY, SECOND TRIMESTER (HHS-HCC): Primary | ICD-10-CM

## 2024-08-25 DIAGNOSIS — R82.71 ANTEPARTUM ASYMPTOMATIC BACTERIURIA (HHS-HCC): ICD-10-CM

## 2024-08-25 DIAGNOSIS — B96.89 BV (BACTERIAL VAGINOSIS): ICD-10-CM

## 2024-08-25 DIAGNOSIS — O99.891 ANTEPARTUM ASYMPTOMATIC BACTERIURIA (HHS-HCC): ICD-10-CM

## 2024-08-25 DIAGNOSIS — N76.0 BV (BACTERIAL VAGINOSIS): ICD-10-CM

## 2024-08-25 LAB
ABO GROUP (TYPE) IN BLOOD: NORMAL
ANTIBODY SCREEN: NORMAL
APPEARANCE UR: CLEAR
B-HCG SERPL-ACNC: ABNORMAL MIU/ML
BACTERIA #/AREA URNS AUTO: ABNORMAL /HPF
BASOPHILS # BLD AUTO: 0.02 X10*3/UL (ref 0–0.1)
BASOPHILS NFR BLD AUTO: 0.2 %
BILIRUB UR STRIP.AUTO-MCNC: NEGATIVE MG/DL
CLUE CELLS SPEC QL WET PREP: PRESENT
COLOR UR: ABNORMAL
EOSINOPHIL # BLD AUTO: 0.11 X10*3/UL (ref 0–0.7)
EOSINOPHIL NFR BLD AUTO: 1 %
ERYTHROCYTE [DISTWIDTH] IN BLOOD BY AUTOMATED COUNT: 13.5 % (ref 11.5–14.5)
GLUCOSE UR STRIP.AUTO-MCNC: NORMAL MG/DL
HCT VFR BLD AUTO: 36.1 % (ref 36–46)
HGB BLD-MCNC: 12.9 G/DL (ref 12–16)
IMM GRANULOCYTES # BLD AUTO: 0.07 X10*3/UL (ref 0–0.7)
IMM GRANULOCYTES NFR BLD AUTO: 0.6 % (ref 0–0.9)
KETONES UR STRIP.AUTO-MCNC: NEGATIVE MG/DL
LEUKOCYTE ESTERASE UR QL STRIP.AUTO: NEGATIVE
LYMPHOCYTES # BLD AUTO: 2.47 X10*3/UL (ref 1.2–4.8)
LYMPHOCYTES NFR BLD AUTO: 22.6 %
MCH RBC QN AUTO: 25.8 PG (ref 26–34)
MCHC RBC AUTO-ENTMCNC: 35.7 G/DL (ref 32–36)
MCV RBC AUTO: 72 FL (ref 80–100)
MONOCYTES # BLD AUTO: 0.91 X10*3/UL (ref 0.1–1)
MONOCYTES NFR BLD AUTO: 8.3 %
MUCOUS THREADS #/AREA URNS AUTO: ABNORMAL /LPF
NEUTROPHILS # BLD AUTO: 7.35 X10*3/UL (ref 1.2–7.7)
NEUTROPHILS NFR BLD AUTO: 67.3 %
NITRITE UR QL STRIP.AUTO: NEGATIVE
NRBC BLD-RTO: 0 /100 WBCS (ref 0–0)
PH UR STRIP.AUTO: 6 [PH]
PLATELET # BLD AUTO: 293 X10*3/UL (ref 150–450)
PROT UR STRIP.AUTO-MCNC: NEGATIVE MG/DL
RBC # BLD AUTO: 5 X10*6/UL (ref 4–5.2)
RBC # UR STRIP.AUTO: ABNORMAL /UL
RBC #/AREA URNS AUTO: ABNORMAL /HPF
RH FACTOR (ANTIGEN D): NORMAL
SP GR UR STRIP.AUTO: 1.02
SQUAMOUS #/AREA URNS AUTO: ABNORMAL /HPF
T VAGINALIS SPEC QL WET PREP: ABNORMAL
UROBILINOGEN UR STRIP.AUTO-MCNC: NORMAL MG/DL
WBC # BLD AUTO: 10.9 X10*3/UL (ref 4.4–11.3)
WBC #/AREA URNS AUTO: ABNORMAL /HPF
WBC VAG QL WET PREP: ABNORMAL
YEAST VAG QL WET PREP: ABNORMAL

## 2024-08-25 PROCEDURE — 86901 BLOOD TYPING SEROLOGIC RH(D): CPT | Performed by: PHYSICIAN ASSISTANT

## 2024-08-25 PROCEDURE — 2500000004 HC RX 250 GENERAL PHARMACY W/ HCPCS (ALT 636 FOR OP/ED): Mod: SE | Performed by: PHYSICIAN ASSISTANT

## 2024-08-25 PROCEDURE — 36415 COLL VENOUS BLD VENIPUNCTURE: CPT | Performed by: PHYSICIAN ASSISTANT

## 2024-08-25 PROCEDURE — 76805 OB US >/= 14 WKS SNGL FETUS: CPT

## 2024-08-25 PROCEDURE — 87210 SMEAR WET MOUNT SALINE/INK: CPT | Performed by: PHYSICIAN ASSISTANT

## 2024-08-25 PROCEDURE — 96376 TX/PRO/DX INJ SAME DRUG ADON: CPT

## 2024-08-25 PROCEDURE — 84702 CHORIONIC GONADOTROPIN TEST: CPT | Performed by: PHYSICIAN ASSISTANT

## 2024-08-25 PROCEDURE — 87491 CHLMYD TRACH DNA AMP PROBE: CPT | Performed by: PHYSICIAN ASSISTANT

## 2024-08-25 PROCEDURE — 96374 THER/PROPH/DIAG INJ IV PUSH: CPT

## 2024-08-25 PROCEDURE — 76815 OB US LIMITED FETUS(S): CPT | Performed by: RADIOLOGY

## 2024-08-25 PROCEDURE — 81003 URINALYSIS AUTO W/O SCOPE: CPT | Performed by: PHYSICIAN ASSISTANT

## 2024-08-25 PROCEDURE — 85025 COMPLETE CBC W/AUTO DIFF WBC: CPT | Performed by: PHYSICIAN ASSISTANT

## 2024-08-25 PROCEDURE — 99284 EMERGENCY DEPT VISIT MOD MDM: CPT | Mod: 25

## 2024-08-25 RX ORDER — HYDROMORPHONE HYDROCHLORIDE 1 MG/ML
1 INJECTION, SOLUTION INTRAMUSCULAR; INTRAVENOUS; SUBCUTANEOUS ONCE
Status: COMPLETED | OUTPATIENT
Start: 2024-08-25 | End: 2024-08-25

## 2024-08-25 RX ORDER — OXYCODONE HYDROCHLORIDE 5 MG/1
5 TABLET ORAL ONCE
Status: COMPLETED | OUTPATIENT
Start: 2024-08-26 | End: 2024-08-26

## 2024-08-25 RX ORDER — HYDROMORPHONE HYDROCHLORIDE 1 MG/ML
1 INJECTION, SOLUTION INTRAMUSCULAR; INTRAVENOUS; SUBCUTANEOUS ONCE
Status: DISCONTINUED | OUTPATIENT
Start: 2024-08-25 | End: 2024-08-25

## 2024-08-25 ASSESSMENT — PAIN SCALES - GENERAL
PAINLEVEL_OUTOF10: 10 - WORST POSSIBLE PAIN
PAINLEVEL_OUTOF10: 8
PAINLEVEL_OUTOF10: 8
PAINLEVEL_OUTOF10: 10 - WORST POSSIBLE PAIN
PAINLEVEL_OUTOF10: 0 - NO PAIN

## 2024-08-25 ASSESSMENT — PAIN - FUNCTIONAL ASSESSMENT: PAIN_FUNCTIONAL_ASSESSMENT: 0-10

## 2024-08-25 NOTE — ED PROVIDER NOTES
HPI   Chief Complaint   Patient presents with    Vaginal Bleeding - Pregnant       HPI: Patient is a 34-year-old female who is G2, P0, history of hidradenitis suppurativa, and the main, asthma, hirsutism who presents to the ED for vaginal bleeding that started earlier today. Patient is 15 weeks and 2 days. States that she noticed some blood on her toilet tissue when using the bathroom today. States taht she is a hgih risk patient and was instructed by her nurse to be seen in the ED. She states that she passed a very small clot while in the ED but since then the bleeding has been very scant.  She denies any abdominal pain but does say that she is having some pain in the actual vaginal canal.  She denies any nausea, vomiting, dysuria or hematuria.  ------------------------------------------------------------------------------------------------------------------------------------------  ROS: a ten point review of systems was performed and was negative except as per HPI.  ------------------------------------------------------------------------------------------------------------------------------------------  PMH / PSH: as per HPI, otherwise reviewed   MEDS: as per HPI, otherwise reviewed in EMR  ALLERGIES: as per HPI, otherwise reviewed in EMR  SocH:  as per HPI, otherwise reviewed in EMR  FH:  as per HPI, otherwise reviewed in EMR   ------------------------------------------------------------------------------------------------------------------------------------------  Physical Exam:  VS: As documented in the triage note and EMR flowsheet from this visit was reviewed  General: Well appearing. No acute distress.   Eyes:  Extraocular movements grossly intact. No scleral icterus.   Head: Atraumatic. Normocephalic.     Neck: No meningismus. No gross masses. Full movement through range of motion  ENT: Posterior oropharynx shows no erythema, exudate or edema.  Uvula is midline without edema.  No stridor or trismus  CV:  Regular rhythm. No murmurs, rubs, gallops appreciated.   Resp: Clear to auscultation bilaterally. No respiratory distress.    GI: Nontender. Soft. No masses. No rebound, rigidity or guarding.  PELVIC EXAM: Chaperone present. Speculum exam shows no discharge, ulcerations, lesions. Bimanual exam shows no adnexal pain or mass. No cervical motion tenderness.  Cervical os is closed.  Very small amount of blood discharge coming from the cervix.  No active bleeding.  MSK: Symmetric muscle bulk. No gross step offs or deformities.  Skin: Warm, dry. No rashes  Neuro: CN II-VII intact. A&O x3. Speech fluent. Alert. Moving all extremities. Ambulates with normal gait  Psych: Appropriate mood and affect for situation  ------------------------------------------------------------------------------------------------------------------------------------------  Hospital Course / Medical Decision Making: Patient is a 34 year old female who is  pregnant female, 15 weeks gestation who presents to the ED for vaginal bleeding. On examination, patient is well appearing. Abdominal examination is benign. There is a very small amount of blood vaginal discharge on pelvic examination. No cervical motion or adnexal tenderness though she did have generalized discomfort to the vaginal canal.  CBC showed hemoglobin of 12.9.  Wet prep did show the presence of clue cells.  hCG beta quant was 97610.  Urinalysis showed 1+ bacteria.  Type and screen was a positive.  Transvaginal ultrasound showed a single live IUP of 15 weeks and 2 days that was otherwise healthy.  Fetal heart rate measured 144 to 153 bpm.  Patient reassured.  She was written prescriptions for Flagyl and Keflex for treatment of BV and bacteria.  Advised to follow-up with her high risk doctor as scheduled. As a result of the work-up, the patient was discharged home in stable condition.  They were informed of the diagnosis and instructed to come back with any concerns or worsening of  condition.  Agreeable to the plan as discussed above.  Patient given the opportunity to ask questions.  All of the patient's questions were answered.                 Patient History   Past Medical History:   Diagnosis Date    Anemia 06/16/2024    Hidradenitis suppurativa 10/29/2020    Hidradenitis    Hidradenitis suppurativa     Lupus (Multi)     UTI (urinary tract infection) during pregnancy, first trimester ([facility]) 07/16/2024    Vitamin D deficiency 06/16/2024     Past Surgical History:   Procedure Laterality Date    OTHER SURGICAL HISTORY  09/19/2022    Arm surgery    OTHER SURGICAL HISTORY Left 2011    Left wrist    OTHER SURGICAL HISTORY Left 2021    Left wrist procedure     No family history on file.  Social History     Tobacco Use    Smoking status: Former     Types: Cigarettes    Smokeless tobacco: Never   Vaping Use    Vaping status: Never Used   Substance Use Topics    Alcohol use: Not Currently    Drug use: Never       Physical Exam   ED Triage Vitals [08/25/24 1742]   Temperature Heart Rate Respirations BP   36.6 °C (97.9 °F) 87 16 106/68      Pulse Ox Temp Source Heart Rate Source Patient Position   97 % Tympanic -- --      BP Location FiO2 (%)     -- --       Physical Exam      ED Course & MDM   Diagnoses as of 08/26/24 0005   Vaginal bleeding in pregnancy, second trimester ([facility])   BV (bacterial vaginosis)   Antepartum asymptomatic bacteriuria ([facility])                 No data recorded     Monik Coma Scale Score: 15 (08/25/24 1742 : Jazmyne Ferro RN)                           Medical Decision Making      Procedure  Procedures     Abiola Ellington PA-C  08/26/24 0007

## 2024-08-26 LAB
C TRACH RRNA SPEC QL NAA+PROBE: NEGATIVE
HOLD SPECIMEN: NORMAL
N GONORRHOEA DNA SPEC QL PROBE+SIG AMP: NEGATIVE

## 2024-08-26 PROCEDURE — 2500000001 HC RX 250 WO HCPCS SELF ADMINISTERED DRUGS (ALT 637 FOR MEDICARE OP): Mod: SE | Performed by: PHYSICIAN ASSISTANT

## 2024-08-26 RX ORDER — CEPHALEXIN 500 MG/1
500 CAPSULE ORAL 4 TIMES DAILY
Qty: 28 CAPSULE | Refills: 0 | Status: SHIPPED | OUTPATIENT
Start: 2024-08-26 | End: 2024-08-26

## 2024-08-26 RX ORDER — METRONIDAZOLE 500 MG/1
500 TABLET ORAL 2 TIMES DAILY
Qty: 14 TABLET | Refills: 0 | Status: SHIPPED | OUTPATIENT
Start: 2024-08-26 | End: 2024-09-02

## 2024-08-26 RX ORDER — METRONIDAZOLE 500 MG/1
500 TABLET ORAL 2 TIMES DAILY
Qty: 14 TABLET | Refills: 0 | Status: SHIPPED | OUTPATIENT
Start: 2024-08-26 | End: 2024-08-26

## 2024-08-26 RX ORDER — CEPHALEXIN 500 MG/1
500 CAPSULE ORAL 4 TIMES DAILY
Qty: 28 CAPSULE | Refills: 0 | Status: SHIPPED | OUTPATIENT
Start: 2024-08-26 | End: 2024-09-02

## 2024-08-28 PROBLEM — Z3A.15 15 WEEKS GESTATION OF PREGNANCY (HHS-HCC): Status: ACTIVE | Noted: 2024-06-16

## 2024-08-29 ENCOUNTER — ROUTINE PRENATAL (OUTPATIENT)
Dept: MATERNAL FETAL MEDICINE | Facility: CLINIC | Age: 34
End: 2024-08-29
Payer: MEDICAID

## 2024-08-29 ENCOUNTER — LAB (OUTPATIENT)
Dept: LAB | Facility: LAB | Age: 34
End: 2024-08-29
Payer: MEDICAID

## 2024-08-29 VITALS — WEIGHT: 184.9 LBS | SYSTOLIC BLOOD PRESSURE: 135 MMHG | DIASTOLIC BLOOD PRESSURE: 75 MMHG | BODY MASS INDEX: 34.94 KG/M2

## 2024-08-29 DIAGNOSIS — Z3A.15 15 WEEKS GESTATION OF PREGNANCY (HHS-HCC): ICD-10-CM

## 2024-08-29 DIAGNOSIS — L73.2 HIDRADENITIS SUPPURATIVA: Primary | ICD-10-CM

## 2024-08-29 DIAGNOSIS — Z65.4 VICTIM OF INTIMATE PARTNER ABUSE DURING PREGNANCY: ICD-10-CM

## 2024-08-29 DIAGNOSIS — L73.2 HIDRADENITIS SUPPURATIVA: ICD-10-CM

## 2024-08-29 PROCEDURE — 86481 TB AG RESPONSE T-CELL SUSP: CPT

## 2024-08-29 PROCEDURE — 36415 COLL VENOUS BLD VENIPUNCTURE: CPT

## 2024-08-29 PROCEDURE — 99215 OFFICE O/P EST HI 40 MIN: CPT | Performed by: OBSTETRICS & GYNECOLOGY

## 2024-08-29 PROCEDURE — 99215 OFFICE O/P EST HI 40 MIN: CPT | Mod: GC | Performed by: OBSTETRICS & GYNECOLOGY

## 2024-08-29 RX ORDER — OXYCODONE AND ACETAMINOPHEN 10; 325 MG/1; MG/1
1 TABLET ORAL EVERY 4 HOURS PRN
Qty: 42 TABLET | Refills: 0 | Status: SHIPPED | OUTPATIENT
Start: 2024-08-29 | End: 2024-09-05

## 2024-08-29 RX ORDER — OXYCODONE AND ACETAMINOPHEN 10; 325 MG/1; MG/1
1 TABLET ORAL EVERY 4 HOURS PRN
Qty: 42 TABLET | Refills: 0 | Status: SHIPPED | OUTPATIENT
Start: 2024-08-29 | End: 2024-08-29 | Stop reason: SDUPTHER

## 2024-08-29 ASSESSMENT — PATIENT HEALTH QUESTIONNAIRE - PHQ9
SUM OF ALL RESPONSES TO PHQ9 QUESTIONS 1 AND 2: 0
1. LITTLE INTEREST OR PLEASURE IN DOING THINGS: NOT AT ALL
2. FEELING DOWN, DEPRESSED OR HOPELESS: NOT AT ALL

## 2024-08-29 ASSESSMENT — ENCOUNTER SYMPTOMS
PSYCHIATRIC NEGATIVE: 0
CONSTITUTIONAL NEGATIVE: 0
GASTROINTESTINAL NEGATIVE: 0
EYES NEGATIVE: 0
MUSCULOSKELETAL NEGATIVE: 0
CARDIOVASCULAR NEGATIVE: 0
NEUROLOGICAL NEGATIVE: 0
HEMATOLOGIC/LYMPHATIC NEGATIVE: 0
ENDOCRINE NEGATIVE: 0
RESPIRATORY NEGATIVE: 0
ALLERGIC/IMMUNOLOGIC NEGATIVE: 0

## 2024-08-29 NOTE — LETTER
8/29/2024    To Whom It May Concern:    Sandrita Adams is being followed for her pregnancy at [facility] [place].  Estimated Date of Delivery: 2/14/25    Sincerely,      Sean Mariscal MD  [facility]
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
## 2024-08-29 NOTE — ASSESSMENT & PLAN NOTE
- Cimzia to be prescribed by dermatology, has follow-up scheduled 11/2024  - Percocet refilled today x7 day course. Initially sent to [facility] but NOT FILLED and resent to [facility] after confirming via phone with [facility]. OARRS report with no irregularity - only prescriptions by us

## 2024-08-30 ENCOUNTER — APPOINTMENT (OUTPATIENT)
Dept: PAIN MEDICINE | Facility: CLINIC | Age: 34
End: 2024-08-30
Payer: MEDICAID

## 2024-08-30 VITALS
SYSTOLIC BLOOD PRESSURE: 122 MMHG | HEIGHT: 64 IN | RESPIRATION RATE: 17 BRPM | BODY MASS INDEX: 31.24 KG/M2 | HEART RATE: 86 BPM | WEIGHT: 183 LBS | DIASTOLIC BLOOD PRESSURE: 79 MMHG

## 2024-08-30 DIAGNOSIS — L73.2 HIDRADENITIS SUPPURATIVA: ICD-10-CM

## 2024-08-30 PROCEDURE — 3008F BODY MASS INDEX DOCD: CPT | Performed by: PAIN MEDICINE

## 2024-08-30 PROCEDURE — 99204 OFFICE O/P NEW MOD 45 MIN: CPT | Performed by: PAIN MEDICINE

## 2024-08-30 PROCEDURE — 4004F PT TOBACCO SCREEN RCVD TLK: CPT | Performed by: PAIN MEDICINE

## 2024-08-30 SDOH — SOCIAL STABILITY: SOCIAL NETWORK: SOCIAL ACTIVITY:: 8

## 2024-08-30 ASSESSMENT — PAIN - FUNCTIONAL ASSESSMENT: PAIN_FUNCTIONAL_ASSESSMENT: 0-10

## 2024-08-30 ASSESSMENT — PAIN SCALES - GENERAL
PAINLEVEL_OUTOF10: 9
PAINLEVEL: 9

## 2024-08-30 NOTE — PROGRESS NOTES
Subjective   Patient ID: Sandrita Adams is a 34 y.o. female who is 16-weeks pregnant with a past medical history of chronic hidradenitis suppuritiva who presents today after a referral from her [facility] physician.     HPI:     This is a 34-year-old female with a past medical history of chronic hidradenitis suppuritiva who presents today after a referral from her [facility] physician. She reports more than 14 years of pain symptoms from her condition, with active lesions in her axillae, groin, and gluteal cleft. She reports that she feels a tunneling, burning, stabbing sensation. Her symptoms prevent her from showering, sitting for prolonged periods of time, or sitting. She has been treated with antibiotics, percocet  q4h, for years physician at [facility] was providing her with  and cosentyx which she is now off of since she is pregnant. She tried gabapentin and naproxen in the past without much relief.    Other Conservative Measures she has tried: Acupuncture, cupping  Classes of medications tried in the past: Acetaminophen, NSAIDs, Gabapentenoids, and Opioids    Last Urine Drug Screen:  Recent Results (from the past 8760 hour(s))   Drug Screen, Urine    Collection Time: 11/13/23  8:56 AM   Result Value Ref Range    Amphetamine Screen, Urine Presumptive Negative Presumptive Negative    Barbiturate Screen, Urine Presumptive Negative Presumptive Negative    Benzodiazepines Screen, Urine Presumptive Negative Presumptive Negative    Cannabinoid Screen, Urine Presumptive Negative Presumptive Negative    Cocaine Metabolite Screen, Urine Presumptive Negative Presumptive Negative    Fentanyl Screen, Urine Presumptive Negative Presumptive Negative    Opiate Screen, Urine Presumptive Negative Presumptive Negative    Oxycodone Screen, Urine Presumptive Positive (A) Presumptive Negative    PCP Screen, Urine Presumptive Negative Presumptive Negative     Review of Systems   13-point ROS done and negative except for HPI.      Current Outpatient Medications   Medication Instructions    albuterol 90 mcg/actuation inhaler     aspirin 162 mg, oral, Daily    Banophen 25 mg, oral, Nightly PRN    cephalexin (KEFLEX) 500 mg, oral, 4 times daily    chlorhexidine (Hibiclens) 4 % external liquid Topical, 2 times daily, Bathe in Hibiclens BID    clindamycin (CLEOCIN) 300 mg, oral, 2 times daily    cyclobenzaprine (FLEXERIL) 10 mg, oral, 2 times daily PRN    famotidine (PEPCID) 20 mg, oral, 2 times daily    food supplemt, lactose-reduced (Boost Breeze NutritionaL) 0.04-1.05 gram-kcal/mL liquid 1 bottle, oral, Daily    gabapentin (NEURONTIN) 800 mg, oral, Every 8 hours scheduled    metroNIDAZOLE (FLAGYL) 500 mg, oral, 2 times daily    multivitamin with minerals tablet 1 tablet, oral, Daily    Narcan 4 mg, nasal, As needed, May repeat every 2-3 minutes if needed, alternating nostrils, until medical assistance becomes available.    ondansetron ODT (ZOFRAN-ODT) 4 mg, oral, Every 8 hours PRN    oxyCODONE-acetaminophen (Percocet)  mg tablet 1 tablet, oral, Every 4 hours PRN    prenatal no115/iron/folic acid (PRENATAL 19 ORAL) oral    progesterone vaginal suppository 200 mg 400 mg, vaginal       Past Medical History:   Diagnosis Date    Anemia 06/16/2024    Hidradenitis suppurativa 10/29/2020    Hidradenitis    Hidradenitis suppurativa     Lupus ([facility])     UTI (urinary tract infection) during pregnancy, first trimester ([facility]) 07/16/2024    Vitamin D deficiency 06/16/2024        Past Surgical History:   Procedure Laterality Date    OTHER SURGICAL HISTORY  09/19/2022    Arm surgery    OTHER SURGICAL HISTORY Left 2011    Left wrist    OTHER SURGICAL HISTORY Left 2021    Left wrist procedure        No family history on file.     Allergies   Allergen Reactions    Suboxone [Buprenorphine-Naloxone] Anaphylaxis, Hives and Itching    Clarithromycin Hives    Haloperidol Hallucinations and Psychosis    Levofloxacin Swelling     Ankle Joint/tendon pain     "Metoclopramide Headache, Hallucinations and Psychosis    Reglan [Metoclopramide Hcl] Psychosis        Objective     Vitals:    08/30/24 0927   BP: 122/79   Pulse: 86   Resp: 17        Physical Exam  General: NAD, well groomed, well nourished  Eyes: Non-icteric sclera, EOMI  Ears, Nose, Mouth, and Throat: External ears and nose appear to be without deformity or rash. No lesions or masses noted. Hearing is grossly intact.   Neck: Trachea midline  Respiratory: Nonlabored breathing   Cardiovascular: trace peripheral edema   Skin: Evidence of lumps, blackheads, cysts, scarring and linear lesions that are tender to touch and palpation in the bilateral axillae, groin, and gluteal cleft.     Psychiatric: Alert, orientation to person, place, and time. Cooperative.    Assessment/Plan     Sandrita Adams is a 34 y.o. female who is 16-weeks pregnant with a past medical history of chronic hidradenitis suppuritiva who presents today after a referral from her [facility] physician.    Plan:    -Educated the patient that we advise to wean her narcotics as it is high doses and will have too many negative effect on the baby as well pt herself.Also advised we dont maintain opioids for chronic non cancer patients . Advised and  encouraged to keep her relationship with   physician at [facility] who had been maintaining her on her pain regimen . As we explained that to pt she starts to be aggressive and repeatedly said I did not like to come and and see you \"your rating is very bad\" and and started to say other uncontrolled words .            Sergey Ashley, PGY1    Jamar Licea MD  "

## 2024-08-31 LAB
NIL(NEG) CONTROL SPOT COUNT: NORMAL
PANEL A SPOT COUNT: 0
PANEL B SPOT COUNT: 0
POS CONTROL SPOT COUNT: NORMAL
T-SPOT. TB INTERPRETATION: NEGATIVE

## 2024-09-04 PROBLEM — Z3A.16 16 WEEKS GESTATION OF PREGNANCY (HHS-HCC): Status: ACTIVE | Noted: 2024-06-16

## 2024-09-05 ENCOUNTER — ROUTINE PRENATAL (OUTPATIENT)
Dept: MATERNAL FETAL MEDICINE | Facility: CLINIC | Age: 34
End: 2024-09-05
Payer: MEDICAID

## 2024-09-05 ENCOUNTER — PHARMACY VISIT (OUTPATIENT)
Dept: PHARMACY | Facility: CLINIC | Age: 34
End: 2024-09-05
Payer: MEDICAID

## 2024-09-05 VITALS
BODY MASS INDEX: 31.15 KG/M2 | HEART RATE: 72 BPM | WEIGHT: 181.5 LBS | DIASTOLIC BLOOD PRESSURE: 58 MMHG | SYSTOLIC BLOOD PRESSURE: 112 MMHG

## 2024-09-05 DIAGNOSIS — L40.9 PSORIASIS: Primary | ICD-10-CM

## 2024-09-05 DIAGNOSIS — B96.89 BACTERIAL VAGINOSIS IN PREGNANCY (HHS-HCC): Primary | ICD-10-CM

## 2024-09-05 DIAGNOSIS — Z65.4 VICTIM OF INTIMATE PARTNER ABUSE DURING PREGNANCY: ICD-10-CM

## 2024-09-05 DIAGNOSIS — O09.522 MULTIGRAVIDA OF ADVANCED MATERNAL AGE IN SECOND TRIMESTER (HHS-HCC): ICD-10-CM

## 2024-09-05 DIAGNOSIS — O23.599 BACTERIAL VAGINOSIS IN PREGNANCY (HHS-HCC): Primary | ICD-10-CM

## 2024-09-05 DIAGNOSIS — Z3A.16 16 WEEKS GESTATION OF PREGNANCY (HHS-HCC): ICD-10-CM

## 2024-09-05 DIAGNOSIS — O21.9 NAUSEA AND VOMITING IN PREGNANCY PRIOR TO 22 WEEKS GESTATION (HHS-HCC): ICD-10-CM

## 2024-09-05 DIAGNOSIS — L73.2 HIDRADENITIS SUPPURATIVA: ICD-10-CM

## 2024-09-05 DIAGNOSIS — J45.20 MILD INTERMITTENT ASTHMA, UNSPECIFIED WHETHER COMPLICATED (HHS-HCC): ICD-10-CM

## 2024-09-05 DIAGNOSIS — O23.42 URINARY TRACT INFECTION IN MOTHER DURING SECOND TRIMESTER OF PREGNANCY (HHS-HCC): ICD-10-CM

## 2024-09-05 PROCEDURE — 99214 OFFICE O/P EST MOD 30 MIN: CPT | Mod: GC | Performed by: STUDENT IN AN ORGANIZED HEALTH CARE EDUCATION/TRAINING PROGRAM

## 2024-09-05 PROCEDURE — RXMED WILLOW AMBULATORY MEDICATION CHARGE

## 2024-09-05 PROCEDURE — 99214 OFFICE O/P EST MOD 30 MIN: CPT | Performed by: STUDENT IN AN ORGANIZED HEALTH CARE EDUCATION/TRAINING PROGRAM

## 2024-09-05 RX ORDER — CEPHALEXIN 500 MG/1
500 CAPSULE ORAL 4 TIMES DAILY
Qty: 28 CAPSULE | Refills: 0 | Status: SHIPPED | OUTPATIENT
Start: 2024-09-05 | End: 2024-09-09 | Stop reason: HOSPADM

## 2024-09-05 RX ORDER — CERTOLIZUMAB PEGOL 200 MG/ML
INJECTION, SOLUTION SUBCUTANEOUS
Qty: 2 ML | Refills: 11 | Status: ON HOLD | OUTPATIENT
Start: 2024-09-05

## 2024-09-05 RX ORDER — OXYCODONE AND ACETAMINOPHEN 10; 325 MG/1; MG/1
1 TABLET ORAL EVERY 4 HOURS PRN
Qty: 42 TABLET | Refills: 0 | Status: ON HOLD | OUTPATIENT
Start: 2024-09-05 | End: 2024-09-09

## 2024-09-05 RX ORDER — METRONIDAZOLE 500 MG/1
500 TABLET ORAL 2 TIMES DAILY
Qty: 10 TABLET | Refills: 0 | Status: SHIPPED | OUTPATIENT
Start: 2024-09-05 | End: 2024-09-14

## 2024-09-05 ASSESSMENT — ENCOUNTER SYMPTOMS
EYES NEGATIVE: 0
ALLERGIC/IMMUNOLOGIC NEGATIVE: 0
PSYCHIATRIC NEGATIVE: 0
CARDIOVASCULAR NEGATIVE: 0
MUSCULOSKELETAL NEGATIVE: 0
HEMATOLOGIC/LYMPHATIC NEGATIVE: 0
RESPIRATORY NEGATIVE: 0
GASTROINTESTINAL NEGATIVE: 0
NEUROLOGICAL NEGATIVE: 0
CONSTITUTIONAL NEGATIVE: 0
ENDOCRINE NEGATIVE: 0

## 2024-09-05 ASSESSMENT — PAIN - FUNCTIONAL ASSESSMENT: PAIN_FUNCTIONAL_ASSESSMENT: 0-10

## 2024-09-05 ASSESSMENT — PAIN SCALES - GENERAL: PAINLEVEL_OUTOF10: 8

## 2024-09-05 NOTE — ASSESSMENT & PLAN NOTE
Getting unroofing done by plastic surgery. May be postpartum pending decision by plastic surgery. Will plan to wait until postpartum given current good control at this time.   To be restarting Dr Hunter with  dermatologist soon; has finished all testing required. Pain well controlled with percocet every four hours. Will continue same dose and continue weekly visits while on this dosing. Per pt, dermatologist visit at [facility] [place], operated by [facility] this morning told her Cimzia will likely only give relief after 6 months of treatment, so will likely continue current dosing in the interim pending control.

## 2024-09-05 NOTE — ASSESSMENT & PLAN NOTE
Pt not having contact with FOB and he is not trying to contact her anymore which she is still happy about. She is still at maternity home and happy and safe there. Plans to move back to her house at some point in second trimester.

## 2024-09-05 NOTE — ASSESSMENT & PLAN NOTE
Nausea is very well controlled with zofran, will continue current regiment. No refills needed today.
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
## 2024-09-05 NOTE — ASSESSMENT & PLAN NOTE
Pt with concern for BV and clue cells seen in ED on 8/25, pt lost rx for flagyl and wanted filled. Ordered today

## 2024-09-05 NOTE — ASSESSMENT & PLAN NOTE
Pt with concern for UTI in ED on 8/25 with bacteria in urine but no nitrites and not sent for culture. Will send keflex and repeat Urine culture today to assess if needed

## 2024-09-08 ENCOUNTER — HOSPITAL ENCOUNTER (EMERGENCY)
Facility: HOSPITAL | Age: 34
Discharge: HOME | End: 2024-09-08
Payer: MEDICAID

## 2024-09-08 ENCOUNTER — HOSPITAL ENCOUNTER (INPATIENT)
Facility: HOSPITAL | Age: 34
LOS: 1 days | Discharge: HOME | End: 2024-09-09
Attending: EMERGENCY MEDICINE | Admitting: STUDENT IN AN ORGANIZED HEALTH CARE EDUCATION/TRAINING PROGRAM
Payer: MEDICAID

## 2024-09-08 VITALS
HEIGHT: 61 IN | WEIGHT: 181 LBS | OXYGEN SATURATION: 100 % | TEMPERATURE: 98.2 F | HEART RATE: 81 BPM | SYSTOLIC BLOOD PRESSURE: 118 MMHG | BODY MASS INDEX: 34.17 KG/M2 | RESPIRATION RATE: 18 BRPM | DIASTOLIC BLOOD PRESSURE: 74 MMHG

## 2024-09-08 DIAGNOSIS — R11.0 NAUSEA: ICD-10-CM

## 2024-09-08 DIAGNOSIS — L73.2 HIDRADENITIS SUPPURATIVA: ICD-10-CM

## 2024-09-08 DIAGNOSIS — L73.2 AXILLARY HIDRADENITIS SUPPURATIVA: ICD-10-CM

## 2024-09-08 DIAGNOSIS — L73.2 HIDRADENITIS AXILLARIS: ICD-10-CM

## 2024-09-08 DIAGNOSIS — L73.2 HIDRADENITIS SUPPURATIVA OF MULTIPLE SITES: Primary | ICD-10-CM

## 2024-09-08 DIAGNOSIS — L73.2 HIDRADENITIS: Primary | ICD-10-CM

## 2024-09-08 LAB
ALBUMIN SERPL BCP-MCNC: 4.2 G/DL (ref 3.4–5)
ALP SERPL-CCNC: 52 U/L (ref 33–110)
ALT SERPL W P-5'-P-CCNC: 10 U/L (ref 7–45)
ANION GAP SERPL CALC-SCNC: 12 MMOL/L (ref 10–20)
AST SERPL W P-5'-P-CCNC: 15 U/L (ref 9–39)
BASOPHILS # BLD AUTO: 0.03 X10*3/UL (ref 0–0.1)
BASOPHILS NFR BLD AUTO: 0.3 %
BILIRUB SERPL-MCNC: 0.2 MG/DL (ref 0–1.2)
BUN SERPL-MCNC: 9 MG/DL (ref 6–23)
CALCIUM SERPL-MCNC: 9.7 MG/DL (ref 8.6–10.6)
CHLORIDE SERPL-SCNC: 104 MMOL/L (ref 98–107)
CO2 SERPL-SCNC: 23 MMOL/L (ref 21–32)
CREAT SERPL-MCNC: 0.63 MG/DL (ref 0.5–1.05)
EGFRCR SERPLBLD CKD-EPI 2021: >90 ML/MIN/1.73M*2
EOSINOPHIL # BLD AUTO: 0.08 X10*3/UL (ref 0–0.7)
EOSINOPHIL NFR BLD AUTO: 0.7 %
ERYTHROCYTE [DISTWIDTH] IN BLOOD BY AUTOMATED COUNT: 13.6 % (ref 11.5–14.5)
GLUCOSE SERPL-MCNC: 87 MG/DL (ref 74–99)
HCT VFR BLD AUTO: 36.3 % (ref 36–46)
HGB BLD-MCNC: 12.7 G/DL (ref 12–16)
IMM GRANULOCYTES # BLD AUTO: 0.12 X10*3/UL (ref 0–0.7)
IMM GRANULOCYTES NFR BLD AUTO: 1.1 % (ref 0–0.9)
LYMPHOCYTES # BLD AUTO: 2.81 X10*3/UL (ref 1.2–4.8)
LYMPHOCYTES NFR BLD AUTO: 26 %
MCH RBC QN AUTO: 25.4 PG (ref 26–34)
MCHC RBC AUTO-ENTMCNC: 35 G/DL (ref 32–36)
MCV RBC AUTO: 73 FL (ref 80–100)
MONOCYTES # BLD AUTO: 0.77 X10*3/UL (ref 0.1–1)
MONOCYTES NFR BLD AUTO: 7.1 %
NEUTROPHILS # BLD AUTO: 6.99 X10*3/UL (ref 1.2–7.7)
NEUTROPHILS NFR BLD AUTO: 64.8 %
NRBC BLD-RTO: 0 /100 WBCS (ref 0–0)
PLATELET # BLD AUTO: 314 X10*3/UL (ref 150–450)
POTASSIUM SERPL-SCNC: 4.3 MMOL/L (ref 3.5–5.3)
PROT SERPL-MCNC: 7.8 G/DL (ref 6.4–8.2)
RBC # BLD AUTO: 5 X10*6/UL (ref 4–5.2)
SODIUM SERPL-SCNC: 135 MMOL/L (ref 136–145)
WBC # BLD AUTO: 10.8 X10*3/UL (ref 4.4–11.3)

## 2024-09-08 PROCEDURE — 99223 1ST HOSP IP/OBS HIGH 75: CPT

## 2024-09-08 PROCEDURE — 85025 COMPLETE CBC W/AUTO DIFF WBC: CPT | Performed by: PHYSICIAN ASSISTANT

## 2024-09-08 PROCEDURE — G0378 HOSPITAL OBSERVATION PER HR: HCPCS

## 2024-09-08 PROCEDURE — 96374 THER/PROPH/DIAG INJ IV PUSH: CPT

## 2024-09-08 PROCEDURE — 76882 US LMTD JT/FCL EVL NVASC XTR: CPT

## 2024-09-08 PROCEDURE — 2500000004 HC RX 250 GENERAL PHARMACY W/ HCPCS (ALT 636 FOR OP/ED): Mod: SE | Performed by: PHYSICIAN ASSISTANT

## 2024-09-08 PROCEDURE — 2500000004 HC RX 250 GENERAL PHARMACY W/ HCPCS (ALT 636 FOR OP/ED): Mod: SE

## 2024-09-08 PROCEDURE — 1220000001 HC OB SEMI-PRIVATE ROOM DAILY

## 2024-09-08 PROCEDURE — 36415 COLL VENOUS BLD VENIPUNCTURE: CPT | Performed by: PHYSICIAN ASSISTANT

## 2024-09-08 PROCEDURE — 76882 US LMTD JT/FCL EVL NVASC XTR: CPT | Performed by: EMERGENCY MEDICINE

## 2024-09-08 PROCEDURE — 2500000005 HC RX 250 GENERAL PHARMACY W/O HCPCS: Mod: SE | Performed by: PHYSICIAN ASSISTANT

## 2024-09-08 PROCEDURE — 96375 TX/PRO/DX INJ NEW DRUG ADDON: CPT

## 2024-09-08 PROCEDURE — 2500000001 HC RX 250 WO HCPCS SELF ADMINISTERED DRUGS (ALT 637 FOR MEDICARE OP): Mod: SE | Performed by: PHYSICIAN ASSISTANT

## 2024-09-08 PROCEDURE — 2500000005 HC RX 250 GENERAL PHARMACY W/O HCPCS: Mod: SE

## 2024-09-08 PROCEDURE — 80053 COMPREHEN METABOLIC PANEL: CPT | Performed by: PHYSICIAN ASSISTANT

## 2024-09-08 PROCEDURE — 96376 TX/PRO/DX INJ SAME DRUG ADON: CPT

## 2024-09-08 PROCEDURE — 99285 EMERGENCY DEPT VISIT HI MDM: CPT

## 2024-09-08 PROCEDURE — 99285 EMERGENCY DEPT VISIT HI MDM: CPT | Performed by: EMERGENCY MEDICINE

## 2024-09-08 PROCEDURE — 99284 EMERGENCY DEPT VISIT MOD MDM: CPT | Mod: 25

## 2024-09-08 RX ORDER — BISACODYL 10 MG/1
10 SUPPOSITORY RECTAL DAILY PRN
Status: DISCONTINUED | OUTPATIENT
Start: 2024-09-08 | End: 2024-09-09 | Stop reason: HOSPADM

## 2024-09-08 RX ORDER — LABETALOL HYDROCHLORIDE 5 MG/ML
20 INJECTION, SOLUTION INTRAVENOUS ONCE AS NEEDED
Status: DISCONTINUED | OUTPATIENT
Start: 2024-09-08 | End: 2024-09-09 | Stop reason: HOSPADM

## 2024-09-08 RX ORDER — ONDANSETRON HYDROCHLORIDE 2 MG/ML
4 INJECTION, SOLUTION INTRAVENOUS ONCE
Status: COMPLETED | OUTPATIENT
Start: 2024-09-08 | End: 2024-09-08

## 2024-09-08 RX ORDER — OXYCODONE HYDROCHLORIDE 5 MG/1
10 TABLET ORAL EVERY 4 HOURS PRN
Status: DISCONTINUED | OUTPATIENT
Start: 2024-09-08 | End: 2024-09-09 | Stop reason: HOSPADM

## 2024-09-08 RX ORDER — CLINDAMYCIN HYDROCHLORIDE 300 MG/1
300 CAPSULE ORAL 2 TIMES DAILY
Status: DISCONTINUED | OUTPATIENT
Start: 2024-09-09 | End: 2024-09-09 | Stop reason: HOSPADM

## 2024-09-08 RX ORDER — HYDROMORPHONE HYDROCHLORIDE 1 MG/ML
1 INJECTION, SOLUTION INTRAMUSCULAR; INTRAVENOUS; SUBCUTANEOUS ONCE
Status: DISCONTINUED | OUTPATIENT
Start: 2024-09-08 | End: 2024-09-08

## 2024-09-08 RX ORDER — CLINDAMYCIN HYDROCHLORIDE 150 MG/1
300 CAPSULE ORAL 3 TIMES DAILY
Qty: 84 CAPSULE | Refills: 0 | Status: SHIPPED | OUTPATIENT
Start: 2024-09-08 | End: 2024-09-09 | Stop reason: HOSPADM

## 2024-09-08 RX ORDER — HYDROMORPHONE HYDROCHLORIDE 2 MG/1
1 TABLET ORAL ONCE
Status: DISCONTINUED | OUTPATIENT
Start: 2024-09-08 | End: 2024-09-08

## 2024-09-08 RX ORDER — NIFEDIPINE 10 MG/1
10 CAPSULE ORAL ONCE AS NEEDED
Status: DISCONTINUED | OUTPATIENT
Start: 2024-09-08 | End: 2024-09-09 | Stop reason: HOSPADM

## 2024-09-08 RX ORDER — IBUPROFEN 200 MG
1 TABLET ORAL DAILY
Status: DISCONTINUED | OUTPATIENT
Start: 2024-09-09 | End: 2024-09-09 | Stop reason: HOSPADM

## 2024-09-08 RX ORDER — DIPHENHYDRAMINE HCL 25 MG
25 CAPSULE ORAL EVERY 6 HOURS PRN
Status: DISCONTINUED | OUTPATIENT
Start: 2024-09-08 | End: 2024-09-09 | Stop reason: HOSPADM

## 2024-09-08 RX ORDER — LIDOCAINE HYDROCHLORIDE AND EPINEPHRINE 10; 10 MG/ML; UG/ML
5 INJECTION, SOLUTION INFILTRATION; PERINEURAL ONCE
Status: COMPLETED | OUTPATIENT
Start: 2024-09-08 | End: 2024-09-08

## 2024-09-08 RX ORDER — HYDROMORPHONE HYDROCHLORIDE 1 MG/ML
1 INJECTION, SOLUTION INTRAMUSCULAR; INTRAVENOUS; SUBCUTANEOUS ONCE
Status: COMPLETED | OUTPATIENT
Start: 2024-09-08 | End: 2024-09-08

## 2024-09-08 RX ORDER — ACETAMINOPHEN 325 MG/1
975 TABLET ORAL EVERY 6 HOURS SCHEDULED
Status: DISCONTINUED | OUTPATIENT
Start: 2024-09-09 | End: 2024-09-09 | Stop reason: HOSPADM

## 2024-09-08 RX ORDER — HYDRALAZINE HYDROCHLORIDE 20 MG/ML
5 INJECTION INTRAMUSCULAR; INTRAVENOUS ONCE AS NEEDED
Status: DISCONTINUED | OUTPATIENT
Start: 2024-09-08 | End: 2024-09-09 | Stop reason: HOSPADM

## 2024-09-08 RX ORDER — SIMETHICONE 80 MG
80 TABLET,CHEWABLE ORAL 4 TIMES DAILY PRN
Status: DISCONTINUED | OUTPATIENT
Start: 2024-09-08 | End: 2024-09-09 | Stop reason: HOSPADM

## 2024-09-08 RX ORDER — METRONIDAZOLE 500 MG/1
500 TABLET ORAL 2 TIMES DAILY
Status: DISCONTINUED | OUTPATIENT
Start: 2024-09-09 | End: 2024-09-09 | Stop reason: HOSPADM

## 2024-09-08 RX ORDER — ALBUTEROL SULFATE 90 UG/1
2 INHALANT RESPIRATORY (INHALATION) EVERY 4 HOURS PRN
Status: DISCONTINUED | OUTPATIENT
Start: 2024-09-08 | End: 2024-09-09 | Stop reason: HOSPADM

## 2024-09-08 RX ORDER — LIDOCAINE HYDROCHLORIDE 10 MG/ML
0.5 INJECTION, SOLUTION INFILTRATION; PERINEURAL ONCE AS NEEDED
Status: DISCONTINUED | OUTPATIENT
Start: 2024-09-08 | End: 2024-09-09 | Stop reason: HOSPADM

## 2024-09-08 RX ORDER — PROCHLORPERAZINE MALEATE 10 MG
5 TABLET ORAL EVERY 6 HOURS PRN
Status: DISCONTINUED | OUTPATIENT
Start: 2024-09-08 | End: 2024-09-09 | Stop reason: HOSPADM

## 2024-09-08 RX ORDER — ONDANSETRON 4 MG/1
4 TABLET, ORALLY DISINTEGRATING ORAL ONCE
Status: COMPLETED | OUTPATIENT
Start: 2024-09-08 | End: 2024-09-08

## 2024-09-08 RX ORDER — SODIUM CHLORIDE, SODIUM LACTATE, POTASSIUM CHLORIDE, CALCIUM CHLORIDE 600; 310; 30; 20 MG/100ML; MG/100ML; MG/100ML; MG/100ML
125 INJECTION, SOLUTION INTRAVENOUS CONTINUOUS
Status: DISCONTINUED | OUTPATIENT
Start: 2024-09-09 | End: 2024-09-09 | Stop reason: HOSPADM

## 2024-09-08 RX ORDER — ONDANSETRON HYDROCHLORIDE 2 MG/ML
4 INJECTION, SOLUTION INTRAVENOUS EVERY 6 HOURS PRN
Status: DISCONTINUED | OUTPATIENT
Start: 2024-09-08 | End: 2024-09-09 | Stop reason: HOSPADM

## 2024-09-08 RX ORDER — HYDROMORPHONE HYDROCHLORIDE 1 MG/ML
0.4 INJECTION, SOLUTION INTRAMUSCULAR; INTRAVENOUS; SUBCUTANEOUS
Status: DISCONTINUED | OUTPATIENT
Start: 2024-09-08 | End: 2024-09-09 | Stop reason: HOSPADM

## 2024-09-08 RX ORDER — ADHESIVE BANDAGE
10 BANDAGE TOPICAL
Status: DISCONTINUED | OUTPATIENT
Start: 2024-09-08 | End: 2024-09-09 | Stop reason: HOSPADM

## 2024-09-08 RX ORDER — ONDANSETRON 4 MG/1
4 TABLET, FILM COATED ORAL EVERY 6 HOURS PRN
Status: DISCONTINUED | OUTPATIENT
Start: 2024-09-08 | End: 2024-09-09 | Stop reason: HOSPADM

## 2024-09-08 RX ORDER — CLINDAMYCIN HYDROCHLORIDE 300 MG/1
300 CAPSULE ORAL ONCE
Status: COMPLETED | OUTPATIENT
Start: 2024-09-08 | End: 2024-09-08

## 2024-09-08 RX ORDER — ASPIRIN 81 MG/1
162 TABLET ORAL DAILY
Status: DISCONTINUED | OUTPATIENT
Start: 2024-09-09 | End: 2024-09-09 | Stop reason: HOSPADM

## 2024-09-08 RX ORDER — FAMOTIDINE 20 MG/1
20 TABLET, FILM COATED ORAL 2 TIMES DAILY
Status: DISCONTINUED | OUTPATIENT
Start: 2024-09-09 | End: 2024-09-09 | Stop reason: HOSPADM

## 2024-09-08 RX ORDER — POLYETHYLENE GLYCOL 3350 17 G/17G
17 POWDER, FOR SOLUTION ORAL 2 TIMES DAILY PRN
Status: DISCONTINUED | OUTPATIENT
Start: 2024-09-08 | End: 2024-09-09 | Stop reason: HOSPADM

## 2024-09-08 ASSESSMENT — PAIN SCALES - WONG BAKER: WONGBAKER_NUMERICALRESPONSE: HURTS WHOLE LOT

## 2024-09-08 ASSESSMENT — COLUMBIA-SUICIDE SEVERITY RATING SCALE - C-SSRS
2. HAVE YOU ACTUALLY HAD ANY THOUGHTS OF KILLING YOURSELF?: NO
1. IN THE PAST MONTH, HAVE YOU WISHED YOU WERE DEAD OR WISHED YOU COULD GO TO SLEEP AND NOT WAKE UP?: NO
6. HAVE YOU EVER DONE ANYTHING, STARTED TO DO ANYTHING, OR PREPARED TO DO ANYTHING TO END YOUR LIFE?: NO
2. HAVE YOU ACTUALLY HAD ANY THOUGHTS OF KILLING YOURSELF?: NO
1. IN THE PAST MONTH, HAVE YOU WISHED YOU WERE DEAD OR WISHED YOU COULD GO TO SLEEP AND NOT WAKE UP?: NO
6. HAVE YOU EVER DONE ANYTHING, STARTED TO DO ANYTHING, OR PREPARED TO DO ANYTHING TO END YOUR LIFE?: NO

## 2024-09-08 ASSESSMENT — PAIN SCALES - GENERAL
PAINLEVEL_OUTOF10: 9
PAINLEVEL_OUTOF10: 9
PAINLEVEL_OUTOF10: 8
PAINLEVEL_OUTOF10: 10 - WORST POSSIBLE PAIN

## 2024-09-08 ASSESSMENT — PAIN DESCRIPTION - ORIENTATION: ORIENTATION: LEFT

## 2024-09-08 ASSESSMENT — PAIN DESCRIPTION - LOCATION
LOCATION: GENERALIZED
LOCATION: ARM

## 2024-09-08 ASSESSMENT — ENCOUNTER SYMPTOMS: PAIN: 1

## 2024-09-08 NOTE — DISCHARGE INSTRUCTIONS
Take the clindamycin 3 times a day.  Continue your home pain medications.  The dermatology office should be in touch with you to schedule an appointment, if you have not heard from them call the number listed below.  Additionally follow-up with your PCP for good continuum of care.

## 2024-09-08 NOTE — ED PROVIDER NOTES
Emergency Department Provider Note        History of Present Illness     34-year-old female G3, P0 at 17 weeks with history of hidradenitis suppurativa presents with concerns for hidradenitis flare.  States that she is trying to switch injectables from dermatologist however the prior authorization has not gone through yet.  Feels that it is worse in her left axilla than her right.  States it is causing some tingling sensation and diffuse swelling in her distal arm.  Does report she was admitted in July for IV antibiotics.  She takes Percocet 10/325 at home for pain relief however has not had adequate relief.  Denies any fevers or chills.  States plastic surgery wanted to wait till after the pregnancy for any surgical intervention.    External Records Reviewed including ED notes, H&P, Discharge Summary, outpatient PCP/specialist notes.  Physical Exam     Triage Vitals: T 36.8 °C (98.2 °F)  HR 81  /74  RR 18  O2 100 %    GEN: NAD  EYES:  EOMs grossly intact, anicteric sclera  IRINA: Mucosa moist.  NECK: Supple.  CARD: RRR  PULMONARY: Moving air well. Clear all lung fields.  ABDOMEN: Soft, no guarding, no rigidity. Nontender. NABS  EXTREMITIES: Full ROM, no pitting edema,   SKIN: Bilateral axilla with induration and tenderness without any localized swelling or lesions or purulence.  Buttocks evaluated with female chaperone showed several small indurated lesions without any signs of fluid filled abscesses.  No erythema or warmth throughout her axilla or buttocks, bilateral inguinal lymphadenopathy present  NEURO: Alert and oriented x 3, speech is clear, no obvious deficits noted.         Medical Decision Making & ED Course     34-year-old female 17 weeks pregnant with at bedtime flare.  On exam she is nontoxic ambulating comfortably.  Vital signs stable.  Bilateral axilla with signs of cellulitis without abscess fluid collection.  She is insistent on needing IV antibiotics however feel that her hidradenitis does  not indicate IV antibiotics at this time.  No indication for I&D as well given lack of abscess.  Will check laboratory work and provide dose of clindamycin, Dilaudid and Zofran for pain and nausea.    ED Course as of 09/08/24 1056   Sun Sep 08, 2024   1056 WBC: 10.8  Reassuring with no leukocytosis [JENN]   1056 Results were communicated to the patient.  As she has no leukocytosis, afebrile, normal vitals, well-appearing, no indication for admission or IV antibiotics at this time.  Will send a new prescription for her oral clindamycin to her pharmacy.  She is told to take this and follow-up with her dermatologist.  I did have lengthy discussion that she needs good continuum of care for a single provider to see her for improvement/worsening of her symptomatology.  She verbalized understanding discharge plan she agreed with plan of questions were answered. [JENN]      ED Course User Index  [JENN] Amarjit Willard PA-C         Diagnoses as of 09/08/24 1056   Hidradenitis suppurativa of multiple sites     Point of Care Ultrasound    (Results Pending)     Labs Reviewed   CBC WITH AUTO DIFFERENTIAL - Abnormal       Result Value    WBC 10.8      nRBC 0.0      RBC 5.00      Hemoglobin 12.7      Hematocrit 36.3      MCV 73 (*)     MCH 25.4 (*)     MCHC 35.0      RDW 13.6      Platelets 314      Neutrophils % 64.8      Immature Granulocytes %, Automated 1.1 (*)     Lymphocytes % 26.0      Monocytes % 7.1      Eosinophils % 0.7      Basophils % 0.3      Neutrophils Absolute 6.99      Immature Granulocytes Absolute, Automated 0.12      Lymphocytes Absolute 2.81      Monocytes Absolute 0.77      Eosinophils Absolute 0.08      Basophils Absolute 0.03     COMPREHENSIVE METABOLIC PANEL - Abnormal    Glucose 87      Sodium 135 (*)     Potassium 4.3      Chloride 104      Bicarbonate 23      Anion Gap 12      Urea Nitrogen 9      Creatinine 0.63      eGFR >90      Calcium 9.7      Albumin 4.2      Alkaline Phosphatase 52      Total  Protein 7.8      AST 15      Bilirubin, Total 0.2      ALT 10         ----------------------------------------------------------------------------------------------------------------------------    This note was dictated using a speech recognition program.  While an attempt was made at proof reading to minimize errors, minor errors in transcription may be present call for questions.     Amarjit Willard PA-C  09/08/24 5999

## 2024-09-08 NOTE — ED PROVIDER NOTES
History of Present Illness     History provided by: Patient  Limitations to History: None  External Records Reviewed with Brief Summary: ED note    HPI:  Sandrita Adams is a 34 y.o. female  at 17 weeks with a history of hidradenitis suppurativa that presents for hidradenitis flare.  Patient states that she is was seen here in the emergency room earlier today and was diagnosed with cellulitis in her left axilla.  Patient states that she went home to returning home and took Percocet, Zofran, clindamycin that was prescribed in the emergency room.  Patient states that she woke up feeling significant amount of pain, subjective fevers, chills.  Patient states that over the past 2 days she has had worsening pain in the left arm along with swelling, redness and tenderness.  Patient also notes to have some warmth on the left axilla and right axilla.  Patient also stating that she is having some bilateral induration in her groin.  Patient states that she has been seen dermatology and was prescribed a medication however insurance has not covered and will see any improvement until 6 months from now.  Patient states that he was recently admitted in July to  for hidradenitis suppurativa secondary to a cellulitis.  Patient states that the nausea and pain is associated with cellulitis.     Physical Exam   Triage vitals:  T 37.1 °C (98.8 °F)  HR 90  /72  RR 18  O2 97 %      General: Awake, alert, in no acute distress  Eyes: Gaze conjugate.  No scleral icterus or injection  HENT: Normo-cephalic, atraumatic. No stridor  CV: Regular rate, regular rhythm. Radial pulses 2+ bilaterally  Resp: Breathing non-labored, speaking in full sentences.  Clear to auscultation bilaterally  GI: Soft, non-distended, non-tender. No rebound or guarding.  MSK/Extremities: No gross bony deformities. Moving all extremities, bilateral axilla with induration and tenderness, no localized swelling or fluctuance, lesions or purulent  drainage.  Buttocks was evaluated with chaperone and showed small indurated lesions, no fluctuance, no no drainage.  No erythema or warmth in her buttocks.  Bilateral inguinal lymphadenopathy present.  Skin: Warm. Appropriate color  Neuro: Alert. Oriented. Face symmetric. Speech is fluent.  Gross strength and sensation intact in b/l UE and LEs  Psych: Appropriate mood and affect    Medical Decision Making & ED Course   Medical Decision Makin y.o. female  at 17 weeks with a history of hidradenitis suppurativa that presents for hidradenitis flare.  Vital signs are stable.  Bilateral axilla with signs of cellulitis.  A POCUS will be done bedside.  Patient was given 2 mg of Dilaudid.  Patient has had CBC, CMP earlier this morning which showed no evidence of leukocytosis.  Patient was given 4 mg of Zofran for the nausea.  On physical exam, patient is having tenderness to palpation of bilateral axilla worse on the left, no erythema, induration felt, no drainage or pus to the area.  There is some warmth to the area on the left axilla.  Patient also has some bilateral lymphadenopathy and some induration on the left gluteus john however no indications of cellulitis.  Patient is requiring significant mount of pain medication and states that the 2 mg of Dilaudid did not help.  Maternal-fetal medicine has been consulted and will admit for pain control.  Dermatology has also been consulted and will see the patient later tomorrow morning.  Is vitally stable throughout entire visit.  ----  Scoring Tools Utilized: None     Social Determinants of Health which Significantly Impact Care: None identified The following actions were taken to address these social determinants: None    EKG Independent Interpretation: EKG not obtained    Independent Result Review and Interpretation: Relevant laboratory and radiographic results were reviewed and independently interpreted by myself.  As necessary, they are commented on in the ED  Course.    Chronic conditions affecting the patient's care: As documented above in MDM    The patient was discussed with the following consultants/services: None    Care Considerations: As documented above in [facility]    ED Course:  Diagnoses as of 09/08/24 6375   Hidradenitis   Axillary hidradenitis suppurativa     Disposition   As a result of their workup, the patient will require admission to the hospital.  The patient was informed of her diagnosis.  The patient was given the opportunity to ask questions and I answered them. The patient agreed to be admitted to the hospital.    Procedures   Procedures    Patient seen and discussed with ED attending physician.    Marianela Malave MD  Emergency Medicine     Marianela Malave MD  Resident  09/08/24 5742

## 2024-09-08 NOTE — ED TRIAGE NOTES
17 wks OB, . Pt reports for hidradenitis suppurativa, currently flaring under left armpit and near the lower buttock. Pt states pain is severe and swelling in the left armpit is intolerable. Also causing numbness in affected hand.

## 2024-09-09 ENCOUNTER — APPOINTMENT (OUTPATIENT)
Dept: RADIOLOGY | Facility: HOSPITAL | Age: 34
End: 2024-09-09
Payer: MEDICAID

## 2024-09-09 ENCOUNTER — PHARMACY VISIT (OUTPATIENT)
Dept: PHARMACY | Facility: CLINIC | Age: 34
End: 2024-09-09
Payer: MEDICAID

## 2024-09-09 VITALS
TEMPERATURE: 98.4 F | OXYGEN SATURATION: 100 % | WEIGHT: 181 LBS | BODY MASS INDEX: 34.17 KG/M2 | SYSTOLIC BLOOD PRESSURE: 121 MMHG | HEART RATE: 75 BPM | HEIGHT: 61 IN | DIASTOLIC BLOOD PRESSURE: 78 MMHG | RESPIRATION RATE: 18 BRPM

## 2024-09-09 DIAGNOSIS — L73.2 HIDRADENITIS SUPPURATIVA: Primary | ICD-10-CM

## 2024-09-09 PROBLEM — Z3A.17 17 WEEKS GESTATION OF PREGNANCY (HHS-HCC): Status: ACTIVE | Noted: 2024-06-16

## 2024-09-09 LAB
ABO GROUP (TYPE) IN BLOOD: NORMAL
ANION GAP SERPL CALC-SCNC: 11 MMOL/L (ref 10–20)
ANTIBODY SCREEN: NORMAL
APPEARANCE UR: CLEAR
BILIRUB UR STRIP.AUTO-MCNC: NEGATIVE MG/DL
BUN SERPL-MCNC: 8 MG/DL (ref 6–23)
CALCIUM SERPL-MCNC: 9.1 MG/DL (ref 8.6–10.6)
CHLORIDE SERPL-SCNC: 104 MMOL/L (ref 98–107)
CO2 SERPL-SCNC: 24 MMOL/L (ref 21–32)
COLOR UR: COLORLESS
CREAT SERPL-MCNC: 0.64 MG/DL (ref 0.5–1.05)
EGFRCR SERPLBLD CKD-EPI 2021: >90 ML/MIN/1.73M*2
ERYTHROCYTE [DISTWIDTH] IN BLOOD BY AUTOMATED COUNT: 13.8 % (ref 11.5–14.5)
GLUCOSE SERPL-MCNC: 93 MG/DL (ref 74–99)
GLUCOSE UR STRIP.AUTO-MCNC: NORMAL MG/DL
HCT VFR BLD AUTO: 34.8 % (ref 36–46)
HGB BLD-MCNC: 11.6 G/DL (ref 12–16)
HOLD SPECIMEN: NORMAL
KETONES UR STRIP.AUTO-MCNC: NEGATIVE MG/DL
LEUKOCYTE ESTERASE UR QL STRIP.AUTO: NEGATIVE
MAGNESIUM SERPL-MCNC: 1.99 MG/DL (ref 1.6–2.4)
MCH RBC QN AUTO: 25.6 PG (ref 26–34)
MCHC RBC AUTO-ENTMCNC: 33.3 G/DL (ref 32–36)
MCV RBC AUTO: 77 FL (ref 80–100)
NITRITE UR QL STRIP.AUTO: NEGATIVE
NRBC BLD-RTO: 0 /100 WBCS (ref 0–0)
PH UR STRIP.AUTO: 6.5 [PH]
PLATELET # BLD AUTO: 289 X10*3/UL (ref 150–450)
POTASSIUM SERPL-SCNC: 4 MMOL/L (ref 3.5–5.3)
PROT UR STRIP.AUTO-MCNC: NEGATIVE MG/DL
RBC # BLD AUTO: 4.54 X10*6/UL (ref 4–5.2)
RBC # UR STRIP.AUTO: NEGATIVE /UL
RH FACTOR (ANTIGEN D): NORMAL
SODIUM SERPL-SCNC: 135 MMOL/L (ref 136–145)
SP GR UR STRIP.AUTO: 1.01
UROBILINOGEN UR STRIP.AUTO-MCNC: NORMAL MG/DL
WBC # BLD AUTO: 10.2 X10*3/UL (ref 4.4–11.3)

## 2024-09-09 PROCEDURE — 76881 US COMPL JOINT R-T W/IMG: CPT | Mod: LT

## 2024-09-09 PROCEDURE — 2500000004 HC RX 250 GENERAL PHARMACY W/ HCPCS (ALT 636 FOR OP/ED)

## 2024-09-09 PROCEDURE — 80048 BASIC METABOLIC PNL TOTAL CA: CPT

## 2024-09-09 PROCEDURE — 86901 BLOOD TYPING SEROLOGIC RH(D): CPT

## 2024-09-09 PROCEDURE — 76882 US LMTD JT/FCL EVL NVASC XTR: CPT | Mod: LEFT SIDE

## 2024-09-09 PROCEDURE — 36415 COLL VENOUS BLD VENIPUNCTURE: CPT

## 2024-09-09 PROCEDURE — 87077 CULTURE AEROBIC IDENTIFY: CPT

## 2024-09-09 PROCEDURE — 81003 URINALYSIS AUTO W/O SCOPE: CPT

## 2024-09-09 PROCEDURE — 87205 SMEAR GRAM STAIN: CPT

## 2024-09-09 PROCEDURE — RXMED WILLOW AMBULATORY MEDICATION CHARGE

## 2024-09-09 PROCEDURE — G0378 HOSPITAL OBSERVATION PER HR: HCPCS

## 2024-09-09 PROCEDURE — 2500000001 HC RX 250 WO HCPCS SELF ADMINISTERED DRUGS (ALT 637 FOR MEDICARE OP)

## 2024-09-09 PROCEDURE — 83735 ASSAY OF MAGNESIUM: CPT

## 2024-09-09 PROCEDURE — 85027 COMPLETE CBC AUTOMATED: CPT

## 2024-09-09 PROCEDURE — 99232 SBSQ HOSP IP/OBS MODERATE 35: CPT

## 2024-09-09 RX ORDER — HYDROMORPHONE HYDROCHLORIDE 1 MG/ML
0.6 INJECTION, SOLUTION INTRAMUSCULAR; INTRAVENOUS; SUBCUTANEOUS ONCE
Status: COMPLETED | OUTPATIENT
Start: 2024-09-09 | End: 2024-09-09

## 2024-09-09 RX ORDER — CERTOLIZUMAB PEGOL 200 MG/ML
400 INJECTION, SOLUTION SUBCUTANEOUS
Qty: 4 ML | Refills: 11 | Status: SHIPPED | OUTPATIENT
Start: 2024-09-09

## 2024-09-09 RX ORDER — CLINDAMYCIN HYDROCHLORIDE 300 MG/1
300 CAPSULE ORAL 2 TIMES DAILY
Qty: 28 CAPSULE | Refills: 0 | Status: SHIPPED | OUTPATIENT
Start: 2024-09-09 | End: 2024-09-19 | Stop reason: SDUPTHER

## 2024-09-09 RX ORDER — HYDROMORPHONE HYDROCHLORIDE 1 MG/ML
1 INJECTION, SOLUTION INTRAMUSCULAR; INTRAVENOUS; SUBCUTANEOUS ONCE
Status: COMPLETED | OUTPATIENT
Start: 2024-09-09 | End: 2024-09-09

## 2024-09-09 RX ORDER — LIDOCAINE 40 MG/G
CREAM TOPICAL EVERY 6 HOURS
Status: DISCONTINUED | OUTPATIENT
Start: 2024-09-09 | End: 2024-09-09 | Stop reason: HOSPADM

## 2024-09-09 RX ORDER — OXYCODONE AND ACETAMINOPHEN 10; 325 MG/1; MG/1
1 TABLET ORAL EVERY 4 HOURS PRN
Qty: 42 TABLET | Refills: 0 | Status: SHIPPED | OUTPATIENT
Start: 2024-09-09 | End: 2024-09-19 | Stop reason: SDUPTHER

## 2024-09-09 RX ORDER — SULFAMETHOXAZOLE AND TRIMETHOPRIM 800; 160 MG/1; MG/1
1 TABLET ORAL 2 TIMES DAILY
Qty: 28 TABLET | Refills: 0 | Status: SHIPPED | OUTPATIENT
Start: 2024-09-09 | End: 2024-09-23

## 2024-09-09 SDOH — SOCIAL STABILITY: SOCIAL INSECURITY: ABUSE SCREEN: ADULT

## 2024-09-09 SDOH — HEALTH STABILITY: MENTAL HEALTH: WERE YOU ABLE TO COMPLETE ALL THE BEHAVIORAL HEALTH SCREENINGS?: YES

## 2024-09-09 SDOH — SOCIAL STABILITY: SOCIAL INSECURITY: HAS ANYONE EVER THREATENED TO HURT YOUR FAMILY OR YOUR PETS?: YES

## 2024-09-09 SDOH — SOCIAL STABILITY: SOCIAL INSECURITY: ARE THERE ANY APPARENT SIGNS OF INJURIES/BEHAVIORS THAT COULD BE RELATED TO ABUSE/NEGLECT?: NO

## 2024-09-09 SDOH — SOCIAL STABILITY: SOCIAL INSECURITY: ARE YOU OR HAVE YOU BEEN THREATENED OR ABUSED PHYSICALLY, EMOTIONALLY, OR SEXUALLY BY ANYONE?: YES

## 2024-09-09 SDOH — SOCIAL STABILITY: SOCIAL INSECURITY: VERBAL ABUSE: YES, PAST (COMMENT)

## 2024-09-09 SDOH — ECONOMIC STABILITY: HOUSING INSECURITY: DO YOU FEEL UNSAFE GOING BACK TO THE PLACE WHERE YOU ARE LIVING?: NO

## 2024-09-09 SDOH — SOCIAL STABILITY: SOCIAL INSECURITY: DO YOU FEEL ANYONE HAS EXPLOITED OR TAKEN ADVANTAGE OF YOU FINANCIALLY OR OF YOUR PERSONAL PROPERTY?: NO

## 2024-09-09 SDOH — SOCIAL STABILITY: SOCIAL INSECURITY: HAVE YOU HAD THOUGHTS OF HARMING ANYONE ELSE?: NO

## 2024-09-09 SDOH — SOCIAL STABILITY: SOCIAL INSECURITY: DOES ANYONE TRY TO KEEP YOU FROM HAVING/CONTACTING OTHER FRIENDS OR DOING THINGS OUTSIDE YOUR HOME?: NO

## 2024-09-09 SDOH — SOCIAL STABILITY: SOCIAL INSECURITY: PHYSICAL ABUSE: YES, PAST (COMMENT)

## 2024-09-09 ASSESSMENT — PAIN DESCRIPTION - DESCRIPTORS
DESCRIPTORS: STABBING
DESCRIPTORS: SHARP
DESCRIPTORS: STABBING

## 2024-09-09 ASSESSMENT — ENCOUNTER SYMPTOMS
FEVER: 1
CHILLS: 1

## 2024-09-09 ASSESSMENT — PAIN SCALES - GENERAL
PAINLEVEL_OUTOF10: 7
PAINLEVEL_OUTOF10: 8
PAINLEVEL_OUTOF10: 9
PAINLEVEL_OUTOF10: 8
PAINLEVEL_OUTOF10: 7
PAINLEVEL_OUTOF10: 6
PAINLEVEL_OUTOF10: 9
PAINLEVEL_OUTOF10: 8
PAINLEVEL_OUTOF10: 7
PAINLEVEL_OUTOF10: 8
PAINLEVEL_OUTOF10: 8
PAINLEVEL_OUTOF10: 7
PAINLEVEL_OUTOF10: 9
PAINLEVEL_OUTOF10: 8
PAINLEVEL_OUTOF10: 7
PAINLEVEL_OUTOF10: 8
PAINLEVEL_OUTOF10: 9
PAINLEVEL_OUTOF10: 8
PAINLEVEL_OUTOF10: 7

## 2024-09-09 ASSESSMENT — PAIN - FUNCTIONAL ASSESSMENT: PAIN_FUNCTIONAL_ASSESSMENT: 0-10

## 2024-09-09 ASSESSMENT — ACTIVITIES OF DAILY LIVING (ADL)
GROOMING: INDEPENDENT
TOILETING: INDEPENDENT
DRESSING YOURSELF: INDEPENDENT
HEARING - RIGHT EAR: FUNCTIONAL
PATIENT'S MEMORY ADEQUATE TO SAFELY COMPLETE DAILY ACTIVITIES?: YES
BATHING: INDEPENDENT
ADEQUATE_TO_COMPLETE_ADL: YES
LACK_OF_TRANSPORTATION: NO
FEEDING YOURSELF: INDEPENDENT
WALKS IN HOME: INDEPENDENT
HEARING - LEFT EAR: FUNCTIONAL
JUDGMENT_ADEQUATE_SAFELY_COMPLETE_DAILY_ACTIVITIES: YES

## 2024-09-09 ASSESSMENT — LIFESTYLE VARIABLES
SKIP TO QUESTIONS 9-10: 1
HOW OFTEN DO YOU HAVE 6 OR MORE DRINKS ON ONE OCCASION: NEVER
AUDIT-C TOTAL SCORE: 0
HOW OFTEN DO YOU HAVE A DRINK CONTAINING ALCOHOL: NEVER
AUDIT-C TOTAL SCORE: 0
HOW MANY STANDARD DRINKS CONTAINING ALCOHOL DO YOU HAVE ON A TYPICAL DAY: PATIENT DOES NOT DRINK

## 2024-09-09 ASSESSMENT — PAIN DESCRIPTION - LOCATION
LOCATION: ARM

## 2024-09-09 ASSESSMENT — PATIENT HEALTH QUESTIONNAIRE - PHQ9
2. FEELING DOWN, DEPRESSED OR HOPELESS: SEVERAL DAYS
SUM OF ALL RESPONSES TO PHQ9 QUESTIONS 1 & 2: 1
1. LITTLE INTEREST OR PLEASURE IN DOING THINGS: NOT AT ALL

## 2024-09-09 ASSESSMENT — PAIN DESCRIPTION - ORIENTATION
ORIENTATION: LEFT
ORIENTATION: LEFT

## 2024-09-09 NOTE — DISCHARGE INSTRUCTIONS
A few days after your discharge, one of our care coordinators may be calling you to see how things have been going at home. This phone call also gives you the opportunity to clarify any information on your discharge instructions or ask any questions that may have come up since leaving the hospital.    To  your Cimzia: go to  specialty pharmacy at [address] [facility] in [place] [address], park in the patient parking and call [phone]. They will bring the med out to you and its a $0 copay.    Continue your flagyl for 7 days.    Continue clindamycin 2 times a day    Start bactrim twice a day for 14 days.    Follow up at your appointment with [facility] on 9/24 at [facility]. You will not be able to refill your percocet until 9/19    Follow up at your next OB appointment as scheduled.

## 2024-09-09 NOTE — SIGNIFICANT EVENT
To bedside due to patient reports significant axillary pain despite oxycodone and IV dilaudid. Pain located in the left axilla and groin area. Will give another dose of 0.6 mg dilaudid now.    Marly Guerin MD PGY-3

## 2024-09-09 NOTE — CONSULTS
Inpatient consult to Dermatology  Consult performed by: Ivan Castanon MD  Consult ordered by: Merlin Wilson MD        DERMATOLOGY DEPARTMENT CONSULTATION NOTE  Name: Sandrita Adams  MRN: [id number]  : 1990    Reason for consultation: hidradenitis suppurativa (HS) flare    History of Present Illness  Sandrita Adams is a 34 y.o. female with a 2nd trimester pregnancy and long-standing HS presented on 2024 with 9/10 pain in the bilateral axillae and was admitted for a HS flare. Dermatology was consulted for management of HS flare.    Ms. Adams reported she took an antibiotic for ~14 years for HS and alternated between bactrim and doxycycline the last 4 years. She stopped doxycycline when she got pregnant. She has taken clindamycin BID for the last 3 weeks.     She has tried steroid injections, Remicade infusion, and Humira, which did not provide adequate relief. She had significant improvement with the use of Cosentyx, but had to discontinue due to her pregnancy. She follows with dermatologist Dr. Elizalde, who switched her to Cimzia. She received Cimzia on 7/10/24 and 24. She did not received any refills since these initial injections. She also washes with hibiclens and applies clindamycin solution to affected areas daily.     For pain, she reports 9/10 pain in the bilateral axillae which prompted her visit to the ED. She states she is not getting adequate relief from the lower-dose Dilaudid and oxycodone compared to the dose in the ED.    Review of Systems  Review of Systems   Constitutional:  Positive for chills and fever.   Skin:         9/10 pain in bilateral axillae. No drainage        Past Medical History  Past Medical History:   Diagnosis Date    Anemia 2024    Hidradenitis suppurativa 10/29/2020    Hidradenitis    Hidradenitis suppurativa     Lupus (Multi)     UTI (urinary tract infection) during pregnancy, first trimester ([facility]) 2024    Vitamin D deficiency  "06/16/2024       Past Surgical History   has a past surgical history that includes Other surgical history (09/19/2022); Other surgical history (Left, 2011); and Other surgical history (Left, 2021).     Allergies  Allergies   Allergen Reactions    Suboxone [Buprenorphine-Naloxone] Anaphylaxis, Hives and Itching    Clarithromycin Hives    Haloperidol Hallucinations and Psychosis    Levofloxacin Swelling     Ankle Joint/tendon pain    Metoclopramide Headache, Hallucinations and Psychosis    Reglan [Metoclopramide Hcl] Psychosis       Medications  Scheduled Meds: acetaminophen, 975 mg, oral, q6h ANDREA  aspirin, 162 mg, oral, Daily  clindamycin, 300 mg, oral, BID  famotidine, 20 mg, oral, BID  lidocaine, , Topical, q6h  metroNIDAZOLE, 500 mg, oral, BID  [Transfer Hold] nicotine, 1 patch, transdermal, Daily  prenatal vitamin (iron-folic), 1 tablet, oral, Daily       Continuous Infusions: lactated Ringer's, 125 mL/hr       PRN Meds: PRN medications: albuterol, bisacodyl, diphenhydrAMINE, hydrALAZINE, HYDROmorphone, labetaloL, lidocaine, magnesium hydroxide, NIFEdipine, ondansetron **OR** ondansetron, oxyCODONE, polyethylene glycol, prochlorperazine, psyllium, simethicone     Family History  No family history on file.    Social History   reports that she has been smoking cigarettes. She has never used smokeless tobacco. She reports that she does not currently use alcohol. She reports that she does not use drugs.     Objective    Vitals:    09/08/24 1819 09/08/24 2355 09/09/24 0014 09/09/24 0827   BP: 120/72 117/82  94/58   Pulse: 90 90  74   Resp: 18 18  18   Temp: 37.1 °C (98.8 °F) 36.9 °C (98.4 °F)  36.8 °C (98.2 °F)   TempSrc:  Temporal  Temporal   SpO2: 97% 96%  98%   Weight: 82.1 kg (181 lb)  82.1 kg (181 lb)    Height:   1.549 m (5' 1\")         Exam    GEN: no acute distress  NEURO: moving all extremities   EYES: conjunctiva and eyelids normal. No conjunctival injection or erosions appreciated  ENT:   - Lips: normal  - " Teeth/gums: normal  - Oropharynx: normal tongue and mucosa  NECK: normal and symmetric.   CV: no varicosities, warmth or tenderness of extremities.  GI: Flat abdomen. Non-tender. No hepatosplenomegaly.  LYMPH: no LAD  EXTREMITIES: no distal digital clubbing, cyanosis, petechiae   SKIN: A full body skin exam including scalp, face, eyes, ears, neck, trunk, bilateral upper & lower extremities, toenails and fingernails were examined with the following findings:  Hyperpigmented nodules with palpable sinus tracts and scar in bilateral axillae, groin, and buttocks. Tender to palpation. No drainage.       Laboratory and Data  Results for orders placed or performed during the hospital encounter of 09/08/24 (from the past 24 hour(s))   Urinalysis with Reflex Culture and Microscopic   Result Value Ref Range    Color, Urine Colorless (N) Light-Yellow, Yellow, Dark-Yellow    Appearance, Urine Clear Clear    Specific Gravity, Urine 1.011 1.005 - 1.035    pH, Urine 6.5 5.0, 5.5, 6.0, 6.5, 7.0, 7.5, 8.0    Protein, Urine NEGATIVE NEGATIVE, 10 (TRACE), 20 (TRACE) mg/dL    Glucose, Urine Normal Normal mg/dL    Blood, Urine NEGATIVE NEGATIVE    Ketones, Urine NEGATIVE NEGATIVE mg/dL    Bilirubin, Urine NEGATIVE NEGATIVE    Urobilinogen, Urine Normal Normal mg/dL    Nitrite, Urine NEGATIVE NEGATIVE    Leukocyte Esterase, Urine NEGATIVE NEGATIVE   Type And Screen   Result Value Ref Range    ABO TYPE A     Rh TYPE POS     ANTIBODY SCREEN NEG    CBC   Result Value Ref Range    WBC 10.2 4.4 - 11.3 x10*3/uL    nRBC 0.0 0.0 - 0.0 /100 WBCs    RBC 4.54 4.00 - 5.20 x10*6/uL    Hemoglobin 11.6 (L) 12.0 - 16.0 g/dL    Hematocrit 34.8 (L) 36.0 - 46.0 %    MCV 77 (L) 80 - 100 fL    MCH 25.6 (L) 26.0 - 34.0 pg    MCHC 33.3 32.0 - 36.0 g/dL    RDW 13.8 11.5 - 14.5 %    Platelets 289 150 - 450 x10*3/uL   Basic Metabolic Panel   Result Value Ref Range    Glucose 93 74 - 99 mg/dL    Sodium 135 (L) 136 - 145 mmol/L    Potassium 4.0 3.5 - 5.3 mmol/L     Chloride 104 98 - 107 mmol/L    Bicarbonate 24 21 - 32 mmol/L    Anion Gap 11 10 - 20 mmol/L    Urea Nitrogen 8 6 - 23 mg/dL    Creatinine 0.64 0.50 - 1.05 mg/dL    eGFR >90 >60 mL/min/1.73m*2    Calcium 9.1 8.6 - 10.6 mg/dL   Magnesium   Result Value Ref Range    Magnesium 1.99 1.60 - 2.40 mg/dL        Assessment/Plan   Sandrita Adams is a 34 y.o. female with a 2nd trimester pregnancy and long-standing HS presented on 9/8/2024 with 9/10 in bilateral axillae and was admitted for a HS flare. Dermatology was consulted for management of HS flare.    Differential diagnoses: Hidradenitis Suppurativa, Rand Stage 3      Impression:  Patient's HS has been refractory to steroid injections, infliximab infusion, adalimumab injections. The goal is to have patient controlled enough until she delivers in February, however, there are limited options while in pregnancy. Patient would also be a candidate for deroofing with plastic surgery, however, this is also best done when patient is not pregnant.     Recommendations:  -Continue clindamycin 300 mg BID oral  -Resume Cimzia injection 200 mg. Pharmacist confirmed the patient's prior authorization was approved and delivery may now be coordinated  -Pain regimen per pain management team    The patient was seen and discussed with attending physician Dr. Castanon. The assessment and plan was communicated to the care team.    Thank you for the consultation and for the opportunity to contribute to the care of this patient.      Haley Winn   Medical student    I have reviewed the medical student documentation and verified the findings in the note as written with additions or exceptions as stated in the body of the note.     Geena Arthur,    PGY-3, Dermatology  Epic chat (preferred)  Team pager [phone]    I reviewed the resident/fellow's documentation and discussed the patient with the resident/fellow. I agree with the resident/fellow's medical decision making as documented in the note.      vIan Castanon MD
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
## 2024-09-09 NOTE — ED PROCEDURE NOTE
Procedure    Performed by: Marianela Malave MD  Authorized by: Nelson Moctezuma MD          Integumentary Indications: pain, swelling and tender to palpation        Procedure: Soft Tissue Ultrasound    Findings:  Fluid Collection: NO fluid collection was identified.  Cobblestoning: Cobblestoning was identified.      Impression:  Soft Tissue: The soft tissue ultrasound exam had ABNORMAL findings as specified.    Comments: No signs of abscess, collection of fluid. Swelling and cobblestoning seen throughout.               Marianela Malave MD  Resident  09/08/24 [id number]       Marianela Malave MD  Resident  09/08/24 [id number]

## 2024-09-09 NOTE — HOSPITAL COURSE
Sandrita Adams is a 34 y.o.  at 17w3d by 8wk US admitted for worsening hidradenitis pain of her left axilla. Initial workup in the ED showed no leukocytosis and POC axillary US without evidence of discrete abscess. Patient required IV narcotics for pain control overnight.     Formal ultrasound obtained in the morning with no abscess or sizeable fluid collection noted. Patient remained afebrile and has no other signs of systemic infection. No drainage, however, skin culture obtained and pending. Dermatology was consulted and recommended continuing clindamycin and biologic agent as recently prescribed. Although no concern for acute infection at this time, previous culture from axillary drainage was resistant to clindamycin. Patient discharged on 14 day course of bactrim in addition to longer course of clindamycin.     Patient previously well controlled on cosyntex, but this was discontinued during pregnancy. Patient had been prescribed Cimzia injection. She has not been able to start this yet as she was awaiting prior authorization. Cimzia was approved and sent to specialty pharmacy, which patient will  today. Suspect acute exacerbation of pain due to discontinuation of biologic agent.     Patient's home pain regimen includes percocet, which was refilled on discharge for a short term course. Patient was counseled on recommendations to wean off of high dose narcotics. Patient follows with pain medicine at [facility] and has an appointment scheduled for .    Patient recently diagnosed with UTI based off of UA +bacteria, neg nitrites. UA on admission neg nitrites, neg leukocytes. Low concern for UTI. Discussed with patient no need for UTI treatment at this time.    Fetal status remained reassuring throughout admission with positive doptones on admission and discharge.    Patient desired discharge today in setting of a family emergency. Patient safe and stable for discharge home with follow up at her next  scheduled OB appointment.

## 2024-09-09 NOTE — CARE PLAN
Problem: Antepartum  Goal: Maintain pregnancy as long as maternal and/or fetal condition is stable  Outcome: Met  Goal: Avoid/minimize constipation  Outcome: Met  Goal: No decrease in circulation/VTE  Outcome: Met  Goal: FHR remains reassuring  Outcome: Met  Goal: Minimize anxiety/maximize coping  Outcome: Met     Problem: Infection  Goal: Fever/diaphoresis will improve to <38.0 C  Outcome: Met     Problem: Pain - Adult  Goal: Verbalizes/displays adequate comfort level or baseline comfort level  Outcome: Met     Problem: Safety - Adult  Goal: Free from fall injury  Outcome: Met     Problem: Discharge Planning  Goal: Discharge to home or other facility with appropriate resources  Outcome: Met     Problem: Chronic Conditions and Co-morbidities  Goal: Patient's chronic conditions and co-morbidity symptoms are monitored and maintained or improved  Outcome: Met     Problem: Pain  Goal: Takes deep breaths with improved pain control throughout the shift  Outcome: Met  Goal: Turns in bed with improved pain control throughout the shift  Outcome: Met  Goal: Walks with improved pain control throughout the shift  Outcome: Met  Goal: Performs ADL's with improved pain control throughout shift  Outcome: Met  Goal: Free from opioid side effects throughout the shift  Outcome: Met  Goal: Free from acute confusion related to pain meds throughout the shift  Outcome: Met   The patient's goals for the shift include get something for pain.    The clinical goals for the shift include pain will be a 4 or less after intervention.    VSS and pt. Has pain that is a 9.It improved with iv dilaudid,oxycodone,lidocaine cream,and, tylenol.Pt. went for an ultrasound of her upper extremities today.Her FHR is WNL.Stable and wants to go home.
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
## 2024-09-09 NOTE — DISCHARGE SUMMARY
Discharge Diagnosis  Hidradenitis suppurativa    Issues Requiring Follow-Up  Long term pain management in setting of HS    Test Results Pending At Discharge  Pending Labs       Order Current Status    Tissue/Wound Culture/Smear In process            Hospital Course  Sandrita Adams is a 34 y.o.  at 17w3d by 8wk US admitted for worsening hidradenitis pain of her left axilla. Initial workup in the ED showed no leukocytosis and POC axillary US without evidence of discrete abscess. Patient required IV narcotics for pain control overnight.     Formal ultrasound obtained in the morning with no abscess or sizeable fluid collection noted. Patient remained afebrile and has no other signs of systemic infection. No drainage, however, skin culture obtained and pending. Dermatology was consulted and recommended continuing clindamycin and biologic agent as recently prescribed. Although no concern for acute infection at this time, previous culture from axillary drainage was resistant to clindamycin. Patient discharged on 14 day course of bactrim in addition to longer course of clindamycin.     Patient previously well controlled on cosyntex, but this was discontinued during pregnancy. Patient had been prescribed Cimzia injection. She has not been able to start this yet as she was awaiting prior authorization. Cimzia was approved and sent to specialty pharmacy, which patient will  today. Suspect acute exacerbation of pain due to discontinuation of biologic agent.     Patient's home pain regimen includes percocet, which was refilled on discharge for a short term course. Patient was counseled on recommendations to wean off of high dose narcotics. Patient follows with pain medicine at [facility] and has an appointment scheduled for .    Fetal status remained reassuring throughout admission with positive doptones on admission and discharge.    Patient desired discharge today in setting of a family emergency. Patient safe  and stable for discharge home with follow up at her next scheduled OB appointment.                 Pertinent Physical Exam At Time of Discharge  Physical Exam  Vitals reviewed.     General:  AAOx3, No acute distress  Cardiovascular: Warm and well perfused  Respiratory: Normal respiratory effort   Abdominal:  Soft, gravid, non-tender, no rebound or guarding, no palpable contractions   Back: No CVA tenderness  Skin: L axilla with HS scars and area of swelling, Tender to touch, no erythema or induration, no active drainage. Multiple HS scars throughout lower pelvis and groin area, as well as intergluteal region. Some areas with tenderness to palpation, no palpable fluctuance, no drainage      R axilla with HS scars and areas of swelling, no drainage or fluctuance , tender to touch, less painful compared to L axilla     FHR on discharge: 140s    Home Medications     Medication List      START taking these medications     sulfamethoxazole-trimethoprim 800-160 mg tablet; Commonly known as:   Bactrim DS; Take 1 tablet by mouth 2 times a day for 14 days.     CHANGE how you take these medications     Cimzia injection; Generic drug: certolizumab pegol; Inject 2 mL (400 mg)   under the skin every 14 (fourteen) days.; What changed: how much to take,   how to take this, when to take this, additional instructions   clindamycin 300 mg capsule; Commonly known as: Cleocin; Take 1 capsule   (300 mg) by mouth 2 times a day for 14 days.; What changed: medication   strength, when to take this   metroNIDAZOLE 500 mg tablet; Commonly known as: FlagyL; Take 1 tablet   (500 mg) by mouth 2 times a day for 7 days.; What changed: Another   medication with the same name was removed. Continue taking this   medication, and follow the directions you see here.     CONTINUE taking these medications     albuterol 90 mcg/actuation inhaler   aspirin 81 mg EC tablet; Take 2 tablets (162 mg) by mouth once daily.   Banophen 25 mg tablet; Generic drug:  diphenhydrAMINE; Take 1 tablet (25   mg) by mouth as needed at bedtime for sleep for up to 5 days.   chlorhexidine 4 % external liquid; Commonly known as: Hibiclens; Apply   topically 2 times a day. Bathe in Hibiclens BID   cyclobenzaprine 10 mg tablet; Commonly known as: Flexeril; Take 1 tablet   (10 mg) by mouth 2 times a day as needed for muscle spasms for up to 10   doses.   famotidine 20 mg tablet; Commonly known as: Pepcid; Take 1 tablet (20   mg) by mouth 2 times a day.   multivitamin with minerals tablet   Narcan 4 mg/actuation nasal spray; Generic drug: naloxone; Administer 1   spray (4 mg) into affected nostril(s) if needed for opioid reversal or   respiratory depression. May repeat every 2-3 minutes if needed,   alternating nostrils, until medical assistance becomes available.   ondansetron ODT 4 mg disintegrating tablet; Commonly known as:   Zofran-ODT; Take 1 tablet (4 mg) by mouth every 8 hours if needed for   nausea or vomiting.   oxyCODONE-acetaminophen  mg tablet; Commonly known as: Percocet;   Take 1 tablet by mouth every 4 hours if needed for moderate pain (4 - 6)   or severe pain (7 - 10).   PRENATAL 19 ORAL   progesterone vaginal suppository 200 mg     STOP taking these medications     Boost Breeze NutritionaL 0.04-1.05 gram-kcal/mL liquid; Generic drug:   food supplemt, lactose-reduced   cephalexin 500 mg capsule; Commonly known as: Keflex       Outpatient Follow-Up  Future Appointments   Date Time Provider Department Center   9/12/2024  9:30 AM Leila Beatty MD [id number] Academic   9/19/2024  9:00 AM MD YUNIER [id number] Academic   9/26/2024  1:00 PM Alonso Lockwood MD [id number] Academic   9/26/2024  1:30 PM MAC [id number] OBGYNIMG ULTRASOUND 4 [id number] MAC [place]   11/20/2024  9:00 AM Spenser Elizalde MD [id number] Academic       Rhea Garcia MD

## 2024-09-09 NOTE — PROGRESS NOTES
Social Work Note    Patient: Sandrita Adams, 33yo, , LUIS MIGUEL 23    SW met with Ms Adams for assessment - per chart, Ms Adams with concerns for housing/safety, depression, transportation, court involvement, and resources. Ms Adams declines SW assessment and resources at this time. She states she has to travel to [place] to be with her father and is focused on getting discharged and getting pain medication for trip. SW reviewed possible lower cost via [employer] if insurance would not cover early refill. SW also reviewed supports and resources available for other needs and provided SW contact information. Ms Adams reports she has a safe place to go (says she can return to [place] when she is back from [place]) and denies additional urgent needs. SW remains available as needed and will see at delivery, please page.     MAKAYLA Mcdermott

## 2024-09-16 ENCOUNTER — PHARMACY VISIT (OUTPATIENT)
Dept: PHARMACY | Facility: CLINIC | Age: 34
End: 2024-09-16
Payer: MEDICAID

## 2024-09-16 DIAGNOSIS — O23.599 BACTERIAL VAGINOSIS IN PREGNANCY (HHS-HCC): Primary | ICD-10-CM

## 2024-09-16 DIAGNOSIS — B96.89 BACTERIAL VAGINOSIS IN PREGNANCY (HHS-HCC): Primary | ICD-10-CM

## 2024-09-16 PROCEDURE — RXMED WILLOW AMBULATORY MEDICATION CHARGE

## 2024-09-16 RX ORDER — METRONIDAZOLE 500 MG/1
500 TABLET ORAL 2 TIMES DAILY
Qty: 4 TABLET | Refills: 0 | Status: SHIPPED | OUTPATIENT
Start: 2024-09-16 | End: 2024-09-18

## 2024-09-17 ENCOUNTER — SPECIALTY PHARMACY (OUTPATIENT)
Dept: PHARMACY | Facility: CLINIC | Age: 34
End: 2024-09-17

## 2024-09-17 ENCOUNTER — PHARMACY VISIT (OUTPATIENT)
Dept: PHARMACY | Facility: CLINIC | Age: 34
End: 2024-09-17
Payer: MEDICAID

## 2024-09-18 PROBLEM — Z3A.18 18 WEEKS GESTATION OF PREGNANCY (HHS-HCC): Status: ACTIVE | Noted: 2024-06-16
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
## 2024-09-19 ENCOUNTER — PHARMACY VISIT (OUTPATIENT)
Dept: PHARMACY | Facility: CLINIC | Age: 34
End: 2024-09-19
Payer: MEDICAID

## 2024-09-19 ENCOUNTER — ROUTINE PRENATAL (OUTPATIENT)
Dept: MATERNAL FETAL MEDICINE | Facility: CLINIC | Age: 34
End: 2024-09-19
Payer: MEDICAID

## 2024-09-19 VITALS — BODY MASS INDEX: 34.43 KG/M2 | DIASTOLIC BLOOD PRESSURE: 82 MMHG | SYSTOLIC BLOOD PRESSURE: 122 MMHG | WEIGHT: 182.2 LBS

## 2024-09-19 DIAGNOSIS — O09.522 MULTIGRAVIDA OF ADVANCED MATERNAL AGE IN SECOND TRIMESTER (HHS-HCC): ICD-10-CM

## 2024-09-19 DIAGNOSIS — L73.2 HIDRADENITIS SUPPURATIVA: Primary | ICD-10-CM

## 2024-09-19 DIAGNOSIS — L73.2 HIDRADENITIS AXILLARIS: ICD-10-CM

## 2024-09-19 DIAGNOSIS — Z3A.18 18 WEEKS GESTATION OF PREGNANCY (HHS-HCC): ICD-10-CM

## 2024-09-19 DIAGNOSIS — Z65.4 VICTIM OF INTIMATE PARTNER ABUSE DURING PREGNANCY: ICD-10-CM

## 2024-09-19 DIAGNOSIS — R11.0 NAUSEA: ICD-10-CM

## 2024-09-19 DIAGNOSIS — B96.89 BACTERIAL VAGINOSIS IN PREGNANCY (HHS-HCC): ICD-10-CM

## 2024-09-19 DIAGNOSIS — J45.20 MILD INTERMITTENT ASTHMA, UNSPECIFIED WHETHER COMPLICATED (HHS-HCC): ICD-10-CM

## 2024-09-19 DIAGNOSIS — O21.9 NAUSEA AND VOMITING IN PREGNANCY PRIOR TO 22 WEEKS GESTATION: ICD-10-CM

## 2024-09-19 DIAGNOSIS — O23.42 URINARY TRACT INFECTION IN MOTHER DURING SECOND TRIMESTER OF PREGNANCY (HHS-HCC): ICD-10-CM

## 2024-09-19 DIAGNOSIS — O23.599 BACTERIAL VAGINOSIS IN PREGNANCY (HHS-HCC): ICD-10-CM

## 2024-09-19 PROCEDURE — 99214 OFFICE O/P EST MOD 30 MIN: CPT | Performed by: OBSTETRICS & GYNECOLOGY

## 2024-09-19 PROCEDURE — 99214 OFFICE O/P EST MOD 30 MIN: CPT | Mod: GC | Performed by: OBSTETRICS & GYNECOLOGY

## 2024-09-19 PROCEDURE — RXMED WILLOW AMBULATORY MEDICATION CHARGE

## 2024-09-19 RX ORDER — ONDANSETRON 4 MG/1
4 TABLET, ORALLY DISINTEGRATING ORAL EVERY 8 HOURS PRN
Qty: 16 TABLET | Refills: 0 | Status: SHIPPED | OUTPATIENT
Start: 2024-09-19

## 2024-09-19 RX ORDER — LACTOSE-REDUCED FOOD 0.04G-1/ML
LIQUID (ML) ORAL
Qty: 237 ML | Refills: 3 | Status: SHIPPED | OUTPATIENT
Start: 2024-09-19 | End: 2024-09-19

## 2024-09-19 RX ORDER — ALBUTEROL SULFATE 90 UG/1
2 INHALANT RESPIRATORY (INHALATION) EVERY 6 HOURS PRN
Qty: 18 G | Refills: 1 | Status: SHIPPED | OUTPATIENT
Start: 2024-09-19

## 2024-09-19 RX ORDER — CLINDAMYCIN HYDROCHLORIDE 300 MG/1
300 CAPSULE ORAL 2 TIMES DAILY
Qty: 28 CAPSULE | Refills: 0 | Status: SHIPPED | OUTPATIENT
Start: 2024-09-19 | End: 2024-10-03

## 2024-09-19 RX ORDER — OXYCODONE AND ACETAMINOPHEN 10; 325 MG/1; MG/1
1 TABLET ORAL EVERY 4 HOURS PRN
Qty: 42 TABLET | Refills: 0 | Status: SHIPPED | OUTPATIENT
Start: 2024-09-19 | End: 2024-09-26 | Stop reason: SDUPTHER

## 2024-09-19 RX ORDER — LACTOSE-REDUCED FOOD 0.04G-1/ML
LIQUID (ML) ORAL
Qty: 237 ML | Refills: 3 | Status: SHIPPED | OUTPATIENT
Start: 2024-09-19

## 2024-09-19 ASSESSMENT — ENCOUNTER SYMPTOMS
ALLERGIC/IMMUNOLOGIC NEGATIVE: 0
RESPIRATORY NEGATIVE: 0
PSYCHIATRIC NEGATIVE: 0
MUSCULOSKELETAL NEGATIVE: 0
CONSTITUTIONAL NEGATIVE: 0
NEUROLOGICAL NEGATIVE: 0
HEMATOLOGIC/LYMPHATIC NEGATIVE: 0
GASTROINTESTINAL NEGATIVE: 0
CARDIOVASCULAR NEGATIVE: 0
EYES NEGATIVE: 0
ENDOCRINE NEGATIVE: 0

## 2024-09-19 ASSESSMENT — PATIENT HEALTH QUESTIONNAIRE - PHQ9
SUM OF ALL RESPONSES TO PHQ9 QUESTIONS 1 AND 2: 0
2. FEELING DOWN, DEPRESSED OR HOPELESS: NOT AT ALL
1. LITTLE INTEREST OR PLEASURE IN DOING THINGS: NOT AT ALL

## 2024-09-19 NOTE — ASSESSMENT & PLAN NOTE
Pt reports that she was following with rheumatology for concern of lupus due to joint pain, rash and strong family hx. Told that her lupus is in remission. No sxs at this time. On chart review, KEITH negative, other lupus workout all negative with the exception of elevated c4. No concern for lupus at this time.

## 2024-09-19 NOTE — LETTER
September 19, 2024     Patient: Sandrita Adams   YOB: 1990   Date of Visit: 9/19/2024       To Whom It May Concern:    Sandrita Adams was seen in my clinic on 9/19/2024 at 9:00 am. Please excuse Sandrita for her absence from work on this day to make the appointment.    If you have any questions or concerns, please don't hesitate to call.         Sincerely,         Jadiel Storey MD        CC: No Recipients

## 2024-09-19 NOTE — ASSESSMENT & PLAN NOTE
Pt living alone in her apartment, in contact with FOB. Pt reports she is feeling safe at this time, but has some concerns that FOB knows where she lives. Will continue to follow up at every visit.

## 2024-09-19 NOTE — ASSESSMENT & PLAN NOTE
Pt reports starting her Cimzia that has worsened flare in the short-term. Current flares on groin, axilla and glute that were visualized without drainage or significant induration concerning for active infection at this time. Has been taking bactrim course, 4 more days. Will start on clindamycin suppression afterward, reordered today. Taking percocet q4h, will refill for one week today. [facility] potentially [facility] over pain management.

## 2024-09-19 NOTE — ASSESSMENT & PLAN NOTE
Pt is doing well at this time without symptoms, does not have albuterol at home. Pt reports asthma worse in cold months, refilled.

## 2024-09-23 ENCOUNTER — HOSPITAL ENCOUNTER (EMERGENCY)
Facility: HOSPITAL | Age: 34
Discharge: HOME | End: 2024-09-24
Payer: MEDICAID

## 2024-09-23 ENCOUNTER — HOSPITAL ENCOUNTER (OUTPATIENT)
Facility: HOSPITAL | Age: 34
Discharge: STILL ADMITTED OR ADMINISTRATIVE | End: 2024-09-23
Attending: OBSTETRICS & GYNECOLOGY | Admitting: OBSTETRICS & GYNECOLOGY
Payer: MEDICAID

## 2024-09-23 VITALS
BODY MASS INDEX: 34.17 KG/M2 | DIASTOLIC BLOOD PRESSURE: 70 MMHG | WEIGHT: 181 LBS | HEIGHT: 61 IN | RESPIRATION RATE: 18 BRPM | OXYGEN SATURATION: 99 % | HEART RATE: 85 BPM | SYSTOLIC BLOOD PRESSURE: 104 MMHG

## 2024-09-23 VITALS
OXYGEN SATURATION: 99 % | RESPIRATION RATE: 18 BRPM | WEIGHT: 181 LBS | TEMPERATURE: 97 F | SYSTOLIC BLOOD PRESSURE: 96 MMHG | HEART RATE: 74 BPM | BODY MASS INDEX: 34.2 KG/M2 | DIASTOLIC BLOOD PRESSURE: 65 MMHG

## 2024-09-23 PROCEDURE — 4500999001 HC ED NO CHARGE

## 2024-09-23 ASSESSMENT — PAIN SCALES - GENERAL
PAINLEVEL_OUTOF10: 8
PAINLEVEL_OUTOF10: 10 - WORST POSSIBLE PAIN

## 2024-09-23 ASSESSMENT — COLUMBIA-SUICIDE SEVERITY RATING SCALE - C-SSRS
6. HAVE YOU EVER DONE ANYTHING, STARTED TO DO ANYTHING, OR PREPARED TO DO ANYTHING TO END YOUR LIFE?: NO
1. IN THE PAST MONTH, HAVE YOU WISHED YOU WERE DEAD OR WISHED YOU COULD GO TO SLEEP AND NOT WAKE UP?: NO
2. HAVE YOU ACTUALLY HAD ANY THOUGHTS OF KILLING YOURSELF?: NO
6. HAVE YOU EVER DONE ANYTHING, STARTED TO DO ANYTHING, OR PREPARED TO DO ANYTHING TO END YOUR LIFE?: NO
1. IN THE PAST MONTH, HAVE YOU WISHED YOU WERE DEAD OR WISHED YOU COULD GO TO SLEEP AND NOT WAKE UP?: NO
2. HAVE YOU ACTUALLY HAD ANY THOUGHTS OF KILLING YOURSELF?: NO

## 2024-09-23 ASSESSMENT — PAIN DESCRIPTION - LOCATION
LOCATION: BUTTOCKS
LOCATION: ABDOMEN

## 2024-09-23 ASSESSMENT — PAIN DESCRIPTION - PAIN TYPE
TYPE: ACUTE PAIN
TYPE: ACUTE PAIN

## 2024-09-23 ASSESSMENT — PAIN - FUNCTIONAL ASSESSMENT
PAIN_FUNCTIONAL_ASSESSMENT: 0-10
PAIN_FUNCTIONAL_ASSESSMENT: 0-10

## 2024-09-23 NOTE — ED TRIAGE NOTES
Patient is 19 weeks, Due 2025, . Patient fis experieincing abdominal pain from her injection site for HS stage 3. Patient states she feels like she hasn't felt the baby move today.

## 2024-09-23 NOTE — ED TRIAGE NOTES
Patient has a history HS and is having problems with her injections site that are causing pain and pain in her abdomen. Patient was also experiencing low fetal movement but was seen in L&D for that prior to coming back to the ED.

## 2024-09-24 ENCOUNTER — HOSPITAL ENCOUNTER (EMERGENCY)
Facility: HOSPITAL | Age: 34
Discharge: HOME | End: 2024-09-24
Payer: MEDICAID

## 2024-09-24 DIAGNOSIS — L73.2 HYDRADENITIS: ICD-10-CM

## 2024-09-24 DIAGNOSIS — G89.29 OTHER CHRONIC PAIN: ICD-10-CM

## 2024-09-24 DIAGNOSIS — R11.2 NAUSEA AND VOMITING, UNSPECIFIED VOMITING TYPE: Primary | ICD-10-CM

## 2024-09-24 LAB
ANION GAP SERPL CALC-SCNC: 14 MMOL/L (ref 10–20)
BASOPHILS # BLD AUTO: 0.03 X10*3/UL (ref 0–0.1)
BASOPHILS NFR BLD AUTO: 0.3 %
BUN SERPL-MCNC: 7 MG/DL (ref 6–23)
CALCIUM SERPL-MCNC: 9.6 MG/DL (ref 8.6–10.6)
CHLORIDE SERPL-SCNC: 105 MMOL/L (ref 98–107)
CO2 SERPL-SCNC: 23 MMOL/L (ref 21–32)
CREAT SERPL-MCNC: 0.53 MG/DL (ref 0.5–1.05)
EGFRCR SERPLBLD CKD-EPI 2021: >90 ML/MIN/1.73M*2
EOSINOPHIL # BLD AUTO: 0.06 X10*3/UL (ref 0–0.7)
EOSINOPHIL NFR BLD AUTO: 0.6 %
ERYTHROCYTE [DISTWIDTH] IN BLOOD BY AUTOMATED COUNT: 14.3 % (ref 11.5–14.5)
GLUCOSE SERPL-MCNC: 86 MG/DL (ref 74–99)
HCT VFR BLD AUTO: 37.1 % (ref 36–46)
HGB BLD-MCNC: 12.5 G/DL (ref 12–16)
IMM GRANULOCYTES # BLD AUTO: 0.07 X10*3/UL (ref 0–0.7)
IMM GRANULOCYTES NFR BLD AUTO: 0.7 % (ref 0–0.9)
LYMPHOCYTES # BLD AUTO: 1.94 X10*3/UL (ref 1.2–4.8)
LYMPHOCYTES NFR BLD AUTO: 20.6 %
MCH RBC QN AUTO: 25.6 PG (ref 26–34)
MCHC RBC AUTO-ENTMCNC: 33.7 G/DL (ref 32–36)
MCV RBC AUTO: 76 FL (ref 80–100)
MONOCYTES # BLD AUTO: 0.59 X10*3/UL (ref 0.1–1)
MONOCYTES NFR BLD AUTO: 6.3 %
NEUTROPHILS # BLD AUTO: 6.73 X10*3/UL (ref 1.2–7.7)
NEUTROPHILS NFR BLD AUTO: 71.5 %
NRBC BLD-RTO: 0 /100 WBCS (ref 0–0)
PLATELET # BLD AUTO: 302 X10*3/UL (ref 150–450)
POTASSIUM SERPL-SCNC: 5.5 MMOL/L (ref 3.5–5.3)
RBC # BLD AUTO: 4.88 X10*6/UL (ref 4–5.2)
SODIUM SERPL-SCNC: 136 MMOL/L (ref 136–145)
WBC # BLD AUTO: 9.4 X10*3/UL (ref 4.4–11.3)

## 2024-09-24 PROCEDURE — 80048 BASIC METABOLIC PNL TOTAL CA: CPT | Performed by: NURSE PRACTITIONER

## 2024-09-24 PROCEDURE — 96361 HYDRATE IV INFUSION ADD-ON: CPT

## 2024-09-24 PROCEDURE — 85025 COMPLETE CBC W/AUTO DIFF WBC: CPT | Performed by: NURSE PRACTITIONER

## 2024-09-24 PROCEDURE — 99284 EMERGENCY DEPT VISIT MOD MDM: CPT

## 2024-09-24 PROCEDURE — 36415 COLL VENOUS BLD VENIPUNCTURE: CPT | Performed by: NURSE PRACTITIONER

## 2024-09-24 PROCEDURE — 2500000001 HC RX 250 WO HCPCS SELF ADMINISTERED DRUGS (ALT 637 FOR MEDICARE OP): Mod: SE | Performed by: NURSE PRACTITIONER

## 2024-09-24 PROCEDURE — 2500000004 HC RX 250 GENERAL PHARMACY W/ HCPCS (ALT 636 FOR OP/ED): Mod: SE | Performed by: NURSE PRACTITIONER

## 2024-09-24 PROCEDURE — 96374 THER/PROPH/DIAG INJ IV PUSH: CPT

## 2024-09-24 RX ORDER — ONDANSETRON HYDROCHLORIDE 2 MG/ML
4 INJECTION, SOLUTION INTRAVENOUS ONCE
Status: COMPLETED | OUTPATIENT
Start: 2024-09-24 | End: 2024-09-24

## 2024-09-24 RX ORDER — OXYCODONE HYDROCHLORIDE 5 MG/1
5 TABLET ORAL ONCE
Status: COMPLETED | OUTPATIENT
Start: 2024-09-24 | End: 2024-09-24

## 2024-09-24 ASSESSMENT — LIFESTYLE VARIABLES
EVER HAD A DRINK FIRST THING IN THE MORNING TO STEADY YOUR NERVES TO GET RID OF A HANGOVER: NO
HAVE PEOPLE ANNOYED YOU BY CRITICIZING YOUR DRINKING: NO
HAVE YOU EVER FELT YOU SHOULD CUT DOWN ON YOUR DRINKING: NO
TOTAL SCORE: 0
EVER FELT BAD OR GUILTY ABOUT YOUR DRINKING: NO

## 2024-09-24 ASSESSMENT — PAIN SCALES - GENERAL
PAINLEVEL_OUTOF10: 8

## 2024-09-24 ASSESSMENT — PAIN DESCRIPTION - FREQUENCY: FREQUENCY: CONSTANT/CONTINUOUS

## 2024-09-24 ASSESSMENT — PAIN DESCRIPTION - LOCATION
LOCATION: ABDOMEN
LOCATION: BACK

## 2024-09-24 ASSESSMENT — PAIN - FUNCTIONAL ASSESSMENT: PAIN_FUNCTIONAL_ASSESSMENT: 0-10

## 2024-09-24 ASSESSMENT — PAIN DESCRIPTION - PAIN TYPE: TYPE: ACUTE PAIN

## 2024-09-24 ASSESSMENT — PAIN DESCRIPTION - DESCRIPTORS: DESCRIPTORS: ACHING;CRAMPING

## 2024-09-25 PROBLEM — Z3A.19 19 WEEKS GESTATION OF PREGNANCY (HHS-HCC): Status: ACTIVE | Noted: 2024-06-16

## 2024-09-26 ENCOUNTER — PHARMACY VISIT (OUTPATIENT)
Dept: PHARMACY | Facility: CLINIC | Age: 34
End: 2024-09-26
Payer: MEDICAID

## 2024-09-26 ENCOUNTER — ROUTINE PRENATAL (OUTPATIENT)
Dept: MATERNAL FETAL MEDICINE | Facility: CLINIC | Age: 34
End: 2024-09-26
Payer: MEDICAID

## 2024-09-26 ENCOUNTER — HOSPITAL ENCOUNTER (OUTPATIENT)
Dept: RADIOLOGY | Facility: CLINIC | Age: 34
Discharge: HOME | End: 2024-09-26
Payer: MEDICAID

## 2024-09-26 VITALS — BODY MASS INDEX: 34.63 KG/M2 | WEIGHT: 183.3 LBS | SYSTOLIC BLOOD PRESSURE: 118 MMHG | DIASTOLIC BLOOD PRESSURE: 80 MMHG

## 2024-09-26 DIAGNOSIS — R51.9 PREGNANCY HEADACHE IN SECOND TRIMESTER (HHS-HCC): ICD-10-CM

## 2024-09-26 DIAGNOSIS — Z36.3 ENCOUNTER FOR ANTENATAL SCREENING FOR MALFORMATION: ICD-10-CM

## 2024-09-26 DIAGNOSIS — Z3A.20 20 WEEKS GESTATION OF PREGNANCY (HHS-HCC): ICD-10-CM

## 2024-09-26 DIAGNOSIS — O99.212 OBESITY AFFECTING PREGNANCY IN SECOND TRIMESTER (HHS-HCC): ICD-10-CM

## 2024-09-26 DIAGNOSIS — O09.512 AMA (ADVANCED MATERNAL AGE) PRIMIGRAVIDA 35+, SECOND TRIMESTER (HHS-HCC): ICD-10-CM

## 2024-09-26 DIAGNOSIS — L73.2 HIDRADENITIS SUPPURATIVA: Primary | ICD-10-CM

## 2024-09-26 DIAGNOSIS — O26.892 PREGNANCY HEADACHE IN SECOND TRIMESTER (HHS-HCC): ICD-10-CM

## 2024-09-26 PROCEDURE — RXMED WILLOW AMBULATORY MEDICATION CHARGE

## 2024-09-26 PROCEDURE — 99214 OFFICE O/P EST MOD 30 MIN: CPT | Performed by: STUDENT IN AN ORGANIZED HEALTH CARE EDUCATION/TRAINING PROGRAM

## 2024-09-26 PROCEDURE — 76811 OB US DETAILED SNGL FETUS: CPT

## 2024-09-26 PROCEDURE — 99214 OFFICE O/P EST MOD 30 MIN: CPT | Mod: GC | Performed by: STUDENT IN AN ORGANIZED HEALTH CARE EDUCATION/TRAINING PROGRAM

## 2024-09-26 RX ORDER — LANOLIN ALCOHOL/MO/W.PET/CERES
400 CREAM (GRAM) TOPICAL 2 TIMES DAILY
Qty: 60 TABLET | Refills: 11 | Status: SHIPPED | OUTPATIENT
Start: 2024-09-26 | End: 2025-09-26

## 2024-09-26 RX ORDER — RIBOFLAVIN (VITAMIN B2) 100 MG
100 TABLET ORAL DAILY
Qty: 60 TABLET | Refills: 2 | Status: SHIPPED | OUTPATIENT
Start: 2024-09-26 | End: 2025-03-25

## 2024-09-26 RX ORDER — OXYCODONE AND ACETAMINOPHEN 10; 325 MG/1; MG/1
1 TABLET ORAL EVERY 4 HOURS PRN
Qty: 42 TABLET | Refills: 0 | Status: SHIPPED | OUTPATIENT
Start: 2024-09-26 | End: 2024-10-03

## 2024-09-26 RX ORDER — LANOLIN ALCOHOL/MO/W.PET/CERES
400 CREAM (GRAM) TOPICAL DAILY
Qty: 30 TABLET | Refills: 11 | Status: SHIPPED | OUTPATIENT
Start: 2024-09-26 | End: 2024-09-26

## 2024-09-26 ASSESSMENT — ENCOUNTER SYMPTOMS
ALLERGIC/IMMUNOLOGIC NEGATIVE: 0
HEMATOLOGIC/LYMPHATIC NEGATIVE: 0
CARDIOVASCULAR NEGATIVE: 0
PSYCHIATRIC NEGATIVE: 0
ENDOCRINE NEGATIVE: 0
EYES NEGATIVE: 0
NEUROLOGICAL NEGATIVE: 0
CONSTITUTIONAL NEGATIVE: 0
GASTROINTESTINAL NEGATIVE: 0
MUSCULOSKELETAL NEGATIVE: 0
RESPIRATORY NEGATIVE: 0

## 2024-09-26 NOTE — ASSESSMENT & PLAN NOTE
- mag oxide and vit b2 ordered today  - if HA does not resolve, would recommend referral to neurology and imaging
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
## 2024-09-26 NOTE — ASSESSMENT & PLAN NOTE
- re-ordered percocet for this week  - cont. Cimzia q2wks and clindamycin per Derm recs  - follows w/Derm & has upcoming appts with Derm in November and [facility] in October

## 2024-09-27 ENCOUNTER — TELEPHONE (OUTPATIENT)
Dept: MATERNAL FETAL MEDICINE | Facility: CLINIC | Age: 34
End: 2024-09-27
Payer: MEDICAID

## 2024-09-27 NOTE — TELEPHONE ENCOUNTER
Spoke w/Ms. Adams. Boost prescription sent by Dr. Lynn to [facility] at [address]     Pt will reach out to pharmacy to see if Boost is covered. Pt will call and inform me if she has any issues.     [facility]: [phone]     [facility]: [phone]

## 2024-10-02 PROBLEM — Z3A.20 20 WEEKS GESTATION OF PREGNANCY (HHS-HCC): Status: ACTIVE | Noted: 2024-06-16

## 2024-10-02 PROBLEM — B96.89 BACTERIAL VAGINOSIS IN PREGNANCY (HHS-HCC): Status: RESOLVED | Noted: 2024-09-05 | Resolved: 2024-10-02

## 2024-10-02 PROBLEM — O23.42 URINARY TRACT INFECTION IN MOTHER DURING SECOND TRIMESTER OF PREGNANCY (HHS-HCC): Status: RESOLVED | Noted: 2024-07-16 | Resolved: 2024-10-02

## 2024-10-02 PROBLEM — O23.599 BACTERIAL VAGINOSIS IN PREGNANCY (HHS-HCC): Status: RESOLVED | Noted: 2024-09-05 | Resolved: 2024-10-02

## 2024-10-03 ENCOUNTER — ROUTINE PRENATAL (OUTPATIENT)
Dept: MATERNAL FETAL MEDICINE | Facility: CLINIC | Age: 34
End: 2024-10-03
Payer: MEDICAID

## 2024-10-03 VITALS — DIASTOLIC BLOOD PRESSURE: 82 MMHG | SYSTOLIC BLOOD PRESSURE: 122 MMHG | BODY MASS INDEX: 34.79 KG/M2 | WEIGHT: 184.1 LBS

## 2024-10-03 DIAGNOSIS — O26.892 PREGNANCY HEADACHE IN SECOND TRIMESTER (HHS-HCC): ICD-10-CM

## 2024-10-03 DIAGNOSIS — O99.512 ASTHMA AFFECTING PREGNANCY IN SECOND TRIMESTER (HHS-HCC): ICD-10-CM

## 2024-10-03 DIAGNOSIS — O09.522 MULTIGRAVIDA OF ADVANCED MATERNAL AGE IN SECOND TRIMESTER (HHS-HCC): ICD-10-CM

## 2024-10-03 DIAGNOSIS — Z3A.20 20 WEEKS GESTATION OF PREGNANCY (HHS-HCC): ICD-10-CM

## 2024-10-03 DIAGNOSIS — J45.909 ASTHMA AFFECTING PREGNANCY IN SECOND TRIMESTER (HHS-HCC): ICD-10-CM

## 2024-10-03 DIAGNOSIS — L73.2 HIDRADENITIS SUPPURATIVA: Primary | ICD-10-CM

## 2024-10-03 DIAGNOSIS — R51.9 PREGNANCY HEADACHE IN SECOND TRIMESTER (HHS-HCC): ICD-10-CM

## 2024-10-03 DIAGNOSIS — Z71.85 VACCINE COUNSELING: ICD-10-CM

## 2024-10-03 PROCEDURE — 99215 OFFICE O/P EST HI 40 MIN: CPT | Performed by: OBSTETRICS & GYNECOLOGY

## 2024-10-03 PROCEDURE — 99215 OFFICE O/P EST HI 40 MIN: CPT | Mod: GC | Performed by: OBSTETRICS & GYNECOLOGY

## 2024-10-03 RX ORDER — ASPIRIN 81 MG/1
162 TABLET ORAL DAILY
Qty: 60 TABLET | Refills: 11 | Status: SHIPPED | OUTPATIENT
Start: 2024-10-03 | End: 2025-10-03

## 2024-10-03 RX ORDER — OXYCODONE AND ACETAMINOPHEN 10; 325 MG/1; MG/1
1 TABLET ORAL EVERY 4 HOURS PRN
Qty: 42 TABLET | Refills: 0 | Status: SHIPPED | OUTPATIENT
Start: 2024-10-03 | End: 2024-10-10

## 2024-10-03 RX ORDER — PNV 119/IRON FUM/FOLIC ACID 29 MG-1 MG
1 TABLET ORAL DAILY
Qty: 90 TABLET | Refills: 3 | Status: SHIPPED | OUTPATIENT
Start: 2024-10-03

## 2024-10-03 RX ORDER — FLUTICASONE PROPIONATE 50 MCG
1 SPRAY, SUSPENSION (ML) NASAL DAILY
Qty: 16 G | Refills: 12 | Status: SHIPPED | OUTPATIENT
Start: 2024-10-03 | End: 2024-10-03 | Stop reason: ALTCHOICE

## 2024-10-03 ASSESSMENT — ENCOUNTER SYMPTOMS
ALLERGIC/IMMUNOLOGIC NEGATIVE: 0
GASTROINTESTINAL NEGATIVE: 0
CARDIOVASCULAR NEGATIVE: 0
PSYCHIATRIC NEGATIVE: 0
CONSTITUTIONAL NEGATIVE: 0
MUSCULOSKELETAL NEGATIVE: 0
ENDOCRINE NEGATIVE: 0
EYES NEGATIVE: 0
HEMATOLOGIC/LYMPHATIC NEGATIVE: 0
RESPIRATORY NEGATIVE: 0
NEUROLOGICAL NEGATIVE: 0

## 2024-10-03 NOTE — ASSESSMENT & PLAN NOTE
Using albuterol prn was previously using 1-2x with stairs however she has moved and not requiring albuterol since on first level. Discussed that if symptoms worsen, would consider initiating Flonase, will continue to monitor symptoms at this time

## 2024-10-03 NOTE — ASSESSMENT & PLAN NOTE
Refill of Percocet today  Scheduled for outpatient visit with Pain management and Dermatology in November  Continue Cimzia    Orders:    oxyCODONE-acetaminophen (Percocet)  mg tablet; Take 1 tablet by mouth every 4 hours if needed for moderate pain (4 - 6) or severe pain (7 - 10) for up to 7 days.

## 2024-10-03 NOTE — ASSESSMENT & PLAN NOTE
Needs to complete anatomic views  Orders:    aspirin 81 mg EC tablet; Take 2 tablets (162 mg) by mouth once daily.    -iron fum-folic acid (Prenatal 19) 29 mg iron- 1 mg tablet; Take 1 tablet by mouth once daily.
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
## 2024-10-07 ENCOUNTER — HOSPITAL ENCOUNTER (EMERGENCY)
Facility: HOSPITAL | Age: 34
Discharge: OTHER INSTITUTION | End: 2024-10-08
Attending: GENERAL PRACTICE
Payer: MEDICAID

## 2024-10-07 DIAGNOSIS — L73.2 HIDRADENITIS SUPPURATIVA: Primary | ICD-10-CM

## 2024-10-07 LAB
ALBUMIN SERPL BCP-MCNC: 4 G/DL (ref 3.4–5)
ALP SERPL-CCNC: 59 U/L (ref 33–110)
ALT SERPL W P-5'-P-CCNC: 9 U/L (ref 7–45)
ANION GAP SERPL CALC-SCNC: 13 MMOL/L (ref 10–20)
AST SERPL W P-5'-P-CCNC: 12 U/L (ref 9–39)
BASOPHILS # BLD AUTO: 0.04 X10*3/UL (ref 0–0.1)
BASOPHILS NFR BLD AUTO: 0.3 %
BILIRUB SERPL-MCNC: 0.3 MG/DL (ref 0–1.2)
BUN SERPL-MCNC: 9 MG/DL (ref 6–23)
CALCIUM SERPL-MCNC: 9.2 MG/DL (ref 8.6–10.3)
CHLORIDE SERPL-SCNC: 103 MMOL/L (ref 98–107)
CO2 SERPL-SCNC: 24 MMOL/L (ref 21–32)
CREAT SERPL-MCNC: 0.66 MG/DL (ref 0.5–1.05)
CRP SERPL-MCNC: 0.6 MG/DL
EGFRCR SERPLBLD CKD-EPI 2021: >90 ML/MIN/1.73M*2
EOSINOPHIL # BLD AUTO: 0.09 X10*3/UL (ref 0–0.7)
EOSINOPHIL NFR BLD AUTO: 0.7 %
ERYTHROCYTE [DISTWIDTH] IN BLOOD BY AUTOMATED COUNT: 14.3 % (ref 11.5–14.5)
ERYTHROCYTE [SEDIMENTATION RATE] IN BLOOD BY WESTERGREN METHOD: 43 MM/H (ref 0–20)
GLUCOSE SERPL-MCNC: 83 MG/DL (ref 74–99)
HCT VFR BLD AUTO: 36.6 % (ref 36–46)
HGB BLD-MCNC: 12.3 G/DL (ref 12–16)
IMM GRANULOCYTES # BLD AUTO: 0.18 X10*3/UL (ref 0–0.7)
IMM GRANULOCYTES NFR BLD AUTO: 1.4 % (ref 0–0.9)
LYMPHOCYTES # BLD AUTO: 2.66 X10*3/UL (ref 1.2–4.8)
LYMPHOCYTES NFR BLD AUTO: 20.2 %
MCH RBC QN AUTO: 25.4 PG (ref 26–34)
MCHC RBC AUTO-ENTMCNC: 33.6 G/DL (ref 32–36)
MCV RBC AUTO: 76 FL (ref 80–100)
MONOCYTES # BLD AUTO: 1.14 X10*3/UL (ref 0.1–1)
MONOCYTES NFR BLD AUTO: 8.7 %
NEUTROPHILS # BLD AUTO: 9.05 X10*3/UL (ref 1.2–7.7)
NEUTROPHILS NFR BLD AUTO: 68.7 %
NRBC BLD-RTO: 0 /100 WBCS (ref 0–0)
PLATELET # BLD AUTO: 343 X10*3/UL (ref 150–450)
POTASSIUM SERPL-SCNC: 3.8 MMOL/L (ref 3.5–5.3)
PROT SERPL-MCNC: 6.9 G/DL (ref 6.4–8.2)
RBC # BLD AUTO: 4.84 X10*6/UL (ref 4–5.2)
SODIUM SERPL-SCNC: 136 MMOL/L (ref 136–145)
WBC # BLD AUTO: 13.2 X10*3/UL (ref 4.4–11.3)

## 2024-10-07 PROCEDURE — 96374 THER/PROPH/DIAG INJ IV PUSH: CPT

## 2024-10-07 PROCEDURE — 99285 EMERGENCY DEPT VISIT HI MDM: CPT | Mod: 25

## 2024-10-07 PROCEDURE — 86140 C-REACTIVE PROTEIN: CPT | Performed by: GENERAL PRACTICE

## 2024-10-07 PROCEDURE — 2500000001 HC RX 250 WO HCPCS SELF ADMINISTERED DRUGS (ALT 637 FOR MEDICARE OP): Performed by: GENERAL PRACTICE

## 2024-10-07 PROCEDURE — 36415 COLL VENOUS BLD VENIPUNCTURE: CPT | Performed by: GENERAL PRACTICE

## 2024-10-07 PROCEDURE — 96375 TX/PRO/DX INJ NEW DRUG ADDON: CPT

## 2024-10-07 PROCEDURE — 99284 EMERGENCY DEPT VISIT MOD MDM: CPT | Mod: 25

## 2024-10-07 PROCEDURE — 2500000004 HC RX 250 GENERAL PHARMACY W/ HCPCS (ALT 636 FOR OP/ED): Performed by: GENERAL PRACTICE

## 2024-10-07 PROCEDURE — 80053 COMPREHEN METABOLIC PANEL: CPT | Performed by: GENERAL PRACTICE

## 2024-10-07 PROCEDURE — 96376 TX/PRO/DX INJ SAME DRUG ADON: CPT

## 2024-10-07 PROCEDURE — 85652 RBC SED RATE AUTOMATED: CPT | Performed by: GENERAL PRACTICE

## 2024-10-07 PROCEDURE — 85025 COMPLETE CBC W/AUTO DIFF WBC: CPT | Performed by: GENERAL PRACTICE

## 2024-10-07 RX ORDER — CLINDAMYCIN HYDROCHLORIDE 150 MG/1
300 CAPSULE ORAL ONCE
Status: COMPLETED | OUTPATIENT
Start: 2024-10-07 | End: 2024-10-07

## 2024-10-07 RX ORDER — ONDANSETRON HYDROCHLORIDE 2 MG/ML
4 INJECTION, SOLUTION INTRAVENOUS ONCE
Status: COMPLETED | OUTPATIENT
Start: 2024-10-07 | End: 2024-10-07

## 2024-10-07 RX ORDER — HYDROMORPHONE HYDROCHLORIDE 1 MG/ML
1 INJECTION, SOLUTION INTRAMUSCULAR; INTRAVENOUS; SUBCUTANEOUS ONCE
Status: COMPLETED | OUTPATIENT
Start: 2024-10-07 | End: 2024-10-07

## 2024-10-07 ASSESSMENT — PAIN SCALES - GENERAL
PAINLEVEL_OUTOF10: 8
PAINLEVEL_OUTOF10: 8
PAINLEVEL_OUTOF10: 3
PAINLEVEL_OUTOF10: 3
PAINLEVEL_OUTOF10: 8
PAINLEVEL_OUTOF10: 8
PAINLEVEL_OUTOF10: 6

## 2024-10-07 ASSESSMENT — PAIN - FUNCTIONAL ASSESSMENT
PAIN_FUNCTIONAL_ASSESSMENT: 0-10

## 2024-10-07 ASSESSMENT — PAIN DESCRIPTION - LOCATION: LOCATION: GROIN

## 2024-10-07 ASSESSMENT — PAIN DESCRIPTION - DESCRIPTORS
DESCRIPTORS: SHARP;SORE;TENDER
DESCRIPTORS: SHARP;TENDER
DESCRIPTORS: SHARP;TENDER
DESCRIPTORS: SHARP
DESCRIPTORS: SHARP;SORE;TENDER

## 2024-10-07 ASSESSMENT — PAIN DESCRIPTION - ORIENTATION: ORIENTATION: RIGHT;LEFT

## 2024-10-07 ASSESSMENT — PAIN SCALES - PAIN ASSESSMENT IN ADVANCED DEMENTIA (PAINAD): TOTALSCORE: MEDICATION (SEE MAR)

## 2024-10-07 NOTE — ED TRIAGE NOTES
PT reports to ED via EMS with complaint of infected boils and an unbearable headache. Pt has a HX of Hidradenitis Suppurative which causes boils to form, condition was previously well managed but due to pregnancy is no longer able to take the required medications. Pt is currently taking injections every 14 days, and clinimycin for infection, but states it is not working.     Pt is seen by MFM, and infectious disease     PT denies Chest pain, pt denies SOB

## 2024-10-08 ENCOUNTER — APPOINTMENT (OUTPATIENT)
Dept: RADIOLOGY | Facility: HOSPITAL | Age: 34
End: 2024-10-08
Payer: MEDICAID

## 2024-10-08 ENCOUNTER — HOSPITAL ENCOUNTER (INPATIENT)
Facility: HOSPITAL | Age: 34
LOS: 2 days | Discharge: HOME | End: 2024-10-10
Attending: STUDENT IN AN ORGANIZED HEALTH CARE EDUCATION/TRAINING PROGRAM | Admitting: STUDENT IN AN ORGANIZED HEALTH CARE EDUCATION/TRAINING PROGRAM
Payer: MEDICAID

## 2024-10-08 VITALS
HEART RATE: 82 BPM | WEIGHT: 183 LBS | BODY MASS INDEX: 34.55 KG/M2 | RESPIRATION RATE: 16 BRPM | HEIGHT: 61 IN | SYSTOLIC BLOOD PRESSURE: 123 MMHG | TEMPERATURE: 98.2 F | OXYGEN SATURATION: 100 % | DIASTOLIC BLOOD PRESSURE: 84 MMHG

## 2024-10-08 DIAGNOSIS — R11.0 NAUSEA: ICD-10-CM

## 2024-10-08 DIAGNOSIS — O26.892 PREGNANCY HEADACHE IN SECOND TRIMESTER (HHS-HCC): Primary | ICD-10-CM

## 2024-10-08 DIAGNOSIS — L73.2 HIDRADENITIS AXILLARIS: ICD-10-CM

## 2024-10-08 DIAGNOSIS — L73.2 HIDRADENITIS SUPPURATIVA: ICD-10-CM

## 2024-10-08 DIAGNOSIS — O21.9 NAUSEA AND VOMITING IN PREGNANCY PRIOR TO 22 WEEKS GESTATION: ICD-10-CM

## 2024-10-08 DIAGNOSIS — R51.9 PREGNANCY HEADACHE IN SECOND TRIMESTER (HHS-HCC): Primary | ICD-10-CM

## 2024-10-08 LAB
ABO GROUP (TYPE) IN BLOOD: NORMAL
ANTIBODY SCREEN: NORMAL
RH FACTOR (ANTIGEN D): NORMAL

## 2024-10-08 PROCEDURE — 2500000001 HC RX 250 WO HCPCS SELF ADMINISTERED DRUGS (ALT 637 FOR MEDICARE OP)

## 2024-10-08 PROCEDURE — 36415 COLL VENOUS BLD VENIPUNCTURE: CPT | Performed by: STUDENT IN AN ORGANIZED HEALTH CARE EDUCATION/TRAINING PROGRAM

## 2024-10-08 PROCEDURE — 2500000004 HC RX 250 GENERAL PHARMACY W/ HCPCS (ALT 636 FOR OP/ED): Performed by: EMERGENCY MEDICINE

## 2024-10-08 PROCEDURE — 70551 MRI BRAIN STEM W/O DYE: CPT

## 2024-10-08 PROCEDURE — 70544 MR ANGIOGRAPHY HEAD W/O DYE: CPT | Mod: 59

## 2024-10-08 PROCEDURE — 2500000004 HC RX 250 GENERAL PHARMACY W/ HCPCS (ALT 636 FOR OP/ED): Performed by: STUDENT IN AN ORGANIZED HEALTH CARE EDUCATION/TRAINING PROGRAM

## 2024-10-08 PROCEDURE — 99254 IP/OBS CNSLTJ NEW/EST MOD 60: CPT | Performed by: STUDENT IN AN ORGANIZED HEALTH CARE EDUCATION/TRAINING PROGRAM

## 2024-10-08 PROCEDURE — 99214 OFFICE O/P EST MOD 30 MIN: CPT

## 2024-10-08 PROCEDURE — 1210000001 HC SEMI-PRIVATE ROOM DAILY

## 2024-10-08 PROCEDURE — 2500000001 HC RX 250 WO HCPCS SELF ADMINISTERED DRUGS (ALT 637 FOR MEDICARE OP): Performed by: STUDENT IN AN ORGANIZED HEALTH CARE EDUCATION/TRAINING PROGRAM

## 2024-10-08 PROCEDURE — 2500000004 HC RX 250 GENERAL PHARMACY W/ HCPCS (ALT 636 FOR OP/ED)

## 2024-10-08 PROCEDURE — 96376 TX/PRO/DX INJ SAME DRUG ADON: CPT

## 2024-10-08 PROCEDURE — 99233 SBSQ HOSP IP/OBS HIGH 50: CPT

## 2024-10-08 PROCEDURE — 2500000005 HC RX 250 GENERAL PHARMACY W/O HCPCS: Performed by: STUDENT IN AN ORGANIZED HEALTH CARE EDUCATION/TRAINING PROGRAM

## 2024-10-08 PROCEDURE — 86901 BLOOD TYPING SEROLOGIC RH(D): CPT | Performed by: STUDENT IN AN ORGANIZED HEALTH CARE EDUCATION/TRAINING PROGRAM

## 2024-10-08 RX ORDER — NIFEDIPINE 10 MG/1
10 CAPSULE ORAL ONCE AS NEEDED
Status: DISCONTINUED | OUTPATIENT
Start: 2024-10-08 | End: 2024-10-10 | Stop reason: HOSPADM

## 2024-10-08 RX ORDER — ACETAMINOPHEN 325 MG/1
975 TABLET ORAL ONCE
Status: COMPLETED | OUTPATIENT
Start: 2024-10-08 | End: 2024-10-08

## 2024-10-08 RX ORDER — HYDROMORPHONE HYDROCHLORIDE 1 MG/ML
2 INJECTION, SOLUTION INTRAMUSCULAR; INTRAVENOUS; SUBCUTANEOUS
Status: DISCONTINUED | OUTPATIENT
Start: 2024-10-08 | End: 2024-10-08

## 2024-10-08 RX ORDER — OXYCODONE HYDROCHLORIDE 5 MG/1
10 TABLET ORAL EVERY 4 HOURS
Status: DISCONTINUED | OUTPATIENT
Start: 2024-10-08 | End: 2024-10-10 | Stop reason: HOSPADM

## 2024-10-08 RX ORDER — LIDOCAINE HYDROCHLORIDE 10 MG/ML
0.5 INJECTION, SOLUTION INFILTRATION; PERINEURAL ONCE AS NEEDED
Status: DISCONTINUED | OUTPATIENT
Start: 2024-10-08 | End: 2024-10-08

## 2024-10-08 RX ORDER — HYDROMORPHONE HYDROCHLORIDE 1 MG/ML
1 INJECTION, SOLUTION INTRAMUSCULAR; INTRAVENOUS; SUBCUTANEOUS
Status: DISCONTINUED | OUTPATIENT
Start: 2024-10-08 | End: 2024-10-08

## 2024-10-08 RX ORDER — BISACODYL 10 MG/1
10 SUPPOSITORY RECTAL DAILY PRN
Status: DISCONTINUED | OUTPATIENT
Start: 2024-10-08 | End: 2024-10-10 | Stop reason: HOSPADM

## 2024-10-08 RX ORDER — NALOXONE HYDROCHLORIDE 4 MG/.1ML
4 SPRAY NASAL AS NEEDED
Status: DISCONTINUED | OUTPATIENT
Start: 2024-10-08 | End: 2024-10-10 | Stop reason: HOSPADM

## 2024-10-08 RX ORDER — OXYCODONE HYDROCHLORIDE 10 MG/1
10 TABLET ORAL EVERY 4 HOURS
Status: DISCONTINUED | OUTPATIENT
Start: 2024-10-08 | End: 2024-10-08

## 2024-10-08 RX ORDER — CLINDAMYCIN HYDROCHLORIDE 300 MG/1
300 CAPSULE ORAL 2 TIMES DAILY
Status: DISCONTINUED | OUTPATIENT
Start: 2024-10-08 | End: 2024-10-09

## 2024-10-08 RX ORDER — ONDANSETRON 4 MG/1
4 TABLET, FILM COATED ORAL EVERY 6 HOURS PRN
Status: DISCONTINUED | OUTPATIENT
Start: 2024-10-08 | End: 2024-10-10 | Stop reason: HOSPADM

## 2024-10-08 RX ORDER — LIDOCAINE HYDROCHLORIDE 10 MG/ML
0.5 INJECTION, SOLUTION INFILTRATION; PERINEURAL ONCE AS NEEDED
Status: DISCONTINUED | OUTPATIENT
Start: 2024-10-08 | End: 2024-10-10 | Stop reason: HOSPADM

## 2024-10-08 RX ORDER — HYDRALAZINE HYDROCHLORIDE 20 MG/ML
5 INJECTION INTRAMUSCULAR; INTRAVENOUS ONCE AS NEEDED
Status: DISCONTINUED | OUTPATIENT
Start: 2024-10-08 | End: 2024-10-10 | Stop reason: HOSPADM

## 2024-10-08 RX ORDER — NIFEDIPINE 10 MG/1
10 CAPSULE ORAL ONCE AS NEEDED
Status: DISCONTINUED | OUTPATIENT
Start: 2024-10-08 | End: 2024-10-08

## 2024-10-08 RX ORDER — LABETALOL HYDROCHLORIDE 5 MG/ML
20 INJECTION, SOLUTION INTRAVENOUS ONCE AS NEEDED
Status: DISCONTINUED | OUTPATIENT
Start: 2024-10-08 | End: 2024-10-10 | Stop reason: HOSPADM

## 2024-10-08 RX ORDER — ONDANSETRON 4 MG/1
4 TABLET, FILM COATED ORAL EVERY 6 HOURS PRN
Status: DISCONTINUED | OUTPATIENT
Start: 2024-10-08 | End: 2024-10-08

## 2024-10-08 RX ORDER — POLYETHYLENE GLYCOL 3350 17 G/17G
17 POWDER, FOR SOLUTION ORAL 2 TIMES DAILY PRN
Status: DISCONTINUED | OUTPATIENT
Start: 2024-10-08 | End: 2024-10-10 | Stop reason: HOSPADM

## 2024-10-08 RX ORDER — ADHESIVE BANDAGE
10 BANDAGE TOPICAL
Status: DISCONTINUED | OUTPATIENT
Start: 2024-10-08 | End: 2024-10-10 | Stop reason: HOSPADM

## 2024-10-08 RX ORDER — HYDRALAZINE HYDROCHLORIDE 20 MG/ML
5 INJECTION INTRAMUSCULAR; INTRAVENOUS ONCE AS NEEDED
Status: DISCONTINUED | OUTPATIENT
Start: 2024-10-08 | End: 2024-10-08

## 2024-10-08 RX ORDER — HYDROMORPHONE HYDROCHLORIDE 1 MG/ML
1 INJECTION, SOLUTION INTRAMUSCULAR; INTRAVENOUS; SUBCUTANEOUS ONCE
Status: COMPLETED | OUTPATIENT
Start: 2024-10-08 | End: 2024-10-08

## 2024-10-08 RX ORDER — LABETALOL HYDROCHLORIDE 5 MG/ML
20 INJECTION, SOLUTION INTRAVENOUS ONCE AS NEEDED
Status: DISCONTINUED | OUTPATIENT
Start: 2024-10-08 | End: 2024-10-08

## 2024-10-08 RX ORDER — ONDANSETRON HYDROCHLORIDE 2 MG/ML
4 INJECTION, SOLUTION INTRAVENOUS EVERY 6 HOURS PRN
Status: DISCONTINUED | OUTPATIENT
Start: 2024-10-08 | End: 2024-10-10 | Stop reason: HOSPADM

## 2024-10-08 RX ORDER — ONDANSETRON HYDROCHLORIDE 2 MG/ML
4 INJECTION, SOLUTION INTRAVENOUS EVERY 6 HOURS PRN
Status: DISCONTINUED | OUTPATIENT
Start: 2024-10-08 | End: 2024-10-08

## 2024-10-08 RX ORDER — ONDANSETRON HYDROCHLORIDE 2 MG/ML
4 INJECTION, SOLUTION INTRAVENOUS ONCE
Status: COMPLETED | OUTPATIENT
Start: 2024-10-08 | End: 2024-10-08

## 2024-10-08 RX ORDER — HYDROMORPHONE HYDROCHLORIDE 1 MG/ML
2 INJECTION, SOLUTION INTRAMUSCULAR; INTRAVENOUS; SUBCUTANEOUS EVERY 2 HOUR PRN
Status: DISCONTINUED | OUTPATIENT
Start: 2024-10-08 | End: 2024-10-10

## 2024-10-08 RX ORDER — CYCLOBENZAPRINE HCL 10 MG
10 TABLET ORAL 2 TIMES DAILY PRN
Status: DISCONTINUED | OUTPATIENT
Start: 2024-10-08 | End: 2024-10-10 | Stop reason: HOSPADM

## 2024-10-08 RX ORDER — ASPIRIN 81 MG/1
162 TABLET ORAL DAILY
Status: DISCONTINUED | OUTPATIENT
Start: 2024-10-08 | End: 2024-10-10 | Stop reason: HOSPADM

## 2024-10-08 RX ORDER — SIMETHICONE 80 MG
80 TABLET,CHEWABLE ORAL 4 TIMES DAILY PRN
Status: DISCONTINUED | OUTPATIENT
Start: 2024-10-08 | End: 2024-10-10 | Stop reason: HOSPADM

## 2024-10-08 RX ORDER — LANOLIN ALCOHOL/MO/W.PET/CERES
400 CREAM (GRAM) TOPICAL 2 TIMES DAILY
Status: DISCONTINUED | OUTPATIENT
Start: 2024-10-08 | End: 2024-10-10 | Stop reason: HOSPADM

## 2024-10-08 RX ORDER — ALBUTEROL SULFATE 90 UG/1
2 INHALANT RESPIRATORY (INHALATION) EVERY 6 HOURS PRN
Status: DISCONTINUED | OUTPATIENT
Start: 2024-10-08 | End: 2024-10-10 | Stop reason: HOSPADM

## 2024-10-08 RX ORDER — ACETAMINOPHEN 325 MG/1
650 TABLET ORAL EVERY 6 HOURS
Status: DISCONTINUED | OUTPATIENT
Start: 2024-10-08 | End: 2024-10-10 | Stop reason: HOSPADM

## 2024-10-08 RX ORDER — SODIUM CHLORIDE, SODIUM LACTATE, POTASSIUM CHLORIDE, CALCIUM CHLORIDE 600; 310; 30; 20 MG/100ML; MG/100ML; MG/100ML; MG/100ML
125 INJECTION, SOLUTION INTRAVENOUS CONTINUOUS
Status: DISCONTINUED | OUTPATIENT
Start: 2024-10-08 | End: 2024-10-10 | Stop reason: HOSPADM

## 2024-10-08 RX ORDER — LIDOCAINE 40 MG/G
CREAM TOPICAL AS NEEDED
Status: DISCONTINUED | OUTPATIENT
Start: 2024-10-08 | End: 2024-10-10 | Stop reason: HOSPADM

## 2024-10-08 RX ORDER — DIPHENHYDRAMINE HCL 25 MG
25 CAPSULE ORAL EVERY 6 HOURS PRN
Status: DISCONTINUED | OUTPATIENT
Start: 2024-10-08 | End: 2024-10-10 | Stop reason: HOSPADM

## 2024-10-08 SDOH — SOCIAL STABILITY: SOCIAL INSECURITY: ARE YOU MARRIED, WIDOWED, DIVORCED, SEPARATED, NEVER MARRIED, OR LIVING WITH A PARTNER?: SEPARATED

## 2024-10-08 SDOH — SOCIAL STABILITY: SOCIAL INSECURITY: HAS ANYONE EVER THREATENED TO HURT YOUR FAMILY OR YOUR PETS?: YES

## 2024-10-08 SDOH — SOCIAL STABILITY: SOCIAL INSECURITY: WITHIN THE LAST YEAR, HAVE YOU BEEN AFRAID OF YOUR PARTNER OR EX-PARTNER?: YES

## 2024-10-08 SDOH — HEALTH STABILITY: MENTAL HEALTH: HAVE YOU USED ANY SUBSTANCES (CANABIS, COCAINE, HEROIN, HALLUCINOGENS, INHALANTS, ETC.) IN THE PAST 12 MONTHS?: NO

## 2024-10-08 SDOH — SOCIAL STABILITY: SOCIAL INSECURITY: DO YOU FEEL ANYONE HAS EXPLOITED OR TAKEN ADVANTAGE OF YOU FINANCIALLY OR OF YOUR PERSONAL PROPERTY?: NO

## 2024-10-08 SDOH — HEALTH STABILITY: PHYSICAL HEALTH: ON AVERAGE, HOW MANY MINUTES DO YOU ENGAGE IN EXERCISE AT THIS LEVEL?: 30 MIN

## 2024-10-08 SDOH — ECONOMIC STABILITY: FOOD INSECURITY: WITHIN THE PAST 12 MONTHS, THE FOOD YOU BOUGHT JUST DIDN'T LAST AND YOU DIDN'T HAVE MONEY TO GET MORE.: SOMETIMES TRUE

## 2024-10-08 SDOH — ECONOMIC STABILITY: FOOD INSECURITY: WITHIN THE PAST 12 MONTHS, YOU WORRIED THAT YOUR FOOD WOULD RUN OUT BEFORE YOU GOT MONEY TO BUY MORE.: SOMETIMES TRUE

## 2024-10-08 SDOH — HEALTH STABILITY: MENTAL HEALTH
DO YOU FEEL STRESS - TENSE, RESTLESS, NERVOUS, OR ANXIOUS, OR UNABLE TO SLEEP AT NIGHT BECAUSE YOUR MIND IS TROUBLED ALL THE TIME - THESE DAYS?: RATHER MUCH

## 2024-10-08 SDOH — ECONOMIC STABILITY: FOOD INSECURITY
WITHIN THE PAST 12 MONTHS, YOU WORRIED THAT YOUR FOOD WOULD RUN OUT BEFORE YOU GOT THE MONEY TO BUY MORE.: SOMETIMES TRUE

## 2024-10-08 SDOH — SOCIAL STABILITY: SOCIAL NETWORK: HOW OFTEN DO YOU GET TOGETHER WITH FRIENDS OR RELATIVES?: THREE TIMES A WEEK

## 2024-10-08 SDOH — HEALTH STABILITY: MENTAL HEALTH: WERE YOU ABLE TO COMPLETE ALL THE BEHAVIORAL HEALTH SCREENINGS?: YES

## 2024-10-08 SDOH — SOCIAL STABILITY: SOCIAL NETWORK: ARE YOU MARRIED, WIDOWED, DIVORCED, SEPARATED, NEVER MARRIED, OR LIVING WITH A PARTNER?: SEPARATED

## 2024-10-08 SDOH — HEALTH STABILITY: PHYSICAL HEALTH: ON AVERAGE, HOW MANY DAYS PER WEEK DO YOU ENGAGE IN MODERATE TO STRENUOUS EXERCISE (LIKE A BRISK WALK)?: 5 DAYS

## 2024-10-08 SDOH — SOCIAL STABILITY: SOCIAL INSECURITY: PHYSICAL ABUSE: YES, PAST (COMMENT)

## 2024-10-08 SDOH — SOCIAL STABILITY: SOCIAL NETWORK: HOW OFTEN DO YOU ATTEND CHURCH OR RELIGIOUS SERVICES?: NEVER

## 2024-10-08 SDOH — SOCIAL STABILITY: SOCIAL NETWORK
DO YOU BELONG TO ANY CLUBS OR ORGANIZATIONS SUCH AS CHURCH GROUPS, UNIONS, FRATERNAL OR ATHLETIC GROUPS, OR SCHOOL GROUPS?: NO

## 2024-10-08 SDOH — ECONOMIC STABILITY: INCOME INSECURITY: HOW HARD IS IT FOR YOU TO PAY FOR THE VERY BASICS LIKE FOOD, HOUSING, MEDICAL CARE, AND HEATING?: SOMEWHAT HARD

## 2024-10-08 SDOH — SOCIAL STABILITY: SOCIAL INSECURITY: HAVE YOU HAD THOUGHTS OF HARMING ANYONE ELSE?: NO

## 2024-10-08 SDOH — SOCIAL STABILITY: SOCIAL INSECURITY: WITHIN THE LAST YEAR, HAVE YOU BEEN HUMILIATED OR EMOTIONALLY ABUSED IN OTHER WAYS BY YOUR PARTNER OR EX-PARTNER?: YES

## 2024-10-08 SDOH — ECONOMIC STABILITY: FOOD INSECURITY: HOW HARD IS IT FOR YOU TO PAY FOR THE VERY BASICS LIKE FOOD, HOUSING, MEDICAL CARE, AND HEATING?: SOMEWHAT HARD

## 2024-10-08 SDOH — ECONOMIC STABILITY: TRANSPORTATION INSECURITY: IN THE PAST 12 MONTHS, HAS LACK OF TRANSPORTATION KEPT YOU FROM MEDICAL APPOINTMENTS OR FROM GETTING MEDICATIONS?: YES

## 2024-10-08 SDOH — SOCIAL STABILITY: SOCIAL INSECURITY: HAVE YOU HAD ANY THOUGHTS OF HARMING ANYONE ELSE?: NO

## 2024-10-08 SDOH — ECONOMIC STABILITY: HOUSING INSECURITY: DO YOU FEEL UNSAFE GOING BACK TO THE PLACE WHERE YOU ARE LIVING?: NO

## 2024-10-08 SDOH — SOCIAL STABILITY: SOCIAL INSECURITY: VERBAL ABUSE: YES, PAST (COMMENT)

## 2024-10-08 SDOH — HEALTH STABILITY: MENTAL HEALTH: HAVE YOU USED ANY PRESCRIPTION DRUGS OTHER THAN PRESCRIBED IN THE PAST 12 MONTHS?: NO

## 2024-10-08 SDOH — SOCIAL STABILITY: SOCIAL INSECURITY: ABUSE SCREEN: ADULT

## 2024-10-08 SDOH — ECONOMIC STABILITY: TRANSPORTATION INSECURITY
IN THE PAST 12 MONTHS, HAS LACK OF TRANSPORTATION KEPT YOU FROM MEETINGS, WORK, OR FROM GETTING THINGS NEEDED FOR DAILY LIVING?: YES

## 2024-10-08 SDOH — SOCIAL STABILITY: SOCIAL INSECURITY
WITHIN THE LAST YEAR, HAVE YOU BEEN RAPED OR FORCED TO HAVE ANY KIND OF SEXUAL ACTIVITY BY YOUR PARTNER OR EX-PARTNER?: YES

## 2024-10-08 SDOH — HEALTH STABILITY: MENTAL HEALTH: NON-SPECIFIC ACTIVE SUICIDAL THOUGHTS (PAST 1 MONTH): NO

## 2024-10-08 SDOH — SOCIAL STABILITY: SOCIAL INSECURITY: ARE YOU OR HAVE YOU BEEN THREATENED OR ABUSED PHYSICALLY, EMOTIONALLY, OR SEXUALLY BY ANYONE?: YES

## 2024-10-08 SDOH — HEALTH STABILITY: MENTAL HEALTH: SUICIDAL BEHAVIOR (LIFETIME): NO

## 2024-10-08 SDOH — SOCIAL STABILITY: SOCIAL NETWORK: HOW OFTEN DO YOU ATTENT MEETINGS OF THE CLUB OR ORGANIZATION YOU BELONG TO?: NEVER

## 2024-10-08 SDOH — HEALTH STABILITY: MENTAL HEALTH: WISH TO BE DEAD (PAST 1 MONTH): NO

## 2024-10-08 SDOH — SOCIAL STABILITY: SOCIAL INSECURITY: DOES ANYONE TRY TO KEEP YOU FROM HAVING/CONTACTING OTHER FRIENDS OR DOING THINGS OUTSIDE YOUR HOME?: NO

## 2024-10-08 SDOH — SOCIAL STABILITY: SOCIAL NETWORK: HOW OFTEN DO YOU ATTEND MEETINGS OF THE CLUBS OR ORGANIZATIONS YOU BELONG TO?: NEVER

## 2024-10-08 SDOH — SOCIAL STABILITY: SOCIAL INSECURITY: ARE THERE ANY APPARENT SIGNS OF INJURIES/BEHAVIORS THAT COULD BE RELATED TO ABUSE/NEGLECT?: NO

## 2024-10-08 ASSESSMENT — PAIN SCALES - GENERAL
PAINLEVEL_OUTOF10: 9
PAINLEVEL_OUTOF10: 8
PAINLEVEL_OUTOF10: 8
PAINLEVEL_OUTOF10: 10 - WORST POSSIBLE PAIN
PAINLEVEL_OUTOF10: 4
PAINLEVEL_OUTOF10: 8
PAINLEVEL_OUTOF10: 8
PAINLEVEL_OUTOF10: 9
PAINLEVEL_OUTOF10: 6
PAINLEVEL_OUTOF10: 8
PAINLEVEL_OUTOF10: 10 - WORST POSSIBLE PAIN
PAINLEVEL_OUTOF10: 8
PAINLEVEL_OUTOF10: 8
PAINLEVEL_OUTOF10: 9
PAINLEVEL_OUTOF10: 9
PAINLEVEL_OUTOF10: 8
PAINLEVEL_OUTOF10: 10 - WORST POSSIBLE PAIN
PAINLEVEL_OUTOF10: 9
PAINLEVEL_OUTOF10: 8

## 2024-10-08 ASSESSMENT — PATIENT HEALTH QUESTIONNAIRE - PHQ9
SUM OF ALL RESPONSES TO PHQ9 QUESTIONS 1 & 2: 0
2. FEELING DOWN, DEPRESSED OR HOPELESS: NOT AT ALL
1. LITTLE INTEREST OR PLEASURE IN DOING THINGS: NOT AT ALL

## 2024-10-08 ASSESSMENT — PAIN - FUNCTIONAL ASSESSMENT
PAIN_FUNCTIONAL_ASSESSMENT: 0-10

## 2024-10-08 ASSESSMENT — PAIN DESCRIPTION - DESCRIPTORS
DESCRIPTORS: SHARP;TENDER;SORE
DESCRIPTORS: HEADACHE
DESCRIPTORS: ACHING;SORE
DESCRIPTORS: SORE;TENDER
DESCRIPTORS: SORE;TENDER
DESCRIPTORS: TENDER
DESCRIPTORS: TENDER
DESCRIPTORS: ACHING
DESCRIPTORS: TENDER
DESCRIPTORS: TENDER
DESCRIPTORS: HEADACHE
DESCRIPTORS: TENDER
DESCRIPTORS: HEADACHE
DESCRIPTORS: SHARP;TENDER;SORE
DESCRIPTORS: SHARP

## 2024-10-08 ASSESSMENT — LIFESTYLE VARIABLES
AUDIT-C TOTAL SCORE: 0
HOW OFTEN DO YOU HAVE 6 OR MORE DRINKS ON ONE OCCASION: NEVER
SKIP TO QUESTIONS 9-10: 1
HOW MANY STANDARD DRINKS CONTAINING ALCOHOL DO YOU HAVE ON A TYPICAL DAY: PATIENT DOES NOT DRINK
HOW OFTEN DO YOU HAVE A DRINK CONTAINING ALCOHOL: NEVER
AUDIT-C TOTAL SCORE: 0

## 2024-10-08 ASSESSMENT — PAIN DESCRIPTION - LOCATION
LOCATION: GROIN
LOCATION: GROIN

## 2024-10-08 ASSESSMENT — ACTIVITIES OF DAILY LIVING (ADL): LACK_OF_TRANSPORTATION: YES
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
## 2024-10-08 NOTE — ED PROVIDER NOTES
HPI   Chief Complaint   Patient presents with    Headache    Hidradenitis Suppurativa       HPI: 34-year-old female with a history of hidradenitis suppurativa currently 21 weeks pregnant presents with pain and possible infection in her axillary region, gluteal cleft and inguinal folds.  She chronically takes clindamycin and oxycodone as needed for pain.  Over the past several days she has had worsening pain and reports a fever at home.  She denies purulent drainage and sick contacts.  She is followed by maternal-fetal medicine at [facility]      Limitations to history: None  Independent Historians: Patient  External Records Reviewed: HIE, outpatient notes, inpatient notes  ------------------------------------------------------------------------------------------------------------------------------------------  ROS: a ten point review of systems was performed and was negative except as per HPI.  ------------------------------------------------------------------------------------------------------------------------------------------  PMH / PSH: as per HPI, otherwise reviewed in EMR  MEDS: as per HPI, otherwise reviewed in EMR  ALLERGIES: as per HPI, otherwise reviewed in EMR  SocH:  as per HPI, otherwise reviewed in EMR  FH:  as per HPI, otherwise reviewed in EMR  ------------------------------------------------------------------------------------------------------------------------------------------  Physical Exam:  VS: As documented in the triage note and EMR flowsheet from this visit was reviewed  General: Uncomfortable appearing. No acute distress.   Eyes:  Extraocular movements grossly intact. No scleral icterus. No discharge  HEENT:  Normocephalic.  Atraumatic  Neck: Moves neck freely. No gross masses  CV: Regular rhythm. No murmurs, rubs or gallops   Resp: Clear to auscultation bilaterally. No respiratory distress.    GI: Soft, no masses, nontender. No rebound tenderness or guarding  MSK:  Symmetric muscle bulk. No deformities. No lower extremity edema.    Skin: Warm, dry, intact.  There is thickened skin and lesions over the axillary regions, inguinal folds and gluteal cleft consistent with her hidradenitis.  No purulent drainage.  The areas are exquisitely tender to touch.  Nurse present at bedside for exam  Neuro: No focal deficits.  A&O x3.   Psych: Appropriate for situation  ------------------------------------------------------------------------------------------------------------------------------------------  Hospital Course / Medical Decision Making:  Independent Interpretations: N/A  EKG as interpreted by me: N/A    MDM: 34-year-old female with a history of hidradenitis suppurativa currently 21 weeks pregnant presents for pain and possible infection.  She is afebrile.  There is a mild leukocytosis.  She was given Dilaudid for pain.  She is followed by maternal-fetal medicine at [facility].  I spoke to Dr. Lockwood from the maternal-fetal medicine service at [facility] who accepted the patient for transfer.  He asked that the patient be given a dose of her clindamycin in the ED.  The patient will be transferred by S to [facility].    Discussion of Management with Other Providers:   I discussed the patient/results with: Emergency medicine team    Final diagnosis and disposition as below.    Results for orders placed or performed during the hospital encounter of 10/07/24  -CBC and Auto Differential:        Result                      Value             Ref Range           WBC                         13.2 (H)          4.4 - 11.3 x*       nRBC                        0.0               0.0 - 0.0 /1*       RBC                         4.84              4.00 - 5.20 *       Hemoglobin                  12.3              12.0 - 16.0 *       Hematocrit                  36.6              36.0 - 46.0 %       MCV                         76 (L)            80 - 100 fL         MCH                          25.4 (L)          26.0 - 34.0 *       MCHC                        33.6              32.0 - 36.0 *       RDW                         14.3              11.5 - 14.5 %       Platelets                   343               150 - 450 x1*       Neutrophils %               68.7              40.0 - 80.0 %       Immature Granulocytes *     1.4 (H)           0.0 - 0.9 %         Lymphocytes %               20.2              13.0 - 44.0 %       Monocytes %                 8.7               2.0 - 10.0 %        Eosinophils %               0.7               0.0 - 6.0 %         Basophils %                 0.3               0.0 - 2.0 %         Neutrophils Absolute        9.05 (H)          1.20 - 7.70 *       Immature Granulocytes *     0.18              0.00 - 0.70 *       Lymphocytes Absolute        2.66              1.20 - 4.80 *       Monocytes Absolute          1.14 (H)          0.10 - 1.00 *       Eosinophils Absolute        0.09              0.00 - 0.70 *       Basophils Absolute          0.04              0.00 - 0.10 *  -Comprehensive metabolic panel:        Result                      Value             Ref Range           Glucose                     83                74 - 99 mg/dL       Sodium                      136               136 - 145 mm*       Potassium                   3.8               3.5 - 5.3 mm*       Chloride                    103               98 - 107 mmo*       Bicarbonate                 24                21 - 32 mmol*       Anion Gap                   13                10 - 20 mmol*       Urea Nitrogen               9                 6 - 23 mg/dL        Creatinine                  0.66              0.50 - 1.05 *       eGFR                        >90               >60 mL/min/1*       Calcium                     9.2               8.6 - 10.3 m*       Albumin                     4.0               3.4 - 5.0 g/*       Alkaline Phosphatase        59                33 - 110 U/L        Total Protein                6.9               6.4 - 8.2 g/*       AST                         12                9 - 39 U/L          Bilirubin, Total            0.3               0.0 - 1.2 mg*       ALT                         9                 7 - 45 U/L     -Sedimentation rate, automated:        Result                      Value             Ref Range           Sedimentation Rate          43 (H)            0 - 20 mm/h    -C-reactive protein:        Result                      Value             Ref Range           C-Reactive Protein          0.60              <1.00 mg/dL    No orders to display                Patient History   Past Medical History:   Diagnosis Date    Anemia 06/16/2024    Hidradenitis suppurativa 10/29/2020    Hidradenitis    Hidradenitis suppurativa     Lupus     UTI (urinary tract infection) during pregnancy, first trimester ([facility]) 07/16/2024    Vitamin D deficiency 06/16/2024     Past Surgical History:   Procedure Laterality Date    OTHER SURGICAL HISTORY  09/19/2022    Arm surgery    OTHER SURGICAL HISTORY Left 2011    Left wrist    OTHER SURGICAL HISTORY Left 2021    Left wrist procedure     No family history on file.  Social History     Tobacco Use    Smoking status: Every Day     Types: Cigarettes    Smokeless tobacco: Never   Vaping Use    Vaping status: Never Used   Substance Use Topics    Alcohol use: Not Currently    Drug use: Never       Physical Exam   ED Triage Vitals [10/07/24 1744]   Temperature Heart Rate Respirations BP   36.6 °C (97.8 °F) 94 18 114/58      Pulse Ox Temp Source Heart Rate Source Patient Position   99 % Temporal Monitor Lying      BP Location FiO2 (%)     Left arm --       Physical Exam      ED Course & MDM                  No data recorded     [place] Coma Scale Score: 15 (10/07/24 1747 : Mojgan Hernandez RN)                           Medical Decision Making      Procedure  Procedures     Juan Carlos Santos,   10/07/24 2964

## 2024-10-08 NOTE — CONSULTS
DERMATOLOGY DEPARTMENT CONSULTATION NOTE  Name: Sandrita Adams  MRN: [id number]  : 1990    Reason for consultation: Hidradenitis flare during pregnancy    History of Present Illness  Sandrita Adams is a 34 y.o. female with a past medical history of current second trimester pregnancy and longstanding hidradenitis suppurativa presented on 10/8/2024 with hidradenitis pain and headache and was admitted for the same. Dermatology was consulted for management of hidradenitis suppurativa flare during pregnancy.    She has a longstanding (>10 year) hx of HS, which per chart review she has been on: adalimumab, infliximab, secukinumab apremilast, spirnolactone, doxycycline, bactrim, clindamycin, levaquin, rifampin, IV dalbavancin, IV vancomycin. She has also gotten ILK injections, which she states never work and she flares very quickly after. Prior to pregnancy, she was most recently on cosentyx which got her the best she has ever been but she stopped medication when becoming pregnant. Cimzia, a TNF antagonist that does not cause the placenta, to avoid any effects on fetus even though it is not approved for HS. She was also on bactrim and doxy prior to pregnancy which were stopped. She follows intermittently with Dr. Elizalde v [facility] and also sees ID at [facility]. She states she has an ID appointment coming up soon. ID at [facility] has previously admitted her for IV abx and had her on suppression with doxy then augmentin, LCV 2024.    Reports that since her last contact with dermatology she has been getting the cimzia on schedule without issue. She is not sure if she got the loading doses, per Dr. Elizalde's notes it seems she had gotten them but today she thinks she did not take them in a loading manner. She thinks her HS is continuing to worsen compared to what she was like on the cosentyx. She is getting new painful regions in the underarms, buttocks, groin, and inguinal folds, they are not yet draining but  she thinks they will start to shortly. She thinks the right axilla is starting to smell and states that it is becoming infected. She has been taking clindamcyin 300 mg BID without interruption and using hibiclens daily. She still thinks it is better than prior to systemic initaition but she is worried it continues to worsen compared to what she was on cosentyx.     Reports the pain is so bad it is giving her headaches.       Review of Systems  As above    Past Medical History  Past Medical History:   Diagnosis Date    Anemia 06/16/2024    Hidradenitis suppurativa 10/29/2020    Hidradenitis    Hidradenitis suppurativa     Lupus     UTI (urinary tract infection) during pregnancy, first trimester ([facility]) 07/16/2024    Vitamin D deficiency 06/16/2024       Past Surgical History   has a past surgical history that includes Other surgical history (09/19/2022); Other surgical history (Left, 2011); and Other surgical history (Left, 2021).     Allergies  Allergies   Allergen Reactions    Suboxone [Buprenorphine-Naloxone] Anaphylaxis, Hives and Itching    Clarithromycin Hives    Haloperidol Hallucinations and Psychosis    Levofloxacin Swelling     Ankle Joint/tendon pain    Metoclopramide Headache, Hallucinations and Psychosis    Reglan [Metoclopramide Hcl] Psychosis       Medications  Scheduled Meds: acetaminophen, 650 mg, oral, q6h  aspirin, 162 mg, oral, Daily  clindamycin, 300 mg, oral, BID  oxyCODONE, 10 mg, oral, q4h  prenatal vitamin (iron-folic), 1 tablet, oral, Daily       Continuous Infusions: lactated Ringer's, 125 mL/hr       PRN Meds: PRN medications: albuterol, bisacodyl, cyclobenzaprine, hydrALAZINE, HYDROmorphone, labetaloL, lidocaine, lidocaine, magnesium hydroxide, naloxone, NIFEdipine, ondansetron **OR** ondansetron, polyethylene glycol, psyllium, simethicone     Family History  No family history on file.    Social History   reports that she has been smoking cigarettes. She has never used smokeless  tobacco. She reports that she does not currently use alcohol. She reports that she does not use drugs.     Objective    Vitals:    10/08/24 0858 10/08/24 0903 10/08/24 1218 10/08/24 1259   BP:  133/69 (!) 143/98 (!) 139/98   BP Location:  Left arm     Patient Position:  Sitting     Pulse: 97 96 96 91   Resp:  19 18    Temp:  36.7 °C (98.1 °F) 36.3 °C (97.3 °F)    TempSrc:  Temporal Temporal    SpO2: 98% 98% 98%    Weight:       Height:            Exam    GEN: no acute distress  NEURO: moving all extremities   EYES: conjunctiva and eyelids normal. No conjunctival injection or erosions appreciated  ENT:   - Lips: normal  - Teeth/gums: normal  - Oropharynx: normal tongue and mucosa  NECK: normal and symmetric.   CV: no varicosities, warmth or tenderness of extremities.  GI: Pregnant abdomen  EXTREMITIES: no distal digital clubbing, cyanosis, petechiae   SKIN: A full body skin exam including scalp, face, eyes, ears, neck, trunk, bilateral upper & lower extremities, toenails and fingernails were examined with the following findings:  - bilateral axillae, gluteal fold, infrabdominal fold, inguinal folds, and labia majora with tender subcutaneous nodules and sinus tracts, none of which are draining spontaneously or productive of drainage when pressure applied  - right axilla with a tender pink papule c/w granulation tissue  - round hyperpigmented patches and atrophic scars in bilateral axillae, gluteal fold, gluteal cleft, abdomen, infrabdominal fold, inguinal folds, mons pubis, lateral thighs      Laboratory and Data  Results for orders placed or performed during the hospital encounter of 10/07/24 (from the past 24 hour(s))   CBC and Auto Differential   Result Value Ref Range    WBC 13.2 (H) 4.4 - 11.3 x10*3/uL    nRBC 0.0 0.0 - 0.0 /100 WBCs    RBC 4.84 4.00 - 5.20 x10*6/uL    Hemoglobin 12.3 12.0 - 16.0 g/dL    Hematocrit 36.6 36.0 - 46.0 %    MCV 76 (L) 80 - 100 fL    MCH 25.4 (L) 26.0 - 34.0 pg    MCHC 33.6 32.0 -  36.0 g/dL    RDW 14.3 11.5 - 14.5 %    Platelets 343 150 - 450 x10*3/uL    Neutrophils % 68.7 40.0 - 80.0 %    Immature Granulocytes %, Automated 1.4 (H) 0.0 - 0.9 %    Lymphocytes % 20.2 13.0 - 44.0 %    Monocytes % 8.7 2.0 - 10.0 %    Eosinophils % 0.7 0.0 - 6.0 %    Basophils % 0.3 0.0 - 2.0 %    Neutrophils Absolute 9.05 (H) 1.20 - 7.70 x10*3/uL    Immature Granulocytes Absolute, Automated 0.18 0.00 - 0.70 x10*3/uL    Lymphocytes Absolute 2.66 1.20 - 4.80 x10*3/uL    Monocytes Absolute 1.14 (H) 0.10 - 1.00 x10*3/uL    Eosinophils Absolute 0.09 0.00 - 0.70 x10*3/uL    Basophils Absolute 0.04 0.00 - 0.10 x10*3/uL   Comprehensive metabolic panel   Result Value Ref Range    Glucose 83 74 - 99 mg/dL    Sodium 136 136 - 145 mmol/L    Potassium 3.8 3.5 - 5.3 mmol/L    Chloride 103 98 - 107 mmol/L    Bicarbonate 24 21 - 32 mmol/L    Anion Gap 13 10 - 20 mmol/L    Urea Nitrogen 9 6 - 23 mg/dL    Creatinine 0.66 0.50 - 1.05 mg/dL    eGFR >90 >60 mL/min/1.73m*2    Calcium 9.2 8.6 - 10.3 mg/dL    Albumin 4.0 3.4 - 5.0 g/dL    Alkaline Phosphatase 59 33 - 110 U/L    Total Protein 6.9 6.4 - 8.2 g/dL    AST 12 9 - 39 U/L    Bilirubin, Total 0.3 0.0 - 1.2 mg/dL    ALT 9 7 - 45 U/L   Sedimentation rate, automated   Result Value Ref Range    Sedimentation Rate 43 (H) 0 - 20 mm/h   C-reactive protein   Result Value Ref Range    C-Reactive Protein 0.60 <1.00 mg/dL        Assessment/Plan       Sandritanasreen Adams is a 34 y.o. female with a past medical history of current second trimester pregnancy and longstanding hidradenitis suppurativa presented on 10/8/2024 with hidradenitis pain and headache and was admitted for the same. Dermatology was consulted for management of hidradenitis suppurativa flare during pregnancy.      Differential diagnoses:  Hidradenitis suppurativa, elliott stage 3, flaring    Impression:  34 F with a history of refractory hidradenitis suppurative elliott stage 3 who had previously been under better control  while on cosentyx, and given the lack of safety data for this drug during pregnancy was changed to cimzia which does not cross the placenta. She is relatively early in her cimzia course but has been hospitalized multiple times due to poor control on this medication, even with the addition of topical hibiclens and PO clindamycin. Cimzia is not itself approved for HS, and her dosing is based off of its other indications and it is unclear if this will be sufficient for her disease. She is on 200 mg q14 days, and there is an alternate dosing of 400 mg q28 days in rheumatoid arthritis but the monthly dose is the same so we will not change. We discussed this again with patient. Being pregnant limits treatment options in this patient. We considered PO steroid taper of 40 mg PO for 5 days then 20 mg PO for 5 days, which M was agreeable to in pregnancy but would provide limited duration of relief and patient has many  more weeks of pregnancy during which her HS needs to be managed. Discussed option of ILK injections today, patient states she never wants them again because they did not work and she thinks she flared. Could also consider changing from clindamycin to bactrim but she has failed this medication in the past. She previously responded to dalbavancin IV which is contraindicated in pregnancy. A 2024 article in [facility] ([facility]. 2024;160(3):312-318. doi:10.1001/jamadermatol.2023.6201 Published online February 14, 2024. ) reports improvement on ertapenem 1 g IV daily for 12-16 weeks. Given that this drug is safe in pregnancy, patient has poorly controlled flare on cimizia but there is little safety data on other biologics in pregnancy, and the relatively long timecourse until delivery (expected delivery date 02/14/2025) would recommend trial. Patient is agreeable to IV antibiotics.     Recommendations:    - CONTINUE cimzia  200 mg q14 days  - engage ID: consider ertapenem 1 g IV daily for 12-14 weeks for HS  flare  - if ertapenem is not approved, consider short course of prednisone PO as above  - keep derm outpatient visit scheduled 11/20    The patient was seen and discussed with attending physician Dr. Castanon. The assessment and plan was communicated to the care team.    Thank you for the consultation and for the opportunity to contribute to the care of this patient.      Carlyn Holt MD, PhD  PGY2, Dermatology  Epic chat (preferred)  Team pager [phone]     I saw and evaluated the patient. I personally obtained the key and critical portions of the history and physical exam or was physically present for key and critical portions performed by the resident. I reviewed the resident's documentation and discussed the patient with the resident. I agree with the resident's medical decision making as documented in the note.    Ivan Castanon MD

## 2024-10-08 NOTE — CARE PLAN
Problem: Pain - Adult  Goal: Verbalizes/displays adequate comfort level or baseline comfort level  Outcome: Not Progressing  Flowsheets (Taken 10/8/2024 1834)  Verbalizes/displays adequate comfort level or baseline comfort level:   Assess pain using appropriate pain scale   Administer analgesics based on type and severity of pain and evaluate response     Problem: Antepartum  Goal: FHR remains reassuring  Outcome: Progressing     Problem: Infection  Goal: Fever/diaphoresis will improve to <38.0 C  Outcome: Progressing  Flowsheets (Taken 10/8/2024 1834)  Fever/diaphoresis will improve to <38.0 C: Monitor VS & labs     Problem: Safety - Adult  Goal: Free from fall injury  Outcome: Progressing  Flowsheets (Taken 10/8/2024 1834)  Free from fall injury: Based on caregiver fall risk screen, instruct family/caregiver to ask for assistance with transferring infant if caregiver noted to have fall risk factors   The patient's goals for the shift include feel better    The clinical goals for the shift include patients pain will be 5/10 or less after pain interventions during shift    Over the shift, the patient did not make progress toward the following goals. Barriers to progression include uncontrolled pain. Recommendations to address these barriers include alternative pain control intervetions.    Problem: Pain - Adult  Goal: Verbalizes/displays adequate comfort level or baseline comfort level  Outcome: Not Progressing  Flowsheets (Taken 10/8/2024 1834)  Verbalizes/displays adequate comfort level or baseline comfort level:   Assess pain using appropriate pain scale   Administer analgesics based on type and severity of pain and evaluate response

## 2024-10-08 NOTE — SIGNIFICANT EVENT
Transfer to [facility]    Sandrita Adams is a 33 y.o.  at 21w4d by 7wk US with PMH of hidradenitis suppurativa who presents for hidradenitis suppurative flare.    Admit to [facility] for ongoing management.  Pain reg: scheduled Tylenol and Oxycodone 10mg q4 and IV dilaudid q2hr PRN for pain    Stable for transfer to [facility]    Haley Wang MD, PGY-4

## 2024-10-08 NOTE — H&P
Obstetrical Admission History and Physical    Chief Complaint: Pain (D/t Hidradenitis suppurativa) and Headache    Assessment/Plan       Sandrita Adams is a 33 y.o.  at 21w4d by 7wk US with PMH of hidradenitis suppurativa who presents for hidradentiis suppurative flare      Hidradenitis suppurativa flare  - Patient with rupture of R axillary abscess on exam, culture from wound sent  - Failed treatment with Augmentin and Clindamycin outpatient, started on IV Vancomycin and Zosyn ( - )  - Scheduled Tylenol and Oxycodone and IV dilaudid q2hr PRN for pain  - For dermatology consult this AM  - s/p Clindamycin at [facility]    IPV  -[facility] stay, now back home  -Pt feeling safe at home, however still in relationship with partner.     Lupus  - In remission, no medications     Asthma  - no home meds  - asymptomatic     Fetal Status   -   on admission   - prenatal labs reviewed and wnl  - Had 1st trimester bleeding, now s/p vaginal progesterone. No longer taking.    To be Seen and discussed with Dr. Steward and MFM attending Dr. Fabián Palmer MD PGY-4  OBGYN      Active Problems:  There are no active Hospital Problems.      Pregnancy Problems (from 24 to present)       Problem Noted Resolved    UTI (urinary tract infection) during pregnancy, first trimester ([facility]) 2024 by Jana Villaseñor RN No          Subjective   33 y.o.  at 21w4d by 7wk US, here for flare of her hidradenitis suppurativa. Patient reports increased pain in the right axilla and groin starting yesterday.  She reports subjective fevers and nausea at home. Denies chest pain or SOB. Presented to [facility] for pain control and received IV dilaudid and clindamycin. Given her GA, transferred to [facility] for further management.        Obstetrical History   OB History    Para Term  AB Living   2 0 0 0 1 0   SAB IAB Ectopic Multiple Live Births   1 0 0 0 0      # Outcome Date GA Lbr Anoop/2nd Weight  Sex Type Anes PTL Lv   2 Current            1 SAB                Past Medical History  Past Medical History:   Diagnosis Date    Anemia 06/16/2024    Hidradenitis suppurativa 10/29/2020    Hidradenitis    Hidradenitis suppurativa     Lupus     UTI (urinary tract infection) during pregnancy, first trimester ([facility]) 07/16/2024    Vitamin D deficiency 06/16/2024        Past Surgical History   Past Surgical History:   Procedure Laterality Date    OTHER SURGICAL HISTORY  09/19/2022    Arm surgery    OTHER SURGICAL HISTORY Left 2011    Left wrist    OTHER SURGICAL HISTORY Left 2021    Left wrist procedure       Social History  Social History     Tobacco Use    Smoking status: Every Day     Types: Cigarettes    Smokeless tobacco: Never   Substance Use Topics    Alcohol use: Not Currently     Substance and Sexual Activity   Drug Use Never       Allergies  Suboxone [buprenorphine-naloxone], Clarithromycin, Haloperidol, Levofloxacin, Metoclopramide, and Reglan [metoclopramide hcl]     Medications  Medications Prior to Admission   Medication Sig Dispense Refill Last Dose    albuterol 90 mcg/actuation inhaler Inhale 2 puffs every 6 hours if needed for wheezing. 18 g 1 Past Month    aspirin 81 mg EC tablet Take 2 tablets (162 mg) by mouth once daily. 60 tablet 11 10/7/2024    Boost 0.04 gram- 1 kcal/mL liquid Please dispense 14 bottles for patient to have BID for 7 days. 237 mL 3 Past Month    certolizumab pegol (Cimzia) injection Inject 2 mL (400 mg) under the skin every 14 (fourteen) days. 4 mL 11 Past Month    magnesium oxide (Mag-Ox) 400 mg (241.3 mg magnesium) tablet Take 1 tablet (400 mg) by mouth 2 times a day. 60 tablet 11 Past Week    multivitamin with minerals tablet Take 1 tablet by mouth once daily.   Past Week    ondansetron ODT (Zofran-ODT) 4 mg disintegrating tablet Take 1 tablet (4 mg) by mouth every 8 hours if needed for nausea or vomiting. 16 tablet 0 Past Week    oxyCODONE-acetaminophen (Percocet)  mg  tablet Take 1 tablet by mouth every 4 hours if needed for moderate pain (4 - 6) or severe pain (7 - 10) for up to 7 days. 42 tablet 0 10/7/2024    -iron fum-folic acid (Prenatal 19) 29 mg iron- 1 mg tablet Take 1 tablet by mouth once daily. 90 tablet 3 10/7/2024    riboflavin (vitamin B2) 100 mg tablet tablet Take 1 tablet (100 mg) by mouth once daily. 60 tablet 2 Past Week    chlorhexidine (Hibiclens) 4 % external liquid Apply topically 2 times a day. Bathe in Hibiclens  mL 0     [] clindamycin (Cleocin) 300 mg capsule Take 1 capsule (300 mg) by mouth 2 times a day for 14 days. 28 capsule 0     cyclobenzaprine (Flexeril) 10 mg tablet Take 1 tablet (10 mg) by mouth 2 times a day as needed for muscle spasms for up to 10 doses. 10 tablet 0     naloxone (Narcan) 4 mg/0.1 mL nasal spray Administer 1 spray (4 mg) into affected nostril(s) if needed for opioid reversal or respiratory depression. May repeat every 2-3 minutes if needed, alternating nostrils, until medical assistance becomes available. 2 each 11        Objective    Last Vitals  Temp Pulse Resp BP MAP O2 Sat   36.3 °C (97.3 °F) 81 16 117/60   100 %     Physical Examination  General: no acute distress  HEENT: normocephalic, atraumatic  Heart: warm and well perfused  Lungs: breathing comfortably on room air  Abdomen: gravid  Extremities: moving all extremities, multiple tracking boils in the bilateral axilla. Groin and mid buttocks with recurrent boils  Neuro: awake and conversant  Psych: appropriate mood and affect      Lab Review  Labs in chart were reviewed.

## 2024-10-09 PROBLEM — L73.2 HIDRADENITIS: Status: RESOLVED | Noted: 2024-10-08 | Resolved: 2024-10-09

## 2024-10-09 PROBLEM — Z3A.21 21 WEEKS GESTATION OF PREGNANCY (HHS-HCC): Status: ACTIVE | Noted: 2024-06-16

## 2024-10-09 PROBLEM — L73.2 HIDRADENITIS AXILLARIS: Status: RESOLVED | Noted: 2024-04-29 | Resolved: 2024-10-09

## 2024-10-09 PROCEDURE — 2500000001 HC RX 250 WO HCPCS SELF ADMINISTERED DRUGS (ALT 637 FOR MEDICARE OP): Performed by: STUDENT IN AN ORGANIZED HEALTH CARE EDUCATION/TRAINING PROGRAM

## 2024-10-09 PROCEDURE — 99232 SBSQ HOSP IP/OBS MODERATE 35: CPT

## 2024-10-09 PROCEDURE — 2500000001 HC RX 250 WO HCPCS SELF ADMINISTERED DRUGS (ALT 637 FOR MEDICARE OP)

## 2024-10-09 PROCEDURE — 2500000004 HC RX 250 GENERAL PHARMACY W/ HCPCS (ALT 636 FOR OP/ED)

## 2024-10-09 PROCEDURE — 1210000001 HC SEMI-PRIVATE ROOM DAILY

## 2024-10-09 PROCEDURE — 99254 IP/OBS CNSLTJ NEW/EST MOD 60: CPT | Performed by: INTERNAL MEDICINE

## 2024-10-09 PROCEDURE — 2500000005 HC RX 250 GENERAL PHARMACY W/O HCPCS: Performed by: STUDENT IN AN ORGANIZED HEALTH CARE EDUCATION/TRAINING PROGRAM

## 2024-10-09 PROCEDURE — 2500000004 HC RX 250 GENERAL PHARMACY W/ HCPCS (ALT 636 FOR OP/ED): Performed by: STUDENT IN AN ORGANIZED HEALTH CARE EDUCATION/TRAINING PROGRAM

## 2024-10-09 RX ORDER — MAGNESIUM SULFATE HEPTAHYDRATE 40 MG/ML
2 INJECTION, SOLUTION INTRAVENOUS ONCE
Status: COMPLETED | OUTPATIENT
Start: 2024-10-09 | End: 2024-10-09

## 2024-10-09 RX ORDER — ERTAPENEM 1 G/1
1 INJECTION, POWDER, LYOPHILIZED, FOR SOLUTION INTRAMUSCULAR; INTRAVENOUS EVERY 24 HOURS
Status: DISCONTINUED | OUTPATIENT
Start: 2024-10-09 | End: 2024-10-10 | Stop reason: HOSPADM

## 2024-10-09 RX ORDER — SUMATRIPTAN 50 MG/1
50 TABLET, FILM COATED ORAL EVERY 6 HOURS PRN
Status: DISCONTINUED | OUTPATIENT
Start: 2024-10-09 | End: 2024-10-10 | Stop reason: HOSPADM

## 2024-10-09 RX ORDER — SUMATRIPTAN 50 MG/1
50 TABLET, FILM COATED ORAL ONCE
Status: COMPLETED | OUTPATIENT
Start: 2024-10-09 | End: 2024-10-09

## 2024-10-09 ASSESSMENT — PAIN - FUNCTIONAL ASSESSMENT

## 2024-10-09 ASSESSMENT — PAIN DESCRIPTION - DESCRIPTORS
DESCRIPTORS: ACHING
DESCRIPTORS: HEADACHE
DESCRIPTORS: TENDER
DESCRIPTORS: ACHING
DESCRIPTORS: HEADACHE
DESCRIPTORS: ACHING
DESCRIPTORS: STABBING
DESCRIPTORS: TENDER
DESCRIPTORS: TENDER
DESCRIPTORS: ACHING
DESCRIPTORS: STABBING
DESCRIPTORS: ACHING

## 2024-10-09 ASSESSMENT — PAIN SCALES - GENERAL
PAINLEVEL_OUTOF10: 8
PAINLEVEL_OUTOF10: 7
PAINLEVEL_OUTOF10: 8
PAINLEVEL_OUTOF10: 8
PAINLEVEL_OUTOF10: 6
PAINLEVEL_OUTOF10: 6
PAINLEVEL_OUTOF10: 9
PAINLEVEL_OUTOF10: 8
PAINLEVEL_OUTOF10: 8
PAINLEVEL_OUTOF10: 6
PAINLEVEL_OUTOF10: 8
PAINLEVEL_OUTOF10: 6
PAINLEVEL_OUTOF10: 7
PAINLEVEL_OUTOF10: 7
PAINLEVEL_OUTOF10: 4
PAINLEVEL_OUTOF10: 8
PAINLEVEL_OUTOF10: 8
PAINLEVEL_OUTOF10: 6
PAINLEVEL_OUTOF10: 7
PAINLEVEL_OUTOF10: 7
PAINLEVEL_OUTOF10: 9
PAINLEVEL_OUTOF10: 8
PAINLEVEL_OUTOF10: 4
PAINLEVEL_OUTOF10: 4

## 2024-10-09 ASSESSMENT — PAIN DESCRIPTION - LOCATION: LOCATION: BUTTOCKS

## 2024-10-09 NOTE — CONSULTS
Inpatient consult to Infectious Diseases  Consult performed by: Mireille Inman MD  Consult ordered by: Alonso Lockwood MD        Primary MD: Lane Grigsby MD  Reason For Consult: 21wks pregnant, Recurrent HS flares, Derm considering extended IV ertapenem treatment       History Of Present Illness  Sandrita Adams is a 34 y.o. female  at 21W4D, and PMH of HS, Lupus, Asthma, Hirsutism, Obesity,  presented on 10/8 for hidradenitis flare.    Patient has long standing history of HS (as below) and presented on 10/7 with pain and possible infection in the axillary region, gluteal cleft and inguinal folds, while she is on Clindamycin and Hibiclens.  She is still on Cimzia but reports its not improving and has noticed new lesions. Was seen by Derm and was recommended IV Ertapenem 1G Q24H.  Patient has an appointment with ID at [facility] on 10/11. Patient has always grown MSSA and Group B Strep.     HS History:  Patient was officially diagnosed with HS in , and currently follows with dermatology at [facility].  She has been previously treated with Hibiclens, clindamycin, isotretinoin, infliximab, doxycycline, clindamycin plus rifampin, Augmentin, levofloxacin, intralesional Kenalog, multiple I&D of index lesion.  She was most recently on Cosentyx which was working well for her but stopped in 2024 after she found out she was pregnant.  Patient's last follow-up with dermatology at [facility] was on 2024,    Patient has past medical history of hidradenitis suppurativa and is seen by infectious disease multiple times in the past.  She has been on p.o. Doxy/Augmentin alternating in between but has not been on doxycycline since 2024 given pregnancy.  Patient has also been on Cosentyx in the past which was also stopped given pregnancy.  She was last seen by ID on 2024 at [facility].  She was also evaluated by dermatology for for pain/drainage from the right axilla.  She was also evaluated by  "surgery at that time and was not recommended any surgical intervention.  Patient was initially treated with IV vancomycin and Zosyn.  She was also seen by dermatology.  Patient was recommended ILK in the outpatient settings along with daily Hibiclens, clindamycin 300 mg twice daily x 3 months, with also a discussion about Certolizumab which had minimal transfer across the placenta but was denied by the insurance given the off label use.  Patient was then discharged from [facility] on 6/19.     In the interim patient was seen by plastic surgery for surgical options for hidradenitis suppurativa and was told that she has limited options which will not \"cure\" the disease, but can reflect on it again after she delivers the baby.  She presented again at [facility] on 6/23 for another hidradenitis flare.  She received IV vancomycin and Zosyn and was discharged on p.o. Bactrim, p.o. Augmentin was stopped and was recommended to continue clindamycin. Cimzia was eventually approved by insurance on 7/10/2024 and patient received the first dose.     WAs admitted again [facility], 7/15 - 7/16 for UTI and was treated with TMP-SMX, and again between 7/22 - 7/24 for HS Flare. Patient underwent RUQ US of soft tissue which demonstrated ~3cm pocket, concerning for abscess. Acute Care Surgery was subsequently consulted who recommended supportive care without acute surgical intervention given evidence that lesions were draining spontaneously. Augmentin was stopped and she was discharged on Fosfomycin and Clindamycin.      Previous Treatment Summary:  1. Isotretinoin - helped acne but did not improve HS   2. Adalimumab - developed hives/bumps  3. Infliximab - missed 2 infusions due to personal family issues   4.  I&D  5. Excision  6. Apremilast: No improvement  7. Spironolactone - stopped due to higher potassium and tingling in hands/ arms. Note hx of PCOS and elevated testosterone.   8. Multiple antibiotics: Doxycycline, bactrim, clindamycin, " levaquin, rifampin - none of these helped. Had previously been on antibiotics but they gave her frequent yeast infections.   9. ILK    Past Medical History  She has a past medical history of Anemia (06/16/2024), Hidradenitis suppurativa (10/29/2020), Hidradenitis suppurativa, Lupus, UTI (urinary tract infection) during pregnancy, first trimester ([facility]) (07/16/2024), and Vitamin D deficiency (06/16/2024).    Surgical History  She has a past surgical history that includes Other surgical history (09/19/2022); Other surgical history (Left, 2011); and Other surgical history (Left, 2021).     Social History     Occupational History    Occupation: Disabled   Tobacco Use    Smoking status: Every Day     Types: Cigarettes    Smokeless tobacco: Never   Vaping Use    Vaping status: Every Day   Substance and Sexual Activity    Alcohol use: Not Currently    Drug use: Never    Sexual activity: Yes     Partners: Male     Travel History   Travel since 09/09/24    No documented travel since 09/09/24       Family History  No family history on file.  Allergies  Suboxone [buprenorphine-naloxone], Clarithromycin, Haloperidol, Levofloxacin, Metoclopramide, and Reglan [metoclopramide hcl]     Immunization History   Administered Date(s) Administered    DT (pediatric) 08/17/1999    DTP 1988, 02/08/1989, 05/09/1989, 02/07/1990    DTaP vaccine, pediatric  (INFANRIX) 08/19/1993    Hep B, Unspecified 08/17/1999    Hepatitis B vaccine, adult *Check Product/Dose* 03/24/2022, 04/10/2024    HiB, unspecified 11/13/1990    MMR vaccine, subcutaneous (MMR II) 02/07/1990, 08/19/1993    OPV 1988, 02/08/1989, 02/07/1990, 08/19/1993    PPD Test 01/30/2022    Pneumococcal polysaccharide vaccine, 23-valent, age 2 years and older (PNEUMOVAX 23) 03/24/2022    Td vaccine, age 7 years and older (TDVAX) 08/17/1999    Tdap vaccine, age 7 year and older (BOOSTRIX, ADACEL) 03/10/2015, 08/20/2023     Medications  Home medications:  Medications Prior to  Admission   Medication Sig Dispense Refill Last Dose    albuterol 90 mcg/actuation inhaler Inhale 2 puffs every 6 hours if needed for wheezing. 18 g 1 Past Month    aspirin 81 mg EC tablet Take 2 tablets (162 mg) by mouth once daily. 60 tablet 11 10/7/2024    Boost 0.04 gram- 1 kcal/mL liquid Please dispense 14 bottles for patient to have BID for 7 days. 237 mL 3 Past Month    certolizumab pegol (Cimzia) injection Inject 2 mL (400 mg) under the skin every 14 (fourteen) days. 4 mL 11 Past Month    magnesium oxide (Mag-Ox) 400 mg (241.3 mg magnesium) tablet Take 1 tablet (400 mg) by mouth 2 times a day. 60 tablet 11 Past Week    multivitamin with minerals tablet Take 1 tablet by mouth once daily.   Past Week    ondansetron ODT (Zofran-ODT) 4 mg disintegrating tablet Take 1 tablet (4 mg) by mouth every 8 hours if needed for nausea or vomiting. 16 tablet 0 Past Week    oxyCODONE-acetaminophen (Percocet)  mg tablet Take 1 tablet by mouth every 4 hours if needed for moderate pain (4 - 6) or severe pain (7 - 10) for up to 7 days. 42 tablet 0 10/7/2024    -iron fum-folic acid (Prenatal 19) 29 mg iron- 1 mg tablet Take 1 tablet by mouth once daily. 90 tablet 3 10/7/2024    riboflavin (vitamin B2) 100 mg tablet tablet Take 1 tablet (100 mg) by mouth once daily. 60 tablet 2 Past Week    chlorhexidine (Hibiclens) 4 % external liquid Apply topically 2 times a day. Bathe in Hibiclens  mL 0     [] clindamycin (Cleocin) 300 mg capsule Take 1 capsule (300 mg) by mouth 2 times a day for 14 days. 28 capsule 0     cyclobenzaprine (Flexeril) 10 mg tablet Take 1 tablet (10 mg) by mouth 2 times a day as needed for muscle spasms for up to 10 doses. 10 tablet 0     naloxone (Narcan) 4 mg/0.1 mL nasal spray Administer 1 spray (4 mg) into affected nostril(s) if needed for opioid reversal or respiratory depression. May repeat every 2-3 minutes if needed, alternating nostrils, until medical assistance becomes  available. 2 each 11      Current medications:  Scheduled medications  acetaminophen, 650 mg, oral, q6h  aspirin, 162 mg, oral, Daily  clindamycin, 300 mg, oral, BID  magnesium oxide, 400 mg, oral, BID  oxyCODONE, 10 mg, oral, q4h  prenatal vitamin (iron-folic), 1 tablet, oral, Daily      Continuous medications  lactated Ringer's, 125 mL/hr      PRN medications  PRN medications: albuterol, bisacodyl, cyclobenzaprine, diphenhydrAMINE, hydrALAZINE, HYDROmorphone, labetaloL, lidocaine, lidocaine, magnesium hydroxide, naloxone, NIFEdipine, ondansetron **OR** ondansetron, polyethylene glycol, psyllium, simethicone    Review of Systems  Negative except as HPI  Objective  Range of Vitals (last 24 hours)  Heart Rate:  []   Temp:  [36.2 °C (97.2 °F)-36.9 °C (98.4 °F)]   Resp:  [18-19]   BP: (127-143)/(77-98)   SpO2:  [98 %-99 %]   Daily Weight  10/08/24 : 82.3 kg (181 lb 7 oz)    Body mass index is 34.28 kg/m².     Physical Exam  General: Patient is laying in bed, in no acute distress.   HEENT: Anicteric sclera, no conjunctival pallor. No oral sores/ulcers. No dental caries.   CVS: S1/S2 audible, no M/G/R.  Resp: Breathing comfortably on RA. Normal vesicular breathing. No wheezing, crackles or rhonchi auscultated.   Abd: Gravid, non tender, no organomegaly was appreciated. +BS.  CNS: AAO x4. No gross focal deficits appreciated.  Skin: Multiple lesions noted in axilla, growing, gluteal clefts, abdomen.. Lesions were also painful.     Relevant Results  Outside Hospital Results  Yes -   Labs  Results from last 72 hours   Lab Units 10/07/24  1904   WBC AUTO x10*3/uL 13.2*   HEMOGLOBIN g/dL 12.3   HEMATOCRIT % 36.6   PLATELETS AUTO x10*3/uL 343   NEUTROS PCT AUTO % 68.7   LYMPHS PCT AUTO % 20.2   MONOS PCT AUTO % 8.7   EOS PCT AUTO % 0.7     Results from last 72 hours   Lab Units 10/07/24  1904   SODIUM mmol/L 136   POTASSIUM mmol/L 3.8   CHLORIDE mmol/L 103   CO2 mmol/L 24   BUN mg/dL 9   CREATININE mg/dL 0.66   GLUCOSE  "mg/dL 83   CALCIUM mg/dL 9.2   ANION GAP mmol/L 13   EGFR mL/min/1.73m*2 >90     Results from last 72 hours   Lab Units 10/07/24  1904   ALK PHOS U/L 59   BILIRUBIN TOTAL mg/dL 0.3   PROTEIN TOTAL g/dL 6.9   ALT U/L 9   AST U/L 12   ALBUMIN g/dL 4.0     Estimated Creatinine Clearance: 114.6 mL/min (by C-G formula based on SCr of 0.66 mg/dL).  C-Reactive Protein   Date Value Ref Range Status   10/07/2024 0.60 <1.00 mg/dL Final   2024 1.61 (H) <1.00 mg/dL Final     CRP   Date Value Ref Range Status   2022 0.88 mg/dL Final     Comment:     REF VALUE  < 1.00       Sedimentation Rate   Date Value Ref Range Status   10/07/2024 43 (H) 0 - 20 mm/h Final   2024 27 (H) 0 - 20 mm/h Final   2022 8 0 - 20 mm/h Final     No results found for: \"HIV1X2\", \"HIVCONF\", \"PKZTXF7EH\"  No results found for: \"HEPCABINIT\", \"HEPCAB\", \"HCVPCRQUANT\"  Microbiology  Susceptibility data from last 90 days.  Collected Specimen Info Organism Clindamycin Erythromycin Oxacillin Tetracycline Trimethoprim/Sulfamethoxazole Vancomycin   24 Tissue/Biopsy from Wound/Tissue Methicillin Susceptible Staphylococcus aureus (MSSA)  S  S  S  S  S  S     Mixed Gram-Positive and Gram-Negative Bacteria         24 Tissue/Biopsy from Wound/Tissue Methicillin Susceptible Staphylococcus aureus (MSSA)  S  S  S  S  S  S     Staphylococcus lugdunensis  R  R  S  S  S  S     Mixed Anaerobic Bacteria               Assessment/Plan   34 y.o. female  at 21W4D, and PMH of HS, Lupus, Asthma, Hirsutism, Obesity,  presented on 10/8 for hidradenitis flare.    Patient has long standing history of HS (as above) and presented on 10/7 with pain and possible infection in the axillary region, gluteal cleft and inguinal folds, while she was on Clindamycin and Hibiclens.  She is still on Cimzia but reports its not improving and has noticed new lesions. Was seen by Derm and was recommended IV Ertapenem 1G Q24H.  Patient has an appointment with ID at " [facility] on 10/11. Patient has always grown MSSA and Group B Strep.     Antibiotics are typically effective in early stage HS; however, in this patient’s advanced stage (likely Stage 4?), they are less beneficial. Biologics, particularly Cosentyx, have been effective in the past, but due to her pregnancy, they are contraindicated. Given the limited treatment options, recommend holding off on pain medications and initiating IV ertapenem via peripheral IV (as the patient declined a PICC line). If pain persists despite IV ertapenem and withholding pain medications, it would suggest antibiotics are ineffective, and they can be avoided for the remainder of her pregnancy while continuing oral medications.    Clinical Impression: HS Flare    Recommendations:  Continue IV Ertapenem 1G Q24H through PIV.   Hold off on PICC line.     Plan was discussed with ID attending Dr Amanda Martinez  We will continue to follow the patient.     Mireille Inman MD  PGY5, ID Fellow.   Team A Pager: [phone]  For new consults, contact pager [phone].   EPIC chat preferred.    Mireille Inman MD
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
## 2024-10-10 ENCOUNTER — PHARMACY VISIT (OUTPATIENT)
Dept: PHARMACY | Facility: CLINIC | Age: 34
End: 2024-10-10
Payer: MEDICAID

## 2024-10-10 ENCOUNTER — APPOINTMENT (OUTPATIENT)
Dept: MATERNAL FETAL MEDICINE | Facility: CLINIC | Age: 34
End: 2024-10-10
Payer: MEDICAID

## 2024-10-10 VITALS
DIASTOLIC BLOOD PRESSURE: 68 MMHG | TEMPERATURE: 98.4 F | RESPIRATION RATE: 18 BRPM | BODY MASS INDEX: 34.26 KG/M2 | HEIGHT: 61 IN | WEIGHT: 181.44 LBS | HEART RATE: 90 BPM | OXYGEN SATURATION: 98 % | SYSTOLIC BLOOD PRESSURE: 108 MMHG

## 2024-10-10 PROCEDURE — 2500000004 HC RX 250 GENERAL PHARMACY W/ HCPCS (ALT 636 FOR OP/ED)

## 2024-10-10 PROCEDURE — 2500000001 HC RX 250 WO HCPCS SELF ADMINISTERED DRUGS (ALT 637 FOR MEDICARE OP): Performed by: STUDENT IN AN ORGANIZED HEALTH CARE EDUCATION/TRAINING PROGRAM

## 2024-10-10 PROCEDURE — RXMED WILLOW AMBULATORY MEDICATION CHARGE

## 2024-10-10 PROCEDURE — 2500000001 HC RX 250 WO HCPCS SELF ADMINISTERED DRUGS (ALT 637 FOR MEDICARE OP)

## 2024-10-10 PROCEDURE — 2500000004 HC RX 250 GENERAL PHARMACY W/ HCPCS (ALT 636 FOR OP/ED): Performed by: STUDENT IN AN ORGANIZED HEALTH CARE EDUCATION/TRAINING PROGRAM

## 2024-10-10 RX ORDER — HYDROMORPHONE HYDROCHLORIDE 1 MG/ML
2 INJECTION, SOLUTION INTRAMUSCULAR; INTRAVENOUS; SUBCUTANEOUS
Status: DISCONTINUED | OUTPATIENT
Start: 2024-10-10 | End: 2024-10-10 | Stop reason: HOSPADM

## 2024-10-10 RX ORDER — SUMATRIPTAN 50 MG/1
50 TABLET, FILM COATED ORAL EVERY 6 HOURS PRN
Qty: 7 TABLET | Refills: 0 | Status: ON HOLD | OUTPATIENT
Start: 2024-10-10 | End: 2024-10-17

## 2024-10-10 RX ORDER — LACTOSE-REDUCED FOOD 0.04G-1/ML
LIQUID (ML) ORAL
Qty: 237 ML | Refills: 3 | Status: SHIPPED | OUTPATIENT
Start: 2024-10-10

## 2024-10-10 RX ORDER — OXYCODONE AND ACETAMINOPHEN 10; 325 MG/1; MG/1
1 TABLET ORAL EVERY 4 HOURS PRN
Qty: 42 TABLET | Refills: 0 | Status: ON HOLD | OUTPATIENT
Start: 2024-10-10 | End: 2024-10-17

## 2024-10-10 RX ORDER — ONDANSETRON HYDROCHLORIDE 2 MG/ML
8 INJECTION, SOLUTION INTRAVENOUS EVERY 8 HOURS PRN
Status: DISCONTINUED | OUTPATIENT
Start: 2024-10-10 | End: 2024-10-10 | Stop reason: HOSPADM

## 2024-10-10 RX ORDER — OXYCODONE AND ACETAMINOPHEN 10; 325 MG/1; MG/1
1 TABLET ORAL EVERY 4 HOURS PRN
Qty: 28 TABLET | Refills: 0 | Status: SHIPPED | OUTPATIENT
Start: 2024-10-10 | End: 2024-10-10

## 2024-10-10 RX ORDER — CLINDAMYCIN HYDROCHLORIDE 300 MG/1
300 CAPSULE ORAL 2 TIMES DAILY
Qty: 28 CAPSULE | Refills: 0 | Status: SHIPPED | OUTPATIENT
Start: 2024-10-10 | End: 2024-10-17 | Stop reason: HOSPADM

## 2024-10-10 RX ORDER — ACETAMINOPHEN 325 MG/1
650 TABLET ORAL EVERY 6 HOURS
Qty: 60 TABLET | Refills: 0 | Status: ON HOLD | OUTPATIENT
Start: 2024-10-10 | End: 2024-10-17

## 2024-10-10 RX ORDER — ONDANSETRON 4 MG/1
4 TABLET, ORALLY DISINTEGRATING ORAL EVERY 8 HOURS PRN
Qty: 16 TABLET | Refills: 0 | Status: ON HOLD | OUTPATIENT
Start: 2024-10-10 | End: 2024-10-17

## 2024-10-10 ASSESSMENT — PAIN DESCRIPTION - DESCRIPTORS
DESCRIPTORS: STABBING
DESCRIPTORS: HEADACHE

## 2024-10-10 ASSESSMENT — PAIN SCALES - GENERAL
PAINLEVEL_OUTOF10: 7
PAINLEVEL_OUTOF10: 4
PAINLEVEL_OUTOF10: 7
PAINLEVEL_OUTOF10: 4
PAINLEVEL_OUTOF10: 7

## 2024-10-10 ASSESSMENT — PAIN - FUNCTIONAL ASSESSMENT
PAIN_FUNCTIONAL_ASSESSMENT: 0-10

## 2024-10-10 NOTE — CONSULTS
Inpatient consult to Neurology  Consult performed by: Faraz Yu MD  Consult ordered by: Alonso Lockwood MD  Reason for consult: Unremitting 10/10 headache        History Of Present Illness  Sandrita Adams is a 34 y.o. female  at 21w4d by 7wk US with a past medical history significant for lupus, asthma, and chronic hidradenitis suppurativa who initially presented on 10/8 presents for hidradentiis suppurative flare and severe unremitting headaches.  Patient reports that headaches started 2 days ago on 10/7, patient noticed some mild headache around noon and took a nap, when she awoke ~4 hours later headache was significantly worse.  She has had intermittent nausea with her headache, and had one episode of emesis while in the ED on 10/8.  She reports no photophobia or phonophobia with her headache.  She states that her headache is slightly better when she lies flat.  She states the pain is on the front and top of her head, slightly worse on the right side.  She reports no recent illnesses, no fever, no neck stiffness, and no dizziness.  Patient reports that she is also experiencing a particularly bad flare of her Hidraenitis Suppurativa (HS).    Patient states that in the past when she has had HS flares they have been accompanied by headaches, but none of this intensity, duration, or unremitting nature.  Normally in the past when patient has a HS flare it is accompanied by a bitemporal headache that is responsive to pain medication.  Patient takes percocet 10/325 q4 for pain related to her HS outpatient, states she is taking medications consistently every 4 hours at baseline.  Headaches usually resolve on their own or are responsive to her pain medicine, she has never had an episode previously like this one.    Patient reports that she was previously on Cosentyx (secukinumab, anti-IL 17A mAb), however was taken off this medication several months ago due to it being unsafe for pregnancy.  She was  instead started on Cimzia (anti-TNF fmAb) with one dose in June, then subsequently did not receive more for months due to insurance issue.  Patient recently received coverage approval for further Cimzia, with recent dose this past Sunday.  Patient was told that headaches were a known side effect of this medication prior to administration, and was concerned about stopping Cosentyx due to it being highly efficacious in controlling her HS flares previously.    Past Medical History  Past Medical History:   Diagnosis Date    Anemia 06/16/2024    Hidradenitis suppurativa 10/29/2020    Hidradenitis    Hidradenitis suppurativa     Lupus     UTI (urinary tract infection) during pregnancy, first trimester ([facility]) 07/16/2024    Vitamin D deficiency 06/16/2024     Surgical History  Past Surgical History:   Procedure Laterality Date    OTHER SURGICAL HISTORY  09/19/2022    Arm surgery    OTHER SURGICAL HISTORY Left 2011    Left wrist    OTHER SURGICAL HISTORY Left 2021    Left wrist procedure     Social History  Social History     Tobacco Use    Smoking status: Every Day     Types: Cigarettes    Smokeless tobacco: Never   Vaping Use    Vaping status: Every Day   Substance Use Topics    Alcohol use: Not Currently    Drug use: Never     Allergies  Suboxone [buprenorphine-naloxone], Clarithromycin, Haloperidol, Levofloxacin, Metoclopramide, and Reglan [metoclopramide hcl]  Medications Prior to Admission   Medication Sig Dispense Refill Last Dose    albuterol 90 mcg/actuation inhaler Inhale 2 puffs every 6 hours if needed for wheezing. 18 g 1 Past Month    aspirin 81 mg EC tablet Take 2 tablets (162 mg) by mouth once daily. 60 tablet 11 10/7/2024    Boost 0.04 gram- 1 kcal/mL liquid Please dispense 14 bottles for patient to have BID for 7 days. 237 mL 3 Past Month    certolizumab pegol (Cimzia) injection Inject 2 mL (400 mg) under the skin every 14 (fourteen) days. 4 mL 11 Past Month    magnesium oxide (Mag-Ox) 400 mg (241.3 mg  magnesium) tablet Take 1 tablet (400 mg) by mouth 2 times a day. 60 tablet 11 Past Week    multivitamin with minerals tablet Take 1 tablet by mouth once daily.   Past Week    ondansetron ODT (Zofran-ODT) 4 mg disintegrating tablet Take 1 tablet (4 mg) by mouth every 8 hours if needed for nausea or vomiting. 16 tablet 0 Past Week    oxyCODONE-acetaminophen (Percocet)  mg tablet Take 1 tablet by mouth every 4 hours if needed for moderate pain (4 - 6) or severe pain (7 - 10) for up to 7 days. 42 tablet 0 10/7/2024    -iron fum-folic acid (Prenatal 19) 29 mg iron- 1 mg tablet Take 1 tablet by mouth once daily. 90 tablet 3 10/7/2024    riboflavin (vitamin B2) 100 mg tablet tablet Take 1 tablet (100 mg) by mouth once daily. 60 tablet 2 Past Week    chlorhexidine (Hibiclens) 4 % external liquid Apply topically 2 times a day. Bathe in Hibiclens  mL 0     [] clindamycin (Cleocin) 300 mg capsule Take 1 capsule (300 mg) by mouth 2 times a day for 14 days. 28 capsule 0     cyclobenzaprine (Flexeril) 10 mg tablet Take 1 tablet (10 mg) by mouth 2 times a day as needed for muscle spasms for up to 10 doses. 10 tablet 0     naloxone (Narcan) 4 mg/0.1 mL nasal spray Administer 1 spray (4 mg) into affected nostril(s) if needed for opioid reversal or respiratory depression. May repeat every 2-3 minutes if needed, alternating nostrils, until medical assistance becomes available. 2 each 11      PHYSICAL EXAM:  - No nuchal rigidity  - Kernig sign negative  - Brudzkinski sign negative    GENERAL APPEARANCE:  No distress, alert, interactive and cooperative.     MENTAL STATE: Orientation was normal to time, place and person.  Recent and remote memory was intact.  Attention span and concentration were normal.  Able to follow simple commands, able to follow complex commands across midline.  Language testing was normal for comprehension, naming, repetition and expression.  General fund of knowledge was intact.       CRANIAL NERVES:   CN II, III, IV, VI: Visual fields full to confrontation.  Visual fields intact to finger-count in all quadrants, though patient reports subjective blurriness in right eye (no appreciable right/left difference on exam).  Pupils round, 2-3 mm in diameter, minimally reactive to light. Lids symmetric; no ptosis. EOMs normal alignment, full range with normal pursuit and convergence. No spontaneous or gaze-evoked nystagmus.  Optic disc border sharp in both eyes.  Snellen Chart:  Right eye: 1 error on 20/20 line  Left eye: 1 error on 20/20 line  CN V: Decreased sensation to light touch in left V1 and V3 branches, sensation to light touch in V2 branches symmetric and intact  CN VII: Nasolabial folds symmetric.  Oris orbicularis normal strength and symmetric with baring teeth.  Oris oculi normal strength and symmetric with eyebrow raise, eyelid closure able to resist opening   CN VIII: Hearing intact and symmetric bilaterally to conversation and finger rub  CN IX, X: Gag reflex triggers symmetric palate elevation   CN XI: Normal strength of shoulder shrug, normal strength of head rotation (SCM)  CN XII: Tongue midline, with normal bulk and strength; no fasciculations.  Lateral movements intact and symmetric.    MOTOR: Muscle bulk was normal and tone was normal in both upper and lower extremities. No adventitious movements.                            R       L  UPPER EXTREMITY:  Deltoid                  5       5  Lat Dors           5       5  Bicep                5       5  Tricep               5       5                  5       5  Finger Abduction       5       5    LOWER EXTREMITY:  Hip Flexion             5       5  Hip Extension            5       5  Leg Ext             5       5  Leg Flex            5       5  Dorsiflexion                   5       5  Plantarflexion                   5       5     REFLEXES:   RIGHT UE  LEFT UE   Biceps:3  Biceps:3     Triceps:2   Triceps:2       RIGHT LLE  LEFT  "LLE     Patellar:3  Patellar:3     Ankle:2   Ankle:2     Ankle Clonus: Absent bilaterally    Babinski: Toes downgoing to plantar stimulation.     SENSORY: Sensory exam was abnormal and asymmetric. Patient reported decrease sensation to light touch in right arm and leg compared to left.  Patient reported on the medial aspect of her right ankle sensation was reduced to 55%, on the lateral aspect of the right ankle sensation was reduced to 67%, and on her right forearm sensation was approximately 78% of the left.    COORDINATION: Coordination exam was normal. In both upper extremities, finger-nose-finger was intact without dysmetria or overshoot. In both lower extremities, heel-to-shin was intact.     GAIT: Gait testing not performed.    Last Recorded Vitals  Blood pressure 111/64, pulse 90, temperature 36.9 °C (98.4 °F), temperature source Temporal, resp. rate 18, height 1.549 m (5' 1\"), weight 82.3 kg (181 lb 7 oz), last menstrual period 05/18/2024, SpO2 99%.    Relevant Results    Scheduled medications  acetaminophen, 650 mg, oral, q6h  aspirin, 162 mg, oral, Daily  ertapenem, 1 g, intravenous, q24h  magnesium oxide, 400 mg, oral, BID  oxyCODONE, 10 mg, oral, q4h  prenatal vitamin (iron-folic), 1 tablet, oral, Daily    Continuous medications  lactated Ringer's, 125 mL/hr    PRN medications  PRN medications: albuterol, bisacodyl, cyclobenzaprine, diphenhydrAMINE, hydrALAZINE, HYDROmorphone, labetaloL, lidocaine, lidocaine, magnesium hydroxide, naloxone, NIFEdipine, ondansetron **OR** ondansetron, polyethylene glycol, psyllium, simethicone, SUMAtriptan    No results found for this or any previous visit (from the past 24 hour(s)).        I have personally reviewed the following imaging results MR venography intracranial wo IV contrast    Result Date: 10/9/2024  Interpreted By:  Merlin Madison, STUDY: MR BRAIN WO IV CONTRAST; MR ANGIO HEAD WO IV CONTRAST; MR VENOGRAPHY INTRACRANIAL WO IV CONTRAST;  10/8/2024 10:20 pm   " INDICATION: Signs/Symptoms:intractable headache, pregnant patient.   COMPARISON: None.   ACCESSION NUMBER(S): [id number]; [id number]; [id number]   ORDERING CLINICIAN: JUDY BARAHONA   TECHNIQUE: Axial diffusion, axial T2, axial FLAIR, axial gradient echo T2, coronal T1, and sagittal T1 weighted MRI images of the brain were obtained without intravenous contrast administration. Time-of-flight MRA and MRV images of the intracranial vessels were obtained. The source MRA and MRV images were reformatted in multiple planes.   FINDINGS: The diffusion weighted images fail to demonstrate evidence of abnormal diffusion restriction to suggest acute infarction.   The ventricular system is nondilated. There is minimal asymmetry in the size of the temporal horns of the lateral ventricles with the right temporal horn noted be slightly larger when compared with the left which may very well fall within the range of normal variation although mild asymmetric surrounding brain parenchymal volume loss with compensatory dilatation of the temporal horn of the right lateral ventricle could give a similar appearance and this latter possibility therefore can not be completely excluded.   There is a focus of nonspecific periventricular white matter changes noted adjacent to the frontal horn of the left lateral ventricle. In retrospect, the finding was likely present on the prior CT study dated 03/27/2024. While nonspecific, white matter changes can be seen with demyelinating processes or small-vessel ischemic change among others.   There is a minimal mucosal thickening noted within a few scattered ethmoid air cells.     The intracranial MRA demonstrates no significant focal stenosis or aneurysm.   The intracranial MRV demonstrates a right dominant transverse and sigmoid sinus representing a variant of normal. There is segmental narrowing noted along the left transverse sinus which may be related to congenital hypoplasia with the  possibility of secondary narrowing/stricturing not excluded. The remainder of the intracranial MRV is within normal limits.       There is no MRI evidence of acute infarction on the diffusion weighted images.   The ventricular system is nondilated. There is minimal asymmetry in the size of the temporal horns of the lateral ventricles with the right temporal horn noted be slightly larger when compared with the left which may very well fall within the range of normal variation although mild asymmetric surrounding brain parenchymal volume loss with compensatory dilatation of the temporal horn of the right lateral ventricle could give a similar appearance and this latter possibility therefore can not be completely excluded.   There is a focus of nonspecific periventricular white matter changes noted adjacent to the frontal horn of the left lateral ventricle. In retrospect, the finding was likely present on the prior CT study dated 03/27/2024. While nonspecific, white matter changes can be seen with demyelinating processes or small-vessel ischemic change among others.   The intracranial MRA demonstrates no significant focal stenosis or aneurysm.   The intracranial MRV demonstrates a right dominant transverse and sigmoid sinus representing a variant of normal. There is segmental narrowing noted along the left transverse sinus which may be related to congenital hypoplasia with the possibility of secondary narrowing/stricturing not excluded. The remainder of the intracranial MRV is within normal limits.     MACRO: None.   Signed by: Merlin Madiosn 10/9/2024 8:01 AM Dictation workstation:   [id number]    MR angio head wo IV contrast    Result Date: 10/9/2024  Interpreted By:  Merlin Madison, STUDY: MR BRAIN WO IV CONTRAST; MR ANGIO HEAD WO IV CONTRAST; MR VENOGRAPHY INTRACRANIAL WO IV CONTRAST;  10/8/2024 10:20 pm   INDICATION: Signs/Symptoms:intractable headache, pregnant patient.   COMPARISON: None.   ACCESSION  NUMBER(S): [id number]; [id number]; [id number]   ORDERING CLINICIAN: JUDY BARAHONA   TECHNIQUE: Axial diffusion, axial T2, axial FLAIR, axial gradient echo T2, coronal T1, and sagittal T1 weighted MRI images of the brain were obtained without intravenous contrast administration. Time-of-flight MRA and MRV images of the intracranial vessels were obtained. The source MRA and MRV images were reformatted in multiple planes.   FINDINGS: The diffusion weighted images fail to demonstrate evidence of abnormal diffusion restriction to suggest acute infarction.   The ventricular system is nondilated. There is minimal asymmetry in the size of the temporal horns of the lateral ventricles with the right temporal horn noted be slightly larger when compared with the left which may very well fall within the range of normal variation although mild asymmetric surrounding brain parenchymal volume loss with compensatory dilatation of the temporal horn of the right lateral ventricle could give a similar appearance and this latter possibility therefore can not be completely excluded.   There is a focus of nonspecific periventricular white matter changes noted adjacent to the frontal horn of the left lateral ventricle. In retrospect, the finding was likely present on the prior CT study dated 03/27/2024. While nonspecific, white matter changes can be seen with demyelinating processes or small-vessel ischemic change among others.   There is a minimal mucosal thickening noted within a few scattered ethmoid air cells.     The intracranial MRA demonstrates no significant focal stenosis or aneurysm.   The intracranial MRV demonstrates a right dominant transverse and sigmoid sinus representing a variant of normal. There is segmental narrowing noted along the left transverse sinus which may be related to congenital hypoplasia with the possibility of secondary narrowing/stricturing not excluded. The remainder of the intracranial MRV is  within normal limits.       There is no MRI evidence of acute infarction on the diffusion weighted images.   The ventricular system is nondilated. There is minimal asymmetry in the size of the temporal horns of the lateral ventricles with the right temporal horn noted be slightly larger when compared with the left which may very well fall within the range of normal variation although mild asymmetric surrounding brain parenchymal volume loss with compensatory dilatation of the temporal horn of the right lateral ventricle could give a similar appearance and this latter possibility therefore can not be completely excluded.   There is a focus of nonspecific periventricular white matter changes noted adjacent to the frontal horn of the left lateral ventricle. In retrospect, the finding was likely present on the prior CT study dated 03/27/2024. While nonspecific, white matter changes can be seen with demyelinating processes or small-vessel ischemic change among others.   The intracranial MRA demonstrates no significant focal stenosis or aneurysm.   The intracranial MRV demonstrates a right dominant transverse and sigmoid sinus representing a variant of normal. There is segmental narrowing noted along the left transverse sinus which may be related to congenital hypoplasia with the possibility of secondary narrowing/stricturing not excluded. The remainder of the intracranial MRV is within normal limits.     MACRO: None.   Signed by: Merlin Madison 10/9/2024 8:01 AM Dictation workstation:   [id number]    MR brain wo IV contrast    Result Date: 10/9/2024  Interpreted By:  Merlin Madison, STUDY: MR BRAIN WO IV CONTRAST; MR ANGIO HEAD WO IV CONTRAST; MR VENOGRAPHY INTRACRANIAL WO IV CONTRAST;  10/8/2024 10:20 pm   INDICATION: Signs/Symptoms:intractable headache, pregnant patient.   COMPARISON: None.   ACCESSION NUMBER(S): [id number]; [id number]; [id number]   ORDERING CLINICIAN: JUDY BARAHONA   TECHNIQUE: Axial  diffusion, axial T2, axial FLAIR, axial gradient echo T2, coronal T1, and sagittal T1 weighted MRI images of the brain were obtained without intravenous contrast administration. Time-of-flight MRA and MRV images of the intracranial vessels were obtained. The source MRA and MRV images were reformatted in multiple planes.   FINDINGS: The diffusion weighted images fail to demonstrate evidence of abnormal diffusion restriction to suggest acute infarction.   The ventricular system is nondilated. There is minimal asymmetry in the size of the temporal horns of the lateral ventricles with the right temporal horn noted be slightly larger when compared with the left which may very well fall within the range of normal variation although mild asymmetric surrounding brain parenchymal volume loss with compensatory dilatation of the temporal horn of the right lateral ventricle could give a similar appearance and this latter possibility therefore can not be completely excluded.   There is a focus of nonspecific periventricular white matter changes noted adjacent to the frontal horn of the left lateral ventricle. In retrospect, the finding was likely present on the prior CT study dated 03/27/2024. While nonspecific, white matter changes can be seen with demyelinating processes or small-vessel ischemic change among others.   There is a minimal mucosal thickening noted within a few scattered ethmoid air cells.     The intracranial MRA demonstrates no significant focal stenosis or aneurysm.   The intracranial MRV demonstrates a right dominant transverse and sigmoid sinus representing a variant of normal. There is segmental narrowing noted along the left transverse sinus which may be related to congenital hypoplasia with the possibility of secondary narrowing/stricturing not excluded. The remainder of the intracranial MRV is within normal limits.       There is no MRI evidence of acute infarction on the diffusion weighted images.   The  ventricular system is nondilated. There is minimal asymmetry in the size of the temporal horns of the lateral ventricles with the right temporal horn noted be slightly larger when compared with the left which may very well fall within the range of normal variation although mild asymmetric surrounding brain parenchymal volume loss with compensatory dilatation of the temporal horn of the right lateral ventricle could give a similar appearance and this latter possibility therefore can not be completely excluded.   There is a focus of nonspecific periventricular white matter changes noted adjacent to the frontal horn of the left lateral ventricle. In retrospect, the finding was likely present on the prior CT study dated 2024. While nonspecific, white matter changes can be seen with demyelinating processes or small-vessel ischemic change among others.   The intracranial MRA demonstrates no significant focal stenosis or aneurysm.   The intracranial MRV demonstrates a right dominant transverse and sigmoid sinus representing a variant of normal. There is segmental narrowing noted along the left transverse sinus which may be related to congenital hypoplasia with the possibility of secondary narrowing/stricturing not excluded. The remainder of the intracranial MRV is within normal limits.     MACRO: None.   Signed by: Merlin Madison 10/9/2024 8:01 AM Dictation workstation:   [id number]     OB detail fetal anatomy    Result Date: 2024  Interpreted by: Alonso Lockwood and Eloisa Disla Indication ======== Detailed Fetal Anatomy for Class I Obesity (BMI 30-34.9), AMA Multigravida. History ====== General History Smoking: yes Height 155 cm Height (ft) 5 ft Height (in) 1 in Previous Outcomes  2 Para 0 Abortions (A) 1 Miscarriages 1 Maternal Assessment ================= Height 155 cm Height (ft) 5 ft Height (in) 1 in Weight 80 kg Weight (lb) 177 lb Weight gain 0 kg Weight gain (lb) 0 lb BMI 33.44 kg/mï¿½  Physical Exam Initial weight (lb) 177 lb Pregnancy ========= [place] pregnancy. Number of fetuses: 1 Dating ====== Cycle: LMP date not known Conception: LMP GA by prior assessment 19 w + 6 d LUIS MIGUEL by prior assessment: 2/14/2025 Previous Ultrasound on: 7/9/2024 Type of prior assessment: CRL U/S measurement at prior assessment date 20.0 mm GA at prior assessment date 8 w + 4 d GA by previous U/S 19 w + 6 d LUIS MIGUEL by previous Ultrasound: 2/14/2025 Ultrasound examination on: 9/26/2024 GA by U/S based upon: AC, BPD, Femur, HC GA by U/S 20 w + 0 d LUIS MIGUEL by U/S: 2/13/2025 Assigned: based on ultrasound (CRL), selected on 08/8/2024 Assigned GA 19 w + 6 d Assigned LUIS MIGUEL: 2/14/2025 Fetal Growth Overview ================= Exam date        GA              BPD (mm)          HC (mm)              AC (mm)               FL (mm)             HL (mm)            EFW (g) 09/26/2024        19w 6d        44.4     32%        167.7    23%        151.6     62%        33.3    63%        32.5     74%        343    68% Impression ========= A targeted anatomic survey was indicated for class I obesity. Pt had risk-reducing cfDNA -Fetal biometry is consistent with the stated gestational age -Detailed anatomic evaluation of the fetal brain/ventricles, face, heart/outflow tracts and chest anatomy, abdominal organ specific anatomy, number/length/architecture of limbs and detailed evaluation of the umbilical cord and placenta and other fetal anatomy as clinically indicated was attempted. -The following was suboptimally visualized: L-spine, S-spine, renal arteries -No malformations were identified on this incomplete survey within limitations of sonographic evaluation at this gestational age and maternal acoustic properties and fetal lie. A follow-up study is recommended in order to complete the anatomic survey Today's exam cannot exclude all functional or structural fetal abnormalities. Thank you for allowing us to participate in the care of your patient  Follow-up ======== Follow-up in 3-4 weeks General Evaluation ============== Cardiac activity present.  bpm. Fetal movements: visualized. Presentation: Variable Placenta: Placental site: posterior, No Previa Seen Umbilical cord: Cord vessels: 3 vessel cord. Insertion site: placental insertion: normal Amniotic fluid: Amount of AF: normal amount, normal amount Fetal Biometry ============ Standard BPD 44.4 mm 19w 3d 32% Hadlock OFD 60.1 mm  51% INTERGROWTH-21st .7 mm 19w 3d 23% Hadlock Cerebellum tr 20.8 mm 20w 2d 64% Byrd Nuchal fold 43.0 mm .6 mm 20w 3d 62% Hadlock Femur 33.3 mm 20w 3d 63% Hadlock Humerus 32.5 mm  74% Chitty HC / AC 1.11  14% Hadlock  g 20w 1d 68% Hadlock EFW (lb) 0 lb EFW (oz) 12 oz EFW by: Hadlock (BPD-HC-AC-FL) Extended  60.2 mm CM 2.2 mm  <1% Nicolaides Nasal bone 5.5 mm Head / Face / Neck Cephalic index 0.74  7% Nicolaides Nasal bone: present Extremities / Bony Struc FL / BPD 0.75  91% Hadlock FL / HC 0.20  91% Hadlock FL / AC 0.22  59% Hadlock Other Structures  bpm Fetal Anatomy =========== Cranium: Normal Lateral ventricles: Normal Choroid plexus: Normal Midline falx: Normal Cavum septi pellucidi: Normal Cerebellum: Normal Cisterna magna: Normal Head / Neck Head size: Normal Head shape: Normal Rt lateral ventricle: Normal Lt lateral ventricle: Normal Rt choroid plexus: Normal Lt choroid plexus: Normal Thalami: Normal Cerebellar lobes: Normal Vermis: Normal Neck: Normal Neck: No neck masses seen Lips: Normal Profile: Normal Nose: Normal Face Nasal bone: present Maxilla: Normal Mandible: Normal Orbits: Normal 4-chamber view: Normal RVOT view: Normal LVOT view: Normal 3-vessel view: Normal 3-vessel-trachea view: Normal Heart / Thorax Situs: situs solitus (normal) Aortic arch view: Normal Bicaval view: Normal Cardiac position: levocardia (normal) Cardiac axis: Normal Cardiac size: normal (approx. 1/3 of thoracic area) Cardiac proportions: proportioned (normal)  Cardiac rhythm: regular (normal) Rt lung: Normal Lt lung: Normal Rt diaphragm: Normal Lt diaphragm: Normal Cord insertion: Normal Stomach: Normal Kidneys: Normal Bladder: Normal Genitals: Normal Abdomen Abdom. wall: Normal Stomach: Stomach size and situs appear normal Rt kidney: Normal Lt kidney: Normal Small bowel: Normal Large bowel: Normal Rt renal artery: suboptimal Lt renal artery: suboptimal Cervical spine: Normal Thoracic spine: Normal Lumbar spine: suboptimal Sacral spine: suboptimal Arms: Normal Hands: normal Legs: Normal Feet: normal Rt upper arm: Normal Rt forearm: Normal Rt hand: Normal Rt fingers: Normal Lt upper arm: Normal Lt forearm: Normal Lt hand: Normal Lt fingers: Normal Rt upper leg: Normal Rt lower leg: Normal Rt foot: Normal Rt toes: Normal Lt upper leg: Normal Lt lower leg: Normal Lt foot: Normal Lt toes: Normal Position of hands: Normal Position of feet: Normal Fetal sex: male Wants to know fetal sex: yes Genetic Screen ============ Age 34 yrs Echogenic focus: no Ventriculomegaly: no Nuchal fold: normal Echogenic bowel: no Pyelectasis: no Short femur: no Short humerus: no Nasal bone: present Display risk: Risk at time of screening Maternal Structures =============== Uterus / Cervix Uterus: Visualized Uterus details: Normal , Normal Cervix: Visualized Cervix details: Long and closed Cervical length 40.3 mm Ovaries / Tubes / Adnexa Rt ovary: Visualized Rt ovary details: Normal, Normal Rt ovary D1 22.0 mm Rt ovary D2 13.0 mm Rt ovary D3 23.0 mm Rt ovary Vol 3.4 cmï¿½ Lt ovary: Not visualized Lt ovary details: Normal Method ====== Transabdominal ultrasound examination. View: Suboptimal view: limited by fetal position.      Assessment/Plan   Assessment & Plan      ASSESSMENT:  Ms Sandrita Adams is a 34 year old female  at 21w4d by 7wk US with a past medical history significant for lupus, asthma, and chronic hidradenitis suppurativa who initially presented on 10/8 presents for  hidradentiis suppurative flare and severe unremitting headaches.  Her physical exam is reassuring for no signs of meningismus, and clinical history of her symptoms is equally reassuring (pain slightly relieved lying flat, no photophobia/phonophobia).   Patient underwent MRI/MRA/MRV yesterday which showed no acute infarct or intracranial pathology, and patent venous system.  She has had some improvement in symptoms this evening with magnesium IV.  Patient's headache likely has multiple contributory components.  Most prominent of which is that patient recently received a dose of Cimzia this past Saturday which has known side effect of headache (which patient was counseled on prior to initiation of treatment).  Her use of percocet (oxycodone/acetaminophen) q4 consistently prior to this hospitalization puts patient at high risk for medication overuse headache.      RECOMMENDATIONS:  - Migraine cocktail regimen as follows, defer selection of medication to OB/GYN team:    Step 1 (do all of the following):  - Tylenol 650mg q6 PRN  - Metoclopramide 10mg IVP over 2min OR ondansetron 8mg IVP  - Diphenhydramine 25-50mg IVP  - IVF    Step 2 (if Step 1 fails, do all of the following):  - Dexamethasone 4-8mg IVP  - Magnesium sulfate 1g IV over 1h    Step 3 (if Step 2 fails, choose sumatriptan OR DHE, and choose an anti-emetic):  - Sumatriptan 6mg SQ, repeat in 1h if no response (max 12mg in 24h)  - Prochlorperazine 10mg IVP over 30s q2-4h PRN  - Metoclopramide 10mg IVP over 2min  - Ondansetron 4-8mg IVP over 30s    Faraz Yu MD  Department of Neurology, PGY-2  General Pager [phone]    Faraz Yu MD    ----------------------------------------------------------------------------------------------------------------------------------------  ATTENDING ATTESTATION    Patient was discharged by OB team prior to me being able to see the patient.       Glenny Funes MD  General Neurology Attending  [facility]  [facility]

## 2024-10-10 NOTE — DISCHARGE SUMMARY
Discharge Summary    Admission Date: 10/8/2024  Discharge Date: 10/10/24    Discharge Diagnosis  Hidradenitis    Hospital Course  Sandrita Adams is 35 yo  at 21.6 wga admitted due to worsening hidradenitis pain. Dermatology and Infectious Disease consulted and recommend trial of IV Ertapenem for 12-16 weeks. She received one dose of IV ertapenem on 10/9. Patient also reported 10/10 HA during admission and she underwent MRI brain imaging that was negative. Neurology consulted and suspect symptoms secondary to migraines. Symptoms improved with Magnesium and Imitrex which were prescribed upon discharge. Patient on HD#2 requesting discharge due to family emergency. She was discharged home in stable condition with PO clindamycin, Percocet x 7 days, and Imitrex. She is scheduled for [facility] follow-up on 10/17.            Pertinent Physical Exam At Time of Discharge  General: no acute distress  HEENT: normocephalic, atraumatic  Heart: warm and well perfused  Lungs: breathing comfortably on room air  Abdomen: gravid  Extremities: moving all extremities  Neuro: awake and conversant  Psych: appropriate mood and affect      Last Vitals:  Temp Pulse Resp BP MAP Pulse Ox   36.9 °C (98.4 °F) 90 18 108/68 81 98 %     Discharge Meds     Your medication list        ASK your doctor about these medications        Instructions Last Dose Given Next Dose Due   albuterol 90 mcg/actuation inhaler      Inhale 2 puffs every 6 hours if needed for wheezing.       aspirin 81 mg EC tablet      Take 2 tablets (162 mg) by mouth once daily.       Boost 0.04 gram- 1 kcal/mL liquid  Generic drug: food supplemt, lactose-reduced      Please dispense 14 bottles for patient to have BID for 7 days.       chlorhexidine 4 % external liquid  Commonly known as: Hibiclens      Apply topically 2 times a day. Bathe in Hibiclens BID       Cimzia injection  Generic drug: certolizumab pegol      Inject 2 mL (400 mg) under the skin every 14 (fourteen) days.        clindamycin 300 mg capsule  Commonly known as: Cleocin  Ask about: Should I take this medication?      Take 1 capsule (300 mg) by mouth 2 times a day for 14 days.       cyclobenzaprine 10 mg tablet  Commonly known as: Flexeril      Take 1 tablet (10 mg) by mouth 2 times a day as needed for muscle spasms for up to 10 doses.       magnesium oxide 400 mg (241.3 mg magnesium) tablet  Commonly known as: Mag-Ox      Take 1 tablet (400 mg) by mouth 2 times a day.       multivitamin with minerals tablet           Narcan 4 mg/actuation nasal spray  Generic drug: naloxone      Administer 1 spray (4 mg) into affected nostril(s) if needed for opioid reversal or respiratory depression. May repeat every 2-3 minutes if needed, alternating nostrils, until medical assistance becomes available.       ondansetron ODT 4 mg disintegrating tablet  Commonly known as: Zofran-ODT      Take 1 tablet (4 mg) by mouth every 8 hours if needed for nausea or vomiting.       oxyCODONE-acetaminophen  mg tablet  Commonly known as: Percocet      Take 1 tablet by mouth every 4 hours if needed for moderate pain (4 - 6) or severe pain (7 - 10) for up to 7 days.       Prenatal 19 29 mg iron- 1 mg tablet  Generic drug: -iron fum-folic acid      Take 1 tablet by mouth once daily.       Vitamin B-2 100 mg tablet tablet  Generic drug: riboflavin      Take 1 tablet (100 mg) by mouth once daily.                 Results Pending At Discharge  Pending Labs       No current pending labs.            Outpatient Follow-Up  Future Appointments   Date Time Provider Department Center   10/10/2024  9:30 AM Jadiel Storey MD [id number] [facility]   10/17/2024  8:00 AM MAC [id number] OBGYNIMG ULTRASOUND 3 [id number] [facility] [place]   10/17/2024  9:00 AM Sean Mariscal MD [id number] [facility]   10/24/2024  9:30 AM MD YUNIER [id number] [facility]   10/31/2024  9:00 AM Leila Beatty MD [id number] [facility]   11/7/2024  9:00 AM Alonso Lockwood MD  [id number] [facility]   11/14/2024  9:00 AM Dharmesh Mccann MD [id number] [facility]   11/20/2024  9:00 AM Spenser Elizalde MD [id number] [facility]   11/21/2024  9:00 AM Sean Mariscal MD [id number] [facility]         Seen and discussed with Dr. Fabián Guerin MD PGY-3

## 2024-10-10 NOTE — CARE PLAN
The patient's goals for the shift include Go home now.    The clinical goals for the shift include pain will be a 4 or less today.      Problem: Antepartum  Goal: Maintain pregnancy as long as maternal and/or fetal condition is stable  Outcome: Met  Goal: FHR remains reassuring  Outcome: Met     Problem: Infection  Goal: Fever/diaphoresis will improve to <38.0 C  Outcome: Met  Goal: Improvement in s/sx of infection  Outcome: Met     Problem: Pain - Adult  Goal: Verbalizes/displays adequate comfort level or baseline comfort level  Outcome: Met     Problem: Safety - Adult  Goal: Free from fall injury  Outcome: Met     Problem: Discharge Planning  Goal: Discharge to home or other facility with appropriate resources  Outcome: Met     Problem: Chronic Conditions and Co-morbidities  Goal: Patient's chronic conditions and co-morbidity symptoms are monitored and maintained or improved  Outcome: Met   VSS and pain is improved with tylenol,oxycodone,dilaudid, and flexeril .FHR is WNL and she has no VB or cramps.She will cont. On po ATB.'s at home.Stable and ready to go home.

## 2024-10-14 ENCOUNTER — HOSPITAL ENCOUNTER (OUTPATIENT)
Facility: HOSPITAL | Age: 34
Discharge: OTHER INSTITUTION | End: 2024-10-14
Attending: OBSTETRICS & GYNECOLOGY | Admitting: OBSTETRICS & GYNECOLOGY
Payer: MEDICAID

## 2024-10-14 ENCOUNTER — HOSPITAL ENCOUNTER (INPATIENT)
Facility: HOSPITAL | Age: 34
LOS: 3 days | Discharge: HOME | End: 2024-10-17
Attending: SPECIALIST | Admitting: STUDENT IN AN ORGANIZED HEALTH CARE EDUCATION/TRAINING PROGRAM
Payer: MEDICAID

## 2024-10-14 VITALS
RESPIRATION RATE: 18 BRPM | OXYGEN SATURATION: 100 % | TEMPERATURE: 97.7 F | WEIGHT: 181.11 LBS | DIASTOLIC BLOOD PRESSURE: 74 MMHG | SYSTOLIC BLOOD PRESSURE: 117 MMHG | BODY MASS INDEX: 33.33 KG/M2 | HEIGHT: 62 IN | HEART RATE: 82 BPM

## 2024-10-14 DIAGNOSIS — L73.2 HIDRADENITIS SUPPURATIVA: ICD-10-CM

## 2024-10-14 DIAGNOSIS — J45.20 MILD INTERMITTENT ASTHMA, UNSPECIFIED WHETHER COMPLICATED (HHS-HCC): ICD-10-CM

## 2024-10-14 DIAGNOSIS — Z3A.20 20 WEEKS GESTATION OF PREGNANCY (HHS-HCC): ICD-10-CM

## 2024-10-14 DIAGNOSIS — O26.892 PREGNANCY HEADACHE IN SECOND TRIMESTER (HHS-HCC): ICD-10-CM

## 2024-10-14 DIAGNOSIS — R11.0 NAUSEA: ICD-10-CM

## 2024-10-14 DIAGNOSIS — R51.9 PREGNANCY HEADACHE IN SECOND TRIMESTER (HHS-HCC): ICD-10-CM

## 2024-10-14 LAB
ALBUMIN SERPL BCP-MCNC: 3.7 G/DL (ref 3.4–5)
ALP SERPL-CCNC: 59 U/L (ref 33–110)
ALT SERPL W P-5'-P-CCNC: 16 U/L (ref 7–45)
ANION GAP SERPL CALC-SCNC: 14 MMOL/L (ref 10–20)
AST SERPL W P-5'-P-CCNC: 25 U/L (ref 9–39)
BASOPHILS # BLD AUTO: 0.05 X10*3/UL (ref 0–0.1)
BASOPHILS NFR BLD AUTO: 0.4 %
BILIRUB SERPL-MCNC: 0.2 MG/DL (ref 0–1.2)
BUN SERPL-MCNC: 10 MG/DL (ref 6–23)
CALCIUM SERPL-MCNC: 9.1 MG/DL (ref 8.6–10.3)
CHLORIDE SERPL-SCNC: 104 MMOL/L (ref 98–107)
CO2 SERPL-SCNC: 20 MMOL/L (ref 21–32)
CREAT SERPL-MCNC: 0.67 MG/DL (ref 0.5–1.05)
EGFRCR SERPLBLD CKD-EPI 2021: >90 ML/MIN/1.73M*2
EOSINOPHIL # BLD AUTO: 0.08 X10*3/UL (ref 0–0.7)
EOSINOPHIL NFR BLD AUTO: 0.7 %
ERYTHROCYTE [DISTWIDTH] IN BLOOD BY AUTOMATED COUNT: 14.6 % (ref 11.5–14.5)
GLUCOSE SERPL-MCNC: 77 MG/DL (ref 74–99)
HCT VFR BLD AUTO: 37 % (ref 36–46)
HGB BLD-MCNC: 12 G/DL (ref 12–16)
IMM GRANULOCYTES # BLD AUTO: 0.17 X10*3/UL (ref 0–0.7)
IMM GRANULOCYTES NFR BLD AUTO: 1.4 % (ref 0–0.9)
LYMPHOCYTES # BLD AUTO: 2.93 X10*3/UL (ref 1.2–4.8)
LYMPHOCYTES NFR BLD AUTO: 24.4 %
MCH RBC QN AUTO: 25.8 PG (ref 26–34)
MCHC RBC AUTO-ENTMCNC: 32.4 G/DL (ref 32–36)
MCV RBC AUTO: 80 FL (ref 80–100)
MONOCYTES # BLD AUTO: 0.86 X10*3/UL (ref 0.1–1)
MONOCYTES NFR BLD AUTO: 7.2 %
NEUTROPHILS # BLD AUTO: 7.92 X10*3/UL (ref 1.2–7.7)
NEUTROPHILS NFR BLD AUTO: 65.9 %
NRBC BLD-RTO: 0 /100 WBCS (ref 0–0)
PLATELET # BLD AUTO: 341 X10*3/UL (ref 150–450)
POTASSIUM SERPL-SCNC: 4.8 MMOL/L (ref 3.5–5.3)
PROT SERPL-MCNC: 7 G/DL (ref 6.4–8.2)
RBC # BLD AUTO: 4.65 X10*6/UL (ref 4–5.2)
SODIUM SERPL-SCNC: 133 MMOL/L (ref 136–145)
WBC # BLD AUTO: 12 X10*3/UL (ref 4.4–11.3)

## 2024-10-14 PROCEDURE — 2500000004 HC RX 250 GENERAL PHARMACY W/ HCPCS (ALT 636 FOR OP/ED)

## 2024-10-14 PROCEDURE — 99215 OFFICE O/P EST HI 40 MIN: CPT | Performed by: OBSTETRICS & GYNECOLOGY

## 2024-10-14 PROCEDURE — 2500000001 HC RX 250 WO HCPCS SELF ADMINISTERED DRUGS (ALT 637 FOR MEDICARE OP): Performed by: OBSTETRICS & GYNECOLOGY

## 2024-10-14 PROCEDURE — 87086 URINE CULTURE/COLONY COUNT: CPT | Mod: AHULAB | Performed by: OBSTETRICS & GYNECOLOGY

## 2024-10-14 PROCEDURE — 84075 ASSAY ALKALINE PHOSPHATASE: CPT | Performed by: OBSTETRICS & GYNECOLOGY

## 2024-10-14 PROCEDURE — 1220000001 HC OB SEMI-PRIVATE ROOM DAILY

## 2024-10-14 PROCEDURE — 2500000004 HC RX 250 GENERAL PHARMACY W/ HCPCS (ALT 636 FOR OP/ED): Performed by: OBSTETRICS & GYNECOLOGY

## 2024-10-14 PROCEDURE — 36415 COLL VENOUS BLD VENIPUNCTURE: CPT | Performed by: OBSTETRICS & GYNECOLOGY

## 2024-10-14 PROCEDURE — 85025 COMPLETE CBC W/AUTO DIFF WBC: CPT | Performed by: OBSTETRICS & GYNECOLOGY

## 2024-10-14 PROCEDURE — 2500000001 HC RX 250 WO HCPCS SELF ADMINISTERED DRUGS (ALT 637 FOR MEDICARE OP)

## 2024-10-14 PROCEDURE — 99222 1ST HOSP IP/OBS MODERATE 55: CPT

## 2024-10-14 RX ORDER — RIBOFLAVIN (VITAMIN B2) 100 MG
100 TABLET ORAL DAILY
Status: DISCONTINUED | OUTPATIENT
Start: 2024-10-15 | End: 2024-10-17 | Stop reason: HOSPADM

## 2024-10-14 RX ORDER — ONDANSETRON 4 MG/1
4 TABLET, FILM COATED ORAL EVERY 6 HOURS PRN
Status: DISCONTINUED | OUTPATIENT
Start: 2024-10-14 | End: 2024-10-14 | Stop reason: HOSPADM

## 2024-10-14 RX ORDER — LIDOCAINE HYDROCHLORIDE 10 MG/ML
0.5 INJECTION, SOLUTION EPIDURAL; INFILTRATION; INTRACAUDAL; PERINEURAL ONCE AS NEEDED
Status: DISCONTINUED | OUTPATIENT
Start: 2024-10-14 | End: 2024-10-14 | Stop reason: HOSPADM

## 2024-10-14 RX ORDER — DIPHENHYDRAMINE HCL 25 MG
25 CAPSULE ORAL ONCE
Status: COMPLETED | OUTPATIENT
Start: 2024-10-14 | End: 2024-10-14

## 2024-10-14 RX ORDER — DIPHENHYDRAMINE HYDROCHLORIDE 50 MG/ML
25 INJECTION INTRAMUSCULAR; INTRAVENOUS ONCE
Status: COMPLETED | OUTPATIENT
Start: 2024-10-14 | End: 2024-10-14

## 2024-10-14 RX ORDER — CLINDAMYCIN HYDROCHLORIDE 300 MG/1
300 CAPSULE ORAL 2 TIMES DAILY
Status: DISCONTINUED | OUTPATIENT
Start: 2024-10-14 | End: 2024-10-15

## 2024-10-14 RX ORDER — NAPROXEN SODIUM 220 MG/1
162 TABLET, FILM COATED ORAL DAILY
Status: DISCONTINUED | OUTPATIENT
Start: 2024-10-15 | End: 2024-10-17 | Stop reason: HOSPADM

## 2024-10-14 RX ORDER — HYDROMORPHONE HYDROCHLORIDE 1 MG/ML
2 INJECTION, SOLUTION INTRAMUSCULAR; INTRAVENOUS; SUBCUTANEOUS EVERY 2 HOUR PRN
Status: DISCONTINUED | OUTPATIENT
Start: 2024-10-14 | End: 2024-10-15

## 2024-10-14 RX ORDER — LANOLIN ALCOHOL/MO/W.PET/CERES
400 CREAM (GRAM) TOPICAL 2 TIMES DAILY
Status: DISCONTINUED | OUTPATIENT
Start: 2024-10-14 | End: 2024-10-17 | Stop reason: HOSPADM

## 2024-10-14 RX ORDER — NIFEDIPINE 10 MG/1
10 CAPSULE ORAL ONCE AS NEEDED
Status: DISCONTINUED | OUTPATIENT
Start: 2024-10-14 | End: 2024-10-14 | Stop reason: HOSPADM

## 2024-10-14 RX ORDER — OXYCODONE AND ACETAMINOPHEN 5; 325 MG/1; MG/1
1 TABLET ORAL EVERY 4 HOURS PRN
Status: DISCONTINUED | OUTPATIENT
Start: 2024-10-14 | End: 2024-10-14

## 2024-10-14 RX ORDER — ONDANSETRON HYDROCHLORIDE 2 MG/ML
4 INJECTION, SOLUTION INTRAVENOUS EVERY 6 HOURS PRN
Status: DISCONTINUED | OUTPATIENT
Start: 2024-10-14 | End: 2024-10-17 | Stop reason: HOSPADM

## 2024-10-14 RX ORDER — LABETALOL HYDROCHLORIDE 5 MG/ML
20 INJECTION, SOLUTION INTRAVENOUS ONCE AS NEEDED
Status: DISCONTINUED | OUTPATIENT
Start: 2024-10-14 | End: 2024-10-17 | Stop reason: HOSPADM

## 2024-10-14 RX ORDER — ALBUTEROL SULFATE 90 UG/1
2 INHALANT RESPIRATORY (INHALATION) EVERY 6 HOURS PRN
Status: DISCONTINUED | OUTPATIENT
Start: 2024-10-14 | End: 2024-10-17 | Stop reason: HOSPADM

## 2024-10-14 RX ORDER — BISACODYL 10 MG/1
10 SUPPOSITORY RECTAL DAILY PRN
Status: DISCONTINUED | OUTPATIENT
Start: 2024-10-14 | End: 2024-10-17 | Stop reason: HOSPADM

## 2024-10-14 RX ORDER — ONDANSETRON 4 MG/1
4 TABLET, FILM COATED ORAL EVERY 6 HOURS PRN
Status: DISCONTINUED | OUTPATIENT
Start: 2024-10-14 | End: 2024-10-17 | Stop reason: HOSPADM

## 2024-10-14 RX ORDER — NIFEDIPINE 10 MG/1
10 CAPSULE ORAL ONCE AS NEEDED
Status: DISCONTINUED | OUTPATIENT
Start: 2024-10-14 | End: 2024-10-17 | Stop reason: HOSPADM

## 2024-10-14 RX ORDER — SIMETHICONE 80 MG
80 TABLET,CHEWABLE ORAL 4 TIMES DAILY PRN
Status: DISCONTINUED | OUTPATIENT
Start: 2024-10-14 | End: 2024-10-17 | Stop reason: HOSPADM

## 2024-10-14 RX ORDER — HYDROMORPHONE HYDROCHLORIDE 1 MG/ML
2 INJECTION, SOLUTION INTRAMUSCULAR; INTRAVENOUS; SUBCUTANEOUS EVERY 4 HOURS PRN
Status: DISCONTINUED | OUTPATIENT
Start: 2024-10-14 | End: 2024-10-14 | Stop reason: HOSPADM

## 2024-10-14 RX ORDER — ONDANSETRON HYDROCHLORIDE 2 MG/ML
4 INJECTION, SOLUTION INTRAVENOUS EVERY 6 HOURS PRN
Status: DISCONTINUED | OUTPATIENT
Start: 2024-10-14 | End: 2024-10-14 | Stop reason: HOSPADM

## 2024-10-14 RX ORDER — HYDRALAZINE HYDROCHLORIDE 20 MG/ML
5 INJECTION INTRAMUSCULAR; INTRAVENOUS ONCE AS NEEDED
Status: DISCONTINUED | OUTPATIENT
Start: 2024-10-14 | End: 2024-10-17 | Stop reason: HOSPADM

## 2024-10-14 RX ORDER — OXYCODONE AND ACETAMINOPHEN 5; 325 MG/1; MG/1
1 TABLET ORAL EVERY 6 HOURS PRN
Status: DISCONTINUED | OUTPATIENT
Start: 2024-10-14 | End: 2024-10-14 | Stop reason: HOSPADM

## 2024-10-14 RX ORDER — POLYETHYLENE GLYCOL 3350 17 G/17G
17 POWDER, FOR SOLUTION ORAL 2 TIMES DAILY PRN
Status: DISCONTINUED | OUTPATIENT
Start: 2024-10-14 | End: 2024-10-17 | Stop reason: HOSPADM

## 2024-10-14 RX ORDER — ADHESIVE BANDAGE
10 BANDAGE TOPICAL
Status: DISCONTINUED | OUTPATIENT
Start: 2024-10-14 | End: 2024-10-17 | Stop reason: HOSPADM

## 2024-10-14 RX ORDER — LABETALOL HYDROCHLORIDE 5 MG/ML
20 INJECTION, SOLUTION INTRAVENOUS ONCE AS NEEDED
Status: DISCONTINUED | OUTPATIENT
Start: 2024-10-14 | End: 2024-10-14 | Stop reason: HOSPADM

## 2024-10-14 RX ORDER — LIDOCAINE HYDROCHLORIDE 10 MG/ML
0.5 INJECTION, SOLUTION INFILTRATION; PERINEURAL ONCE AS NEEDED
Status: DISCONTINUED | OUTPATIENT
Start: 2024-10-14 | End: 2024-10-17 | Stop reason: HOSPADM

## 2024-10-14 RX ORDER — OXYCODONE HYDROCHLORIDE 5 MG/1
10 TABLET ORAL EVERY 4 HOURS PRN
Status: DISCONTINUED | OUTPATIENT
Start: 2024-10-14 | End: 2024-10-15

## 2024-10-14 RX ORDER — SUMATRIPTAN 50 MG/1
50 TABLET, FILM COATED ORAL EVERY 6 HOURS PRN
Status: DISCONTINUED | OUTPATIENT
Start: 2024-10-14 | End: 2024-10-17 | Stop reason: HOSPADM

## 2024-10-14 RX ORDER — METOCLOPRAMIDE 10 MG/1
10 TABLET ORAL EVERY 6 HOURS PRN
Status: DISCONTINUED | OUTPATIENT
Start: 2024-10-14 | End: 2024-10-15

## 2024-10-14 RX ORDER — METOCLOPRAMIDE HYDROCHLORIDE 5 MG/ML
10 INJECTION INTRAMUSCULAR; INTRAVENOUS EVERY 6 HOURS PRN
Status: DISCONTINUED | OUTPATIENT
Start: 2024-10-14 | End: 2024-10-15

## 2024-10-14 RX ORDER — HYDRALAZINE HYDROCHLORIDE 20 MG/ML
5 INJECTION INTRAMUSCULAR; INTRAVENOUS ONCE AS NEEDED
Status: DISCONTINUED | OUTPATIENT
Start: 2024-10-14 | End: 2024-10-14 | Stop reason: HOSPADM

## 2024-10-14 RX ORDER — ACETAMINOPHEN 325 MG/1
975 TABLET ORAL EVERY 6 HOURS
Status: DISCONTINUED | OUTPATIENT
Start: 2024-10-14 | End: 2024-10-17 | Stop reason: HOSPADM

## 2024-10-14 RX ADMIN — HYDROMORPHONE HYDROCHLORIDE 2 MG: 1 INJECTION, SOLUTION INTRAMUSCULAR; INTRAVENOUS; SUBCUTANEOUS at 15:31

## 2024-10-14 RX ADMIN — OXYCODONE HYDROCHLORIDE AND ACETAMINOPHEN 1 TABLET: 5; 325 TABLET ORAL at 18:10

## 2024-10-14 RX ADMIN — DIPHENHYDRAMINE HYDROCHLORIDE 25 MG: 25 CAPSULE ORAL at 15:38

## 2024-10-14 SDOH — HEALTH STABILITY: MENTAL HEALTH: HOW OFTEN DO YOU HAVE SIX OR MORE DRINKS ON ONE OCCASION?: NEVER

## 2024-10-14 SDOH — ECONOMIC STABILITY: HOUSING INSECURITY: DO YOU FEEL UNSAFE GOING BACK TO THE PLACE WHERE YOU ARE LIVING?: NO

## 2024-10-14 SDOH — ECONOMIC STABILITY: FOOD INSECURITY: WITHIN THE PAST 12 MONTHS, THE FOOD YOU BOUGHT JUST DIDN'T LAST AND YOU DIDN'T HAVE MONEY TO GET MORE.: OFTEN TRUE

## 2024-10-14 SDOH — SOCIAL STABILITY: SOCIAL INSECURITY: HAVE YOU HAD ANY THOUGHTS OF HARMING ANYONE ELSE?: NO

## 2024-10-14 SDOH — HEALTH STABILITY: MENTAL HEALTH: HOW OFTEN DO YOU HAVE A DRINK CONTAINING ALCOHOL?: NEVER

## 2024-10-14 SDOH — ECONOMIC STABILITY: FOOD INSECURITY: WITHIN THE PAST 12 MONTHS, YOU WORRIED THAT YOUR FOOD WOULD RUN OUT BEFORE YOU GOT THE MONEY TO BUY MORE.: OFTEN TRUE

## 2024-10-14 SDOH — SOCIAL STABILITY: SOCIAL INSECURITY
WITHIN THE LAST YEAR, HAVE YOU BEEN RAPED OR FORCED TO HAVE ANY KIND OF SEXUAL ACTIVITY BY YOUR PARTNER OR EX-PARTNER?: NO

## 2024-10-14 SDOH — ECONOMIC STABILITY: FOOD INSECURITY: HOW HARD IS IT FOR YOU TO PAY FOR THE VERY BASICS LIKE FOOD, HOUSING, MEDICAL CARE, AND HEATING?: SOMEWHAT HARD

## 2024-10-14 SDOH — SOCIAL STABILITY: SOCIAL INSECURITY: WITHIN THE LAST YEAR, HAVE YOU BEEN HUMILIATED OR EMOTIONALLY ABUSED IN OTHER WAYS BY YOUR PARTNER OR EX-PARTNER?: NO

## 2024-10-14 SDOH — SOCIAL STABILITY: SOCIAL INSECURITY: VERBAL ABUSE: YES, PAST (COMMENT)

## 2024-10-14 SDOH — SOCIAL STABILITY: SOCIAL INSECURITY: WITHIN THE LAST YEAR, HAVE YOU BEEN AFRAID OF YOUR PARTNER OR EX-PARTNER?: NO

## 2024-10-14 SDOH — HEALTH STABILITY: MENTAL HEALTH: HOW MANY DRINKS CONTAINING ALCOHOL DO YOU HAVE ON A TYPICAL DAY WHEN YOU ARE DRINKING?: PATIENT DOES NOT DRINK

## 2024-10-14 SDOH — SOCIAL STABILITY: SOCIAL INSECURITY: DOES ANYONE TRY TO KEEP YOU FROM HAVING/CONTACTING OTHER FRIENDS OR DOING THINGS OUTSIDE YOUR HOME?: YES

## 2024-10-14 SDOH — HEALTH STABILITY: MENTAL HEALTH: HAVE YOU USED ANY PRESCRIPTION DRUGS OTHER THAN PRESCRIBED IN THE PAST 12 MONTHS?: NO

## 2024-10-14 SDOH — ECONOMIC STABILITY: TRANSPORTATION INSECURITY: IN THE PAST 12 MONTHS, HAS LACK OF TRANSPORTATION KEPT YOU FROM MEDICAL APPOINTMENTS OR FROM GETTING MEDICATIONS?: NO

## 2024-10-14 SDOH — HEALTH STABILITY: MENTAL HEALTH: WISH TO BE DEAD (PAST 1 MONTH): NO

## 2024-10-14 SDOH — SOCIAL STABILITY: SOCIAL INSECURITY: ABUSE SCREEN: ADULT

## 2024-10-14 SDOH — SOCIAL STABILITY: SOCIAL INSECURITY: DO YOU FEEL ANYONE HAS EXPLOITED OR TAKEN ADVANTAGE OF YOU FINANCIALLY OR OF YOUR PERSONAL PROPERTY?: NO

## 2024-10-14 SDOH — HEALTH STABILITY: MENTAL HEALTH: WERE YOU ABLE TO COMPLETE ALL THE BEHAVIORAL HEALTH SCREENINGS?: YES

## 2024-10-14 SDOH — SOCIAL STABILITY: SOCIAL INSECURITY: HAS ANYONE EVER THREATENED TO HURT YOUR FAMILY OR YOUR PETS?: NO

## 2024-10-14 SDOH — SOCIAL STABILITY: SOCIAL INSECURITY: PHYSICAL ABUSE: YES, PAST (COMMENT)

## 2024-10-14 SDOH — HEALTH STABILITY: MENTAL HEALTH: NON-SPECIFIC ACTIVE SUICIDAL THOUGHTS (PAST 1 MONTH): NO

## 2024-10-14 SDOH — HEALTH STABILITY: MENTAL HEALTH: HAVE YOU USED ANY SUBSTANCES (CANABIS, COCAINE, HEROIN, HALLUCINOGENS, INHALANTS, ETC.) IN THE PAST 12 MONTHS?: NO

## 2024-10-14 SDOH — HEALTH STABILITY: MENTAL HEALTH: SUICIDAL BEHAVIOR (LIFETIME): NO

## 2024-10-14 SDOH — SOCIAL STABILITY: SOCIAL INSECURITY: ARE YOU OR HAVE YOU BEEN THREATENED OR ABUSED PHYSICALLY, EMOTIONALLY, OR SEXUALLY BY ANYONE?: YES

## 2024-10-14 SDOH — SOCIAL STABILITY: SOCIAL INSECURITY: HAVE YOU HAD THOUGHTS OF HARMING ANYONE ELSE?: NO

## 2024-10-14 SDOH — SOCIAL STABILITY: SOCIAL INSECURITY: ARE THERE ANY APPARENT SIGNS OF INJURIES/BEHAVIORS THAT COULD BE RELATED TO ABUSE/NEGLECT?: NO

## 2024-10-14 ASSESSMENT — PAIN DESCRIPTION - ORIENTATION: ORIENTATION: LEFT

## 2024-10-14 ASSESSMENT — PAIN SCALES - GENERAL
PAINLEVEL_OUTOF10: 8
PAINLEVEL_OUTOF10: 6
PAINLEVEL_OUTOF10: 8

## 2024-10-14 ASSESSMENT — LIFESTYLE VARIABLES
AUDIT-C TOTAL SCORE: 0
SKIP TO QUESTIONS 9-10: 1
AUDIT-C TOTAL SCORE: 0
AUDIT-C TOTAL SCORE: 0
SKIP TO QUESTIONS 9-10: 1
HOW OFTEN DO YOU HAVE A DRINK CONTAINING ALCOHOL: NEVER
HOW MANY STANDARD DRINKS CONTAINING ALCOHOL DO YOU HAVE ON A TYPICAL DAY: PATIENT DOES NOT DRINK
HOW OFTEN DO YOU HAVE 6 OR MORE DRINKS ON ONE OCCASION: NEVER

## 2024-10-14 ASSESSMENT — PATIENT HEALTH QUESTIONNAIRE - PHQ9
2. FEELING DOWN, DEPRESSED OR HOPELESS: NOT AT ALL
1. LITTLE INTEREST OR PLEASURE IN DOING THINGS: SEVERAL DAYS
SUM OF ALL RESPONSES TO PHQ9 QUESTIONS 1 & 2: 1

## 2024-10-14 ASSESSMENT — PAIN - FUNCTIONAL ASSESSMENT: PAIN_FUNCTIONAL_ASSESSMENT: 0-10

## 2024-10-14 ASSESSMENT — ACTIVITIES OF DAILY LIVING (ADL): LACK_OF_TRANSPORTATION: NO

## 2024-10-14 ASSESSMENT — PAIN DESCRIPTION - LOCATION
LOCATION: ARM
LOCATION: ARM

## 2024-10-14 ASSESSMENT — PAIN DESCRIPTION - DESCRIPTORS: DESCRIPTORS: SORE
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
## 2024-10-15 LAB
ABO GROUP (TYPE) IN BLOOD: NORMAL
ANTIBODY SCREEN: NORMAL
BACTERIA UR CULT: NORMAL
RH FACTOR (ANTIGEN D): NORMAL

## 2024-10-15 PROCEDURE — 2500000004 HC RX 250 GENERAL PHARMACY W/ HCPCS (ALT 636 FOR OP/ED)

## 2024-10-15 PROCEDURE — 86901 BLOOD TYPING SEROLOGIC RH(D): CPT

## 2024-10-15 PROCEDURE — 1220000001 HC OB SEMI-PRIVATE ROOM DAILY

## 2024-10-15 PROCEDURE — 2500000001 HC RX 250 WO HCPCS SELF ADMINISTERED DRUGS (ALT 637 FOR MEDICARE OP)

## 2024-10-15 PROCEDURE — 36415 COLL VENOUS BLD VENIPUNCTURE: CPT

## 2024-10-15 RX ORDER — ERTAPENEM 1 G/1
1 INJECTION, POWDER, LYOPHILIZED, FOR SOLUTION INTRAMUSCULAR; INTRAVENOUS EVERY 24 HOURS
Status: DISCONTINUED | OUTPATIENT
Start: 2024-10-15 | End: 2024-10-17

## 2024-10-15 RX ORDER — CYCLOBENZAPRINE HCL 10 MG
10 TABLET ORAL 3 TIMES DAILY PRN
Status: DISCONTINUED | OUTPATIENT
Start: 2024-10-15 | End: 2024-10-17 | Stop reason: HOSPADM

## 2024-10-15 RX ORDER — DIPHENHYDRAMINE HCL 25 MG
25 CAPSULE ORAL EVERY 6 HOURS PRN
Status: DISCONTINUED | OUTPATIENT
Start: 2024-10-15 | End: 2024-10-17 | Stop reason: HOSPADM

## 2024-10-15 RX ORDER — OXYCODONE HYDROCHLORIDE 5 MG/1
10 TABLET ORAL EVERY 4 HOURS
Status: DISCONTINUED | OUTPATIENT
Start: 2024-10-15 | End: 2024-10-17 | Stop reason: HOSPADM

## 2024-10-15 RX ORDER — HYDROMORPHONE HYDROCHLORIDE 1 MG/ML
2 INJECTION, SOLUTION INTRAMUSCULAR; INTRAVENOUS; SUBCUTANEOUS
Status: DISCONTINUED | OUTPATIENT
Start: 2024-10-15 | End: 2024-10-16

## 2024-10-15 ASSESSMENT — PAIN SCALES - GENERAL
PAINLEVEL_OUTOF10: 8
PAINLEVEL_OUTOF10: 7
PAINLEVEL_OUTOF10: 4
PAINLEVEL_OUTOF10: 8
PAINLEVEL_OUTOF10: 7
PAINLEVEL_OUTOF10: 9
PAINLEVEL_OUTOF10: 7
PAINLEVEL_OUTOF10: 8
PAINLEVEL_OUTOF10: 8
PAINLEVEL_OUTOF10: 7

## 2024-10-15 ASSESSMENT — PAIN DESCRIPTION - LOCATION
LOCATION: GROIN
LOCATION: ARM
LOCATION: BUTTOCKS

## 2024-10-15 ASSESSMENT — PAIN DESCRIPTION - DESCRIPTORS
DESCRIPTORS: SORE

## 2024-10-15 ASSESSMENT — PAIN DESCRIPTION - ORIENTATION
ORIENTATION: LEFT
ORIENTATION: RIGHT;LEFT
ORIENTATION: RIGHT;LEFT

## 2024-10-15 NOTE — CARE PLAN
The patient's goals for the shift include pain management    The clinical goals for the shift include pain control and infection control    Patient remained free from falls/injury throughout shift. VSS, no s/sx of worsening infection at this time. Pain currently still not controlled with only PO medications, pt requiring q3 PRN dilaudid for 7-8/10 pain. Patient resting comfortably in bed and declines needs at this time.
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
## 2024-10-15 NOTE — PROGRESS NOTES
Social Work Note    Patient: SW met with Ms Adams for ongoing support. She states she is tired, denies needs ar concerns at this time. She states she is safe. SW to remain available as needed, please message as concerns identified.     MAKAYLA Mcdermott
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
## 2024-10-16 LAB
ALBUMIN SERPL BCP-MCNC: 3.5 G/DL (ref 3.4–5)
ALP SERPL-CCNC: 52 U/L (ref 33–110)
ALT SERPL W P-5'-P-CCNC: 12 U/L (ref 7–45)
ANION GAP SERPL CALC-SCNC: 13 MMOL/L (ref 10–20)
AST SERPL W P-5'-P-CCNC: 14 U/L (ref 9–39)
BILIRUB SERPL-MCNC: 0.2 MG/DL (ref 0–1.2)
BUN SERPL-MCNC: 9 MG/DL (ref 6–23)
CALCIUM SERPL-MCNC: 8.8 MG/DL (ref 8.6–10.6)
CHLORIDE SERPL-SCNC: 105 MMOL/L (ref 98–107)
CO2 SERPL-SCNC: 21 MMOL/L (ref 21–32)
CREAT SERPL-MCNC: 0.62 MG/DL (ref 0.5–1.05)
EGFRCR SERPLBLD CKD-EPI 2021: >90 ML/MIN/1.73M*2
ERYTHROCYTE [DISTWIDTH] IN BLOOD BY AUTOMATED COUNT: 14.1 % (ref 11.5–14.5)
GLUCOSE SERPL-MCNC: 101 MG/DL (ref 74–99)
HCT VFR BLD AUTO: 33.4 % (ref 36–46)
HGB BLD-MCNC: 11.1 G/DL (ref 12–16)
MCH RBC QN AUTO: 25.8 PG (ref 26–34)
MCHC RBC AUTO-ENTMCNC: 33.2 G/DL (ref 32–36)
MCV RBC AUTO: 78 FL (ref 80–100)
NRBC BLD-RTO: 0 /100 WBCS (ref 0–0)
PLATELET # BLD AUTO: 322 X10*3/UL (ref 150–450)
POTASSIUM SERPL-SCNC: 3.7 MMOL/L (ref 3.5–5.3)
PROT SERPL-MCNC: 6.7 G/DL (ref 6.4–8.2)
RBC # BLD AUTO: 4.3 X10*6/UL (ref 4–5.2)
SODIUM SERPL-SCNC: 135 MMOL/L (ref 136–145)
WBC # BLD AUTO: 9.6 X10*3/UL (ref 4.4–11.3)

## 2024-10-16 PROCEDURE — 2500000004 HC RX 250 GENERAL PHARMACY W/ HCPCS (ALT 636 FOR OP/ED)

## 2024-10-16 PROCEDURE — 2500000001 HC RX 250 WO HCPCS SELF ADMINISTERED DRUGS (ALT 637 FOR MEDICARE OP)

## 2024-10-16 PROCEDURE — 85027 COMPLETE CBC AUTOMATED: CPT

## 2024-10-16 PROCEDURE — 99232 SBSQ HOSP IP/OBS MODERATE 35: CPT

## 2024-10-16 PROCEDURE — 84075 ASSAY ALKALINE PHOSPHATASE: CPT

## 2024-10-16 PROCEDURE — 1220000001 HC OB SEMI-PRIVATE ROOM DAILY

## 2024-10-16 RX ORDER — LIDOCAINE 40 MG/G
CREAM TOPICAL 4 TIMES DAILY
Status: DISCONTINUED | OUTPATIENT
Start: 2024-10-16 | End: 2024-10-17 | Stop reason: HOSPADM

## 2024-10-16 RX ORDER — HYDROMORPHONE HYDROCHLORIDE 1 MG/ML
1 INJECTION, SOLUTION INTRAMUSCULAR; INTRAVENOUS; SUBCUTANEOUS
Status: DISCONTINUED | OUTPATIENT
Start: 2024-10-16 | End: 2024-10-17

## 2024-10-16 RX ORDER — HYDROMORPHONE HYDROCHLORIDE 1 MG/ML
1 INJECTION, SOLUTION INTRAMUSCULAR; INTRAVENOUS; SUBCUTANEOUS ONCE
Status: COMPLETED | OUTPATIENT
Start: 2024-10-16 | End: 2024-10-16

## 2024-10-16 RX ORDER — CHLORHEXIDINE GLUCONATE 40 MG/ML
SOLUTION TOPICAL DAILY PRN
Status: DISCONTINUED | OUTPATIENT
Start: 2024-10-16 | End: 2024-10-17 | Stop reason: HOSPADM

## 2024-10-16 ASSESSMENT — PAIN SCALES - GENERAL
PAINLEVEL_OUTOF10: 8
PAINLEVEL_OUTOF10: 7
PAINLEVEL_OUTOF10: 8
PAINLEVEL_OUTOF10: 7
PAINLEVEL_OUTOF10: 8
PAINLEVEL_OUTOF10: 9
PAINLEVEL_OUTOF10: 8
PAINLEVEL_OUTOF10: 8
PAINLEVEL_OUTOF10: 7
PAINLEVEL_OUTOF10: 8
PAINLEVEL_OUTOF10: 7
PAINLEVEL_OUTOF10: 7
PAINLEVEL_OUTOF10: 8
PAINLEVEL_OUTOF10: 9
PAINLEVEL_OUTOF10: 8
PAINLEVEL_OUTOF10: 7
PAINLEVEL_OUTOF10: 8
PAINLEVEL_OUTOF10: 7
PAINLEVEL_OUTOF10: 9
PAINLEVEL_OUTOF10: 8
PAINLEVEL_OUTOF10: 7
PAINLEVEL_OUTOF10: 8
PAINLEVEL_OUTOF10: 7
PAINLEVEL_OUTOF10: 8

## 2024-10-16 ASSESSMENT — PAIN DESCRIPTION - LOCATION
LOCATION: BUTTOCKS

## 2024-10-16 ASSESSMENT — PAIN SCALES - PAIN ASSESSMENT IN ADVANCED DEMENTIA (PAINAD): TOTALSCORE: MEDICATION (SEE MAR)

## 2024-10-16 ASSESSMENT — PAIN - FUNCTIONAL ASSESSMENT
PAIN_FUNCTIONAL_ASSESSMENT: 0-10

## 2024-10-16 ASSESSMENT — PAIN DESCRIPTION - ORIENTATION
ORIENTATION: RIGHT;LEFT
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
## 2024-10-17 ENCOUNTER — HOSPITAL ENCOUNTER (INPATIENT)
Facility: HOSPITAL | Age: 34
End: 2024-10-17
Attending: STUDENT IN AN ORGANIZED HEALTH CARE EDUCATION/TRAINING PROGRAM | Admitting: STUDENT IN AN ORGANIZED HEALTH CARE EDUCATION/TRAINING PROGRAM
Payer: MEDICAID

## 2024-10-17 ENCOUNTER — APPOINTMENT (OUTPATIENT)
Dept: RADIOLOGY | Facility: CLINIC | Age: 34
End: 2024-10-17
Payer: MEDICAID

## 2024-10-17 ENCOUNTER — APPOINTMENT (OUTPATIENT)
Dept: MATERNAL FETAL MEDICINE | Facility: CLINIC | Age: 34
End: 2024-10-17
Payer: MEDICAID

## 2024-10-17 ENCOUNTER — PHARMACY VISIT (OUTPATIENT)
Dept: PHARMACY | Facility: CLINIC | Age: 34
End: 2024-10-17
Payer: MEDICAID

## 2024-10-17 VITALS
RESPIRATION RATE: 14 BRPM | HEIGHT: 61 IN | SYSTOLIC BLOOD PRESSURE: 95 MMHG | BODY MASS INDEX: 33.96 KG/M2 | WEIGHT: 179.9 LBS | TEMPERATURE: 99.1 F | OXYGEN SATURATION: 99 % | HEART RATE: 79 BPM | DIASTOLIC BLOOD PRESSURE: 66 MMHG

## 2024-10-17 PROCEDURE — 2500000004 HC RX 250 GENERAL PHARMACY W/ HCPCS (ALT 636 FOR OP/ED)

## 2024-10-17 PROCEDURE — 2500000001 HC RX 250 WO HCPCS SELF ADMINISTERED DRUGS (ALT 637 FOR MEDICARE OP)

## 2024-10-17 PROCEDURE — RXMED WILLOW AMBULATORY MEDICATION CHARGE

## 2024-10-17 PROCEDURE — 99254 IP/OBS CNSLTJ NEW/EST MOD 60: CPT | Performed by: STUDENT IN AN ORGANIZED HEALTH CARE EDUCATION/TRAINING PROGRAM

## 2024-10-17 PROCEDURE — 99238 HOSP IP/OBS DSCHRG MGMT 30/<: CPT

## 2024-10-17 RX ORDER — ACETAMINOPHEN 325 MG/1
650 TABLET ORAL EVERY 6 HOURS
Qty: 120 TABLET | Refills: 2 | Status: SHIPPED | OUTPATIENT
Start: 2024-10-17 | End: 2024-12-01

## 2024-10-17 RX ORDER — HYDROMORPHONE HYDROCHLORIDE 1 MG/ML
1 INJECTION, SOLUTION INTRAMUSCULAR; INTRAVENOUS; SUBCUTANEOUS EVERY 4 HOURS PRN
Status: DISCONTINUED | OUTPATIENT
Start: 2024-10-17 | End: 2024-10-17 | Stop reason: HOSPADM

## 2024-10-17 RX ORDER — OXYCODONE AND ACETAMINOPHEN 10; 325 MG/1; MG/1
1 TABLET ORAL EVERY 4 HOURS PRN
Qty: 42 TABLET | Refills: 0 | Status: SHIPPED | OUTPATIENT
Start: 2024-10-17 | End: 2024-10-24 | Stop reason: SDUPTHER

## 2024-10-17 RX ORDER — ONDANSETRON 4 MG/1
4 TABLET, ORALLY DISINTEGRATING ORAL EVERY 8 HOURS PRN
Qty: 60 TABLET | Refills: 3 | Status: SHIPPED | OUTPATIENT
Start: 2024-10-17

## 2024-10-17 RX ORDER — CHLORHEXIDINE GLUCONATE 40 MG/ML
SOLUTION TOPICAL 2 TIMES DAILY
Qty: 946 ML | Refills: 3 | Status: SHIPPED | OUTPATIENT
Start: 2024-10-17 | End: 2024-12-16

## 2024-10-17 RX ORDER — SUMATRIPTAN 50 MG/1
50 TABLET, FILM COATED ORAL EVERY 6 HOURS PRN
Qty: 20 TABLET | Refills: 0 | Status: SHIPPED | OUTPATIENT
Start: 2024-10-17

## 2024-10-17 RX ORDER — ASPIRIN 81 MG/1
162 TABLET ORAL DAILY
Qty: 60 TABLET | Refills: 11 | Status: SHIPPED | OUTPATIENT
Start: 2024-10-17 | End: 2025-10-17

## 2024-10-17 RX ORDER — PNV 119/IRON FUM/FOLIC ACID 29 MG-1 MG
1 TABLET ORAL DAILY
Qty: 60 TABLET | Refills: 6 | Status: SHIPPED | OUTPATIENT
Start: 2024-10-17 | End: 2025-10-17

## 2024-10-17 ASSESSMENT — PAIN - FUNCTIONAL ASSESSMENT
PAIN_FUNCTIONAL_ASSESSMENT: 0-10

## 2024-10-17 ASSESSMENT — PAIN SCALES - GENERAL
PAINLEVEL_OUTOF10: 9
PAINLEVEL_OUTOF10: 8
PAINLEVEL_OUTOF10: 9
PAINLEVEL_OUTOF10: 8
PAINLEVEL_OUTOF10: 8

## 2024-10-17 NOTE — CONSULTS
DERMATOLOGY DEPARTMENT CONSULTATION NOTE  Name: Sandrita Adams  MRN: [id number]  : 1990    Reason For Consult: 22wks pregnant, HS flare, discuss restarting Cosentyx    History Of Present Illness  Sandrita Adams is a 34 y.o. female with a past medical history of current second trimester pregnancy and longstanding hidradenitis suppurativa presented on 10/14/2024 with hidradenitis pain and flares admitted then transferred to [facility] for the same. Dermatology was consulted for management of hidradenitis suppurativa flare during pregnancy, reconsideration of cosentyx.     She has a longstanding (>10 year) hx of HS, which per chart review she has been on: adalimumab, infliximab, secukinumab apremilast, spirnolactone, doxycycline, bactrim, clindamycin, levaquin, rifampin, IV dalbavancin, IV vancomycin. She has trialed ILK injections without success. Prior to pregnancy, she was most recently on cosentyx which got her the best she has ever been but she stopped medication when becoming pregnant. Cimzia, a TNF antagonist that does not cause the placenta, to avoid any effects on fetus even though it is not approved for HS. She was also on bactrim and doxy prior to pregnancy which were stopped. She follows intermittently with Dr. Tonio garibay [facility] and also sees ID at [facility].     Was admitted earlier this month for poor control on cimzia. Planned trial of IV ertapenem for 12-16 weeks given limited data on biologics in pregnancy and patient strong desire to avoid anything that could cause potential fetal harm. Patient did have hesitance about long term maintenance of PICC, but after discussion with ID agreeable to trial while admitted to see if worth pursuing longer term. She left after 1 dose of IV ertapenem due to family concerns.    Report that since then she traveled to [place] to see her father off to the [place]. She had heated family issues while there and left all her belongings  including PO clindamycin there. Reports her flare is worsening with this stress.    Also reports that she thinks cimzia is the cause of her headaches. Reports her neurologist disagreed but since last visit she showed him articles and reports he recommended stopping. Last dose of cimzia was 10/6.    Thinks he HS is continuing to worsen. Reports she spoke to her ID doctor via phone who said she was past the point where antibiotics would help, and that she should consider surgical deroofing.     She was restarted on IV ertapenem since admission and recived 2 doses. Thinks she still has substantial drainage, but that the smell is a little better.    Review of Systems  As above    Past Medical History  Past Medical History:   Diagnosis Date    Anemia 06/16/2024    Hidradenitis suppurativa 10/29/2020    Hidradenitis    Hidradenitis suppurativa     Lupus     UTI (urinary tract infection) during pregnancy, first trimester ([facility]) 07/16/2024    Vitamin D deficiency 06/16/2024       Past Surgical History   has a past surgical history that includes Other surgical history (09/19/2022); Other surgical history (Left, 2011); and Other surgical history (Left, 2021).     Allergies  Allergies   Allergen Reactions    Suboxone [Buprenorphine-Naloxone] Anaphylaxis, Hives and Itching    Clarithromycin Hives    Haloperidol Hallucinations and Psychosis    Levofloxacin Swelling     Ankle Joint/tendon pain    Metoclopramide Headache, Hallucinations and Psychosis    Reglan [Metoclopramide Hcl] Psychosis       Medications  Scheduled Meds:    Continuous Infusions:    PRN Meds:      Family History  No family history on file.    Social History   reports that she has quit smoking. Her smoking use included cigarettes. She has never used smokeless tobacco. She reports that she does not currently use alcohol. She reports that she does not use drugs.     Objective    Vitals:    10/16/24 2000 10/17/24 0027 10/17/24 0429 10/17/24 0840   BP: 116/69  112/71 99/61 95/66   BP Location:       Patient Position:       Pulse: 107 85 81 79   Resp: 20 18 16 14   Temp: 36.9 °C (98.4 °F) 36.8 °C (98.2 °F) 36.7 °C (98.1 °F) 37.3 °C (99.1 °F)   TempSrc: Temporal Temporal Temporal Temporal   SpO2: 96% 100% 98% 99%   Weight:       Height:            Exam    GEN: no acute distress  NEURO: moving all extremities   EYES: conjunctiva and eyelids normal. No conjunctival injection or erosions appreciated  ENT:   - Lips: normal  - Teeth/gums: normal  - Oropharynx: normal tongue and mucosa  NECK: normal and symmetric.   CV: no varicosities, warmth or tenderness of extremities.  GI: Pregnant abdomen  EXTREMITIES: no distal digital clubbing, cyanosis, petechiae   SKIN: A focused skin exam including scalp, face, eyes, ears, neck, trunk, bilateral upper & lower extremities, toenails and fingernails (mons pubis and inguinal folds excluded) were examined with the following findings:  - bilateral axillae, gluteal fold, infrabdominal fold, inguinal folds, and labia majora with tender subcutaneous nodules and sinus tracts, none of which are draining spontaneously or productive of drainage when pressure applied  - right axilla with a tender pink papule c/w granulation tissue  - round hyperpigmented patches and atrophic scars in bilateral axillae, gluteal fold, gluteal cleft, abdomen, infrabdominal fold, inguinal folds, mons pubis, lateral thighs      Laboratory and Data  No results found for this or any previous visit (from the past 24 hours).     Assessment/Plan     Sandrita Adams is a 34 y.o. female with a past medical history of current second trimester pregnancy and longstanding hidradenitis suppurativa presented on 10/14/2024 with hidradenitis pain and flares admitted then transferred to [facility] for the same. Dermatology was consulted for management of hidradenitis suppurativa flare during pregnancy, reconsideration of cosentyx.    Differential diagnoses:  Hidradenitis  suppurativa, elliott stage 3, flaring     Impression:  34 F with a history of refractory hidradenitis suppurative elliott stage 3 who had previously been under better control while on cosentyx, and given the lack of safety data for this drug during pregnancy was changed to cimzia which does not cross the placenta. She has discontinued cimzia as she thinks it is causing her headaches. Have attempted to start her on IV ertapenem with patient receiving 1 dose last hospitalization and 2 doses this hospitalization. She reports improvement in smell after the recent 2 doses, but has concerns about maintaining PICC line over full abx course.     Per MFM phone discussion, they would be ok with starting cosentyx again but would like dermatology approval and management. There are no trials of cosentyx in pregnant women so the fetal effects are unknown. There is one article (Isaias, [place] Journal of Dermatology, 2018, https://doi.org/10.1111/bjd.83767) based on post market data, starting placental transfer would be highest in third trimester and that animal studies do not show harmful effects. When they analyzed post market data, the majority of women discontinued within first trimester. Of the 18 cases that continued:, 9 were lost to follow up/unknown, 3 had spontaneous abortions, 4 had elective termination, 1 had ongoing pregnancy, and 1 had a healthy . Three of those 18 cases used cosentyx during third trimester but their outcomes were not specified. Patient would need cosentyx in second and third trimester if she were to resume during pregnancy.    Discussed with patient that there is formal study on cosentyx in pregnancy and that effects on fetus would be unkonwn. Discussed that other biologics, in particular humira have been on market longer and had more pregnant women use them without any known averse effects on fetus, but that there is still no formal safety trail for that medication. Discussed again option for  IV ertapenem or oral clindamycin, both of which are safe  in pregnancy.     At time of staffing, patient is preparing to leave and reports she plans to pursue humira outpatient. We discussed extensively again that there is no formal safety trial in pregnancy, but post market surveillance has not yet shown any contraindication, and she prefers this medication even though she did not do as well from the HS standpoint in the past. She is agreeable to continuing clindamycin 300 mg PO BID. After she gives birth, can still plan to restart cosentyx.      Recommendations:  - CONTINUE clindamcyin 300 mg PO BID  - CONTINUE hibiclens and topical clindamycin  - CONSIDER humira outpatient thru pregnancy  - reconsider IV ertapenem if patient housing situation stabilizes to the point she could maintain PICC, she is potentially getting her own apartment soon  - derm will contact schedulers to try and get sooner appointment.    The patient was seen and discussed with attending physician Dr. Marion.     Thank you for the consultation and for the opportunity to contribute to the care of this patient.      Carlyn Holt MD, PhD  PGY2, Dermatology  Epic chat (preferred)  Team pager [phone]     I saw and evaluated the patient. I personally obtained the key and critical portions of the history and physical exam or was physically present for key and critical portions performed by the resident. I reviewed the resident's documentation and discussed the patient with the resident. I agree with the resident's medical decision making as documented in the note.    Maria Victoria Marion MD

## 2024-10-17 NOTE — CARE PLAN
The patient's goals for the shift include decreased pain so she can sleep    The clinical goals for the shift include pain control    Patient discharged home with pain around 8/10 on current medication regimen. Patient feeling ready to go home and asked for discharge in order to make house tour appt. Medications sent to [facility] for pick-up by patient. Patient to DC home via private car with friend. VSS, no OB complaints at this time.  at time of DC. AVS provided and reviewed, all questions/concerns addressed at this time.

## 2024-10-17 NOTE — DISCHARGE SUMMARY
Discharge Summary    Admission Date: 10/14/2024  Discharge Date: 10/17/2024    Discharge Diagnosis  Axillary hidradenitis suppurativa    Hospital Course  34 y.o.  at 22w6d admitted as a t/f [facility] for pain control in s/o recurrent HS flares. Patient presented to [facility] triage on 10/14 with increased pain in her axilla and groin. She recently returned from [place] after previous admission for similar symptoms, and reported that she left all of her medications and credit cards in [place]. She was treated with her home regimen of scheduled tylenol, oxycodone and PRN dilaudid and clindamycin and transferred to [facility].    At [facility], She endorsed continued severe pain and worsening HS lesions. She did not have signs of infection, but was started on Dermatology's previous recommendations of IV ertapenem 1g daily for 12-16 weeks. A Multi-modal pain regimen of  scheduled tylenol, lidocaine gel, prn oxy 10mg q4hrs + IV dilaudid 2mg q2hrs for breakthrough, was continued. Her IV dilaudid was weaned to facilitate pain control on her home regimen. We continued discussion about Dermatology recommendations for IV ertapenem and risks/benefits of midline placement for home infusions. Patient expressed concerns about risks and ultimately elected against this regimen. Multiple pain and HS treatment options were discussed during her stay, including but not limited to, Cimzia, Cosentyx, Humira, gabapentin, Lyrica, and amitriptyline. Patient reported that she had tried and failed multiple of these options, and that Cosentyx was one of the only things that worked for her prior to pregnancy. We did discuss with the patient that Cosentyx has limited safety data in pregnancy as trials for this and many other newer drugs do not include pregnant patients. The drug could potentially cross the placenta, but other related biologics appear safe in pregnancy and benefits may outweigh risks for controlling her symptoms at later gestational age.  Dermatology was re-engaged to discuss the potential of restarting Cosentyx during pregnancy. Dermatology reinforces unknown fetal effects and limited safety data of Cosentyx in pregnancy. After discussion, patient desires Humira.  Derm planning for close outpatient follow up. After consultation with Derm, patient reported needing to leave urgently to attend a meeting regarding her housing situation. Given her pain stability, she was discharged. She will see MFM in office next week.    Fetal heart rate present on admission and discharge     Pertinent Physical Exam At Time of Discharge  General: no acute distress  HEENT: normocephalic, atraumatic  Heart: warm and well perfused  Lungs: breathing comfortably on room air  Abdomen: gravid  Extremities: moving all extremities  Neuro: awake and conversant  Psych: appropriate mood and affect    Last Vitals:  Temp Pulse Resp BP MAP Pulse Ox   37.3 °C (99.1 °F) 79 14 95/66 76 99 %     Discharge Meds     Your medication list        ASK your doctor about these medications        Instructions Last Dose Given Next Dose Due   acetaminophen 325 mg tablet  Commonly known as: Tylenol      Take 2 tablets (650 mg) by mouth every 6 hours for 30 doses.       albuterol 90 mcg/actuation inhaler      Inhale 2 puffs every 6 hours if needed for wheezing.       aspirin 81 mg EC tablet      Take 2 tablets (162 mg) by mouth once daily.       Boost 0.04 gram- 1 kcal/mL liquid  Generic drug: food supplemt, lactose-reduced      Please dispense 14 bottles for patient to have BID for 7 days.       chlorhexidine 4 % external liquid  Commonly known as: Hibiclens      Apply topically 2 times a day. Bathe in Hibiclens BID       Cimzia injection  Generic drug: certolizumab pegol      Inject 2 mL (400 mg) under the skin every 14 (fourteen) days.       clindamycin 300 mg capsule  Commonly known as: Cleocin      Take 1 capsule (300 mg) by mouth 2 times a day for 14 days.       magnesium oxide 400 mg (241.3  mg magnesium) tablet  Commonly known as: Mag-Ox      Take 1 tablet (400 mg) by mouth 2 times a day.       multivitamin with minerals tablet           Narcan 4 mg/actuation nasal spray  Generic drug: naloxone      Administer 1 spray (4 mg) into affected nostril(s) if needed for opioid reversal or respiratory depression. May repeat every 2-3 minutes if needed, alternating nostrils, until medical assistance becomes available.       ondansetron ODT 4 mg disintegrating tablet  Commonly known as: Zofran-ODT      Take 1 tablet (4 mg) by mouth every 8 hours if needed for nausea or vomiting.       oxyCODONE-acetaminophen  mg tablet  Commonly known as: Percocet      Take 1 tablet by mouth every 4 hours if needed for moderate pain (4 - 6) or severe pain (7 - 10).       Prenatal 19 29 mg iron- 1 mg tablet  Generic drug: -iron fum-folic acid      Take 1 tablet by mouth once daily.       SUMAtriptan 50 mg tablet  Commonly known as: Imitrex      Take 1 tablet (50 mg) by mouth every 6 hours if needed for migraine. May repeat dose once in 2 hours if no relief.  Do not exceed 2 doses in 24 hours.       Vitamin B-2 100 mg tablet tablet  Generic drug: riboflavin      Take 1 tablet (100 mg) by mouth once daily.                 Complications Requiring Follow-Up  N/a    Test Results Pending At Discharge  Pending Labs       No current pending labs.            Outpatient Follow-Up  Future Appointments   Date Time Provider Department Center   10/24/2024  9:30 AM Leila Beatty MD [id number] [facility]   10/31/2024  9:00 AM Leila Beatty MD [id number] [facility]   11/7/2024  9:00 AM Alonso Lockwood MD [id number] [facility]   11/14/2024  9:00 AM Dharmesh Mccann MD [id number] [facility]   11/20/2024  9:00 AM Spenser Elizalde MD [id number] [facility]   11/21/2024  9:00 AM Sean Mariscal MD [id number] [facility]         Seen & Discussed with Dr. Cordelia Connolly MD, PGY-2  MFM team pager [phone]

## 2024-10-21 ENCOUNTER — SPECIALTY PHARMACY (OUTPATIENT)
Dept: PHARMACY | Facility: CLINIC | Age: 34
End: 2024-10-21

## 2024-10-24 ENCOUNTER — PHARMACY VISIT (OUTPATIENT)
Dept: PHARMACY | Facility: CLINIC | Age: 34
End: 2024-10-24
Payer: MEDICAID

## 2024-10-24 ENCOUNTER — ROUTINE PRENATAL (OUTPATIENT)
Dept: MATERNAL FETAL MEDICINE | Facility: CLINIC | Age: 34
End: 2024-10-24
Payer: MEDICAID

## 2024-10-24 VITALS
DIASTOLIC BLOOD PRESSURE: 74 MMHG | SYSTOLIC BLOOD PRESSURE: 114 MMHG | WEIGHT: 184.5 LBS | HEART RATE: 91 BPM | BODY MASS INDEX: 34.86 KG/M2

## 2024-10-24 DIAGNOSIS — O21.9 NAUSEA AND VOMITING IN PREGNANCY PRIOR TO 22 WEEKS GESTATION: ICD-10-CM

## 2024-10-24 DIAGNOSIS — O99.332: ICD-10-CM

## 2024-10-24 DIAGNOSIS — R51.9 PREGNANCY HEADACHE IN SECOND TRIMESTER (HHS-HCC): ICD-10-CM

## 2024-10-24 DIAGNOSIS — O26.892 PREGNANCY HEADACHE IN SECOND TRIMESTER (HHS-HCC): ICD-10-CM

## 2024-10-24 DIAGNOSIS — Z3A.23 23 WEEKS GESTATION OF PREGNANCY (HHS-HCC): ICD-10-CM

## 2024-10-24 DIAGNOSIS — Z65.4 VICTIM OF INTIMATE PARTNER ABUSE DURING PREGNANCY: ICD-10-CM

## 2024-10-24 DIAGNOSIS — J45.909 ASTHMA AFFECTING PREGNANCY IN SECOND TRIMESTER (HHS-HCC): ICD-10-CM

## 2024-10-24 DIAGNOSIS — L73.2 HIDRADENITIS SUPPURATIVA: Primary | ICD-10-CM

## 2024-10-24 DIAGNOSIS — O99.512 ASTHMA AFFECTING PREGNANCY IN SECOND TRIMESTER (HHS-HCC): ICD-10-CM

## 2024-10-24 PROCEDURE — 99214 OFFICE O/P EST MOD 30 MIN: CPT | Performed by: STUDENT IN AN ORGANIZED HEALTH CARE EDUCATION/TRAINING PROGRAM

## 2024-10-24 PROCEDURE — RXMED WILLOW AMBULATORY MEDICATION CHARGE

## 2024-10-24 PROCEDURE — 99214 OFFICE O/P EST MOD 30 MIN: CPT | Mod: GC | Performed by: STUDENT IN AN ORGANIZED HEALTH CARE EDUCATION/TRAINING PROGRAM

## 2024-10-24 RX ORDER — OXYCODONE AND ACETAMINOPHEN 10; 325 MG/1; MG/1
1 TABLET ORAL EVERY 4 HOURS PRN
Qty: 42 TABLET | Refills: 0 | Status: SHIPPED | OUTPATIENT
Start: 2024-10-24

## 2024-10-24 ASSESSMENT — ENCOUNTER SYMPTOMS
ENDOCRINE NEGATIVE: 0
NEUROLOGICAL NEGATIVE: 0
EYES NEGATIVE: 0
ALLERGIC/IMMUNOLOGIC NEGATIVE: 0
CARDIOVASCULAR NEGATIVE: 0
PSYCHIATRIC NEGATIVE: 0
RESPIRATORY NEGATIVE: 0
CONSTITUTIONAL NEGATIVE: 0
GASTROINTESTINAL NEGATIVE: 0
MUSCULOSKELETAL NEGATIVE: 0
HEMATOLOGIC/LYMPHATIC NEGATIVE: 0

## 2024-10-24 ASSESSMENT — PAIN SCALES - GENERAL: PAINLEVEL_OUTOF10: 8

## 2024-10-24 ASSESSMENT — PAIN - FUNCTIONAL ASSESSMENT: PAIN_FUNCTIONAL_ASSESSMENT: 0-10

## 2024-10-24 NOTE — ASSESSMENT & PLAN NOTE
- recently admitted to hospital 10/14-10/17 due to HS flare  - Reporting 8/10 pain today, very intolerable, has been progressing during pregnancy  - taking Percocet and clindamycin for pain, they do provide some relief but not as much as medications she was taking pre-pregnancy  - seeing Derm 10/30 to discuss starting Humira

## 2024-10-24 NOTE — ASSESSMENT & PLAN NOTE
- trying to cut down on smoking, smoking about once a week, has patch at home  - tobacco cessation counseling provided
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
## 2024-10-27 ENCOUNTER — HOSPITAL ENCOUNTER (OUTPATIENT)
Facility: HOSPITAL | Age: 34
Setting detail: OBSERVATION
End: 2024-10-27
Attending: STUDENT IN AN ORGANIZED HEALTH CARE EDUCATION/TRAINING PROGRAM | Admitting: STUDENT IN AN ORGANIZED HEALTH CARE EDUCATION/TRAINING PROGRAM
Payer: MEDICAID

## 2024-10-27 ENCOUNTER — HOSPITAL ENCOUNTER (EMERGENCY)
Facility: HOSPITAL | Age: 34
Discharge: OTHER INSTITUTION | End: 2024-10-28
Attending: EMERGENCY MEDICINE
Payer: MEDICAID

## 2024-10-27 VITALS
WEIGHT: 184 LBS | SYSTOLIC BLOOD PRESSURE: 136 MMHG | DIASTOLIC BLOOD PRESSURE: 97 MMHG | TEMPERATURE: 98.8 F | RESPIRATION RATE: 18 BRPM | BODY MASS INDEX: 33.86 KG/M2 | OXYGEN SATURATION: 98 % | HEART RATE: 86 BPM | HEIGHT: 62 IN

## 2024-10-27 DIAGNOSIS — Z87.2 HISTORY OF HIDRADENITIS SUPPURATIVA: Primary | ICD-10-CM

## 2024-10-27 DIAGNOSIS — Z3A.24 24 WEEKS GESTATION OF PREGNANCY (HHS-HCC): ICD-10-CM

## 2024-10-27 LAB
ALBUMIN SERPL BCP-MCNC: 3.8 G/DL (ref 3.4–5)
ALP SERPL-CCNC: 69 U/L (ref 33–110)
ALT SERPL W P-5'-P-CCNC: 15 U/L (ref 7–45)
ANION GAP SERPL CALC-SCNC: 13 MMOL/L (ref 10–20)
APPEARANCE UR: CLEAR
AST SERPL W P-5'-P-CCNC: 11 U/L (ref 9–39)
B-HCG SERPL-ACNC: ABNORMAL MIU/ML
BASOPHILS # BLD AUTO: 0.02 X10*3/UL (ref 0–0.1)
BASOPHILS NFR BLD AUTO: 0.2 %
BILIRUB SERPL-MCNC: 0.2 MG/DL (ref 0–1.2)
BILIRUB UR STRIP.AUTO-MCNC: NEGATIVE MG/DL
BUN SERPL-MCNC: 13 MG/DL (ref 6–23)
CALCIUM SERPL-MCNC: 9.1 MG/DL (ref 8.6–10.3)
CHLORIDE SERPL-SCNC: 108 MMOL/L (ref 98–107)
CO2 SERPL-SCNC: 20 MMOL/L (ref 21–32)
COLOR UR: NORMAL
CREAT SERPL-MCNC: 0.73 MG/DL (ref 0.5–1.05)
CRP SERPL-MCNC: 0.64 MG/DL
EGFRCR SERPLBLD CKD-EPI 2021: >90 ML/MIN/1.73M*2
EOSINOPHIL # BLD AUTO: 0.06 X10*3/UL (ref 0–0.7)
EOSINOPHIL NFR BLD AUTO: 0.5 %
ERYTHROCYTE [DISTWIDTH] IN BLOOD BY AUTOMATED COUNT: 14.3 % (ref 11.5–14.5)
ERYTHROCYTE [SEDIMENTATION RATE] IN BLOOD BY WESTERGREN METHOD: 35 MM/H (ref 0–20)
GLUCOSE SERPL-MCNC: 95 MG/DL (ref 74–99)
GLUCOSE UR STRIP.AUTO-MCNC: NORMAL MG/DL
HCT VFR BLD AUTO: 34.1 % (ref 36–46)
HGB BLD-MCNC: 11.5 G/DL (ref 12–16)
IMM GRANULOCYTES # BLD AUTO: 0.14 X10*3/UL (ref 0–0.7)
IMM GRANULOCYTES NFR BLD AUTO: 1.1 % (ref 0–0.9)
KETONES UR STRIP.AUTO-MCNC: NEGATIVE MG/DL
LACTATE SERPL-SCNC: 0.9 MMOL/L (ref 0.4–2)
LEUKOCYTE ESTERASE UR QL STRIP.AUTO: NEGATIVE
LYMPHOCYTES # BLD AUTO: 2.64 X10*3/UL (ref 1.2–4.8)
LYMPHOCYTES NFR BLD AUTO: 21.4 %
MCH RBC QN AUTO: 25.4 PG (ref 26–34)
MCHC RBC AUTO-ENTMCNC: 33.7 G/DL (ref 32–36)
MCV RBC AUTO: 75 FL (ref 80–100)
MONOCYTES # BLD AUTO: 0.95 X10*3/UL (ref 0.1–1)
MONOCYTES NFR BLD AUTO: 7.7 %
NEUTROPHILS # BLD AUTO: 8.52 X10*3/UL (ref 1.2–7.7)
NEUTROPHILS NFR BLD AUTO: 69.1 %
NITRITE UR QL STRIP.AUTO: NEGATIVE
NRBC BLD-RTO: 0 /100 WBCS (ref 0–0)
PH UR STRIP.AUTO: 6 [PH]
PLATELET # BLD AUTO: 282 X10*3/UL (ref 150–450)
POTASSIUM SERPL-SCNC: 3.8 MMOL/L (ref 3.5–5.3)
PROT SERPL-MCNC: 7 G/DL (ref 6.4–8.2)
PROT UR STRIP.AUTO-MCNC: NEGATIVE MG/DL
RBC # BLD AUTO: 4.52 X10*6/UL (ref 4–5.2)
RBC # UR STRIP.AUTO: NEGATIVE /UL
SODIUM SERPL-SCNC: 137 MMOL/L (ref 136–145)
SP GR UR STRIP.AUTO: 1.02
UROBILINOGEN UR STRIP.AUTO-MCNC: NORMAL MG/DL
WBC # BLD AUTO: 12.3 X10*3/UL (ref 4.4–11.3)

## 2024-10-27 PROCEDURE — 36415 COLL VENOUS BLD VENIPUNCTURE: CPT

## 2024-10-27 PROCEDURE — 87075 CULTR BACTERIA EXCEPT BLOOD: CPT | Mod: AHULAB

## 2024-10-27 PROCEDURE — 2500000004 HC RX 250 GENERAL PHARMACY W/ HCPCS (ALT 636 FOR OP/ED)

## 2024-10-27 PROCEDURE — 96365 THER/PROPH/DIAG IV INF INIT: CPT

## 2024-10-27 PROCEDURE — 96375 TX/PRO/DX INJ NEW DRUG ADDON: CPT

## 2024-10-27 PROCEDURE — 85652 RBC SED RATE AUTOMATED: CPT

## 2024-10-27 PROCEDURE — 96376 TX/PRO/DX INJ SAME DRUG ADON: CPT

## 2024-10-27 PROCEDURE — 76815 OB US LIMITED FETUS(S): CPT | Performed by: EMERGENCY MEDICINE

## 2024-10-27 PROCEDURE — 83605 ASSAY OF LACTIC ACID: CPT

## 2024-10-27 PROCEDURE — 86140 C-REACTIVE PROTEIN: CPT

## 2024-10-27 PROCEDURE — 99285 EMERGENCY DEPT VISIT HI MDM: CPT | Mod: 25

## 2024-10-27 PROCEDURE — 84702 CHORIONIC GONADOTROPIN TEST: CPT

## 2024-10-27 PROCEDURE — 80053 COMPREHEN METABOLIC PANEL: CPT

## 2024-10-27 PROCEDURE — 85025 COMPLETE CBC W/AUTO DIFF WBC: CPT

## 2024-10-27 PROCEDURE — 81003 URINALYSIS AUTO W/O SCOPE: CPT

## 2024-10-27 PROCEDURE — 84156 ASSAY OF PROTEIN URINE: CPT

## 2024-10-27 PROCEDURE — 2500000004 HC RX 250 GENERAL PHARMACY W/ HCPCS (ALT 636 FOR OP/ED): Performed by: EMERGENCY MEDICINE

## 2024-10-27 PROCEDURE — 87040 BLOOD CULTURE FOR BACTERIA: CPT | Mod: 59,AHULAB

## 2024-10-27 RX ORDER — ERTAPENEM 1 G/1
1 INJECTION, POWDER, LYOPHILIZED, FOR SOLUTION INTRAMUSCULAR; INTRAVENOUS EVERY 24 HOURS
Status: DISCONTINUED | OUTPATIENT
Start: 2024-10-27 | End: 2024-10-28 | Stop reason: HOSPADM

## 2024-10-27 RX ORDER — HYDROMORPHONE HYDROCHLORIDE 1 MG/ML
1 INJECTION, SOLUTION INTRAMUSCULAR; INTRAVENOUS; SUBCUTANEOUS ONCE
Status: COMPLETED | OUTPATIENT
Start: 2024-10-27 | End: 2024-10-27

## 2024-10-27 RX ORDER — HYDROMORPHONE HYDROCHLORIDE 2 MG/ML
2 INJECTION, SOLUTION INTRAMUSCULAR; INTRAVENOUS; SUBCUTANEOUS EVERY 2 HOUR PRN
Status: DISCONTINUED | OUTPATIENT
Start: 2024-10-27 | End: 2024-10-28 | Stop reason: HOSPADM

## 2024-10-27 ASSESSMENT — PAIN DESCRIPTION - PAIN TYPE: TYPE: ACUTE PAIN

## 2024-10-27 ASSESSMENT — PAIN SCALES - GENERAL
PAINLEVEL_OUTOF10: 9
PAINLEVEL_OUTOF10: 8
PAINLEVEL_OUTOF10: 9
PAINLEVEL_OUTOF10: 8

## 2024-10-27 ASSESSMENT — PAIN DESCRIPTION - LOCATION: LOCATION: GROIN

## 2024-10-27 ASSESSMENT — PAIN DESCRIPTION - ONSET: ONSET: GRADUAL

## 2024-10-27 ASSESSMENT — PAIN - FUNCTIONAL ASSESSMENT: PAIN_FUNCTIONAL_ASSESSMENT: 0-10

## 2024-10-27 ASSESSMENT — PAIN DESCRIPTION - FREQUENCY: FREQUENCY: CONSTANT/CONTINUOUS

## 2024-10-27 ASSESSMENT — PAIN DESCRIPTION - DESCRIPTORS: DESCRIPTORS: ACHING

## 2024-10-27 ASSESSMENT — PAIN DESCRIPTION - PROGRESSION: CLINICAL_PROGRESSION: NOT CHANGED

## 2024-10-28 ENCOUNTER — HOSPITAL ENCOUNTER (INPATIENT)
Facility: HOSPITAL | Age: 34
LOS: 3 days | Discharge: HOME | End: 2024-10-31
Attending: STUDENT IN AN ORGANIZED HEALTH CARE EDUCATION/TRAINING PROGRAM | Admitting: STUDENT IN AN ORGANIZED HEALTH CARE EDUCATION/TRAINING PROGRAM
Payer: MEDICAID

## 2024-10-28 DIAGNOSIS — O09.90 HIGH RISK PREGNANCY CASE MANAGEMENT PATIENT (HHS-HCC): ICD-10-CM

## 2024-10-28 DIAGNOSIS — L73.2 AXILLARY HIDRADENITIS SUPPURATIVA: ICD-10-CM

## 2024-10-28 DIAGNOSIS — L73.2 HIDRADENITIS SUPPURATIVA: ICD-10-CM

## 2024-10-28 DIAGNOSIS — Z3A.23 23 WEEKS GESTATION OF PREGNANCY (HHS-HCC): Primary | ICD-10-CM

## 2024-10-28 DIAGNOSIS — Z87.2 H/O HIDRADENITIS SUPPURATIVA: ICD-10-CM

## 2024-10-28 DIAGNOSIS — L73.2 HIDRADENITIS SUPPURATIVA: Primary | ICD-10-CM

## 2024-10-28 DIAGNOSIS — Z3A.20 20 WEEKS GESTATION OF PREGNANCY (HHS-HCC): ICD-10-CM

## 2024-10-28 DIAGNOSIS — Z65.4 VICTIM OF INTIMATE PARTNER ABUSE DURING PREGNANCY: ICD-10-CM

## 2024-10-28 LAB
CREAT UR-MCNC: 98.6 MG/DL (ref 20–320)
PROT UR-ACNC: 10 MG/DL (ref 5–24)
PROT/CREAT UR: 0.1 MG/MG CREAT (ref 0–0.17)

## 2024-10-28 PROCEDURE — 2500000004 HC RX 250 GENERAL PHARMACY W/ HCPCS (ALT 636 FOR OP/ED)

## 2024-10-28 PROCEDURE — 99255 IP/OBS CONSLTJ NEW/EST HI 80: CPT

## 2024-10-28 PROCEDURE — 59025 FETAL NON-STRESS TEST: CPT

## 2024-10-28 PROCEDURE — G0378 HOSPITAL OBSERVATION PER HR: HCPCS

## 2024-10-28 PROCEDURE — 99221 1ST HOSP IP/OBS SF/LOW 40: CPT

## 2024-10-28 PROCEDURE — 99214 OFFICE O/P EST MOD 30 MIN: CPT | Mod: 25

## 2024-10-28 PROCEDURE — 1220000001 HC OB SEMI-PRIVATE ROOM DAILY

## 2024-10-28 PROCEDURE — 2500000001 HC RX 250 WO HCPCS SELF ADMINISTERED DRUGS (ALT 637 FOR MEDICARE OP)

## 2024-10-28 PROCEDURE — 99222 1ST HOSP IP/OBS MODERATE 55: CPT | Performed by: STUDENT IN AN ORGANIZED HEALTH CARE EDUCATION/TRAINING PROGRAM

## 2024-10-28 PROCEDURE — 99254 IP/OBS CNSLTJ NEW/EST MOD 60: CPT | Performed by: STUDENT IN AN ORGANIZED HEALTH CARE EDUCATION/TRAINING PROGRAM

## 2024-10-28 PROCEDURE — 59025 FETAL NON-STRESS TEST: CPT | Mod: GC

## 2024-10-28 RX ORDER — SENNOSIDES 25 MG/1
TABLET, FILM COATED ORAL 4 TIMES DAILY
Status: DISCONTINUED | OUTPATIENT
Start: 2024-10-28 | End: 2024-10-28 | Stop reason: ALTCHOICE

## 2024-10-28 RX ORDER — CLINDAMYCIN HYDROCHLORIDE 300 MG/1
300 CAPSULE ORAL EVERY 12 HOURS
Status: DISCONTINUED | OUTPATIENT
Start: 2024-10-28 | End: 2024-10-28

## 2024-10-28 RX ORDER — HYDROMORPHONE HYDROCHLORIDE 1 MG/ML
2 INJECTION, SOLUTION INTRAMUSCULAR; INTRAVENOUS; SUBCUTANEOUS
Status: DISCONTINUED | OUTPATIENT
Start: 2024-10-28 | End: 2024-10-29

## 2024-10-28 RX ORDER — POLYETHYLENE GLYCOL 3350 17 G/17G
17 POWDER, FOR SOLUTION ORAL 2 TIMES DAILY PRN
Status: DISCONTINUED | OUTPATIENT
Start: 2024-10-28 | End: 2024-10-31 | Stop reason: HOSPADM

## 2024-10-28 RX ORDER — LANOLIN ALCOHOL/MO/W.PET/CERES
400 CREAM (GRAM) TOPICAL 2 TIMES DAILY
Status: DISCONTINUED | OUTPATIENT
Start: 2024-10-28 | End: 2024-10-31 | Stop reason: HOSPADM

## 2024-10-28 RX ORDER — ADALIMUMAB 80MG/0.8ML
KIT SUBCUTANEOUS
Qty: 3 EACH | Refills: 0 | Status: SHIPPED | OUTPATIENT
Start: 2024-10-28

## 2024-10-28 RX ORDER — ONDANSETRON 4 MG/1
4 TABLET, FILM COATED ORAL EVERY 6 HOURS PRN
Status: DISCONTINUED | OUTPATIENT
Start: 2024-10-28 | End: 2024-10-31 | Stop reason: HOSPADM

## 2024-10-28 RX ORDER — OXYCODONE HYDROCHLORIDE 5 MG/1
10 TABLET ORAL EVERY 4 HOURS PRN
Status: DISCONTINUED | OUTPATIENT
Start: 2024-10-28 | End: 2024-10-28

## 2024-10-28 RX ORDER — NIFEDIPINE 10 MG/1
10 CAPSULE ORAL ONCE AS NEEDED
Status: DISCONTINUED | OUTPATIENT
Start: 2024-10-28 | End: 2024-10-31 | Stop reason: HOSPADM

## 2024-10-28 RX ORDER — SIMETHICONE 80 MG
80 TABLET,CHEWABLE ORAL 4 TIMES DAILY PRN
Status: DISCONTINUED | OUTPATIENT
Start: 2024-10-28 | End: 2024-10-31 | Stop reason: HOSPADM

## 2024-10-28 RX ORDER — ASPIRIN 81 MG/1
162 TABLET ORAL DAILY
Status: DISCONTINUED | OUTPATIENT
Start: 2024-10-29 | End: 2024-10-31 | Stop reason: HOSPADM

## 2024-10-28 RX ORDER — METOCLOPRAMIDE HYDROCHLORIDE 5 MG/ML
10 INJECTION INTRAMUSCULAR; INTRAVENOUS EVERY 6 HOURS PRN
Status: DISCONTINUED | OUTPATIENT
Start: 2024-10-28 | End: 2024-10-31 | Stop reason: HOSPADM

## 2024-10-28 RX ORDER — HYDROMORPHONE HYDROCHLORIDE 1 MG/ML
2 INJECTION, SOLUTION INTRAMUSCULAR; INTRAVENOUS; SUBCUTANEOUS
Status: DISCONTINUED | OUTPATIENT
Start: 2024-10-28 | End: 2024-10-28

## 2024-10-28 RX ORDER — ACETAMINOPHEN 325 MG/1
975 TABLET ORAL EVERY 6 HOURS
Status: DISCONTINUED | OUTPATIENT
Start: 2024-10-28 | End: 2024-10-31 | Stop reason: HOSPADM

## 2024-10-28 RX ORDER — CLINDAMYCIN HYDROCHLORIDE 300 MG/1
300 CAPSULE ORAL EVERY 12 HOURS
Status: DISCONTINUED | OUTPATIENT
Start: 2024-10-28 | End: 2024-10-31 | Stop reason: HOSPADM

## 2024-10-28 RX ORDER — ADALIMUMAB 80MG/0.8ML
80 KIT SUBCUTANEOUS
Qty: 2 EACH | Refills: 5 | Status: SHIPPED | OUTPATIENT
Start: 2024-10-28 | End: 2025-04-26

## 2024-10-28 RX ORDER — BISACODYL 10 MG/1
10 SUPPOSITORY RECTAL DAILY PRN
Status: DISCONTINUED | OUTPATIENT
Start: 2024-10-28 | End: 2024-10-31 | Stop reason: HOSPADM

## 2024-10-28 RX ORDER — OXYCODONE HYDROCHLORIDE 5 MG/1
10 TABLET ORAL EVERY 4 HOURS
Status: DISCONTINUED | OUTPATIENT
Start: 2024-10-28 | End: 2024-10-31 | Stop reason: HOSPADM

## 2024-10-28 RX ORDER — DIPHENHYDRAMINE HCL 25 MG
25 CAPSULE ORAL ONCE
Status: COMPLETED | OUTPATIENT
Start: 2024-10-28 | End: 2024-10-28

## 2024-10-28 RX ORDER — LABETALOL HYDROCHLORIDE 5 MG/ML
20 INJECTION, SOLUTION INTRAVENOUS ONCE AS NEEDED
Status: DISCONTINUED | OUTPATIENT
Start: 2024-10-28 | End: 2024-10-31 | Stop reason: HOSPADM

## 2024-10-28 RX ORDER — LIDOCAINE 40 MG/G
CREAM TOPICAL 4 TIMES DAILY
Status: DISCONTINUED | OUTPATIENT
Start: 2024-10-28 | End: 2024-10-31 | Stop reason: HOSPADM

## 2024-10-28 RX ORDER — METOCLOPRAMIDE 10 MG/1
10 TABLET ORAL EVERY 6 HOURS PRN
Status: DISCONTINUED | OUTPATIENT
Start: 2024-10-28 | End: 2024-10-31 | Stop reason: HOSPADM

## 2024-10-28 RX ORDER — HYDROMORPHONE HYDROCHLORIDE 1 MG/ML
2 INJECTION, SOLUTION INTRAMUSCULAR; INTRAVENOUS; SUBCUTANEOUS EVERY 2 HOUR PRN
Status: DISCONTINUED | OUTPATIENT
Start: 2024-10-28 | End: 2024-10-28

## 2024-10-28 RX ORDER — ERTAPENEM 1 G/1
1 INJECTION, POWDER, LYOPHILIZED, FOR SOLUTION INTRAMUSCULAR; INTRAVENOUS EVERY 24 HOURS
Status: DISCONTINUED | OUTPATIENT
Start: 2024-10-28 | End: 2024-10-28

## 2024-10-28 RX ORDER — ONDANSETRON HYDROCHLORIDE 2 MG/ML
4 INJECTION, SOLUTION INTRAVENOUS EVERY 6 HOURS PRN
Status: DISCONTINUED | OUTPATIENT
Start: 2024-10-28 | End: 2024-10-31 | Stop reason: HOSPADM

## 2024-10-28 RX ORDER — HYDRALAZINE HYDROCHLORIDE 20 MG/ML
5 INJECTION INTRAMUSCULAR; INTRAVENOUS ONCE AS NEEDED
Status: DISCONTINUED | OUTPATIENT
Start: 2024-10-28 | End: 2024-10-31 | Stop reason: HOSPADM

## 2024-10-28 RX ORDER — LIDOCAINE HYDROCHLORIDE 10 MG/ML
0.5 INJECTION, SOLUTION INFILTRATION; PERINEURAL ONCE AS NEEDED
Status: DISCONTINUED | OUTPATIENT
Start: 2024-10-28 | End: 2024-10-31 | Stop reason: HOSPADM

## 2024-10-28 RX ORDER — ADHESIVE BANDAGE
10 BANDAGE TOPICAL
Status: DISCONTINUED | OUTPATIENT
Start: 2024-10-28 | End: 2024-10-31 | Stop reason: HOSPADM

## 2024-10-28 RX ADMIN — HYDROMORPHONE HYDROCHLORIDE 2 MG: 1 INJECTION, SOLUTION INTRAMUSCULAR; INTRAVENOUS; SUBCUTANEOUS at 05:49

## 2024-10-28 RX ADMIN — OXYCODONE HYDROCHLORIDE 10 MG: 10 TABLET ORAL at 01:31

## 2024-10-28 RX ADMIN — MAGNESIUM OXIDE TAB 400 MG (241.3 MG ELEMENTAL MG) 400 MG: 400 (241.3 MG) TAB at 17:16

## 2024-10-28 RX ADMIN — ACETAMINOPHEN 975 MG: 325 TABLET ORAL at 20:20

## 2024-10-28 RX ADMIN — ACETAMINOPHEN 975 MG: 325 TABLET ORAL at 08:53

## 2024-10-28 RX ADMIN — ACETAMINOPHEN 975 MG: 325 TABLET ORAL at 02:40

## 2024-10-28 RX ADMIN — DIPHENHYDRAMINE HYDROCHLORIDE 25 MG: 25 CAPSULE ORAL at 04:35

## 2024-10-28 RX ADMIN — HYDROMORPHONE HYDROCHLORIDE 2 MG: 1 INJECTION, SOLUTION INTRAMUSCULAR; INTRAVENOUS; SUBCUTANEOUS at 03:43

## 2024-10-28 RX ADMIN — HYDROMORPHONE HYDROCHLORIDE 2 MG: 1 INJECTION, SOLUTION INTRAMUSCULAR; INTRAVENOUS; SUBCUTANEOUS at 01:34

## 2024-10-28 RX ADMIN — HYDROMORPHONE HYDROCHLORIDE 2 MG: 1 INJECTION, SOLUTION INTRAMUSCULAR; INTRAVENOUS; SUBCUTANEOUS at 11:57

## 2024-10-28 RX ADMIN — OXYCODONE HYDROCHLORIDE 10 MG: 5 TABLET ORAL at 09:47

## 2024-10-28 RX ADMIN — OXYCODONE HYDROCHLORIDE 10 MG: 5 TABLET ORAL at 13:50

## 2024-10-28 RX ADMIN — HYDROMORPHONE HYDROCHLORIDE 2 MG: 1 INJECTION, SOLUTION INTRAMUSCULAR; INTRAVENOUS; SUBCUTANEOUS at 15:13

## 2024-10-28 RX ADMIN — OXYCODONE HYDROCHLORIDE 10 MG: 5 TABLET ORAL at 05:48

## 2024-10-28 RX ADMIN — ACETAMINOPHEN 975 MG: 325 TABLET ORAL at 13:50

## 2024-10-28 RX ADMIN — PRENATAL VIT W/ FE FUMARATE-FA TAB 27-0.8 MG 1 TABLET: 27-0.8 TAB at 08:16

## 2024-10-28 RX ADMIN — MAGNESIUM OXIDE TAB 400 MG (241.3 MG ELEMENTAL MG) 400 MG: 400 (241.3 MG) TAB at 04:00

## 2024-10-28 RX ADMIN — OXYCODONE HYDROCHLORIDE 10 MG: 5 TABLET ORAL at 18:03

## 2024-10-28 RX ADMIN — LIDOCAINE 4%: 4 CREAM TOPICAL at 15:18

## 2024-10-28 RX ADMIN — HYDROMORPHONE HYDROCHLORIDE 2 MG: 1 INJECTION, SOLUTION INTRAMUSCULAR; INTRAVENOUS; SUBCUTANEOUS at 18:13

## 2024-10-28 RX ADMIN — HYDROMORPHONE HYDROCHLORIDE 2 MG: 1 INJECTION, SOLUTION INTRAMUSCULAR; INTRAVENOUS; SUBCUTANEOUS at 08:52

## 2024-10-28 RX ADMIN — OXYCODONE HYDROCHLORIDE 10 MG: 5 TABLET ORAL at 21:10

## 2024-10-28 RX ADMIN — HYDROMORPHONE HYDROCHLORIDE 2 MG: 1 INJECTION, SOLUTION INTRAMUSCULAR; INTRAVENOUS; SUBCUTANEOUS at 21:10

## 2024-10-28 SDOH — HEALTH STABILITY: MENTAL HEALTH: HOW OFTEN DO YOU HAVE SIX OR MORE DRINKS ON ONE OCCASION?: NEVER

## 2024-10-28 SDOH — HEALTH STABILITY: MENTAL HEALTH: HOW OFTEN DO YOU HAVE A DRINK CONTAINING ALCOHOL?: NEVER

## 2024-10-28 SDOH — HEALTH STABILITY: MENTAL HEALTH: HAVE YOU USED ANY SUBSTANCES (CANABIS, COCAINE, HEROIN, HALLUCINOGENS, INHALANTS, ETC.) IN THE PAST 12 MONTHS?: NO

## 2024-10-28 SDOH — SOCIAL STABILITY: SOCIAL INSECURITY: POSSIBLE ABUSE REPORTED TO:: SOCIAL SERVICES

## 2024-10-28 SDOH — SOCIAL STABILITY: SOCIAL INSECURITY: HAS ANYONE EVER THREATENED TO HURT YOUR FAMILY OR YOUR PETS?: NO

## 2024-10-28 SDOH — SOCIAL STABILITY: SOCIAL INSECURITY: ARE YOU MARRIED, WIDOWED, DIVORCED, SEPARATED, NEVER MARRIED, OR LIVING WITH A PARTNER?: SEPARATED

## 2024-10-28 SDOH — SOCIAL STABILITY: SOCIAL INSECURITY: WITHIN THE LAST YEAR, HAVE YOU BEEN AFRAID OF YOUR PARTNER OR EX-PARTNER?: NO

## 2024-10-28 SDOH — ECONOMIC STABILITY: FOOD INSECURITY: WITHIN THE PAST 12 MONTHS, THE FOOD YOU BOUGHT JUST DIDN'T LAST AND YOU DIDN'T HAVE MONEY TO GET MORE.: OFTEN TRUE

## 2024-10-28 SDOH — HEALTH STABILITY: PHYSICAL HEALTH
HOW OFTEN DO YOU NEED TO HAVE SOMEONE HELP YOU WHEN YOU READ INSTRUCTIONS, PAMPHLETS, OR OTHER WRITTEN MATERIAL FROM YOUR DOCTOR OR PHARMACY?: NEVER

## 2024-10-28 SDOH — HEALTH STABILITY: MENTAL HEALTH: HAVE YOU USED ANY PRESCRIPTION DRUGS OTHER THAN PRESCRIBED IN THE PAST 12 MONTHS?: NO

## 2024-10-28 SDOH — ECONOMIC STABILITY: FOOD INSECURITY: WITHIN THE PAST 12 MONTHS, YOU WORRIED THAT YOUR FOOD WOULD RUN OUT BEFORE YOU GOT THE MONEY TO BUY MORE.: OFTEN TRUE

## 2024-10-28 SDOH — SOCIAL STABILITY: SOCIAL NETWORK: HOW OFTEN DO YOU ATTEND MEETINGS OF THE CLUBS OR ORGANIZATIONS YOU BELONG TO?: NEVER

## 2024-10-28 SDOH — SOCIAL STABILITY: SOCIAL INSECURITY: ARE YOU OR HAVE YOU BEEN THREATENED OR ABUSED PHYSICALLY, EMOTIONALLY, OR SEXUALLY BY ANYONE?: NO

## 2024-10-28 SDOH — SOCIAL STABILITY: SOCIAL INSECURITY: HAVE YOU HAD THOUGHTS OF HARMING ANYONE ELSE?: NO

## 2024-10-28 SDOH — SOCIAL STABILITY: SOCIAL INSECURITY: DOES ANYONE TRY TO KEEP YOU FROM HAVING/CONTACTING OTHER FRIENDS OR DOING THINGS OUTSIDE YOUR HOME?: NO

## 2024-10-28 SDOH — SOCIAL STABILITY: SOCIAL INSECURITY: ARE THERE ANY APPARENT SIGNS OF INJURIES/BEHAVIORS THAT COULD BE RELATED TO ABUSE/NEGLECT?: NO

## 2024-10-28 SDOH — SOCIAL STABILITY: SOCIAL NETWORK: HOW OFTEN DO YOU GET TOGETHER WITH FRIENDS OR RELATIVES?: THREE TIMES A WEEK

## 2024-10-28 SDOH — SOCIAL STABILITY: SOCIAL INSECURITY: WITHIN THE LAST YEAR, HAVE YOU BEEN HUMILIATED OR EMOTIONALLY ABUSED IN OTHER WAYS BY YOUR PARTNER OR EX-PARTNER?: NO

## 2024-10-28 SDOH — SOCIAL STABILITY: SOCIAL INSECURITY: DO YOU FEEL ANYONE HAS EXPLOITED OR TAKEN ADVANTAGE OF YOU FINANCIALLY OR OF YOUR PERSONAL PROPERTY?: NO

## 2024-10-28 SDOH — ECONOMIC STABILITY: HOUSING INSECURITY: DO YOU FEEL UNSAFE GOING BACK TO THE PLACE WHERE YOU ARE LIVING?: YES

## 2024-10-28 SDOH — HEALTH STABILITY: MENTAL HEALTH: SUICIDAL BEHAVIOR (LIFETIME): NO

## 2024-10-28 SDOH — SOCIAL STABILITY: SOCIAL INSECURITY: HAVE YOU HAD ANY THOUGHTS OF HARMING ANYONE ELSE?: NO

## 2024-10-28 SDOH — HEALTH STABILITY: MENTAL HEALTH: NON-SPECIFIC ACTIVE SUICIDAL THOUGHTS (PAST 1 MONTH): NO

## 2024-10-28 SDOH — ECONOMIC STABILITY: FOOD INSECURITY: HOW HARD IS IT FOR YOU TO PAY FOR THE VERY BASICS LIKE FOOD, HOUSING, MEDICAL CARE, AND HEATING?: SOMEWHAT HARD

## 2024-10-28 SDOH — SOCIAL STABILITY: SOCIAL INSECURITY: ABUSE SCREEN: ADULT

## 2024-10-28 SDOH — HEALTH STABILITY: MENTAL HEALTH: WISH TO BE DEAD (PAST 1 MONTH): NO

## 2024-10-28 SDOH — SOCIAL STABILITY: SOCIAL INSECURITY: WITHIN THE LAST YEAR, HAVE YOU BEEN HUMILIATED OR EMOTIONALLY ABUSED IN OTHER WAYS BY YOUR PARTNER OR EX-PARTNER?: YES

## 2024-10-28 SDOH — HEALTH STABILITY: MENTAL HEALTH: WERE YOU ABLE TO COMPLETE ALL THE BEHAVIORAL HEALTH SCREENINGS?: YES

## 2024-10-28 SDOH — ECONOMIC STABILITY: TRANSPORTATION INSECURITY: IN THE PAST 12 MONTHS, HAS LACK OF TRANSPORTATION KEPT YOU FROM MEDICAL APPOINTMENTS OR FROM GETTING MEDICATIONS?: NO

## 2024-10-28 SDOH — SOCIAL STABILITY: SOCIAL INSECURITY: PHYSICAL ABUSE: DENIES

## 2024-10-28 SDOH — SOCIAL STABILITY: SOCIAL NETWORK: HOW OFTEN DO YOU ATTEND CHURCH OR RELIGIOUS SERVICES?: NEVER

## 2024-10-28 SDOH — HEALTH STABILITY: MENTAL HEALTH: HOW MANY DRINKS CONTAINING ALCOHOL DO YOU HAVE ON A TYPICAL DAY WHEN YOU ARE DRINKING?: PATIENT DOES NOT DRINK

## 2024-10-28 SDOH — SOCIAL STABILITY: SOCIAL INSECURITY: VERBAL ABUSE: YES, PAST (COMMENT)

## 2024-10-28 ASSESSMENT — PAIN SCALES - GENERAL
PAINLEVEL_OUTOF10: 8
PAINLEVEL_OUTOF10: 7
PAINLEVEL_OUTOF10: 8
PAINLEVEL_OUTOF10: 7
PAINLEVEL_OUTOF10: 8

## 2024-10-28 ASSESSMENT — LIFESTYLE VARIABLES
AUDIT-C TOTAL SCORE: 0
SKIP TO QUESTIONS 9-10: 1
SKIP TO QUESTIONS 9-10: 1
AUDIT-C TOTAL SCORE: 0
AUDIT-C TOTAL SCORE: 0
HOW MANY STANDARD DRINKS CONTAINING ALCOHOL DO YOU HAVE ON A TYPICAL DAY: PATIENT DOES NOT DRINK
HOW OFTEN DO YOU HAVE A DRINK CONTAINING ALCOHOL: NEVER
HOW OFTEN DO YOU HAVE 6 OR MORE DRINKS ON ONE OCCASION: NEVER

## 2024-10-28 ASSESSMENT — PAIN - FUNCTIONAL ASSESSMENT
PAIN_FUNCTIONAL_ASSESSMENT: 0-10

## 2024-10-28 ASSESSMENT — PAIN DESCRIPTION - DESCRIPTORS: DESCRIPTORS: SHARP;SHOOTING

## 2024-10-28 ASSESSMENT — ACTIVITIES OF DAILY LIVING (ADL)
LACK_OF_TRANSPORTATION: NO
LACK_OF_TRANSPORTATION: NO

## 2024-10-28 ASSESSMENT — PAIN SCALES - PAIN ASSESSMENT IN ADVANCED DEMENTIA (PAINAD): TOTALSCORE: MEDICATION (SEE MAR)

## 2024-10-28 ASSESSMENT — PAIN DESCRIPTION - LOCATION
LOCATION: GROIN

## 2024-10-28 ASSESSMENT — PATIENT HEALTH QUESTIONNAIRE - PHQ9
2. FEELING DOWN, DEPRESSED OR HOPELESS: NOT AT ALL
SUM OF ALL RESPONSES TO PHQ9 QUESTIONS 1 & 2: 0
1. LITTLE INTEREST OR PLEASURE IN DOING THINGS: NOT AT ALL

## 2024-10-29 PROCEDURE — 2500000004 HC RX 250 GENERAL PHARMACY W/ HCPCS (ALT 636 FOR OP/ED)

## 2024-10-29 PROCEDURE — 99233 SBSQ HOSP IP/OBS HIGH 50: CPT

## 2024-10-29 PROCEDURE — 2500000001 HC RX 250 WO HCPCS SELF ADMINISTERED DRUGS (ALT 637 FOR MEDICARE OP)

## 2024-10-29 PROCEDURE — G0378 HOSPITAL OBSERVATION PER HR: HCPCS

## 2024-10-29 PROCEDURE — 1220000001 HC OB SEMI-PRIVATE ROOM DAILY

## 2024-10-29 RX ORDER — HYDROMORPHONE HYDROCHLORIDE 1 MG/ML
1 INJECTION, SOLUTION INTRAMUSCULAR; INTRAVENOUS; SUBCUTANEOUS EVERY 4 HOURS PRN
Status: DISCONTINUED | OUTPATIENT
Start: 2024-10-29 | End: 2024-10-29

## 2024-10-29 RX ORDER — HYDROMORPHONE HYDROCHLORIDE 1 MG/ML
2 INJECTION, SOLUTION INTRAMUSCULAR; INTRAVENOUS; SUBCUTANEOUS
Status: DISCONTINUED | OUTPATIENT
Start: 2024-10-29 | End: 2024-10-31 | Stop reason: HOSPADM

## 2024-10-29 RX ORDER — HYDROMORPHONE HYDROCHLORIDE 1 MG/ML
2 INJECTION, SOLUTION INTRAMUSCULAR; INTRAVENOUS; SUBCUTANEOUS EVERY 4 HOURS PRN
Status: DISCONTINUED | OUTPATIENT
Start: 2024-10-29 | End: 2024-10-29

## 2024-10-29 RX ADMIN — HYDROMORPHONE HYDROCHLORIDE 2 MG: 1 INJECTION, SOLUTION INTRAMUSCULAR; INTRAVENOUS; SUBCUTANEOUS at 13:43

## 2024-10-29 RX ADMIN — ASPIRIN 162 MG: 81 TABLET, COATED ORAL at 09:18

## 2024-10-29 RX ADMIN — MAGNESIUM OXIDE TAB 400 MG (241.3 MG ELEMENTAL MG) 400 MG: 400 (241.3 MG) TAB at 03:31

## 2024-10-29 RX ADMIN — HYDROMORPHONE HYDROCHLORIDE 2 MG: 1 INJECTION, SOLUTION INTRAMUSCULAR; INTRAVENOUS; SUBCUTANEOUS at 03:30

## 2024-10-29 RX ADMIN — PRENATAL VIT W/ FE FUMARATE-FA TAB 27-0.8 MG 1 TABLET: 27-0.8 TAB at 09:18

## 2024-10-29 RX ADMIN — OXYCODONE HYDROCHLORIDE 10 MG: 5 TABLET ORAL at 05:15

## 2024-10-29 RX ADMIN — MAGNESIUM OXIDE TAB 400 MG (241.3 MG ELEMENTAL MG) 400 MG: 400 (241.3 MG) TAB at 17:18

## 2024-10-29 RX ADMIN — OXYCODONE HYDROCHLORIDE 10 MG: 5 TABLET ORAL at 17:15

## 2024-10-29 RX ADMIN — HYDROMORPHONE HYDROCHLORIDE 2 MG: 1 INJECTION, SOLUTION INTRAMUSCULAR; INTRAVENOUS; SUBCUTANEOUS at 00:16

## 2024-10-29 RX ADMIN — HYDROMORPHONE HYDROCHLORIDE 2 MG: 1 INJECTION, SOLUTION INTRAMUSCULAR; INTRAVENOUS; SUBCUTANEOUS at 22:43

## 2024-10-29 RX ADMIN — ACETAMINOPHEN 975 MG: 325 TABLET ORAL at 02:31

## 2024-10-29 RX ADMIN — ACETAMINOPHEN 975 MG: 325 TABLET ORAL at 17:18

## 2024-10-29 RX ADMIN — ACETAMINOPHEN 975 MG: 325 TABLET ORAL at 09:18

## 2024-10-29 RX ADMIN — METOCLOPRAMIDE 10 MG: 10 TABLET ORAL at 16:54

## 2024-10-29 RX ADMIN — OXYCODONE HYDROCHLORIDE 10 MG: 5 TABLET ORAL at 09:18

## 2024-10-29 RX ADMIN — OXYCODONE HYDROCHLORIDE 10 MG: 5 TABLET ORAL at 21:31

## 2024-10-29 RX ADMIN — HYDROMORPHONE HYDROCHLORIDE 2 MG: 1 INJECTION, SOLUTION INTRAMUSCULAR; INTRAVENOUS; SUBCUTANEOUS at 16:48

## 2024-10-29 RX ADMIN — OXYCODONE HYDROCHLORIDE 10 MG: 5 TABLET ORAL at 13:13

## 2024-10-29 RX ADMIN — HYDROMORPHONE HYDROCHLORIDE 2 MG: 1 INJECTION, SOLUTION INTRAMUSCULAR; INTRAVENOUS; SUBCUTANEOUS at 06:43

## 2024-10-29 RX ADMIN — ONDANSETRON 4 MG: 2 INJECTION INTRAMUSCULAR; INTRAVENOUS at 13:01

## 2024-10-29 RX ADMIN — CLINDAMYCIN HYDROCHLORIDE 300 MG: 300 CAPSULE ORAL at 12:05

## 2024-10-29 RX ADMIN — HYDROMORPHONE HYDROCHLORIDE 1 MG: 1 INJECTION, SOLUTION INTRAMUSCULAR; INTRAVENOUS; SUBCUTANEOUS at 10:43

## 2024-10-29 RX ADMIN — HYDROMORPHONE HYDROCHLORIDE 2 MG: 1 INJECTION, SOLUTION INTRAMUSCULAR; INTRAVENOUS; SUBCUTANEOUS at 19:46

## 2024-10-29 RX ADMIN — OXYCODONE HYDROCHLORIDE 10 MG: 5 TABLET ORAL at 01:15

## 2024-10-29 RX ADMIN — ACETAMINOPHEN 975 MG: 325 TABLET ORAL at 22:42

## 2024-10-29 ASSESSMENT — PAIN SCALES - GENERAL
PAINLEVEL_OUTOF10: 8
PAINLEVEL_OUTOF10: 7
PAINLEVEL_OUTOF10: 7
PAINLEVEL_OUTOF10: 8
PAINLEVEL_OUTOF10: 7
PAINLEVEL_OUTOF10: 8
PAINLEVEL_OUTOF10: 7
PAINLEVEL_OUTOF10: 8
PAINLEVEL_OUTOF10: 8

## 2024-10-29 ASSESSMENT — PAIN DESCRIPTION - DESCRIPTORS
DESCRIPTORS: SHARP
DESCRIPTORS: SHARP;SHOOTING

## 2024-10-29 ASSESSMENT — PAIN DESCRIPTION - LOCATION
LOCATION: ARM
LOCATION: LEG
LOCATION: PERINEUM
LOCATION: GROIN
LOCATION: ARM
LOCATION: ARM

## 2024-10-29 ASSESSMENT — PAIN - FUNCTIONAL ASSESSMENT
PAIN_FUNCTIONAL_ASSESSMENT: 0-10

## 2024-10-30 ENCOUNTER — APPOINTMENT (OUTPATIENT)
Dept: DERMATOLOGY | Facility: CLINIC | Age: 34
End: 2024-10-30
Payer: MEDICAID

## 2024-10-30 ENCOUNTER — APPOINTMENT (OUTPATIENT)
Dept: RADIOLOGY | Facility: HOSPITAL | Age: 34
End: 2024-10-30
Payer: MEDICAID

## 2024-10-30 PROCEDURE — 1220000001 HC OB SEMI-PRIVATE ROOM DAILY

## 2024-10-30 PROCEDURE — 2500000004 HC RX 250 GENERAL PHARMACY W/ HCPCS (ALT 636 FOR OP/ED)

## 2024-10-30 PROCEDURE — 99233 SBSQ HOSP IP/OBS HIGH 50: CPT

## 2024-10-30 PROCEDURE — G0378 HOSPITAL OBSERVATION PER HR: HCPCS

## 2024-10-30 PROCEDURE — 76816 OB US FOLLOW-UP PER FETUS: CPT

## 2024-10-30 PROCEDURE — 76819 FETAL BIOPHYS PROFIL W/O NST: CPT | Performed by: STUDENT IN AN ORGANIZED HEALTH CARE EDUCATION/TRAINING PROGRAM

## 2024-10-30 PROCEDURE — 76816 OB US FOLLOW-UP PER FETUS: CPT | Performed by: STUDENT IN AN ORGANIZED HEALTH CARE EDUCATION/TRAINING PROGRAM

## 2024-10-30 PROCEDURE — 2500000005 HC RX 250 GENERAL PHARMACY W/O HCPCS

## 2024-10-30 PROCEDURE — 2500000001 HC RX 250 WO HCPCS SELF ADMINISTERED DRUGS (ALT 637 FOR MEDICARE OP)

## 2024-10-30 PROCEDURE — 76819 FETAL BIOPHYS PROFIL W/O NST: CPT

## 2024-10-30 RX ORDER — CHLORHEXIDINE GLUCONATE 40 MG/ML
SOLUTION TOPICAL DAILY
Status: DISCONTINUED | OUTPATIENT
Start: 2024-10-30 | End: 2024-10-31 | Stop reason: HOSPADM

## 2024-10-30 RX ORDER — FAMOTIDINE 20 MG/1
20 TABLET, FILM COATED ORAL 2 TIMES DAILY
Status: DISCONTINUED | OUTPATIENT
Start: 2024-10-30 | End: 2024-10-31 | Stop reason: HOSPADM

## 2024-10-30 RX ORDER — CALCIUM CARBONATE 200(500)MG
500 TABLET,CHEWABLE ORAL 4 TIMES DAILY PRN
Status: DISCONTINUED | OUTPATIENT
Start: 2024-10-30 | End: 2024-10-31 | Stop reason: HOSPADM

## 2024-10-30 RX ADMIN — ACETAMINOPHEN 975 MG: 325 TABLET ORAL at 11:08

## 2024-10-30 RX ADMIN — HYDROMORPHONE HYDROCHLORIDE 2 MG: 1 INJECTION, SOLUTION INTRAMUSCULAR; INTRAVENOUS; SUBCUTANEOUS at 14:08

## 2024-10-30 RX ADMIN — PRENATAL VIT W/ FE FUMARATE-FA TAB 27-0.8 MG 1 TABLET: 27-0.8 TAB at 08:20

## 2024-10-30 RX ADMIN — ACETAMINOPHEN 975 MG: 325 TABLET ORAL at 16:50

## 2024-10-30 RX ADMIN — ACETAMINOPHEN 975 MG: 325 TABLET ORAL at 05:08

## 2024-10-30 RX ADMIN — ASPIRIN 162 MG: 81 TABLET, COATED ORAL at 08:20

## 2024-10-30 RX ADMIN — FAMOTIDINE 20 MG: 20 TABLET ORAL at 08:21

## 2024-10-30 RX ADMIN — HYDROMORPHONE HYDROCHLORIDE 2 MG: 1 INJECTION, SOLUTION INTRAMUSCULAR; INTRAVENOUS; SUBCUTANEOUS at 02:00

## 2024-10-30 RX ADMIN — HYDROMORPHONE HYDROCHLORIDE 2 MG: 1 INJECTION, SOLUTION INTRAMUSCULAR; INTRAVENOUS; SUBCUTANEOUS at 08:16

## 2024-10-30 RX ADMIN — MAGNESIUM OXIDE TAB 400 MG (241.3 MG ELEMENTAL MG) 400 MG: 400 (241.3 MG) TAB at 16:52

## 2024-10-30 RX ADMIN — ACETAMINOPHEN 975 MG: 325 TABLET ORAL at 23:13

## 2024-10-30 RX ADMIN — OXYCODONE HYDROCHLORIDE 10 MG: 5 TABLET ORAL at 09:43

## 2024-10-30 RX ADMIN — HYDROMORPHONE HYDROCHLORIDE 2 MG: 1 INJECTION, SOLUTION INTRAMUSCULAR; INTRAVENOUS; SUBCUTANEOUS at 16:54

## 2024-10-30 RX ADMIN — HYDROMORPHONE HYDROCHLORIDE 2 MG: 1 INJECTION, SOLUTION INTRAMUSCULAR; INTRAVENOUS; SUBCUTANEOUS at 20:15

## 2024-10-30 RX ADMIN — OXYCODONE HYDROCHLORIDE 10 MG: 5 TABLET ORAL at 02:00

## 2024-10-30 RX ADMIN — ONDANSETRON HYDROCHLORIDE 4 MG: 4 TABLET, FILM COATED ORAL at 06:21

## 2024-10-30 RX ADMIN — OXYCODONE HYDROCHLORIDE 10 MG: 5 TABLET ORAL at 06:16

## 2024-10-30 RX ADMIN — OXYCODONE HYDROCHLORIDE 10 MG: 5 TABLET ORAL at 18:32

## 2024-10-30 RX ADMIN — CLINDAMYCIN HYDROCHLORIDE 300 MG: 300 CAPSULE ORAL at 14:09

## 2024-10-30 RX ADMIN — CALCIUM CARBONATE (ANTACID) CHEW TAB 500 MG 500 MG: 500 CHEW TAB at 06:46

## 2024-10-30 RX ADMIN — OXYCODONE HYDROCHLORIDE 10 MG: 5 TABLET ORAL at 23:14

## 2024-10-30 RX ADMIN — CLINDAMYCIN HYDROCHLORIDE 300 MG: 300 CAPSULE ORAL at 03:17

## 2024-10-30 RX ADMIN — HYDROMORPHONE HYDROCHLORIDE 2 MG: 1 INJECTION, SOLUTION INTRAMUSCULAR; INTRAVENOUS; SUBCUTANEOUS at 11:08

## 2024-10-30 RX ADMIN — FAMOTIDINE 20 MG: 20 TABLET ORAL at 20:22

## 2024-10-30 RX ADMIN — OXYCODONE HYDROCHLORIDE 10 MG: 5 TABLET ORAL at 13:53

## 2024-10-30 RX ADMIN — MAGNESIUM OXIDE TAB 400 MG (241.3 MG ELEMENTAL MG) 400 MG: 400 (241.3 MG) TAB at 05:08

## 2024-10-30 RX ADMIN — HYDROMORPHONE HYDROCHLORIDE 2 MG: 1 INJECTION, SOLUTION INTRAMUSCULAR; INTRAVENOUS; SUBCUTANEOUS at 23:14

## 2024-10-30 RX ADMIN — HYDROMORPHONE HYDROCHLORIDE 2 MG: 1 INJECTION, SOLUTION INTRAMUSCULAR; INTRAVENOUS; SUBCUTANEOUS at 05:09

## 2024-10-30 ASSESSMENT — PAIN DESCRIPTION - DESCRIPTORS
DESCRIPTORS: SHARP;SHOOTING
DESCRIPTORS: SHARP;SHOOTING

## 2024-10-30 ASSESSMENT — PAIN SCALES - GENERAL
PAINLEVEL_OUTOF10: 8
PAINLEVEL_OUTOF10: 7
PAINLEVEL_OUTOF10: 7
PAINLEVEL_OUTOF10: 8
PAINLEVEL_OUTOF10: 8
PAINLEVEL_OUTOF10: 7
PAINLEVEL_OUTOF10: 7

## 2024-10-30 ASSESSMENT — PAIN - FUNCTIONAL ASSESSMENT
PAIN_FUNCTIONAL_ASSESSMENT: 0-10

## 2024-10-31 ENCOUNTER — HOSPITAL ENCOUNTER (OUTPATIENT)
Dept: RADIOLOGY | Facility: CLINIC | Age: 34
End: 2024-10-31
Payer: MEDICAID

## 2024-10-31 ENCOUNTER — PHARMACY VISIT (OUTPATIENT)
Dept: PHARMACY | Facility: CLINIC | Age: 34
End: 2024-10-31
Payer: MEDICAID

## 2024-10-31 ENCOUNTER — HOSPITAL ENCOUNTER (INPATIENT)
Facility: HOSPITAL | Age: 34
End: 2024-10-31
Attending: STUDENT IN AN ORGANIZED HEALTH CARE EDUCATION/TRAINING PROGRAM | Admitting: STUDENT IN AN ORGANIZED HEALTH CARE EDUCATION/TRAINING PROGRAM
Payer: MEDICAID

## 2024-10-31 ENCOUNTER — APPOINTMENT (OUTPATIENT)
Dept: MATERNAL FETAL MEDICINE | Facility: CLINIC | Age: 34
End: 2024-10-31
Payer: MEDICAID

## 2024-10-31 VITALS
SYSTOLIC BLOOD PRESSURE: 131 MMHG | OXYGEN SATURATION: 99 % | BODY MASS INDEX: 34 KG/M2 | HEART RATE: 105 BPM | DIASTOLIC BLOOD PRESSURE: 88 MMHG | RESPIRATION RATE: 18 BRPM | WEIGHT: 184.75 LBS | TEMPERATURE: 96.8 F | HEIGHT: 62 IN

## 2024-10-31 PROCEDURE — 99238 HOSP IP/OBS DSCHRG MGMT 30/<: CPT

## 2024-10-31 PROCEDURE — 2500000004 HC RX 250 GENERAL PHARMACY W/ HCPCS (ALT 636 FOR OP/ED)

## 2024-10-31 PROCEDURE — 2500000001 HC RX 250 WO HCPCS SELF ADMINISTERED DRUGS (ALT 637 FOR MEDICARE OP)

## 2024-10-31 PROCEDURE — G0378 HOSPITAL OBSERVATION PER HR: HCPCS

## 2024-10-31 PROCEDURE — 59025 FETAL NON-STRESS TEST: CPT | Mod: GC

## 2024-10-31 PROCEDURE — 2500000005 HC RX 250 GENERAL PHARMACY W/O HCPCS

## 2024-10-31 PROCEDURE — 59025 FETAL NON-STRESS TEST: CPT

## 2024-10-31 PROCEDURE — RXMED WILLOW AMBULATORY MEDICATION CHARGE

## 2024-10-31 RX ORDER — LIDOCAINE 40 MG/G
CREAM TOPICAL 4 TIMES DAILY PRN
Qty: 60 G | Refills: 2 | Status: SHIPPED | OUTPATIENT
Start: 2024-10-31 | End: 2024-12-23 | Stop reason: WASHOUT

## 2024-10-31 RX ORDER — FAMOTIDINE 20 MG/1
20 TABLET, FILM COATED ORAL 2 TIMES DAILY
Qty: 60 TABLET | Refills: 2 | Status: SHIPPED | OUTPATIENT
Start: 2024-10-31

## 2024-10-31 RX ORDER — CLINDAMYCIN HYDROCHLORIDE 300 MG/1
300 CAPSULE ORAL EVERY 12 HOURS
Qty: 60 CAPSULE | Refills: 0 | Status: SHIPPED | OUTPATIENT
Start: 2024-10-31 | End: 2025-01-11 | Stop reason: HOSPADM

## 2024-10-31 RX ORDER — ASPIRIN 81 MG/1
162 TABLET ORAL DAILY
Qty: 90 TABLET | Refills: 2 | Status: ON HOLD | OUTPATIENT
Start: 2024-10-31 | End: 2024-12-02

## 2024-10-31 RX ORDER — OXYCODONE AND ACETAMINOPHEN 10; 325 MG/1; MG/1
1 TABLET ORAL EVERY 4 HOURS PRN
Qty: 42 TABLET | Refills: 0 | Status: ON HOLD | OUTPATIENT
Start: 2024-10-31 | End: 2024-11-06

## 2024-10-31 RX ADMIN — FAMOTIDINE 20 MG: 20 TABLET ORAL at 08:17

## 2024-10-31 RX ADMIN — PRENATAL VIT W/ FE FUMARATE-FA TAB 27-0.8 MG 1 TABLET: 27-0.8 TAB at 08:17

## 2024-10-31 RX ADMIN — HYDROMORPHONE HYDROCHLORIDE 2 MG: 1 INJECTION, SOLUTION INTRAMUSCULAR; INTRAVENOUS; SUBCUTANEOUS at 08:17

## 2024-10-31 RX ADMIN — MAGNESIUM OXIDE TAB 400 MG (241.3 MG ELEMENTAL MG) 400 MG: 400 (241.3 MG) TAB at 05:17

## 2024-10-31 RX ADMIN — HYDROMORPHONE HYDROCHLORIDE 2 MG: 1 INJECTION, SOLUTION INTRAMUSCULAR; INTRAVENOUS; SUBCUTANEOUS at 02:17

## 2024-10-31 RX ADMIN — OXYCODONE HYDROCHLORIDE 10 MG: 5 TABLET ORAL at 07:15

## 2024-10-31 RX ADMIN — OXYCODONE HYDROCHLORIDE 10 MG: 5 TABLET ORAL at 03:26

## 2024-10-31 RX ADMIN — ONDANSETRON HYDROCHLORIDE 4 MG: 4 TABLET, FILM COATED ORAL at 07:17

## 2024-10-31 RX ADMIN — ACETAMINOPHEN 975 MG: 325 TABLET ORAL at 05:17

## 2024-10-31 RX ADMIN — CLINDAMYCIN HYDROCHLORIDE 300 MG: 300 CAPSULE ORAL at 02:17

## 2024-10-31 RX ADMIN — HYDROMORPHONE HYDROCHLORIDE 2 MG: 1 INJECTION, SOLUTION INTRAMUSCULAR; INTRAVENOUS; SUBCUTANEOUS at 05:17

## 2024-10-31 RX ADMIN — ASPIRIN 162 MG: 81 TABLET, COATED ORAL at 08:17

## 2024-10-31 ASSESSMENT — PAIN SCALES - GENERAL
PAINLEVEL_OUTOF10: 8
PAINLEVEL_OUTOF10: 7
PAINLEVEL_OUTOF10: 8
PAINLEVEL_OUTOF10: 8
PAINLEVEL_OUTOF10: 7
PAINLEVEL_OUTOF10: 7

## 2024-10-31 ASSESSMENT — PAIN - FUNCTIONAL ASSESSMENT
PAIN_FUNCTIONAL_ASSESSMENT: 0-10

## 2024-10-31 ASSESSMENT — PAIN DESCRIPTION - DESCRIPTORS
DESCRIPTORS: SHARP;SHOOTING
DESCRIPTORS: SHARP;SHOOTING

## 2024-11-01 ENCOUNTER — SPECIALTY PHARMACY (OUTPATIENT)
Dept: PHARMACY | Facility: CLINIC | Age: 34
End: 2024-11-01

## 2024-11-01 ENCOUNTER — PHARMACY VISIT (OUTPATIENT)
Dept: PHARMACY | Facility: CLINIC | Age: 34
End: 2024-11-01
Payer: MEDICAID

## 2024-11-01 LAB
BACTERIA BLD CULT: NORMAL
BACTERIA BLD CULT: NORMAL

## 2024-11-01 PROCEDURE — RXMED WILLOW AMBULATORY MEDICATION CHARGE

## 2024-11-04 ENCOUNTER — HOSPITAL ENCOUNTER (EMERGENCY)
Facility: HOSPITAL | Age: 34
Discharge: ANOTHER HOSPITAL | End: 2024-11-05
Attending: EMERGENCY MEDICINE
Payer: MEDICAID

## 2024-11-04 DIAGNOSIS — L73.2 HIDRADENITIS SUPPURATIVA: Primary | ICD-10-CM

## 2024-11-04 PROBLEM — Z3A.25 25 WEEKS GESTATION OF PREGNANCY (HHS-HCC): Status: ACTIVE | Noted: 2024-06-16

## 2024-11-04 LAB
ALBUMIN SERPL BCP-MCNC: 3.6 G/DL (ref 3.4–5)
ALP SERPL-CCNC: 83 U/L (ref 33–110)
ALT SERPL W P-5'-P-CCNC: 23 U/L (ref 7–45)
ANION GAP SERPL CALC-SCNC: 11 MMOL/L (ref 10–20)
APPEARANCE UR: CLEAR
AST SERPL W P-5'-P-CCNC: 28 U/L (ref 9–39)
BASOPHILS # BLD AUTO: 0.03 X10*3/UL (ref 0–0.1)
BASOPHILS NFR BLD AUTO: 0.3 %
BILIRUB DIRECT SERPL-MCNC: 0.1 MG/DL (ref 0–0.3)
BILIRUB SERPL-MCNC: 0.3 MG/DL (ref 0–1.2)
BILIRUB UR STRIP.AUTO-MCNC: NEGATIVE MG/DL
BUN SERPL-MCNC: 9 MG/DL (ref 6–23)
CALCIUM SERPL-MCNC: 9 MG/DL (ref 8.6–10.3)
CHLORIDE SERPL-SCNC: 107 MMOL/L (ref 98–107)
CO2 SERPL-SCNC: 21 MMOL/L (ref 21–32)
COLOR UR: COLORLESS
CREAT SERPL-MCNC: 0.6 MG/DL (ref 0.5–1.05)
CRP SERPL-MCNC: 0.63 MG/DL
EGFRCR SERPLBLD CKD-EPI 2021: >90 ML/MIN/1.73M*2
EOSINOPHIL # BLD AUTO: 0.07 X10*3/UL (ref 0–0.7)
EOSINOPHIL NFR BLD AUTO: 0.6 %
ERYTHROCYTE [DISTWIDTH] IN BLOOD BY AUTOMATED COUNT: 14.6 % (ref 11.5–14.5)
ERYTHROCYTE [SEDIMENTATION RATE] IN BLOOD BY WESTERGREN METHOD: 35 MM/H (ref 0–20)
GLUCOSE SERPL-MCNC: 84 MG/DL (ref 74–99)
GLUCOSE UR STRIP.AUTO-MCNC: NORMAL MG/DL
HCT VFR BLD AUTO: 34.2 % (ref 36–46)
HGB BLD-MCNC: 11.3 G/DL (ref 12–16)
IMM GRANULOCYTES # BLD AUTO: 0.15 X10*3/UL (ref 0–0.7)
IMM GRANULOCYTES NFR BLD AUTO: 1.3 % (ref 0–0.9)
KETONES UR STRIP.AUTO-MCNC: NEGATIVE MG/DL
LACTATE SERPL-SCNC: 0.8 MMOL/L (ref 0.4–2)
LEUKOCYTE ESTERASE UR QL STRIP.AUTO: NEGATIVE
LYMPHOCYTES # BLD AUTO: 2.32 X10*3/UL (ref 1.2–4.8)
LYMPHOCYTES NFR BLD AUTO: 20.5 %
MCH RBC QN AUTO: 25.1 PG (ref 26–34)
MCHC RBC AUTO-ENTMCNC: 33 G/DL (ref 32–36)
MCV RBC AUTO: 76 FL (ref 80–100)
MONOCYTES # BLD AUTO: 0.8 X10*3/UL (ref 0.1–1)
MONOCYTES NFR BLD AUTO: 7.1 %
NEUTROPHILS # BLD AUTO: 7.93 X10*3/UL (ref 1.2–7.7)
NEUTROPHILS NFR BLD AUTO: 70.2 %
NITRITE UR QL STRIP.AUTO: NEGATIVE
NRBC BLD-RTO: 0 /100 WBCS (ref 0–0)
PH UR STRIP.AUTO: 5.5 [PH]
PLATELET # BLD AUTO: 307 X10*3/UL (ref 150–450)
POTASSIUM SERPL-SCNC: 4.8 MMOL/L (ref 3.5–5.3)
PROT SERPL-MCNC: 7 G/DL (ref 6.4–8.2)
PROT UR STRIP.AUTO-MCNC: NEGATIVE MG/DL
RBC # BLD AUTO: 4.51 X10*6/UL (ref 4–5.2)
RBC # UR STRIP.AUTO: NEGATIVE /UL
SODIUM SERPL-SCNC: 134 MMOL/L (ref 136–145)
SP GR UR STRIP.AUTO: 1.01
UROBILINOGEN UR STRIP.AUTO-MCNC: NORMAL MG/DL
WBC # BLD AUTO: 11.3 X10*3/UL (ref 4.4–11.3)

## 2024-11-04 PROCEDURE — 2500000001 HC RX 250 WO HCPCS SELF ADMINISTERED DRUGS (ALT 637 FOR MEDICARE OP): Performed by: EMERGENCY MEDICINE

## 2024-11-04 PROCEDURE — 96376 TX/PRO/DX INJ SAME DRUG ADON: CPT

## 2024-11-04 PROCEDURE — 85652 RBC SED RATE AUTOMATED: CPT | Performed by: EMERGENCY MEDICINE

## 2024-11-04 PROCEDURE — 80048 BASIC METABOLIC PNL TOTAL CA: CPT | Performed by: EMERGENCY MEDICINE

## 2024-11-04 PROCEDURE — 36415 COLL VENOUS BLD VENIPUNCTURE: CPT | Performed by: EMERGENCY MEDICINE

## 2024-11-04 PROCEDURE — 83605 ASSAY OF LACTIC ACID: CPT | Performed by: EMERGENCY MEDICINE

## 2024-11-04 PROCEDURE — 84075 ASSAY ALKALINE PHOSPHATASE: CPT | Performed by: EMERGENCY MEDICINE

## 2024-11-04 PROCEDURE — 86140 C-REACTIVE PROTEIN: CPT | Performed by: EMERGENCY MEDICINE

## 2024-11-04 PROCEDURE — 99285 EMERGENCY DEPT VISIT HI MDM: CPT

## 2024-11-04 PROCEDURE — 96374 THER/PROPH/DIAG INJ IV PUSH: CPT

## 2024-11-04 PROCEDURE — 85025 COMPLETE CBC W/AUTO DIFF WBC: CPT | Performed by: EMERGENCY MEDICINE

## 2024-11-04 PROCEDURE — 2500000004 HC RX 250 GENERAL PHARMACY W/ HCPCS (ALT 636 FOR OP/ED): Performed by: EMERGENCY MEDICINE

## 2024-11-04 PROCEDURE — 81003 URINALYSIS AUTO W/O SCOPE: CPT | Performed by: EMERGENCY MEDICINE

## 2024-11-04 RX ORDER — ACETAMINOPHEN 325 MG/1
975 TABLET ORAL ONCE
Status: COMPLETED | OUTPATIENT
Start: 2024-11-04 | End: 2024-11-04

## 2024-11-04 RX ORDER — HYDROMORPHONE HYDROCHLORIDE 2 MG/ML
2 INJECTION, SOLUTION INTRAMUSCULAR; INTRAVENOUS; SUBCUTANEOUS EVERY 2 HOUR PRN
Status: DISCONTINUED | OUTPATIENT
Start: 2024-11-04 | End: 2024-11-05 | Stop reason: HOSPADM

## 2024-11-04 RX ORDER — HYDROMORPHONE HYDROCHLORIDE 2 MG/ML
2 INJECTION, SOLUTION INTRAMUSCULAR; INTRAVENOUS; SUBCUTANEOUS ONCE
Status: COMPLETED | OUTPATIENT
Start: 2024-11-04 | End: 2024-11-04

## 2024-11-04 ASSESSMENT — PAIN - FUNCTIONAL ASSESSMENT
PAIN_FUNCTIONAL_ASSESSMENT: 0-10

## 2024-11-04 ASSESSMENT — PAIN SCALES - GENERAL
PAINLEVEL_OUTOF10: 8

## 2024-11-04 ASSESSMENT — PAIN DESCRIPTION - LOCATION
LOCATION: ABDOMEN
LOCATION: ARM

## 2024-11-04 ASSESSMENT — PAIN DESCRIPTION - PAIN TYPE: TYPE: ACUTE PAIN

## 2024-11-04 ASSESSMENT — PAIN DESCRIPTION - PROGRESSION: CLINICAL_PROGRESSION: GRADUALLY WORSENING

## 2024-11-04 ASSESSMENT — PAIN DESCRIPTION - ONSET: ONSET: ONGOING

## 2024-11-04 ASSESSMENT — PAIN DESCRIPTION - FREQUENCY: FREQUENCY: CONSTANT/CONTINUOUS

## 2024-11-04 ASSESSMENT — PAIN DESCRIPTION - ORIENTATION: ORIENTATION: RIGHT;LEFT

## 2024-11-04 NOTE — ED TRIAGE NOTES
Pt having pain in armpit groin and buttocks since yesterday. PT recently discharged after being admitted. Has a pain disorder. PT 25 weeks pregnant. Has infection flare ups causing the pain. Endorses nausea.

## 2024-11-05 ENCOUNTER — SPECIALTY PHARMACY (OUTPATIENT)
Dept: PHARMACY | Facility: CLINIC | Age: 34
End: 2024-11-05

## 2024-11-05 ENCOUNTER — HOSPITAL ENCOUNTER (INPATIENT)
Facility: HOSPITAL | Age: 34
LOS: 1 days | Discharge: HOME | End: 2024-11-06
Attending: STUDENT IN AN ORGANIZED HEALTH CARE EDUCATION/TRAINING PROGRAM | Admitting: NURSE PRACTITIONER
Payer: MEDICAID

## 2024-11-05 VITALS
DIASTOLIC BLOOD PRESSURE: 75 MMHG | HEART RATE: 82 BPM | SYSTOLIC BLOOD PRESSURE: 121 MMHG | WEIGHT: 184 LBS | BODY MASS INDEX: 34.74 KG/M2 | RESPIRATION RATE: 16 BRPM | TEMPERATURE: 98.4 F | OXYGEN SATURATION: 99 % | HEIGHT: 61 IN

## 2024-11-05 DIAGNOSIS — L73.2 HIDRADENITIS SUPPURATIVA: ICD-10-CM

## 2024-11-05 LAB
ABO GROUP (TYPE) IN BLOOD: NORMAL
ANTIBODY SCREEN: NORMAL
RH FACTOR (ANTIGEN D): NORMAL

## 2024-11-05 PROCEDURE — 2500000001 HC RX 250 WO HCPCS SELF ADMINISTERED DRUGS (ALT 637 FOR MEDICARE OP): Performed by: NURSE PRACTITIONER

## 2024-11-05 PROCEDURE — 1220000001 HC OB SEMI-PRIVATE ROOM DAILY

## 2024-11-05 PROCEDURE — 99214 OFFICE O/P EST MOD 30 MIN: CPT

## 2024-11-05 PROCEDURE — 36415 COLL VENOUS BLD VENIPUNCTURE: CPT | Performed by: NURSE PRACTITIONER

## 2024-11-05 PROCEDURE — G0378 HOSPITAL OBSERVATION PER HR: HCPCS

## 2024-11-05 PROCEDURE — 96376 TX/PRO/DX INJ SAME DRUG ADON: CPT

## 2024-11-05 PROCEDURE — 2500000004 HC RX 250 GENERAL PHARMACY W/ HCPCS (ALT 636 FOR OP/ED): Performed by: EMERGENCY MEDICINE

## 2024-11-05 PROCEDURE — 99221 1ST HOSP IP/OBS SF/LOW 40: CPT | Performed by: NURSE PRACTITIONER

## 2024-11-05 PROCEDURE — 2500000004 HC RX 250 GENERAL PHARMACY W/ HCPCS (ALT 636 FOR OP/ED)

## 2024-11-05 PROCEDURE — 86901 BLOOD TYPING SEROLOGIC RH(D): CPT | Performed by: NURSE PRACTITIONER

## 2024-11-05 PROCEDURE — 2500000005 HC RX 250 GENERAL PHARMACY W/O HCPCS: Performed by: NURSE PRACTITIONER

## 2024-11-05 PROCEDURE — 2500000004 HC RX 250 GENERAL PHARMACY W/ HCPCS (ALT 636 FOR OP/ED): Performed by: NURSE PRACTITIONER

## 2024-11-05 RX ORDER — ACETAMINOPHEN 325 MG/1
650 TABLET ORAL EVERY 4 HOURS PRN
Status: DISCONTINUED | OUTPATIENT
Start: 2024-11-05 | End: 2024-11-06 | Stop reason: HOSPADM

## 2024-11-05 RX ORDER — NIFEDIPINE 10 MG/1
10 CAPSULE ORAL ONCE AS NEEDED
Status: DISCONTINUED | OUTPATIENT
Start: 2024-11-05 | End: 2024-11-06 | Stop reason: HOSPADM

## 2024-11-05 RX ORDER — HYDROMORPHONE HYDROCHLORIDE 1 MG/ML
1 INJECTION, SOLUTION INTRAMUSCULAR; INTRAVENOUS; SUBCUTANEOUS EVERY 2 HOUR PRN
Status: DISCONTINUED | OUTPATIENT
Start: 2024-11-05 | End: 2024-11-05

## 2024-11-05 RX ORDER — LABETALOL HYDROCHLORIDE 5 MG/ML
20 INJECTION, SOLUTION INTRAVENOUS ONCE AS NEEDED
Status: DISCONTINUED | OUTPATIENT
Start: 2024-11-05 | End: 2024-11-06 | Stop reason: HOSPADM

## 2024-11-05 RX ORDER — ASPIRIN 81 MG/1
162 TABLET ORAL DAILY
Status: DISCONTINUED | OUTPATIENT
Start: 2024-11-05 | End: 2024-11-06 | Stop reason: HOSPADM

## 2024-11-05 RX ORDER — FAMOTIDINE 20 MG/1
20 TABLET, FILM COATED ORAL 2 TIMES DAILY
Status: DISCONTINUED | OUTPATIENT
Start: 2024-11-05 | End: 2024-11-06 | Stop reason: HOSPADM

## 2024-11-05 RX ORDER — METOCLOPRAMIDE 10 MG/1
10 TABLET ORAL EVERY 6 HOURS PRN
Status: DISCONTINUED | OUTPATIENT
Start: 2024-11-05 | End: 2024-11-06 | Stop reason: HOSPADM

## 2024-11-05 RX ORDER — ONDANSETRON HYDROCHLORIDE 2 MG/ML
4 INJECTION, SOLUTION INTRAVENOUS EVERY 6 HOURS PRN
Status: DISCONTINUED | OUTPATIENT
Start: 2024-11-05 | End: 2024-11-06 | Stop reason: HOSPADM

## 2024-11-05 RX ORDER — METOCLOPRAMIDE HYDROCHLORIDE 5 MG/ML
10 INJECTION INTRAMUSCULAR; INTRAVENOUS EVERY 6 HOURS PRN
Status: DISCONTINUED | OUTPATIENT
Start: 2024-11-05 | End: 2024-11-06 | Stop reason: HOSPADM

## 2024-11-05 RX ORDER — HYDROMORPHONE HYDROCHLORIDE 1 MG/ML
1 INJECTION, SOLUTION INTRAMUSCULAR; INTRAVENOUS; SUBCUTANEOUS ONCE
Status: DISCONTINUED | OUTPATIENT
Start: 2024-11-05 | End: 2024-11-06 | Stop reason: HOSPADM

## 2024-11-05 RX ORDER — HYDRALAZINE HYDROCHLORIDE 20 MG/ML
5 INJECTION INTRAMUSCULAR; INTRAVENOUS ONCE AS NEEDED
Status: DISCONTINUED | OUTPATIENT
Start: 2024-11-05 | End: 2024-11-06 | Stop reason: HOSPADM

## 2024-11-05 RX ORDER — WATER
125 LIQUID (ML) MISCELLANEOUS
Status: DISCONTINUED | OUTPATIENT
Start: 2024-11-05 | End: 2024-11-06 | Stop reason: HOSPADM

## 2024-11-05 RX ORDER — ADHESIVE BANDAGE
10 BANDAGE TOPICAL
Status: DISCONTINUED | OUTPATIENT
Start: 2024-11-05 | End: 2024-11-06 | Stop reason: HOSPADM

## 2024-11-05 RX ORDER — OXYCODONE HYDROCHLORIDE 5 MG/1
10 TABLET ORAL EVERY 4 HOURS PRN
Status: DISCONTINUED | OUTPATIENT
Start: 2024-11-05 | End: 2024-11-06 | Stop reason: HOSPADM

## 2024-11-05 RX ORDER — LIDOCAINE HYDROCHLORIDE 10 MG/ML
0.5 INJECTION, SOLUTION INFILTRATION; PERINEURAL ONCE AS NEEDED
Status: DISCONTINUED | OUTPATIENT
Start: 2024-11-05 | End: 2024-11-06 | Stop reason: HOSPADM

## 2024-11-05 RX ORDER — CLINDAMYCIN HYDROCHLORIDE 300 MG/1
300 CAPSULE ORAL EVERY 12 HOURS
Status: DISCONTINUED | OUTPATIENT
Start: 2024-11-05 | End: 2024-11-06 | Stop reason: HOSPADM

## 2024-11-05 RX ORDER — NIFEDIPINE 10 MG/1
10 CAPSULE ORAL ONCE AS NEEDED
Status: DISCONTINUED | OUTPATIENT
Start: 2024-11-05 | End: 2024-11-05

## 2024-11-05 RX ORDER — ACETAMINOPHEN 325 MG/1
975 TABLET ORAL ONCE
Status: COMPLETED | OUTPATIENT
Start: 2024-11-05 | End: 2024-11-05

## 2024-11-05 RX ORDER — LIDOCAINE HYDROCHLORIDE 10 MG/ML
0.5 INJECTION, SOLUTION INFILTRATION; PERINEURAL ONCE AS NEEDED
Status: DISCONTINUED | OUTPATIENT
Start: 2024-11-05 | End: 2024-11-05

## 2024-11-05 RX ORDER — HYDROMORPHONE HYDROCHLORIDE 1 MG/ML
2 INJECTION, SOLUTION INTRAMUSCULAR; INTRAVENOUS; SUBCUTANEOUS EVERY 2 HOUR PRN
Status: DISCONTINUED | OUTPATIENT
Start: 2024-11-05 | End: 2024-11-06 | Stop reason: HOSPADM

## 2024-11-05 RX ORDER — ALBUTEROL SULFATE 90 UG/1
2 INHALANT RESPIRATORY (INHALATION) EVERY 6 HOURS PRN
Status: DISCONTINUED | OUTPATIENT
Start: 2024-11-05 | End: 2024-11-06 | Stop reason: HOSPADM

## 2024-11-05 RX ORDER — ONDANSETRON 4 MG/1
4 TABLET, FILM COATED ORAL EVERY 6 HOURS PRN
Status: DISCONTINUED | OUTPATIENT
Start: 2024-11-05 | End: 2024-11-05

## 2024-11-05 RX ORDER — HYDRALAZINE HYDROCHLORIDE 20 MG/ML
5 INJECTION INTRAMUSCULAR; INTRAVENOUS ONCE AS NEEDED
Status: DISCONTINUED | OUTPATIENT
Start: 2024-11-05 | End: 2024-11-05

## 2024-11-05 RX ORDER — POLYETHYLENE GLYCOL 3350 17 G/17G
17 POWDER, FOR SOLUTION ORAL 2 TIMES DAILY PRN
Status: DISCONTINUED | OUTPATIENT
Start: 2024-11-05 | End: 2024-11-06 | Stop reason: HOSPADM

## 2024-11-05 RX ORDER — SIMETHICONE 80 MG
80 TABLET,CHEWABLE ORAL 4 TIMES DAILY PRN
Status: DISCONTINUED | OUTPATIENT
Start: 2024-11-05 | End: 2024-11-06 | Stop reason: HOSPADM

## 2024-11-05 RX ORDER — BISACODYL 10 MG/1
10 SUPPOSITORY RECTAL DAILY PRN
Status: DISCONTINUED | OUTPATIENT
Start: 2024-11-05 | End: 2024-11-06 | Stop reason: HOSPADM

## 2024-11-05 RX ORDER — ONDANSETRON HYDROCHLORIDE 2 MG/ML
4 INJECTION, SOLUTION INTRAVENOUS EVERY 6 HOURS PRN
Status: DISCONTINUED | OUTPATIENT
Start: 2024-11-05 | End: 2024-11-05

## 2024-11-05 RX ORDER — ONDANSETRON 4 MG/1
4 TABLET, FILM COATED ORAL EVERY 6 HOURS PRN
Status: DISCONTINUED | OUTPATIENT
Start: 2024-11-05 | End: 2024-11-06 | Stop reason: HOSPADM

## 2024-11-05 RX ORDER — CHLORHEXIDINE GLUCONATE 40 MG/ML
SOLUTION TOPICAL 2 TIMES DAILY
Status: DISCONTINUED | OUTPATIENT
Start: 2024-11-05 | End: 2024-11-06 | Stop reason: HOSPADM

## 2024-11-05 RX ORDER — OXYCODONE HYDROCHLORIDE 10 MG/1
10 TABLET ORAL ONCE
Status: COMPLETED | OUTPATIENT
Start: 2024-11-05 | End: 2024-11-05

## 2024-11-05 RX ORDER — LABETALOL HYDROCHLORIDE 5 MG/ML
20 INJECTION, SOLUTION INTRAVENOUS ONCE AS NEEDED
Status: DISCONTINUED | OUTPATIENT
Start: 2024-11-05 | End: 2024-11-05

## 2024-11-05 SDOH — ECONOMIC STABILITY: FOOD INSECURITY: WITHIN THE PAST 12 MONTHS, THE FOOD YOU BOUGHT JUST DIDN'T LAST AND YOU DIDN'T HAVE MONEY TO GET MORE.: OFTEN TRUE

## 2024-11-05 SDOH — SOCIAL STABILITY: SOCIAL INSECURITY: DO YOU FEEL ANYONE HAS EXPLOITED OR TAKEN ADVANTAGE OF YOU FINANCIALLY OR OF YOUR PERSONAL PROPERTY?: NO

## 2024-11-05 SDOH — SOCIAL STABILITY: SOCIAL INSECURITY: ARE YOU OR HAVE YOU BEEN THREATENED OR ABUSED PHYSICALLY, EMOTIONALLY, OR SEXUALLY BY ANYONE?: NO

## 2024-11-05 SDOH — HEALTH STABILITY: MENTAL HEALTH: WISH TO BE DEAD (PAST 1 MONTH): NO

## 2024-11-05 SDOH — SOCIAL STABILITY: SOCIAL INSECURITY: ABUSE SCREEN: ADULT

## 2024-11-05 SDOH — ECONOMIC STABILITY: FOOD INSECURITY: HOW HARD IS IT FOR YOU TO PAY FOR THE VERY BASICS LIKE FOOD, HOUSING, MEDICAL CARE, AND HEATING?: SOMEWHAT HARD

## 2024-11-05 SDOH — HEALTH STABILITY: MENTAL HEALTH: HAVE YOU USED ANY PRESCRIPTION DRUGS OTHER THAN PRESCRIBED IN THE PAST 12 MONTHS?: NO

## 2024-11-05 SDOH — SOCIAL STABILITY: SOCIAL INSECURITY: HAVE YOU HAD ANY THOUGHTS OF HARMING ANYONE ELSE?: NO

## 2024-11-05 SDOH — HEALTH STABILITY: MENTAL HEALTH: NON-SPECIFIC ACTIVE SUICIDAL THOUGHTS (PAST 1 MONTH): NO

## 2024-11-05 SDOH — SOCIAL STABILITY: SOCIAL INSECURITY: WITHIN THE LAST YEAR, HAVE YOU BEEN AFRAID OF YOUR PARTNER OR EX-PARTNER?: NO

## 2024-11-05 SDOH — SOCIAL STABILITY: SOCIAL INSECURITY: HAVE YOU HAD THOUGHTS OF HARMING ANYONE ELSE?: NO

## 2024-11-05 SDOH — SOCIAL STABILITY: SOCIAL INSECURITY: VERBAL ABUSE: DENIES

## 2024-11-05 SDOH — HEALTH STABILITY: MENTAL HEALTH: HAVE YOU USED ANY SUBSTANCES (CANABIS, COCAINE, HEROIN, HALLUCINOGENS, INHALANTS, ETC.) IN THE PAST 12 MONTHS?: NO

## 2024-11-05 SDOH — HEALTH STABILITY: MENTAL HEALTH: WERE YOU ABLE TO COMPLETE ALL THE BEHAVIORAL HEALTH SCREENINGS?: YES

## 2024-11-05 SDOH — ECONOMIC STABILITY: TRANSPORTATION INSECURITY: IN THE PAST 12 MONTHS, HAS LACK OF TRANSPORTATION KEPT YOU FROM MEDICAL APPOINTMENTS OR FROM GETTING MEDICATIONS?: NO

## 2024-11-05 SDOH — SOCIAL STABILITY: SOCIAL INSECURITY: WITHIN THE LAST YEAR, HAVE YOU BEEN HUMILIATED OR EMOTIONALLY ABUSED IN OTHER WAYS BY YOUR PARTNER OR EX-PARTNER?: NO

## 2024-11-05 SDOH — HEALTH STABILITY: MENTAL HEALTH: STRENGTHS (MUST CHOOSE TWO): SUPPORT FROM FAMILY;INDEPENDENT LIVING

## 2024-11-05 SDOH — SOCIAL STABILITY: SOCIAL INSECURITY: PHYSICAL ABUSE: DENIES

## 2024-11-05 SDOH — HEALTH STABILITY: MENTAL HEALTH: SUICIDAL BEHAVIOR (LIFETIME): NO

## 2024-11-05 SDOH — ECONOMIC STABILITY: FOOD INSECURITY: WITHIN THE PAST 12 MONTHS, YOU WORRIED THAT YOUR FOOD WOULD RUN OUT BEFORE YOU GOT THE MONEY TO BUY MORE.: OFTEN TRUE

## 2024-11-05 SDOH — SOCIAL STABILITY: SOCIAL INSECURITY: HAS ANYONE EVER THREATENED TO HURT YOUR FAMILY OR YOUR PETS?: NO

## 2024-11-05 SDOH — SOCIAL STABILITY: SOCIAL INSECURITY: ARE THERE ANY APPARENT SIGNS OF INJURIES/BEHAVIORS THAT COULD BE RELATED TO ABUSE/NEGLECT?: NO

## 2024-11-05 SDOH — SOCIAL STABILITY: SOCIAL INSECURITY: DOES ANYONE TRY TO KEEP YOU FROM HAVING/CONTACTING OTHER FRIENDS OR DOING THINGS OUTSIDE YOUR HOME?: NO

## 2024-11-05 SDOH — ECONOMIC STABILITY: HOUSING INSECURITY: DO YOU FEEL UNSAFE GOING BACK TO THE PLACE WHERE YOU ARE LIVING?: NO

## 2024-11-05 ASSESSMENT — PAIN SCALES - GENERAL
PAINLEVEL_OUTOF10: 7
PAINLEVEL_OUTOF10: 7
PAINLEVEL_OUTOF10: 10 - WORST POSSIBLE PAIN
PAINLEVEL_OUTOF10: 7
PAINLEVEL_OUTOF10: 8
PAINLEVEL_OUTOF10: 10 - WORST POSSIBLE PAIN
PAINLEVEL_OUTOF10: 8
PAINLEVEL_OUTOF10: 7
PAINLEVEL_OUTOF10: 8
PAINLEVEL_OUTOF10: 6
PAINLEVEL_OUTOF10: 6
PAINLEVEL_OUTOF10: 8
PAINLEVEL_OUTOF10: 7
PAINLEVEL_OUTOF10: 6
PAINLEVEL_OUTOF10: 8
PAINLEVEL_OUTOF10: 7
PAINLEVEL_OUTOF10: 8
PAINLEVEL_OUTOF10: 9
PAINLEVEL_OUTOF10: 8
PAINLEVEL_OUTOF10: 8
PAINLEVEL_OUTOF10: 7
PAINLEVEL_OUTOF10: 8
PAINLEVEL_OUTOF10: 7
PAINLEVEL_OUTOF10: 7
PAINLEVEL_OUTOF10: 8

## 2024-11-05 ASSESSMENT — PAIN DESCRIPTION - ONSET: ONSET: ONGOING

## 2024-11-05 ASSESSMENT — PAIN DESCRIPTION - LOCATION
LOCATION: OTHER (COMMENT)
LOCATION: LEG
LOCATION: GROIN

## 2024-11-05 ASSESSMENT — PAIN - FUNCTIONAL ASSESSMENT
PAIN_FUNCTIONAL_ASSESSMENT: 0-10

## 2024-11-05 ASSESSMENT — ACTIVITIES OF DAILY LIVING (ADL)
EFFECT OF PAIN ON DAILY ACTIVITIES: DECREASED
LACK_OF_TRANSPORTATION: NO
LACK_OF_TRANSPORTATION: NO

## 2024-11-05 ASSESSMENT — PAIN DESCRIPTION - PAIN TYPE: TYPE: ACUTE PAIN

## 2024-11-05 ASSESSMENT — PAIN DESCRIPTION - FREQUENCY: FREQUENCY: CONSTANT/CONTINUOUS

## 2024-11-05 ASSESSMENT — PAIN DESCRIPTION - DESCRIPTORS
DESCRIPTORS: ACHING;BURNING;SHARP
DESCRIPTORS: ACHING;BURNING;SHARP
DESCRIPTORS: SHARP
DESCRIPTORS: SHARP;ACHING;BURNING
DESCRIPTORS: SHOOTING;SHARP
DESCRIPTORS: SHARP
DESCRIPTORS: BURNING;ACHING
DESCRIPTORS: BURNING

## 2024-11-05 ASSESSMENT — LIFESTYLE VARIABLES
AUDIT-C TOTAL SCORE: 0
HOW OFTEN DO YOU HAVE A DRINK CONTAINING ALCOHOL: NEVER
AUDIT-C TOTAL SCORE: 0
HOW MANY STANDARD DRINKS CONTAINING ALCOHOL DO YOU HAVE ON A TYPICAL DAY: PATIENT DOES NOT DRINK
SKIP TO QUESTIONS 9-10: 1
HOW OFTEN DO YOU HAVE 6 OR MORE DRINKS ON ONE OCCASION: NEVER

## 2024-11-05 ASSESSMENT — PAIN DESCRIPTION - ORIENTATION: ORIENTATION: OTHER (COMMENT)

## 2024-11-05 ASSESSMENT — PAIN DESCRIPTION - PROGRESSION: CLINICAL_PROGRESSION: GRADUALLY WORSENING

## 2024-11-05 NOTE — ED PROVIDER NOTES
HPI   Chief Complaint   Patient presents with    body pains       HPI: []  34-year-old  female history of advanced hidradenitis suppurativa involving her axilla groin pubic area and genital areas currently on clindamycin 25 weeks pregnant recently hospitalized for a flareup has a care path which she gets intravenous ertapenem and pain medication discharged on clindamycin returns with increasing pain in her axilla and groin areas.  No fever or chills.  No nausea vomiting or diarrhea.  No vaginal spotting bleeding or discharge.  No cramping.  No contractions.  No flank pain.  No urine frequency urgency or hematuria.  Patient was seen by dermatology also and is due to start [employer].    Past history: Hidradenitis suppurativa  Social: Patient denies current tobacco alcohol drug abuse.      REVIEW OF SYSTEMS:    GENERAL.: No weight loss, fatigue, anorexia, insomnia, fever.  Positive pain in her axilla groin and anogenital area    EYES: No vision loss, double vision, drainage, eye pain.    ENT: No pharyngitis, dry mouth.    CARDIOPULMONARY: No chest pain, palpitations, syncope, near syncope. No shortness of breath, cough, hemoptysis.    GI: No abdominal pain, change in bowel habits, melena, hematemesis, hematochezia, nausea, vomiting, diarrhea.    : No discharge, dysuria, frequency, urgency, hematuria.    MS: No limb pain, joint pain, joint swelling.    SKIN: No rashes.    PSYCH: No depression, anxiety, suicidality, homicidality.    Review of systems is otherwise negative unless stated above or in history of present illness.  Social history, family history, allergies reviewed.  PHYSICAL EXAM:    GENERAL: Vitals noted, no distress. Alert and oriented  x 3. Non-toxic.      EENT: TMs clear. Posterior oropharynx unremarkable. No meningismus. No LAD.     NECK: Supple. Nontender. No midline tenderness.     CARDIAC: Regular, rate, rhythm. No murmurs rubs or gallops. No JVD    PULMONARY: Lungs clear bilaterally with  good aeration. No wheezes rales or rhonchi. No respiratory distress.     ABDOMEN: Soft, nonsurgical.  Gravid uterus nontender no rebound or guarding nontender. No peritoneal signs. Normoactive bowel sounds. No pulsatile masses.  Negative CVA tenderness.    EXTREMITIES: No peripheral edema. Negative Homans bilaterally, no cords.    SKIN: No rash. Intact.    examination: Female chaperone present, I examined both axilla both inguinal areas vulvovaginal area gluteal area and anorectal area patient has evidence of chronic appearing scars from previous flareups but I do not appreciate any active lesions any open draining lesions any open sinuses or tracks.  There are no areas of induration or fluctuation.  NEURO: No focal neurologic deficits, NIH score of 0. Cranial nerves normal as tested from II through XII.     MEDICAL DECISION MAKING:  CBC with shows no leukocytosis chemistries LFTs are unremarkable ESR CRP normal fetal heart rate 140s.    Treatments: IV established given IV hydromorphone as per her care path for pain control.    Consult: Discussed with Dr. Bustos obstetrics at labor and delivery [facility] and transferred to [facility] recommended    ED course: Patient remained stable hemodynamic.    Impression: #1 hidradenitis suppurativa, #2 third-trimester pregnancy uncomplicated    Plan set MDM: 34-year female history of longstanding hidradenitis suppurativa comes in with the what appears to be a flareup she is currently 25 weeks pregnant currently no OB complaints.  No cramping contractions spotting or bleeding.  Currently no signs of active open lesions or open abscesses low suspicion for sepsis septic shock or bacteremia.  Patient will be transferred to [facility] labor delivery for further care.              Patient History   Past Medical History:   Diagnosis Date    Anemia 06/16/2024    Hidradenitis suppurativa 10/29/2020    Hidradenitis    Hidradenitis suppurativa     Lupus     UTI  (urinary tract infection) during pregnancy, first trimester ([facility]) 07/16/2024    Vitamin D deficiency 06/16/2024     Past Surgical History:   Procedure Laterality Date    OTHER SURGICAL HISTORY  09/19/2022    Arm surgery    OTHER SURGICAL HISTORY Left 2011    Left wrist    OTHER SURGICAL HISTORY Left 2021    Left wrist procedure     No family history on file.  Social History     Tobacco Use    Smoking status: Former     Types: Cigarettes    Smokeless tobacco: Never   Vaping Use    Vaping status: Every Day   Substance Use Topics    Alcohol use: Not Currently    Drug use: Never       Physical Exam   ED Triage Vitals   Temperature Heart Rate Respirations BP   11/04/24 1153 11/04/24 1153 11/04/24 1153 11/04/24 1153   36.6 °C (97.9 °F) 90 18 102/76      Pulse Ox Temp Source Heart Rate Source Patient Position   11/04/24 1153 11/04/24 1153 11/04/24 1300 11/04/24 1153   99 % Tympanic Monitor Sitting      BP Location FiO2 (%)     11/04/24 1153 --     Right arm        Physical Exam      ED Course & MDM   ED Course as of 11/04/24 2114 Mon Nov 04, 2024 2113 CBC with differential shows no leukocytosis chemistry is unremarkable ESR 25 CRP normal, discussed with the labor delivery Dr. Bustos and patient to be transferred to labor and delivery for further care.  Fetal heart rate in the 140s. [MT]      ED Course User Index  [MT] David Chase MD         Diagnoses as of 11/04/24 2114   Hidradenitis suppurativa                 No data recorded     [place] Coma Scale Score: 15 (11/04/24 1349 : Mojgan Hernandez RN)                           Medical Decision Making      Procedure  Procedures     David Chase MD  11/04/24 2118       David Chase MD  11/07/24 2313       David Chase MD  11/07/24 2313       David Chase MD  11/07/24 2314       David Chase MD  11/07/24 2314       David Chase MD  11/07/24 2314
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
## 2024-11-05 NOTE — ED NOTES
Report given to nurse Dania [facility] MAC 2 (OB/L&D).     Carlos Avalos RN  11/05/24 [id number]

## 2024-11-05 NOTE — ED NOTES
Pt transported to [facility] 2 by [facility], ground ambulance, Squad 2, w/ EMT-B x2 on EMS stretcher with safety/fall precautions, mask on, belongings, paperwork. No significant detrimental changes prior to transfer. Neuro/skin/respiratory grossly WDL.     Report given to EDGARDO Parson, CORTE[facility] Back 10.     Carlos Avalos RN  11/05/24 3661

## 2024-11-05 NOTE — CONSULTS
"Inpatient consult to Dermatology  Consult performed by: Angi Garcia DO  Consult ordered by: ZAFAR Marion-CNP      DERMATOLOGY DEPARTMENT CONSULTATION NOTE  Name: Sandrita Adams  MRN: [id number]  : 1990    Reason for consultation: Hidradenitis suppurativa flare in pregnancy    History of Present Illness  Sandrita Adams is a 34 y.o. female with a past medical history of hidradenitis suppurativa (elliott stage 3) and intrauterine pregnancy (currently at 25w4d) presenting on 2024 as a transfer from [facility] for HS flare. Dermatology was consulted for HS flare in pregnancy.     Patient has longstanding history of hidradenitis suppurativa. She reports developing her first \"boil\" at 7 years of age and receiving official diagnosis in . She has previously been treated with: certolizumab, IV ertapenem, adalimumab, infliximab, secukinumab apremilast, spironolactone, doxycycline, bactrim, clindamycin, levaquin, rifampin, IV dalbavancin, and IV vancomycin. She has also trialed ILK injections without success. Prior to pregnancy, patient was on cosentyx which she reports has been the most beneficial treatment. Patient discontinued cosentyx in  after finding out she was pregnant.      During this pregnancy, patient has been admitted/evaluated 6 times by dermatology. During previous admissions, patient was started on certolizumab (Cimzia). Patient reports she did not notice significant improvement on certolizumab and discontinued due to headaches (last dose 10/6). During previous admissions, patient also received 3 total doses of IV ertapenem. However, patient stated she was unable to manage a PICC line at home due to her current housing situation and refused outpatient IV antibiotics.     Patient was most recently seen 10/28/24 (admitted 10/27-10/31). She was discharged on PO clindamycin and topicals (hibiclens and topical clindamycin) with plans to initiate Humira.     Today, patient reports " that she is experiencing another HS flare. She states her last flare during her previous admission 10/27-10/31 did not improve/resolve, but instead continued to worsen after discharge. Patient reports new lesions in her bilateral axilla, buttocks, and perianal region. The pain is very severe. She has difficulty showering and defecating due to the pain. She has been using hibiclens daily and taking PO clindamycin 300mg BID as prescribed. Does not use topical clindamycin as this causes irritation to her skin.     Previous Treatment Summary:  1. Isotretinoin - helped acne but did not improve HS   2. Adalimumab - developed hives/bumps  3. Infliximab - missed 2 infusions due to personal family issues   4.  I&D  5. Excision  6. Apremilast - No improvement  7. Spironolactone - stopped due to higher potassium and tingling in hands/ arms. Note hx of PCOS and elevated testosterone.   8. Multiple antibiotics - Doxycycline, bactrim, clindamycin, levaquin, rifampin - none of these helped. Had previously been on antibiotics but they gave her frequent yeast infections.   9. ILK  10. Cimzia - headaches  11. IV ertapenem    Review of Systems  Review of Systems   Constitutional:  Negative for chills and fever.        Past Medical History  Past Medical History:   Diagnosis Date    Anemia 06/16/2024    Hidradenitis suppurativa 10/29/2020    Hidradenitis    Hidradenitis suppurativa     Lupus     UTI (urinary tract infection) during pregnancy, first trimester ([facility]) 07/16/2024    Vitamin D deficiency 06/16/2024       Past Surgical History   has a past surgical history that includes Other surgical history (09/19/2022); Other surgical history (Left, 2011); and Other surgical history (Left, 2021).     Allergies  Allergies   Allergen Reactions    Suboxone [Buprenorphine-Naloxone] Anaphylaxis, Hives and Itching    Clarithromycin Hives    Haloperidol Hallucinations and Psychosis    Levofloxacin Swelling     Ankle Joint/tendon pain     Metoclopramide Headache, Hallucinations and Psychosis    Reglan [Metoclopramide Hcl] Psychosis       Medications  Scheduled Meds:     Continuous Infusions:     PRN Meds: PRN medications: hydrALAZINE, labetaloL, lidocaine, NIFEdipine, ondansetron **OR** ondansetron     Family History  No family history on file.    Social History   reports that she has quit smoking. Her smoking use included cigarettes. She has never used smokeless tobacco. She reports that she does not currently use alcohol. She reports that she does not use drugs.     Objective    Vitals:    11/05/24 0920 11/05/24 0927 11/05/24 1159   BP: 118/79     Pulse: 90  97   Resp:  18    Temp:  36.8 °C (98.2 °F)    TempSrc:  Temporal    SpO2:   100%        Exam    GEN: no acute distress  NEURO: moving all extremities   EYES: conjunctiva and eyelids normal. No conjunctival injection or erosions appreciated  ENT:   - Lips: normal  NECK: normal and symmetric.   CV: no varicosities, warmth or tenderness of extremities.  GI: Distended abdomen 2/2 pregnancy  EXTREMITIES: no distal digital clubbing, cyanosis, petechiae   SKIN: A full body skin exam including scalp, face, eyes, ears, neck, trunk, bilateral upper & lower extremities, toenails and fingernails were examined with the following findings:  -On the bilateral axillae, there are several hyperpigmented nodules, sinus tracts, and atrophic plaques. Some nodules are tender and fluctuant.   -On the gluteal fold, infrabdominal fold, inguinal folds, and labia majora there are hyperpigmented tender nodules, hyperpigmented patches, and atrophic plaques. Some are tender.   -On the right axilla with a tender pink papule c/w granulation tissue     Laboratory and Data  Results for orders placed or performed during the hospital encounter of 11/04/24 (from the past 24 hours)   Urinalysis with Reflex Culture and Microscopic   Result Value Ref Range    Color, Urine Colorless (N) Light-Yellow, Yellow, Dark-Yellow    Appearance,  Urine Clear Clear    Specific Gravity, Urine 1.006 1.005 - 1.035    pH, Urine 5.5 5.0, 5.5, 6.0, 6.5, 7.0, 7.5, 8.0    Protein, Urine NEGATIVE NEGATIVE, 10 (TRACE), 20 (TRACE) mg/dL    Glucose, Urine Normal Normal mg/dL    Blood, Urine NEGATIVE NEGATIVE    Ketones, Urine NEGATIVE NEGATIVE mg/dL    Bilirubin, Urine NEGATIVE NEGATIVE    Urobilinogen, Urine Normal Normal mg/dL    Nitrite, Urine NEGATIVE NEGATIVE    Leukocyte Esterase, Urine NEGATIVE NEGATIVE   CBC and Auto Differential   Result Value Ref Range    WBC 11.3 4.4 - 11.3 x10*3/uL    nRBC 0.0 0.0 - 0.0 /100 WBCs    RBC 4.51 4.00 - 5.20 x10*6/uL    Hemoglobin 11.3 (L) 12.0 - 16.0 g/dL    Hematocrit 34.2 (L) 36.0 - 46.0 %    MCV 76 (L) 80 - 100 fL    MCH 25.1 (L) 26.0 - 34.0 pg    MCHC 33.0 32.0 - 36.0 g/dL    RDW 14.6 (H) 11.5 - 14.5 %    Platelets 307 150 - 450 x10*3/uL    Neutrophils % 70.2 40.0 - 80.0 %    Immature Granulocytes %, Automated 1.3 (H) 0.0 - 0.9 %    Lymphocytes % 20.5 13.0 - 44.0 %    Monocytes % 7.1 2.0 - 10.0 %    Eosinophils % 0.6 0.0 - 6.0 %    Basophils % 0.3 0.0 - 2.0 %    Neutrophils Absolute 7.93 (H) 1.20 - 7.70 x10*3/uL    Immature Granulocytes Absolute, Automated 0.15 0.00 - 0.70 x10*3/uL    Lymphocytes Absolute 2.32 1.20 - 4.80 x10*3/uL    Monocytes Absolute 0.80 0.10 - 1.00 x10*3/uL    Eosinophils Absolute 0.07 0.00 - 0.70 x10*3/uL    Basophils Absolute 0.03 0.00 - 0.10 x10*3/uL   Basic metabolic panel   Result Value Ref Range    Glucose 84 74 - 99 mg/dL    Sodium 134 (L) 136 - 145 mmol/L    Potassium 4.8 3.5 - 5.3 mmol/L    Chloride 107 98 - 107 mmol/L    Bicarbonate 21 21 - 32 mmol/L    Anion Gap 11 10 - 20 mmol/L    Urea Nitrogen 9 6 - 23 mg/dL    Creatinine 0.60 0.50 - 1.05 mg/dL    eGFR >90 >60 mL/min/1.73m*2    Calcium 9.0 8.6 - 10.3 mg/dL   Hepatic function panel   Result Value Ref Range    Albumin 3.6 3.4 - 5.0 g/dL    Bilirubin, Total 0.3 0.0 - 1.2 mg/dL    Bilirubin, Direct 0.1 0.0 - 0.3 mg/dL    Alkaline Phosphatase 83  33 - 110 U/L    ALT 23 7 - 45 U/L    AST 28 9 - 39 U/L    Total Protein 7.0 6.4 - 8.2 g/dL   Sedimentation Rate   Result Value Ref Range    Sedimentation Rate 35 (H) 0 - 20 mm/h   C-Reactive Protein   Result Value Ref Range    C-Reactive Protein 0.63 <1.00 mg/dL   Lactate   Result Value Ref Range    Lactate 0.8 0.4 - 2.0 mmol/L        Assessment/Plan   Sandrita Adams is a 34 y.o. female with a past medical history of rand stage 3 hidradenitis suppurativa and intrauterine pregnancy (currently at 25w4d) presented on 11/5/2024 as a transfer from [facility] for HS flare. Dermatology was consulted for HS flare.     Differential diagnoses: Hidradenitis suppurativa, Rand stage 3 in pregnancy with active flare    Impression:     Sandrita has a history of stage 3 hidradenitis suppurativa and is currently pregnant (25w4d). She has previously been treated with multiple therapies: certolizumab, IV ertapenem, adalimumab, infliximab, secukinumab apremilast, spironolactone, doxycycline, bactrim, clindamycin, levaquin, rifampin, IV dalbavancin, and IV vancomycin. She has also trialed ILK injections without success.     Prior to pregnancy, patient was on cosentyx. She reports the most significant improvement while on cosentyx, however it was discontinued in June after finding out she was pregnant given lack of safety data.     During this pregnancy, patient has been admitted/evaluated 6 times by dermatology. During previous admissions, patient was started on certolizumab (Cimzia). Patient reports she did not notice significant improvement on certolizumab and discontinued due to headaches (last dose 10/6). During previous admissions, patient also received 3 total doses of IV ertapenem. However, patient stated she was unable to manage a PICC line at home due to her current housing situation and refused outpatient IV antibiotics.      Patient was last seen 10/28/24. Plan was to continue PO clindamycin, topicals (hibiclens and  topical clindamycin), and initiate Humira. Patient missed her outpatient dermatology appointment 10/30/24 as she was admitted to the hospital. Contacted pharmacy to discuss status of prior authorization. Patient is approved and [employer] attempted to deliver today.       Recommendations:  -Discussed additional options to treat current flare including ILK. Patient deferred.   -Patient approved for Humira and is being delivered to her house today. [employer] attempted delivery today while patient was in triage. Goal for patient to start Humira to help prevent additional flares.   -Continue clindamycin 300mg PO BID.   -Continue topical hibiclens daily.   -If continues to flare despite Humira, can reconsider IV ertapenem if patient housing situation stabilizes to the point she could maintain PICC.  -Pain management per primary team.   -Patient has outpatient follow up with Dr. Castanon 11/13/24.     The patient was seen and discussed with attending physician Dr. Castanon. The assessment and plan was communicated to the care team.    Thank you for the consultation and for the opportunity to contribute to the care of this patient.      Angi Garcia DO   PGY3, Dermatology  Epic chat (preferred)  Team pager [phone]     I saw and evaluated the patient. I personally obtained the key and critical portions of the history and physical exam or was physically present for key and critical portions performed by the resident. I reviewed the resident's documentation and discussed the patient with the resident. I agree with the resident's medical decision making as documented in the note.    Ivan Castanon MD

## 2024-11-05 NOTE — PROGRESS NOTES
"[facility] Clinical Note  Initial Patient Education     Introduction  Sandrita Adams is a 34 y.o. female who is on the specialty pharmacy service for management of: Dermatology Core.    Sandrita Adams is initiating the following therapy: Humira 160mg day 1, then 80mg every other week thereafter (name, dose, directions)    Medication receipt date: planned delivery 11/5/24. Patient now hospitalized and unable to receive shipment. Will coordinate with home delivery for new delivery/ date  Duration of therapy: Maintenance    The most recent encounter visit with the referring prescriber Dr Ivan Castanon on 10/28/24 was reviewed.  Pharmacy will continue to collaborate in the care of this patient with the referring prescriber.    Clinical Background  An initial assessment was conducted prior to first fill of the medication to determine the appropriateness of therapy given the patient's diagnosis, medication list, comorbidities, allergies, medical history, patient's ability to self administer medication, and therapeutic goals based on possible outcomes of therapy. Refer to initial assessment task completed on 11/4/24.    Labs for clinical appropriateness that were reviewed include:   Dermatology- For Biologics- TB:   Lab Results   Component Value Date    TBSIN Negative 08/29/2024   , Hepatitis B panel: No results found for: \"HEPBCAB\", \"HEPBCIGM\", \"HEPBSAG\", \"HEPBSAB\", Hepatitis C antibody: No results found for: \"HEPCABINIT\", \"HEPCINTERP\", \"HEPCAB\", HIV: No results found for: \"HIV1X2\", \"HIVCONF\", \"HIVITP\", and CMP and CBC reviewed and within normal limits    Education/Discussion  Sandrita was contacted on 11/5/2024 at 4:32 PM for a pharmacy visit with encounter number [phone] from:   [facility]  [address] [place] [id number]  Dept: [phone]  Dept Fax: [phone]  Sandrita consented to a/an Telephone visit, which was " performed.    Medication Start Date (planned or actual): TBD pending new delivery date  Education was conducted prior to start of therapy? Yes    Education discussed includes the following:     Additional details of the medication specific counseling are found within the linked patient education flowsheet.     The follow up timeline was discussed. Every person responds to and reacts to therapy differently. Patient should be assessed for efficacy and tolerability in approximately: 8-12 weeks    Provided education on goals and possible outcomes of therapy:  Adherence with therapy  Timely completion of appropriate labs  Timely and appropriate follow up with provider  Identify and address medication interactions with presciption medications, OTC medications and supplements  Optimize or maintain quality of life  Dermatology: Prevent or reduce disease flares  Reduce track depth, pain, inflammation, skin lesions (HS)    The importance of adherence was discussed and they were advised to take the medication as prescribed by their provider.     Impression/Plan       This patient has been identified as high risk due to Pregnancy.  The following action was taken: Patient/caregiver encouraged to participate in patient management program and Follow up timeline adjusted for high risk status.         Provided contact information ([phone]) for [facility] and reviewed dispensing process, refill timeline and patient management follow up. Advised to contact the pharmacy if there are any adverse effects and/or changes to medication list, including prescriptions, OTC medications, herbal products, or supplements. Confirmed understanding of education conducted during assessment. All questions and concerns were addressed and patient was encouraged to reach out for additional questions or concerns.    Adonay Hinds, PharmD

## 2024-11-06 ENCOUNTER — PHARMACY VISIT (OUTPATIENT)
Dept: PHARMACY | Facility: CLINIC | Age: 34
End: 2024-11-06
Payer: MEDICAID

## 2024-11-06 VITALS
TEMPERATURE: 97.3 F | WEIGHT: 184.02 LBS | SYSTOLIC BLOOD PRESSURE: 118 MMHG | BODY MASS INDEX: 34.74 KG/M2 | HEIGHT: 61 IN | OXYGEN SATURATION: 98 % | DIASTOLIC BLOOD PRESSURE: 77 MMHG | RESPIRATION RATE: 17 BRPM | HEART RATE: 93 BPM

## 2024-11-06 LAB
ALBUMIN SERPL BCP-MCNC: 3.3 G/DL (ref 3.4–5)
ALP SERPL-CCNC: 80 U/L (ref 33–110)
ALT SERPL W P-5'-P-CCNC: 26 U/L (ref 7–45)
ANION GAP SERPL CALC-SCNC: 13 MMOL/L (ref 10–20)
AST SERPL W P-5'-P-CCNC: 19 U/L (ref 9–39)
BILIRUB SERPL-MCNC: 0.2 MG/DL (ref 0–1.2)
BUN SERPL-MCNC: 10 MG/DL (ref 6–23)
CALCIUM SERPL-MCNC: 8.9 MG/DL (ref 8.6–10.6)
CHLORIDE SERPL-SCNC: 103 MMOL/L (ref 98–107)
CO2 SERPL-SCNC: 22 MMOL/L (ref 21–32)
CREAT SERPL-MCNC: 0.62 MG/DL (ref 0.5–1.05)
EGFRCR SERPLBLD CKD-EPI 2021: >90 ML/MIN/1.73M*2
ERYTHROCYTE [DISTWIDTH] IN BLOOD BY AUTOMATED COUNT: 14.3 % (ref 11.5–14.5)
GLUCOSE 1H P 50 G GLC PO SERPL-MCNC: 92 MG/DL
GLUCOSE SERPL-MCNC: 92 MG/DL (ref 74–99)
HCT VFR BLD AUTO: 31.6 % (ref 36–46)
HGB BLD-MCNC: 10.8 G/DL (ref 12–16)
MCH RBC QN AUTO: 26.2 PG (ref 26–34)
MCHC RBC AUTO-ENTMCNC: 34.2 G/DL (ref 32–36)
MCV RBC AUTO: 77 FL (ref 80–100)
NRBC BLD-RTO: 0 /100 WBCS (ref 0–0)
PLATELET # BLD AUTO: 268 X10*3/UL (ref 150–450)
POTASSIUM SERPL-SCNC: 4.3 MMOL/L (ref 3.5–5.3)
PROT SERPL-MCNC: 6.6 G/DL (ref 6.4–8.2)
RBC # BLD AUTO: 4.12 X10*6/UL (ref 4–5.2)
SODIUM SERPL-SCNC: 134 MMOL/L (ref 136–145)
TREPONEMA PALLIDUM IGG+IGM AB [PRESENCE] IN SERUM OR PLASMA BY IMMUNOASSAY: NONREACTIVE
WBC # BLD AUTO: 8.7 X10*3/UL (ref 4.4–11.3)

## 2024-11-06 PROCEDURE — 59025 FETAL NON-STRESS TEST: CPT | Mod: GC

## 2024-11-06 PROCEDURE — 59025 FETAL NON-STRESS TEST: CPT

## 2024-11-06 PROCEDURE — 36415 COLL VENOUS BLD VENIPUNCTURE: CPT

## 2024-11-06 PROCEDURE — RXMED WILLOW AMBULATORY MEDICATION CHARGE

## 2024-11-06 PROCEDURE — 2500000004 HC RX 250 GENERAL PHARMACY W/ HCPCS (ALT 636 FOR OP/ED)

## 2024-11-06 PROCEDURE — G0378 HOSPITAL OBSERVATION PER HR: HCPCS

## 2024-11-06 PROCEDURE — 2500000001 HC RX 250 WO HCPCS SELF ADMINISTERED DRUGS (ALT 637 FOR MEDICARE OP): Performed by: NURSE PRACTITIONER

## 2024-11-06 PROCEDURE — 85027 COMPLETE CBC AUTOMATED: CPT

## 2024-11-06 PROCEDURE — 99239 HOSP IP/OBS DSCHRG MGMT >30: CPT

## 2024-11-06 PROCEDURE — 82947 ASSAY GLUCOSE BLOOD QUANT: CPT

## 2024-11-06 PROCEDURE — 86780 TREPONEMA PALLIDUM: CPT

## 2024-11-06 RX ORDER — CHLORHEXIDINE GLUCONATE 40 MG/ML
SOLUTION TOPICAL 2 TIMES DAILY
Qty: 946 ML | Refills: 3 | Status: SHIPPED | OUTPATIENT
Start: 2024-11-06

## 2024-11-06 RX ORDER — OXYCODONE AND ACETAMINOPHEN 10; 325 MG/1; MG/1
1 TABLET ORAL EVERY 4 HOURS PRN
Qty: 42 TABLET | Refills: 0 | Status: SHIPPED | OUTPATIENT
Start: 2024-11-06

## 2024-11-06 ASSESSMENT — PAIN - FUNCTIONAL ASSESSMENT
PAIN_FUNCTIONAL_ASSESSMENT: 0-10

## 2024-11-06 ASSESSMENT — PAIN DESCRIPTION - LOCATION
LOCATION: GROIN
LOCATION: ARM
LOCATION: GROIN

## 2024-11-06 ASSESSMENT — PAIN SCALES - GENERAL
PAINLEVEL_OUTOF10: 7
PAINLEVEL_OUTOF10: 7
PAINLEVEL_OUTOF10: 6
PAINLEVEL_OUTOF10: 8
PAINLEVEL_OUTOF10: 7
PAINLEVEL_OUTOF10: 7
PAINLEVEL_OUTOF10: 8
PAINLEVEL_OUTOF10: 7
PAINLEVEL_OUTOF10: 6
PAINLEVEL_OUTOF10: 6
PAINLEVEL_OUTOF10: 7

## 2024-11-06 ASSESSMENT — ENCOUNTER SYMPTOMS
CHILLS: 0
FEVER: 0

## 2024-11-06 ASSESSMENT — PAIN DESCRIPTION - ORIENTATION: ORIENTATION: RIGHT

## 2024-11-06 NOTE — CARE PLAN
The patient's goals for the shift include pain control      The clinical goals for the shift include adequate pain control, reassuring FHR

## 2024-11-06 NOTE — CONSULTS
MFM Consult    Reason for Consult: Hidradenitis Suppurativa in pregnancy    HPI:   Sandrita Adams is a 34 y.o. female with a past medical history of elliott stage 3 hidradenitis suppurativa and pregnancy at 25w4d by 8w4d US presenting for HS flare.   Patient recently admitted 10/27-10/31 for hidradenitis suppurativa flare.     During this pregnancy, patient has been admitted/evaulated 6 times. She has a longstanding (>10 year) hx of HS, which per chart review she has been on: adalimumab, infliximab, secukinumab apremilast, spirnolactone, doxycycline, bactrim, clindamycin, levaquin, rifampin, IV dalbavancin, IV vancomycin. She has trialed ILK injections without success. Prior to pregnancy, she was most recently on cosentyx which got her the best she has ever been but she stopped medication when becoming pregnant.     She was started on Cimzia, a TNF antagonist that does not cross the placenta, to avoid any effects on fetus even though it is not approved for HS. She reported she did not get good control on this medication and ultimately discontinued (last dose 10/6) as c/f headaches. While admitted, she was approved for IV ertapenem which can be used in a 12-16 week course to help with HS flaring, and received one dose that admission and 2 doses a subsequent admission with improvement in smell. However, patient did not think she could manage a PICC line due to social concerns at home, and refused outpatient IV abx.   She was agreeable to Humira, which she had been on previously, and while there are no formal trials in pregnancy there is substantial post market data supporting its use. She is on PO clindamycin with plans to initiate Humira and after pregnancy to restart cosentyx per Dermatology.    Pregnancy n/f  - HS  - Asthma on no meds  - AMA rrcfDNA  - IPA: Strangulation attempt on  with FOB    Obstetrical History   OB History          2    Para   0    Term   0       0    AB   1    Living              SAB   1    IAB   0    Ectopic   0    Multiple        Live Births                     Past Medical History  Past Medical History:   Diagnosis Date    Anemia 06/16/2024    Hidradenitis suppurativa 10/29/2020    Hidradenitis    Hidradenitis suppurativa     Lupus     UTI (urinary tract infection) during pregnancy, first trimester ([facility]) 07/16/2024    Vitamin D deficiency 06/16/2024        Past Surgical History   Past Surgical History:   Procedure Laterality Date    OTHER SURGICAL HISTORY  09/19/2022    Arm surgery    OTHER SURGICAL HISTORY Left 2011    Left wrist    OTHER SURGICAL HISTORY Left 2021    Left wrist procedure       Social History  Social History     Socioeconomic History    Marital status:      Spouse name: Not on file    Number of children: Not on file    Years of education: Not on file    Highest education level: Not on file   Occupational History    Occupation: Disabled   Tobacco Use    Smoking status: Former     Types: Cigarettes    Smokeless tobacco: Never   Vaping Use    Vaping status: Every Day   Substance and Sexual Activity    Alcohol use: Not Currently    Drug use: Never    Sexual activity: Yes     Partners: Male   Other Topics Concern    Not on file   Social History Narrative    Not on file     Allergies  Allergies   Allergen Reactions    Suboxone [Buprenorphine-Naloxone] Anaphylaxis, Hives and Itching    Clarithromycin Hives    Haloperidol Hallucinations and Psychosis    Levofloxacin Swelling     Ankle Joint/tendon pain    Metoclopramide Headache, Hallucinations and Psychosis    Reglan [Metoclopramide Hcl] Psychosis       Medications  Medications Prior to Admission   Medication Sig Dispense Refill Last Dose/Taking    acetaminophen (Tylenol) 325 mg tablet Take 2 tablets (650 mg) by mouth every 6 hours for 180 doses. (Patient not taking: Reported on 10/28/2024) 120 tablet 2     adalimumab (Humira,CF, Pen Crohns-UC-HS) 80 mg/0.8 mL pen injector kit pen-injector Inject 2 pens (160mg)  under the skin on day 0, then 1 pen (80mg) on day 15 3 each 0     adalimumab (Humira,CF, Pen) 80 mg/0.8 mL pen injector kit pen-injector Inject 1 Pen (80 mg) under the skin every 14 (fourteen) days. 2 each 5     albuterol 90 mcg/actuation inhaler Inhale 2 puffs every 6 hours if needed for wheezing. (Patient not taking: Reported on 10/28/2024) 18 g 1     aspirin 81 mg EC tablet Take 2 tablets (162 mg) by mouth once daily. 90 tablet 2     Boost 0.04 gram- 1 kcal/mL liquid Please dispense 14 bottles for patient to have BID for 7 days. 237 mL 3     chlorhexidine (Hibiclens) 4 % external liquid Apply topically 2 times a day. Bathe in Hibiclens twice daily 946 mL 3     clindamycin (Cleocin) 300 mg capsule Take 1 capsule (300 mg) by mouth every 12 hours. 60 capsule 0     famotidine (Pepcid) 20 mg tablet Take 1 tablet (20 mg) by mouth 2 times a day. 60 tablet 2     lidocaine (LMX) 4 % cream Apply topically 4 times a day as needed for mild pain (1 - 3). 60 g 2     magnesium oxide (Mag-Ox) 400 mg (241.3 mg magnesium) tablet Take 1 tablet (400 mg) by mouth 2 times a day. 60 tablet 11     naloxone (Narcan) 4 mg/0.1 mL nasal spray Administer 1 spray (4 mg) into affected nostril(s) if needed for opioid reversal or respiratory depression. May repeat every 2-3 minutes if needed, alternating nostrils, until medical assistance becomes available. 2 each 11     ondansetron ODT (Zofran-ODT) 4 mg disintegrating tablet Take 1 tablet (4 mg) by mouth every 8 hours if needed for nausea or vomiting. 60 tablet 3     oxyCODONE-acetaminophen (Percocet)  mg tablet Take 1 tablet by mouth every 4 hours if needed for moderate pain (4 - 6) or severe pain (7 - 10). 42 tablet 0     -iron fum-folic acid (Prenatal 19) 29 mg iron- 1 mg tablet Take 1 tablet by mouth once daily. 60 tablet 6     riboflavin (vitamin B2) 100 mg tablet tablet Take 1 tablet (100 mg) by mouth once daily. 60 tablet 2     SUMAtriptan (Imitrex) 50 mg tablet Take 1  "tablet (50 mg) by mouth every 6 hours if needed for migraine. May repeat dose once in 2 hours if no relief.  Do not exceed 2 doses in 24 hours. 20 tablet 0        OBJECTIVE:   /86   Pulse 92   Temp 36.9 °C (98.4 °F) (Temporal)   Resp 19   Ht 1.549 m (5' 1\")   Wt 83.5 kg (184 lb 0.3 oz)   LMP 2024   SpO2 97%   BMI 34.77 kg/m²    Temp  Min: 36.1 °C (97 °F)  Max: 37 °C (98.6 °F)  Pulse  Min: 82  Max: 100  BP  Min: 99/52  Max: 138/83    Physical exam:  General:  AAOx3, No acute distress  Cardiovascular: Warm and well perfused  Respiratory: Normal respiratory effort   Abdominal:  Soft, gravid, non-tender, no rebound or guarding, no palpable contractions   Back: No CVA tenderness  Extremities: Warm, well perfused,  edema, no calf tenderness     NST: Baseline 135, accelerations +, - decelerations, AGA  Muniz: quiet     Labs:   Lab Results   Component Value Date    WBC 8.7 2024    HGB 10.8 (L) 2024    HCT 31.6 (L) 2024     2024     No results found for: \"GLUCOSE\", \"NA\", \"K\", \"CL\", \"CO2\", \"ANIONGAP\", \"BUN\", \"CREATININE\", \"EGFR\", \"CALCIUM\", \"ALBUMIN\", \"PROT\", \"ALKPHOS\", \"ALT\", \"AST\", \"BILITOT\"    ASSESSMENT AND PLAN:     34 y.o.  at 25w4d by 8w4d US presenting with Hidradenitis Suppurativa flare.     Hydradenitis Suppurativa   - Current pain regimen: oxycodone-acetaminophen 10/325 q4hr PRN, Dilaudid 2 mg q2hr PRN  - Overnight patient asked for all her PRN Dilaudid doses for shooting pain in her groin, armpits and gluteal area.  - Derm recs: Stage 3 HS.Continue clindamycin until Humira is ready, continue Hibiclens, pain management  - Plastics recs: defer definitive surgery of axillary or groin until postpartum   - Extensive history of HS, s/p removal of axillary sweat glands in 2017, which did not significantly improve her pain. Patient previously followed with Dermatology, Pain Management, and Infectious Disease at [facility] and was previously well-controlled on Cosentix, " Percocet, Gabapentin, and Naproxen which allowed her to complete her activities of daily living, including being able to work.   - s/p admission 7/22-24, 10/28-31 for flare   - Patient didn't want to use  IV ertapenem in her last admission in the setting of she wouldn't be able to keep a PICC line. he was on Cimzia which she stopped using for concern for headaches. She is amenable to use Humira, but stated that she will only be able to get her medication after her discharge from hospital.  - Discuss with patient if her social situation would be suitable for PICC now.  - Discuss amount of opioid use in total in the setting of her chronic condition and Methadone use.    IPV  -Strangulation attempt on 8/8 with FOB. No police report filed. No restraining order   - Stayed in shelter in the interim.  -Pt now feeling safe at home, still in relationship with partner     Fetal status: Reassuring  - continue daily NSTs while inpatient   - Ultrasound: 10/30 EFW 44%, AC 33%, AFV Normal    Routine:  - GBS deferred  - TDAP not due yet  - Flu offered  - 1hr today  - BCM: will be discussed    Dispo: Inpatient management for HS flare    To be seen and discussed with ALEJO Attending, .     Shala Estrada MD PGY2  [facility]  Pager [phone]     Principal Problem:    Hidradenitis suppurativa of multiple sites

## 2024-11-06 NOTE — HOSPITAL COURSE
Patient presented to hospital as transfer from [facility] for HS flare. She has had multiple admissions this pregnancy for severe pain with flares in under arms, groin, and buttocks which has been difficult to manage with multiple trearments and pain medications. Treatment history as below:     Previous Treatment Summary:  1. Isotretinoin - helped acne but did not improve HS   2. Adalimumab - developed hives/bumps  3. Infliximab - missed 2 infusions due to personal family issues   4.  I&D  5. Excision  6. Apremilast - No improvement  7. Spironolactone - stopped due to higher potassium and tingling in hands/ arms. Note hx of PCOS and elevated testosterone.   8. Multiple antibiotics - Doxycycline, bactrim, clindamycin, levaquin, rifampin - none of these helped. Had previously been on antibiotics but they gave her frequent yeast infections.   9. ILK  10. Cimzia - headaches  11. IV ertapenem    She is currently planned to start Humira but has not been able to receive injections yet. Medications just approved at specialty pharmacy. Dermatology was consulted on admission and recommended continuation of clindamycin as well as initiation of Humira as planned (which could not be received inpatient). Patient's pain was controlled with IV pain medications and oxycodone, and she ultimately requested discharge on hospital day 2 with pain medications refilled. She plans to  Humira rx tomorrow and has close follow up scheduled with MAT.

## 2024-11-06 NOTE — CARE PLAN
The patient's goals for the shift include pain control, d/c    The clinical goals for the shift include pain control      Problem: Antepartum  Goal: Maintain pregnancy as long as maternal and/or fetal condition is stable  Outcome: Met  Goal: Avoid/minimize constipation  Outcome: Met  Goal: No decrease in circulation/VTE  Outcome: Met  Goal: FHR remains reassuring  Outcome: Met  Goal: Minimize anxiety/maximize coping  Outcome: Met     Problem: Pain - Adult  Goal: Verbalizes/displays adequate comfort level or baseline comfort level  Outcome: Met     Problem: Safety - Adult  Goal: Free from fall injury  Outcome: Met     Problem: Discharge Planning  Goal: Discharge to home or other facility with appropriate resources  Outcome: Met

## 2024-11-06 NOTE — DISCHARGE SUMMARY
Discharge Summary    Admission Date: 11/5/2024  Discharge Date: 11/6/24    Discharge Diagnosis  Hidradenitis suppurativa of multiple sites    Hospital Course  Patient presented to hospital as transfer from [facility] for HS flare. She has had multiple admissions this pregnancy for severe pain with flares in under arms, groin, and buttocks which has been difficult to manage with multiple trearments and pain medications. Treatment history as below:     Previous Treatment Summary:  1. Isotretinoin - helped acne but did not improve HS   2. Adalimumab - developed hives/bumps  3. Infliximab - missed 2 infusions due to personal family issues   4.  I&D  5. Excision  6. Apremilast - No improvement  7. Spironolactone - stopped due to higher potassium and tingling in hands/ arms. Note hx of PCOS and elevated testosterone.   8. Multiple antibiotics - Doxycycline, bactrim, clindamycin, levaquin, rifampin - none of these helped. Had previously been on antibiotics but they gave her frequent yeast infections.   9. ILK  10. Cimzia - headaches  11. IV ertapenem    She is currently planned to start Humira but has not been able to receive injections yet. Medications just approved at specialty pharmacy. Dermatology was consulted on admission and recommended continuation of clindamycin as well as initiation of Humira as planned (which could not be received inpatient). Patient's pain was controlled with IV pain medications and oxycodone, and she ultimately requested discharge on hospital day 2 with pain medications refilled. She plans to  Humira rx tomorrow and has close follow up scheduled with [facility].       Pertinent Physical Exam At Time of Discharge  General: Well appearing, alert  Skin: multiple thickened areas with HS lesions of varying chronicity, under arms, in inner thighs, groin, and gluteal cleft  HEENT: normocephalic, EOMI, clear sclera  Cardio: Warm and well perfused  Resp: breathing comfortably on room air  Abd:  gravid  Neuro: grossly intact, no focal deficits  Extremities: full ROM  Psych: A&O x3, appropriate mood and affect      Last Vitals:  Temp Pulse Resp BP MAP Pulse Ox   36.3 °C (97.3 °F) 93 17 118/77 91 98 %     Discharge Meds     Your medication list        CONTINUE taking these medications        Instructions Last Dose Given Next Dose Due   acetaminophen 325 mg tablet  Commonly known as: Tylenol      Take 2 tablets (650 mg) by mouth every 6 hours for 180 doses.       aspirin 81 mg EC tablet      Take 2 tablets (162 mg) by mouth once daily.       chlorhexidine 4 % external liquid  Commonly known as: Hibiclens      Apply topically 2 times a day. Bathe in Hibiclens twice daily       Boost 0.04 gram- 1 kcal/mL liquid  Generic drug: food supplemt, lactose-reduced      Please dispense 14 bottles for patient to have BID for 7 days.       clindamycin 300 mg capsule  Commonly known as: Cleocin      Take 1 capsule (300 mg) by mouth every 12 hours.       famotidine 20 mg tablet  Commonly known as: Pepcid      Take 1 tablet (20 mg) by mouth 2 times a day.       Humira(CF) Pen Crohns-UC-HS 80 mg/0.8 mL pen injector kit pen-injector  Generic drug: adalimumab      Inject 2 pens (160mg) under the skin on day 0, then 1 pen (80mg) on day 15       Humira(CF) Pen 80 mg/0.8 mL pen injector kit pen-injector  Generic drug: adalimumab      Inject 1 Pen (80 mg) under the skin every 14 (fourteen) days.       lidocaine 4 % cream  Commonly known as: LMX      Apply topically 4 times a day as needed for mild pain (1 - 3).       magnesium oxide 400 mg (241.3 mg magnesium) tablet  Commonly known as: Mag-Ox      Take 1 tablet (400 mg) by mouth 2 times a day.       Narcan 4 mg/actuation nasal spray  Generic drug: naloxone      Administer 1 spray (4 mg) into affected nostril(s) if needed for opioid reversal or respiratory depression. May repeat every 2-3 minutes if needed, alternating nostrils, until medical assistance becomes available.        ondansetron ODT 4 mg disintegrating tablet  Commonly known as: Zofran-ODT      Take 1 tablet (4 mg) by mouth every 8 hours if needed for nausea or vomiting.       oxyCODONE-acetaminophen  mg tablet  Commonly known as: Percocet      Take 1 tablet by mouth every 4 hours if needed for moderate pain (4 - 6) or severe pain (7 - 10).       Prenatal 19 29 mg iron- 1 mg tablet  Generic drug: -iron fum-folic acid      Take 1 tablet by mouth once daily.       SUMAtriptan 50 mg tablet  Commonly known as: Imitrex      Take 1 tablet (50 mg) by mouth every 6 hours if needed for migraine. May repeat dose once in 2 hours if no relief.  Do not exceed 2 doses in 24 hours.       Vitamin B-2 100 mg tablet tablet  Generic drug: riboflavin      Take 1 tablet (100 mg) by mouth once daily.              ASK your doctor about these medications        Instructions Last Dose Given Next Dose Due   albuterol 90 mcg/actuation inhaler      Inhale 2 puffs every 6 hours if needed for wheezing.                 Where to Get Your Medications        These medications were sent to [facility]  [address], [address] [address]      Hours: 8AM to 6PM Mon-Fri, 8AM to 4PM Sat, 9AM to 1PM Sun Phone: [phone]   chlorhexidine 4 % external liquid  oxyCODONE-acetaminophen  mg tablet          Complications Requiring Follow-Up  Hydradenitis Suppuritiva    Test Results Pending At Discharge  Pending Labs       No current pending labs.            Outpatient Follow-Up  Future Appointments   Date Time Provider Department [place]   11/7/2024  9:00 AM [id number]   11/13/2024 12:00 PM Ivan Castanon MD [id number] [place]   11/14/2024  9:00 AM Dharmesh Mccann MD [id number]   11/20/2024  9:00 AM Spenser Elizalde MD [id number]   11/21/2024  9:00 AM Sean Mariscal MD [id number]           Maggy Arango MD

## 2024-11-11 ENCOUNTER — HOSPITAL ENCOUNTER (OUTPATIENT)
Facility: HOSPITAL | Age: 34
Setting detail: OBSERVATION
Discharge: HOME | End: 2024-11-12
Attending: STUDENT IN AN ORGANIZED HEALTH CARE EDUCATION/TRAINING PROGRAM | Admitting: SPECIALIST
Payer: MEDICAID

## 2024-11-11 VITALS
HEART RATE: 92 BPM | SYSTOLIC BLOOD PRESSURE: 117 MMHG | HEIGHT: 61 IN | TEMPERATURE: 97.5 F | RESPIRATION RATE: 16 BRPM | BODY MASS INDEX: 34.07 KG/M2 | DIASTOLIC BLOOD PRESSURE: 66 MMHG | WEIGHT: 180.45 LBS | OXYGEN SATURATION: 100 %

## 2024-11-11 DIAGNOSIS — L73.2 HIDRADENITIS SUPPURATIVA OF MULTIPLE SITES: Primary | ICD-10-CM

## 2024-11-11 DIAGNOSIS — Z87.2 H/O HIDRADENITIS SUPPURATIVA: ICD-10-CM

## 2024-11-11 LAB
BASOPHILS # BLD AUTO: 0.02 X10*3/UL (ref 0–0.1)
BASOPHILS NFR BLD AUTO: 0.2 %
EOSINOPHIL # BLD AUTO: 0.06 X10*3/UL (ref 0–0.7)
EOSINOPHIL NFR BLD AUTO: 0.5 %
ERYTHROCYTE [DISTWIDTH] IN BLOOD BY AUTOMATED COUNT: 14.7 % (ref 11.5–14.5)
HCT VFR BLD AUTO: 37.7 % (ref 36–46)
HGB BLD-MCNC: 12.6 G/DL (ref 12–16)
IMM GRANULOCYTES # BLD AUTO: 0.16 X10*3/UL (ref 0–0.7)
IMM GRANULOCYTES NFR BLD AUTO: 1.4 % (ref 0–0.9)
LYMPHOCYTES # BLD AUTO: 2.42 X10*3/UL (ref 1.2–4.8)
LYMPHOCYTES NFR BLD AUTO: 21 %
MCH RBC QN AUTO: 25.8 PG (ref 26–34)
MCHC RBC AUTO-ENTMCNC: 33.4 G/DL (ref 32–36)
MCV RBC AUTO: 77 FL (ref 80–100)
MONOCYTES # BLD AUTO: 0.65 X10*3/UL (ref 0.1–1)
MONOCYTES NFR BLD AUTO: 5.6 %
NEUTROPHILS # BLD AUTO: 8.22 X10*3/UL (ref 1.2–7.7)
NEUTROPHILS NFR BLD AUTO: 71.3 %
NRBC BLD-RTO: 0 /100 WBCS (ref 0–0)
PLATELET # BLD AUTO: 349 X10*3/UL (ref 150–450)
RBC # BLD AUTO: 4.88 X10*6/UL (ref 4–5.2)
WBC # BLD AUTO: 11.5 X10*3/UL (ref 4.4–11.3)

## 2024-11-11 PROCEDURE — 2500000005 HC RX 250 GENERAL PHARMACY W/O HCPCS: Mod: SE

## 2024-11-11 PROCEDURE — 99254 IP/OBS CNSLTJ NEW/EST MOD 60: CPT | Performed by: STUDENT IN AN ORGANIZED HEALTH CARE EDUCATION/TRAINING PROGRAM

## 2024-11-11 PROCEDURE — 99213 OFFICE O/P EST LOW 20 MIN: CPT | Performed by: OBSTETRICS & GYNECOLOGY

## 2024-11-11 PROCEDURE — 99215 OFFICE O/P EST HI 40 MIN: CPT | Mod: 25,27

## 2024-11-11 PROCEDURE — 85025 COMPLETE CBC W/AUTO DIFF WBC: CPT | Performed by: OBSTETRICS & GYNECOLOGY

## 2024-11-11 PROCEDURE — 96375 TX/PRO/DX INJ NEW DRUG ADDON: CPT

## 2024-11-11 PROCEDURE — G0378 HOSPITAL OBSERVATION PER HR: HCPCS

## 2024-11-11 PROCEDURE — 96376 TX/PRO/DX INJ SAME DRUG ADON: CPT

## 2024-11-11 PROCEDURE — 2500000001 HC RX 250 WO HCPCS SELF ADMINISTERED DRUGS (ALT 637 FOR MEDICARE OP)

## 2024-11-11 PROCEDURE — 99213 OFFICE O/P EST LOW 20 MIN: CPT | Mod: 25

## 2024-11-11 PROCEDURE — 2500000004 HC RX 250 GENERAL PHARMACY W/ HCPCS (ALT 636 FOR OP/ED): Mod: SE

## 2024-11-11 PROCEDURE — 1220000001 HC OB SEMI-PRIVATE ROOM DAILY

## 2024-11-11 PROCEDURE — 99223 1ST HOSP IP/OBS HIGH 75: CPT

## 2024-11-11 PROCEDURE — 36415 COLL VENOUS BLD VENIPUNCTURE: CPT | Performed by: OBSTETRICS & GYNECOLOGY

## 2024-11-11 PROCEDURE — 2500000004 HC RX 250 GENERAL PHARMACY W/ HCPCS (ALT 636 FOR OP/ED): Performed by: OBSTETRICS & GYNECOLOGY

## 2024-11-11 PROCEDURE — 96374 THER/PROPH/DIAG INJ IV PUSH: CPT | Mod: 59

## 2024-11-11 PROCEDURE — 2500000001 HC RX 250 WO HCPCS SELF ADMINISTERED DRUGS (ALT 637 FOR MEDICARE OP): Mod: SE | Performed by: STUDENT IN AN ORGANIZED HEALTH CARE EDUCATION/TRAINING PROGRAM

## 2024-11-11 RX ORDER — OXYCODONE AND ACETAMINOPHEN 10; 325 MG/1; MG/1
1 TABLET ORAL EVERY 4 HOURS PRN
Status: DISCONTINUED | OUTPATIENT
Start: 2024-11-11 | End: 2024-11-11

## 2024-11-11 RX ORDER — ADHESIVE BANDAGE
10 BANDAGE TOPICAL
Status: DISCONTINUED | OUTPATIENT
Start: 2024-11-11 | End: 2024-11-12 | Stop reason: HOSPADM

## 2024-11-11 RX ORDER — ONDANSETRON HYDROCHLORIDE 2 MG/ML
4 INJECTION, SOLUTION INTRAVENOUS EVERY 6 HOURS PRN
Status: DISCONTINUED | OUTPATIENT
Start: 2024-11-11 | End: 2024-11-11 | Stop reason: HOSPADM

## 2024-11-11 RX ORDER — SIMETHICONE 80 MG
80 TABLET,CHEWABLE ORAL 4 TIMES DAILY PRN
Status: DISCONTINUED | OUTPATIENT
Start: 2024-11-11 | End: 2024-11-12 | Stop reason: HOSPADM

## 2024-11-11 RX ORDER — OXYCODONE HYDROCHLORIDE 5 MG/1
10 TABLET ORAL ONCE AS NEEDED
Status: DISCONTINUED | OUTPATIENT
Start: 2024-11-11 | End: 2024-11-11 | Stop reason: HOSPADM

## 2024-11-11 RX ORDER — SODIUM CHLORIDE 9 MG/ML
50 INJECTION, SOLUTION INTRAVENOUS CONTINUOUS
Status: DISCONTINUED | OUTPATIENT
Start: 2024-11-11 | End: 2024-11-11 | Stop reason: HOSPADM

## 2024-11-11 RX ORDER — ONDANSETRON HYDROCHLORIDE 2 MG/ML
4 INJECTION, SOLUTION INTRAVENOUS EVERY 6 HOURS PRN
Status: DISCONTINUED | OUTPATIENT
Start: 2024-11-11 | End: 2024-11-12 | Stop reason: HOSPADM

## 2024-11-11 RX ORDER — PREDNISONE 20 MG/1
20 TABLET ORAL DAILY
Status: DISCONTINUED | OUTPATIENT
Start: 2024-11-11 | End: 2024-11-12 | Stop reason: HOSPADM

## 2024-11-11 RX ORDER — LABETALOL HYDROCHLORIDE 5 MG/ML
20 INJECTION, SOLUTION INTRAVENOUS ONCE AS NEEDED
Status: DISCONTINUED | OUTPATIENT
Start: 2024-11-11 | End: 2024-11-11 | Stop reason: HOSPADM

## 2024-11-11 RX ORDER — HYDROMORPHONE HYDROCHLORIDE 1 MG/ML
2 INJECTION, SOLUTION INTRAMUSCULAR; INTRAVENOUS; SUBCUTANEOUS EVERY 2 HOUR PRN
Status: DISCONTINUED | OUTPATIENT
Start: 2024-11-11 | End: 2024-11-11 | Stop reason: HOSPADM

## 2024-11-11 RX ORDER — POLYETHYLENE GLYCOL 3350 17 G/17G
17 POWDER, FOR SOLUTION ORAL 2 TIMES DAILY PRN
Status: DISCONTINUED | OUTPATIENT
Start: 2024-11-11 | End: 2024-11-12 | Stop reason: HOSPADM

## 2024-11-11 RX ORDER — DIPHENHYDRAMINE HCL 25 MG
25 CAPSULE ORAL NIGHTLY PRN
Status: DISCONTINUED | OUTPATIENT
Start: 2024-11-11 | End: 2024-11-12 | Stop reason: HOSPADM

## 2024-11-11 RX ORDER — BISACODYL 10 MG/1
10 SUPPOSITORY RECTAL DAILY PRN
Status: DISCONTINUED | OUTPATIENT
Start: 2024-11-11 | End: 2024-11-12 | Stop reason: HOSPADM

## 2024-11-11 RX ORDER — HYDROMORPHONE HYDROCHLORIDE 1 MG/ML
2 INJECTION, SOLUTION INTRAMUSCULAR; INTRAVENOUS; SUBCUTANEOUS
Status: DISCONTINUED | OUTPATIENT
Start: 2024-11-11 | End: 2024-11-12

## 2024-11-11 RX ORDER — OXYCODONE HYDROCHLORIDE 5 MG/1
10 TABLET ORAL EVERY 4 HOURS PRN
Status: DISCONTINUED | OUTPATIENT
Start: 2024-11-11 | End: 2024-11-12

## 2024-11-11 RX ORDER — ONDANSETRON 4 MG/1
4 TABLET, FILM COATED ORAL EVERY 6 HOURS PRN
Status: DISCONTINUED | OUTPATIENT
Start: 2024-11-11 | End: 2024-11-12 | Stop reason: HOSPADM

## 2024-11-11 RX ORDER — ALBUTEROL SULFATE 90 UG/1
2 INHALANT RESPIRATORY (INHALATION) EVERY 6 HOURS PRN
Status: DISCONTINUED | OUTPATIENT
Start: 2024-11-11 | End: 2024-11-12 | Stop reason: HOSPADM

## 2024-11-11 RX ORDER — NIFEDIPINE 10 MG/1
10 CAPSULE ORAL ONCE AS NEEDED
Status: DISCONTINUED | OUTPATIENT
Start: 2024-11-11 | End: 2024-11-11 | Stop reason: HOSPADM

## 2024-11-11 RX ORDER — ACETAMINOPHEN 325 MG/1
325 TABLET ORAL EVERY 4 HOURS PRN
Status: DISCONTINUED | OUTPATIENT
Start: 2024-11-11 | End: 2024-11-12 | Stop reason: HOSPADM

## 2024-11-11 RX ORDER — ONDANSETRON 4 MG/1
4 TABLET, FILM COATED ORAL EVERY 6 HOURS PRN
Status: DISCONTINUED | OUTPATIENT
Start: 2024-11-11 | End: 2024-11-11 | Stop reason: HOSPADM

## 2024-11-11 RX ORDER — NIFEDIPINE 10 MG/1
10 CAPSULE ORAL ONCE AS NEEDED
Status: DISCONTINUED | OUTPATIENT
Start: 2024-11-11 | End: 2024-11-12 | Stop reason: HOSPADM

## 2024-11-11 RX ORDER — HYDRALAZINE HYDROCHLORIDE 20 MG/ML
5 INJECTION INTRAMUSCULAR; INTRAVENOUS ONCE AS NEEDED
Status: DISCONTINUED | OUTPATIENT
Start: 2024-11-11 | End: 2024-11-12 | Stop reason: HOSPADM

## 2024-11-11 RX ORDER — HYDRALAZINE HYDROCHLORIDE 20 MG/ML
5 INJECTION INTRAMUSCULAR; INTRAVENOUS ONCE AS NEEDED
Status: DISCONTINUED | OUTPATIENT
Start: 2024-11-11 | End: 2024-11-11 | Stop reason: HOSPADM

## 2024-11-11 RX ORDER — LIDOCAINE HYDROCHLORIDE 10 MG/ML
0.5 INJECTION, SOLUTION INFILTRATION; PERINEURAL ONCE AS NEEDED
Status: DISCONTINUED | OUTPATIENT
Start: 2024-11-11 | End: 2024-11-12 | Stop reason: HOSPADM

## 2024-11-11 RX ORDER — WATER
400 LIQUID (ML) MISCELLANEOUS
Status: DISCONTINUED | OUTPATIENT
Start: 2024-11-11 | End: 2024-11-12 | Stop reason: HOSPADM

## 2024-11-11 RX ORDER — OXYCODONE AND ACETAMINOPHEN 5; 325 MG/1; MG/1
1 TABLET ORAL EVERY 6 HOURS PRN
Status: CANCELLED | OUTPATIENT
Start: 2024-11-11

## 2024-11-11 RX ORDER — OXYCODONE AND ACETAMINOPHEN 5; 325 MG/1; MG/1
2 TABLET ORAL EVERY 4 HOURS PRN
Status: DISCONTINUED | OUTPATIENT
Start: 2024-11-11 | End: 2024-11-11

## 2024-11-11 RX ORDER — LANOLIN ALCOHOL/MO/W.PET/CERES
400 CREAM (GRAM) TOPICAL 2 TIMES DAILY
Status: DISCONTINUED | OUTPATIENT
Start: 2024-11-11 | End: 2024-11-12 | Stop reason: HOSPADM

## 2024-11-11 RX ORDER — OXYCODONE AND ACETAMINOPHEN 5; 325 MG/1; MG/1
1 TABLET ORAL ONCE
Status: COMPLETED | OUTPATIENT
Start: 2024-11-11 | End: 2024-11-11

## 2024-11-11 RX ORDER — CLINDAMYCIN HYDROCHLORIDE 300 MG/1
300 CAPSULE ORAL EVERY 12 HOURS
Status: DISCONTINUED | OUTPATIENT
Start: 2024-11-11 | End: 2024-11-12 | Stop reason: HOSPADM

## 2024-11-11 RX ORDER — FAMOTIDINE 20 MG/1
20 TABLET, FILM COATED ORAL 2 TIMES DAILY
Status: DISCONTINUED | OUTPATIENT
Start: 2024-11-11 | End: 2024-11-12 | Stop reason: HOSPADM

## 2024-11-11 RX ORDER — LIDOCAINE 40 MG/G
CREAM TOPICAL AS NEEDED
Status: DISCONTINUED | OUTPATIENT
Start: 2024-11-11 | End: 2024-11-12 | Stop reason: HOSPADM

## 2024-11-11 RX ORDER — LABETALOL HYDROCHLORIDE 5 MG/ML
20 INJECTION, SOLUTION INTRAVENOUS ONCE AS NEEDED
Status: DISCONTINUED | OUTPATIENT
Start: 2024-11-11 | End: 2024-11-12 | Stop reason: HOSPADM

## 2024-11-11 RX ORDER — LIDOCAINE HYDROCHLORIDE 10 MG/ML
0.5 INJECTION, SOLUTION EPIDURAL; INFILTRATION; INTRACAUDAL; PERINEURAL ONCE AS NEEDED
Status: DISCONTINUED | OUTPATIENT
Start: 2024-11-11 | End: 2024-11-11 | Stop reason: HOSPADM

## 2024-11-11 RX ADMIN — HYDROMORPHONE HYDROCHLORIDE 2 MG: 1 INJECTION, SOLUTION INTRAMUSCULAR; INTRAVENOUS; SUBCUTANEOUS at 13:01

## 2024-11-11 RX ADMIN — ONDANSETRON 4 MG: 2 INJECTION INTRAMUSCULAR; INTRAVENOUS at 13:03

## 2024-11-11 RX ADMIN — HYDROMORPHONE HYDROCHLORIDE 2 MG: 1 INJECTION, SOLUTION INTRAMUSCULAR; INTRAVENOUS; SUBCUTANEOUS at 14:53

## 2024-11-11 SDOH — SOCIAL STABILITY: SOCIAL INSECURITY: ABUSE SCREEN: ADULT

## 2024-11-11 SDOH — SOCIAL STABILITY: SOCIAL INSECURITY: HAVE YOU HAD ANY THOUGHTS OF HARMING ANYONE ELSE?: NO

## 2024-11-11 SDOH — SOCIAL STABILITY: SOCIAL INSECURITY: DOES ANYONE TRY TO KEEP YOU FROM HAVING/CONTACTING OTHER FRIENDS OR DOING THINGS OUTSIDE YOUR HOME?: NO

## 2024-11-11 SDOH — ECONOMIC STABILITY: HOUSING INSECURITY: DO YOU FEEL UNSAFE GOING BACK TO THE PLACE WHERE YOU ARE LIVING?: NO

## 2024-11-11 SDOH — SOCIAL STABILITY: SOCIAL INSECURITY: ARE YOU OR HAVE YOU BEEN THREATENED OR ABUSED PHYSICALLY, EMOTIONALLY, OR SEXUALLY BY ANYONE?: YES

## 2024-11-11 SDOH — HEALTH STABILITY: MENTAL HEALTH: WISH TO BE DEAD (PAST 1 MONTH): NO

## 2024-11-11 SDOH — HEALTH STABILITY: MENTAL HEALTH: NON-SPECIFIC ACTIVE SUICIDAL THOUGHTS (PAST 1 MONTH): NO

## 2024-11-11 SDOH — HEALTH STABILITY: MENTAL HEALTH: HAVE YOU USED ANY SUBSTANCES (CANABIS, COCAINE, HEROIN, HALLUCINOGENS, INHALANTS, ETC.) IN THE PAST 12 MONTHS?: NO

## 2024-11-11 SDOH — SOCIAL STABILITY: SOCIAL INSECURITY: PHYSICAL ABUSE: DENIES

## 2024-11-11 SDOH — HEALTH STABILITY: MENTAL HEALTH: SUICIDAL BEHAVIOR (LIFETIME): NO

## 2024-11-11 SDOH — SOCIAL STABILITY: SOCIAL INSECURITY: ARE THERE ANY APPARENT SIGNS OF INJURIES/BEHAVIORS THAT COULD BE RELATED TO ABUSE/NEGLECT?: NO

## 2024-11-11 SDOH — SOCIAL STABILITY: SOCIAL INSECURITY: HAVE YOU HAD THOUGHTS OF HARMING ANYONE ELSE?: YES

## 2024-11-11 SDOH — SOCIAL STABILITY: SOCIAL INSECURITY: VERBAL ABUSE: YES, PAST (COMMENT)

## 2024-11-11 SDOH — SOCIAL STABILITY: SOCIAL INSECURITY: HAS ANYONE EVER THREATENED TO HURT YOUR FAMILY OR YOUR PETS?: NO

## 2024-11-11 SDOH — HEALTH STABILITY: MENTAL HEALTH: HAVE YOU USED ANY PRESCRIPTION DRUGS OTHER THAN PRESCRIBED IN THE PAST 12 MONTHS?: NO

## 2024-11-11 SDOH — SOCIAL STABILITY: SOCIAL INSECURITY: ARE YOU OR HAVE YOU BEEN THREATENED OR ABUSED PHYSICALLY, EMOTIONALLY, OR SEXUALLY BY ANYONE?: NO

## 2024-11-11 SDOH — HEALTH STABILITY: MENTAL HEALTH: WERE YOU ABLE TO COMPLETE ALL THE BEHAVIORAL HEALTH SCREENINGS?: YES

## 2024-11-11 SDOH — SOCIAL STABILITY: SOCIAL INSECURITY: DO YOU FEEL ANYONE HAS EXPLOITED OR TAKEN ADVANTAGE OF YOU FINANCIALLY OR OF YOUR PERSONAL PROPERTY?: NO

## 2024-11-11 SDOH — SOCIAL STABILITY: SOCIAL INSECURITY: HAVE YOU HAD THOUGHTS OF HARMING ANYONE ELSE?: NO

## 2024-11-11 ASSESSMENT — PATIENT HEALTH QUESTIONNAIRE - PHQ9
SUM OF ALL RESPONSES TO PHQ9 QUESTIONS 1 & 2: 0
2. FEELING DOWN, DEPRESSED OR HOPELESS: NOT AT ALL
1. LITTLE INTEREST OR PLEASURE IN DOING THINGS: NOT AT ALL
2. FEELING DOWN, DEPRESSED OR HOPELESS: NOT AT ALL
SUM OF ALL RESPONSES TO PHQ9 QUESTIONS 1 & 2: 0
1. LITTLE INTEREST OR PLEASURE IN DOING THINGS: NOT AT ALL

## 2024-11-11 ASSESSMENT — LIFESTYLE VARIABLES
AUDIT-C TOTAL SCORE: 0
HOW MANY STANDARD DRINKS CONTAINING ALCOHOL DO YOU HAVE ON A TYPICAL DAY: PATIENT DOES NOT DRINK
SKIP TO QUESTIONS 9-10: 1
HOW OFTEN DO YOU HAVE 6 OR MORE DRINKS ON ONE OCCASION: NEVER
HOW OFTEN DO YOU HAVE 6 OR MORE DRINKS ON ONE OCCASION: NEVER
SKIP TO QUESTIONS 9-10: 1
AUDIT-C TOTAL SCORE: 0
AUDIT-C TOTAL SCORE: 0
HOW OFTEN DO YOU HAVE A DRINK CONTAINING ALCOHOL: NEVER
AUDIT-C TOTAL SCORE: 0
HOW OFTEN DO YOU HAVE A DRINK CONTAINING ALCOHOL: NEVER
HOW MANY STANDARD DRINKS CONTAINING ALCOHOL DO YOU HAVE ON A TYPICAL DAY: PATIENT DOES NOT DRINK

## 2024-11-11 ASSESSMENT — PAIN SCALES - GENERAL
PAINLEVEL_OUTOF10: 8
PAINLEVEL_OUTOF10: 4
PAINLEVEL_OUTOF10: 0 - NO PAIN
PAINLEVEL_OUTOF10: 8
PAINLEVEL_OUTOF10: 7
PAINLEVEL_OUTOF10: 8
PAINLEVEL_OUTOF10: 8
PAINLEVEL_OUTOF10: 7
PAINLEVEL_OUTOF10: 8
PAINLEVEL_OUTOF10: 8

## 2024-11-11 ASSESSMENT — PAIN DESCRIPTION - LOCATION: LOCATION: GROIN

## 2024-11-11 ASSESSMENT — PAIN DESCRIPTION - DESCRIPTORS: DESCRIPTORS: HEADACHE
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
## 2024-11-12 ENCOUNTER — PHARMACY VISIT (OUTPATIENT)
Dept: PHARMACY | Facility: CLINIC | Age: 34
End: 2024-11-12
Payer: MEDICAID

## 2024-11-12 VITALS
RESPIRATION RATE: 18 BRPM | SYSTOLIC BLOOD PRESSURE: 120 MMHG | TEMPERATURE: 97.5 F | OXYGEN SATURATION: 100 % | HEART RATE: 69 BPM | DIASTOLIC BLOOD PRESSURE: 74 MMHG

## 2024-11-12 PROCEDURE — 99254 IP/OBS CNSLTJ NEW/EST MOD 60: CPT

## 2024-11-12 PROCEDURE — RXMED WILLOW AMBULATORY MEDICATION CHARGE

## 2024-11-12 PROCEDURE — 2500000004 HC RX 250 GENERAL PHARMACY W/ HCPCS (ALT 636 FOR OP/ED)

## 2024-11-12 PROCEDURE — 2500000001 HC RX 250 WO HCPCS SELF ADMINISTERED DRUGS (ALT 637 FOR MEDICARE OP)

## 2024-11-12 PROCEDURE — G0378 HOSPITAL OBSERVATION PER HR: HCPCS

## 2024-11-12 PROCEDURE — 99233 SBSQ HOSP IP/OBS HIGH 50: CPT | Performed by: STUDENT IN AN ORGANIZED HEALTH CARE EDUCATION/TRAINING PROGRAM

## 2024-11-12 PROCEDURE — 99239 HOSP IP/OBS DSCHRG MGMT >30: CPT

## 2024-11-12 PROCEDURE — 96376 TX/PRO/DX INJ SAME DRUG ADON: CPT

## 2024-11-12 RX ORDER — HYDROMORPHONE HYDROCHLORIDE 2 MG/ML
2 INJECTION, SOLUTION INTRAMUSCULAR; INTRAVENOUS; SUBCUTANEOUS EVERY 4 HOURS PRN
Status: DISCONTINUED | OUTPATIENT
Start: 2024-11-12 | End: 2024-11-12 | Stop reason: HOSPADM

## 2024-11-12 RX ORDER — OXYCODONE AND ACETAMINOPHEN 5; 325 MG/1; MG/1
1 TABLET ORAL EVERY 6 HOURS PRN
Status: DISCONTINUED | OUTPATIENT
Start: 2024-11-12 | End: 2024-11-12

## 2024-11-12 RX ORDER — OXYCODONE AND ACETAMINOPHEN 5; 325 MG/1; MG/1
1 TABLET ORAL EVERY 6 HOURS PRN
Status: DISCONTINUED | OUTPATIENT
Start: 2024-11-12 | End: 2024-11-12 | Stop reason: HOSPADM

## 2024-11-12 RX ORDER — OXYCODONE AND ACETAMINOPHEN 10; 325 MG/1; MG/1
1 TABLET ORAL EVERY 6 HOURS PRN
Qty: 5 TABLET | Refills: 0 | Status: SHIPPED | OUTPATIENT
Start: 2024-11-12 | End: 2024-11-12

## 2024-11-12 RX ORDER — OXYCODONE AND ACETAMINOPHEN 10; 325 MG/1; MG/1
1 TABLET ORAL EVERY 6 HOURS PRN
Qty: 12 TABLET | Refills: 0 | Status: SHIPPED | OUTPATIENT
Start: 2024-11-12 | End: 2024-11-14 | Stop reason: SDUPTHER

## 2024-11-12 RX ORDER — LIDOCAINE 40 MG/G
CREAM TOPICAL AS NEEDED
Qty: 15 G | Refills: 0 | Status: SHIPPED | OUTPATIENT
Start: 2024-11-12

## 2024-11-12 RX ORDER — OXYCODONE AND ACETAMINOPHEN 5; 325 MG/1; MG/1
1 TABLET ORAL EVERY 6 HOURS PRN
Qty: 5 TABLET | Refills: 0 | Status: SHIPPED | OUTPATIENT
Start: 2024-11-12 | End: 2024-11-12 | Stop reason: HOSPADM

## 2024-11-12 ASSESSMENT — ENCOUNTER SYMPTOMS
DIARRHEA: 1
RECTAL PAIN: 1
FEVER: 1

## 2024-11-12 ASSESSMENT — PAIN DESCRIPTION - DESCRIPTORS
DESCRIPTORS: HEADACHE
DESCRIPTORS: SHOOTING

## 2024-11-12 ASSESSMENT — PAIN SCALES - GENERAL
PAINLEVEL_OUTOF10: 8
PAINLEVEL_OUTOF10: 7
PAINLEVEL_OUTOF10: 3
PAINLEVEL_OUTOF10: 9
PAINLEVEL_OUTOF10: 9

## 2024-11-12 ASSESSMENT — PAIN DESCRIPTION - LOCATION
LOCATION: BUTTOCKS
LOCATION: LEG

## 2024-11-12 ASSESSMENT — PAIN - FUNCTIONAL ASSESSMENT
PAIN_FUNCTIONAL_ASSESSMENT: 0-10
PAIN_FUNCTIONAL_ASSESSMENT: 0-10

## 2024-11-12 NOTE — CONSULTS
"Inpatient consult to Dermatology  Consult performed by: Angi Garcia DO  Consult ordered by: Alonso Lockwood MD      DERMATOLOGY DEPARTMENT CONSULTATION NOTE  Name: Sandrita Adams  MRN: [id number]  : 1990    Reason for consultation: HS flare in pregnancy    History of Present Illness  Sandrita Adams is a 34 y.o. female with a past medical history of hidradenitis suppurativa (HS, Rand Stage 3) and intrauterine pregnancy (currently at 26w3d) presented as a transfer from [facility] to [facility] on 2024 with concern for uncontrolled pain in the setting of an HS flare. Dermatology was consulted for HS flare in pregnancy.     Patient has longstanding history of hidradenitis suppurativa. She reports developing her first \"boil\" at 7 years of age and receiving official diagnosis in . She has previously been treated with: certolizumab, IV ertapenem, adalimumab, infliximab, secukinumab apremilast, spironolactone, doxycycline, bactrim, clindamycin, levaquin, rifampin, IV dalbavancin, and IV vancomycin. She has also trialed ILK injections without success. Prior to pregnancy, patient was on cosentyx which she reports has been the most beneficial treatment. Patient discontinued cosentyx in  after finding out she was pregnant.       During this pregnancy, patient has been admitted/evaluated 7 times by dermatology. Patient was previously started on certolizumab (Cimzia). Patient reports she did not notice significant improvement on certolizumab and discontinued due to headaches (last dose 10/6). During previous admissions, patient also received IV ertapenem (received 5 doses inpatient). However, patient stated she was unable to manage a PICC line at home due to her current housing situation and refused outpatient IV antibiotics.      Patient was most recently seen 24 (admitted -). At discharge, she was continued on PO clindamycin, topicals (hibiclens) and was planned to initiate Humira. "      Today, patient reports that she is experiencing another HS flare. Patient reports painful lesions in her bilateral axillae and new lesions on her bilateral thighs, buttocks, and perianal region.  The pain is very severe. She states current pain regimen is not sufficient to control her pain. She has difficulty  defecating due to the pain. She has been using hibiclens daily and taking PO clindamycin 300mg BID as prescribed. She started Humira last week (11/7/24).       Previous Treatment Summary:  1. Isotretinoin - helped acne but did not improve HS   2. Adalimumab - developed hives/bumps  3. Infliximab - missed 2 infusions due to personal family issues   4.  I&D  5. Excision  6. Apremilast - No improvement  7. Spironolactone - stopped due to higher potassium and tingling in hands/ arms. Note hx of PCOS and elevated testosterone.   8. Multiple antibiotics - Doxycycline, bactrim, clindamycin, levaquin, rifampin - none of these helped. Had previously been on antibiotics but they gave her frequent yeast infections.   9. ILK  10. Cimzia - discontinued 2/2 headaches  11. IV ertapenem - received 5 doses inpatient, discontinued due to current living situation/inability to maintain PICC line       Review of Systems  Review of Systems   Constitutional:  Positive for fever.   Gastrointestinal:  Positive for diarrhea and rectal pain.        Past Medical History  Past Medical History:   Diagnosis Date    Anemia 06/16/2024    Hidradenitis suppurativa 10/29/2020    Hidradenitis    Hidradenitis suppurativa     Lupus     UTI (urinary tract infection) during pregnancy, first trimester ([facility]) 07/16/2024    Vitamin D deficiency 06/16/2024       Past Surgical History   has a past surgical history that includes Other surgical history (09/19/2022); Other surgical history (Left, 2011); and Other surgical history (Left, 2021).     Allergies  Allergies   Allergen Reactions    Suboxone [Buprenorphine-Naloxone] Anaphylaxis, Hives and  Itching    Clarithromycin Hives    Haloperidol Hallucinations and Psychosis    Levofloxacin Swelling     Ankle Joint/tendon pain    Metoclopramide Headache, Hallucinations and Psychosis    Reglan [Metoclopramide Hcl] Psychosis       Medications  Scheduled Meds: clindamycin, 300 mg, oral, q12h  famotidine, 20 mg, oral, BID  magnesium oxide, 400 mg, oral, BID  oral hydration, 400 mL, oral, q4h ANDREA  predniSONE, 20 mg, oral, Daily  prenatal vitamin (iron-folic), 1 tablet, oral, Daily       Continuous Infusions:     PRN Meds: PRN medications: acetaminophen, albuterol, bisacodyl, diphenhydrAMINE, hydrALAZINE, HYDROmorphone, labetaloL, lidocaine, lidocaine, magnesium hydroxide, NIFEdipine, ondansetron **OR** ondansetron, oxyCODONE, polyethylene glycol, psyllium, simethicone     Family History  No family history on file.    Social History   reports that she has quit smoking. Her smoking use included cigarettes. She has never used smokeless tobacco. She reports that she does not currently use alcohol. She reports that she does not use drugs.     Objective    Vitals:    11/11/24 2233 11/11/24 2234 11/11/24 2242 11/12/24 0448   BP:  122/63 132/82 100/65   BP Location:    Right arm   Patient Position:    Lying   Pulse:  93 89 81   Resp:  18 18 18   Temp: 36.5 °C (97.7 °F)  36.4 °C (97.5 °F) 36 °C (96.8 °F)   TempSrc: Temporal  Temporal Temporal   SpO2:  100% 99% 99%        Exam    GEN: no acute distress  NEURO: moving all extremities   EYES: conjunctiva and eyelids normal. No conjunctival injection or erosions appreciated  ENT:   - Lips: normal  NECK: normal and symmetric.   CV: no varicosities, warmth or tenderness of extremities.  GI: Distended abdomen 2/2 pregnancy  EXTREMITIES: no distal digital clubbing, cyanosis, petechiae   SKIN: A focused skin exam including scalp, face, eyes, ears, neck, trunk, bilateral upper & lower extremities were examined with the following findings:  -On the bilateral axillae, there are several  hyperpigmented nodules, sinus tracts, and atrophic plaques. Two nodules are tender and fluctuant.   -On the gluteal fold, infrabdominal fold, inguinal folds, and labia majora there are hyperpigmented tender nodules, hyperpigmented patches, and atrophic plaques. Some are tender and fluctuant.   -On the right axilla with a tender pink papule c/w granulation tissue       Laboratory and Data  Results for orders placed or performed during the hospital encounter of 11/11/24 (from the past 24 hours)   CBC and Auto Differential   Result Value Ref Range    WBC 11.5 (H) 4.4 - 11.3 x10*3/uL    nRBC 0.0 0.0 - 0.0 /100 WBCs    RBC 4.88 4.00 - 5.20 x10*6/uL    Hemoglobin 12.6 12.0 - 16.0 g/dL    Hematocrit 37.7 36.0 - 46.0 %    MCV 77 (L) 80 - 100 fL    MCH 25.8 (L) 26.0 - 34.0 pg    MCHC 33.4 32.0 - 36.0 g/dL    RDW 14.7 (H) 11.5 - 14.5 %    Platelets 349 150 - 450 x10*3/uL    Neutrophils % 71.3 40.0 - 80.0 %    Immature Granulocytes %, Automated 1.4 (H) 0.0 - 0.9 %    Lymphocytes % 21.0 13.0 - 44.0 %    Monocytes % 5.6 2.0 - 10.0 %    Eosinophils % 0.5 0.0 - 6.0 %    Basophils % 0.2 0.0 - 2.0 %    Neutrophils Absolute 8.22 (H) 1.20 - 7.70 x10*3/uL    Immature Granulocytes Absolute, Automated 0.16 0.00 - 0.70 x10*3/uL    Lymphocytes Absolute 2.42 1.20 - 4.80 x10*3/uL    Monocytes Absolute 0.65 0.10 - 1.00 x10*3/uL    Eosinophils Absolute 0.06 0.00 - 0.70 x10*3/uL    Basophils Absolute 0.02 0.00 - 0.10 x10*3/uL        Assessment/Plan   Sandrita Adams is a 34 y.o. female with a past medical history of hidradenitis suppurativa (HS, Rand Stage 3) and intrauterine pregnancy (currently at 26w3d) presented as a transfer from [facility] to [facility] on 11/11/2024 with concern for uncontrolled pain in the setting of an HS flare. Dermatology was consulted for HS flare in pregnancy.     Differential diagnoses: Hidradenitis suppurativa, Rand stage 3 in pregnancy with uncontrolled pain    Impression:      Sandrita has a history of stage 3  hidradenitis suppurativa and is currently pregnant (26w3d). She has previously been treated with multiple therapies: certolizumab, IV ertapenem, adalimumab, infliximab, secukinumab apremilast, spironolactone, doxycycline, bactrim, clindamycin, levaquin, rifampin, IV dalbavancin, and IV vancomycin. She has also trialed ILK injections without success.      Prior to pregnancy, patient was on cosentyx. She reports the most significant improvement while on cosentyx, however it was discontinued in June after finding out she was pregnant given lack of safety data.      During this pregnancy, patient has been admitted/evaluated 7 times by dermatology. During previous admissions, patient was started on certolizumab (Cimzia). Patient reports she did not notice significant improvement on certolizumab and discontinued due to headaches (last dose 10/6). During previous admissions, patient also received 5 total doses of IV ertapenem. However, patient stated she was unable to manage a PICC line at home due to her current housing situation and refused outpatient IV antibiotics.      Patient was last seen 11/5/24. Plan was to continue PO clindamycin, topicals hibiclens, and start Humira.  Patient s/p first injection of Humira last week (11/7/24). Counseled patient that it may take up to 12 weeks to see benefits of Humira. Overall, patient reports that her pain is not currently managed with her current pain regimen.     Recommendations:  -Recommend consultation to MFM/pain management to discuss additional pain control options for patient's hidradenitis suppurativa during pregnancy.   -Discussed additional treatment options including ILK versus PO prednisone. Patient agreeable to PO prednisone.   -Start PO prednisone 20mg x 7 days.   -Patient initiated Humira. First injection 11/7/24. Counseled patient that it may take up to 12 weeks to see benefits of Humira.   -Continue clindamycin 300mg PO BID.   -Continue topical hibiclens daily.    -If continues to flare despite Humira, can reconsider IV ertapenem if patient housing situation stabilizes to the point she could maintain PICC.  -Patient has outpatient follow up with Dr. Castanon 11/13/24 (VV) and Dr. Elizalde 11/20/24 (in person). Patient prefers to keep in person appointment with Dr. Elizalde. Will contact scheduling to cancel 11/13/24 appointment.     The patient was discussed with attending physician Dr. Castanon. The assessment and plan was communicated to the care team.    Thank you for the consultation and for the opportunity to contribute to the care of this patient.      Angi Garcia,    PGY3, Dermatology  Epic chat (preferred)  Team pager [phone]     I saw and evaluated the patient. I personally obtained the key and critical portions of the history and physical exam or was physically present for key and critical portions performed by the resident. I reviewed the resident's documentation and discussed the patient with the resident. I agree with the resident's medical decision making as documented in the note.    Ivan Castanon MD
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
## 2024-11-12 NOTE — PROGRESS NOTES
Social Work Note    Patient: Sandrita HansenWhorter    No new needs noted at this time. SW remains available, please page or message if needs noted.     MAKAYLA Mcdermott

## 2024-11-12 NOTE — DISCHARGE SUMMARY
Discharge Summary    Admission Date: 2024  Discharge Date: 2024    Discharge Diagnosis  Hidradenitis suppurativa    Hospital Course  34 y.o.  at 26w4d by 8wk US presenting for pain and exacerbation of HS symptoms in the setting of known history of HS.   She is currently on Humira, which she started , however, she started to have  diarrhea which then exacerbated her rectal lesions.   Patient admitted for pain management. (oxycodone-acetaminophen 10/325 q4hr PRN, Dilaudid 2mg q3hrs PRN Breakthrough ) She was started on  Prednisone 20mg daily x7 days per Dermatology recs.   Patient was discharged on 2024 in stable position with the plan to see [facility] on 2024.    Pertinent Physical Exam At Time of Discharge  General:  AAOx3, No acute distress  Cardiovascular: Warm and well perfused  Respiratory: Normal respiratory effort   Abdominal:  Soft, gravid, non-tender, no rebound or guarding, no palpable contractions   Back: No CVA tenderness  Extremities: Warm, well perfused, no peripheral edema, no calf tenderness   Pelvic: deferred     NST: Baseline 150, accelerations, no decelerations, mod variability   [place]: none     Last Vitals:  Temp Pulse Resp BP MAP Pulse Ox   36.4 °C (97.5 °F) 69 18 120/74 89 100 %     Discharge Meds     Your medication list        CHANGE how you take these medications        Instructions Last Dose Given Next Dose Due   lidocaine 4 % cream  Commonly known as: LMX  What changed: Another medication with the same name was added. Make sure you understand how and when to take each.      Apply topically 4 times a day as needed for mild pain (1 - 3).       lidocaine 4 % cream  Commonly known as: LMX  What changed: You were already taking a medication with the same name, and this prescription was added. Make sure you understand how and when to take each.      Apply topically if needed for moderate pain (4 - 6).       oxyCODONE-acetaminophen  mg tablet  Commonly known as:  Percocet  What changed: when to take this      Take 1 tablet by mouth every 6 hours if needed for moderate pain (4 - 6) or severe pain (7 - 10).              CONTINUE taking these medications        Instructions Last Dose Given Next Dose Due   acetaminophen 325 mg tablet  Commonly known as: Tylenol      Take 2 tablets (650 mg) by mouth every 6 hours for 180 doses.       albuterol 90 mcg/actuation inhaler      Inhale 2 puffs every 6 hours if needed for wheezing.       aspirin 81 mg EC tablet      Take 2 tablets (162 mg) by mouth once daily.       Boost 0.04 gram- 1 kcal/mL liquid  Generic drug: food supplemt, lactose-reduced      Please dispense 14 bottles for patient to have BID for 7 days.       chlorhexidine 4 % external liquid  Commonly known as: Hibiclens      Apply topically 2 times a day. Bathe in Hibiclens twice daily       clindamycin 300 mg capsule  Commonly known as: Cleocin      Take 1 capsule (300 mg) by mouth every 12 hours.       famotidine 20 mg tablet  Commonly known as: Pepcid      Take 1 tablet (20 mg) by mouth 2 times a day.       Humira(CF) Pen Crohns-UC-HS 80 mg/0.8 mL pen injector kit pen-injector  Generic drug: adalimumab      Inject 2 pens (160mg) under the skin on day 0, then 1 pen (80mg) on day 15       Humira(CF) Pen 80 mg/0.8 mL pen injector kit pen-injector  Generic drug: adalimumab      Inject 1 Pen (80 mg) under the skin every 14 (fourteen) days.       magnesium oxide 400 mg (241.3 mg magnesium) tablet  Commonly known as: Mag-Ox      Take 1 tablet (400 mg) by mouth 2 times a day.       Narcan 4 mg/actuation nasal spray  Generic drug: naloxone      Administer 1 spray (4 mg) into affected nostril(s) if needed for opioid reversal or respiratory depression. May repeat every 2-3 minutes if needed, alternating nostrils, until medical assistance becomes available.       ondansetron ODT 4 mg disintegrating tablet  Commonly known as: Zofran-ODT      Take 1 tablet (4 mg) by mouth every 8 hours  if needed for nausea or vomiting.       Prenatal 19 29 mg iron- 1 mg tablet  Generic drug: -iron fum-folic acid      Take 1 tablet by mouth once daily.       SUMAtriptan 50 mg tablet  Commonly known as: Imitrex      Take 1 tablet (50 mg) by mouth every 6 hours if needed for migraine. May repeat dose once in 2 hours if no relief.  Do not exceed 2 doses in 24 hours.       Vitamin B-2 100 mg tablet tablet  Generic drug: riboflavin      Take 1 tablet (100 mg) by mouth once daily.                 Where to Get Your Medications        These medications were sent to [facility]  [address], [address] [address]      Hours: 8AM to 6PM Mon-Fri, 8AM to 4PM Sat, 9AM to 1PM Sun Phone: [phone]   lidocaine 4 % cream  oxyCODONE-acetaminophen  mg tablet          Complications Requiring Follow-Up  None    Test Results Pending At Discharge  Pending Labs       No current pending labs.            Outpatient Follow-Up  Future Appointments   Date Time Provider Department [place]   11/13/2024 12:00 PM Ivan Castanon MD [id number] [place]   11/14/2024  9:00 AM Leila Beatty MD [id number] [facility]   11/20/2024  9:00 AM Spenser Elizalde MD [id number] [facility]   11/21/2024  9:00 AM Sean Mariscal MD [id number] [facility]   12/5/2024  9:00 AM Leila Beatty MD [id number] [facility]   12/12/2024 10:00 AM Sean Mariscal MD [id number] [facility]   12/26/2024  9:00 AM Dharmesh Mccann MD [id number] [facility]   1/6/2025 10:15 AM Manjula Mabry V, APRN-CNM, APRN-CNP [id number] [facility]         Seen and discussed with Dr. Fabián Estrada MD PGY2

## 2024-11-13 ENCOUNTER — APPOINTMENT (OUTPATIENT)
Dept: DERMATOLOGY | Facility: CLINIC | Age: 34
End: 2024-11-13
Payer: MEDICAID

## 2024-11-13 NOTE — PROGRESS NOTES
DERMATOLOGY CONSULT PROGRESS NOTE  Name: Sandrita Adams  MRN: [id number]  : 1990    Subjective    No acute events overnight. Patient reports continued pain. Pain is exacerbated with defecation.      Objective    Vitals:    24 2234 24 2242 24 0448 24 0807   BP: 122/63 132/82 100/65 120/74   BP Location:   Right arm    Patient Position:   Lying    Pulse: 93 89 81 69   Resp: 18 18 18 18   Temp:  36.4 °C (97.5 °F) 36 °C (96.8 °F) 36.4 °C (97.5 °F)   TempSrc:  Temporal Temporal Temporal   SpO2: 100% 99% 99% 100%      Exam    GEN: no acute distress  NEURO: moving all extremities   EYES: conjunctiva and eyelids normal. No conjunctival injection or erosions appreciated  ENT:   - Lips: normal  NECK: normal and symmetric.   CV: no varicosities, warmth or tenderness of extremities.  GI: Distended abdomen 2/2 pregnancy  EXTREMITIES: no distal digital clubbing, cyanosis, petechiae   SKIN: A focused skin exam including scalp, face, eyes, ears, neck, trunk, bilateral upper & lower extremities were examined with the following findings:  -On the bilateral axillae, there are several hyperpigmented nodules, sinus tracts, and atrophic plaques. Two nodules are tender and fluctuant.   -On the gluteal fold, infrabdominal fold, inguinal folds, and labia majora there are hyperpigmented tender nodules, hyperpigmented patches, and atrophic plaques. Some are tender and fluctuant.   -On the right axilla with a tender pink papule c/w granulation tissue       Medications:  Scheduled Meds:    Continuous Infusions:    PRN Meds:      Results:  No results found. However, due to the size of the patient record, not all encounters were searched. Please check Results Review for a complete set of results.     Assessment/Plan   Sandrita Adams is a 34 y.o. female with a past medical history of hidradenitis suppurativa (HS, Rand Stage 3) and intrauterine pregnancy (currently at 26w3d) presented as a transfer from [facility]  ED to [facility] on 11/11/2024 with concern for uncontrolled pain in the setting of an HS flare. Dermatology was consulted for HS flare in pregnancy.     Differential diagnoses: Hidradenitis suppurativa, Rand stage 3 in pregnancy with uncontrolled pain     Impression:   Sandrita has a history of stage 3 hidradenitis suppurativa and is currently pregnant (26w3d). She has previously been treated with multiple therapies: certolizumab, IV ertapenem, adalimumab, infliximab, secukinumab apremilast, spironolactone, doxycycline, bactrim, clindamycin, levaquin, rifampin, IV dalbavancin, and IV vancomycin. She has also trialed ILK injections without success.      Prior to pregnancy, patient was on cosentyx. She reports the most significant improvement while on cosentyx, however it was discontinued in June after finding out she was pregnant given lack of safety data.      During this pregnancy, patient has been admitted/evaluated 7 times by dermatology. During previous admissions, patient was started on certolizumab (Cimzia). Patient reports she did not notice significant improvement on certolizumab and discontinued due to headaches (last dose 10/6). During previous admissions, patient also received 5 total doses of IV ertapenem. However, patient stated she was unable to manage a PICC line at home due to her current housing situation and refused outpatient IV antibiotics.      Patient was last seen 11/5/24. Plan was to continue PO clindamycin, topicals hibiclens, and start Humira.  Patient s/p first injection of Humira last week (11/7/24). Counseled patient that it may take up to 12 weeks to see benefits of Humira. Overall, patient reports that her pain is not currently managed with her current pain regimen.      Recommendations:  -Recommend consultation to MFM/pain management to discuss additional pain control options for patient's hidradenitis suppurativa during pregnancy.   -Discussed additional treatment options including ILK  versus PO prednisone. Patient agreeable to PO prednisone.   -Start PO prednisone 20mg x 7 days. Will send additional refills for patient to use during acute flares.   -Patient initiated Humira. First injection 11/7/24. Counseled patient that it may take up to 12 weeks to see benefits of Humira.   -Continue clindamycin 300mg PO BID.   -Continue topical hibiclens daily.   -If continues to flare despite Humira, can reconsider IV ertapenem if patient housing situation stabilizes to the point she could maintain PICC.  -Patient has outpatient follow up with Dr. Castanon 11/13/24 (VV) and Dr. Elizalde 11/20/24 (in person). Patient prefers to keep in person appointment with Dr. Elizalde. Will contact scheduling to cancel 11/13/24 appointment.     The patient was seen and discussed with attending physician Dr. Castanon. The assessment and plan was communicated to the care team.    Thank you for the consultation and for the opportunity to contribute to the care of this patient.      Angi Garcia DO   PGY3, Dermatology  Epic chat (preferred)  Team pager [phone]     I saw and evaluated the patient. I personally obtained the key and critical portions of the history and physical exam or was physically present for key and critical portions performed by the resident. I reviewed the resident's documentation and discussed the patient with the resident. I agree with the resident's medical decision making as documented in the note.    Ivan Castanon MD

## 2024-11-14 ENCOUNTER — PHARMACY VISIT (OUTPATIENT)
Dept: PHARMACY | Facility: CLINIC | Age: 34
End: 2024-11-14
Payer: COMMERCIAL

## 2024-11-14 ENCOUNTER — ROUTINE PRENATAL (OUTPATIENT)
Dept: MATERNAL FETAL MEDICINE | Facility: CLINIC | Age: 34
End: 2024-11-14
Payer: MEDICAID

## 2024-11-14 VITALS
HEART RATE: 80 BPM | BODY MASS INDEX: 34.94 KG/M2 | SYSTOLIC BLOOD PRESSURE: 116 MMHG | DIASTOLIC BLOOD PRESSURE: 74 MMHG | WEIGHT: 184.9 LBS

## 2024-11-14 DIAGNOSIS — Z3A.25 25 WEEKS GESTATION OF PREGNANCY (HHS-HCC): ICD-10-CM

## 2024-11-14 DIAGNOSIS — L73.2 HIDRADENITIS SUPPURATIVA: ICD-10-CM

## 2024-11-14 DIAGNOSIS — Z87.2 H/O HIDRADENITIS SUPPURATIVA: ICD-10-CM

## 2024-11-14 DIAGNOSIS — Z3A.26 26 WEEKS GESTATION OF PREGNANCY (HHS-HCC): Primary | ICD-10-CM

## 2024-11-14 PROCEDURE — 99213 OFFICE O/P EST LOW 20 MIN: CPT | Mod: GC | Performed by: STUDENT IN AN ORGANIZED HEALTH CARE EDUCATION/TRAINING PROGRAM

## 2024-11-14 PROCEDURE — RXMED WILLOW AMBULATORY MEDICATION CHARGE

## 2024-11-14 PROCEDURE — 99213 OFFICE O/P EST LOW 20 MIN: CPT | Performed by: STUDENT IN AN ORGANIZED HEALTH CARE EDUCATION/TRAINING PROGRAM

## 2024-11-14 RX ORDER — OXYCODONE AND ACETAMINOPHEN 10; 325 MG/1; MG/1
1 TABLET ORAL EVERY 4 HOURS PRN
Qty: 42 TABLET | Refills: 0 | Status: SHIPPED | OUTPATIENT
Start: 2024-11-14

## 2024-11-14 ASSESSMENT — ENCOUNTER SYMPTOMS
NEUROLOGICAL NEGATIVE: 0
ENDOCRINE NEGATIVE: 0
MUSCULOSKELETAL NEGATIVE: 0
CARDIOVASCULAR NEGATIVE: 0
EYES NEGATIVE: 0
CONSTITUTIONAL NEGATIVE: 0
RESPIRATORY NEGATIVE: 0
ALLERGIC/IMMUNOLOGIC NEGATIVE: 0
HEMATOLOGIC/LYMPHATIC NEGATIVE: 0
GASTROINTESTINAL NEGATIVE: 0
PSYCHIATRIC NEGATIVE: 0

## 2024-11-14 ASSESSMENT — PAIN SCALES - GENERAL: PAINLEVEL_OUTOF10: 9

## 2024-11-14 ASSESSMENT — PAIN - FUNCTIONAL ASSESSMENT: PAIN_FUNCTIONAL_ASSESSMENT: 0-10

## 2024-11-14 NOTE — PROGRESS NOTES
MFM Follow-up  2024         SUBJECTIVE    HPI: Sandrita Adams is a 34 y.o.  at 26w6d here for RPNV. Denies contractions, bleeding, or LOF. Reports normal fetal movement.     Brief admission for diarrhea s/o Humira ( dose) for exacerbation of pain in the setting of diarrhea after starting humira. Was started on Prednisone 20mg x7ds per derm.     Today she reports diarrhea is resolved. She continues to have pain from flairs but it is managed with percocet. Received 2 days of percocet on discharge, requesting refill.  OARRS reviewed and demonstrates appropriate usage    Asking about 1hr OGT today. Has normal result (92) from recent inpatient admission, but she is certain she did not actually drink glucose load.    OBJECTIVE    Visit Vitals  /74   Pulse 80   Wt 83.9 kg (184 lb 14.4 oz)   LMP 2024   BMI 34.94 kg/m²   OB Status Pregnant   Smoking Status Former   BSA 1.9 m²      Gen: NAD  Pulm: normal respiratory effort  CV: regular rate, well perfused  Abd: soft, gravid, nontender  Ext: no edema    FHT: 157    ASSESSMENT & PLAN    Sandrita Adams is a 34 y.o.  at 26w6d here for the following concerns we addressed today:    Assessment & Plan  25 weeks gestation of pregnancy ([facility])  - recommend repeat 1hr OGT as patient reports glucose load was not actually received prior to blood draw  -otherwise up to date on 3rd trimester labs  Orders:    Glucose, 1 Hour Screen, Pregnancy; Future    Hidradenitis suppurativa  - Continue Humira injections  - Percocet 1 week supply refilled, OARRS reviewed with appropriate use  - continue prednisone course  - has derm apointment          RTC in 1 week    Patient seen and evaluated with Dr. Jaci Arango MD   PGY-3, Obstetrics and Gynecology    I saw and evaluated the patient. I personally obtained the key and critical portions of the history and physical exam or was physically present for key and critical portions performed  by the resident/fellow. I reviewed the resident/fellow's documentation and discussed the patient with the resident/fellow. I agree with the resident/fellow's medical decision making as documented in the note.    Leila Beatty MD  [employer] Attending

## 2024-11-14 NOTE — ASSESSMENT & PLAN NOTE
- Continue Humira injections  - Percocet 1 week supply refilled, OARRS reviewed with appropriate use  - continue prednisone course  - has derm apointment 11/20

## 2024-11-14 NOTE — ASSESSMENT & PLAN NOTE
- recommend repeat 1hr OGT as patient reports glucose load was not actually received prior to blood draw  -otherwise up to date on 3rd trimester labs  Orders:    Glucose, 1 Hour Screen, Pregnancy; Future

## 2024-11-17 ENCOUNTER — HOSPITAL ENCOUNTER (EMERGENCY)
Facility: HOSPITAL | Age: 34
Discharge: HOME | End: 2024-11-17
Attending: EMERGENCY MEDICINE
Payer: MEDICAID

## 2024-11-17 ENCOUNTER — HOSPITAL ENCOUNTER (INPATIENT)
Facility: HOSPITAL | Age: 34
End: 2024-11-17
Attending: OBSTETRICS & GYNECOLOGY | Admitting: OBSTETRICS & GYNECOLOGY
Payer: MEDICAID

## 2024-11-17 VITALS
HEIGHT: 61 IN | OXYGEN SATURATION: 98 % | RESPIRATION RATE: 18 BRPM | DIASTOLIC BLOOD PRESSURE: 90 MMHG | WEIGHT: 184 LBS | SYSTOLIC BLOOD PRESSURE: 128 MMHG | TEMPERATURE: 98.2 F | BODY MASS INDEX: 34.74 KG/M2 | HEART RATE: 97 BPM

## 2024-11-17 DIAGNOSIS — Z34.90 INTRAUTERINE PREGNANCY (HHS-HCC): ICD-10-CM

## 2024-11-17 DIAGNOSIS — L73.2 HIDRADENITIS SUPPURATIVA: Primary | ICD-10-CM

## 2024-11-17 LAB
ALBUMIN SERPL BCP-MCNC: 3.8 G/DL (ref 3.4–5)
ALP SERPL-CCNC: 95 U/L (ref 33–110)
ALT SERPL W P-5'-P-CCNC: 13 U/L (ref 7–45)
ANION GAP SERPL CALC-SCNC: 11 MMOL/L (ref 10–20)
AST SERPL W P-5'-P-CCNC: 27 U/L (ref 9–39)
BASOPHILS # BLD AUTO: 0.03 X10*3/UL (ref 0–0.1)
BASOPHILS NFR BLD AUTO: 0.2 %
BILIRUB SERPL-MCNC: 0.3 MG/DL (ref 0–1.2)
BUN SERPL-MCNC: 8 MG/DL (ref 6–23)
CALCIUM SERPL-MCNC: 9.4 MG/DL (ref 8.6–10.3)
CHLORIDE SERPL-SCNC: 107 MMOL/L (ref 98–107)
CO2 SERPL-SCNC: 21 MMOL/L (ref 21–32)
CREAT SERPL-MCNC: 0.65 MG/DL (ref 0.5–1.05)
EGFRCR SERPLBLD CKD-EPI 2021: >90 ML/MIN/1.73M*2
EOSINOPHIL # BLD AUTO: 0.11 X10*3/UL (ref 0–0.7)
EOSINOPHIL NFR BLD AUTO: 0.9 %
ERYTHROCYTE [DISTWIDTH] IN BLOOD BY AUTOMATED COUNT: 14.4 % (ref 11.5–14.5)
GLUCOSE SERPL-MCNC: 84 MG/DL (ref 74–99)
HCT VFR BLD AUTO: 35 % (ref 36–46)
HGB BLD-MCNC: 11.8 G/DL (ref 12–16)
IMM GRANULOCYTES # BLD AUTO: 0.23 X10*3/UL (ref 0–0.7)
IMM GRANULOCYTES NFR BLD AUTO: 1.8 % (ref 0–0.9)
LYMPHOCYTES # BLD AUTO: 2.81 X10*3/UL (ref 1.2–4.8)
LYMPHOCYTES NFR BLD AUTO: 21.8 %
MCH RBC QN AUTO: 25.7 PG (ref 26–34)
MCHC RBC AUTO-ENTMCNC: 33.7 G/DL (ref 32–36)
MCV RBC AUTO: 76 FL (ref 80–100)
MONOCYTES # BLD AUTO: 1.03 X10*3/UL (ref 0.1–1)
MONOCYTES NFR BLD AUTO: 8 %
NEUTROPHILS # BLD AUTO: 8.68 X10*3/UL (ref 1.2–7.7)
NEUTROPHILS NFR BLD AUTO: 67.3 %
NRBC BLD-RTO: 0 /100 WBCS (ref 0–0)
PLATELET # BLD AUTO: 330 X10*3/UL (ref 150–450)
POTASSIUM SERPL-SCNC: 4.7 MMOL/L (ref 3.5–5.3)
PROT SERPL-MCNC: 7.3 G/DL (ref 6.4–8.2)
RBC # BLD AUTO: 4.6 X10*6/UL (ref 4–5.2)
SODIUM SERPL-SCNC: 134 MMOL/L (ref 136–145)
WBC # BLD AUTO: 12.9 X10*3/UL (ref 4.4–11.3)

## 2024-11-17 PROCEDURE — 99284 EMERGENCY DEPT VISIT MOD MDM: CPT

## 2024-11-17 PROCEDURE — 84075 ASSAY ALKALINE PHOSPHATASE: CPT | Performed by: EMERGENCY MEDICINE

## 2024-11-17 PROCEDURE — 85025 COMPLETE CBC W/AUTO DIFF WBC: CPT | Performed by: EMERGENCY MEDICINE

## 2024-11-17 PROCEDURE — 96376 TX/PRO/DX INJ SAME DRUG ADON: CPT

## 2024-11-17 PROCEDURE — 96374 THER/PROPH/DIAG INJ IV PUSH: CPT

## 2024-11-17 PROCEDURE — 36415 COLL VENOUS BLD VENIPUNCTURE: CPT | Performed by: EMERGENCY MEDICINE

## 2024-11-17 PROCEDURE — 2500000004 HC RX 250 GENERAL PHARMACY W/ HCPCS (ALT 636 FOR OP/ED): Performed by: EMERGENCY MEDICINE

## 2024-11-17 RX ORDER — HYDROMORPHONE HYDROCHLORIDE 2 MG/ML
2 INJECTION, SOLUTION INTRAMUSCULAR; INTRAVENOUS; SUBCUTANEOUS ONCE
Status: COMPLETED | OUTPATIENT
Start: 2024-11-17 | End: 2024-11-17

## 2024-11-17 RX ORDER — HYDROMORPHONE HYDROCHLORIDE 2 MG/ML
INJECTION, SOLUTION INTRAMUSCULAR; INTRAVENOUS; SUBCUTANEOUS
Status: DISCONTINUED
Start: 2024-11-17 | End: 2024-11-18 | Stop reason: HOSPADM

## 2024-11-17 ASSESSMENT — PAIN DESCRIPTION - ORIENTATION: ORIENTATION: RIGHT;LEFT

## 2024-11-17 ASSESSMENT — PAIN - FUNCTIONAL ASSESSMENT
PAIN_FUNCTIONAL_ASSESSMENT: 0-10

## 2024-11-17 ASSESSMENT — PAIN DESCRIPTION - PROGRESSION
CLINICAL_PROGRESSION: NOT CHANGED
CLINICAL_PROGRESSION: GRADUALLY IMPROVING
CLINICAL_PROGRESSION: NOT CHANGED

## 2024-11-17 ASSESSMENT — PAIN DESCRIPTION - LOCATION
LOCATION: BUTTOCKS
LOCATION: RECTUM

## 2024-11-17 ASSESSMENT — PAIN DESCRIPTION - ONSET: ONSET: ONGOING

## 2024-11-17 ASSESSMENT — PAIN SCALES - GENERAL
PAINLEVEL_OUTOF10: 10 - WORST POSSIBLE PAIN
PAINLEVEL_OUTOF10: 5 - MODERATE PAIN
PAINLEVEL_OUTOF10: 5 - MODERATE PAIN
PAINLEVEL_OUTOF10: 10 - WORST POSSIBLE PAIN

## 2024-11-17 ASSESSMENT — PAIN DESCRIPTION - FREQUENCY: FREQUENCY: CONSTANT/CONTINUOUS

## 2024-11-17 ASSESSMENT — PAIN DESCRIPTION - PAIN TYPE: TYPE: CHRONIC PAIN

## 2024-11-18 ENCOUNTER — HOSPITAL ENCOUNTER (INPATIENT)
Facility: HOSPITAL | Age: 34
LOS: 2 days | Discharge: HOME | End: 2024-11-20
Attending: SPECIALIST | Admitting: OBSTETRICS & GYNECOLOGY
Payer: MEDICAID

## 2024-11-18 DIAGNOSIS — L73.2 HIDRADENITIS SUPPURATIVA OF MULTIPLE SITES: Primary | ICD-10-CM

## 2024-11-18 DIAGNOSIS — L73.2 HIDRADENITIS SUPPURATIVA: Primary | ICD-10-CM

## 2024-11-18 PROCEDURE — 2500000005 HC RX 250 GENERAL PHARMACY W/O HCPCS: Mod: SE

## 2024-11-18 PROCEDURE — 99222 1ST HOSP IP/OBS MODERATE 55: CPT

## 2024-11-18 PROCEDURE — 2500000001 HC RX 250 WO HCPCS SELF ADMINISTERED DRUGS (ALT 637 FOR MEDICARE OP)

## 2024-11-18 PROCEDURE — 2500000004 HC RX 250 GENERAL PHARMACY W/ HCPCS (ALT 636 FOR OP/ED)

## 2024-11-18 PROCEDURE — RXMED WILLOW AMBULATORY MEDICATION CHARGE

## 2024-11-18 PROCEDURE — 1220000001 HC OB SEMI-PRIVATE ROOM DAILY

## 2024-11-18 RX ORDER — ACETAMINOPHEN 325 MG/1
325 TABLET ORAL ONCE
Status: COMPLETED | OUTPATIENT
Start: 2024-11-18 | End: 2024-11-18

## 2024-11-18 RX ORDER — LIDOCAINE HYDROCHLORIDE 10 MG/ML
0.5 INJECTION, SOLUTION INFILTRATION; PERINEURAL ONCE AS NEEDED
Status: DISCONTINUED | OUTPATIENT
Start: 2024-11-18 | End: 2024-11-20 | Stop reason: HOSPADM

## 2024-11-18 RX ORDER — HYDRALAZINE HYDROCHLORIDE 20 MG/ML
5 INJECTION INTRAMUSCULAR; INTRAVENOUS ONCE AS NEEDED
Status: DISCONTINUED | OUTPATIENT
Start: 2024-11-18 | End: 2024-11-20 | Stop reason: HOSPADM

## 2024-11-18 RX ORDER — PREDNISONE 20 MG/1
20 TABLET ORAL EVERY MORNING
Qty: 7 TABLET | Refills: 3 | Status: SHIPPED | OUTPATIENT
Start: 2024-11-18 | End: 2024-11-18

## 2024-11-18 RX ORDER — ONDANSETRON HYDROCHLORIDE 2 MG/ML
4 INJECTION, SOLUTION INTRAVENOUS EVERY 6 HOURS PRN
Status: DISCONTINUED | OUTPATIENT
Start: 2024-11-18 | End: 2024-11-18

## 2024-11-18 RX ORDER — HYDROMORPHONE HYDROCHLORIDE 1 MG/ML
2 INJECTION, SOLUTION INTRAMUSCULAR; INTRAVENOUS; SUBCUTANEOUS
Status: DISCONTINUED | OUTPATIENT
Start: 2024-11-18 | End: 2024-11-18

## 2024-11-18 RX ORDER — ADHESIVE BANDAGE
10 BANDAGE TOPICAL
Status: DISCONTINUED | OUTPATIENT
Start: 2024-11-18 | End: 2024-11-20 | Stop reason: HOSPADM

## 2024-11-18 RX ORDER — OXYCODONE HYDROCHLORIDE 10 MG/1
10 TABLET ORAL ONCE
Status: COMPLETED | OUTPATIENT
Start: 2024-11-18 | End: 2024-11-18

## 2024-11-18 RX ORDER — ONDANSETRON 4 MG/1
4 TABLET, FILM COATED ORAL EVERY 6 HOURS PRN
Status: DISCONTINUED | OUTPATIENT
Start: 2024-11-18 | End: 2024-11-20 | Stop reason: HOSPADM

## 2024-11-18 RX ORDER — LIDOCAINE HYDROCHLORIDE 20 MG/ML
1 JELLY TOPICAL ONCE
Status: DISCONTINUED | OUTPATIENT
Start: 2024-11-18 | End: 2024-11-20 | Stop reason: HOSPADM

## 2024-11-18 RX ORDER — LABETALOL HYDROCHLORIDE 5 MG/ML
20 INJECTION, SOLUTION INTRAVENOUS ONCE AS NEEDED
Status: DISCONTINUED | OUTPATIENT
Start: 2024-11-18 | End: 2024-11-18

## 2024-11-18 RX ORDER — POLYETHYLENE GLYCOL 3350 17 G/17G
17 POWDER, FOR SOLUTION ORAL 2 TIMES DAILY PRN
Status: DISCONTINUED | OUTPATIENT
Start: 2024-11-18 | End: 2024-11-20 | Stop reason: HOSPADM

## 2024-11-18 RX ORDER — ACETAMINOPHEN 325 MG/1
325 TABLET ORAL ONCE
Status: COMPLETED | OUTPATIENT
Start: 2024-11-18 | End: 2024-11-19

## 2024-11-18 RX ORDER — ONDANSETRON 4 MG/1
4 TABLET, FILM COATED ORAL EVERY 6 HOURS PRN
Status: DISCONTINUED | OUTPATIENT
Start: 2024-11-18 | End: 2024-11-18

## 2024-11-18 RX ORDER — LABETALOL HYDROCHLORIDE 5 MG/ML
20 INJECTION, SOLUTION INTRAVENOUS ONCE AS NEEDED
Status: DISCONTINUED | OUTPATIENT
Start: 2024-11-18 | End: 2024-11-20 | Stop reason: HOSPADM

## 2024-11-18 RX ORDER — ONDANSETRON HYDROCHLORIDE 2 MG/ML
4 INJECTION, SOLUTION INTRAVENOUS EVERY 6 HOURS PRN
Status: DISCONTINUED | OUTPATIENT
Start: 2024-11-18 | End: 2024-11-20 | Stop reason: HOSPADM

## 2024-11-18 RX ORDER — HYDRALAZINE HYDROCHLORIDE 20 MG/ML
5 INJECTION INTRAMUSCULAR; INTRAVENOUS ONCE AS NEEDED
Status: DISCONTINUED | OUTPATIENT
Start: 2024-11-18 | End: 2024-11-18

## 2024-11-18 RX ORDER — CHLORHEXIDINE GLUCONATE 40 MG/ML
SOLUTION TOPICAL 2 TIMES DAILY
Status: DISCONTINUED | OUTPATIENT
Start: 2024-11-18 | End: 2024-11-18 | Stop reason: RX

## 2024-11-18 RX ORDER — ALBUTEROL SULFATE 90 UG/1
2 INHALANT RESPIRATORY (INHALATION) EVERY 6 HOURS PRN
Status: DISCONTINUED | OUTPATIENT
Start: 2024-11-18 | End: 2024-11-20 | Stop reason: HOSPADM

## 2024-11-18 RX ORDER — OXYCODONE HYDROCHLORIDE 5 MG/1
10 TABLET ORAL EVERY 4 HOURS PRN
Status: DISCONTINUED | OUTPATIENT
Start: 2024-11-18 | End: 2024-11-20

## 2024-11-18 RX ORDER — BISACODYL 10 MG/1
10 SUPPOSITORY RECTAL DAILY PRN
Status: DISCONTINUED | OUTPATIENT
Start: 2024-11-18 | End: 2024-11-20 | Stop reason: HOSPADM

## 2024-11-18 RX ORDER — PREDNISONE 20 MG/1
20 TABLET ORAL EVERY MORNING
Qty: 7 TABLET | Refills: 3 | Status: SHIPPED | OUTPATIENT
Start: 2024-11-18 | End: 2024-11-27

## 2024-11-18 RX ORDER — HYDROMORPHONE HYDROCHLORIDE 1 MG/ML
2 INJECTION, SOLUTION INTRAMUSCULAR; INTRAVENOUS; SUBCUTANEOUS EVERY 2 HOUR PRN
Status: COMPLETED | OUTPATIENT
Start: 2024-11-18 | End: 2024-11-19

## 2024-11-18 RX ORDER — LIDOCAINE HYDROCHLORIDE 10 MG/ML
0.5 INJECTION, SOLUTION INFILTRATION; PERINEURAL ONCE AS NEEDED
Status: DISCONTINUED | OUTPATIENT
Start: 2024-11-18 | End: 2024-11-18

## 2024-11-18 RX ORDER — FAMOTIDINE 20 MG/1
20 TABLET, FILM COATED ORAL 2 TIMES DAILY
Status: DISCONTINUED | OUTPATIENT
Start: 2024-11-18 | End: 2024-11-20 | Stop reason: HOSPADM

## 2024-11-18 RX ORDER — NIFEDIPINE 10 MG/1
10 CAPSULE ORAL ONCE AS NEEDED
Status: DISCONTINUED | OUTPATIENT
Start: 2024-11-18 | End: 2024-11-18

## 2024-11-18 RX ORDER — NIFEDIPINE 10 MG/1
10 CAPSULE ORAL ONCE AS NEEDED
Status: DISCONTINUED | OUTPATIENT
Start: 2024-11-18 | End: 2024-11-20 | Stop reason: HOSPADM

## 2024-11-18 RX ORDER — WATER
300 LIQUID (ML) MISCELLANEOUS
Status: DISCONTINUED | OUTPATIENT
Start: 2024-11-18 | End: 2024-11-20 | Stop reason: HOSPADM

## 2024-11-18 RX ORDER — LIDOCAINE 40 MG/G
CREAM TOPICAL 4 TIMES DAILY PRN
Status: DISCONTINUED | OUTPATIENT
Start: 2024-11-18 | End: 2024-11-20 | Stop reason: HOSPADM

## 2024-11-18 RX ORDER — SIMETHICONE 80 MG
80 TABLET,CHEWABLE ORAL 4 TIMES DAILY PRN
Status: DISCONTINUED | OUTPATIENT
Start: 2024-11-18 | End: 2024-11-20 | Stop reason: HOSPADM

## 2024-11-18 SDOH — SOCIAL STABILITY: SOCIAL INSECURITY: WITHIN THE LAST YEAR, HAVE YOU BEEN HUMILIATED OR EMOTIONALLY ABUSED IN OTHER WAYS BY YOUR PARTNER OR EX-PARTNER?: NO

## 2024-11-18 SDOH — SOCIAL STABILITY: SOCIAL INSECURITY: PHYSICAL ABUSE: DENIES

## 2024-11-18 SDOH — HEALTH STABILITY: MENTAL HEALTH: WERE YOU ABLE TO COMPLETE ALL THE BEHAVIORAL HEALTH SCREENINGS?: YES

## 2024-11-18 SDOH — SOCIAL STABILITY: SOCIAL INSECURITY: HAS ANYONE EVER THREATENED TO HURT YOUR FAMILY OR YOUR PETS?: NO

## 2024-11-18 SDOH — HEALTH STABILITY: MENTAL HEALTH: WISH TO BE DEAD (PAST 1 MONTH): NO

## 2024-11-18 SDOH — SOCIAL STABILITY: SOCIAL INSECURITY: DOES ANYONE TRY TO KEEP YOU FROM HAVING/CONTACTING OTHER FRIENDS OR DOING THINGS OUTSIDE YOUR HOME?: NO

## 2024-11-18 SDOH — ECONOMIC STABILITY: FOOD INSECURITY: WITHIN THE PAST 12 MONTHS, YOU WORRIED THAT YOUR FOOD WOULD RUN OUT BEFORE YOU GOT THE MONEY TO BUY MORE.: NEVER TRUE

## 2024-11-18 SDOH — SOCIAL STABILITY: SOCIAL INSECURITY: ARE THERE ANY APPARENT SIGNS OF INJURIES/BEHAVIORS THAT COULD BE RELATED TO ABUSE/NEGLECT?: NO

## 2024-11-18 SDOH — ECONOMIC STABILITY: FOOD INSECURITY: WITHIN THE PAST 12 MONTHS, THE FOOD YOU BOUGHT JUST DIDN'T LAST AND YOU DIDN'T HAVE MONEY TO GET MORE.: NEVER TRUE

## 2024-11-18 SDOH — ECONOMIC STABILITY: TRANSPORTATION INSECURITY: IN THE PAST 12 MONTHS, HAS LACK OF TRANSPORTATION KEPT YOU FROM MEDICAL APPOINTMENTS OR FROM GETTING MEDICATIONS?: NO

## 2024-11-18 SDOH — HEALTH STABILITY: MENTAL HEALTH: STRENGTHS (MUST CHOOSE TWO): SUPPORT FROM FAMILY;SUPPORT FROM FRIENDS

## 2024-11-18 SDOH — SOCIAL STABILITY: SOCIAL INSECURITY: ABUSE SCREEN: ADULT

## 2024-11-18 SDOH — HEALTH STABILITY: MENTAL HEALTH: SUICIDAL BEHAVIOR (LIFETIME): NO

## 2024-11-18 SDOH — ECONOMIC STABILITY: FOOD INSECURITY: HOW HARD IS IT FOR YOU TO PAY FOR THE VERY BASICS LIKE FOOD, HOUSING, MEDICAL CARE, AND HEATING?: NOT HARD AT ALL

## 2024-11-18 SDOH — SOCIAL STABILITY: SOCIAL INSECURITY: WITHIN THE LAST YEAR, HAVE YOU BEEN AFRAID OF YOUR PARTNER OR EX-PARTNER?: NO

## 2024-11-18 SDOH — SOCIAL STABILITY: SOCIAL INSECURITY: DO YOU FEEL ANYONE HAS EXPLOITED OR TAKEN ADVANTAGE OF YOU FINANCIALLY OR OF YOUR PERSONAL PROPERTY?: NO

## 2024-11-18 SDOH — SOCIAL STABILITY: SOCIAL INSECURITY: HAVE YOU HAD THOUGHTS OF HARMING ANYONE ELSE?: NO

## 2024-11-18 SDOH — HEALTH STABILITY: MENTAL HEALTH: HAVE YOU USED ANY PRESCRIPTION DRUGS OTHER THAN PRESCRIBED IN THE PAST 12 MONTHS?: NO

## 2024-11-18 SDOH — HEALTH STABILITY: MENTAL HEALTH: NON-SPECIFIC ACTIVE SUICIDAL THOUGHTS (PAST 1 MONTH): NO

## 2024-11-18 SDOH — ECONOMIC STABILITY: HOUSING INSECURITY: DO YOU FEEL UNSAFE GOING BACK TO THE PLACE WHERE YOU ARE LIVING?: NO

## 2024-11-18 SDOH — SOCIAL STABILITY: SOCIAL INSECURITY: VERBAL ABUSE: DENIES

## 2024-11-18 SDOH — HEALTH STABILITY: MENTAL HEALTH: HAVE YOU USED ANY SUBSTANCES (CANABIS, COCAINE, HEROIN, HALLUCINOGENS, INHALANTS, ETC.) IN THE PAST 12 MONTHS?: NO

## 2024-11-18 SDOH — SOCIAL STABILITY: SOCIAL INSECURITY: HAVE YOU HAD ANY THOUGHTS OF HARMING ANYONE ELSE?: NO

## 2024-11-18 SDOH — SOCIAL STABILITY: SOCIAL INSECURITY: ARE YOU OR HAVE YOU BEEN THREATENED OR ABUSED PHYSICALLY, EMOTIONALLY, OR SEXUALLY BY ANYONE?: NO

## 2024-11-18 ASSESSMENT — PAIN SCALES - GENERAL
PAINLEVEL_OUTOF10: 9
PAINLEVEL_OUTOF10: 8
PAINLEVEL_OUTOF10: 7
PAINLEVEL_OUTOF10: 9

## 2024-11-18 ASSESSMENT — PAIN DESCRIPTION - DESCRIPTORS: DESCRIPTORS: ACHING;DISCOMFORT;SORE

## 2024-11-18 ASSESSMENT — LIFESTYLE VARIABLES
HOW OFTEN DO YOU HAVE 6 OR MORE DRINKS ON ONE OCCASION: NEVER
SKIP TO QUESTIONS 9-10: 1
AUDIT-C TOTAL SCORE: 0
AUDIT-C TOTAL SCORE: 0
HOW OFTEN DO YOU HAVE A DRINK CONTAINING ALCOHOL: NEVER
HOW MANY STANDARD DRINKS CONTAINING ALCOHOL DO YOU HAVE ON A TYPICAL DAY: PATIENT DOES NOT DRINK

## 2024-11-18 ASSESSMENT — PAIN - FUNCTIONAL ASSESSMENT: PAIN_FUNCTIONAL_ASSESSMENT: 0-10

## 2024-11-18 ASSESSMENT — PAIN DESCRIPTION - LOCATION
LOCATION: BUTTOCKS
LOCATION: BUTTOCKS

## 2024-11-18 ASSESSMENT — ACTIVITIES OF DAILY LIVING (ADL)
LACK_OF_TRANSPORTATION: NO
LACK_OF_TRANSPORTATION: NO

## 2024-11-18 NOTE — PROGRESS NOTES
Emergency Medicine Transition of Care Note.    I received Sandrita Adams in signout from Dr. Moctezuma.  Please see the previous ED provider note for all HPI, PE and MDM up to the time of signout. This is in addition to the primary record.    In brief Sandrita Adams is an 34 y.o. female presenting for   Chief Complaint   Patient presents with    HS flare up     At the time of signout we were awaiting: Discharge    Diagnoses as of 11/22/24 2237   Hidradenitis suppurativa   Intrauterine pregnancy ([facility])       Medical Decision Making  Patient was either awaiting transfer or discharge.  I was told to give no further medications by previous doctor per the care plan.  She would rather be discharged home.    Final diagnoses:   [L73.2] Hidradenitis suppurativa   [Z34.90] Intrauterine pregnancy ([facility])           Procedure  Procedures    Mark Mason MD

## 2024-11-18 NOTE — ED TRIAGE NOTES
Pt came in via EMS for having HS flare up on her rectum. Pt stated that everywhere hurts but that area hurts the most. Pt has had this before and takes pain meds for it but it got worse. Pt is 25 weeks pregnant.

## 2024-11-18 NOTE — ED PROVIDER NOTES
HPI   Chief Complaint   Patient presents with   • HS flare up       34-year-old female with history of complicated longstanding hidradenitis suppurativa who is currently 27 weeks pregnant and follows with maternal-fetal medicine who is frequently admitted for hidradenitis pain flares and antibiotics who is presenting with hidradenitis flare with rectal pain.  Utilizing pain medicine at home without significant pain capture.  States that she often has flares, comes to the hospital and gets pain medicine symptoms and goes home but often has to be admitted, has to be transferred to [facility] due to pregnancy.  No vaginal bleeding or abdominal pain.  Rectal pain is been going on for the last week.  Was having diarrhea.  No bloody stools.  States she has had multiple surgeries to remove sweat glands, no longer has her abscesses drained. Currently receiving immunomodulatory infusions. Per record review, some discussion around starting IV ertapenem via PICC line but plan limited by unstable housing.       History provided by:  Patient and medical records          Patient History   Past Medical History:   Diagnosis Date   • Anemia 06/16/2024   • Hidradenitis suppurativa 10/29/2020    Hidradenitis   • Hidradenitis suppurativa    • Lupus    • UTI (urinary tract infection) during pregnancy, first trimester ([facility]) 07/16/2024   • Vitamin D deficiency 06/16/2024     Past Surgical History:   Procedure Laterality Date   • OTHER SURGICAL HISTORY  09/19/2022    Arm surgery   • OTHER SURGICAL HISTORY Left 2011    Left wrist   • OTHER SURGICAL HISTORY Left 2021    Left wrist procedure     No family history on file.  Social History     Tobacco Use   • Smoking status: Former     Types: Cigarettes   • Smokeless tobacco: Never   Vaping Use   • Vaping status: Every Day   Substance Use Topics   • Alcohol use: Not Currently   • Drug use: Never       Physical Exam   ED Triage Vitals [11/17/24 1934]   Temperature Heart Rate Respirations BP    36.8 °C (98.2 °F) 100 18 94/59      Pulse Ox Temp Source Heart Rate Source Patient Position   98 % Oral Monitor Lying      BP Location FiO2 (%)     Left arm --       Physical Exam  Vitals and nursing note reviewed.   Constitutional:       General: She is not in acute distress.     Appearance: She is well-developed.   HENT:      Head: Normocephalic and atraumatic.      Mouth/Throat:      Mouth: Mucous membranes are moist.   Eyes:      Conjunctiva/sclera: Conjunctivae normal.   Cardiovascular:      Rate and Rhythm: Normal rate and regular rhythm.      Heart sounds: No murmur heard.  Pulmonary:      Effort: Pulmonary effort is normal. No respiratory distress.      Breath sounds: Normal breath sounds.   Abdominal:      Palpations: Abdomen is soft.      Tenderness: There is no abdominal tenderness.      Comments: Gravid uterus c/w gestational age   Genitourinary:     Comments: Area of fluctuance in mid gluteal cleft, does not reach anal mucosa   Musculoskeletal:         General: No swelling.      Cervical back: Neck supple.   Skin:     General: Skin is warm and dry.      Capillary Refill: Capillary refill takes less than 2 seconds.   Neurological:      General: No focal deficit present.      Mental Status: She is alert.   Psychiatric:         Mood and Affect: Mood normal.           ED Course & MDM   Diagnoses as of 11/19/24 2221   Hidradenitis suppurativa   Intrauterine pregnancy ([facility])                 No data recorded     Monik Coma Scale Score: 15 (11/17/24 1936 : Leila Irwin, EMT)                           Medical Decision Making  34-year-old female presenting with HA flare.  Patient is motivated to go home, given IV Dilaudid x 2 per her pain plan.  Fetal heart rate of 158 good fetal movement.  Discussed with maternal-fetal medicine at [facility], if she feels that she needs to be admitted for pain we will transfer [facility].  Defer any sort of I&D, patient does not get them drained anymore, no drainable  pocket on bedside ultrasound. In discussion with patient, she wants to try the IV dilaudid and see if she can get enough pain capture to go home, she very much does not want to be admitted. I signed her out just after second dose of dilaudid.         Procedure  Procedures     Nelson Moctezuma MD  11/19/24 [id number]

## 2024-11-19 ENCOUNTER — APPOINTMENT (OUTPATIENT)
Dept: RADIOLOGY | Facility: HOSPITAL | Age: 34
End: 2024-11-19
Payer: MEDICAID

## 2024-11-19 PROCEDURE — 36573 INSJ PICC RS&I 5 YR+: CPT

## 2024-11-19 PROCEDURE — 2500000001 HC RX 250 WO HCPCS SELF ADMINISTERED DRUGS (ALT 637 FOR MEDICARE OP)

## 2024-11-19 PROCEDURE — 02HV33Z INSERTION OF INFUSION DEVICE INTO SUPERIOR VENA CAVA, PERCUTANEOUS APPROACH: ICD-10-PCS | Performed by: OBSTETRICS & GYNECOLOGY

## 2024-11-19 PROCEDURE — 2500000004 HC RX 250 GENERAL PHARMACY W/ HCPCS (ALT 636 FOR OP/ED)

## 2024-11-19 PROCEDURE — C1751 CATH, INF, PER/CENT/MIDLINE: HCPCS

## 2024-11-19 PROCEDURE — 2780000003 HC OR 278 NO HCPCS

## 2024-11-19 PROCEDURE — 99252 IP/OBS CONSLTJ NEW/EST SF 35: CPT

## 2024-11-19 PROCEDURE — 99254 IP/OBS CNSLTJ NEW/EST MOD 60: CPT | Performed by: STUDENT IN AN ORGANIZED HEALTH CARE EDUCATION/TRAINING PROGRAM

## 2024-11-19 PROCEDURE — 87081 CULTURE SCREEN ONLY: CPT

## 2024-11-19 PROCEDURE — 99255 IP/OBS CONSLTJ NEW/EST HI 80: CPT | Performed by: STUDENT IN AN ORGANIZED HEALTH CARE EDUCATION/TRAINING PROGRAM

## 2024-11-19 PROCEDURE — 1220000001 HC OB SEMI-PRIVATE ROOM DAILY

## 2024-11-19 RX ORDER — LIDOCAINE HYDROCHLORIDE 10 MG/ML
5 INJECTION, SOLUTION INFILTRATION; PERINEURAL ONCE
Status: DISCONTINUED | OUTPATIENT
Start: 2024-11-19 | End: 2024-11-20 | Stop reason: HOSPADM

## 2024-11-19 RX ORDER — HYDROMORPHONE HYDROCHLORIDE 2 MG/ML
2 INJECTION, SOLUTION INTRAMUSCULAR; INTRAVENOUS; SUBCUTANEOUS
Status: DISCONTINUED | OUTPATIENT
Start: 2024-11-19 | End: 2024-11-20

## 2024-11-19 RX ORDER — ZINC OXIDE 20 G/100G
1 OINTMENT TOPICAL
Status: DISCONTINUED | OUTPATIENT
Start: 2024-11-19 | End: 2024-11-20 | Stop reason: HOSPADM

## 2024-11-19 RX ORDER — ACETAMINOPHEN 325 MG/1
975 TABLET ORAL EVERY 6 HOURS
Status: DISCONTINUED | OUTPATIENT
Start: 2024-11-19 | End: 2024-11-20 | Stop reason: HOSPADM

## 2024-11-19 ASSESSMENT — PAIN DESCRIPTION - DESCRIPTORS
DESCRIPTORS: DISCOMFORT
DESCRIPTORS: DISCOMFORT
DESCRIPTORS: SHOOTING
DESCRIPTORS: SHOOTING
DESCRIPTORS: DISCOMFORT
DESCRIPTORS: DISCOMFORT
DESCRIPTORS: SHOOTING
DESCRIPTORS: DISCOMFORT

## 2024-11-19 ASSESSMENT — PAIN SCALES - GENERAL
PAINLEVEL_OUTOF10: 7
PAINLEVEL_OUTOF10: 7
PAINLEVEL_OUTOF10: 8
PAINLEVEL_OUTOF10: 8
PAINLEVEL_OUTOF10: 7
PAINLEVEL_OUTOF10: 6
PAINLEVEL_OUTOF10: 7
PAINLEVEL_OUTOF10: 6
PAINLEVEL_OUTOF10: 7
PAINLEVEL_OUTOF10: 8
PAINLEVEL_OUTOF10: 7
PAINLEVEL_OUTOF10: 7
PAINLEVEL_OUTOF10: 8

## 2024-11-19 ASSESSMENT — ENCOUNTER SYMPTOMS
APPETITE CHANGE: 1
NAUSEA: 1
DIARRHEA: 1
VOMITING: 1
WOUND: 1

## 2024-11-19 ASSESSMENT — PAIN DESCRIPTION - LOCATION
LOCATION: BUTTOCKS

## 2024-11-19 ASSESSMENT — PAIN - FUNCTIONAL ASSESSMENT
PAIN_FUNCTIONAL_ASSESSMENT: 0-10

## 2024-11-19 NOTE — POST-PROCEDURE NOTE
Pre-Procedure Checklist:  Emergent Line Insertion: No  Type of Line to be Placed: PICC  Consent Obtained: Yes  Emergency Medication Necessary: No  Patient Identified with 2 Independent Identifiers: Yes  Review of Allergies, Anticoagulation, Relevant Labs, ECG/Telemetry: Yes  Risks/Benefits/Alternatives Discussed with Patient/POA/Legal Representative: Yes  Stop Sign on Door: Yes  Time Out Performed: Yes  Catheter Exchange: No    Positioning Checklist:  All People, Including Patient, in the Room with Cap and Mask: Yes  Fluoroscopy Used to Identify Vessel and Guide Insertion: No   Sterile Cover Used: Yes  Full Barrier Precautions Followed (Mask, Cap, Gown, Gloves): Yes  Hands Washed: Yes  Monitors Attached with Sound Alarms On: No  Full Body Sterile Drape (Head-to-Toe) Used to Cover Patient: Yes  Trendelenburg Position (For IJ and Subclavian): No  CHG Skin Prep Used and Allowed to Air Dry to Skin Procedure: Yes    Procedure Checklist:  Blood Aspirated From All Lumens, All Ports Subsequently Flushed: Yes  Catheter Caps Placed on All Lumens; Lumens Clamped: Yes  Maintain Guidewire Control Throughout, Ensuring Guidewire Removal: Yes  Maintain Sterile Field Throughout Insertion: Yes  Catheter Secured: Yes  Confirmatory Test of Venous Placement: Non-Pulsatile Blood    Post Procedure Checklist:  Date and Time Written on Dressing: Yes  Sharp and Wire Count and Safe Disposal of all Sharps/Wires: Yes  Sterile Dressing Applied Per Protocol: Yes  X-ray Ordered or ECG Image: Yes    PICC Insertion Details:  Size (Fr): 4  Lumen Type: Single  Catheter to Vein Ratio Less Than 50%: Yes  Total Length (cm): 36  External Length (cm): 0  Orientation: Right  Location: Basilic  Site Prep: Chlorohexidine; Usual sterile procedure followed  Local Anesthetic: Injectable/Subcutaneous  Indication: IV ABX  Insertion Team Members in the Room: NurseMaria G LPN  Initial Extremity Circumference (cm): 33  Insertion Attempts: 1  Patient Tolerance:  Tolerated Well, Age Appropriate  Comfort Measures: Subcutaneous anesthetic; Verbal  Procedure Location: Bedside  Safety Measures: Patient specific safety measures addressed with RN  Estimated Blood Loss (mL):  0  Vessel Fully Compressible Proximally and Distally to Insertion Site: Yes  Brisk Blood Return Obtained and Line Draws Easily: Yes  Tip Location: Cavo Atrial Junction   Line Confirmation: ECG  Lot #: [id number]  : Bard  PICC Line Exp Date: 11/30/2025  Securement: Stat Lock  Post Procedure Checklist: Handoff with RN; Obtain all new IV tubing prior to use; Bed at lowest level and wheels locked; Line discharge information at bedside.  Additional Details: Line was inserted using Modified Seldinger's Technique.   Placed by: Roxy Acevedo RN
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
## 2024-11-19 NOTE — SIGNIFICANT EVENT
Dermatology Significant Event Note:     S: Patient reports new lesions in her perianal area, left thigh, and buttocks. Lesions appeared yesterday. She states her pain has been poorly controlled at home despite adherence to current pain regimen. Went to [facility] yesterday. Pain is severe today causing decreased appetite and vomiting. She reports that she stopped Clindamycin 2 weeks ago as her previous dermatologist at [facility] told her to discontinue it once she started Humira.     O:   GEN: no acute distress  NEURO: moving all extremities   EYES: conjunctiva and eyelids normal. No conjunctival injection or erosions appreciated  ENT:   - Lips: normal  NECK: normal and symmetric.   CV: no varicosities, warmth or tenderness of extremities.  GI: Distended abdomen 2/2 pregnancy  EXTREMITIES: no distal digital clubbing, cyanosis, petechiae   SKIN: A focused skin exam including scalp, face, eyes, ears, neck, trunk, bilateral upper & lower extremities were examined with the following findings:  -In the bilateral axillae, there are several hyperpigmented nodules, sinus tracts, and atrophic plaques.   -In the gluteal fold, infrabdominal fold, inguinal folds, and labia majora there are hyperpigmented tender nodules, hyperpigmented patches, and atrophic plaques.   -In the left medial thigh perianal region, and buttocks, there are a few tender, fluctuant nodules.     A/P:     # Hidradenitis Suppurativa, Rand Stage III   # 27 weeks gestation of pregnancy   -On Humira. First injection (160mg) 11/7/24. Next injection due 11/21/24.   -S/p prednisone 20mg x 7 days (11/11-11/17) without improvement and development of new lesions.  -Patient with new lesions in her perianal area, left thigh, and buttocks. Pain is not controlled.   -Discussed previous plan to start IV ertapenem. Patient states that her housing situation has stabilized to the point that she can maintain a PICC line. Will plan to discuss further plan to start IV  ertapenem.   -Patient reports pain is uncontrolled with current regimen. Recommend discussion with MFM tomorrow AM regarding additional pain control options for patient during pregnancy.     Patient was discussed with Dr. Daley who agrees with the above plan.     Angi Garcia DO   PGY-3, Department of Dermatology

## 2024-11-19 NOTE — CARE PLAN
The patient's goals for the shift include Decrease pain    The clinical goals for the shift include Pain control    Over the shift, the patient did make a little progress.No barriers at this time. Recommendations continue pain meds as ordered and start antibiotics if ordered.

## 2024-11-19 NOTE — CONSULTS
Inpatient consult to Infectious Diseases  Consult performed by: Lexa Galarza MD  Consult ordered by: Jadiel Storey MD        Referred by Dr. Storey    Primary MD: Lane Grigsby MD    Reason For Consult  Ertapenem for HS    History Of Present Illness  Sandrita Adams is a 34 y.o. female with a past medical history of hidradenitis suppurativa (HS, Rand Stage 3) and intrauterine pregnancy presenting with HS flare.     Patient presented on 11/19 for HS flare. Patient has trialed many things in the past including isotretinoin Adalimumab, Infliximab, Excision, spironolactone and multiple antibiotics, Doxycycline, bactrim, clindamycin, levaquin, rifampin. Patient has trialed short course of ertapenem with no benefit.     Patient returns with pain in the groins and armpits. They are painful and warm per patient.      Past Medical History  She has a past medical history of Anemia (06/16/2024), Hidradenitis suppurativa (10/29/2020), Hidradenitis suppurativa, Lupus, UTI (urinary tract infection) during pregnancy, first trimester ([facility]) (07/16/2024), and Vitamin D deficiency (06/16/2024).    Surgical History  She has a past surgical history that includes Other surgical history (09/19/2022); Other surgical history (Left, 2011); and Other surgical history (Left, 2021).     Social History     Occupational History    Occupation: Disabled   Tobacco Use    Smoking status: Former     Types: Cigarettes    Smokeless tobacco: Never   Vaping Use    Vaping status: Every Day   Substance and Sexual Activity    Alcohol use: Not Currently    Drug use: Never    Sexual activity: Yes     Partners: Male     Travel History   Travel since 10/19/24        Location Start Date End Date     [place] 10/11/24 (defaulted) 11/11/24 (defaulted)                 Family History  No family history on file.  Allergies  Suboxone [buprenorphine-naloxone], Clarithromycin, Haloperidol, Keflex [cephalexin],  Levofloxacin, Metoclopramide, and Reglan [metoclopramide hcl]     Immunization History   Administered Date(s) Administered    DT (pediatric) 08/17/1999    DTP 1988, 02/08/1989, 05/09/1989, 02/07/1990    DTaP vaccine, pediatric  (INFANRIX) 08/19/1993    Hep B, Unspecified 08/17/1999    Hepatitis B vaccine, adult *Check Product/Dose* 03/24/2022, 04/10/2024    HiB, unspecified 11/13/1990    MMR vaccine, subcutaneous (MMR II) 02/07/1990, 08/19/1993    OPV 1988, 02/08/1989, 02/07/1990, 08/19/1993    PPD Test 01/30/2022    Pneumococcal polysaccharide vaccine, 23-valent, age 2 years and older (PNEUMOVAX 23) 03/24/2022    Td vaccine, age 7 years and older (TDVAX) 08/17/1999    Tdap vaccine, age 7 year and older (BOOSTRIX, ADACEL) 03/10/2015, 08/20/2023     Medications  Home medications:  Medications Prior to Admission   Medication Sig Dispense Refill Last Dose/Taking    acetaminophen (Tylenol) 325 mg tablet Take 2 tablets (650 mg) by mouth every 6 hours for 180 doses. 120 tablet 2 More than a month    adalimumab (Humira,CF, Pen) 80 mg/0.8 mL pen injector kit pen-injector Inject 1 Pen (80 mg) under the skin every 14 (fourteen) days. 2 each 5 Past Month    albuterol 90 mcg/actuation inhaler Inhale 2 puffs every 6 hours if needed for wheezing. 18 g 1 11/18/2024    aspirin 81 mg EC tablet Take 2 tablets (162 mg) by mouth once daily. 90 tablet 2 11/18/2024    chlorhexidine (Hibiclens) 4 % external liquid Apply topically 2 times a day. Bathe in Hibiclens twice daily 946 mL 3 11/18/2024    famotidine (Pepcid) 20 mg tablet Take 1 tablet (20 mg) by mouth 2 times a day. 60 tablet 2 More than a month    lidocaine (LMX) 4 % cream Apply topically 4 times a day as needed for mild pain (1 - 3). 60 g 2 Past Week    ondansetron ODT (Zofran-ODT) 4 mg disintegrating tablet Take 1 tablet (4 mg) by mouth every 8 hours if needed for nausea or vomiting. 60 tablet 3 11/18/2024 at 11:00 AM    oxyCODONE-acetaminophen (Percocet)  mg  tablet Take 1 tablet by mouth every 4 hours if needed for moderate pain (4 - 6) or severe pain (7 - 10). 42 tablet 0 11/18/2024 at 11:00 AM    -iron fum-folic acid (Prenatal 19) 29 mg iron- 1 mg tablet Take 1 tablet by mouth once daily. 60 tablet 6 11/18/2024    predniSONE (Deltasone) 20 mg tablet Take 1 tablet (20 mg) by mouth once daily in the morning for 7 days. For acute hidradenitis suppurativa flares. 7 tablet 3 11/18/2024    riboflavin (vitamin B2) 100 mg tablet tablet Take 1 tablet (100 mg) by mouth once daily. 60 tablet 2 More than a month    SUMAtriptan (Imitrex) 50 mg tablet Take 1 tablet (50 mg) by mouth every 6 hours if needed for migraine. May repeat dose once in 2 hours if no relief.  Do not exceed 2 doses in 24 hours. 20 tablet 0 More than a month    adalimumab (Humira,DANETTE, Pen Crohns-UC-HS) 80 mg/0.8 mL pen injector kit pen-injector Inject 2 pens (160mg) under the skin on day 0, then 1 pen (80mg) on day 15 3 each 0     Boost 0.04 gram- 1 kcal/mL liquid Please dispense 14 bottles for patient to have BID for 7 days. 237 mL 3 Unknown    clindamycin (Cleocin) 300 mg capsule Take 1 capsule (300 mg) by mouth every 12 hours. 60 capsule 0     lidocaine (LMX) 4 % cream Apply topically if needed for moderate pain (4 - 6). 15 g 0     magnesium oxide (Mag-Ox) 400 mg (241.3 mg magnesium) tablet Take 1 tablet (400 mg) by mouth 2 times a day. 60 tablet 11 Unknown    naloxone (Narcan) 4 mg/0.1 mL nasal spray Administer 1 spray (4 mg) into affected nostril(s) if needed for opioid reversal or respiratory depression. May repeat every 2-3 minutes if needed, alternating nostrils, until medical assistance becomes available. 2 each 11 Unknown     Current medications:  Scheduled medications  acetaminophen, 975 mg, oral, q6h  famotidine, 20 mg, oral, BID  lidocaine, 5 mL, infiltration, Once  lidocaine, 1 Application, Topical, Once  oral hydration, 300 mL, oral, q4h ANDREA  prenatal vitamin (iron-folic), 1 tablet, oral,  Daily      Continuous medications     PRN medications  PRN medications: albuterol, alteplase, bisacodyl, hydrALAZINE, HYDROmorphone, labetaloL, lidocaine, lidocaine, magnesium hydroxide, NIFEdipine, ondansetron **OR** ondansetron, oxyCODONE, polyethylene glycol, psyllium, simethicone, zinc oxide    Review of Systems     Review of systems otherwise negative    Objective  Range of Vitals (last 24 hours)  Heart Rate:  []   Temp:  [36.2 °C (97.2 °F)-36.6 °C (97.9 °F)]   Resp:  [17-18]   BP: (101-128)/(65-85)   SpO2:  [97 %-100 %]   Daily Weight  11/18/24 : 81.7 kg (180 lb 1.9 oz)    Body mass index is 34.03 kg/m².     Physical Exam   General: in no acute distress, able to answer questions  HEENT: no conjunctival injection. anicteric. Armpit with hyperpigmentation, and pain in palpation   CVS: No cardiopulmonary effort  Abd:Soft and lax. Groin with hs lesions as well  Ext: No swelling of the LE b/l.   Neuro: Answers questions appropriately.  Integumentary: no obvious lesions     Relevant Results    Labs  Results from last 72 hours   Lab Units 11/17/24 2039   WBC AUTO x10*3/uL 12.9*   HEMOGLOBIN g/dL 11.8*   HEMATOCRIT % 35.0*   PLATELETS AUTO x10*3/uL 330   NEUTROS PCT AUTO % 67.3   LYMPHS PCT AUTO % 21.8   MONOS PCT AUTO % 8.0   EOS PCT AUTO % 0.9     Results from last 72 hours   Lab Units 11/17/24 2039   SODIUM mmol/L 134*   POTASSIUM mmol/L 4.7   CHLORIDE mmol/L 107   CO2 mmol/L 21   BUN mg/dL 8   CREATININE mg/dL 0.65   GLUCOSE mg/dL 84   CALCIUM mg/dL 9.4   ANION GAP mmol/L 11   EGFR mL/min/1.73m*2 >90     Results from last 72 hours   Lab Units 11/17/24 2039   ALK PHOS U/L 95   BILIRUBIN TOTAL mg/dL 0.3   PROTEIN TOTAL g/dL 7.3   ALT U/L 13   AST U/L 27   ALBUMIN g/dL 3.8     Estimated Creatinine Clearance: 116 mL/min (by C-G formula based on SCr of 0.65 mg/dL).  C-Reactive Protein   Date Value Ref Range Status   11/04/2024 0.63 <1.00 mg/dL Final   10/27/2024 0.64 <1.00 mg/dL Final   10/07/2024 0.60 <1.00  "mg/dL Final     Sedimentation Rate   Date Value Ref Range Status   11/04/2024 35 (H) 0 - 20 mm/h Final   10/27/2024 35 (H) 0 - 20 mm/h Final   10/07/2024 43 (H) 0 - 20 mm/h Final     No results found for: \"HIV1X2\", \"HIVCONF\", \"SVJIXV5WP\"  No results found for: \"HEPCABINIT\", \"HEPCAB\", \"HCVPCRQUANT\"  Microbiology  Susceptibility data from last 90 days.  Collected Specimen Info Organism Clindamycin Erythromycin Oxacillin Tetracycline Trimethoprim/Sulfamethoxazole Vancomycin   09/09/24 Tissue/Biopsy from Wound/Tissue Methicillin Susceptible Staphylococcus aureus (MSSA)  S  S  S  S  S  S     Mixed Gram-Positive and Gram-Negative Bacteria             Imaging    No new imaging     Assessment/Plan     34 y.o. female PMH of pregnancy, HS, Lupus, Asthma, Hirsutism, Obesity,  presented on 10/8 for hidradenitis flare.     #Hidradenitis flare  #Many failed therapies with HS  #Pregnancy    Patient with many past medications with isotretinoin Adalimumab, Infliximab, Excision, spironolactone and multiple antibiotics, Doxycycline, bactrim, clindamycin, levaquin, rifampin. There are reports that ertapenem can work well for HS. Discussed with team and patient without any IVDU. Of note, while it appears that ertpenem is helpful in hidrandenitis, it is a carbapenem and prolonged use may lead to antimicrobial resistance. Benefits and risks should be continuously evaluated    -Okay to start ertapenem 1 g q 24 hours   -After discharge, the following labs should be collected weekly:  CBC with diff, CMP, quantitative C- Reactive protein.  Fax all results to [phone] attention Dr. Galarza  -Did discuss safety of medication with ID pharmacy, and OBGYN who both note that it is safe    ID will continue to follow. If any questions regarding this patient please wen Galarza  Infectious Diseases  Team A    "

## 2024-11-19 NOTE — CONSULTS
"Inpatient consult to Dermatology  Consult performed by: Angi Garcia DO  Consult ordered by: Eliz Mcnamara MD      DERMATOLOGY DEPARTMENT CONSULTATION NOTE  Name: Sandrita Adams  MRN: [id number]  : 1990    Reason for consultation: HS flare in pregnancy    History of Present Illness  Sandrita Adams is a 34 y.o. female with a past medical history of hidradenitis suppurativa (HS, Rand Stage 3) and intrauterine pregnancy (currently at 27w4d) presented [facility] on 2024 with concern for uncontrolled pain in the setting of an HS flare. Dermatology was consulted for HS flare in pregnancy.      Patient has longstanding history of hidradenitis suppurativa. She reports developing her first \"boil\" at 7 years of age and receiving official diagnosis in 2016. She has previously been treated with: certolizumab, IV ertapenem, adalimumab, infliximab, secukinumab apremilast, spironolactone, doxycycline, bactrim, clindamycin, levaquin, rifampin, IV dalbavancin, and IV vancomycin. She has also trialed ILK injections and oral prednisone without success. Prior to pregnancy, patient was on cosentyx which she reports has been the most beneficial treatment. Patient discontinued cosentyx in  after finding out she was pregnant.       During this pregnancy, patient has been evaluated 8 times by dermatology. Patient was previously started on certolizumab (Cimzia). Patient reports she did not notice significant improvement on certolizumab and discontinued due to headaches (last dose 10/6). During previous admissions, patient also received IV ertapenem (received 5 doses inpatient). However, in the past patient stated she was unable to manage a PICC line at home due to her  housing situation and refused outpatient IV antibiotics.      Today, patient reports that she is experiencing another HS flare in her perianal area, left thigh, and buttocks. She states current pain regimen is not sufficient to control her " pain. She reports decreased appetite and vomiting as well as difficulty defecating due to the pain. She notes new diarrhea that coincided with the Humira injections. She has been using hibiclens daily and started Humira 11/7/24. She reports that she stopped Clindamycin 2 weeks ago as her previous dermatologist at [facility] told her to discontinue it once she started Humira. She states that she now has stable housing and would like to discuss possibility of PICC line and IV ertapenem.      Previous Treatment Summary:  1. Isotretinoin - helped acne but did not improve HS   2. Adalimumab - developed hives/bumps  3. Infliximab - missed 2 infusions due to personal family issues   4.  I&D  5. Excision  6. Apremilast - No improvement  7. Spironolactone - stopped due to higher potassium and tingling in hands/ arms. Note hx of PCOS and elevated testosterone.   8. Multiple antibiotics - Doxycycline, bactrim, clindamycin, levaquin, rifampin - none of these helped. Had previously been on antibiotics but they gave her frequent yeast infections.   9. ILK  10. Cimzia - discontinued 2/2 headaches  11. IV ertapenem - received 5 doses inpatient, discontinued due to current living situation/inability to maintain PICC line       Review of Systems  Review of Systems   Constitutional:  Positive for appetite change.   Gastrointestinal:  Positive for diarrhea, nausea and vomiting.   Skin:  Positive for wound.        Past Medical History  Past Medical History:   Diagnosis Date    Anemia 06/16/2024    Hidradenitis suppurativa 10/29/2020    Hidradenitis    Hidradenitis suppurativa     Lupus     UTI (urinary tract infection) during pregnancy, first trimester ([facility]) 07/16/2024    Vitamin D deficiency 06/16/2024       Past Surgical History   has a past surgical history that includes Other surgical history (09/19/2022); Other surgical history (Left, 2011); and Other surgical history (Left, 2021).     Allergies  Allergies   Allergen Reactions     Suboxone [Buprenorphine-Naloxone] Anaphylaxis, Hives and Itching    Clarithromycin Hives    Haloperidol Hallucinations and Psychosis    Keflex [Cephalexin] Hives    Levofloxacin Swelling     Ankle Joint/tendon pain    Metoclopramide Headache, Hallucinations and Psychosis    Reglan [Metoclopramide Hcl] Psychosis       Medications  Scheduled Meds: acetaminophen, 975 mg, oral, q6h  famotidine, 20 mg, oral, BID  lidocaine, 5 mL, infiltration, Once  lidocaine, 1 Application, Topical, Once  oral hydration, 300 mL, oral, q4h ANDREA  prenatal vitamin (iron-folic), 1 tablet, oral, Daily       Continuous Infusions:     PRN Meds: PRN medications: albuterol, alteplase, bisacodyl, hydrALAZINE, HYDROmorphone, labetaloL, lidocaine, lidocaine, magnesium hydroxide, NIFEdipine, ondansetron **OR** ondansetron, oxyCODONE, polyethylene glycol, psyllium, simethicone, zinc oxide     Family History  No family history on file.    Social History   reports that she has quit smoking. Her smoking use included cigarettes. She has never used smokeless tobacco. She reports that she does not currently use alcohol. She reports that she does not use drugs.     Objective    Vitals:    11/18/24 2346 11/19/24 0412 11/19/24 0755 11/19/24 1136   BP: 101/65 107/75 121/81 128/81   BP Location: Left arm Left arm     Patient Position: Lying Lying Lying    Pulse: 92 110 103 109   Resp: 18 18 18 18   Temp: 36.2 °C (97.2 °F) 36.5 °C (97.7 °F) 36.6 °C (97.9 °F) 36.4 °C (97.5 °F)   TempSrc: Temporal Temporal Temporal Temporal   SpO2: 99% 98% 100% 97%   Weight:       Height:            Exam    GEN: no acute distress  NEURO: moving all extremities   EYES: conjunctiva and eyelids normal. No conjunctival injection or erosions appreciated  ENT:   - Lips: normal  NECK: normal and symmetric.   CV: no varicosities, warmth or tenderness of extremities.  GI: Distended abdomen 2/2 pregnancy  EXTREMITIES: no distal digital clubbing, cyanosis, petechiae   SKIN: A focused skin  exam including scalp, face, eyes, ears, neck, trunk, bilateral upper & lower extremities were examined with the following findings:  -In the bilateral axillae, there are several hyperpigmented nodules, sinus tracts, and atrophic plaques.   -In the gluteal fold, infrabdominal fold, inguinal folds, and labia majora there are hyperpigmented tender nodules, hyperpigmented patches, and atrophic plaques.   -In the left medial thigh perianal region, and buttocks, there are a few tender, fluctuant nodules.       Laboratory and Data  No results found for this or any previous visit (from the past 24 hours).     Assessment/Plan   Sandrita Adams is a 34 y.o. female with a past medical history of hidradenitis suppurativa (HS, Rand Stage 3) and intrauterine pregnancy (currently at 27w4d) presented [facility] on 11/18/2024 with concern for uncontrolled pain in the setting of an HS flare. Dermatology was consulted for HS flare in pregnancy.        Differential diagnoses: Hidradenitis suppurativa, Rand stage 3 in pregnancy with flare and uncontrolled pain    Impression:  Sandrita has a history of stage 3 hidradenitis suppurativa and is currently pregnant (27w4d). She has previously been treated with multiple therapies: certolizumab, IV ertapenem, adalimumab, infliximab, secukinumab apremilast, spironolactone, doxycycline, bactrim, clindamycin, levaquin, rifampin, IV dalbavancin, and IV vancomycin. She has also trialed ILK injections and PO prednisone without success.      Prior to pregnancy, patient was on cosentyx. She reports the most significant improvement while on cosentyx, however it was discontinued in June after finding out she was pregnant given lack of safety data.      During this pregnancy, patient has been admitted/evaluated 8 times by dermatology. During previous admissions, patient was started on certolizumab (Cimzia). Patient reports she did not notice significant improvement on certolizumab and discontinued due to  headaches (last dose 10/6). During previous admissions, patient also received 5 total doses of IV ertapenem. However, patient stated she was unable to manage a PICC line at home due to her current housing situation and refused outpatient IV antibiotics.      Patient was last seen 11/12/24. Plan was to continue PO clindamycin, topicals hibiclens, and Humira.  Patient s/p first injection of Humira 11/7/24.  Today, she reports that she stopped Clindamycin 2 weeks ago as her previous dermatologist at [facility] told her to discontinue it once she started Humira. She states that she now has stable housing and would like to discuss possibility of PICC line and IV ertapenem.      Recommendations:  -Please consult ID to discuss use of IV ertapenem. Patient is agreeable to PICC line and IV ertapenem as her housing situation has stabilized.   -Continue Humira. Patient due for next injection 11/21/2024.   -Continue topical hibiclens daily.   -Will arrange outpatient follow up with dermatology at discharge.     The patient was seen and discussed with attending physician Dr. Castanon. The assessment and plan was communicated to the care team.    Thank you for the consultation and for the opportunity to contribute to the care of this patient.      Angi Garcia DO   PGY3, Dermatology  Epic chat (preferred)  Team pager [phone]     I saw and evaluated the patient. I personally obtained the key and critical portions of the history and physical exam or was physically present for key and critical portions performed by the resident. I reviewed the resident's documentation and discussed the patient with the resident. I agree with the resident's medical decision making as documented in the note.    Ivan Castanon MD
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
## 2024-11-20 ENCOUNTER — HOME HEALTH ADMISSION (OUTPATIENT)
Dept: HOME HEALTH SERVICES | Facility: HOME HEALTH | Age: 34
End: 2024-11-20
Payer: MEDICAID

## 2024-11-20 ENCOUNTER — DOCUMENTATION (OUTPATIENT)
Dept: HOME HEALTH SERVICES | Facility: HOME HEALTH | Age: 34
End: 2024-11-20

## 2024-11-20 ENCOUNTER — HOSPITAL ENCOUNTER (INPATIENT)
Facility: HOSPITAL | Age: 34
End: 2024-11-20
Attending: OBSTETRICS & GYNECOLOGY | Admitting: OBSTETRICS & GYNECOLOGY
Payer: MEDICAID

## 2024-11-20 ENCOUNTER — DOCUMENTATION (OUTPATIENT)
Dept: PHARMACY | Facility: CLINIC | Age: 34
End: 2024-11-20

## 2024-11-20 ENCOUNTER — APPOINTMENT (OUTPATIENT)
Dept: DERMATOLOGY | Facility: CLINIC | Age: 34
End: 2024-11-20
Payer: MEDICAID

## 2024-11-20 ENCOUNTER — PHARMACY VISIT (OUTPATIENT)
Dept: PHARMACY | Facility: CLINIC | Age: 34
End: 2024-11-20
Payer: MEDICAID

## 2024-11-20 ENCOUNTER — HOME INFUSION (OUTPATIENT)
Dept: INFUSION THERAPY | Age: 34
End: 2024-11-20

## 2024-11-20 VITALS
HEIGHT: 61 IN | DIASTOLIC BLOOD PRESSURE: 65 MMHG | WEIGHT: 180.12 LBS | OXYGEN SATURATION: 99 % | HEART RATE: 88 BPM | TEMPERATURE: 97.3 F | BODY MASS INDEX: 34.01 KG/M2 | RESPIRATION RATE: 18 BRPM | SYSTOLIC BLOOD PRESSURE: 97 MMHG

## 2024-11-20 DIAGNOSIS — L73.2 HIDRADENITIS SUPPURATIVA: Primary | ICD-10-CM

## 2024-11-20 PROBLEM — F11.20 OPIOID DEPENDENCE, DAILY USE (MULTI): Status: ACTIVE | Noted: 2024-11-20

## 2024-11-20 PROBLEM — Z3A.27 27 WEEKS GESTATION OF PREGNANCY (HHS-HCC): Status: ACTIVE | Noted: 2024-06-16

## 2024-11-20 LAB — STAPHYLOCOCCUS SPEC CULT: NORMAL

## 2024-11-20 PROCEDURE — 2500000001 HC RX 250 WO HCPCS SELF ADMINISTERED DRUGS (ALT 637 FOR MEDICARE OP)

## 2024-11-20 PROCEDURE — 99238 HOSP IP/OBS DSCHRG MGMT 30/<: CPT

## 2024-11-20 PROCEDURE — 59025 FETAL NON-STRESS TEST: CPT

## 2024-11-20 PROCEDURE — 2500000004 HC RX 250 GENERAL PHARMACY W/ HCPCS (ALT 636 FOR OP/ED)

## 2024-11-20 PROCEDURE — 59025 FETAL NON-STRESS TEST: CPT | Mod: GC

## 2024-11-20 PROCEDURE — RXMED WILLOW AMBULATORY MEDICATION CHARGE

## 2024-11-20 PROCEDURE — 99214 OFFICE O/P EST MOD 30 MIN: CPT | Performed by: STUDENT IN AN ORGANIZED HEALTH CARE EDUCATION/TRAINING PROGRAM

## 2024-11-20 RX ORDER — OXYCODONE HYDROCHLORIDE 10 MG/1
10 TABLET ORAL EVERY 4 HOURS PRN
Qty: 42 TABLET | Refills: 0 | Status: SHIPPED | OUTPATIENT
Start: 2024-11-20 | End: 2024-11-26 | Stop reason: ALTCHOICE

## 2024-11-20 RX ORDER — ACETAMINOPHEN 325 MG/1
975 TABLET ORAL EVERY 6 HOURS PRN
Status: DISCONTINUED | OUTPATIENT
Start: 2024-11-20 | End: 2024-11-20 | Stop reason: HOSPADM

## 2024-11-20 RX ORDER — ASPIRIN 81 MG/1
162 TABLET ORAL DAILY
Status: DISCONTINUED | OUTPATIENT
Start: 2024-11-20 | End: 2024-11-20 | Stop reason: HOSPADM

## 2024-11-20 RX ORDER — ERTAPENEM 1 G/1
1 INJECTION, POWDER, LYOPHILIZED, FOR SOLUTION INTRAMUSCULAR; INTRAVENOUS EVERY 24 HOURS
Status: DISCONTINUED | OUTPATIENT
Start: 2024-11-20 | End: 2024-11-20 | Stop reason: HOSPADM

## 2024-11-20 RX ORDER — ERTAPENEM 1 G/1
1 INJECTION, POWDER, LYOPHILIZED, FOR SOLUTION INTRAMUSCULAR; INTRAVENOUS EVERY 24 HOURS
Qty: 1500 ML | Refills: 2 | Status: ON HOLD | OUTPATIENT
Start: 2024-11-21 | End: 2025-02-19

## 2024-11-20 RX ORDER — ACETAMINOPHEN 325 MG/1
975 TABLET ORAL EVERY 6 HOURS PRN
Qty: 30 TABLET | Refills: 3 | Status: ON HOLD | OUTPATIENT
Start: 2024-11-20

## 2024-11-20 RX ORDER — OXYCODONE HYDROCHLORIDE 5 MG/1
10 TABLET ORAL
Status: DISCONTINUED | OUTPATIENT
Start: 2024-11-20 | End: 2024-11-20 | Stop reason: HOSPADM

## 2024-11-20 RX ORDER — OXYCODONE HYDROCHLORIDE 5 MG/1
10 TABLET ORAL EVERY 4 HOURS PRN
Status: DISCONTINUED | OUTPATIENT
Start: 2024-11-20 | End: 2024-11-20

## 2024-11-20 ASSESSMENT — PAIN SCALES - GENERAL
PAINLEVEL_OUTOF10: 7
PAINLEVEL_OUTOF10: 8
PAINLEVEL_OUTOF10: 9
PAINLEVEL_OUTOF10: 10 - WORST POSSIBLE PAIN
PAINLEVEL_OUTOF10: 10 - WORST POSSIBLE PAIN
PAINLEVEL_OUTOF10: 8
PAINLEVEL_OUTOF10: 8
PAINLEVEL_OUTOF10: 7
PAINLEVEL_OUTOF10: 8

## 2024-11-20 ASSESSMENT — PAIN DESCRIPTION - DESCRIPTORS
DESCRIPTORS: SHOOTING;SHARP
DESCRIPTORS: SHOOTING;SHARP
DESCRIPTORS: SHOOTING

## 2024-11-20 ASSESSMENT — PAIN - FUNCTIONAL ASSESSMENT
PAIN_FUNCTIONAL_ASSESSMENT: 0-10

## 2024-11-20 ASSESSMENT — ITCH NUMERIC RATING SCALE: HOW SEVERE IS YOUR ITCHING?: 0

## 2024-11-20 ASSESSMENT — DERMATOLOGY QUALITY OF LIFE (QOL) ASSESSMENT
RATE HOW EMOTIONALLY BOTHERED YOU ARE BY YOUR SKIN PROBLEM (FOR EXAMPLE, WORRY, EMBARRASSMENT, FRUSTRATION): 6 - ALWAYS BOTHERED
RATE HOW BOTHERED YOU ARE BY SYMPTOMS OF YOUR SKIN PROBLEM (EG, ITCHING, STINGING BURNING, HURTING OR SKIN IRRITATION): 6 - ALWAYS BOTHERED
DATE THE QUALITY-OF-LIFE ASSESSMENT WAS COMPLETED: 67164
RATE HOW BOTHERED YOU ARE BY EFFECTS OF YOUR SKIN PROBLEMS ON YOUR ACTIVITIES (EG, GOING OUT, ACCOMPLISHING WHAT YOU WANT, WORK ACTIVITIES OR YOUR RELATIONSHIPS WITH OTHERS): 6 - ALWAYS BOTHERED
ARE THERE EXCLUSIONS OR EXCEPTIONS FOR THE QUALITY OF LIFE ASSESSMENT: NO
WHAT SINGLE SKIN CONDITION LISTED BELOW IS THE PATIENT ANSWERING THE QUALITY-OF-LIFE ASSESSMENT QUESTIONS ABOUT: HIDRADENITIS SUPPURATIVA

## 2024-11-20 ASSESSMENT — PAIN DESCRIPTION - LOCATION: LOCATION: BUTTOCKS

## 2024-11-20 ASSESSMENT — DERMATOLOGY PATIENT ASSESSMENT: DO YOU HAVE ANY NEW OR CHANGING LESIONS: NO

## 2024-11-20 ASSESSMENT — PATIENT GLOBAL ASSESSMENT (PGA): PATIENT GLOBAL ASSESSMENT: PATIENT GLOBAL ASSESSMENT:  3 - MODERATE

## 2024-11-20 NOTE — PROGRESS NOTES
"Subjective     Sandrita Adams is a 34 y.o. female who presents for the following: Hidradenitis Suppurativa (Bilat axilla, Groin, Genitals. Tx Humira, pt currently admitted into hospital d/t HS).       Seen by dermatology yesterday (Dr. Ashraf)    Sandrita Adams is a 34 y.o. female with a past medical history of hidradenitis suppurativa (HS, Rand Stage 3) and intrauterine pregnancy (currently at 27w4d) presented [facility] on 11/18/2024 with concern for uncontrolled pain in the setting of an HS flare. Dermatology was consulted for HS flare in pregnancy.      Patient has longstanding history of hidradenitis suppurativa. She reports developing her first \"boil\" at 7 years of age and receiving official diagnosis in 2016. She has previously been treated with: certolizumab, IV ertapenem, adalimumab, infliximab, secukinumab apremilast, spironolactone, doxycycline, bactrim, clindamycin, levaquin, rifampin, IV dalbavancin, and IV vancomycin. She has also trialed ILK injections and oral prednisone without success. Prior to pregnancy, patient was on cosentyx which she reports has been the most beneficial treatment. Patient discontinued cosentyx in June after finding out she was pregnant.       During this pregnancy, patient has been evaluated 8 times by dermatology. Patient was previously started on certolizumab (Cimzia). Patient reports she did not notice significant improvement on certolizumab and discontinued due to headaches (last dose 10/6). During previous admissions, patient also received IV ertapenem (received 5 doses inpatient). However, in the past patient stated she was unable to manage a PICC line at home due to her  housing situation and refused outpatient IV antibiotics.      Today, patient reports that she is experiencing another HS flare in her perianal area, left thigh, and buttocks. She states current pain regimen is not sufficient to control her pain. She reports decreased appetite and vomiting as well " as difficulty defecating due to the pain. She notes new diarrhea that coincided with the Humira injections. She has been using hibiclens daily and started Humira 11/7/24. She reports that she stopped Clindamycin 2 weeks ago as her previous dermatologist at [facility] told her to discontinue it once she started Humira. She states that she now has stable housing and would like to discuss possibility of PICC line and IV ertapenem.      Previous Treatment Summary:  1. Isotretinoin - helped acne but did not improve HS   2. Adalimumab - developed hives/bumps  3. Infliximab - missed 2 infusions due to personal family issues   4.  I&D  5. Excision  6. Apremilast - No improvement  7. Spironolactone - stopped due to higher potassium and tingling in hands/ arms. Note hx of PCOS and elevated testosterone.   8. Multiple antibiotics - Doxycycline, bactrim, clindamycin, levaquin, rifampin - none of these helped. Had previously been on antibiotics but they gave her frequent yeast infections.   9. ILK  10. Cimzia - discontinued 2/2 headaches  11. IV ertapenem - received 5 doses inpatient, discontinued due to current living situation/inability to maintain PICC line           Review of Systems:  No other skin or systemic complaints other than what is documented elsewhere in the note.    The following portions of the chart were reviewed this encounter and updated as appropriate:          Skin Cancer History  No skin cancer on file.      Specialty Problems          Dermatology Problems    Hidradenitis suppurativa     -Extensive history of HS, s/p removal of axillary sweat glands in 2017, which did not significantly improve her pain. Patient previously followed with Dermatology, Pain Management, and Infectious Disease at [facility] and was previously well-controlled on Cosentix, Percocet, Gabapentin, and Naproxen which allowed her to complete her activities of daily living, including being able to work.   - s/p admission 7/22-24 for flare -  RUE US demonstrated 3cm pocket in axilla, evaluated by [facility] that admission, indurated without anything to drain     - Current pain regimen: oxycodone-acetaminophen 10/325 q4hr PRN, keflex.    Discontinued gabapentin due to parasthesias    Derm recs: cont clindamycin, humira injections started 11/7  Plastics recs: defer definitive surgery of axillary or groin until postpartum   Pain recs: signed off in pregnancy, re-established with [facility], has appt end of October           Hidradenitis suppurativa of multiple sites        Objective   Well appearing patient in no apparent distress; mood and affect are within normal limits.    A focused skin examination was performed. All findings within normal limits unless otherwise noted below.    Assessment/Plan   1. Hidradenitis suppurativa  Sandrita has a history of stage 3 hidradenitis suppurativa and is currently pregnant (27w4d). She has previously been treated with multiple therapies: certolizumab, IV ertapenem, adalimumab, infliximab, secukinumab apremilast, spironolactone, doxycycline, bactrim, clindamycin, levaquin, rifampin, IV dalbavancin, and IV vancomycin. She has also trialed ILK injections and PO prednisone without success.      Prior to pregnancy, patient was on cosentyx. She reports the most significant improvement while on cosentyx, however it was discontinued in June after finding out she was pregnant given lack of safety data.      During this pregnancy, patient has been admitted/evaluated 8 times by dermatology. During previous admissions, patient was started on certolizumab (Cimzia). Patient reports she did not notice significant improvement on certolizumab and discontinued due to headaches (last dose 10/6). During previous admissions, patient also received 5 total doses of IV ertapenem. However, patient stated she was unable to manage a PICC line at home due to her current housing situation and refused outpatient IV antibiotics.      Patient was last seen  11/12/24. Plan was to continue PO clindamycin, topicals hibiclens, and Humira.  Patient s/p first injection of Humira 11/7/24.       Recommendations/Agree with dermatology inpatient consultation   Agree with inpatient dermatology   -Continue Humira. Patient due for next injection 11/21/2024.   -Continue topical hibiclens daily.   -Continue IV Ertapenem ( complete 12 week course to carry until delivery so then can transition to a stronger biologic medication such as cosentyx- she refuses cosentyx today )    The plan after pregnancy is to re-start Cosentyx- will save a sample for patient to give in [facility] after birth (patient doesn't want to use Cosentyx right now although for her it was the most successful thing)    Please work to adjust pain regimen as necessary since patient still is suffering a great deal with pain  I would expect Ertapenem to start working within 2 weeks but at least some sort of additional pain regimen for 2 weeks would be appreciated

## 2024-11-20 NOTE — DISCHARGE INSTRUCTIONS
Home Care number is [phone].   Home Care representative will call you prior to the visit to arrange time of visit.

## 2024-11-20 NOTE — CARE PLAN
VSS. fundus and bleeding WNL. pain controlled with po meds. patient remained safe throughout shift. PICC in place and orders for home care tomorrow for antibiotics. pt d/c'd home, received d/c instructions, follow up visit info, prescriptions and patient verbalized understanding.

## 2024-11-20 NOTE — DISCHARGE SUMMARY
Discharge Summary    Admission Date: 11/18/2024  Discharge Date: 11/20/2024    Discharge Diagnosis  Hidradenitis suppurativa  27 wks pregnancy    Hospital Course  Patient was admitted to the hospital on 11/18/2024 for HS flare. Patient was on Humira for  HS treatment and that has caused diarrhea increasing pain and lesions around the perianal area. Patient was offered Ertapenem IV infusion treatment previously by Dermatology. Patient wasn't able to start that treatment in the setting of unstable housing. However, she is able to start Ertapenem at this time. Dermatology and ID were consulted. Patient received her first dose of Ertapenem in the hospital. Plan for 90 day course of IV antibiotic.  Patient is discharged on 11/20/2024 in stable condition with the plan to follow with Dermatology for disease progression, with ID for Ertapenem safety checks and with OB for pregnancy follow ups.    Pertinent Physical Exam At Time of Discharge  General:  AAOx3, No acute distress  Cardiovascular: Warm and well perfused  Respiratory: Normal respiratory effort   Abdominal:  Soft, gravid, non-tender, no rebound or guarding, no palpable contractions   Extremities: Warm, well perfused, no edema, no calf tenderness     Last Vitals:  Temp Pulse Resp BP MAP Pulse Ox   36.3 °C (97.3 °F) 88 18 97/65 76 99 %     Discharge Meds     Your medication list        START taking these medications        Instructions Last Dose Given Next Dose Due   alteplase 2 mg injection  Commonly known as: Cathflo Activase      2 mL (2 mg) by intra-catheter route 1 time for 1 dose.       ertapenem IV  Commonly known as: INVanz  Start taking on: November 21, 2024      Infuse 50 mL (1 g) over 30 minutes into a venous catheter once every 24 hours.       oxyCODONE 10 mg immediate release tablet  Commonly known as: Roxicodone      Take 1 tablet (10 mg) by mouth every 4 hours if needed for severe pain (7 - 10).              CHANGE how you take these medications         Instructions Last Dose Given Next Dose Due   acetaminophen 325 mg tablet  Commonly known as: Tylenol  What changed: Another medication with the same name was added. Make sure you understand how and when to take each.      Take 2 tablets (650 mg) by mouth every 6 hours for 180 doses.       acetaminophen 325 mg tablet  Commonly known as: Tylenol  What changed: You were already taking a medication with the same name, and this prescription was added. Make sure you understand how and when to take each.      Take 3 tablets (975 mg) by mouth every 6 hours if needed for mild pain (1 - 3) or moderate pain (4 - 6).              CONTINUE taking these medications        Instructions Last Dose Given Next Dose Due   albuterol 90 mcg/actuation inhaler      Inhale 2 puffs every 6 hours if needed for wheezing.       aspirin 81 mg EC tablet      Take 2 tablets (162 mg) by mouth once daily.       Boost 0.04 gram- 1 kcal/mL liquid  Generic drug: food supplemt, lactose-reduced      Please dispense 14 bottles for patient to have BID for 7 days.       chlorhexidine 4 % external liquid  Commonly known as: Hibiclens      Apply topically 2 times a day. Bathe in Hibiclens twice daily       clindamycin 300 mg capsule  Commonly known as: Cleocin      Take 1 capsule (300 mg) by mouth every 12 hours.       famotidine 20 mg tablet  Commonly known as: Pepcid      Take 1 tablet (20 mg) by mouth 2 times a day.       Humira(CF) Pen Crohns-UC-HS 80 mg/0.8 mL pen injector kit pen-injector  Generic drug: adalimumab      Inject 2 pens (160mg) under the skin on day 0, then 1 pen (80mg) on day 15       Humira(CF) Pen 80 mg/0.8 mL pen injector kit pen-injector  Generic drug: adalimumab      Inject 1 Pen (80 mg) under the skin every 14 (fourteen) days.       lidocaine 4 % cream  Commonly known as: LMX      Apply topically 4 times a day as needed for mild pain (1 - 3).       lidocaine 4 % cream  Commonly known as: LMX      Apply topically if needed for  moderate pain (4 - 6).       magnesium oxide 400 mg (241.3 mg magnesium) tablet  Commonly known as: Mag-Ox      Take 1 tablet (400 mg) by mouth 2 times a day.       Narcan 4 mg/actuation nasal spray  Generic drug: naloxone      Administer 1 spray (4 mg) into affected nostril(s) if needed for opioid reversal or respiratory depression. May repeat every 2-3 minutes if needed, alternating nostrils, until medical assistance becomes available.       ondansetron ODT 4 mg disintegrating tablet  Commonly known as: Zofran-ODT      Take 1 tablet (4 mg) by mouth every 8 hours if needed for nausea or vomiting.       oxyCODONE-acetaminophen  mg tablet  Commonly known as: Percocet      Take 1 tablet by mouth every 4 hours if needed for moderate pain (4 - 6) or severe pain (7 - 10).       predniSONE 20 mg tablet  Commonly known as: Deltasone      Take 1 tablet (20 mg) by mouth once daily in the morning for 7 days. For acute hidradenitis suppurativa flares.       Prenatal 19 29 mg iron- 1 mg tablet  Generic drug: -iron fum-folic acid      Take 1 tablet by mouth once daily.       SUMAtriptan 50 mg tablet  Commonly known as: Imitrex      Take 1 tablet (50 mg) by mouth every 6 hours if needed for migraine. May repeat dose once in 2 hours if no relief.  Do not exceed 2 doses in 24 hours.       Vitamin B-2 100 mg tablet tablet  Generic drug: riboflavin      Take 1 tablet (100 mg) by mouth once daily.                 Where to Get Your Medications        These medications were sent to [facility]  [address], [address], [facility] [address]      Hours: 8AM to 6PM Mon-Fri, 8AM to 4PM Sat, 9AM to 1PM Sun Phone: [phone]   acetaminophen 325 mg tablet  ertapenem IV  oxyCODONE 10 mg immediate release tablet       These medications [facility] sent [facility]  [facility]  [address] [facility] [address]      Phone: [phone]   alteplase 2 mg injection          Complications Requiring  Follow-Up  None    Test Results Pending At Discharge  Pending Labs       No current pending labs.            Outpatient Follow-Up  Future Appointments   Date Time Provider Department Center   11/26/2024  9:00 AM Dharmesh Mccann MD [id number] [facility]   12/5/2024  9:00 AM Leila Beatty MD [id number] [facility]   12/12/2024 10:00 AM Sean Mariscal MD [id number] [facility]   12/19/2024  9:00 AM [id number]   12/26/2024  9:00 AM Dharmesh Mccann MD [id number] [facility]   1/8/2025  1:00 PM Mark Kelly MD [id number] [facility]   1/14/2025 10:00 AM Lexa Galarza MD [id number] [facility]   2/6/2025  9:15 AM Spenser Elizalde MD [id number] [facility]       Seen and discussed with Dr. Cordelia Estrada MD PGY2

## 2024-11-20 NOTE — PROGRESS NOTES
Reviewed pt info as correct  Dx - hidradenitis suppurativa  Allergies - as listed in epic  No medication interactions  Labs are - weekly CBC w diff, CMP, CRP to Dr Galarza ([phone])  Pt has SL PICC placed 11/19 to flush per [facility] protocol  Med and med system - ertapenem MB+ thru 2/11/25  Care plan done today    Sandrita Adams is a 34 y.o. patient discharged from hospital to [facility] for skilled nursing and pharmacy services.  Pt ordered ertapenem 1 gm q24 SOC 11/21. Med ordered verified as accurate and appropriate for therapy  Dr Galarza following.    Spoke at length with pt. Verified correct address: [address] [place] [address] and phone number: [phone]. Reviewed [facility] servieces and answered all questions. RN to call pt with time of appt. Pt agreeable to delivery by 9 pm and confirmed will refrigerate med as appropriate.  to call when dropping off medication.    Pharmacy to mix 11/20 and deliver straight with supplies to match.  7x ertapenem 1 gm MB+  DOS: 11/21 - 11/27    Follow up 11/26 - check labs and progress, deliver OVN

## 2024-11-20 NOTE — SIGNIFICANT EVENT
Care Coordinator Note  Message received from Ivy Barajas from  Home Care that SOC will be 11/21/24.  Meds will be delivered by 9pm this evening per Nando Malloy PharmD.   Home Care phone number placed in patient's AVS

## 2024-11-20 NOTE — SIGNIFICANT EVENT
Care Coordinator Note  Met with patient this am to verify address, phone number, and insurance information.  Patient has Home Care referral for IV antibiotic infusions.  Patient will learn the care herself.  She has no teachable person available.  Home Care currently reviewing the referral.

## 2024-11-20 NOTE — HH CARE COORDINATION
Home Care received a Referral for Infusion and Nursing. We have processed the referral for a Start of Care on 11/21.     If you have any questions or concerns, please feel free to contact us at [phone]. Follow the prompts, enter your five digit zip code, and you will be directed to your care team on CENTL 2.

## 2024-11-20 NOTE — CARE PLAN
Discussed with dermatology and her options are very limited and she likely needs 12 weeks of ertapenem. She will likely switch back to her original HS regimen once she has delivered    34 y.o. female PMH of pregnancy, HS, Lupus, Asthma, Hirsutism, Obesity,  presented on 10/8 for hidradenitis flare.     #Hidradenitis flare  #Many failed therapies with HS  #Pregnancy     Patient with many past medications with isotretinoin Adalimumab, Infliximab, Excision, spironolactone and multiple antibiotics, Doxycycline, bactrim, clindamycin, levaquin, rifampin. There are reports that ertapenem can work well for HS. Discussed with team and patient without any IVDU. Of note, while it appears that ertpenem is helpful in hidrandenitis, it is a carbapenem and prolonged use may lead to antimicrobial resistance. Benefits and risks should be continuously evaluated. Did discuss this risk with dermatology and they will discuss today     -Okay to start ertapenem 1 g q 24 hours for 12 weeks of therapy (end of therapy 2/11/24)  -After discharge, the following labs should be collected weekly:  CBC with diff, CMP, quantitative C- Reactive protein.  Fax all results to [phone] attention Dr. Galarza  -Did discuss safety of medication with ID pharmacy, and OBGYN who both note that it is safe  -Please  patient that breast milk may contain <1 percent of ertapenem. Would be shared decision making between patient and OB team regarding this when she ultimately delivers  -Patient will follow with dermatology for disease progression, we will set up follow up as well to ensure that patient is doing okay on the medications    Infectious disease will sign off at this time. Please feel free to reach out with any questions or concerns. If any questions about this patient please haiku or call id pager [phone]    Lexa Galarza  Infectious Diseases  Team A

## 2024-11-20 NOTE — PROGRESS NOTES
"ANTEPARTUM PROGRESS NOTE   2024, 12:05 PM     SUBJECTIVE:  No acute events overnight. Patient has no complaints this morning. Denies painful contractions, VB, LOF. Reports normal fetal movement.     OBJECTIVE:   /74 (BP Location: Left arm, Patient Position: Sitting)   Pulse 86   Temp 36.7 °C (98.1 °F) (Temporal)   Resp 18   Ht 1.549 m (5' 1\")   Wt 81.7 kg (180 lb 1.9 oz)   LMP 2024   SpO2 100%   BMI 34.03 kg/m²    Temp  Min: 36.5 °C (97.7 °F)  Max: 36.7 °C (98.1 °F)  Pulse  Min: 86  Max: 129  BP  Min: 92/60  Max: 115/80    General:  AAOx3, No acute distress  Cardiovascular: Warm and well perfused  Respiratory: Normal respiratory effort   Abdominal:  Soft, gravid, non-tender, no rebound or guarding, no palpable contractions   Extremities: Warm, well perfused, no edema, no calf tenderness     NST: Baseline 135/ + accels/ - decels/ mod [place]  [place]: no contractions     ASSESSMENT AND PLAN:     34 y.o.  at 27w5d by 8wk US presenting for pain in the setting of known history of HS.   Patient is admitted for PICC placement to start Ertapenem per dermatology.    Hidradenitis Suppurativa   - Most recently admitted to [facility] [place] - for pain control  - Presented to [facility] ED  and was transferred here for repeat derm consult and pain control. Patient with new lesions in her perianal area, left thigh, and buttocks. Pain is not controlled.   - Current pain regimen: Oxycodone 10 q 4 PRN  - Patient reports pain is uncontrolled with current regimen   - Sitz baths PRN  - S/p prednisone 20mg x 7 days (-) without improvement and development of new lesions.   - Derm recs: Stage 3 HS. Started Humira  (q2 weeks). Ertapenem for 12 wks  - Plastics recs: defer definitive surgery of axillary or groin until postpartum   - Extensive history of HS, s/p removal of axillary sweat glands in , which did not significantly improve her pain. Patient following with Dermatology, Pain Management, and " Infectious Disease at [facility] [place], operated by [facility] and was previously well-controlled on Cosentix, Percocet, Gabapentin, and Naproxen which allowed her to complete her activities of daily living, including being able to work.   - ID recs: ertapenem 1 g q 24 hours , CBC with diff, CMP, quantitative C- Reactive protein.  Fax all results to [phone] attention Dr. Galarza.     Fetal status: Reassuring, NST AGA   - continue daily NSTs while inpatient   - Ultrasound: Last US: 10/30 EFW 736g 44%, AC 33%     Routine:  - TDAP not yet done  - 1hr 92  - BCM: to be discussed  - GBS deferred     Dispo: Pending coordination of daily IV infusion     Pt seen and discussed with MFM Attending, Dr. Storey.     Shala Estrada MD PGY2  MFM  Pager [phone]     Principal Problem:    Hidradenitis suppurativa

## 2024-11-21 ENCOUNTER — HOME CARE VISIT (OUTPATIENT)
Dept: HOME HEALTH SERVICES | Facility: HOME HEALTH | Age: 34
End: 2024-11-21

## 2024-11-21 ENCOUNTER — APPOINTMENT (OUTPATIENT)
Dept: MATERNAL FETAL MEDICINE | Facility: CLINIC | Age: 34
End: 2024-11-21
Payer: MEDICAID

## 2024-11-21 DIAGNOSIS — Z65.4 VICTIM OF INTIMATE PARTNER ABUSE DURING PREGNANCY: ICD-10-CM

## 2024-11-21 DIAGNOSIS — L73.2 HIDRADENITIS SUPPURATIVA: ICD-10-CM

## 2024-11-21 DIAGNOSIS — O99.512 ASTHMA AFFECTING PREGNANCY IN SECOND TRIMESTER (HHS-HCC): Primary | ICD-10-CM

## 2024-11-21 DIAGNOSIS — J45.909 ASTHMA AFFECTING PREGNANCY IN SECOND TRIMESTER (HHS-HCC): Primary | ICD-10-CM

## 2024-11-21 DIAGNOSIS — Z3A.27 27 WEEKS GESTATION OF PREGNANCY (HHS-HCC): ICD-10-CM

## 2024-11-21 NOTE — SIGNIFICANT EVENT
Updated Dermatology Assessment/Plan:   -Continue Humira. Patient due for next injection 11/21/2024.   -Continue hibiclens daily.  -ID agreeable to IV ertapenem. Will plan for IV ertapenem 1g daily x 12 weeks. Discussed with ID who is in agreement and will monitor labs.   -Patient has outpatient follow up with Dr. Elizalde 2/6/25.     Angi Garcia DO   PGY-3, Department of Dermatology

## 2024-11-22 ENCOUNTER — HOME CARE VISIT (OUTPATIENT)
Dept: HOME HEALTH SERVICES | Facility: HOME HEALTH | Age: 34
End: 2024-11-22
Payer: MEDICAID

## 2024-11-22 PROCEDURE — G0299 HHS/HOSPICE OF RN EA 15 MIN: HCPCS

## 2024-11-23 ENCOUNTER — HOME CARE VISIT (OUTPATIENT)
Dept: HOME HEALTH SERVICES | Facility: HOME HEALTH | Age: 34
End: 2024-11-23
Payer: MEDICAID

## 2024-11-23 VITALS
WEIGHT: 184 LBS | DIASTOLIC BLOOD PRESSURE: 89 MMHG | HEART RATE: 98 BPM | SYSTOLIC BLOOD PRESSURE: 134 MMHG | TEMPERATURE: 96.7 F | HEIGHT: 60 IN | OXYGEN SATURATION: 94 % | BODY MASS INDEX: 36.12 KG/M2 | RESPIRATION RATE: 16 BRPM

## 2024-11-23 PROCEDURE — G0299 HHS/HOSPICE OF RN EA 15 MIN: HCPCS

## 2024-11-23 ASSESSMENT — ENCOUNTER SYMPTOMS
CHANGE IN APPETITE: UNCHANGED
PAIN LOCATION: GENERALIZED
APPETITE LEVEL: GOOD
PERSON REPORTING PAIN: PATIENT
HIGHEST PAIN SEVERITY IN PAST 24 HOURS: 9/10
SUBJECTIVE PAIN PROGRESSION: WAXING AND WANING
FORGETFULNESS: 1
PAIN LOCATION - PAIN QUALITY: SHARP
PAIN LOCATION - PAIN SEVERITY: 8/10
LOWEST PAIN SEVERITY IN PAST 24 HOURS: 3/10
PAIN SEVERITY GOAL: 0/10
OCCASIONAL FEELINGS OF UNSTEADINESS: 0
PAIN: 1
PAIN LOCATION - PAIN FREQUENCY: CONSTANT

## 2024-11-23 ASSESSMENT — ACTIVITIES OF DAILY LIVING (ADL)
OASIS_M1830: 05
ENTERING_EXITING_HOME: SUPERVISION

## 2024-11-23 ASSESSMENT — LIFESTYLE VARIABLES: SMOKING_STATUS: 1

## 2024-11-24 ENCOUNTER — HOSPITAL ENCOUNTER (EMERGENCY)
Facility: HOSPITAL | Age: 34
Discharge: HOME | End: 2024-11-24
Attending: STUDENT IN AN ORGANIZED HEALTH CARE EDUCATION/TRAINING PROGRAM
Payer: MEDICAID

## 2024-11-24 ENCOUNTER — HOME CARE VISIT (OUTPATIENT)
Dept: HOME HEALTH SERVICES | Facility: HOME HEALTH | Age: 34
End: 2024-11-24
Payer: MEDICAID

## 2024-11-24 VITALS
SYSTOLIC BLOOD PRESSURE: 106 MMHG | OXYGEN SATURATION: 97 % | HEART RATE: 90 BPM | TEMPERATURE: 97 F | RESPIRATION RATE: 16 BRPM | DIASTOLIC BLOOD PRESSURE: 71 MMHG

## 2024-11-24 VITALS
OXYGEN SATURATION: 97 % | BODY MASS INDEX: 36.12 KG/M2 | DIASTOLIC BLOOD PRESSURE: 74 MMHG | HEART RATE: 90 BPM | SYSTOLIC BLOOD PRESSURE: 124 MMHG | RESPIRATION RATE: 20 BRPM | WEIGHT: 184 LBS | TEMPERATURE: 98.1 F | HEIGHT: 60 IN

## 2024-11-24 DIAGNOSIS — L73.2 HIDRADENITIS SUPPURATIVA: Primary | ICD-10-CM

## 2024-11-24 LAB
ALBUMIN SERPL BCP-MCNC: 3.5 G/DL (ref 3.4–5)
ALP SERPL-CCNC: 110 U/L (ref 33–110)
ALT SERPL W P-5'-P-CCNC: 11 U/L (ref 7–45)
ANION GAP SERPL CALC-SCNC: 10 MMOL/L (ref 10–20)
AST SERPL W P-5'-P-CCNC: 13 U/L (ref 9–39)
BASOPHILS # BLD AUTO: 0.03 X10*3/UL (ref 0–0.1)
BASOPHILS NFR BLD AUTO: 0.3 %
BILIRUB SERPL-MCNC: 0.2 MG/DL (ref 0–1.2)
BUN SERPL-MCNC: 7 MG/DL (ref 6–23)
CALCIUM SERPL-MCNC: 9.3 MG/DL (ref 8.6–10.3)
CHLORIDE SERPL-SCNC: 108 MMOL/L (ref 98–107)
CO2 SERPL-SCNC: 22 MMOL/L (ref 21–32)
CREAT SERPL-MCNC: 0.56 MG/DL (ref 0.5–1.05)
EGFRCR SERPLBLD CKD-EPI 2021: >90 ML/MIN/1.73M*2
EOSINOPHIL # BLD AUTO: 0.06 X10*3/UL (ref 0–0.7)
EOSINOPHIL NFR BLD AUTO: 0.5 %
ERYTHROCYTE [DISTWIDTH] IN BLOOD BY AUTOMATED COUNT: 14 % (ref 11.5–14.5)
GLUCOSE SERPL-MCNC: 87 MG/DL (ref 74–99)
HCT VFR BLD AUTO: 32.9 % (ref 36–46)
HGB BLD-MCNC: 11 G/DL (ref 12–16)
IMM GRANULOCYTES # BLD AUTO: 0.14 X10*3/UL (ref 0–0.7)
IMM GRANULOCYTES NFR BLD AUTO: 1.3 % (ref 0–0.9)
LYMPHOCYTES # BLD AUTO: 2.34 X10*3/UL (ref 1.2–4.8)
LYMPHOCYTES NFR BLD AUTO: 21.1 %
MAGNESIUM SERPL-MCNC: 1.91 MG/DL (ref 1.6–2.4)
MCH RBC QN AUTO: 25.3 PG (ref 26–34)
MCHC RBC AUTO-ENTMCNC: 33.4 G/DL (ref 32–36)
MCV RBC AUTO: 76 FL (ref 80–100)
MONOCYTES # BLD AUTO: 0.76 X10*3/UL (ref 0.1–1)
MONOCYTES NFR BLD AUTO: 6.8 %
NEUTROPHILS # BLD AUTO: 7.78 X10*3/UL (ref 1.2–7.7)
NEUTROPHILS NFR BLD AUTO: 70 %
NRBC BLD-RTO: 0 /100 WBCS (ref 0–0)
PLATELET # BLD AUTO: 305 X10*3/UL (ref 150–450)
POTASSIUM SERPL-SCNC: 4.4 MMOL/L (ref 3.5–5.3)
PROT SERPL-MCNC: 6.5 G/DL (ref 6.4–8.2)
RBC # BLD AUTO: 4.35 X10*6/UL (ref 4–5.2)
SODIUM SERPL-SCNC: 136 MMOL/L (ref 136–145)
WBC # BLD AUTO: 11.1 X10*3/UL (ref 4.4–11.3)

## 2024-11-24 PROCEDURE — 80053 COMPREHEN METABOLIC PANEL: CPT | Performed by: STUDENT IN AN ORGANIZED HEALTH CARE EDUCATION/TRAINING PROGRAM

## 2024-11-24 PROCEDURE — 83735 ASSAY OF MAGNESIUM: CPT | Performed by: STUDENT IN AN ORGANIZED HEALTH CARE EDUCATION/TRAINING PROGRAM

## 2024-11-24 PROCEDURE — 85025 COMPLETE CBC W/AUTO DIFF WBC: CPT | Performed by: STUDENT IN AN ORGANIZED HEALTH CARE EDUCATION/TRAINING PROGRAM

## 2024-11-24 PROCEDURE — 99284 EMERGENCY DEPT VISIT MOD MDM: CPT | Mod: 25

## 2024-11-24 PROCEDURE — 96376 TX/PRO/DX INJ SAME DRUG ADON: CPT

## 2024-11-24 PROCEDURE — G0299 HHS/HOSPICE OF RN EA 15 MIN: HCPCS

## 2024-11-24 PROCEDURE — 59025 FETAL NON-STRESS TEST: CPT | Performed by: STUDENT IN AN ORGANIZED HEALTH CARE EDUCATION/TRAINING PROGRAM

## 2024-11-24 PROCEDURE — 96374 THER/PROPH/DIAG INJ IV PUSH: CPT

## 2024-11-24 PROCEDURE — 2500000004 HC RX 250 GENERAL PHARMACY W/ HCPCS (ALT 636 FOR OP/ED): Performed by: STUDENT IN AN ORGANIZED HEALTH CARE EDUCATION/TRAINING PROGRAM

## 2024-11-24 RX ORDER — HYDROMORPHONE HYDROCHLORIDE 2 MG/ML
2 INJECTION, SOLUTION INTRAMUSCULAR; INTRAVENOUS; SUBCUTANEOUS ONCE
Status: COMPLETED | OUTPATIENT
Start: 2024-11-24 | End: 2024-11-24

## 2024-11-24 RX ADMIN — HYDROMORPHONE HYDROCHLORIDE 2 MG: 2 INJECTION INTRAMUSCULAR; INTRAVENOUS; SUBCUTANEOUS at 13:24

## 2024-11-24 RX ADMIN — HYDROMORPHONE HYDROCHLORIDE 2 MG: 2 INJECTION INTRAMUSCULAR; INTRAVENOUS; SUBCUTANEOUS at 14:26

## 2024-11-24 ASSESSMENT — PAIN SCALES - GENERAL
PAINLEVEL_OUTOF10: 5 - MODERATE PAIN
PAINLEVEL_OUTOF10: 0 - NO PAIN
PAINLEVEL_OUTOF10: 10 - WORST POSSIBLE PAIN

## 2024-11-24 ASSESSMENT — ENCOUNTER SYMPTOMS
HIGHEST PAIN SEVERITY IN PAST 24 HOURS: 10/10
PAIN LOCATION - PAIN FREQUENCY: FREQUENT
LOWEST PAIN SEVERITY IN PAST 24 HOURS: 7/10
PAIN SEVERITY GOAL: 0/10
OCCASIONAL FEELINGS OF UNSTEADINESS: 0
FATIGUE: 1
PAIN LOCATION - PAIN SEVERITY: 8/10
APPETITE LEVEL: GOOD
SUBJECTIVE PAIN PROGRESSION: WAXING AND WANING
PAIN LOCATION: RECTUM
PAIN LOCATION - PAIN QUALITY: SHARP

## 2024-11-24 ASSESSMENT — PAIN DESCRIPTION - LOCATION
LOCATION: RECTUM
LOCATION: RECTUM

## 2024-11-24 ASSESSMENT — PAIN - FUNCTIONAL ASSESSMENT
PAIN_FUNCTIONAL_ASSESSMENT: 0-10

## 2024-11-24 ASSESSMENT — PAIN DESCRIPTION - PROGRESSION: CLINICAL_PROGRESSION: OTHER (COMMENT)

## 2024-11-24 NOTE — ED PROVIDER NOTES
[facility] ED Note    Date of Service: 2024  Reason for Visit: HS Flare UP      Patient History     KURT Adams is a 34 y.o.  female at 28w3d presents to the emerged department for hidradenitis suppurativa flare.  Patient has a history of stage III hidradenitis, has been managed previously by Humira, however was recently mid on 2024 for HS pain, was seen by dermatology and infectious disease, remains on Humira and started ertapenem IV infusions.  Patient has a right upper extremity PICC line.  Patient states yesterday she had a fever of 102.  She states over the past few days she has developed worsening axillary and buttock pain consistent with her hidradenitis pain.  Patient states that her pain regimen at home is not working.  She states given the degree of pain she presented the ED for further evaluation.  Patient states she has a very mild headache, gradual in onset, diffuse, aching sometimes typical of when she gets pain from her hidradenitis.  Patient states that given her refractory pain at home despite ertapenem infusions she presented the ED for further evaluation.  She denies any abdominal pain, vaginal bleeding, vaginal discharge, dysuria and hematuria.  She states her pregnancy has otherwise been well.  She denies any chest pain or shortness of breath.  She reports vomiting last week but no vomiting today.  She reports little bit of nausea but no diarrhea, melena or hematochezia.  Has previously gotten her hidradenitis abscesses excised but no longer has I&D's performed.      Past Medical History:   Diagnosis Date    Anemia 2024    Hidradenitis suppurativa 10/29/2020    Hidradenitis    Hidradenitis suppurativa     Lupus     UTI (urinary tract infection) during pregnancy, first trimester ([facility]) 2024    Vitamin D deficiency 2024     Past Surgical History:   Procedure Laterality Date    OTHER SURGICAL HISTORY  2022    Arm surgery    OTHER  SURGICAL HISTORY Left 2011    Left wrist    OTHER SURGICAL HISTORY Left 2021    Left wrist procedure         Physical Exam     Vitals:    11/24/24 1302 11/24/24 1400   BP: 120/78 127/71   Pulse: 94 91   Resp: 20 20   Temp: 36.7 °C (98.1 °F)    TempSrc: Temporal    SpO2: 97% 98%   Weight: 83.5 kg (184 lb)    Height: 1.524 m (5')      General: Age-appropriate female, nontoxic, sitting comfortably in the gurney no acute distress.  Pulmonary:   Non-labored breathing. Breath sounds clear bilaterally  Cardiac:  Regular rate   Abdomen: Gravid uterus.  Soft.  Non-- tender to palpation.  : Hidradenitis along her inner groin area without overlying erythema or purulent drainage appreciated.  This exam was performed with appropriate supervision.  Musculoskeletal:  No peripheral edema   Skin: Bilateral axilla with appreciated of hidradenitis with a small abscess appreciated in her bilateral axilla.  No obvious purulence, minimal to no surrounding erythema.  Neuro: Alert and orient x 4.  Moves all 4 extremity spontaneously.    Diagnostic Studies     Labs:  Please see EMR for labs obtained during this patient encounter.    Radiology:  Please see EMR for imaging obtained during this patient encounter.    ED Course and MDM     Sandrita Adams is a 34 y.o. female with a history and presentation as described above in HPI.      Upon presentation, the patient was afebrile, well-appearing, with unremarkable vital signs.  Patient with a history of hidradenitis, currently sees dermatology, infectious disease as well as [facility] given that she is pregnant who presents to the emergency department for pain secondary to hidradenitis.  Low suspicion for any acute additional cause of patient's symptoms here today.  She was afebrile, her exam was notable for abscesses without signs of infection, no surrounding erythema, no warmth, no purulent drainage appreciated in her buttock/groin area or her axillary.  Patient was given Dilaudid 2 mg x 2 in  concordance with her pain plan with improvement of her symptoms and reassessment.  Patient's laboratory evaluation was normal.  On reassessment, patient had improvement of her pain on reassessment and feels comfortable going home.  She did not drive, and will plan to get a ride home.  She has follow-up with her MFM on Tuesday which is appropriate.  She was given return precautions including development of any fevers, nausea or vomiting for any from eating or drinking or purulent drainage from her abscesses.  At this time, she is appropriate for discharge with follow-up on Tuesday.      Impression     Diagnoses as of 11/24/24 8089   Hidradenitis suppurativa        Plan       Discharge, see DCI provided      Michael Starr MD  [facility] Emergency Medicine         Michael Starr MD  11/24/24 5526

## 2024-11-24 NOTE — PROCEDURES
Fetal nonstress test    Baseline: 150 bpm  Variability: Moderate  Accelerations: Present  Decelerations: Not present    Interpretation: Reactive for gestational age less than 32 weeks

## 2024-11-24 NOTE — DISCHARGE INSTRUCTIONS
You were seen in the emergency department for hidradenitis.  We discussed that your labs were normal here in the emergency department.  We were able to get your pain down with 2 doses of Dilaudid.  Please continue to monitor your temperature.  If you have 1 isolated fever like you described yesterday, you may continue to watch it.  However if you continue to have fever multiple times over the course of a day or 2 with any symptoms at all please return to the emergency department for further evaluation.  Even if you do not have symptoms but continued to have fever over a day or 2 please return to the emergency department.

## 2024-11-25 ENCOUNTER — HOME INFUSION (OUTPATIENT)
Dept: INFUSION THERAPY | Age: 34
End: 2024-11-25
Payer: MEDICAID

## 2024-11-25 ENCOUNTER — HOSPITAL ENCOUNTER (INPATIENT)
Facility: HOSPITAL | Age: 34
End: 2024-11-25
Attending: OBSTETRICS & GYNECOLOGY | Admitting: OBSTETRICS & GYNECOLOGY
Payer: MEDICAID

## 2024-11-25 ENCOUNTER — HOSPITAL ENCOUNTER (EMERGENCY)
Facility: HOSPITAL | Age: 34
Discharge: OTHER INSTITUTION | End: 2024-11-25
Payer: MEDICAID

## 2024-11-25 ENCOUNTER — HOSPITAL ENCOUNTER (OUTPATIENT)
Facility: HOSPITAL | Age: 34
Discharge: LEFT BEFORE DISCHARGE | End: 2024-11-25
Attending: OBSTETRICS & GYNECOLOGY | Admitting: OBSTETRICS & GYNECOLOGY
Payer: MEDICAID

## 2024-11-25 ENCOUNTER — HOSPITAL ENCOUNTER (OUTPATIENT)
Facility: HOSPITAL | Age: 34
Discharge: HOME | End: 2024-11-25
Attending: OBSTETRICS & GYNECOLOGY | Admitting: OBSTETRICS & GYNECOLOGY
Payer: MEDICAID

## 2024-11-25 VITALS
TEMPERATURE: 97 F | RESPIRATION RATE: 18 BRPM | SYSTOLIC BLOOD PRESSURE: 110 MMHG | HEART RATE: 78 BPM | DIASTOLIC BLOOD PRESSURE: 78 MMHG | OXYGEN SATURATION: 97 %

## 2024-11-25 VITALS — OXYGEN SATURATION: 98 % | SYSTOLIC BLOOD PRESSURE: 105 MMHG | HEART RATE: 83 BPM | DIASTOLIC BLOOD PRESSURE: 72 MMHG

## 2024-11-25 VITALS
TEMPERATURE: 98.3 F | SYSTOLIC BLOOD PRESSURE: 117 MMHG | BODY MASS INDEX: 35.94 KG/M2 | RESPIRATION RATE: 16 BRPM | WEIGHT: 184 LBS | HEART RATE: 91 BPM | DIASTOLIC BLOOD PRESSURE: 73 MMHG | OXYGEN SATURATION: 98 %

## 2024-11-25 VITALS — RESPIRATION RATE: 18 BRPM | BODY MASS INDEX: 35.9 KG/M2 | HEIGHT: 60 IN | WEIGHT: 182.87 LBS | TEMPERATURE: 97.9 F

## 2024-11-25 PROCEDURE — 99214 OFFICE O/P EST MOD 30 MIN: CPT

## 2024-11-25 PROCEDURE — 4500999001 HC ED NO CHARGE

## 2024-11-25 PROCEDURE — 2500000001 HC RX 250 WO HCPCS SELF ADMINISTERED DRUGS (ALT 637 FOR MEDICARE OP): Performed by: OBSTETRICS & GYNECOLOGY

## 2024-11-25 PROCEDURE — 99213 OFFICE O/P EST LOW 20 MIN: CPT | Performed by: OBSTETRICS & GYNECOLOGY

## 2024-11-25 RX ORDER — OXYCODONE HYDROCHLORIDE 5 MG/1
10 TABLET ORAL ONCE
Status: COMPLETED | OUTPATIENT
Start: 2024-11-25 | End: 2024-11-25

## 2024-11-25 SDOH — SOCIAL STABILITY: SOCIAL INSECURITY: HAVE YOU HAD ANY THOUGHTS OF HARMING ANYONE ELSE?: NO

## 2024-11-25 SDOH — SOCIAL STABILITY: SOCIAL INSECURITY: VERBAL ABUSE: DENIES

## 2024-11-25 SDOH — SOCIAL STABILITY: SOCIAL INSECURITY: HAVE YOU HAD THOUGHTS OF HARMING ANYONE ELSE?: NO

## 2024-11-25 SDOH — ECONOMIC STABILITY: HOUSING INSECURITY: DO YOU FEEL UNSAFE GOING BACK TO THE PLACE WHERE YOU ARE LIVING?: NO

## 2024-11-25 SDOH — SOCIAL STABILITY: SOCIAL INSECURITY: DO YOU FEEL ANYONE HAS EXPLOITED OR TAKEN ADVANTAGE OF YOU FINANCIALLY OR OF YOUR PERSONAL PROPERTY?: NO

## 2024-11-25 SDOH — SOCIAL STABILITY: SOCIAL INSECURITY: PHYSICAL ABUSE: DENIES

## 2024-11-25 SDOH — SOCIAL STABILITY: SOCIAL INSECURITY: ARE YOU OR HAVE YOU BEEN THREATENED OR ABUSED PHYSICALLY, EMOTIONALLY, OR SEXUALLY BY ANYONE?: NO

## 2024-11-25 SDOH — HEALTH STABILITY: MENTAL HEALTH: HAVE YOU USED ANY PRESCRIPTION DRUGS OTHER THAN PRESCRIBED IN THE PAST 12 MONTHS?: NO

## 2024-11-25 SDOH — SOCIAL STABILITY: SOCIAL INSECURITY: DOES ANYONE TRY TO KEEP YOU FROM HAVING/CONTACTING OTHER FRIENDS OR DOING THINGS OUTSIDE YOUR HOME?: NO

## 2024-11-25 SDOH — HEALTH STABILITY: MENTAL HEALTH: HAVE YOU USED ANY SUBSTANCES (CANABIS, COCAINE, HEROIN, HALLUCINOGENS, INHALANTS, ETC.) IN THE PAST 12 MONTHS?: NO

## 2024-11-25 SDOH — SOCIAL STABILITY: SOCIAL INSECURITY: ARE THERE ANY APPARENT SIGNS OF INJURIES/BEHAVIORS THAT COULD BE RELATED TO ABUSE/NEGLECT?: NO

## 2024-11-25 SDOH — SOCIAL STABILITY: SOCIAL INSECURITY: HAS ANYONE EVER THREATENED TO HURT YOUR FAMILY OR YOUR PETS?: NO

## 2024-11-25 SDOH — HEALTH STABILITY: MENTAL HEALTH: WERE YOU ABLE TO COMPLETE ALL THE BEHAVIORAL HEALTH SCREENINGS?: YES

## 2024-11-25 SDOH — HEALTH STABILITY: MENTAL HEALTH: NON-SPECIFIC ACTIVE SUICIDAL THOUGHTS (PAST 1 MONTH): NO

## 2024-11-25 SDOH — HEALTH STABILITY: MENTAL HEALTH: WISH TO BE DEAD (PAST 1 MONTH): NO

## 2024-11-25 SDOH — HEALTH STABILITY: MENTAL HEALTH: SUICIDAL BEHAVIOR (LIFETIME): NO

## 2024-11-25 SDOH — SOCIAL STABILITY: SOCIAL INSECURITY: ABUSE SCREEN: ADULT

## 2024-11-25 ASSESSMENT — ENCOUNTER SYMPTOMS
PAIN: 1
PAIN LOCATION - PAIN QUALITY: SHARP
LOWEST PAIN SEVERITY IN PAST 24 HOURS: 4/10
PAIN LOCATION: RECTUM
PAIN SEVERITY GOAL: 0/10
HIGHEST PAIN SEVERITY IN PAST 24 HOURS: 9/10
PAIN LOCATION - PAIN SEVERITY: 7/10
PAIN LOCATION - PAIN FREQUENCY: CONSTANT
OCCASIONAL FEELINGS OF UNSTEADINESS: 0
SUBJECTIVE PAIN PROGRESSION: WAXING AND WANING
APPETITE LEVEL: GOOD

## 2024-11-25 ASSESSMENT — PAIN DESCRIPTION - PAIN TYPE: TYPE: ACUTE PAIN

## 2024-11-25 ASSESSMENT — LIFESTYLE VARIABLES
SKIP TO QUESTIONS 9-10: 1
HOW MANY STANDARD DRINKS CONTAINING ALCOHOL DO YOU HAVE ON A TYPICAL DAY: PATIENT DOES NOT DRINK
AUDIT-C TOTAL SCORE: 0
AUDIT-C TOTAL SCORE: 0
HOW OFTEN DO YOU HAVE A DRINK CONTAINING ALCOHOL: NEVER
HOW OFTEN DO YOU HAVE 6 OR MORE DRINKS ON ONE OCCASION: NEVER

## 2024-11-25 ASSESSMENT — PAIN DESCRIPTION - LOCATION: LOCATION: GROIN

## 2024-11-25 ASSESSMENT — PAIN SCALES - GENERAL
PAINLEVEL_OUTOF10: 9
PAINLEVEL_OUTOF10: 8
PAINLEVEL_OUTOF10: 9

## 2024-11-25 ASSESSMENT — PAIN - FUNCTIONAL ASSESSMENT: PAIN_FUNCTIONAL_ASSESSMENT: 0-10

## 2024-11-25 NOTE — PROGRESS NOTES
Review of chart. Patient with order for ertapenem 1gm q24h thru 2/11/25 for hidradentitis suppurativa. Note that patient is 28wks pregnant. Weekly labs are ordered with results to dr Galarza.    Review of entries in epic. Patient was in the ED on 11/24 for a HS flare up w/ worsening pain. Patient evaluated, given med for the pain and discharged home with plans for outpatient followup.    Review of rn visit notes. No problems noted with infusions or line. Patient able to administer meds independently.    Tel call with patient. Infusions going well. Patient confirmed need for delivery of further meds on Weds 11/27 with supplies to match. She asks that delivery made after 6p so that she is sure that she is home so that pkg does not get taken.      **DONOT send delivery via [employer]**    Processed fill for 7 x ertapenem MB+ for mix 11/26 and delivery 11/27 after 6p to cover doses 11/28 thru 12/4/24.    Follow up 12/3/24 with next fill ovn. Check labs.   Eucrisa Counseling: Patient may experience a mild burning sensation during topical application. Eucrisa is not approved in children less than 2 years of age.

## 2024-11-26 ENCOUNTER — PHARMACY VISIT (OUTPATIENT)
Dept: PHARMACY | Facility: CLINIC | Age: 34
End: 2024-11-26
Payer: MEDICAID

## 2024-11-26 ENCOUNTER — ROUTINE PRENATAL (OUTPATIENT)
Dept: MATERNAL FETAL MEDICINE | Facility: CLINIC | Age: 34
End: 2024-11-26
Payer: MEDICAID

## 2024-11-26 VITALS
BODY MASS INDEX: 35.75 KG/M2 | SYSTOLIC BLOOD PRESSURE: 109 MMHG | DIASTOLIC BLOOD PRESSURE: 74 MMHG | HEART RATE: 87 BPM | WEIGHT: 183.06 LBS

## 2024-11-26 DIAGNOSIS — L73.2 HIDRADENITIS SUPPURATIVA: ICD-10-CM

## 2024-11-26 DIAGNOSIS — J45.909 ASTHMA AFFECTING PREGNANCY IN SECOND TRIMESTER (HHS-HCC): ICD-10-CM

## 2024-11-26 DIAGNOSIS — O99.512 ASTHMA AFFECTING PREGNANCY IN SECOND TRIMESTER (HHS-HCC): ICD-10-CM

## 2024-11-26 DIAGNOSIS — Z3A.27 27 WEEKS GESTATION OF PREGNANCY (HHS-HCC): Primary | ICD-10-CM

## 2024-11-26 PROCEDURE — 99214 OFFICE O/P EST MOD 30 MIN: CPT | Performed by: OBSTETRICS & GYNECOLOGY

## 2024-11-26 PROCEDURE — RXMED WILLOW AMBULATORY MEDICATION CHARGE

## 2024-11-26 RX ORDER — PNV 119/IRON FUM/FOLIC ACID 29 MG-1 MG
1 TABLET ORAL DAILY
Qty: 60 TABLET | Refills: 6 | Status: ON HOLD | OUTPATIENT
Start: 2024-11-26 | End: 2025-11-26

## 2024-11-26 RX ORDER — OXYCODONE HYDROCHLORIDE 10 MG/1
10 TABLET ORAL EVERY 4 HOURS PRN
Qty: 42 TABLET | Refills: 0 | Status: CANCELLED | OUTPATIENT
Start: 2024-11-26

## 2024-11-26 RX ORDER — OXYCODONE AND ACETAMINOPHEN 10; 325 MG/1; MG/1
1 TABLET ORAL EVERY 4 HOURS PRN
Qty: 42 TABLET | Refills: 0 | Status: ON HOLD | OUTPATIENT
Start: 2024-11-26

## 2024-11-26 ASSESSMENT — PAIN SCALES - GENERAL: PAINLEVEL_OUTOF10: 0 - NO PAIN

## 2024-11-26 ASSESSMENT — PAIN - FUNCTIONAL ASSESSMENT: PAIN_FUNCTIONAL_ASSESSMENT: 0-10

## 2024-11-26 NOTE — ASSESSMENT & PLAN NOTE
GDM screen ordered (never actually jaxon beverage.  Counseled on completion today and plans to get with next visit. Will get grwoth US with next visit.    Orders:    oxyCODONE-acetaminophen (Percocet)  mg tablet; Take 1 tablet by mouth every 4 hours if needed for moderate pain (4 - 6) or severe pain (7 - 10).    -iron fum-folic acid (Prenatal 19) 29 mg iron- 1 mg tablet; Take 1 tablet by mouth once daily.
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
## 2024-11-26 NOTE — ASSESSMENT & PLAN NOTE
- continue daily Ertapenem infusions, follows with home health. Getting weekly labs with ID  - Duration of treatment per dermatology, anticipate at least 12 week course  - Has derm follow up 12/5  - [place]AR[place] reviewed, oxycodone refilled today  - PICCline appears intact.  Suspect skin irritation from dressing causing pruritis.  Discusses could try peripheral flonase (not directly near catheter) to help with localized skin reaction.

## 2024-11-26 NOTE — ED TRIAGE NOTES
Biba from home for c/o pain the groin and bilateral armpits. Pt has hx of hidradenitis and has been ongoing abx therapy at home. Pt states pain in groin area is worse today one of the boils bursted causing pain. Pt is 28 weeks pregnant.,

## 2024-11-26 NOTE — PROGRESS NOTES
MFM Follow-up  2024         SUBJECTIVE    HPI: Sandrita Adams is a 34 y.o.  at 28w4d here for RPNV. Denies contractions, bleeding, or LOF. Reports normal fetal movement. Patient reports still having significant pain from HS.  Has not noted improvement with ertapenem yet.  Having some itching from PICC line.    OBJECTIVE    Visit Vitals  /74   Pulse 87   Wt 83 kg (183 lb 1 oz)   LMP 2024   BMI 35.75 kg/m²   OB Status Pregnant   Smoking Status Former   BSA 1.87 m²      Ext: PICC line in place.  Dressing intact.  No erythema, warmth or induration or drainage.    FHT: see flowsheet    ASSESSMENT & PLAN    Sandrita Adams is a 34 y.o.  at 28w4d here for the following concerns we addressed today:    Assessment & Plan  27 weeks gestation of pregnancy ([facility])  GDM screen ordered (never actually jaxon beverage.  Counseled on completion today and plans to get with next visit. Will get grwoth US with next visit.    Orders:    oxyCODONE-acetaminophen (Percocet)  mg tablet; Take 1 tablet by mouth every 4 hours if needed for moderate pain (4 - 6) or severe pain (7 - 10).    -iron fum-folic acid (Prenatal 19) 29 mg iron- 1 mg tablet; Take 1 tablet by mouth once daily.    Hidradenitis suppurativa  - continue daily Ertapenem infusions, follows with home health. Getting weekly labs with ID  - Duration of treatment per dermatology, anticipate at least 12 week course  - Has derm follow up   - OARRS reviewed, oxycodone refilled today  - PICCline appears intact.  Suspect skin irritation from dressing causing pruritis.  Discusses could try peripheral flonase (not directly near catheter) to help with localized skin reaction.         Asthma affecting pregnancy in second trimester ([facility])  Stable           RTC in 1 weeks      Dharmesh Mccann MD

## 2024-11-27 ENCOUNTER — SPECIALTY PHARMACY (OUTPATIENT)
Dept: PHARMACY | Facility: CLINIC | Age: 34
End: 2024-11-27

## 2024-11-27 ENCOUNTER — DOCUMENTATION (OUTPATIENT)
Dept: PHARMACY | Facility: CLINIC | Age: 34
End: 2024-11-27

## 2024-11-27 ENCOUNTER — HOME CARE VISIT (OUTPATIENT)
Dept: HOME HEALTH SERVICES | Facility: HOME HEALTH | Age: 34
End: 2024-11-27
Payer: MEDICAID

## 2024-11-27 PROCEDURE — 86140 C-REACTIVE PROTEIN: CPT

## 2024-11-27 PROCEDURE — 85025 COMPLETE CBC W/AUTO DIFF WBC: CPT

## 2024-11-27 PROCEDURE — 80053 COMPREHEN METABOLIC PANEL: CPT

## 2024-11-27 PROCEDURE — G0299 HHS/HOSPICE OF RN EA 15 MIN: HCPCS

## 2024-11-27 PROCEDURE — RXMED WILLOW AMBULATORY MEDICATION CHARGE

## 2024-11-28 VITALS
HEART RATE: 87 BPM | TEMPERATURE: 97 F | DIASTOLIC BLOOD PRESSURE: 76 MMHG | RESPIRATION RATE: 18 BRPM | SYSTOLIC BLOOD PRESSURE: 107 MMHG | OXYGEN SATURATION: 97 %

## 2024-11-28 ASSESSMENT — ENCOUNTER SYMPTOMS
APPETITE LEVEL: GOOD
PERSON REPORTING PAIN: PATIENT
OCCASIONAL FEELINGS OF UNSTEADINESS: 0
PAIN: 1

## 2024-11-29 ENCOUNTER — DOCUMENTATION (OUTPATIENT)
Dept: INFECTIOUS DISEASES | Facility: HOSPITAL | Age: 34
End: 2024-11-29
Payer: MEDICAID

## 2024-11-29 ENCOUNTER — HOSPITAL ENCOUNTER (OUTPATIENT)
Facility: HOSPITAL | Age: 34
End: 2024-11-29
Attending: STUDENT IN AN ORGANIZED HEALTH CARE EDUCATION/TRAINING PROGRAM | Admitting: STUDENT IN AN ORGANIZED HEALTH CARE EDUCATION/TRAINING PROGRAM
Payer: MEDICAID

## 2024-11-29 ENCOUNTER — HOSPITAL ENCOUNTER (OUTPATIENT)
Facility: HOSPITAL | Age: 34
Discharge: OTHER INSTITUTION | End: 2024-11-29
Attending: STUDENT IN AN ORGANIZED HEALTH CARE EDUCATION/TRAINING PROGRAM | Admitting: STUDENT IN AN ORGANIZED HEALTH CARE EDUCATION/TRAINING PROGRAM
Payer: MEDICAID

## 2024-11-29 ENCOUNTER — TELEPHONE (OUTPATIENT)
Dept: OBSTETRICS AND GYNECOLOGY | Facility: HOSPITAL | Age: 34
End: 2024-11-29

## 2024-11-29 DIAGNOSIS — Z3A.20 20 WEEKS GESTATION OF PREGNANCY (HHS-HCC): ICD-10-CM

## 2024-11-29 DIAGNOSIS — L73.2 HIDRADENITIS SUPPURATIVA: Primary | ICD-10-CM

## 2024-11-29 DIAGNOSIS — Z3A.27 27 WEEKS GESTATION OF PREGNANCY (HHS-HCC): ICD-10-CM

## 2024-11-29 DIAGNOSIS — L73.2 HIDRADENITIS SUPPURATIVA OF MULTIPLE SITES: ICD-10-CM

## 2024-11-29 PROBLEM — Z87.2 H/O HIDRADENITIS SUPPURATIVA: Status: ACTIVE | Noted: 2024-11-29

## 2024-11-29 LAB
ABO GROUP (TYPE) IN BLOOD: NORMAL
ALBUMIN SERPL BCP-MCNC: 3.3 G/DL (ref 3.4–5)
ALP SERPL-CCNC: 105 U/L (ref 33–110)
ALT SERPL W P-5'-P-CCNC: 11 U/L (ref 7–45)
ANION GAP SERPL CALC-SCNC: 14 MMOL/L (ref 10–20)
ANTIBODY SCREEN: NORMAL
APTT PPP: 26 SECONDS (ref 27–38)
AST SERPL W P-5'-P-CCNC: 13 U/L (ref 9–39)
BASOPHILS # BLD AUTO: 0.03 X10*3/UL (ref 0–0.1)
BASOPHILS NFR BLD AUTO: 0.3 %
BILIRUB SERPL-MCNC: 0.3 MG/DL (ref 0–1.2)
BUN SERPL-MCNC: 7 MG/DL (ref 6–23)
CALCIUM SERPL-MCNC: 9 MG/DL (ref 8.6–10.6)
CHLORIDE SERPL-SCNC: 106 MMOL/L (ref 98–107)
CO2 SERPL-SCNC: 21 MMOL/L (ref 21–32)
CREAT SERPL-MCNC: 0.67 MG/DL (ref 0.5–1.05)
CRP SERPL-MCNC: 3.51 MG/DL
EGFRCR SERPLBLD CKD-EPI 2021: >90 ML/MIN/1.73M*2
EOSINOPHIL # BLD AUTO: 0.16 X10*3/UL (ref 0–0.7)
EOSINOPHIL NFR BLD AUTO: 1.5 %
ERYTHROCYTE [DISTWIDTH] IN BLOOD BY AUTOMATED COUNT: 13.6 % (ref 11.5–14.5)
FIBRINOGEN PPP-MCNC: 462 MG/DL (ref 200–400)
FLUAV RNA RESP QL NAA+PROBE: NOT DETECTED
FLUBV RNA RESP QL NAA+PROBE: NOT DETECTED
GLUCOSE SERPL-MCNC: 99 MG/DL (ref 74–99)
HCT VFR BLD AUTO: 31.7 % (ref 36–46)
HGB BLD-MCNC: 10.6 G/DL (ref 12–16)
HOLD SPECIMEN: NORMAL
IMM GRANULOCYTES # BLD AUTO: 0.17 X10*3/UL (ref 0–0.7)
IMM GRANULOCYTES NFR BLD AUTO: 1.6 % (ref 0–0.9)
INR PPP: 1 (ref 0.9–1.1)
LACTATE SERPL-SCNC: 0.7 MMOL/L (ref 0.4–2)
LYMPHOCYTES # BLD AUTO: 2.48 X10*3/UL (ref 1.2–4.8)
LYMPHOCYTES NFR BLD AUTO: 23.9 %
MCH RBC QN AUTO: 25.4 PG (ref 26–34)
MCHC RBC AUTO-ENTMCNC: 33.4 G/DL (ref 32–36)
MCV RBC AUTO: 76 FL (ref 80–100)
MONOCYTES # BLD AUTO: 0.6 X10*3/UL (ref 0.1–1)
MONOCYTES NFR BLD AUTO: 5.8 %
NEUTROPHILS # BLD AUTO: 6.94 X10*3/UL (ref 1.2–7.7)
NEUTROPHILS NFR BLD AUTO: 66.9 %
NRBC BLD-RTO: 0 /100 WBCS (ref 0–0)
PLATELET # BLD AUTO: 314 X10*3/UL (ref 150–450)
POTASSIUM SERPL-SCNC: 3.8 MMOL/L (ref 3.5–5.3)
PROT SERPL-MCNC: 6.6 G/DL (ref 6.4–8.2)
PROTHROMBIN TIME: 11.3 SECONDS (ref 9.8–12.8)
RBC # BLD AUTO: 4.18 X10*6/UL (ref 4–5.2)
RH FACTOR (ANTIGEN D): NORMAL
RSV RNA RESP QL NAA+PROBE: NOT DETECTED
SARS-COV-2 RNA RESP QL NAA+PROBE: NOT DETECTED
SODIUM SERPL-SCNC: 137 MMOL/L (ref 136–145)
WBC # BLD AUTO: 10.4 X10*3/UL (ref 4.4–11.3)

## 2024-11-29 PROCEDURE — 59025 FETAL NON-STRESS TEST: CPT | Mod: GC

## 2024-11-29 PROCEDURE — 2500000004 HC RX 250 GENERAL PHARMACY W/ HCPCS (ALT 636 FOR OP/ED): Mod: SE

## 2024-11-29 PROCEDURE — 99254 IP/OBS CNSLTJ NEW/EST MOD 60: CPT | Performed by: STUDENT IN AN ORGANIZED HEALTH CARE EDUCATION/TRAINING PROGRAM

## 2024-11-29 PROCEDURE — 36415 COLL VENOUS BLD VENIPUNCTURE: CPT

## 2024-11-29 PROCEDURE — 86901 BLOOD TYPING SEROLOGIC RH(D): CPT

## 2024-11-29 PROCEDURE — 87086 URINE CULTURE/COLONY COUNT: CPT

## 2024-11-29 PROCEDURE — 84075 ASSAY ALKALINE PHOSPHATASE: CPT

## 2024-11-29 PROCEDURE — 2500000001 HC RX 250 WO HCPCS SELF ADMINISTERED DRUGS (ALT 637 FOR MEDICARE OP): Mod: SE

## 2024-11-29 PROCEDURE — 83605 ASSAY OF LACTIC ACID: CPT

## 2024-11-29 PROCEDURE — 85610 PROTHROMBIN TIME: CPT

## 2024-11-29 PROCEDURE — 2500000005 HC RX 250 GENERAL PHARMACY W/O HCPCS: Mod: SE

## 2024-11-29 PROCEDURE — 99223 1ST HOSP IP/OBS HIGH 75: CPT

## 2024-11-29 PROCEDURE — 86140 C-REACTIVE PROTEIN: CPT

## 2024-11-29 PROCEDURE — 59025 FETAL NON-STRESS TEST: CPT

## 2024-11-29 PROCEDURE — 87040 BLOOD CULTURE FOR BACTERIA: CPT

## 2024-11-29 PROCEDURE — 85025 COMPLETE CBC W/AUTO DIFF WBC: CPT

## 2024-11-29 PROCEDURE — 87081 CULTURE SCREEN ONLY: CPT | Performed by: STUDENT IN AN ORGANIZED HEALTH CARE EDUCATION/TRAINING PROGRAM

## 2024-11-29 PROCEDURE — 87075 CULTR BACTERIA EXCEPT BLOOD: CPT

## 2024-11-29 PROCEDURE — 87635 SARS-COV-2 COVID-19 AMP PRB: CPT

## 2024-11-29 PROCEDURE — 99253 IP/OBS CNSLTJ NEW/EST LOW 45: CPT | Performed by: INTERNAL MEDICINE

## 2024-11-29 PROCEDURE — 85384 FIBRINOGEN ACTIVITY: CPT

## 2024-11-29 RX ORDER — ONDANSETRON 4 MG/1
4 TABLET, FILM COATED ORAL EVERY 6 HOURS PRN
Status: DISCONTINUED | OUTPATIENT
Start: 2024-11-29 | End: 2024-12-02 | Stop reason: HOSPADM

## 2024-11-29 RX ORDER — OXYCODONE HYDROCHLORIDE 5 MG/1
10 TABLET ORAL
Status: DISCONTINUED | OUTPATIENT
Start: 2024-11-29 | End: 2024-12-02 | Stop reason: HOSPADM

## 2024-11-29 RX ORDER — ACETAMINOPHEN 325 MG/1
925 TABLET ORAL EVERY 6 HOURS
Status: DISCONTINUED | OUTPATIENT
Start: 2024-11-29 | End: 2024-11-29

## 2024-11-29 RX ORDER — SUMATRIPTAN 50 MG/1
50 TABLET, FILM COATED ORAL EVERY 6 HOURS PRN
Status: DISCONTINUED | OUTPATIENT
Start: 2024-11-29 | End: 2024-12-02 | Stop reason: HOSPADM

## 2024-11-29 RX ORDER — CYCLOBENZAPRINE HCL 10 MG
10 TABLET ORAL 3 TIMES DAILY PRN
Status: DISCONTINUED | OUTPATIENT
Start: 2024-11-29 | End: 2024-11-29

## 2024-11-29 RX ORDER — PROCHLORPERAZINE MALEATE 10 MG
10 TABLET ORAL ONCE
Status: DISCONTINUED | OUTPATIENT
Start: 2024-11-29 | End: 2024-12-02 | Stop reason: HOSPADM

## 2024-11-29 RX ORDER — BISACODYL 10 MG/1
10 SUPPOSITORY RECTAL DAILY PRN
Status: DISCONTINUED | OUTPATIENT
Start: 2024-11-29 | End: 2024-12-02 | Stop reason: HOSPADM

## 2024-11-29 RX ORDER — LABETALOL HYDROCHLORIDE 5 MG/ML
20 INJECTION, SOLUTION INTRAVENOUS ONCE AS NEEDED
Status: DISCONTINUED | OUTPATIENT
Start: 2024-11-29 | End: 2024-12-02 | Stop reason: HOSPADM

## 2024-11-29 RX ORDER — CYCLOBENZAPRINE HCL 10 MG
10 TABLET ORAL 3 TIMES DAILY PRN
Status: DISCONTINUED | OUTPATIENT
Start: 2024-11-30 | End: 2024-12-02 | Stop reason: HOSPADM

## 2024-11-29 RX ORDER — ERTAPENEM 1 G/1
1 INJECTION, POWDER, LYOPHILIZED, FOR SOLUTION INTRAMUSCULAR; INTRAVENOUS EVERY 24 HOURS
Status: DISCONTINUED | OUTPATIENT
Start: 2024-11-29 | End: 2024-11-29

## 2024-11-29 RX ORDER — POLYETHYLENE GLYCOL 3350 17 G/17G
17 POWDER, FOR SOLUTION ORAL 2 TIMES DAILY PRN
Status: DISCONTINUED | OUTPATIENT
Start: 2024-11-29 | End: 2024-12-02 | Stop reason: HOSPADM

## 2024-11-29 RX ORDER — CHLORHEXIDINE GLUCONATE 40 MG/ML
SOLUTION TOPICAL DAILY
Status: DISCONTINUED | OUTPATIENT
Start: 2024-11-29 | End: 2024-12-02 | Stop reason: HOSPADM

## 2024-11-29 RX ORDER — DIPHENHYDRAMINE HCL 25 MG
25 CAPSULE ORAL ONCE
Status: COMPLETED | OUTPATIENT
Start: 2024-11-29 | End: 2024-11-29

## 2024-11-29 RX ORDER — OXYCODONE HYDROCHLORIDE 5 MG/1
10 TABLET ORAL EVERY 4 HOURS PRN
Status: DISCONTINUED | OUTPATIENT
Start: 2024-11-29 | End: 2024-11-29

## 2024-11-29 RX ORDER — HYDRALAZINE HYDROCHLORIDE 20 MG/ML
5 INJECTION INTRAMUSCULAR; INTRAVENOUS ONCE AS NEEDED
Status: DISCONTINUED | OUTPATIENT
Start: 2024-11-29 | End: 2024-12-02 | Stop reason: HOSPADM

## 2024-11-29 RX ORDER — SIMETHICONE 80 MG
80 TABLET,CHEWABLE ORAL 4 TIMES DAILY PRN
Status: DISCONTINUED | OUTPATIENT
Start: 2024-11-29 | End: 2024-12-02 | Stop reason: HOSPADM

## 2024-11-29 RX ORDER — NIFEDIPINE 10 MG/1
10 CAPSULE ORAL ONCE AS NEEDED
Status: DISCONTINUED | OUTPATIENT
Start: 2024-11-29 | End: 2024-12-02 | Stop reason: HOSPADM

## 2024-11-29 RX ORDER — HYDROMORPHONE HYDROCHLORIDE 2 MG/ML
2 INJECTION, SOLUTION INTRAMUSCULAR; INTRAVENOUS; SUBCUTANEOUS
Status: DISCONTINUED | OUTPATIENT
Start: 2024-11-29 | End: 2024-11-29

## 2024-11-29 RX ORDER — LIDOCAINE 40 MG/G
CREAM TOPICAL EVERY 6 HOURS
Status: DISCONTINUED | OUTPATIENT
Start: 2024-11-29 | End: 2024-12-02 | Stop reason: HOSPADM

## 2024-11-29 RX ORDER — ADHESIVE BANDAGE
10 BANDAGE TOPICAL
Status: DISCONTINUED | OUTPATIENT
Start: 2024-11-29 | End: 2024-12-02 | Stop reason: HOSPADM

## 2024-11-29 RX ORDER — LIDOCAINE HYDROCHLORIDE 10 MG/ML
0.5 INJECTION, SOLUTION INFILTRATION; PERINEURAL ONCE AS NEEDED
Status: DISCONTINUED | OUTPATIENT
Start: 2024-11-29 | End: 2024-12-02 | Stop reason: HOSPADM

## 2024-11-29 RX ORDER — ONDANSETRON HYDROCHLORIDE 2 MG/ML
4 INJECTION, SOLUTION INTRAVENOUS EVERY 6 HOURS PRN
Status: DISCONTINUED | OUTPATIENT
Start: 2024-11-29 | End: 2024-12-02 | Stop reason: HOSPADM

## 2024-11-29 RX ORDER — HYDROMORPHONE HYDROCHLORIDE 2 MG/1
1 TABLET ORAL EVERY 4 HOURS PRN
Status: DISCONTINUED | OUTPATIENT
Start: 2024-11-29 | End: 2024-12-02 | Stop reason: HOSPADM

## 2024-11-29 RX ORDER — LIDOCAINE 40 MG/G
CREAM TOPICAL 4 TIMES DAILY PRN
Status: DISCONTINUED | OUTPATIENT
Start: 2024-11-29 | End: 2024-11-29

## 2024-11-29 SDOH — SOCIAL STABILITY: SOCIAL INSECURITY: WITHIN THE LAST YEAR, HAVE YOU BEEN HUMILIATED OR EMOTIONALLY ABUSED IN OTHER WAYS BY YOUR PARTNER OR EX-PARTNER?: NO

## 2024-11-29 SDOH — SOCIAL STABILITY: SOCIAL INSECURITY: HAS ANYONE EVER THREATENED TO HURT YOUR FAMILY OR YOUR PETS?: NO

## 2024-11-29 SDOH — SOCIAL STABILITY: SOCIAL INSECURITY: ABUSE SCREEN: ADULT

## 2024-11-29 SDOH — HEALTH STABILITY: MENTAL HEALTH: HAVE YOU USED ANY PRESCRIPTION DRUGS OTHER THAN PRESCRIBED IN THE PAST 12 MONTHS?: NO

## 2024-11-29 SDOH — SOCIAL STABILITY: SOCIAL INSECURITY: DO YOU FEEL ANYONE HAS EXPLOITED OR TAKEN ADVANTAGE OF YOU FINANCIALLY OR OF YOUR PERSONAL PROPERTY?: NO

## 2024-11-29 SDOH — ECONOMIC STABILITY: FOOD INSECURITY: WITHIN THE PAST 12 MONTHS, YOU WORRIED THAT YOUR FOOD WOULD RUN OUT BEFORE YOU GOT THE MONEY TO BUY MORE.: NEVER TRUE

## 2024-11-29 SDOH — SOCIAL STABILITY: SOCIAL INSECURITY: WITHIN THE LAST YEAR, HAVE YOU BEEN AFRAID OF YOUR PARTNER OR EX-PARTNER?: NO

## 2024-11-29 SDOH — SOCIAL STABILITY: SOCIAL INSECURITY: DOES ANYONE TRY TO KEEP YOU FROM HAVING/CONTACTING OTHER FRIENDS OR DOING THINGS OUTSIDE YOUR HOME?: NO

## 2024-11-29 SDOH — HEALTH STABILITY: MENTAL HEALTH: HAVE YOU USED ANY SUBSTANCES (CANABIS, COCAINE, HEROIN, HALLUCINOGENS, INHALANTS, ETC.) IN THE PAST 12 MONTHS?: NO

## 2024-11-29 SDOH — SOCIAL STABILITY: SOCIAL INSECURITY: HAVE YOU HAD ANY THOUGHTS OF HARMING ANYONE ELSE?: NO

## 2024-11-29 SDOH — ECONOMIC STABILITY: HOUSING INSECURITY: DO YOU FEEL UNSAFE GOING BACK TO THE PLACE WHERE YOU ARE LIVING?: NO

## 2024-11-29 SDOH — ECONOMIC STABILITY: FOOD INSECURITY: WITHIN THE PAST 12 MONTHS, THE FOOD YOU BOUGHT JUST DIDN'T LAST AND YOU DIDN'T HAVE MONEY TO GET MORE.: NEVER TRUE

## 2024-11-29 SDOH — SOCIAL STABILITY: SOCIAL INSECURITY: VERBAL ABUSE: DENIES

## 2024-11-29 SDOH — SOCIAL STABILITY: SOCIAL INSECURITY: ARE YOU OR HAVE YOU BEEN THREATENED OR ABUSED PHYSICALLY, EMOTIONALLY, OR SEXUALLY BY ANYONE?: NO

## 2024-11-29 SDOH — SOCIAL STABILITY: SOCIAL INSECURITY: PHYSICAL ABUSE: DENIES

## 2024-11-29 SDOH — SOCIAL STABILITY: SOCIAL INSECURITY: HAVE YOU HAD THOUGHTS OF HARMING ANYONE ELSE?: NO

## 2024-11-29 SDOH — HEALTH STABILITY: MENTAL HEALTH: NON-SPECIFIC ACTIVE SUICIDAL THOUGHTS (PAST 1 MONTH): NO

## 2024-11-29 SDOH — SOCIAL STABILITY: SOCIAL INSECURITY: ARE THERE ANY APPARENT SIGNS OF INJURIES/BEHAVIORS THAT COULD BE RELATED TO ABUSE/NEGLECT?: NO

## 2024-11-29 SDOH — HEALTH STABILITY: MENTAL HEALTH: WISH TO BE DEAD (PAST 1 MONTH): NO

## 2024-11-29 SDOH — HEALTH STABILITY: MENTAL HEALTH: SUICIDAL BEHAVIOR (LIFETIME): NO

## 2024-11-29 SDOH — HEALTH STABILITY: MENTAL HEALTH: WERE YOU ABLE TO COMPLETE ALL THE BEHAVIORAL HEALTH SCREENINGS?: YES

## 2024-11-29 ASSESSMENT — LIFESTYLE VARIABLES
SKIP TO QUESTIONS 9-10: 1
HOW OFTEN DO YOU HAVE 6 OR MORE DRINKS ON ONE OCCASION: NEVER
HOW OFTEN DO YOU HAVE A DRINK CONTAINING ALCOHOL: NEVER
AUDIT-C TOTAL SCORE: 0
AUDIT-C TOTAL SCORE: 0
HOW MANY STANDARD DRINKS CONTAINING ALCOHOL DO YOU HAVE ON A TYPICAL DAY: PATIENT DOES NOT DRINK

## 2024-11-29 ASSESSMENT — PAIN - FUNCTIONAL ASSESSMENT
PAIN_FUNCTIONAL_ASSESSMENT: 0-10

## 2024-11-29 ASSESSMENT — PAIN SCALES - GENERAL
PAINLEVEL_OUTOF10: 4
PAINLEVEL_OUTOF10: 9
PAINLEVEL_OUTOF10: 10 - WORST POSSIBLE PAIN
PAINLEVEL_OUTOF10: 8
PAINLEVEL_OUTOF10: 4

## 2024-11-29 ASSESSMENT — ENCOUNTER SYMPTOMS
NAUSEA: 1
WOUND: 1
VOMITING: 0
FEVER: 1
CHILLS: 1

## 2024-11-29 ASSESSMENT — PATIENT HEALTH QUESTIONNAIRE - PHQ9
SUM OF ALL RESPONSES TO PHQ9 QUESTIONS 1 & 2: 0
1. LITTLE INTEREST OR PLEASURE IN DOING THINGS: NOT AT ALL
2. FEELING DOWN, DEPRESSED OR HOPELESS: NOT AT ALL

## 2024-11-29 ASSESSMENT — ACTIVITIES OF DAILY LIVING (ADL): LACK_OF_TRANSPORTATION: NO

## 2024-11-29 NOTE — CONSULTS
Inpatient consult to Dermatology  Consult performed by: Angi Garcia DO  Consult ordered by: Suha Moss MD        DERMATOLOGY DEPARTMENT CONSULTATION NOTE  Name: Sandrita Adams  MRN: [id number]  : 1990    Reason for consultation: HS flare    History of Present Illness  Sandrita Adams is a 34 y.o. female with a past medical history of hidradenitis suppurativa (HS, Rand Stage 3) and intrauterine pregnancy (currently at 29w0d) presented to [facility] on 2024 after labs obtained 24 showed leukocytosis (16.6), elevated CRP (4.62), and intermittent fevers (up to 102). Patient is admitted for HS flare and infectious workup.     During this pregnancy, patient has been evaluated 9 times by dermatology. Last seen 24. She is currently on Humira (s/p two injections 24 and 24), IV ertapenem (started  with planned 12 week course), and hibiclens. Patient has previously been treated with multiple therapies: certolizumab (discontinued due to headaches), IV ertapenem, adalimumab, infliximab, secukinumab apremilast, spironolactone, doxycycline, bactrim, clindamycin, levaquin, rifampin, IV dalbavancin, and IV vancomycin. She has also trialed ILK injections and PO prednisone without success.     Today, patient reports that she is experiencing another HS flare in her bilateral axillae and medial thighs which started yesterday. She also reports headaches, nausea, and severe pain from her HS. Denies vomiting. Labs today show no leukocytosis (WBC 10.4), elevated CRP (3.51 decreased from 4.62), and lactate wnl (0.7). Blood culture and urine cultures pending.     Previous Treatment Summary:  1. Isotretinoin - helped acne but did not improve HS   2. Adalimumab - developed hives/bumps  3. Infliximab - missed 2 infusions due to personal family issues   4.  I&D  5. Excision  6. Apremilast - No improvement  7. Spironolactone - stopped due to higher potassium and tingling in hands/ arms.  Note hx of PCOS and elevated testosterone.   8. Multiple antibiotics - Doxycycline, bactrim, clindamycin, levaquin, rifampin - none of these helped. Had previously been on antibiotics but they gave her frequent yeast infections.   9. ILK  10. Cimzia - discontinued 2/2 headaches  11. IV ertapenem - received 5 doses inpatient, discontinued due to current living situation/inability to maintain PICC line      Review of Systems  Review of Systems   Constitutional:  Positive for chills and fever.   Gastrointestinal:  Positive for nausea. Negative for vomiting.   Skin:  Positive for wound.        Past Medical History  Past Medical History:   Diagnosis Date    Anemia 06/16/2024    Hidradenitis suppurativa 10/29/2020    Hidradenitis    Hidradenitis suppurativa     Lupus     UTI (urinary tract infection) during pregnancy, first trimester ([facility]) 07/16/2024    Vitamin D deficiency 06/16/2024       Past Surgical History   has a past surgical history that includes Other surgical history (09/19/2022); Other surgical history (Left, 2011); and Other surgical history (Left, 2021).     Allergies  Allergies   Allergen Reactions    Suboxone [Buprenorphine-Naloxone] Anaphylaxis, Hives and Itching    Clarithromycin Hives    Haloperidol Hallucinations and Psychosis    Keflex [Cephalexin] Hives    Levofloxacin Swelling     Ankle Joint/tendon pain    Metoclopramide Headache, Hallucinations and Psychosis    Reglan [Metoclopramide Hcl] Psychosis       Medications  Scheduled Meds: piperacillin-tazobactam, 3.375 g, intravenous, q6h  prenatal vitamin (iron-folic), 1 tablet, oral, Daily  sodium chloride, 1,000 mL, intravenous, Once       Continuous Infusions:     PRN Meds: PRN medications: bisacodyl, hydrALAZINE, labetaloL, lidocaine, lidocaine, magnesium hydroxide, NIFEdipine, ondansetron **OR** ondansetron, oxyCODONE, polyethylene glycol, psyllium, simethicone, SUMAtriptan     Family History  No family history on file.    Social History    reports that she has quit smoking. Her smoking use included cigarettes. She has never used smokeless tobacco. She reports that she does not currently use alcohol. She reports that she does not use drugs.     Objective    Vitals:    11/29/24 1303 11/29/24 1307 11/29/24 1347   BP:   107/72   Pulse: (!) 137  94   Resp:  18 18   Temp:  36.8 °C (98.2 °F) 36.9 °C (98.4 °F)   TempSrc:  Oral Temporal   SpO2: (!) 88%  98%        Exam    GEN: no acute distress  NEURO: moving all extremities   EYES: conjunctiva and eyelids normal. No conjunctival injection or erosions appreciated  ENT:   - Lips: normal  NECK: normal and symmetric.   CV: no varicosities, warmth or tenderness of extremities.  GI: Distended abdomen 2/2 pregnancy  EXTREMITIES: no distal digital clubbing, cyanosis, petechiae   SKIN: A focused skin exam including scalp, face, eyes, ears, neck, trunk, bilateral upper & lower extremities were examined with the following findings:  -In the bilateral axillae, there are several hyperpigmented nodules, sinus tracts, and atrophic plaques.   -In the bilateral buttocks, medial thighs, and labia majora there are hyperpigmented nodules, hyperpigmented patches, and atrophic plaques.   -Few erythematous tender nodules in the bilateral medial thighs.     Laboratory and Data  No results found for this or any previous visit (from the past 24 hours).     Assessment/Plan   Sandrita Adams is a 34 y.o. female with a past medical history of hidradenitis suppurativa (HS, Rand Stage 3) and intrauterine pregnancy (currently at 29w0d) presented to [facility] on 11/29/2024 after labs obtained 11/27 showed leukocytosis (16.6), elevated CRP (4.62), and intermittent fevers (up to 102). Patient is admitted for HS flare and infectious workup.     Differential diagnoses: Hidradenitis Suppurativa Rand stage III with active flare    Impression:  Sandrita has a history of rand stage 3 hidradenitis suppurativa and is currently pregnant (29w0d).  She has previously been treated with multiple therapies: certolizumab, IV ertapenem (few doses while inpatient), adalimumab, infliximab, secukinumab, apremilast, spironolactone, doxycycline, bactrim, clindamycin, levaquin, rifampin, IV dalbavancin, and IV vancomycin. She has also trialed ILK injections and PO prednisone without success. Prior to pregnancy, patient was on cosentyx. She reports the most significant improvement while on cosentyx, however it was discontinued in  after finding out she was pregnant given lack of safety data.     Patient has had multiple admissions for HS flares in pregnancy. MFM team discussed that they are ok starting cosentyx in this case as they believe the benefits outweigh the risks but would like dermatology to manage. There are no trials of cosentyx in pregnant women so the fetal effects are unknown. There is one article (Isaias, [place] Journal of Dermatology, 2018, https://doi.org/10.1111/bjd.89541) based on post market data, stating placental transfer would be highest in third trimester and that animal studies do not show harmful effects. When they analyzed post market data, the majority of women discontinued within first trimester. Of the 18 cases that continued: 9 were lost to follow up/unknown, 3 had spontaneous abortions, 4 had elective termination, 1 had ongoing pregnancy, and 1 had a healthy . Three of those 18 cases used cosentyx during third trimester but their outcomes were not specified.     Patient has active abscesses today on her medial thighs, but overall appears slightly improved from previous exam. Patient is currently on Humira, IV ertapenem, and hibiclens. Patient has completed two injections of Humira (24 and 24) and recently started IV ertapenem 24.  Would recommend continuing Humira, IV ertapenem (for total 12 week course), and hibiclens at this time given slight improvement and the length of treatment of Humira/Ertapenem is not  sufficient to determine treatment failure.     Recommendations:  -Pain management per MFM.   -Continue Humira 80mg every 2 weeks (next injection due 12/5/24). Counseled patient that it may take up to 12 weeks to see benefits of Humira.   -Continue hibiclens daily.  -Continue ertapenem 1g daily for a total of 12 weeks (ID plans to broaden IV abx to Zosyn while undergoing infectious workup).   -Patient has outpatient follow up with Dr. Elizalde 2/6/25. Will notify Dr. Elizalde of admission.     The patient was seen and discussed with attending physician Dr. Marion. The assessment and plan was communicated to the care team.    Thank you for the consultation and for the opportunity to contribute to the care of this patient.      Angi Garcia DO   PGY3, Dermatology  Epic chat (preferred)  Team pager [phone]     I saw and evaluated the patient. I personally obtained the key and critical portions of the history and physical exam or was physically present for key and critical portions performed by the resident. I reviewed the resident's documentation and discussed the patient with the resident. I agree with the resident's medical decision making as documented in the note.    Maria Victoria Marion MD

## 2024-11-29 NOTE — PROGRESS NOTES
Reviewed labs, white count is elevated and CRP elevated.     Lab Results   Component Value Date    WBC 16.6 (H) 11/27/2024    HGB 10.4 (L) 11/27/2024    HCT 30.8 (L) 11/27/2024    MCV 76 (L) 11/27/2024     11/27/2024     Lab Results   Component Value Date    CRP 4.62 (H) 11/27/2024     Patient notes that she has fevers, and pain in the hydradenitis. No pus coming out the picc line and no pain. Some pruritus likely tape.     #Elevated leukocytosis  #Elevated CRP  #Fevers  #HS on IV ertapenem  #Pregnancy    Patient notes that she has a fever of 102. She also has higher white count of 16.6 and CRP of 4.62. This is up from CRP <1. She notes that she does not feel well and has headache and severe pain from her HS. She does not have any obvious symptoms of PICC line infection. Given the fever, and worsening white count I am requesting that she return to the ER. Differential includes leukocytosis from infection, worsening HS flare, stress, pregnancy related concerns, PICC line issues (although does not appear to be this from questioning), allergic reaction, or stress.     -Discussed with patient that she should return to the ER given rising white count, Fevers, and rising CRP  -Patient is agreeable  -She requested that I Contact [facility] which I will attempt to do    Lexa Galarza  Infectious Diseases
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
## 2024-11-29 NOTE — CONSULTS
Inpatient consult to Infectious Diseases  Consult performed by: Edilma Esposito MD PhD  Consult ordered by: Suha Moss MD            Primary MD: Lane Grigsby MD    Reason For Consult  Antibiotic support for the treatment of hidradenitis suppurativa and pregnancy.    History Of Present Illness  Sandrita Adams is a 34 y.o. female  with a past medical history of hidradenitis suppurativa (HS, Rand Stage 3) and intrauterine pregnancy presenting with HS flare.      Patient admitted on 11/19 for HS flare. Patient has trialed many things in the past including isotretinoin Adalimumab, Infliximab, Excision, spironolactone and multiple antibiotics, Doxycycline, bactrim, clindamycin, levaquin, rifampin. Patient has trialed short course of ertapenem and was discharged home on another trial of ertapenem.     Her cultures in the past from her hidradenitis sites have only ever grown methicillin sensitive Staph aureus and methicillin sensitive coagulase-negative staph.  She did have 1 tissue culture in the past that grew 1 colony of Fusobacterium that was pan susceptible.    Today. atient notes that she has a fever of 102. She also has higher white count of 16.6 and CRP of 4.62. This is up from CRP <1. She notes that she does not feel well and has headache and severe pain from her HS. She does not have any obvious symptoms of PICC line infection. Given the fever, and worsening white count, she returned to the ED.    Patient was admitted and infectious diseases consulted for antibiotic support.    Patient does report some mild headache but no photophobia.  She has no pain erythema or discharge from around the PICC line.  She has no cough, she has no chest pain, she has no nausea, she has no vomiting, she has no diarrhea.  She does however, reports pain in her right axilla, her right inguinal thigh and groin area, and around her buttocks bilaterally.  She reports that the baby is moving well.  Patient was  seen at the same time as she was being evaluated by dermatology and maternal-fetal medicine.     Past Medical History  She has a past medical history of Anemia (06/16/2024), Hidradenitis suppurativa (10/29/2020), Hidradenitis suppurativa, Lupus, UTI (urinary tract infection) during pregnancy, first trimester ([facility]) (07/16/2024), and Vitamin D deficiency (06/16/2024).    Surgical History  She has a past surgical history that includes Other surgical history (09/19/2022); Other surgical history (Left, 2011); and Other surgical history (Left, 2021).     Social History     Occupational History    Occupation: Disabled   Tobacco Use    Smoking status: Former     Types: Cigarettes    Smokeless tobacco: Never   Vaping Use    Vaping status: Every Day   Substance and Sexual Activity    Alcohol use: Not Currently    Drug use: Never    Sexual activity: Yes     Partners: Male     Travel History   Travel since 10/29/24        Location Start Date End Date     [place] 10/11/24 (defaulted) 11/11/24 (defaulted)                   Family History  No family history on file.  Allergies  Suboxone [buprenorphine-naloxone], Clarithromycin, Haloperidol, Keflex [cephalexin], Levofloxacin, Metoclopramide, and Reglan [metoclopramide hcl]     Immunization History   Administered Date(s) Administered    DT (pediatric) 08/17/1999    DTP 1988, 02/08/1989, 05/09/1989, 02/07/1990    DTaP vaccine, pediatric  (INFANRIX) 08/19/1993    Hep B, Unspecified 08/17/1999    Hepatitis B vaccine, adult *Check Product/Dose* 03/24/2022, 04/10/2024    HiB, unspecified 11/13/1990    MMR vaccine, subcutaneous (MMR II) 02/07/1990, 08/19/1993    OPV 1988, 02/08/1989, 02/07/1990, 08/19/1993    PPD Test 01/30/2022    Pneumococcal polysaccharide vaccine, 23-valent, age 2 years and older (PNEUMOVAX 23) 03/24/2022    Td vaccine, age 7 years and older (TDVAX) 08/17/1999    Tdap vaccine, age 7 year and older (BOOSTRIX, ADACEL)  03/10/2015, 08/20/2023     Medications  Home medications:  Medications Prior to Admission   Medication Sig Dispense Refill Last Dose/Taking    aspirin 81 mg EC tablet Take 2 tablets (162 mg) by mouth once daily. 90 tablet 2 11/29/2024    oxyCODONE-acetaminophen (Percocet)  mg tablet Take 1 tablet by mouth every 4 hours if needed for moderate pain (4 - 6) or severe pain (7 - 10). 42 tablet 0 11/29/2024    acetaminophen (Tylenol) 325 mg tablet Take 2 tablets (650 mg) by mouth every 6 hours for 180 doses. 120 tablet 2     acetaminophen (Tylenol) 325 mg tablet Take 3 tablets (975 mg) by mouth every 6 hours if needed for mild pain (1 - 3) or moderate pain (4 - 6). 30 tablet 3     adalimumab (Bhumika,CF, Pen Crohns-UC-HS) 80 mg/0.8 mL pen injector kit pen-injector Inject 2 pens (160mg) under the skin on day 0, then 1 pen (80mg) on day 15 3 each 0     adalimumab (Humira,CF, Pen) 80 mg/0.8 mL pen injector kit pen-injector Inject 1 Pen (80 mg) under the skin every 14 (fourteen) days. 2 each 5     albuterol 90 mcg/actuation inhaler Inhale 2 puffs every 6 hours if needed for wheezing. 18 g 1     Boost 0.04 gram- 1 kcal/mL liquid Please dispense 14 bottles for patient to have BID for 7 days. 237 mL 3     chlorhexidine (Hibiclens) 4 % external liquid Apply topically 2 times a day. Bathe in Hibiclens twice daily 946 mL 3     clindamycin (Cleocin) 300 mg capsule Take 1 capsule (300 mg) by mouth every 12 hours. 60 capsule 0     ertapenem (INVanz) IV Infuse 50 mL (1 g) over 30 minutes into a venous catheter once every 24 hours. 1500 mL 2     famotidine (Pepcid) 20 mg tablet Take 1 tablet (20 mg) by mouth 2 times a day. 60 tablet 2     heparin lock flush, porcine, 10 unit/mL injection Infuse 0.5 mL into a venous catheter once daily. Indications: prevent clot from blocking an intravenous catheter       lidocaine (LMX) 4 % cream Apply topically 4 times a day as needed for mild pain (1 - 3). 60 g 2     lidocaine (LMX) 4 % cream  Apply topically if needed for moderate pain (4 - 6). 15 g 0     magnesium oxide (Mag-Ox) 400 mg (241.3 mg magnesium) tablet Take 1 tablet (400 mg) by mouth 2 times a day. 60 tablet 11     naloxone (Narcan) 4 mg/0.1 mL nasal spray Administer 1 spray (4 mg) into affected nostril(s) if needed for opioid reversal or respiratory depression. May repeat every 2-3 minutes if needed, alternating nostrils, until medical assistance becomes available. 2 each 11     ondansetron ODT (Zofran-ODT) 4 mg disintegrating tablet Take 1 tablet (4 mg) by mouth every 8 hours if needed for nausea or vomiting. 60 tablet 3     -iron fum-folic acid (Prenatal 19) 29 mg iron- 1 mg tablet Take 1 tablet by mouth once daily. 60 tablet 6     [] predniSONE (Deltasone) 20 mg tablet Take 1 tablet (20 mg) by mouth once daily in the morning for 7 days. For acute hidradenitis suppurativa flares. 7 tablet 3     riboflavin (vitamin B2) 100 mg tablet tablet Take 1 tablet (100 mg) by mouth once daily. 60 tablet 2     sodium chloride 0.9% (sodium chloride) flush Infuse 10 mL into a venous catheter once daily. Indications: prevent clot from blocking an intravenous catheter       SUMAtriptan (Imitrex) 50 mg tablet Take 1 tablet (50 mg) by mouth every 6 hours if needed for migraine. May repeat dose once in 2 hours if no relief.  Do not exceed 2 doses in 24 hours. 20 tablet 0      Current medications:  Scheduled medications  ertapenem, 1 g, intravenous, q24h  prenatal vitamin (iron-folic), 1 tablet, oral, Daily  sodium chloride, 1,000 mL, intravenous, Once      Continuous medications     PRN medications  PRN medications: bisacodyl, hydrALAZINE, labetaloL, lidocaine, lidocaine, magnesium hydroxide, NIFEdipine, ondansetron **OR** ondansetron, oxyCODONE, polyethylene glycol, psyllium, simethicone, SUMAtriptan    Review of Systems:  As per HPI.     Objective  Range of Vitals (last 24 hours)  Heart Rate:  []   Temp:  [36.8 °C (98.2 °F)-36.9 °C (98.4  "°F)]   Resp:  [18]   BP: (107)/(72)   SpO2:  [88 %-98 %]   Daily Weight  11/26/24 : 83 kg (183 lb 1 oz)    There is no height or weight on file to calculate BMI.     Physical Exam:  No apparent distress.  Conjunctiva are pink.  Sclera have no icterus.  Oropharynx is clear.  Right forearm PICC line is nontender, nonerythematous, and has no exudate.  Her cardiac exam is normal.  Her lungs are clear.  She has no spinal tenderness.  Her abdomen is gravid.  It is soft.  She has bowel sounds.  Her right axilla has a tender area of hidradenitis suppurativa.  She also has a fluctuant area in her right groin.  She also has multiple healed papules and plaques from prior hidradenitis suppurativa.     Relevant Results    Labs  Results from last 72 hours   Lab Units 11/27/24  0230   WBC AUTO x10*3/uL 16.6*   HEMOGLOBIN g/dL 10.4*   HEMATOCRIT % 30.8*   PLATELETS AUTO x10*3/uL 314   NEUTROS PCT AUTO % 78.0   LYMPHS PCT AUTO % 14.4   MONOS PCT AUTO % 6.1   EOS PCT AUTO % 0.2     Results from last 72 hours   Lab Units 11/27/24  0230   SODIUM mmol/L 137   POTASSIUM mmol/L 4.2   CHLORIDE mmol/L 104   CO2 mmol/L 22   BUN mg/dL 11   CREATININE mg/dL 0.56   GLUCOSE mg/dL 79   CALCIUM mg/dL 9.2   ANION GAP mmol/L 15   EGFR mL/min/1.73m*2 >90     Results from last 72 hours   Lab Units 11/27/24  0230   ALK PHOS U/L 106   BILIRUBIN TOTAL mg/dL 0.2   PROTEIN TOTAL g/dL 6.5   ALT U/L 13   AST U/L 12   ALBUMIN g/dL 3.6     Estimated Creatinine Clearance: 125 mL/min (by C-G formula based on SCr of 0.56 mg/dL).  C-Reactive Protein   Date Value Ref Range Status   11/27/2024 4.62 (H) <1.00 mg/dL Final   11/04/2024 0.63 <1.00 mg/dL Final   10/27/2024 0.64 <1.00 mg/dL Final     Sedimentation Rate   Date Value Ref Range Status   11/04/2024 35 (H) 0 - 20 mm/h Final   10/27/2024 35 (H) 0 - 20 mm/h Final   10/07/2024 43 (H) 0 - 20 mm/h Final     No results found for: \"HIV1X2\", \"HIVCONF\", \"ZNXHXK1DE\"  No results found for: \"HEPCABINIT\", \"HEPCAB\", " "\"HCVPCRQUANT\"  Microbiology  Susceptibility data from last 90 days.  Collected Specimen Info Organism Clindamycin Erythromycin Oxacillin Tetracycline Trimethoprim/Sulfamethoxazole Vancomycin   09/09/24 Tissue/Biopsy from Wound/Tissue Methicillin Susceptible Staphylococcus aureus (MSSA)  S  S  S  S  S  S     Mixed Gram-Positive and Gram-Negative Bacteria           No results found.     Assessment/Plan     34-year-old female with severe hidradenitis suppurativa who is well-known to the infectious disease consult service from a recent admission.  She was sent home on IV ertapenem, but her white blood cell count is now elevated to 16 and her CRP is also elevated.  Her symptoms physical findings and laboratory values raise the concern about a hidradenitis suppurativa flare, and there is a possibility for bacterial superinfection.  Given that this developed on ertapenem, we would favor broadening her antibiotic coverage at this point.  She has no prior history of MRSA infections.    Recommendations:  1.  Blood cultures have been sent and we will follow-up on results.  For now, it is reasonable to keep the PICC line in place given that she has no concerning findings for PICC infection.  2.  We would favor broadening her antibiotic coverage to piperacillin/tazobactam.  She can have standard dosing for the piperacillin/tazobactam, and does not need the higher doses we would usually recommend for Pseudomonas.  3.  Please send nares swab for MRSA.  4.  Would not add empiric vancomycin on at this point.  We will see how she does with the Zosyn.  However, if she does not improve on Zosyn or if any of her cultures are positive we may need to broaden her to vancomycin.  5.  Patient does not have an infectious contraindication to monoclonal antibody or other immunosuppressive therapy at this point.  We would defer ultimate decisions on this to dermatology and maternal-fetal medicine team.    Discussed with patient, primary team, " and dermatology.  ID will continue to follow this patient.  Please reach out via epic chat with any questions or concerns.    I spent 40 minutes in the professional and overall care of this patient.      Edilma Esposito MD PhD

## 2024-11-30 LAB
ALBUMIN SERPL BCP-MCNC: 1.9 G/DL (ref 3.4–5)
ALP SERPL-CCNC: 54 U/L (ref 33–110)
ALT SERPL W P-5'-P-CCNC: 5 U/L (ref 7–45)
ANION GAP SERPL CALC-SCNC: 12 MMOL/L (ref 10–20)
ANION GAP SERPL CALC-SCNC: 7 MMOL/L (ref 10–20)
AST SERPL W P-5'-P-CCNC: 7 U/L (ref 9–39)
BACTERIA UR CULT: NO GROWTH
BASOPHILS # BLD AUTO: 0.03 X10*3/UL (ref 0–0.1)
BASOPHILS NFR BLD AUTO: 0.3 %
BILIRUB SERPL-MCNC: 0.1 MG/DL (ref 0–1.2)
BUN SERPL-MCNC: 6 MG/DL (ref 6–23)
BUN SERPL-MCNC: 9 MG/DL (ref 6–23)
CALCIUM SERPL-MCNC: 5.4 MG/DL (ref 8.6–10.6)
CALCIUM SERPL-MCNC: 8.9 MG/DL (ref 8.6–10.6)
CHLORIDE SERPL-SCNC: 109 MMOL/L (ref 98–107)
CHLORIDE SERPL-SCNC: 122 MMOL/L (ref 98–107)
CO2 SERPL-SCNC: 16 MMOL/L (ref 21–32)
CO2 SERPL-SCNC: 23 MMOL/L (ref 21–32)
CREAT SERPL-MCNC: 0.32 MG/DL (ref 0.5–1.05)
CREAT SERPL-MCNC: 0.74 MG/DL (ref 0.5–1.05)
CRP SERPL-MCNC: 1.87 MG/DL
EGFRCR SERPLBLD CKD-EPI 2021: >90 ML/MIN/1.73M*2
EGFRCR SERPLBLD CKD-EPI 2021: >90 ML/MIN/1.73M*2
EOSINOPHIL # BLD AUTO: 0.15 X10*3/UL (ref 0–0.7)
EOSINOPHIL NFR BLD AUTO: 1.5 %
ERYTHROCYTE [DISTWIDTH] IN BLOOD BY AUTOMATED COUNT: 13.8 % (ref 11.5–14.5)
GLUCOSE SERPL-MCNC: 123 MG/DL (ref 74–99)
GLUCOSE SERPL-MCNC: 68 MG/DL (ref 74–99)
HCT VFR BLD AUTO: 28.3 % (ref 36–46)
HGB BLD-MCNC: 9.4 G/DL (ref 12–16)
IMM GRANULOCYTES # BLD AUTO: 0.12 X10*3/UL (ref 0–0.7)
IMM GRANULOCYTES NFR BLD AUTO: 1.2 % (ref 0–0.9)
LYMPHOCYTES # BLD AUTO: 3.16 X10*3/UL (ref 1.2–4.8)
LYMPHOCYTES NFR BLD AUTO: 31.3 %
MAGNESIUM SERPL-MCNC: 1.92 MG/DL (ref 1.6–2.4)
MCH RBC QN AUTO: 25.4 PG (ref 26–34)
MCHC RBC AUTO-ENTMCNC: 33.2 G/DL (ref 32–36)
MCV RBC AUTO: 77 FL (ref 80–100)
MONOCYTES # BLD AUTO: 0.86 X10*3/UL (ref 0.1–1)
MONOCYTES NFR BLD AUTO: 8.5 %
NEUTROPHILS # BLD AUTO: 5.78 X10*3/UL (ref 1.2–7.7)
NEUTROPHILS NFR BLD AUTO: 57.2 %
NRBC BLD-RTO: 0 /100 WBCS (ref 0–0)
PHOSPHATE SERPL-MCNC: 2.7 MG/DL (ref 2.5–4.9)
PLATELET # BLD AUTO: 275 X10*3/UL (ref 150–450)
POTASSIUM SERPL-SCNC: 2.7 MMOL/L (ref 3.5–5.3)
POTASSIUM SERPL-SCNC: 4.2 MMOL/L (ref 3.5–5.3)
PROT SERPL-MCNC: 3.8 G/DL (ref 6.4–8.2)
RBC # BLD AUTO: 3.7 X10*6/UL (ref 4–5.2)
SODIUM SERPL-SCNC: 140 MMOL/L (ref 136–145)
SODIUM SERPL-SCNC: 142 MMOL/L (ref 136–145)
WBC # BLD AUTO: 10.1 X10*3/UL (ref 4.4–11.3)

## 2024-11-30 PROCEDURE — 59025 FETAL NON-STRESS TEST: CPT

## 2024-11-30 PROCEDURE — 2500000005 HC RX 250 GENERAL PHARMACY W/O HCPCS: Mod: SE

## 2024-11-30 PROCEDURE — 2500000001 HC RX 250 WO HCPCS SELF ADMINISTERED DRUGS (ALT 637 FOR MEDICARE OP): Mod: SE | Performed by: STUDENT IN AN ORGANIZED HEALTH CARE EDUCATION/TRAINING PROGRAM

## 2024-11-30 PROCEDURE — 59025 FETAL NON-STRESS TEST: CPT | Mod: GC

## 2024-11-30 PROCEDURE — 99231 SBSQ HOSP IP/OBS SF/LOW 25: CPT | Performed by: INTERNAL MEDICINE

## 2024-11-30 PROCEDURE — 82374 ASSAY BLOOD CARBON DIOXIDE: CPT

## 2024-11-30 PROCEDURE — 2500000004 HC RX 250 GENERAL PHARMACY W/ HCPCS (ALT 636 FOR OP/ED): Mod: SE

## 2024-11-30 PROCEDURE — 2500000001 HC RX 250 WO HCPCS SELF ADMINISTERED DRUGS (ALT 637 FOR MEDICARE OP): Mod: SE

## 2024-11-30 PROCEDURE — 84100 ASSAY OF PHOSPHORUS: CPT

## 2024-11-30 PROCEDURE — 99232 SBSQ HOSP IP/OBS MODERATE 35: CPT

## 2024-11-30 PROCEDURE — 83735 ASSAY OF MAGNESIUM: CPT

## 2024-11-30 PROCEDURE — 86140 C-REACTIVE PROTEIN: CPT

## 2024-11-30 PROCEDURE — 85025 COMPLETE CBC W/AUTO DIFF WBC: CPT

## 2024-11-30 PROCEDURE — 80053 COMPREHEN METABOLIC PANEL: CPT

## 2024-11-30 RX ORDER — DIPHENHYDRAMINE HCL 25 MG
50 CAPSULE ORAL ONCE
Status: COMPLETED | OUTPATIENT
Start: 2024-11-30 | End: 2024-11-30

## 2024-11-30 RX ORDER — NAPROXEN SODIUM 220 MG/1
162 TABLET, FILM COATED ORAL DAILY
Status: DISCONTINUED | OUTPATIENT
Start: 2024-11-30 | End: 2024-12-02 | Stop reason: HOSPADM

## 2024-11-30 ASSESSMENT — PAIN SCALES - GENERAL
PAINLEVEL_OUTOF10: 9
PAINLEVEL_OUTOF10: 8
PAINLEVEL_OUTOF10: 0 - NO PAIN
PAINLEVEL_OUTOF10: 4
PAINLEVEL_OUTOF10: 8
PAINLEVEL_OUTOF10: 7
PAINLEVEL_OUTOF10: 2
PAINLEVEL_OUTOF10: 3
PAINLEVEL_OUTOF10: 4
PAINLEVEL_OUTOF10: 9
PAINLEVEL_OUTOF10: 8
PAINLEVEL_OUTOF10: 9
PAINLEVEL_OUTOF10: 8
PAINLEVEL_OUTOF10: 9
PAINLEVEL_OUTOF10: 9
PAINLEVEL_OUTOF10: 8
PAINLEVEL_OUTOF10: 9
PAINLEVEL_OUTOF10: 9
PAINLEVEL_OUTOF10: 8

## 2024-11-30 ASSESSMENT — PAIN - FUNCTIONAL ASSESSMENT
PAIN_FUNCTIONAL_ASSESSMENT: 0-10

## 2024-11-30 ASSESSMENT — PAIN DESCRIPTION - DESCRIPTORS
DESCRIPTORS: HEADACHE
DESCRIPTORS: SHARP;SHOOTING
DESCRIPTORS: SHARP;SHOOTING
DESCRIPTORS: SHOOTING;SHARP
DESCRIPTORS: SHOOTING;SHARP
DESCRIPTORS: HEADACHE
DESCRIPTORS: SHOOTING;SHARP
DESCRIPTORS: SHARP;SHOOTING
DESCRIPTORS: HEADACHE
DESCRIPTORS: HEADACHE
DESCRIPTORS: SHARP;SHOOTING

## 2024-11-30 ASSESSMENT — PAIN DESCRIPTION - LOCATION: LOCATION: BUTTOCKS

## 2024-11-30 NOTE — PROGRESS NOTES
Sandrita Adams is a 34 y.o. female on day 0 of admission presenting with H/O hidradenitis suppurativa.  ID is following for antibiotic management.    Subjective   Interval History: Patient continues to report severe pain.  She notes the development of new lesions in her buttock area.  The rest of her lesions are stable.  She is on Zosyn.  Her white blood cell count is trending down.  She is if afebrile.  She is tolerating her antibiotics well.            Objective   Range of Vitals (last 24 hours)  Heart Rate:  []   Temp:  [36 °C (96.8 °F)-36.9 °C (98.4 °F)]   Resp:  [17-18]   BP: (102-113)/(59-75)   SpO2:  [88 %-100 %]   Daily Weight  11/26/24 : 83 kg (183 lb 1 oz)    There is no height or weight on file to calculate BMI.    Physical Exam  She is in no apparent distress.  Her conjunctiva are pink.  Her sclera have no icterus.  Her oropharynx is clear.  Her cardiac exam is notable for a 1 out of 6 systolic ejection murmur.  Her lungs are clear.  Her abdomen is gravid but soft, she has bowel sounds present.  She has no tenderness.  Her buttock lesions appear stable without any erythema or areas that appear to need drainage.    Antibiotics      Relevant Results  Labs  Results from last 72 hours   Lab Units 11/30/24  0702 11/29/24  1512   WBC AUTO x10*3/uL 10.1 10.4   HEMOGLOBIN g/dL 9.4* 10.6*   HEMATOCRIT % 28.3* 31.7*   PLATELETS AUTO x10*3/uL 275 314   NEUTROS PCT AUTO % 57.2 66.9   LYMPHS PCT AUTO % 31.3 23.9   MONOS PCT AUTO % 8.5 5.8   EOS PCT AUTO % 1.5 1.5     Results from last 72 hours   Lab Units 11/30/24  0702 11/29/24  1512   SODIUM mmol/L 142 137   POTASSIUM mmol/L 2.7* 3.8   CHLORIDE mmol/L 122* 106   CO2 mmol/L 16* 21   BUN mg/dL 6 7   CREATININE mg/dL 0.32* 0.67   GLUCOSE mg/dL 68* 99   CALCIUM mg/dL 5.4* 9.0   ANION GAP mmol/L 7* 14   EGFR mL/min/1.73m*2 >90 >90     Results from last 72 hours   Lab Units 11/30/24  0702 11/29/24  1512   ALK PHOS U/L 54 105   BILIRUBIN TOTAL mg/dL 0.1 0.3    PROTEIN TOTAL g/dL 3.8* 6.6   ALT U/L 5* 11   AST U/L 7* 13   ALBUMIN g/dL 1.9* 3.3*     Estimated Creatinine Clearance: 125 mL/min (A) (by C-G formula based on SCr of 0.32 mg/dL (L)).  C-Reactive Protein   Date Value Ref Range Status   11/30/2024 1.87 (H) <1.00 mg/dL Final   11/29/2024 3.51 (H) <1.00 mg/dL Final   11/27/2024 4.62 (H) <1.00 mg/dL Final     Assessment/Plan   Severe hidradenitis suppurativa and a 34-year-old woman who is currently pregnant.  She is on long-term ertapenem, and her symptoms worsened on this medication as did her inflammatory markers.  She appears to be improving on Zosyn.  This is concerning for possible gram-negative pathogens that are not covered by ertapenem.  I spoke with her about the possibility of adding ciprofloxacin to her regimen.  There are some animal studies that suggest the maria isabel quinolones could be associated with skeletal abnormalities and animals.  She does not wish to try a maria isabel quinolone at this point, given the potential risk to the unborn baby.  We talked about the possibility of receiving IV Zosyn through a pump.  I suspect she may only need 7 days of Zosyn and then she can return to her usual ertapenem for 12 weeks.  I will discuss this, however, with her outpatient ID attending on Monday.    For now we would recommend to continue Zosyn.  ID will continue to follow this patient.  Please do not hesitate to epic chat me with any questions or concerns.     I spent 20 minutes in the professional and overall care of this patient.      Edilma Esposito MD PhD

## 2024-11-30 NOTE — CARE PLAN
The patient's goals for the shift include speak with doctor about her plan of care tonight    The clinical goals for the shift include pain control    Over the shift, the patient did not make progress toward the following goals. Barriers to progression include none. Recommendations to address these barriers include continue pain meds ask the doctors to make adjustments.Waiting on pain consult.

## 2024-11-30 NOTE — PROGRESS NOTES
[facility] PROGRESS NOTE   2024, 7:39 AM     SUBJECTIVE: Patient reports pain poorly controlled overnight. Understands why we are limiting opioids but states the breakthrough pain is unbearable. Denies any fevers or chills. Nausea is improved. Reports headache overnight that resolved. Denies painful contractions, VB, LOF. Reports normal fetal movement.     OBJECTIVE:   /75   Pulse 86   Temp 36 °C (96.8 °F) (Temporal)   Resp 18   LMP 2024   SpO2 100%    Temp  Min: 36 °C (96.8 °F)  Max: 36.9 °C (98.4 °F)  Pulse  Min: 82  Max: 137  BP  Min: 102/63  Max: 113/75    General:  AAOx3, No acute distress  Cardiovascular: Warm and well perfused  Respiratory: Normal respiratory effort   Abdominal: Gravid  MSK: extensive bilateral axillary, inguinal, and rectal HS lesions, mild fluctuance in right axilla  Extremities: Warm, well perfused, no edema. PICC in place on right upper arm without any erythema or drainage, mildly tender to palpation around site.    NST: Baseline 135, AGA  [place]: quiet     Labs:   Lab Results   Component Value Date    WBC 10.4 2024    HGB 10.6 (L) 2024    HCT 31.7 (L) 2024     2024     Lab Results   Component Value Date    APTT 26 (L) 2024    PROTIME 11.3 2024    INR 1.0 2024     Lab Results   Component Value Date    FIBRINOGEN 462 (H) 2024     Lab Results   Component Value Date    LACTATE 0.7 2024       ASSESSMENT AND PLAN:     34 y.o.  at 29w1d by 8wk US w/ hx of HS presenting for leukocytosis, fevers at home, and elevated CRP.     Hidradenitis Suppurativa  Extensive history of HS, s/p removal of axillary sweat glands in 2017, which did not significantly improve her pain. Patient following with Dermatology, Pain Management, and Infectious Disease at [facility] and was previously well-controlled on Cosentix, Percocet, Gabapentin, and Naproxen which allowed her to complete her activities of daily living, including being  able to work. During this pregnancy, trialed Cymzia but was unable to tolerate due to side effect of headaches. Plastics deferred definitive surgery of axillary or groin until postpartum. Currently on Humira q2 weeks (last dose 11/21) and was started on daily IV ertapenem on 11/20.  - Admitted 8x this pregnancy for HS flares and pain control  - Sent in by ID attending for WBC 16.6, CRP 4.62 on 11/27 labs as well as fevers at home  - Current pain regimen: Oxycodone 10 q4h PRN, PO Dilaudid 1mg q4hr for breakthrough pain; avoid IV pain medications  - ID cs, rec Zosyn, appreciate further recs  - Derm cs, do not recommend Cosentix at this time given lack of safety data, rec continue Humira and IV ertapenem     C/f sepsis  - WBC 16.6 (11/27) -> 10.4 on admission  - CRP 4.6 -> 3.51  - lactate 0.7  - Flu/COVID/RSV neg  - Blood cx, Ucx, MRSA swab pending  - Afebrile, VSS     Fetal status: Reassuring, NST AGA   - continue daily NSTs while inpatient   - Presentation: cephalic (11/29)  - Last US 10/30, EFW 736g 44%     Routine:  - TDAP not yet done  - Flu not yet done  - 1hr GTT drawn last admission, pt reports never drinking the beverage, needs to be re-done  - BCM: not yet discussed     Dispo: Continue inpatient management     Pt seen and discussed with M Attending, Dr. Moreno.      Leigh Sams MD   [facility]  Pager [phone]     Principal Problem:    H/O hidradenitis suppurativa

## 2024-12-01 VITALS
SYSTOLIC BLOOD PRESSURE: 107 MMHG | HEART RATE: 95 BPM | RESPIRATION RATE: 17 BRPM | DIASTOLIC BLOOD PRESSURE: 73 MMHG | WEIGHT: 182.98 LBS | OXYGEN SATURATION: 99 % | HEIGHT: 60 IN | BODY MASS INDEX: 35.92 KG/M2 | TEMPERATURE: 98.4 F

## 2024-12-01 LAB
ALBUMIN SERPL BCP-MCNC: 2.8 G/DL (ref 3.4–5)
ALP SERPL-CCNC: 88 U/L (ref 33–110)
ALT SERPL W P-5'-P-CCNC: 9 U/L (ref 7–45)
ANION GAP SERPL CALC-SCNC: 10 MMOL/L (ref 10–20)
AST SERPL W P-5'-P-CCNC: 9 U/L (ref 9–39)
BACTERIA BLD CULT: NORMAL
BACTERIA BLD CULT: NORMAL
BASOPHILS # BLD AUTO: 0.03 X10*3/UL (ref 0–0.1)
BASOPHILS NFR BLD AUTO: 0.3 %
BILIRUB SERPL-MCNC: 0.2 MG/DL (ref 0–1.2)
BUN SERPL-MCNC: 9 MG/DL (ref 6–23)
CALCIUM SERPL-MCNC: 8.5 MG/DL (ref 8.6–10.6)
CHLORIDE SERPL-SCNC: 108 MMOL/L (ref 98–107)
CO2 SERPL-SCNC: 23 MMOL/L (ref 21–32)
CREAT SERPL-MCNC: 0.69 MG/DL (ref 0.5–1.05)
EGFRCR SERPLBLD CKD-EPI 2021: >90 ML/MIN/1.73M*2
EOSINOPHIL # BLD AUTO: 0.17 X10*3/UL (ref 0–0.7)
EOSINOPHIL NFR BLD AUTO: 1.9 %
ERYTHROCYTE [DISTWIDTH] IN BLOOD BY AUTOMATED COUNT: 14.1 % (ref 11.5–14.5)
GLUCOSE 1H P 50 G GLC PO SERPL-MCNC: 101 MG/DL
GLUCOSE SERPL-MCNC: 83 MG/DL (ref 74–99)
HCT VFR BLD AUTO: 27.9 % (ref 36–46)
HGB BLD-MCNC: 9.4 G/DL (ref 12–16)
IMM GRANULOCYTES # BLD AUTO: 0.13 X10*3/UL (ref 0–0.7)
IMM GRANULOCYTES NFR BLD AUTO: 1.4 % (ref 0–0.9)
LYMPHOCYTES # BLD AUTO: 2.95 X10*3/UL (ref 1.2–4.8)
LYMPHOCYTES NFR BLD AUTO: 32.8 %
MCH RBC QN AUTO: 25.5 PG (ref 26–34)
MCHC RBC AUTO-ENTMCNC: 33.7 G/DL (ref 32–36)
MCV RBC AUTO: 76 FL (ref 80–100)
MONOCYTES # BLD AUTO: 0.83 X10*3/UL (ref 0.1–1)
MONOCYTES NFR BLD AUTO: 9.2 %
NEUTROPHILS # BLD AUTO: 4.89 X10*3/UL (ref 1.2–7.7)
NEUTROPHILS NFR BLD AUTO: 54.4 %
NRBC BLD-RTO: 0 /100 WBCS (ref 0–0)
PLATELET # BLD AUTO: 255 X10*3/UL (ref 150–450)
POTASSIUM SERPL-SCNC: 4.1 MMOL/L (ref 3.5–5.3)
PROT SERPL-MCNC: 5.5 G/DL (ref 6.4–8.2)
RBC # BLD AUTO: 3.68 X10*6/UL (ref 4–5.2)
SODIUM SERPL-SCNC: 137 MMOL/L (ref 136–145)
STAPHYLOCOCCUS SPEC CULT: NORMAL
WBC # BLD AUTO: 9 X10*3/UL (ref 4.4–11.3)

## 2024-12-01 PROCEDURE — 85025 COMPLETE CBC W/AUTO DIFF WBC: CPT

## 2024-12-01 PROCEDURE — 99254 IP/OBS CNSLTJ NEW/EST MOD 60: CPT | Performed by: STUDENT IN AN ORGANIZED HEALTH CARE EDUCATION/TRAINING PROGRAM

## 2024-12-01 PROCEDURE — 2500000001 HC RX 250 WO HCPCS SELF ADMINISTERED DRUGS (ALT 637 FOR MEDICARE OP): Mod: SE

## 2024-12-01 PROCEDURE — 82947 ASSAY GLUCOSE BLOOD QUANT: CPT

## 2024-12-01 PROCEDURE — 59025 FETAL NON-STRESS TEST: CPT

## 2024-12-01 PROCEDURE — 59025 FETAL NON-STRESS TEST: CPT | Mod: GC

## 2024-12-01 PROCEDURE — 80053 COMPREHEN METABOLIC PANEL: CPT

## 2024-12-01 PROCEDURE — 2500000005 HC RX 250 GENERAL PHARMACY W/O HCPCS: Mod: SE

## 2024-12-01 PROCEDURE — 99231 SBSQ HOSP IP/OBS SF/LOW 25: CPT | Performed by: INTERNAL MEDICINE

## 2024-12-01 PROCEDURE — 99232 SBSQ HOSP IP/OBS MODERATE 35: CPT

## 2024-12-01 PROCEDURE — 2500000004 HC RX 250 GENERAL PHARMACY W/ HCPCS (ALT 636 FOR OP/ED): Mod: SE

## 2024-12-01 RX ORDER — ACETAMINOPHEN 325 MG/1
925 TABLET ORAL EVERY 6 HOURS
Status: DISCONTINUED | OUTPATIENT
Start: 2024-12-01 | End: 2024-12-02 | Stop reason: HOSPADM

## 2024-12-01 ASSESSMENT — PAIN DESCRIPTION - LOCATION
LOCATION: BUTTOCKS

## 2024-12-01 ASSESSMENT — ENCOUNTER SYMPTOMS
WOUND: 1
NEUROLOGICAL NEGATIVE: 1
CONSTITUTIONAL NEGATIVE: 1
CARDIOVASCULAR NEGATIVE: 1
GASTROINTESTINAL NEGATIVE: 1
MUSCULOSKELETAL NEGATIVE: 1
RESPIRATORY NEGATIVE: 1
EYES NEGATIVE: 1
ENDOCRINE NEGATIVE: 1

## 2024-12-01 ASSESSMENT — PAIN SCALES - GENERAL
PAINLEVEL_OUTOF10: 9
PAINLEVEL_OUTOF10: 3
PAINLEVEL_OUTOF10: 6
PAINLEVEL_OUTOF10: 9
PAINLEVEL_OUTOF10: 6
PAINLEVEL_OUTOF10: 0 - NO PAIN
PAINLEVEL_OUTOF10: 9
PAINLEVEL_OUTOF10: 0 - NO PAIN
PAINLEVEL_OUTOF10: 7
PAINLEVEL_OUTOF10: 9
PAINLEVEL_OUTOF10: 3
PAINLEVEL_OUTOF10: 4
PAINLEVEL_OUTOF10: 0 - NO PAIN
PAINLEVEL_OUTOF10: 0 - NO PAIN
PAINLEVEL_OUTOF10: 8
PAINLEVEL_OUTOF10: 7
PAINLEVEL_OUTOF10: 9
PAINLEVEL_OUTOF10: 0 - NO PAIN
PAINLEVEL_OUTOF10: 0 - NO PAIN
PAINLEVEL_OUTOF10: 6
PAINLEVEL_OUTOF10: 7
PAINLEVEL_OUTOF10: 9

## 2024-12-01 ASSESSMENT — PAIN - FUNCTIONAL ASSESSMENT
PAIN_FUNCTIONAL_ASSESSMENT: 0-10

## 2024-12-01 ASSESSMENT — PAIN DESCRIPTION - DESCRIPTORS
DESCRIPTORS: SHARP;SHOOTING
DESCRIPTORS: SHARP;SHOOTING
DESCRIPTORS: SHARP
DESCRIPTORS: SHARP;SHOOTING
DESCRIPTORS: HEADACHE
DESCRIPTORS: SHARP;SHOOTING
DESCRIPTORS: HEADACHE
DESCRIPTORS: SHARP;SHOOTING
DESCRIPTORS: HEADACHE
DESCRIPTORS: SHARP;SHOOTING

## 2024-12-01 ASSESSMENT — PAIN SCALES - WONG BAKER: WONGBAKER_NUMERICALRESPONSE: NO HURT

## 2024-12-01 NOTE — PROGRESS NOTES
[facility] PROGRESS NOTE   2024, 7:58 AM     SUBJECTIVE: Patient reports pain 6/10 this AM, improved when taking PO oxy and dilaudid together. Denies any fevers or chills. Nausea is waxing and waning but able to tolerate PO. Reports headache overnight that resolved. Denies painful contractions, VB, LOF. Reports normal fetal movement.     OBJECTIVE:   /57   Pulse 73   Temp 36.3 °C (97.3 °F) (Temporal)   Resp 18   LMP 2024   SpO2 99%    Temp  Min: 35.9 °C (96.6 °F)  Max: 36.8 °C (98.2 °F)  Pulse  Min: 73  Max: 96  BP  Min: 89/60  Max: 125/72    General:  AAOx3, No acute distress  Cardiovascular: Warm and well perfused  Respiratory: Normal respiratory effort   Abdominal: Gravid  MSK: normal ROM  Extremities: Warm, well perfused, no edema. PICC in place on right upper arm without any erythema or drainage    NST: Baseline 135, AGA  [place]: quiet     Labs:   Lab Results   Component Value Date    WBC 10.1 2024    HGB 9.4 (L) 2024    HCT 28.3 (L) 2024     2024     Lab Results   Component Value Date    APTT 26 (L) 2024    PROTIME 11.3 2024    INR 1.0 2024     Lab Results   Component Value Date    FIBRINOGEN 462 (H) 2024     Lab Results   Component Value Date    LACTATE 0.7 2024       ASSESSMENT AND PLAN:     34 y.o.  at 29w1d by 8wk US w/ hx of HS presenting for leukocytosis, fevers at home, and elevated CRP.     Hidradenitis Suppurativa  Extensive history of HS, s/p removal of axillary sweat glands in 2017, which did not significantly improve her pain. Patient following with Dermatology, Pain Management, and Infectious Disease at [facility] and was previously well-controlled on Cosentix, Percocet, Gabapentin, and Naproxen which allowed her to complete her activities of daily living, including being able to work. During this pregnancy, trialed Cymzia but was unable to tolerate due to side effect of headaches. Plastics deferred definitive  surgery of axillary or groin until postpartum. Currently on Humira q2 weeks (last dose 11/21) and was started on daily IV ertapenem on 11/20.  - Admitted 8x this pregnancy for HS flares and pain control  - Sent in by ID attending for WBC 16.6, CRP 4.62 on 11/27 labs as well as fevers at home; lactate 0.7 on admission. Leukocytosis resolved, CRP downtrending. No concern for sepsis at this time. Flu/COVID/RSV neg. Ucx neg. Blood cx NGTD x1 day. Afebrile, VSS.  - Current pain regimen: Oxycodone 10 q4h PRN, PO Dilaudid 1mg q4hr for breakthrough pain; avoid IV pain medications  - ID cs, to continue Zosyn for 7 day course, then restart ertapenem, appreciate further recs  - Derm cs, do not recommend Cosentix at this time given lack of safety data, rec continue Humira and IV ertapenem     Fetal status: Reassuring, NST AGA   - continue daily NSTs while inpatient   - Presentation: cephalic (11/29)  - Last US 10/30, EFW 736g 44%     Routine:  - TDAP not yet done  - Flu not yet done  - 1hr GTT drawn last admission, pt reports never drinking the beverage, needs to be re-done  - BCM: not yet discussed     Dispo: Continue inpatient management pending ID recs     Pt seen and discussed with M Attending, Dr. Moreno.      Leigh Sams MD   [facility]  Pager [phone]     Principal Problem:    H/O hidradenitis suppurativa

## 2024-12-01 NOTE — PROGRESS NOTES
Sandrita Adams is a 34 y.o. female on day 0 of admission presenting with H/O hidradenitis suppurativa.  ID is following for antibiotic management of hidradenitis suppurativa.    Subjective   Interval History: Patient is afebrile and her white count continues to be stable and improved on Zosyn.  However, she notes that she continues to have new at bedtime lesions appearing.  She also continues to note pain.  She does not have any rashes or diarrhea.          Objective   Range of Vitals (last 24 hours)  Heart Rate:  [71-96]   Temp:  [35.9 °C (96.6 °F)-36.8 °C (98.2 °F)]   Resp:  [18-20]   BP: ()/(57-77)   Height:  [152.4 cm (5')]   SpO2:  [98 %-100 %]   Daily Weight  11/26/24 : 83 kg (183 lb 1 oz)    Body mass index is 35.75 kg/m².    Physical Exam  She is in no apparent distress.  Her conjunctiva are pink.  She has no icterus.  Her oropharynx is clear.  Her cardiac exam is normal.  Her lungs are clear.  Her abdomen is gravid, soft, with bowel sounds present.  Her groin and buttock lesions are stable.    Antibiotics  chlorhexidine - 4 %, 4 %  clindamycin - 300 mg  ertapenem - 1 gram/50 mL  piperacillin-tazobactam - 3.375 gram/50 mL    Relevant Results  Labs  Results from last 72 hours   Lab Units 12/01/24  0652 11/30/24  0702 11/29/24  1512   WBC AUTO x10*3/uL 9.0 10.1 10.4   HEMOGLOBIN g/dL 9.4* 9.4* 10.6*   HEMATOCRIT % 27.9* 28.3* 31.7*   PLATELETS AUTO x10*3/uL 255 275 314   NEUTROS PCT AUTO % 54.4 57.2 66.9   LYMPHS PCT AUTO % 32.8 31.3 23.9   MONOS PCT AUTO % 9.2 8.5 5.8   EOS PCT AUTO % 1.9 1.5 1.5     Results from last 72 hours   Lab Units 12/01/24  0652 11/30/24  1126 11/30/24  0702   SODIUM mmol/L 137 140 142   POTASSIUM mmol/L 4.1 4.2 2.7*   CHLORIDE mmol/L 108* 109* 122*   CO2 mmol/L 23 23 16*   BUN mg/dL 9 9 6   CREATININE mg/dL 0.69 0.74 0.32*   GLUCOSE mg/dL 83 123* 68*   CALCIUM mg/dL 8.5* 8.9 5.4*   ANION GAP mmol/L 10 12 7*   EGFR mL/min/1.73m*2 >90 >90 >90   PHOSPHORUS mg/dL  --  2.7  --       Results from last 72 hours   Lab Units 12/01/24  0652 11/30/24  0702 11/29/24  1512   ALK PHOS U/L 88 54 105   BILIRUBIN TOTAL mg/dL 0.2 0.1 0.3   PROTEIN TOTAL g/dL 5.5* 3.8* 6.6   ALT U/L 9 5* 11   AST U/L 9 7* 13   ALBUMIN g/dL 2.8* 1.9* 3.3*     Estimated Creatinine Clearance: 107.7 mL/min (by C-G formula based on SCr of 0.69 mg/dL).  C-Reactive Protein   Date Value Ref Range Status   11/30/2024 1.87 (H) <1.00 mg/dL Final   11/29/2024 3.51 (H) <1.00 mg/dL Final   11/27/2024 4.62 (H) <1.00 mg/dL Final     Microbiology  Her cultures all remain negative.       Assessment/Plan   34-year-old woman with severe hidradenitis suppurativa who had worsening of white blood cell count and inflammatory markers while on IV or ertapenem.  She appears to be responding well to IV Zosyn.  This suggest the possibility of a gram-negative pathogen that was not adequately covered by ertapenem.    Recommendations:  1.  Plan for 7 days total of IV Zosyn.  The remaining days can be done via pump.  However, I will discuss this plan with her outpatient ID attending tomorrow.  Plan would be to continue her IV ertapenem for the planned 12-week cycle after she completes her course of Zosyn.  We will confirm that this is our final plan with her outpatient ID attending tomorrow.    ID will put final recommendations in her chart tomorrow.  However, we will not be following her clinically.  If she has any acute ID issues that require attention, please do not hesitate to contact me via epic chat.     I spent 20 minutes in the professional and overall care of this patient.      Edilma Esposito MD PhD
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
## 2024-12-02 ENCOUNTER — PHARMACY VISIT (OUTPATIENT)
Dept: PHARMACY | Facility: CLINIC | Age: 34
End: 2024-12-02
Payer: MEDICAID

## 2024-12-02 ENCOUNTER — HOME INFUSION (OUTPATIENT)
Dept: INFUSION THERAPY | Age: 34
End: 2024-12-02
Payer: MEDICAID

## 2024-12-02 VITALS
WEIGHT: 182.98 LBS | BODY MASS INDEX: 35.92 KG/M2 | HEART RATE: 103 BPM | OXYGEN SATURATION: 99 % | RESPIRATION RATE: 17 BRPM | DIASTOLIC BLOOD PRESSURE: 80 MMHG | SYSTOLIC BLOOD PRESSURE: 117 MMHG | TEMPERATURE: 98.6 F | HEIGHT: 60 IN

## 2024-12-02 PROBLEM — Z3A.29 29 WEEKS GESTATION OF PREGNANCY (HHS-HCC): Status: ACTIVE | Noted: 2024-06-16

## 2024-12-02 PROCEDURE — 59025 FETAL NON-STRESS TEST: CPT | Mod: GC

## 2024-12-02 PROCEDURE — 2500000001 HC RX 250 WO HCPCS SELF ADMINISTERED DRUGS (ALT 637 FOR MEDICARE OP): Mod: SE

## 2024-12-02 PROCEDURE — 59025 FETAL NON-STRESS TEST: CPT

## 2024-12-02 PROCEDURE — 2500000004 HC RX 250 GENERAL PHARMACY W/ HCPCS (ALT 636 FOR OP/ED): Mod: SE

## 2024-12-02 PROCEDURE — 99233 SBSQ HOSP IP/OBS HIGH 50: CPT | Performed by: EMERGENCY MEDICINE

## 2024-12-02 PROCEDURE — RXMED WILLOW AMBULATORY MEDICATION CHARGE

## 2024-12-02 PROCEDURE — 99239 HOSP IP/OBS DSCHRG MGMT >30: CPT

## 2024-12-02 RX ORDER — ASPIRIN 81 MG/1
162 TABLET ORAL DAILY
Qty: 180 TABLET | Refills: 1 | Status: SHIPPED | OUTPATIENT
Start: 2024-12-02 | End: 2025-05-31

## 2024-12-02 RX ORDER — OXYCODONE AND ACETAMINOPHEN 10; 325 MG/1; MG/1
1 TABLET ORAL
Qty: 56 TABLET | Refills: 0 | Status: SHIPPED | OUTPATIENT
Start: 2024-12-02

## 2024-12-02 RX ORDER — HYDROMORPHONE HYDROCHLORIDE 2 MG/ML
2 INJECTION, SOLUTION INTRAMUSCULAR; INTRAVENOUS; SUBCUTANEOUS ONCE
Status: COMPLETED | OUTPATIENT
Start: 2024-12-02 | End: 2024-12-02

## 2024-12-02 ASSESSMENT — ENCOUNTER SYMPTOMS
PHOTOPHOBIA: 0
FLANK PAIN: 0
CONSTIPATION: 1
WEAKNESS: 0
WOUND: 1
HEADACHES: 1
DYSPHORIC MOOD: 0
FATIGUE: 0
MYALGIAS: 1
NERVOUS/ANXIOUS: 0
COUGH: 0
ABDOMINAL PAIN: 0
EYE PAIN: 0
SHORTNESS OF BREATH: 0
FEVER: 0
COLOR CHANGE: 0
SORE THROAT: 0
PALPITATIONS: 0
ARTHRALGIAS: 1

## 2024-12-02 ASSESSMENT — PAIN DESCRIPTION - DESCRIPTORS: DESCRIPTORS: SHARP;SHOOTING

## 2024-12-02 ASSESSMENT — PAIN SCALES - GENERAL
PAINLEVEL_OUTOF10: 9
PAINLEVEL_OUTOF10: 9
PAINLEVEL_OUTOF10: 8
PAINLEVEL_OUTOF10: 9
PAINLEVEL_OUTOF10: 8
PAINLEVEL_OUTOF10: 9
PAINLEVEL_OUTOF10: 9

## 2024-12-02 ASSESSMENT — PAIN DESCRIPTION - LOCATION
LOCATION: BUTTOCKS
LOCATION: BUTTOCKS

## 2024-12-02 NOTE — NURSING NOTE
RN notified by Dr. Barnes that patient is to be discharged to home, with arrangements already made for care and required infusions to continue at home. Urgent maternal warning signs reviewed at the bedside, patient expresses understanding. Discharge education reviewed at the bedside. Provider reviewed with patient plan for home care visits.     Patient provided handout on Urgent Maternal Warning Signs and instructed to notify RN for more than 4-6 contractions an hour or other signs of PTL, new-onset or increased vaginal bleeding, new-onset leaking of fluid, decreased fetal movements, severe HA that won't go away, visual changes, dizziness, chest pain, extreme swelling of hands or face, trouble breathing/SOB, thoughts of hurting yourself or your baby, fever, severe nausea and vomiting, swelling/redness/pain in legs and overwhelming tiredness.

## 2024-12-02 NOTE — NURSING NOTE
Dr. Mccann and [facility] team rounding on floor at this time, providers to see patient at bedside and discuss patient questions regarding upcoming ultrasound as well as patient concerns regarding current pain regimen.

## 2024-12-02 NOTE — CONSULTS
Reason For Consult  Pain control in the setting of hidradenitis suppurativa    History Of Present Illness  Sandrita Adams is a 34 y.o. female  at 29wk2d, history of hidradenitis suppurativa s/p removal of bilateral axillary sweat glands in 2017, presenting with flareup of her hidradenitis suppurativa. Flareup is associated with intense pain at the lesion sites. Patient states that she has several lesions in her bilateral armpits, genital area and the perianal area.  She states that the lesions of the genital and perineal area are causing her the most intense pain, especially when she has to use the bathroom or to take a shower.  She has previously seen dermatology and plastic surgery, and was initially placed on Humira (which caused an allergy, likely due to an additive), switched to Cosentyx (which provided great control of her symptoms, but had to be changed due to her current pregnancy), was trialed on Cimzia (which did not provide any benefit), and has now been placed back on Humira (with a different formulation so as to avoid her previous allergy).  She has also discussed surgical options with plastic surgery, with the recommendation being for surgery after delivery of fetus. She also follows with infectious disease, who is managing her antibiotic regimen.  She states that she has obtained at [facility] that provided adequate control of her symptoms with medication management including Percocet  mg every 4 hours.  Pain management was consulted for assistance with pain control.     Past Medical History  She has a past medical history of Anemia (2024), Hidradenitis suppurativa (10/29/2020), Hidradenitis suppurativa, Lupus, UTI (urinary tract infection) during pregnancy, first trimester ([facility]) (2024), and Vitamin D deficiency (2024).    Surgical History  She has a past surgical history that includes Other surgical history (2022); Other surgical history (Left, 2011); and  Other surgical history (Left, ).     Social History  She reports that she has quit smoking. Her smoking use included cigarettes. She has never used smokeless tobacco. She reports that she does not currently use alcohol. She reports that she does not use drugs.    Family History  No family history on file.     Allergies  Suboxone [buprenorphine-naloxone], Clarithromycin, Haloperidol, Keflex [cephalexin], Levofloxacin, Metoclopramide, and Reglan [metoclopramide hcl]    Review of Systems  Review of Systems   Constitutional: Negative.    HENT: Negative.     Eyes: Negative.    Respiratory: Negative.     Cardiovascular: Negative.    Gastrointestinal: Negative.    Endocrine: Negative.    Genitourinary: Negative.    Musculoskeletal: Negative.    Skin:  Positive for wound.   Neurological: Negative.          Physical Exam  Physical Exam  Constitutional:       Appearance: Normal appearance.   HENT:      Head: Normocephalic and atraumatic.      Nose: Nose normal.      Mouth/Throat:      Mouth: Mucous membranes are moist.   Eyes:      Pupils: Pupils are equal, round, and reactive to light.   Cardiovascular:      Rate and Rhythm: Normal rate and regular rhythm.   Abdominal:      Palpations: Abdomen is soft.      Comments: Gravid uterus in the abdomen   Musculoskeletal:         General: Normal range of motion.      Cervical back: Normal range of motion and neck supple.   Skin:     General: Skin is warm and dry.   Neurological:      General: No focal deficit present.      Mental Status: She is alert and oriented to person, place, and time.           Last Recorded Vitals  Blood pressure 107/73, pulse 95, temperature 36.9 °C (98.4 °F), temperature source Temporal, resp. rate 17, height 1.524 m (5'), weight 83 kg (182 lb 15.7 oz), last menstrual period 2024, SpO2 99%.    Relevant Results    Assessment/Plan   Sandrita Adams is a 34 y.o. female  at 29wk2d, history of hidradenitis suppurativa s/p removal of bilateral  axillary sweat glands in 2017, presenting with flareup of her hidradenitis suppurativa. Flareup is associated with intense pain at the lesion sites. She states that the lesions of the genital and perineal area are causing her the most intense pain, especially when she has to use the bathroom or to take a shower.  It appears that the patient has been appropriately following with dermatology plastic surgery, infectious disease, as well as [facility] for care controlling her symptoms during pregnancy.  Patient endorsed adequate control of her hidradenitis suppurativa associated pain with Percocet 10-325mg every 4 hours as needed at home. PDMP reviewed, and no significant evidence of medication shopping. Pain management was consulted for assistance with pain control while inpatient.  Patient states that she does not believe that her pain is well controlled especially because she still is having flareups lesions of her HS still presenting and causing pain.  She states that this pain is making it difficult for her to use the bathroom, shower, and perform other activities without intense pain.    Our recommendations are as follows:  - Consider scheduled Tylenol 975 mg every 6 to 8 hours, pending clearance from infectious disease service for fever watch.  - Continue oxycodone 10 mg every 4 hours as needed pain.  - Dilaudid 1 mg IV every 4 hours as needed breakthrough pain, or for activities that exacerbate her pain dressing changes, showering, using the bathroom.  - On discharge, patient can continue her previous home recommendation.  Patient endorsed that this was controlling her symptoms.  - Consider opioid dependence in patient on the chronic opioid therapy. Also consider opioid-induced hyperalgesia, which may require an opioid wean.    I spent 60 minutes in the professional and overall care of this patient.      Lon Dumont MD

## 2024-12-02 NOTE — SIGNIFICANT EVENT
Infectious diseases follow-up and plan    I was able to touch base with Ms. Adams's primary infectious disease attending Dr. Lexa Galarza.  She is in agreement with her plan for 7 days of IV Zosyn.  After completing the 7 days the patient should restart her IV ertapenem with a planned stop date of 2025.  She has an appointment with Dr. Galarza on 2025.    We would therefore recommend the followin.  Continue IV Zosyn at current doses until .  Care coordination may be able to arrange for this to be done at home via a continuous infusion.  2.  On  resume IV ertapenem at a dose of 1 g IV once a day.  3. After discharge, the following labs should be collected weekly: CBC with diff, CMP, quantitative C- Reactive protein. Fax all results to [phone] attention Dr. Galarza   4.  Follow-up with infectious diseases on 2025.    Infectious disease is signing off.  Please do not hesitate to contact me with any questions or concerns.

## 2024-12-02 NOTE — PROGRESS NOTES
Reviewed pt info as correct  Pt returning home after admission (with obs status) from 11/29-12/2)    Diagnosis: Hidradenitis suppurativa  PMH: 29-week pregnancy, removal of axillary sweat glands in 2017    Allergies   Allergen Reactions    Suboxone [Buprenorphine-Naloxone] Anaphylaxis, Hives and Itching    Clarithromycin Hives    Haloperidol Hallucinations and Psychosis    Keflex [Cephalexin] Hives    Levofloxacin Swelling     Ankle Joint/tendon pain    Metoclopramide Headache, Hallucinations and Psychosis    Reglan [Metoclopramide Hcl] Psychosis        Review of labs at discharge  Line info: pt has a SL PICC line to be managed per [facility] protocol  Pt ordered IV Zosyn 3.375 q6h through 12/6/24, then continue previous IV ertapenem q24h through 2/11/24  Labs ordered include CBC with diff, CMP, quantitative CRP  Med placed in 24-hr CADD bags  Care plan done today    Sandrita Adams is a 34 y.o. female discharged from hospital to [facility] for skilled nursing and pharmacy services. ADOD 12/2 SOC 12/4 (patient will miss a day of therapy on 12/3)  Followed by Dr. Galarza  Follow up appt ID appt 1/14/25    Spoke to resident Dr. Barnes, patient planned for discharge today, script was sent to [facility] pharmacy at 3pm, by 5pm patient was already discharged. Next RN appointment is 12/4/24 with Mely since pt was already on service. No other arrangements made for continuation of care. Per MD, patient will do one dose of ertapenem on 12/3, then start Zosyn from 12/4-12/6. [facility] spoke to pt, agreeable with this plan.    Rx dispensed the following with flushes and supplies to match with delivery by 5-9pm 12/3/24 :  3x Zosyn 15.5g 24hr CADD bags  DOS 12/4-12/6    Follow up 12/6 : check progress, check inventory, forward as appropriate    Spoke at length with patient. Verified correct address and phone #. Reviewed [facility] services and answered all questions. RN to call pt with time of appt. Pt agreed to delivery by 5-9pm tomorrow evening  (12/3/24) and confirmed will refrigerate med as appropriate.   to call patient at [phone] before delivery.    Confirmed address as:  [address] [place] [id number]

## 2024-12-02 NOTE — DISCHARGE SUMMARY
Discharge Summary    Admission Date: 11/29/2024  Discharge Date: 12/2/2024    Discharge Diagnosis  H/O hidradenitis suppurativa    Hospital Course  On 11/29, the patient presented after being sent in by her outpatient ID attending for r/o sepsis in setting of fevers at home and an elevated leukocytosis and CRP in setting of chronic diagnosis of hidradenitis suppurativa. The inpatient ID team was consulted and recommended starting IV Zosyn x7 days. Lactate wnl and leukocytosis resolved on admission. Covid, RSV and Flu swabs negative. Hemodynamically stable and afebrile. Blood cultures with NGTD x3 days at the time of discharge. Dermatology consulted and recommended continuing Humira and to avoid Cosentyx in pregnancy. At the time of discharge, she reported inadequate pain control with Percocet  mg q4 hours prn for breakthrough pain, therefore the regimen wad adjusted to Percocet  mg q3 hours prn for breakthrough pain. Discharge home with new home health referral and Rx for IV Zosyn to complete the 7 ay course with plan to resume daily IV Ertapenem up until 2/19. Planning for fuv with Dr. Mccann and a kleber US in one week.     Pertinent Physical Exam At Time of Discharge  General: No acute distress  Cardiovascular: Warm and well perfused  Respiratory: Normal respiratory effort on RA   Abdominal: Gravid  MSK: normal ROM  Extremities: Warm, well perfused, no edema. PICC in place on right upper arm without any erythema or drainage     Last Vitals:  Temp Pulse Resp BP MAP Pulse Ox   37 °C (98.6 °F) 103 17 117/80 92 99 %     Discharge Meds     Your medication list        START taking these medications        Instructions Last Dose Given Next Dose Due   piperacillin-tazobactam 3.375 gram/50 mL IV  Commonly known as: Zosyn      Infuse 50 mL (3.375 g) over 0.5 hours into a venous catheter every 6 hours for 13 doses.              CHANGE how you take these medications        Instructions Last Dose Given Next Dose Due    oxyCODONE-acetaminophen  mg tablet  Commonly known as: Percocet  What changed:   when to take this  reasons to take this      Take 1 tablet by mouth every 3 hours if needed for severe pain (7 - 10).              CONTINUE taking these medications        Instructions Last Dose Given Next Dose Due   albuterol 90 mcg/actuation inhaler      Inhale 2 puffs every 6 hours if needed for wheezing.       aspirin 81 mg EC tablet      Take 2 tablets (162 mg) by mouth once daily.       Boost 0.04 gram- 1 kcal/mL liquid  Generic drug: food supplemt, lactose-reduced      Please dispense 14 bottles for patient to have BID for 7 days.       chlorhexidine 4 % external liquid  Commonly known as: Hibiclens      Apply topically 2 times a day. Bathe in Hibiclens twice daily       clindamycin 300 mg capsule  Commonly known as: Cleocin      Take 1 capsule (300 mg) by mouth every 12 hours.       ertapenem IV  Commonly known as: INVanz      Infuse 50 mL (1 g) over 30 minutes into a venous catheter once every 24 hours.       famotidine 20 mg tablet  Commonly known as: Pepcid      Take 1 tablet (20 mg) by mouth 2 times a day.       heparin lock flush (porcine) 10 unit/mL injection           Humira(CF) Pen Crohns-UC-HS 80 mg/0.8 mL pen injector kit pen-injector  Generic drug: adalimumab      Inject 2 pens (160mg) under the skin on day 0, then 1 pen (80mg) on day 15       Humira(CF) Pen 80 mg/0.8 mL pen injector kit pen-injector  Generic drug: adalimumab      Inject 1 Pen (80 mg) under the skin every 14 (fourteen) days.       lidocaine 4 % cream  Commonly known as: LMX      Apply topically 4 times a day as needed for mild pain (1 - 3).       lidocaine 4 % cream  Commonly known as: LMX      Apply topically if needed for moderate pain (4 - 6).       magnesium oxide 400 mg (241.3 mg magnesium) tablet  Commonly known as: Mag-Ox      Take 1 tablet (400 mg) by mouth 2 times a day.       Narcan 4 mg/actuation nasal spray  Generic drug:  naloxone      Administer 1 spray (4 mg) into affected nostril(s) if needed for opioid reversal or respiratory depression. May repeat every 2-3 minutes if needed, alternating nostrils, until medical assistance becomes available.       ondansetron ODT 4 mg disintegrating tablet  Commonly known as: Zofran-ODT      Take 1 tablet (4 mg) by mouth every 8 hours if needed for nausea or vomiting.       Prenatal 19 29 mg iron- 1 mg tablet  Generic drug: -iron fum-folic acid      Take 1 tablet by mouth once daily.       sodium chloride 0.9% flush           SUMAtriptan 50 mg tablet  Commonly known as: Imitrex      Take 1 tablet (50 mg) by mouth every 6 hours if needed for migraine. May repeat dose once in 2 hours if no relief.  Do not exceed 2 doses in 24 hours.       Vitamin B-2 100 mg tablet tablet  Generic drug: riboflavin      Take 1 tablet (100 mg) by mouth once daily.              STOP taking these medications      acetaminophen 325 mg tablet  Commonly known as: Tylenol        predniSONE 20 mg tablet  Commonly known as: Deltasone                  Where to Get Your Medications        These medications were sent to [facility]  [address], [address] [address]      Hours: 8AM to 6PM Mon-Fri, 8AM to 4PM Sat, 9AM to 1PM Sun Phone: [phone]   aspirin 81 mg EC tablet  oxyCODONE-acetaminophen  mg tablet       These medications were sent to  Specialty [facility]  [address] [facility] [address]      Hours: 8:30 AM to 5 PM Mon-Fri Phone: [phone]   piperacillin-tazobactam 3.375 gram/50 mL IV          Complications Requiring Follow-Up  N/a     Test Results Pending At Discharge  Pending Labs       Order Current Status    Blood Culture Preliminary result    Blood Culture Preliminary result            Outpatient Follow-Up  Future Appointments   Date Time Provider Department Center   12/4/2024  4:00 PM Mely Abdul RN [facility]   12/5/2024  9:00 AM Leila CHAMBERS  MD Jaci [id number] [facility]   12/11/2024 To Be Determined Mely Abdul RN [facility]   12/12/2024 10:00 AM Sean Mariscal MD [id number] [facility]   12/19/2024  9:00 AM Sintia Moreno MD [id number] [facility]   12/26/2024  9:00 AM Dharmesh Mccann MD [id number] [facility]   1/8/2025  1:00 PM Mark Kelly MD [id number] [facility]   1/14/2025  9:40 AM Lexa Galarza MD [id number] [facility]   2/6/2025  9:15 AM Spenser Elizalde MD [id number] Academic         Seen and d/w Dr. Ramy Barnes MD, PGY-3  Maternal Fetal Medicine

## 2024-12-02 NOTE — CARE PLAN
The patient's goals for the shift include pain control    The clinical goals for the shift include Improved pain control through 1900 on 12/1/2024    VSS and assessments WNL.  Pt continues to report pain from 6-9/10 that is somewhat improved by pain medications but is never in range of her pain goal. Pain management consult completed during shift.
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
## 2024-12-03 ENCOUNTER — PHARMACY VISIT (OUTPATIENT)
Dept: PHARMACY | Facility: CLINIC | Age: 34
End: 2024-12-03
Payer: MEDICAID

## 2024-12-03 LAB
BACTERIA BLD CULT: NORMAL
BACTERIA BLD CULT: NORMAL

## 2024-12-04 ENCOUNTER — HOME CARE VISIT (OUTPATIENT)
Dept: HOME HEALTH SERVICES | Facility: HOME HEALTH | Age: 34
End: 2024-12-04
Payer: MEDICAID

## 2024-12-04 ENCOUNTER — LAB (OUTPATIENT)
Dept: LAB | Facility: LAB | Age: 34
End: 2024-12-04
Payer: MEDICAID

## 2024-12-04 DIAGNOSIS — L73.2 HIDRADENITIS SUPPURATIVA OF MULTIPLE SITES: ICD-10-CM

## 2024-12-04 PROBLEM — Z87.2 H/O HIDRADENITIS SUPPURATIVA: Status: RESOLVED | Noted: 2024-11-29 | Resolved: 2024-12-04

## 2024-12-04 LAB
ALBUMIN SERPL BCP-MCNC: 3.4 G/DL (ref 3.4–5)
ALP SERPL-CCNC: 102 U/L (ref 33–110)
ALT SERPL W P-5'-P-CCNC: 12 U/L (ref 7–45)
ANION GAP SERPL CALC-SCNC: 14 MMOL/L (ref 10–20)
AST SERPL W P-5'-P-CCNC: 14 U/L (ref 9–39)
BILIRUB SERPL-MCNC: 0.2 MG/DL (ref 0–1.2)
BUN SERPL-MCNC: 7 MG/DL (ref 6–23)
CALCIUM SERPL-MCNC: 9.2 MG/DL (ref 8.6–10.6)
CHLORIDE SERPL-SCNC: 105 MMOL/L (ref 98–107)
CO2 SERPL-SCNC: 21 MMOL/L (ref 21–32)
CREAT SERPL-MCNC: 0.57 MG/DL (ref 0.5–1.05)
CRP SERPL HS-MCNC: 51.3 MG/L
EGFRCR SERPLBLD CKD-EPI 2021: >90 ML/MIN/1.73M*2
ERYTHROCYTE [DISTWIDTH] IN BLOOD BY AUTOMATED COUNT: 14.3 % (ref 11.5–14.5)
GLUCOSE SERPL-MCNC: 96 MG/DL (ref 74–99)
HCT VFR BLD AUTO: 30.7 % (ref 36–46)
HGB BLD-MCNC: 10.2 G/DL (ref 12–16)
MCH RBC QN AUTO: 25 PG (ref 26–34)
MCHC RBC AUTO-ENTMCNC: 33.2 G/DL (ref 32–36)
MCV RBC AUTO: 75 FL (ref 80–100)
NRBC BLD-RTO: 0 /100 WBCS (ref 0–0)
PLATELET # BLD AUTO: 333 X10*3/UL (ref 150–450)
POTASSIUM SERPL-SCNC: 4.2 MMOL/L (ref 3.5–5.3)
PROT SERPL-MCNC: 6.5 G/DL (ref 6.4–8.2)
RBC # BLD AUTO: 4.08 X10*6/UL (ref 4–5.2)
SODIUM SERPL-SCNC: 136 MMOL/L (ref 136–145)
WBC # BLD AUTO: 10.9 X10*3/UL (ref 4.4–11.3)

## 2024-12-04 PROCEDURE — 83550 IRON BINDING TEST: CPT

## 2024-12-04 PROCEDURE — 83020 HEMOGLOBIN ELECTROPHORESIS: CPT

## 2024-12-04 PROCEDURE — 80053 COMPREHEN METABOLIC PANEL: CPT

## 2024-12-04 PROCEDURE — 85027 COMPLETE CBC AUTOMATED: CPT

## 2024-12-04 PROCEDURE — 83540 ASSAY OF IRON: CPT

## 2024-12-04 PROCEDURE — 83021 HEMOGLOBIN CHROMOTOGRAPHY: CPT

## 2024-12-04 PROCEDURE — G0299 HHS/HOSPICE OF RN EA 15 MIN: HCPCS

## 2024-12-04 PROCEDURE — 83020 HEMOGLOBIN ELECTROPHORESIS: CPT | Performed by: OBSTETRICS & GYNECOLOGY

## 2024-12-04 PROCEDURE — 86141 C-REACTIVE PROTEIN HS: CPT

## 2024-12-04 PROCEDURE — 82728 ASSAY OF FERRITIN: CPT

## 2024-12-05 ENCOUNTER — HOME INFUSION (OUTPATIENT)
Dept: INFUSION THERAPY | Age: 34
End: 2024-12-05
Payer: MEDICAID

## 2024-12-05 ENCOUNTER — HOME CARE VISIT (OUTPATIENT)
Dept: HOME HEALTH SERVICES | Facility: HOME HEALTH | Age: 34
End: 2024-12-05
Payer: MEDICAID

## 2024-12-05 PROCEDURE — G0299 HHS/HOSPICE OF RN EA 15 MIN: HCPCS

## 2024-12-05 NOTE — PROGRESS NOTES
Telephone call from Mely Abdul, homecare RN - confirming POC  Patient will infuse current bag of Pip Deandre into 12/6 - Erapenem to restart on 12/6  Has doses in the home trough 12/14  Has all needed supplies    Labs will be drawn on 12/11  Ertapenem will continue through 2/11/25  Appt with Dr Galarza on 1/14/25    NF 12/12 OVN - lab check

## 2024-12-06 ENCOUNTER — DOCUMENTATION (OUTPATIENT)
Dept: INFECTIOUS DISEASES | Facility: HOSPITAL | Age: 34
End: 2024-12-06
Payer: MEDICAID

## 2024-12-06 VITALS
TEMPERATURE: 97 F | SYSTOLIC BLOOD PRESSURE: 134 MMHG | HEART RATE: 111 BPM | RESPIRATION RATE: 18 BRPM | DIASTOLIC BLOOD PRESSURE: 78 MMHG | OXYGEN SATURATION: 97 %

## 2024-12-06 VITALS
SYSTOLIC BLOOD PRESSURE: 130 MMHG | HEART RATE: 110 BPM | OXYGEN SATURATION: 97 % | RESPIRATION RATE: 16 BRPM | DIASTOLIC BLOOD PRESSURE: 74 MMHG | TEMPERATURE: 97.6 F

## 2024-12-06 ASSESSMENT — ENCOUNTER SYMPTOMS
APPETITE LEVEL: GOOD
PAIN LOCATION: RECTUM
HIGHEST PAIN SEVERITY IN PAST 24 HOURS: 7/10
PAIN LOCATION - PAIN QUALITY: SHARP
PAIN LOCATION - PAIN SEVERITY: 7/10
OCCASIONAL FEELINGS OF UNSTEADINESS: 0
DENIES PAIN: 1
PAIN LOCATION - PAIN FREQUENCY: CONSTANT
APPETITE LEVEL: FAIR
DENIES PAIN: 1

## 2024-12-06 NOTE — PROGRESS NOTES
Labs from discharge reviewed. HS CRP was obtained which was high, but ideally we will order CRP for patient. Therefore on next lab will order quantitative CRP to correlate with previous labs results

## 2024-12-09 ENCOUNTER — PHARMACY VISIT (OUTPATIENT)
Dept: PHARMACY | Facility: CLINIC | Age: 34
End: 2024-12-09
Payer: MEDICAID

## 2024-12-09 ENCOUNTER — ROUTINE PRENATAL (OUTPATIENT)
Dept: MATERNAL FETAL MEDICINE | Facility: CLINIC | Age: 34
End: 2024-12-09
Payer: MEDICAID

## 2024-12-09 VITALS — BODY MASS INDEX: 35.74 KG/M2 | SYSTOLIC BLOOD PRESSURE: 117 MMHG | WEIGHT: 183 LBS | DIASTOLIC BLOOD PRESSURE: 78 MMHG

## 2024-12-09 DIAGNOSIS — O09.523 MULTIGRAVIDA OF ADVANCED MATERNAL AGE IN THIRD TRIMESTER (HHS-HCC): ICD-10-CM

## 2024-12-09 DIAGNOSIS — L73.2 HIDRADENITIS SUPPURATIVA: Primary | ICD-10-CM

## 2024-12-09 DIAGNOSIS — Z3A.30 30 WEEKS GESTATION OF PREGNANCY (HHS-HCC): ICD-10-CM

## 2024-12-09 LAB
POC APPEARANCE, URINE: CLEAR
POC BILIRUBIN, URINE: NEGATIVE
POC BLOOD, URINE: NEGATIVE
POC COLOR, URINE: YELLOW
POC GLUCOSE, URINE: NEGATIVE MG/DL
POC KETONES, URINE: NEGATIVE MG/DL
POC LEUKOCYTES, URINE: NEGATIVE
POC NITRITE,URINE: NEGATIVE
POC PH, URINE: 6 PH
POC PROTEIN, URINE: NEGATIVE MG/DL
POC SPECIFIC GRAVITY, URINE: 1.02
POC UROBILINOGEN, URINE: 0.2 EU/DL

## 2024-12-09 PROCEDURE — 99214 OFFICE O/P EST MOD 30 MIN: CPT | Performed by: OBSTETRICS & GYNECOLOGY

## 2024-12-09 PROCEDURE — RXMED WILLOW AMBULATORY MEDICATION CHARGE

## 2024-12-09 PROCEDURE — 81003 URINALYSIS AUTO W/O SCOPE: CPT | Performed by: OBSTETRICS & GYNECOLOGY

## 2024-12-09 RX ORDER — OXYCODONE AND ACETAMINOPHEN 10; 325 MG/1; MG/1
1 TABLET ORAL
Qty: 56 TABLET | Refills: 0 | Status: SHIPPED | OUTPATIENT
Start: 2024-12-09

## 2024-12-09 NOTE — ASSESSMENT & PLAN NOTE
- continue daily Ertapenem infusions, follows with home health. Getting weekly labs with ID  - Duration of treatment per dermatology/ID, anticipate at least 12 week course  - OARRS reviewed, oxycodone refilled today  - Reviewed that postpartum MFM will not be prescribing oxycodone for any significant duration.  - Plans on re-establishing with prior pain team ([facility] in [place]). Encouraged to schedule an appointment with them now in order to minimize potential gap in care.  - Given inflammatory disease, serial growth ordered and plan for weekly surveillance at 32 weeks.    Orders:    oxyCODONE-acetaminophen (Percocet)  mg tablet; Take 1 tablet by mouth every 3 hours if needed for severe pain (7 - 10).

## 2024-12-09 NOTE — ASSESSMENT & PLAN NOTE
Care up to date other than Hgb ID missing.  Added to lab draw over the weekend  Reviewed delivery timing today.  Reviewed initial plan for delivery at 37 weeks in order to optimize more definitive care transition.  Today notes that she feels that symptoms are manageable enough to consider waiting until 39 weeks.  Will continue to re-address at future visits.    Orders:    POCT UA Automated manually resulted    Hemoglobin Identification with Path Review; Future

## 2024-12-10 ENCOUNTER — HOSPITAL ENCOUNTER (OUTPATIENT)
Dept: RADIOLOGY | Facility: HOSPITAL | Age: 34
Discharge: HOME | End: 2024-12-10
Payer: MEDICAID

## 2024-12-10 DIAGNOSIS — Z3A.10 10 WEEKS GESTATION OF PREGNANCY (HHS-HCC): ICD-10-CM

## 2024-12-10 LAB
FERRITIN SERPL-MCNC: 92 NG/ML (ref 8–150)
IRON SATN MFR SERPL: 12 % (ref 25–45)
IRON SERPL-MCNC: 48 UG/DL (ref 35–150)
TIBC SERPL-MCNC: 390 UG/DL (ref 240–445)
UIBC SERPL-MCNC: 342 UG/DL (ref 110–370)

## 2024-12-10 PROCEDURE — 76816 OB US FOLLOW-UP PER FETUS: CPT | Performed by: STUDENT IN AN ORGANIZED HEALTH CARE EDUCATION/TRAINING PROGRAM

## 2024-12-10 PROCEDURE — 76819 FETAL BIOPHYS PROFIL W/O NST: CPT | Performed by: STUDENT IN AN ORGANIZED HEALTH CARE EDUCATION/TRAINING PROGRAM

## 2024-12-10 PROCEDURE — 76816 OB US FOLLOW-UP PER FETUS: CPT

## 2024-12-10 PROCEDURE — 76819 FETAL BIOPHYS PROFIL W/O NST: CPT

## 2024-12-10 NOTE — ASSESSMENT & PLAN NOTE
Hgb 10.2 over the weekend.  Hgb ID, Fe studies added on.  B12 and folate ordered with next draw.    Orders:    Ferritin; Future    Iron and TIBC; Future    Folate, Pregnancy; Future    Vitamin B12; Future

## 2024-12-11 ENCOUNTER — LAB REQUISITION (OUTPATIENT)
Dept: LAB | Facility: LAB | Age: 34
End: 2024-12-11
Payer: MEDICAID

## 2024-12-11 ENCOUNTER — HOME INFUSION (OUTPATIENT)
Dept: INFUSION THERAPY | Age: 34
End: 2024-12-11
Payer: MEDICAID

## 2024-12-11 ENCOUNTER — HOME CARE VISIT (OUTPATIENT)
Dept: HOME HEALTH SERVICES | Facility: HOME HEALTH | Age: 34
End: 2024-12-11
Payer: MEDICAID

## 2024-12-11 DIAGNOSIS — L73.2 HIDRADENITIS SUPPURATIVA: ICD-10-CM

## 2024-12-11 LAB
ALBUMIN SERPL BCP-MCNC: 3.4 G/DL (ref 3.4–5)
ALP SERPL-CCNC: 117 U/L (ref 33–110)
ALT SERPL W P-5'-P-CCNC: 11 U/L (ref 7–45)
ANION GAP SERPL CALC-SCNC: 14 MMOL/L (ref 10–20)
AST SERPL W P-5'-P-CCNC: 12 U/L (ref 9–39)
BASOPHILS # BLD AUTO: 0.01 X10*3/UL (ref 0–0.1)
BASOPHILS NFR BLD AUTO: 0.1 %
BILIRUB SERPL-MCNC: 0.3 MG/DL (ref 0–1.2)
BUN SERPL-MCNC: 11 MG/DL (ref 6–23)
CALCIUM SERPL-MCNC: 8.9 MG/DL (ref 8.6–10.6)
CHLORIDE SERPL-SCNC: 108 MMOL/L (ref 98–107)
CO2 SERPL-SCNC: 21 MMOL/L (ref 21–32)
CREAT SERPL-MCNC: 0.65 MG/DL (ref 0.5–1.05)
CRP SERPL-MCNC: 2.17 MG/DL
EGFRCR SERPLBLD CKD-EPI 2021: >90 ML/MIN/1.73M*2
EOSINOPHIL # BLD AUTO: 0.07 X10*3/UL (ref 0–0.7)
EOSINOPHIL NFR BLD AUTO: 0.6 %
ERYTHROCYTE [DISTWIDTH] IN BLOOD BY AUTOMATED COUNT: 14 % (ref 11.5–14.5)
GLUCOSE SERPL-MCNC: 88 MG/DL (ref 74–99)
HCT VFR BLD AUTO: 30.7 % (ref 36–46)
HGB A MFR BLD ELPH: 63.1 % (ref 95.8–98)
HGB A2 MFR BLD ELPH: 3.7 % (ref 2–3.3)
HGB BLD-MCNC: 10.3 G/DL (ref 12–16)
HGB F MFR BLD ELPH: 0.4 % (ref 0–0.9)
HGB FRACT BLD CE-IMP: ABNORMAL
HGB XXX MFR BLD ELPH: ABNORMAL %
IMM GRANULOCYTES # BLD AUTO: 0.21 X10*3/UL (ref 0–0.7)
IMM GRANULOCYTES NFR BLD AUTO: 1.9 % (ref 0–0.9)
LYMPHOCYTES # BLD AUTO: 2.46 X10*3/UL (ref 1.2–4.8)
LYMPHOCYTES NFR BLD AUTO: 21.7 %
MCH RBC QN AUTO: 25.5 PG (ref 26–34)
MCHC RBC AUTO-ENTMCNC: 33.6 G/DL (ref 32–36)
MCV RBC AUTO: 76 FL (ref 80–100)
MONOCYTES # BLD AUTO: 0.97 X10*3/UL (ref 0.1–1)
MONOCYTES NFR BLD AUTO: 8.5 %
NEUTROPHILS # BLD AUTO: 7.63 X10*3/UL (ref 1.2–7.7)
NEUTROPHILS NFR BLD AUTO: 67.2 %
NRBC BLD-RTO: 0 /100 WBCS (ref 0–0)
PLATELET # BLD AUTO: 343 X10*3/UL (ref 150–450)
POTASSIUM SERPL-SCNC: 4 MMOL/L (ref 3.5–5.3)
PROT SERPL-MCNC: 6.4 G/DL (ref 6.4–8.2)
RBC # BLD AUTO: 4.04 X10*6/UL (ref 4–5.2)
SODIUM SERPL-SCNC: 139 MMOL/L (ref 136–145)
WBC # BLD AUTO: 11.4 X10*3/UL (ref 4.4–11.3)

## 2024-12-11 PROCEDURE — G0299 HHS/HOSPICE OF RN EA 15 MIN: HCPCS

## 2024-12-11 PROCEDURE — 80053 COMPREHEN METABOLIC PANEL: CPT

## 2024-12-11 PROCEDURE — 86140 C-REACTIVE PROTEIN: CPT

## 2024-12-11 PROCEDURE — 85025 COMPLETE CBC W/AUTO DIFF WBC: CPT

## 2024-12-11 NOTE — PROGRESS NOTES
Patient resumed Ertapenem 1g IV q24 on 12/6 for Hidradenitis Flare - EOT 2/11/24    Labs from 12/4 Unremarkable - Labs and dressing change 12/11 with homecare RN    Left message for patient regarding delivery on 12/13 - from conversation last week has doses of Ertapenem in the home through 12/14    Dispense x6 doses of Ertapenem for cmpd on 12/12 and delivery on 12/13  Infusions 12/15 through 12/20    NF 12/19 - lab check - OVN delivery of 2 week supply to get through holidays - 14 doses 12/21 - 1/3

## 2024-12-12 ENCOUNTER — HOSPITAL ENCOUNTER (OUTPATIENT)
Facility: HOSPITAL | Age: 34
Discharge: HOME | End: 2024-12-12
Attending: OBSTETRICS & GYNECOLOGY | Admitting: OBSTETRICS & GYNECOLOGY
Payer: MEDICAID

## 2024-12-12 ENCOUNTER — DOCUMENTATION (OUTPATIENT)
Dept: INFECTIOUS DISEASES | Facility: HOSPITAL | Age: 34
End: 2024-12-12
Payer: MEDICAID

## 2024-12-12 ENCOUNTER — HOSPITAL ENCOUNTER (OUTPATIENT)
Facility: HOSPITAL | Age: 34
End: 2024-12-12
Attending: OBSTETRICS & GYNECOLOGY | Admitting: OBSTETRICS & GYNECOLOGY
Payer: MEDICAID

## 2024-12-12 ENCOUNTER — TELEPHONE (OUTPATIENT)
Dept: OBSTETRICS AND GYNECOLOGY | Facility: CLINIC | Age: 34
End: 2024-12-12

## 2024-12-12 ENCOUNTER — APPOINTMENT (OUTPATIENT)
Dept: RADIOLOGY | Facility: HOSPITAL | Age: 34
End: 2024-12-12
Payer: MEDICAID

## 2024-12-12 ENCOUNTER — HOSPITAL ENCOUNTER (EMERGENCY)
Facility: HOSPITAL | Age: 34
Discharge: OTHER INSTITUTION | End: 2024-12-12
Payer: MEDICAID

## 2024-12-12 ENCOUNTER — APPOINTMENT (OUTPATIENT)
Dept: MATERNAL FETAL MEDICINE | Facility: CLINIC | Age: 34
End: 2024-12-12
Payer: MEDICAID

## 2024-12-12 ENCOUNTER — TELEPHONE (OUTPATIENT)
Dept: INFECTIOUS DISEASES | Facility: CLINIC | Age: 34
End: 2024-12-12
Payer: MEDICAID

## 2024-12-12 VITALS
TEMPERATURE: 98.8 F | RESPIRATION RATE: 18 BRPM | SYSTOLIC BLOOD PRESSURE: 107 MMHG | HEART RATE: 87 BPM | WEIGHT: 186.29 LBS | DIASTOLIC BLOOD PRESSURE: 65 MMHG | OXYGEN SATURATION: 95 % | BODY MASS INDEX: 35.17 KG/M2 | HEIGHT: 61 IN

## 2024-12-12 VITALS
RESPIRATION RATE: 18 BRPM | SYSTOLIC BLOOD PRESSURE: 132 MMHG | DIASTOLIC BLOOD PRESSURE: 70 MMHG | TEMPERATURE: 97.7 F | HEART RATE: 78 BPM | OXYGEN SATURATION: 97 %

## 2024-12-12 DIAGNOSIS — L73.2 HIDRADENITIS SUPPURATIVA: ICD-10-CM

## 2024-12-12 LAB
APPEARANCE UR: CLEAR
BILIRUB UR STRIP.AUTO-MCNC: NEGATIVE MG/DL
COLOR UR: ABNORMAL
GLUCOSE UR STRIP.AUTO-MCNC: NORMAL MG/DL
KETONES UR STRIP.AUTO-MCNC: ABNORMAL MG/DL
LEUKOCYTE ESTERASE UR QL STRIP.AUTO: NEGATIVE
NITRITE UR QL STRIP.AUTO: NEGATIVE
PH UR STRIP.AUTO: 5.5 [PH]
PROT UR STRIP.AUTO-MCNC: NEGATIVE MG/DL
RBC # UR STRIP.AUTO: NEGATIVE /UL
SP GR UR STRIP.AUTO: 1.01
UROBILINOGEN UR STRIP.AUTO-MCNC: NORMAL MG/DL

## 2024-12-12 PROCEDURE — 87086 URINE CULTURE/COLONY COUNT: CPT | Mod: AHULAB | Performed by: ADVANCED PRACTICE MIDWIFE

## 2024-12-12 PROCEDURE — 4500999001 HC ED NO CHARGE

## 2024-12-12 PROCEDURE — 87205 SMEAR GRAM STAIN: CPT | Mod: AHULAB | Performed by: ADVANCED PRACTICE MIDWIFE

## 2024-12-12 PROCEDURE — 76770 US EXAM ABDO BACK WALL COMP: CPT | Performed by: RADIOLOGY

## 2024-12-12 PROCEDURE — 99214 OFFICE O/P EST MOD 30 MIN: CPT | Performed by: ADVANCED PRACTICE MIDWIFE

## 2024-12-12 PROCEDURE — 87493 C DIFF AMPLIFIED PROBE: CPT | Mod: AHULAB | Performed by: ADVANCED PRACTICE MIDWIFE

## 2024-12-12 PROCEDURE — 99283 EMERGENCY DEPT VISIT LOW MDM: CPT

## 2024-12-12 PROCEDURE — 81003 URINALYSIS AUTO W/O SCOPE: CPT | Performed by: ADVANCED PRACTICE MIDWIFE

## 2024-12-12 PROCEDURE — 2500000005 HC RX 250 GENERAL PHARMACY W/O HCPCS: Performed by: ADVANCED PRACTICE MIDWIFE

## 2024-12-12 PROCEDURE — 76770 US EXAM ABDO BACK WALL COMP: CPT

## 2024-12-12 PROCEDURE — 2500000001 HC RX 250 WO HCPCS SELF ADMINISTERED DRUGS (ALT 637 FOR MEDICARE OP): Performed by: OBSTETRICS & GYNECOLOGY

## 2024-12-12 PROCEDURE — 59025 FETAL NON-STRESS TEST: CPT

## 2024-12-12 PROCEDURE — 2500000001 HC RX 250 WO HCPCS SELF ADMINISTERED DRUGS (ALT 637 FOR MEDICARE OP): Performed by: ADVANCED PRACTICE MIDWIFE

## 2024-12-12 RX ORDER — LABETALOL HYDROCHLORIDE 5 MG/ML
20 INJECTION, SOLUTION INTRAVENOUS ONCE AS NEEDED
Status: DISCONTINUED | OUTPATIENT
Start: 2024-12-12 | End: 2024-12-13 | Stop reason: HOSPADM

## 2024-12-12 RX ORDER — HYDRALAZINE HYDROCHLORIDE 20 MG/ML
5 INJECTION INTRAMUSCULAR; INTRAVENOUS ONCE AS NEEDED
Status: DISCONTINUED | OUTPATIENT
Start: 2024-12-12 | End: 2024-12-12 | Stop reason: SDUPTHER

## 2024-12-12 RX ORDER — LIDOCAINE HYDROCHLORIDE 10 MG/ML
0.5 INJECTION, SOLUTION EPIDURAL; INFILTRATION; INTRACAUDAL; PERINEURAL ONCE AS NEEDED
Status: DISCONTINUED | OUTPATIENT
Start: 2024-12-12 | End: 2024-12-13 | Stop reason: HOSPADM

## 2024-12-12 RX ORDER — ONDANSETRON 4 MG/1
4 TABLET, ORALLY DISINTEGRATING ORAL EVERY 8 HOURS PRN
Status: DISCONTINUED | OUTPATIENT
Start: 2024-12-12 | End: 2024-12-13 | Stop reason: HOSPADM

## 2024-12-12 RX ORDER — ONDANSETRON HYDROCHLORIDE 2 MG/ML
4 INJECTION, SOLUTION INTRAVENOUS EVERY 6 HOURS PRN
Status: DISCONTINUED | OUTPATIENT
Start: 2024-12-12 | End: 2024-12-12 | Stop reason: SDUPTHER

## 2024-12-12 RX ORDER — CYCLOBENZAPRINE HCL 5 MG
5 TABLET ORAL ONCE
Status: COMPLETED | OUTPATIENT
Start: 2024-12-12 | End: 2024-12-12

## 2024-12-12 RX ORDER — OXYCODONE AND ACETAMINOPHEN 5; 325 MG/1; MG/1
1 TABLET ORAL ONCE
Status: COMPLETED | OUTPATIENT
Start: 2024-12-12 | End: 2024-12-12

## 2024-12-12 RX ORDER — NIFEDIPINE 10 MG/1
10 CAPSULE ORAL ONCE AS NEEDED
Status: DISCONTINUED | OUTPATIENT
Start: 2024-12-12 | End: 2024-12-12 | Stop reason: SDUPTHER

## 2024-12-12 RX ORDER — ONDANSETRON 4 MG/1
4 TABLET, FILM COATED ORAL EVERY 6 HOURS PRN
Status: DISCONTINUED | OUTPATIENT
Start: 2024-12-12 | End: 2024-12-12 | Stop reason: SDUPTHER

## 2024-12-12 RX ORDER — LABETALOL HYDROCHLORIDE 5 MG/ML
20 INJECTION, SOLUTION INTRAVENOUS ONCE AS NEEDED
Status: DISCONTINUED | OUTPATIENT
Start: 2024-12-12 | End: 2024-12-12 | Stop reason: SDUPTHER

## 2024-12-12 RX ORDER — NIFEDIPINE 10 MG/1
10 CAPSULE ORAL ONCE AS NEEDED
Status: DISCONTINUED | OUTPATIENT
Start: 2024-12-12 | End: 2024-12-13 | Stop reason: HOSPADM

## 2024-12-12 RX ORDER — ONDANSETRON HYDROCHLORIDE 2 MG/ML
4 INJECTION, SOLUTION INTRAVENOUS EVERY 8 HOURS PRN
Status: DISCONTINUED | OUTPATIENT
Start: 2024-12-12 | End: 2024-12-13 | Stop reason: HOSPADM

## 2024-12-12 RX ORDER — OXYCODONE HYDROCHLORIDE 5 MG/1
5 TABLET ORAL ONCE
Status: COMPLETED | OUTPATIENT
Start: 2024-12-12 | End: 2024-12-12

## 2024-12-12 RX ORDER — HYDRALAZINE HYDROCHLORIDE 20 MG/ML
5 INJECTION INTRAMUSCULAR; INTRAVENOUS ONCE AS NEEDED
Status: DISCONTINUED | OUTPATIENT
Start: 2024-12-12 | End: 2024-12-13 | Stop reason: HOSPADM

## 2024-12-12 RX ORDER — LIDOCAINE HYDROCHLORIDE 10 MG/ML
0.5 INJECTION, SOLUTION EPIDURAL; INFILTRATION; INTRACAUDAL; PERINEURAL ONCE AS NEEDED
Status: DISCONTINUED | OUTPATIENT
Start: 2024-12-12 | End: 2024-12-12 | Stop reason: SDUPTHER

## 2024-12-12 RX ADMIN — CYCLOBENZAPRINE HYDROCHLORIDE 5 MG: 5 TABLET, FILM COATED ORAL at 18:06

## 2024-12-12 RX ADMIN — OXYCODONE HYDROCHLORIDE 5 MG: 5 TABLET ORAL at 18:16

## 2024-12-12 RX ADMIN — ONDANSETRON 4 MG: 4 TABLET, ORALLY DISINTEGRATING ORAL at 20:49

## 2024-12-12 RX ADMIN — OXYCODONE HYDROCHLORIDE AND ACETAMINOPHEN 1 TABLET: 5; 325 TABLET ORAL at 18:06

## 2024-12-12 ASSESSMENT — ENCOUNTER SYMPTOMS
PAIN: 1
PAIN LOCATION - PAIN SEVERITY: 5/10
PAIN LOCATION: RECTUM
OCCASIONAL FEELINGS OF UNSTEADINESS: 0
PAIN LOCATION - PAIN QUALITY: SHARP
APPETITE LEVEL: FAIR

## 2024-12-12 ASSESSMENT — PAIN SCALES - GENERAL
PAINLEVEL_OUTOF10: 1
PAINLEVEL_OUTOF10: 8
PAINLEVEL_OUTOF10: 9

## 2024-12-12 NOTE — PROGRESS NOTES
Called Sandrita Adams    Patient states that she has severe abdomen pain. She notes that she is having GI problems with diarrhea, 4-5 bouts watery and loose. Fevers as well. She has missed some ertapenem doses. She has been on ertapenem for a while and not doing well with it    #Diarrhea   #Abdomen pain  #Nausea  #Ertapenem use for HS    -Told patient to call OBGYN regarding abdomen pain   -Given abdomen pain, diarrhea, fever I did ask her to come into the hospital  -She should be tested for C. Diff given recent antibiotics use  -Other etiologies for stomach pain in the setting of pregnancy should be evaluated as well  -It could be that the above is just side effects from the ertapenem. Therefore we can stop the ertapenem infusions. I messaged dermatology to notify them and also messaged home care    Lexa Galarza  Infectious Diseases
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
## 2024-12-12 NOTE — TELEPHONE ENCOUNTER
Homecare nurse called to report patient has request to stop IV abx and have PICC remove. Patient has complaints of tiredness and loose stool. She also provided she is not infusing medication daily. Has had mulipute missed infusions.  (Nurse contact Demetrice [phone].)

## 2024-12-12 NOTE — TELEPHONE ENCOUNTER
Patient states that she is in Labor and Delivery and her pain is not being treated. I spoke with Berenice whom advised that she would notify doctor Ramy to contact Labor and Delivery on Patients Behalf.

## 2024-12-12 NOTE — TELEPHONE ENCOUNTER
Patient saw infectious disease and would like to follow up with Dr. Mccann.    Patient has appointment on 12/16/ 24.   Patient was in L+D and now sent to ED after the baby was checked.    Dr. Mccann was notified.

## 2024-12-13 ENCOUNTER — DOCUMENTATION (OUTPATIENT)
Dept: PHARMACY | Facility: CLINIC | Age: 34
End: 2024-12-13

## 2024-12-13 ENCOUNTER — HOME INFUSION (OUTPATIENT)
Dept: INFUSION THERAPY | Age: 34
End: 2024-12-13
Payer: MEDICAID

## 2024-12-13 LAB
BACTERIAL VAGINOSIS VAG-IMP: ABNORMAL
C DIF TOX TCDA+TCDB STL QL NAA+PROBE: NOT DETECTED
CLUE CELLS VAG LPF-#/AREA: ABNORMAL /[LPF]
HEMOGLOBIN A2: 4 % (ref 2–3.5)
HEMOGLOBIN A: 62 % (ref 95.8–98)
HEMOGLOBIN F: 0.8 % (ref 0–2)
HEMOGLOBIN IDENTIFICATION INTERPRETATION: ABNORMAL
HEMOGLOBIN S: 33.2 %
NUGENT SCORE: 4
PATH REVIEW-HGB IDENTIFICATION: ABNORMAL
YEAST VAG WET PREP-#/AREA: PRESENT

## 2024-12-13 NOTE — PROGRESS NOTES
CALLED PT AND LEFT VM - DELIVERY IS SCHEDULED FOR FRIDAY BY  8  PM.  LET PT KNOW WE'RE SENDING STND SUPPLIES AND ASKED TO CALL W/ ANY QUESTIONS.

## 2024-12-13 NOTE — PROGRESS NOTES
[facility] rec'd order from Dr Galarza - pt having side effects to infusions including severe GI upset - ok to stop IV ertapenem and recommended to go to hospital for further evaluation and C diff testing. Pt went to hospital and does not show as admitted.    Follow up 12/18 after RN visit to confirm if line has been removed and discharge from pharmacy service

## 2024-12-14 ENCOUNTER — HOME CARE VISIT (OUTPATIENT)
Dept: HOME HEALTH SERVICES | Facility: HOME HEALTH | Age: 34
End: 2024-12-14
Payer: MEDICAID

## 2024-12-14 LAB — BACTERIA UR CULT: NORMAL

## 2024-12-16 ENCOUNTER — TELEPHONE (OUTPATIENT)
Dept: OBSTETRICS AND GYNECOLOGY | Facility: CLINIC | Age: 34
End: 2024-12-16
Payer: MEDICAID

## 2024-12-16 ENCOUNTER — ROUTINE PRENATAL (OUTPATIENT)
Dept: MATERNAL FETAL MEDICINE | Facility: CLINIC | Age: 34
End: 2024-12-16
Payer: MEDICAID

## 2024-12-16 ENCOUNTER — PHARMACY VISIT (OUTPATIENT)
Dept: PHARMACY | Facility: CLINIC | Age: 34
End: 2024-12-16
Payer: MEDICAID

## 2024-12-16 VITALS — DIASTOLIC BLOOD PRESSURE: 85 MMHG | BODY MASS INDEX: 35.14 KG/M2 | SYSTOLIC BLOOD PRESSURE: 108 MMHG | WEIGHT: 186 LBS

## 2024-12-16 DIAGNOSIS — D57.3 SICKLE CELL TRAIT IN MOTHER AFFECTING PREGNANCY (MULTI): ICD-10-CM

## 2024-12-16 DIAGNOSIS — O99.019 SICKLE CELL TRAIT IN MOTHER AFFECTING PREGNANCY (MULTI): ICD-10-CM

## 2024-12-16 DIAGNOSIS — L73.2 HIDRADENITIS SUPPURATIVA: Primary | ICD-10-CM

## 2024-12-16 DIAGNOSIS — Z3A.31 31 WEEKS GESTATION OF PREGNANCY (HHS-HCC): ICD-10-CM

## 2024-12-16 DIAGNOSIS — R71.8 MICROCYTOSIS: ICD-10-CM

## 2024-12-16 LAB
POC APPEARANCE, URINE: CLEAR
POC BILIRUBIN, URINE: NEGATIVE
POC BLOOD, URINE: NEGATIVE
POC COLOR, URINE: YELLOW
POC GLUCOSE, URINE: NEGATIVE MG/DL
POC KETONES, URINE: NEGATIVE MG/DL
POC LEUKOCYTES, URINE: NEGATIVE
POC NITRITE,URINE: NEGATIVE
POC PH, URINE: 6 PH
POC PROTEIN, URINE: NEGATIVE MG/DL
POC SPECIFIC GRAVITY, URINE: <=1.005
POC UROBILINOGEN, URINE: 0.2 EU/DL

## 2024-12-16 PROCEDURE — 99214 OFFICE O/P EST MOD 30 MIN: CPT | Mod: GC | Performed by: STUDENT IN AN ORGANIZED HEALTH CARE EDUCATION/TRAINING PROGRAM

## 2024-12-16 PROCEDURE — RXMED WILLOW AMBULATORY MEDICATION CHARGE

## 2024-12-16 PROCEDURE — 99214 OFFICE O/P EST MOD 30 MIN: CPT | Performed by: STUDENT IN AN ORGANIZED HEALTH CARE EDUCATION/TRAINING PROGRAM

## 2024-12-16 PROCEDURE — 81003 URINALYSIS AUTO W/O SCOPE: CPT | Performed by: STUDENT IN AN ORGANIZED HEALTH CARE EDUCATION/TRAINING PROGRAM

## 2024-12-16 RX ORDER — OXYCODONE AND ACETAMINOPHEN 10; 325 MG/1; MG/1
1 TABLET ORAL
Qty: 56 TABLET | Refills: 0 | Status: SHIPPED | OUTPATIENT
Start: 2024-12-16

## 2024-12-16 RX ORDER — TERCONAZOLE 80 MG/1
80 SUPPOSITORY VAGINAL NIGHTLY
Qty: 3 SUPPOSITORY | Refills: 0 | Status: SHIPPED | OUTPATIENT
Start: 2024-12-16 | End: 2024-12-19

## 2024-12-16 RX ORDER — OXYCODONE AND ACETAMINOPHEN 10; 325 MG/1; MG/1
1 TABLET ORAL
Qty: 56 TABLET | Refills: 0 | Status: CANCELLED | OUTPATIENT
Start: 2024-12-16

## 2024-12-16 NOTE — PROGRESS NOTES
MFM Follow-up  24       SUBJECTIVE     HPI: Sandrita Adams is a 34 y.o.  at 31w3d here for MFM follow up prenatal visit.     Denies contractions, bleeding, or LOF. Reports normal fetal movement.     Pain controlled on current regimen, having some perianal pain but reports this is manageable with current pain regimen.     Startied having side effects from ertapenem, ruled out for c diff. Plan to stop ertapenem per notes from pharmacy/ID. PICC remains in place, planned for removal in 2 days, however patient has a family emergency and is flying out of state today.    Reports diarrhea/abdominal pain has completely stopped after stopping antibiotics. No malaise, denies subjective fevers. Having vaginal itching (positive for yeast on evaluation in hospital and no rx sent, requesting this today.)     OBJECTIVE  Vitals:    24 1300   BP: 108/85   Weight: 84.4 kg (186 lb)     Physical Exam:  Gen - no acute distress  Resp - non labored breathing  Cardio - warm and well perfused  Extrem - symmetric, non erythematous  Abd - gravid, non tender  Neuro - alert and oriented, no gross defects    PICC line removed in sterile fashion without complication during patient exhale, prolonged pressure held over site. No bleeding from site confirmed. Dressed with gauze, instructed to leave in place until tomorrow and keep dry.    FHT: 144     ASSESSMENT & PLAN     Patient is a 34 y.o.  at 31w3d here for MFM follow up prenatal visit.     Hidradenitis Suppurativa  - Daily ertapenem discontinued, PICC line removed without complication in office today  - Continuing with Humira dosing  - OARRS reviewed and appropriate, percocet refilled for 1 week today  - Current MFM plan, will not be prescribing oxycodone for any significant duration. Patient plans to re-stablish with pain team versus find new pain provider. Strongly recommend expediting scheduling this to avoid care gap, patient voiced understanding and  agreement.  - Plan for weekly NSTs and serial fetal growth US given active inflammatory disease in third trimester    Sickle Cell Trait  - Newly resulted Hb ID ordered last visit, diagnostic of sickle cell trait, patient reports strong family history of sickle cell trait, her mother is a known carrier  - FOB status is unknown, patient called and he voiced being amenable to testing, reviewed testing options  - Reviewed genetic inheritance of hemoglobinopathies and importance of determining paternal status  - Hb ID also mentioned   - Third trimester urine culture negative    Advanced Maternal Age  - rr cfDNA  - Growth US as above    Anemia  - Hb 10.2, iron studies not consistent with deficiency, folate/B12   - Hb ID resulted sickle cell trait as above, report of microcytosis cannot rule out alpha thalassemia, so discussed with patient today, she is amenable to alpha thalassemia carrier screening    Prenatal Care  - Due for Tdap, patient declines today but may consider at next visit  - Remainder of care up to date  - Patient amenable to alpha thalassemia evaluation, ordered today, patient unable to have drawn today, will need Foresight box for draw at visit next week  - Plan for delivery 37-39 weeks pending HS activity/symptoms control, patient current desires 39 weeks if stable   - Terconazole for vulvovaginal candidiasis     RTC in 1 week     Patient seen and counseled with Dr. Jaci Bustos MD  MFM Fellow

## 2024-12-16 NOTE — TELEPHONE ENCOUNTER
Patient called states she has a family emergency and needs to go out of town, asking to be seen earlier today for appointment.  Patient advised no morning appointments available. Will discuss with Dr. Beatty when arrives.

## 2024-12-17 NOTE — PROGRESS NOTES
I saw and evaluated the patient. I personally obtained the key and critical portions of the history and physical exam or was physically present for key and critical portions performed by the resident/fellow. I reviewed the resident/fellow's documentation and discussed the patient with the resident/fellow. I agree with the resident/fellow's medical decision making as documented in the note.    Leila Beatty MD

## 2024-12-18 ENCOUNTER — HOME CARE VISIT (OUTPATIENT)
Dept: HOME HEALTH SERVICES | Facility: HOME HEALTH | Age: 34
End: 2024-12-18
Payer: MEDICAID

## 2024-12-19 ENCOUNTER — APPOINTMENT (OUTPATIENT)
Dept: MATERNAL FETAL MEDICINE | Facility: CLINIC | Age: 34
End: 2024-12-19
Payer: MEDICAID

## 2024-12-19 ENCOUNTER — HOME INFUSION (OUTPATIENT)
Dept: INFUSION THERAPY | Age: 34
End: 2024-12-19
Payer: MEDICAID

## 2024-12-19 NOTE — PROGRESS NOTES
Per previous notes, IV antibiotics stopped. Spoke to patient, PICC line removed at office visit on 12/16.  Discharge from RX services    Pt care rep to discharge pt from [facility] and discharge chart after CADD pump pickup arranged

## 2024-12-20 ASSESSMENT — ACTIVITIES OF DAILY LIVING (ADL)
HOME_HEALTH_OASIS: 00
OASIS_M1830: 00

## 2024-12-20 ASSESSMENT — ENCOUNTER SYMPTOMS: PERSON REPORTING PAIN: PATIENT

## 2024-12-23 ENCOUNTER — ROUTINE PRENATAL (OUTPATIENT)
Dept: MATERNAL FETAL MEDICINE | Facility: CLINIC | Age: 34
End: 2024-12-23
Payer: MEDICAID

## 2024-12-23 ENCOUNTER — PHARMACY VISIT (OUTPATIENT)
Dept: PHARMACY | Facility: CLINIC | Age: 34
End: 2024-12-23
Payer: MEDICAID

## 2024-12-23 ENCOUNTER — PROCEDURE VISIT (OUTPATIENT)
Dept: OBSTETRICS AND GYNECOLOGY | Facility: CLINIC | Age: 34
End: 2024-12-23
Payer: MEDICAID

## 2024-12-23 VITALS — BODY MASS INDEX: 35.52 KG/M2 | WEIGHT: 188 LBS | SYSTOLIC BLOOD PRESSURE: 109 MMHG | DIASTOLIC BLOOD PRESSURE: 73 MMHG

## 2024-12-23 DIAGNOSIS — O99.019 SICKLE CELL TRAIT IN MOTHER AFFECTING PREGNANCY (MULTI): ICD-10-CM

## 2024-12-23 DIAGNOSIS — O09.523 MULTIGRAVIDA OF ADVANCED MATERNAL AGE IN THIRD TRIMESTER (HHS-HCC): ICD-10-CM

## 2024-12-23 DIAGNOSIS — Z71.85 VACCINE COUNSELING: ICD-10-CM

## 2024-12-23 DIAGNOSIS — Z3A.32 32 WEEKS GESTATION OF PREGNANCY (HHS-HCC): Primary | ICD-10-CM

## 2024-12-23 DIAGNOSIS — L73.2 HIDRADENITIS SUPPURATIVA: ICD-10-CM

## 2024-12-23 DIAGNOSIS — D57.3 SICKLE CELL TRAIT IN MOTHER AFFECTING PREGNANCY (MULTI): ICD-10-CM

## 2024-12-23 LAB
POC APPEARANCE, URINE: CLEAR
POC BILIRUBIN, URINE: NEGATIVE
POC BLOOD, URINE: NEGATIVE
POC COLOR, URINE: YELLOW
POC GLUCOSE, URINE: NEGATIVE MG/DL
POC KETONES, URINE: ABNORMAL MG/DL
POC LEUKOCYTES, URINE: NEGATIVE
POC NITRITE,URINE: NEGATIVE
POC PH, URINE: 5.5 PH
POC PROTEIN, URINE: NEGATIVE MG/DL
POC SPECIFIC GRAVITY, URINE: 1.02
POC UROBILINOGEN, URINE: 0.2 EU/DL

## 2024-12-23 PROCEDURE — 99214 OFFICE O/P EST MOD 30 MIN: CPT | Performed by: OBSTETRICS & GYNECOLOGY

## 2024-12-23 PROCEDURE — RXMED WILLOW AMBULATORY MEDICATION CHARGE

## 2024-12-23 PROCEDURE — 81003 URINALYSIS AUTO W/O SCOPE: CPT | Performed by: OBSTETRICS & GYNECOLOGY

## 2024-12-23 PROCEDURE — 59025 FETAL NON-STRESS TEST: CPT | Performed by: OBSTETRICS & GYNECOLOGY

## 2024-12-23 RX ORDER — OXYCODONE AND ACETAMINOPHEN 10; 325 MG/1; MG/1
1 TABLET ORAL
Qty: 56 TABLET | Refills: 0 | Status: SHIPPED | OUTPATIENT
Start: 2024-12-23

## 2024-12-24 NOTE — PROGRESS NOTES
MFM Follow-up  24       SUBJECTIVE     HPI: Sandrita Adams is a 34 y.o.  at 32w4d  here for MFM follow up prenatal visit.     Denies contractions, bleeding, or LOF. Reports normal fetal movement.     Pain controlled on current regimen, having some perianal pain but reports this is manageable with current pain regimen.  Waiting to hear back to arrange follow up with [facility].    Reports diarrhea/abdominal pain has completely stopped after stopping antibiotics. No malaise, denies subjective fevers.      OBJECTIVE  Vitals:    24 1300   BP: 109/73   Weight: 85.3 kg (188 lb)     Physical Exam:  Gen - no acute distress  Resp - non labored breathing  Cardio - warm and well perfused  Extrem - symmetric, non erythematous  Abd - gravid, non tender  Neuro - alert and oriented, no gross defects    FHT: NST     ASSESSMENT & PLAN     Patient is a 34 y.o.  at 32w4d here for MFM follow up prenatal visit.     Hidradenitis Suppurativa  - Continuing with Humira dosing  - OARRS reviewed and appropriate, percocet refilled for 1 week today  - Current MFM plan, will not be prescribing oxycodone for any significant duration. Patient plans to re-stablish with pain team versus find new pain provider. Will help to facilitate getting in with [facility].  Following visit today, did discuss potential secondary/back up options for regimen or weaning strategies with Dr. Angulo who may be able to bridge gap until her definitive surgeries if no other pain team is an option,  - Plan for weekly NSTs and serial fetal growth US given active inflammatory disease in third trimester    Sickle Cell Trait  - partner negative    Advanced Maternal Age  - rr cfDNA  - Growth US as above    Anemia  - Hb 10.2, iron studies not consistent with deficiency, folate/B12   - alpha thal eval pending.    Prenatal Care  - Due for Tdap, patient declines today but may consider at next visit  - Remainder of care up to date  - Plan for  delivery 37-39 weeks pending HS activity/symptoms control, patient current desires 39 weeks if stable      RTC in 1 week    Dharmesh Mccann MD  Maternal Fetal Medicine

## 2024-12-24 NOTE — PROCEDURES
Sandrita Adams, a  at 32w4d with an LUIS MIGUEL of 2025, by Ultrasound, was seen at  [facility] for a nonstress test.    Non-Stress Test   Baseline Fetal Heart Rate for Non-Stress Test: 145 BPM  Variability in Waveform for Non-Stress Test: Moderate  Accelerations in Non-Stress Test: Yes, greater than/equal to 15 bpm, lasting at least 15 seconds  Decelerations in Non-Stress Test: None  Contractions in Non-Stress Test: Irregular  Acoustic Stimulator for Non-Stress Test: No  Interpretation of Non-Stress Test   Interpretation of Non-Stress Test: Reactive, Appropriate for gestational age

## 2024-12-26 ENCOUNTER — APPOINTMENT (OUTPATIENT)
Dept: MATERNAL FETAL MEDICINE | Facility: CLINIC | Age: 34
End: 2024-12-26
Payer: MEDICAID

## 2024-12-29 ENCOUNTER — HOSPITAL ENCOUNTER (OUTPATIENT)
Facility: HOSPITAL | Age: 34
Setting detail: OBSERVATION
End: 2024-12-29
Attending: OBSTETRICS & GYNECOLOGY | Admitting: OBSTETRICS & GYNECOLOGY
Payer: MEDICAID

## 2024-12-29 ENCOUNTER — HOSPITAL ENCOUNTER (EMERGENCY)
Facility: HOSPITAL | Age: 34
Discharge: HOME | End: 2024-12-30
Attending: INTERNAL MEDICINE
Payer: MEDICAID

## 2024-12-29 VITALS
WEIGHT: 188 LBS | BODY MASS INDEX: 35.5 KG/M2 | SYSTOLIC BLOOD PRESSURE: 131 MMHG | DIASTOLIC BLOOD PRESSURE: 79 MMHG | TEMPERATURE: 98.1 F | OXYGEN SATURATION: 98 % | HEIGHT: 61 IN | RESPIRATION RATE: 16 BRPM | HEART RATE: 75 BPM

## 2024-12-29 DIAGNOSIS — L73.2 HIDRADENITIS SUPPURATIVA: Primary | ICD-10-CM

## 2024-12-29 LAB
ALBUMIN SERPL BCP-MCNC: 3.1 G/DL (ref 3.4–5)
ALP SERPL-CCNC: 149 U/L (ref 33–110)
ALT SERPL W P-5'-P-CCNC: 10 U/L (ref 7–45)
ANION GAP SERPL CALC-SCNC: 10 MMOL/L (ref 10–20)
AST SERPL W P-5'-P-CCNC: 13 U/L (ref 9–39)
BASOPHILS # BLD AUTO: 0.03 X10*3/UL (ref 0–0.1)
BASOPHILS NFR BLD AUTO: 0.3 %
BILIRUB SERPL-MCNC: 0.2 MG/DL (ref 0–1.2)
BUN SERPL-MCNC: 9 MG/DL (ref 6–23)
CALCIUM SERPL-MCNC: 8.6 MG/DL (ref 8.6–10.3)
CHLORIDE SERPL-SCNC: 109 MMOL/L (ref 98–107)
CO2 SERPL-SCNC: 21 MMOL/L (ref 21–32)
CREAT SERPL-MCNC: 0.75 MG/DL (ref 0.5–1.05)
CRP SERPL-MCNC: 2.07 MG/DL
EGFRCR SERPLBLD CKD-EPI 2021: >90 ML/MIN/1.73M*2
EOSINOPHIL # BLD AUTO: 0.08 X10*3/UL (ref 0–0.7)
EOSINOPHIL NFR BLD AUTO: 0.7 %
ERYTHROCYTE [DISTWIDTH] IN BLOOD BY AUTOMATED COUNT: 14.9 % (ref 11.5–14.5)
ERYTHROCYTE [SEDIMENTATION RATE] IN BLOOD BY WESTERGREN METHOD: 48 MM/H (ref 0–20)
GLUCOSE SERPL-MCNC: 106 MG/DL (ref 74–99)
HCT VFR BLD AUTO: 33.6 % (ref 36–46)
HGB BLD-MCNC: 11.3 G/DL (ref 12–16)
IMM GRANULOCYTES # BLD AUTO: 0.23 X10*3/UL (ref 0–0.7)
IMM GRANULOCYTES NFR BLD AUTO: 2.1 % (ref 0–0.9)
LYMPHOCYTES # BLD AUTO: 2.7 X10*3/UL (ref 1.2–4.8)
LYMPHOCYTES NFR BLD AUTO: 25 %
MCH RBC QN AUTO: 25.5 PG (ref 26–34)
MCHC RBC AUTO-ENTMCNC: 33.6 G/DL (ref 32–36)
MCV RBC AUTO: 76 FL (ref 80–100)
MONOCYTES # BLD AUTO: 0.83 X10*3/UL (ref 0.1–1)
MONOCYTES NFR BLD AUTO: 7.7 %
NEUTROPHILS # BLD AUTO: 6.91 X10*3/UL (ref 1.2–7.7)
NEUTROPHILS NFR BLD AUTO: 64.2 %
NRBC BLD-RTO: 0 /100 WBCS (ref 0–0)
PLATELET # BLD AUTO: 327 X10*3/UL (ref 150–450)
POTASSIUM SERPL-SCNC: 3.9 MMOL/L (ref 3.5–5.3)
PROT SERPL-MCNC: 6.7 G/DL (ref 6.4–8.2)
RBC # BLD AUTO: 4.44 X10*6/UL (ref 4–5.2)
SODIUM SERPL-SCNC: 136 MMOL/L (ref 136–145)
WBC # BLD AUTO: 10.8 X10*3/UL (ref 4.4–11.3)

## 2024-12-29 PROCEDURE — 99284 EMERGENCY DEPT VISIT MOD MDM: CPT | Mod: 25

## 2024-12-29 PROCEDURE — 96374 THER/PROPH/DIAG INJ IV PUSH: CPT | Performed by: INTERNAL MEDICINE

## 2024-12-29 PROCEDURE — 96376 TX/PRO/DX INJ SAME DRUG ADON: CPT | Performed by: INTERNAL MEDICINE

## 2024-12-29 PROCEDURE — 80053 COMPREHEN METABOLIC PANEL: CPT | Performed by: INTERNAL MEDICINE

## 2024-12-29 PROCEDURE — 85652 RBC SED RATE AUTOMATED: CPT | Performed by: INTERNAL MEDICINE

## 2024-12-29 PROCEDURE — 2500000004 HC RX 250 GENERAL PHARMACY W/ HCPCS (ALT 636 FOR OP/ED): Performed by: INTERNAL MEDICINE

## 2024-12-29 PROCEDURE — 96375 TX/PRO/DX INJ NEW DRUG ADDON: CPT | Performed by: INTERNAL MEDICINE

## 2024-12-29 PROCEDURE — 99285 EMERGENCY DEPT VISIT HI MDM: CPT | Mod: 25 | Performed by: INTERNAL MEDICINE

## 2024-12-29 PROCEDURE — 85025 COMPLETE CBC W/AUTO DIFF WBC: CPT | Performed by: INTERNAL MEDICINE

## 2024-12-29 PROCEDURE — 86140 C-REACTIVE PROTEIN: CPT | Performed by: INTERNAL MEDICINE

## 2024-12-29 RX ORDER — HYDROMORPHONE HYDROCHLORIDE 2 MG/ML
2 INJECTION, SOLUTION INTRAMUSCULAR; INTRAVENOUS; SUBCUTANEOUS ONCE
Status: DISCONTINUED | OUTPATIENT
Start: 2024-12-29 | End: 2024-12-29

## 2024-12-29 RX ORDER — HYDROMORPHONE HYDROCHLORIDE 1 MG/ML
1 INJECTION, SOLUTION INTRAMUSCULAR; INTRAVENOUS; SUBCUTANEOUS ONCE
Status: COMPLETED | OUTPATIENT
Start: 2024-12-29 | End: 2024-12-29

## 2024-12-29 RX ORDER — OXYCODONE HYDROCHLORIDE 5 MG/1
10 TABLET ORAL ONCE
Status: DISCONTINUED | OUTPATIENT
Start: 2024-12-29 | End: 2024-12-29

## 2024-12-29 RX ORDER — ONDANSETRON HYDROCHLORIDE 2 MG/ML
4 INJECTION, SOLUTION INTRAVENOUS ONCE
Status: COMPLETED | OUTPATIENT
Start: 2024-12-29 | End: 2024-12-29

## 2024-12-29 RX ORDER — HYDROMORPHONE HYDROCHLORIDE 2 MG/ML
2 INJECTION, SOLUTION INTRAMUSCULAR; INTRAVENOUS; SUBCUTANEOUS ONCE
Status: COMPLETED | OUTPATIENT
Start: 2024-12-29 | End: 2024-12-29

## 2024-12-29 RX ADMIN — ONDANSETRON 4 MG: 2 INJECTION, SOLUTION INTRAMUSCULAR; INTRAVENOUS at 17:38

## 2024-12-29 RX ADMIN — HYDROMORPHONE HYDROCHLORIDE 2 MG: 2 INJECTION INTRAMUSCULAR; INTRAVENOUS; SUBCUTANEOUS at 23:06

## 2024-12-29 RX ADMIN — HYDROMORPHONE HYDROCHLORIDE 1 MG: 1 INJECTION, SOLUTION INTRAMUSCULAR; INTRAVENOUS; SUBCUTANEOUS at 17:38

## 2024-12-29 RX ADMIN — HYDROMORPHONE HYDROCHLORIDE 2 MG: 2 INJECTION INTRAMUSCULAR; INTRAVENOUS; SUBCUTANEOUS at 19:55

## 2024-12-29 RX ADMIN — HYDROMORPHONE HYDROCHLORIDE 1 MG: 1 INJECTION, SOLUTION INTRAMUSCULAR; INTRAVENOUS; SUBCUTANEOUS at 17:06

## 2024-12-29 ASSESSMENT — PAIN SCALES - GENERAL
PAINLEVEL_OUTOF10: 10 - WORST POSSIBLE PAIN
PAINLEVEL_OUTOF10: 10 - WORST POSSIBLE PAIN
PAINLEVEL_OUTOF10: 9
PAINLEVEL_OUTOF10: 10 - WORST POSSIBLE PAIN

## 2024-12-29 ASSESSMENT — PAIN DESCRIPTION - LOCATION: LOCATION: GROIN

## 2024-12-29 ASSESSMENT — PAIN DESCRIPTION - FREQUENCY: FREQUENCY: CONSTANT/CONTINUOUS

## 2024-12-29 ASSESSMENT — PAIN DESCRIPTION - PAIN TYPE: TYPE: ACUTE PAIN

## 2024-12-29 ASSESSMENT — PAIN - FUNCTIONAL ASSESSMENT: PAIN_FUNCTIONAL_ASSESSMENT: 0-10

## 2024-12-29 NOTE — ED TRIAGE NOTES
Patient presents from home via EMS. Patient is 33 weeks pregnant with HS exacerbation. Patient notes she has can plan in place for disease management. Spots located under arms, groin and buttocks. Groin is extremely painful -9/10. Patient takes  baby aspirin.

## 2024-12-30 ENCOUNTER — PHARMACY VISIT (OUTPATIENT)
Dept: PHARMACY | Facility: CLINIC | Age: 34
End: 2024-12-30
Payer: MEDICAID

## 2024-12-30 ENCOUNTER — ROUTINE PRENATAL (OUTPATIENT)
Dept: MATERNAL FETAL MEDICINE | Facility: CLINIC | Age: 34
End: 2024-12-30
Payer: MEDICAID

## 2024-12-30 ENCOUNTER — PROCEDURE VISIT (OUTPATIENT)
Dept: OBSTETRICS AND GYNECOLOGY | Facility: CLINIC | Age: 34
End: 2024-12-30
Payer: MEDICAID

## 2024-12-30 VITALS — SYSTOLIC BLOOD PRESSURE: 110 MMHG | WEIGHT: 187 LBS | DIASTOLIC BLOOD PRESSURE: 78 MMHG | BODY MASS INDEX: 35.33 KG/M2

## 2024-12-30 DIAGNOSIS — D57.3 SICKLE CELL TRAIT IN MOTHER AFFECTING PREGNANCY (MULTI): ICD-10-CM

## 2024-12-30 DIAGNOSIS — O99.019 SICKLE CELL TRAIT IN MOTHER AFFECTING PREGNANCY (MULTI): ICD-10-CM

## 2024-12-30 DIAGNOSIS — Z3A.33 33 WEEKS GESTATION OF PREGNANCY (HHS-HCC): ICD-10-CM

## 2024-12-30 DIAGNOSIS — F11.20 OPIOID DEPENDENCE, DAILY USE (MULTI): ICD-10-CM

## 2024-12-30 DIAGNOSIS — O09.523 MULTIGRAVIDA OF ADVANCED MATERNAL AGE IN THIRD TRIMESTER (HHS-HCC): ICD-10-CM

## 2024-12-30 DIAGNOSIS — L73.2 HIDRADENITIS SUPPURATIVA: Primary | ICD-10-CM

## 2024-12-30 PROCEDURE — 99214 OFFICE O/P EST MOD 30 MIN: CPT | Performed by: STUDENT IN AN ORGANIZED HEALTH CARE EDUCATION/TRAINING PROGRAM

## 2024-12-30 PROCEDURE — 81003 URINALYSIS AUTO W/O SCOPE: CPT | Mod: QW | Performed by: STUDENT IN AN ORGANIZED HEALTH CARE EDUCATION/TRAINING PROGRAM

## 2024-12-30 PROCEDURE — RXMED WILLOW AMBULATORY MEDICATION CHARGE

## 2024-12-30 RX ORDER — OXYCODONE AND ACETAMINOPHEN 10; 325 MG/1; MG/1
1 TABLET ORAL
Qty: 56 TABLET | Refills: 0 | Status: ON HOLD | OUTPATIENT
Start: 2024-12-30

## 2024-12-30 NOTE — ED PROVIDER NOTES
HPI     CC: Hidradenitis Suppurativa and Autoimmune Disease Flare     HPI: Sandrita Adams is a 34 y.o. female currently 33 WGA with a history of chronic hidradenitis suppurativa presents with concern for HS flare.  She states that symptoms started yesterday.  She has developed new at bedtime lesions in her bilateral axillae, vaginal region, and buttocks.  She was previously having fevers off and on but has not had a fever in 2 days.  She is taking oxycodone for pain at home with minimal relief.  She was recently hospitalized for hidradenitis flare, was on prolonged IV or ertapenem which was discontinued last week.  She is no longer getting her lesions drained as she thinks this made her symptoms worse.  She has had multiple surgeries in the past.  She was doing well on Cosentyx prior to her pregnancy but had to discontinue it when she got pregnant.  She states that she has a care plan for pain control when she comes to the ED but I am unable to find this in the system.  At her last ED visit she received doses of 2 mg IV Dilaudid. She has had some nausea for the past 2 days.  She denies contractions, vaginal bleeding or loss of fluid.  She has noted normal fetal movements.  She follows with M Dr. Mccann and has a follow up appointment tomorrow for NST.    ROS: 10-point review of systems was performed and is otherwise negative except as noted in HPI.    Limitations to history: N/A    Independent Historians: N/A    External Records Reviewed: Outpatient notes in EMR and recent inpatient hospital notes    Past Medical History: Noncontributory except per HPI     Past Surgical History: Noncontributory except per HPI     Family History: Reviewed and noncontributory     Social History:  Denies tobacco. Denies ETOH. Denies illicit drugs.    Social Determinants Affecting Care: N/A    Allergies   Allergen Reactions    Suboxone [Buprenorphine-Naloxone] Anaphylaxis, Hives and Itching    Clarithromycin Hives    Haloperidol  "Hallucinations and Psychosis    Keflex [Cephalexin] Hives    Levofloxacin Swelling     Ankle Joint/tendon pain    Metoclopramide Headache, Hallucinations and Psychosis    Reglan [Metoclopramide Hcl] Psychosis       Home Meds:   Current Outpatient Medications   Medication Instructions    adalimumab (Humira,CF, Pen Crohns-UC-HS) 80 mg/0.8 mL pen injector kit pen-injector Inject 2 pens (160mg) under the skin on day 0, then 1 pen (80mg) on day 15    aspirin 162 mg, oral, Daily    Boost 0.04 gram- 1 kcal/mL liquid Please dispense 14 bottles for patient to have BID for 7 days.    chlorhexidine (Hibiclens) 4 % external liquid Topical, 2 times daily, Bathe in Hibiclens twice daily    clindamycin (CLEOCIN) 300 mg, oral, Every 12 hours    famotidine (PEPCID) 20 mg, oral, 2 times daily    Humira(CF) Pen 80 mg, subcutaneous, Every 14 days    Narcan 4 mg, nasal, As needed, May repeat every 2-3 minutes if needed, alternating nostrils, until medical assistance becomes available.    ondansetron ODT (ZOFRAN-ODT) 4 mg, oral, Every 8 hours PRN    oxyCODONE-acetaminophen (Percocet)  mg tablet 1 tablet, oral, Every 3 hours PRN    -iron fum-folic acid (Prenatal 19) 29 mg iron- 1 mg tablet 1 tablet, oral, Daily    sodium chloride 0.9% (sodium chloride) flush 10 mL, Daily        Physical Exam     ED Triage Vitals [12/29/24 1629]   Temperature Heart Rate Respirations BP   36.7 °C (98.1 °F) 84 20 112/61      Pulse Ox Temp Source Heart Rate Source Patient Position   100 % Oral Monitor Lying      BP Location FiO2 (%)     Right arm --         Heart Rate:  [84-87]   Temperature:  [36.7 °C (98.1 °F)]   Respirations:  [20]   BP: (108-125)/(61-97)   Height:  [154.9 cm (5' 1\")]   Weight:  [85.3 kg (188 lb)]   Pulse Ox:  [98 %-100 %]      Physical Exam  Vitals and nursing note reviewed.     CONSTITUTIONAL: Well appearing, well nourished, in no acute distress.   HENMT: Head atraumatic. Airway patent. Nasal mucosa clear. Mouth with normal " mucosa, clear oropharynx. Uvula midline. Neck supple.    EYES: Clear bilaterally, pupils equally round and reactive to light.   CARDIOVASCULAR: Normal rate, regular rhythm.  Heart sounds S1, S2.  No murmurs, rubs or gallops. Normal pulses. Capillary refill < 2 sec.   RESPIRATORY: No increased work of breathing. Breath sounds clear and equal bilaterally.  GASTROINTESTINAL: Abdomen gravid, non-tender. No rebound, no guarding. Normal bowel sounds. No palpable masses.  GENITOURINARY:  No CVA tenderness.  MUSCULOSKELETAL: Spine appears normal, range of motion is not limited, no muscle or joint tenderness. No edema.   NEUROLOGICAL: Alert and oriented, no asymmetry, moving all extremities equally.  SKIN: Tender nondraining, nonerythematous papules in bilateral axilla, labia folds, medial buttocks.  Otherwise warm, dry and intact. No other rash or notable lesions.  PSYCHIATRIC: Normal mood and affect.  HEME/LYMPH: No adenopathy or splenomegaly.    Diagnostic Results      ECG: ECGs read and interpreted by me. See ED Course, below, for interpretation.    Labs Reviewed   CBC WITH AUTO DIFFERENTIAL - Abnormal       Result Value    WBC 10.8      nRBC 0.0      RBC 4.44      Hemoglobin 11.3 (*)     Hematocrit 33.6 (*)     MCV 76 (*)     MCH 25.5 (*)     MCHC 33.6      RDW 14.9 (*)     Platelets 327      Neutrophils % 64.2      Immature Granulocytes %, Automated 2.1 (*)     Lymphocytes % 25.0      Monocytes % 7.7      Eosinophils % 0.7      Basophils % 0.3      Neutrophils Absolute 6.91      Immature Granulocytes Absolute, Automated 0.23      Lymphocytes Absolute 2.70      Monocytes Absolute 0.83      Eosinophils Absolute 0.08      Basophils Absolute 0.03     COMPREHENSIVE METABOLIC PANEL - Abnormal    Glucose 106 (*)     Sodium 136      Potassium 3.9      Chloride 109 (*)     Bicarbonate 21      Anion Gap 10      Urea Nitrogen 9      Creatinine 0.75      eGFR >90      Calcium 8.6      Albumin 3.1 (*)     Alkaline Phosphatase 149  (*)     Total Protein 6.7      AST 13      Bilirubin, Total 0.2      ALT 10     SEDIMENTATION RATE, AUTOMATED - Abnormal    Sedimentation Rate 48 (*)    C-REACTIVE PROTEIN - Abnormal    C-Reactive Protein 2.07 (*)          No orders to display                 [place] Coma Scale Score: 15                  Procedure  Procedures    ED Course & MDM   Assessment/Plan:   Sandrita Adams is a 34 y.o. female currently 33 WGA with a history of chronic hidradenitis suppurativa presents with concern for HS flare.  She has new painful nodules in her bilateral axillae, vaginal region and buttocks.  She has no systemic symptoms at this time, is hemodynamically stable and generally well-appearing.  No signs of abscess on exam.  Will check labs for inflammatory markers and provide pain control per her prior ED regimen.  Will engage OB/GYN for further recommendations. See below for details of ED course and ultimate disposition.    Medications   HYDROmorphone (Dilaudid) injection 2 mg (has no administration in time range)   HYDROmorphone (Dilaudid) injection 1 mg (1 mg intravenous Given 12/29/24 1706)   ondansetron (Zofran) injection 4 mg (4 mg intravenous Given 12/29/24 1738)   HYDROmorphone (Dilaudid) injection 1 mg (1 mg intravenous Given 12/29/24 1738)   HYDROmorphone (Dilaudid) injection 2 mg (2 mg intravenous Given 12/29/24 1955)        ED Course as of 12/29/24 2306   Sun Dec 29, 2024   1813 Labs are notable for CBC without leukocytosis, mild anemia 11.3, normal platelets, CMP with mildly elevated chloride 109, mildly elevated alkaline phosphatase 149, grossly stable ESR 48 and CRP 2.07 [CG]   1925 Spoke with Dr. Baldwin who recommends transfer downtown if she needs IV pain control. If she can go home they do a quick NST down here.  [CG]   1945 Patient reevaluated.  She is still in significant pain but still would like to try to go home for her MFM appointment tomorrow if possible.  Another 2 mg Dilaudid ordered. [CG]   2001  Dr. Armenta at [facility] accepts for transfer to [facility] if necessary for pain control [CG]   2018 Patient now stating that her pain is better and she under no circumstances wants to stay in the hospital. [CG]   2057  [CG]   2123 Spoke with Dr. Mccann patient's MFM who recommends hospitalization for further derm and ID consults but if patient does not want to stay he can follow up with her tomorrow. [CG]   2306 NST completed.  Cleared for discharge per OB.  Patient is requesting additional IV Dilaudid prior to discharge.  She will be observed after the last dose.  Patient was discharged home with anticipatory guidance and strict return precautions. [CG]      ED Course User Index  [CG] Arlen Gaviria MD         Diagnoses as of 12/29/24 2306   Hidradenitis suppurativa       Disposition:   DISCHARGE.  The patient was discharged with both verbal and written anticipatory guidance and strict return precautions. Discharge diagnosis, instructions and plan were discussed and understood. I emphasized the importance of following up with their primary care provider within 24-48 hours and with any referrals in the timeframe recommended. At the time of discharge the patient was comfortable and was in no apparent distress. Patient is aware of diagnostic uncertainty and was notified though testing is negative here, there is a very small chance that pathology may be missed.  The patient understands these risks and the patient/family understood to call 911 or return immediately to the emergency department if the symptoms worsen or if they have any additional concerns.      FOLLOW UP  Primary care provider in 1-2 days.      ED Prescriptions    None         Arlen Gaviria MD  EM/IM/Peds    This note was dictated by speech recognition. Minor errors in transcription may be present.     Arlen Gaviria MD  12/30/24 9078

## 2024-12-30 NOTE — PROGRESS NOTES
MFM Follow-up  24     SUBJECTIVE     HPI: Sandrita Adams is a 34 y.o.  at 33w3d  here for MFM follow up prenatal visit.      Denies contractions, bleeding, or LOF. Reports normal fetal movement.     Having some perianal and groin pain, reports new flare. Did go to ED yesterday afternoon to evening for this pain, presented to [facility]. Required multiple doses of IV dilaudid. Had been recommended for transfer to [facility] and admission for dermatology and ID consults given flare requiring IV pains, patient then declined and opted for outpatient follow up.     States she just ran out of percocet this morning. Pain is improved since yesterday. Additionally reports onset of worsening heartburn with associated nausea, which is responsive to pepcid, has adequate supply at home. No abdominal pain or changes in bowel function.    Has not yet scheduled with [facility], states she was told to expect a call from their team after the new year.       OBJECTIVE  Vitals:    24 1300   BP: 110/78   Weight: 84.8 kg (187 lb)        Physical Exam:  Gen - no acute distress  Resp - non labored breathing  Cardio - warm and well perfused  Extrem - symmetric, non erythematous  Abd - gravid, non tender  Neuro - alert and oriented, no gross defects     NST: baseline 145 bpm, 15 x 15 accelerations, no decelerations, moderate variability  TOCO: rare asymptomatic contractions     ASSESSMENT & PLAN     Patient is a 34 y.o.  at 33w3d here for MFM follow up prenatal visit.      Hidradenitis Suppurativa  - Continuing with Humira dosing  - OARRS reviewed and appropriate, percocet refilled for 1 week today  - Required ED visit with multiple doses of IV dilaudid yesterday due to reported new flare pain, reports did not use additional PO percocet for breakthrough during this episode. Declined transfer and admission for derm and ID consults at [facility].  - Current MF plan, will not be prescribing oxycodone for any  significant duration. Patient plans to re-stablish with pain team versus find new pain provider. Told to expect call from [facility] after the new year.   - Plan for weekly NSTs and serial fetal growth US given active inflammatory disease in third trimester, growth next week     Sickle Cell Trait  - partner negative     Advanced Maternal Age  - rr cfDNA  - Growth US as above     Anemia  - Hb 10.2, iron studies not consistent with deficiency, folate/B12   - alpha thal eval pending, box given 12/30/24     Prenatal Care  - Due for Tdap and RSV, patient declines today but may consider at next visit  - Remainder of care up to date  - Plan for delivery 37-39 weeks pending HS activity/symptoms control, patient current desires 39 weeks if stable      RTC in 1 week    Patient seen and counseled with Dr. Joan Bustos MD  MFM Fellow

## 2024-12-30 NOTE — DISCHARGE INSTRUCTIONS
You were seen today for hidradenitis flare.  You are offered admission for pain control and further consultation but have opted to go home.  Please keep your appointment with Dr. Mccann tomorrow. Please see the attached information sheet for information about your condition, how to care for your condition at home, and reasons to return to the emergency department. Take any prescriptions written today as prescribed. You should call your primary care provider within 24 hours to tell them about today's visit, including any new medications or medication changes, as he or she may want to see you in the office for further evaluation. If you do not have a primary care provider, call  [phone] for an appointment. We offer in-person office visits as well as virtual options. Please do not hesitate to call  [phone] or return to the emergency department with any new or unresolved concerns or symptoms. Thank you for choosing [facility] for your care.

## 2024-12-30 NOTE — PROGRESS NOTES
I saw and evaluated the patient. I personally obtained the key and critical portions of the history and physical exam or was physically present for key and critical portions performed by the resident/fellow. I reviewed the resident/fellow's documentation and discussed the patient with the resident/fellow. I agree with the resident/fellow's medical decision making as documented in the note.    Flor Franco MD

## 2025-01-05 ENCOUNTER — TELEPHONE (OUTPATIENT)
Dept: OBSTETRICS AND GYNECOLOGY | Facility: HOSPITAL | Age: 35
End: 2025-01-05

## 2025-01-05 ENCOUNTER — HOSPITAL ENCOUNTER (EMERGENCY)
Facility: HOSPITAL | Age: 35
Discharge: HOME | End: 2025-01-05
Attending: EMERGENCY MEDICINE
Payer: MEDICAID

## 2025-01-05 ENCOUNTER — HOSPITAL ENCOUNTER (INPATIENT)
Facility: HOSPITAL | Age: 35
LOS: 1 days | Discharge: HOME | End: 2025-01-06
Attending: STUDENT IN AN ORGANIZED HEALTH CARE EDUCATION/TRAINING PROGRAM | Admitting: STUDENT IN AN ORGANIZED HEALTH CARE EDUCATION/TRAINING PROGRAM
Payer: MEDICAID

## 2025-01-05 ENCOUNTER — HOSPITAL ENCOUNTER (OUTPATIENT)
Facility: HOSPITAL | Age: 35
Discharge: ANOTHER HOSPITAL | End: 2025-01-05
Attending: OBSTETRICS & GYNECOLOGY | Admitting: OBSTETRICS & GYNECOLOGY
Payer: MEDICAID

## 2025-01-05 VITALS
HEART RATE: 77 BPM | SYSTOLIC BLOOD PRESSURE: 102 MMHG | HEIGHT: 61 IN | TEMPERATURE: 98.2 F | WEIGHT: 187.39 LBS | OXYGEN SATURATION: 99 % | DIASTOLIC BLOOD PRESSURE: 62 MMHG | RESPIRATION RATE: 18 BRPM | BODY MASS INDEX: 35.38 KG/M2

## 2025-01-05 DIAGNOSIS — L73.2 HIDRADENITIS SUPPURATIVA: ICD-10-CM

## 2025-01-05 DIAGNOSIS — L73.2 HIDRADENITIS SUPPURATIVA: Primary | ICD-10-CM

## 2025-01-05 DIAGNOSIS — O21.9 NAUSEA AND VOMITING IN PREGNANCY PRIOR TO 22 WEEKS GESTATION: Primary | ICD-10-CM

## 2025-01-05 PROBLEM — Z87.2 H/O HIDRADENITIS SUPPURATIVA: Status: ACTIVE | Noted: 2025-01-05

## 2025-01-05 LAB
ABO GROUP (TYPE) IN BLOOD: NORMAL
ANTIBODY SCREEN: NORMAL
BASOPHILS # BLD AUTO: 0.04 X10*3/UL (ref 0–0.1)
BASOPHILS NFR BLD AUTO: 0.3 %
EOSINOPHIL # BLD AUTO: 0.04 X10*3/UL (ref 0–0.7)
EOSINOPHIL NFR BLD AUTO: 0.3 %
ERYTHROCYTE [DISTWIDTH] IN BLOOD BY AUTOMATED COUNT: 14.4 % (ref 11.5–14.5)
HCT VFR BLD AUTO: 31.5 % (ref 36–46)
HGB BLD-MCNC: 10.8 G/DL (ref 12–16)
IMM GRANULOCYTES # BLD AUTO: 0.24 X10*3/UL (ref 0–0.7)
IMM GRANULOCYTES NFR BLD AUTO: 2 % (ref 0–0.9)
LYMPHOCYTES # BLD AUTO: 2.7 X10*3/UL (ref 1.2–4.8)
LYMPHOCYTES NFR BLD AUTO: 22.5 %
MCH RBC QN AUTO: 25.5 PG (ref 26–34)
MCHC RBC AUTO-ENTMCNC: 34.3 G/DL (ref 32–36)
MCV RBC AUTO: 74 FL (ref 80–100)
MONOCYTES # BLD AUTO: 1 X10*3/UL (ref 0.1–1)
MONOCYTES NFR BLD AUTO: 8.3 %
NEUTROPHILS # BLD AUTO: 7.97 X10*3/UL (ref 1.2–7.7)
NEUTROPHILS NFR BLD AUTO: 66.6 %
NRBC BLD-RTO: 0 /100 WBCS (ref 0–0)
PLATELET # BLD AUTO: 327 X10*3/UL (ref 150–450)
RBC # BLD AUTO: 4.24 X10*6/UL (ref 4–5.2)
RH FACTOR (ANTIGEN D): NORMAL
WBC # BLD AUTO: 12 X10*3/UL (ref 4.4–11.3)

## 2025-01-05 PROCEDURE — 2500000004 HC RX 250 GENERAL PHARMACY W/ HCPCS (ALT 636 FOR OP/ED): Mod: SE

## 2025-01-05 PROCEDURE — 2500000004 HC RX 250 GENERAL PHARMACY W/ HCPCS (ALT 636 FOR OP/ED): Performed by: OBSTETRICS & GYNECOLOGY

## 2025-01-05 PROCEDURE — 99281 EMR DPT VST MAYX REQ PHY/QHP: CPT

## 2025-01-05 PROCEDURE — 59025 FETAL NON-STRESS TEST: CPT | Mod: GC,25

## 2025-01-05 PROCEDURE — 99214 OFFICE O/P EST MOD 30 MIN: CPT | Mod: 25

## 2025-01-05 PROCEDURE — 36415 COLL VENOUS BLD VENIPUNCTURE: CPT

## 2025-01-05 PROCEDURE — 99214 OFFICE O/P EST MOD 30 MIN: CPT | Performed by: OBSTETRICS & GYNECOLOGY

## 2025-01-05 PROCEDURE — 99285 EMERGENCY DEPT VISIT HI MDM: CPT

## 2025-01-05 PROCEDURE — 1220000001 HC OB SEMI-PRIVATE ROOM DAILY

## 2025-01-05 PROCEDURE — 86901 BLOOD TYPING SEROLOGIC RH(D): CPT

## 2025-01-05 PROCEDURE — 85025 COMPLETE CBC W/AUTO DIFF WBC: CPT

## 2025-01-05 PROCEDURE — 59025 FETAL NON-STRESS TEST: CPT

## 2025-01-05 PROCEDURE — 2500000001 HC RX 250 WO HCPCS SELF ADMINISTERED DRUGS (ALT 637 FOR MEDICARE OP): Mod: SE

## 2025-01-05 PROCEDURE — 99223 1ST HOSP IP/OBS HIGH 75: CPT

## 2025-01-05 PROCEDURE — 99283 EMERGENCY DEPT VISIT LOW MDM: CPT | Performed by: EMERGENCY MEDICINE

## 2025-01-05 RX ORDER — HYDROMORPHONE HYDROCHLORIDE 1 MG/ML
2 INJECTION, SOLUTION INTRAMUSCULAR; INTRAVENOUS; SUBCUTANEOUS
Status: DISCONTINUED | OUTPATIENT
Start: 2025-01-05 | End: 2025-01-05 | Stop reason: HOSPADM

## 2025-01-05 RX ORDER — HYDROMORPHONE HYDROCHLORIDE 0.2 MG/ML
0.2 INJECTION INTRAMUSCULAR; INTRAVENOUS; SUBCUTANEOUS EVERY 2 HOUR PRN
Status: DISCONTINUED | OUTPATIENT
Start: 2025-01-05 | End: 2025-01-05

## 2025-01-05 RX ORDER — OXYCODONE HYDROCHLORIDE 5 MG/1
10 TABLET ORAL
Status: DISCONTINUED | OUTPATIENT
Start: 2025-01-05 | End: 2025-01-06 | Stop reason: HOSPADM

## 2025-01-05 RX ORDER — FAMOTIDINE 20 MG/1
20 TABLET, FILM COATED ORAL 2 TIMES DAILY
Status: DISCONTINUED | OUTPATIENT
Start: 2025-01-05 | End: 2025-01-06 | Stop reason: HOSPADM

## 2025-01-05 RX ORDER — LIDOCAINE HYDROCHLORIDE 10 MG/ML
0.5 INJECTION, SOLUTION INFILTRATION; PERINEURAL ONCE AS NEEDED
Status: DISCONTINUED | OUTPATIENT
Start: 2025-01-05 | End: 2025-01-06 | Stop reason: HOSPADM

## 2025-01-05 RX ORDER — HYDROMORPHONE HYDROCHLORIDE 0.2 MG/ML
0.2 INJECTION INTRAMUSCULAR; INTRAVENOUS; SUBCUTANEOUS EVERY 2 HOUR PRN
Status: DISCONTINUED | OUTPATIENT
Start: 2025-01-05 | End: 2025-01-06 | Stop reason: HOSPADM

## 2025-01-05 RX ORDER — TRIAMCINOLONE ACETONIDE 40 MG/ML
10 INJECTION, SUSPENSION INTRA-ARTICULAR; INTRAMUSCULAR ONCE
Status: COMPLETED | OUTPATIENT
Start: 2025-01-05 | End: 2025-01-05

## 2025-01-05 RX ORDER — NIFEDIPINE 10 MG/1
10 CAPSULE ORAL ONCE AS NEEDED
Status: DISCONTINUED | OUTPATIENT
Start: 2025-01-05 | End: 2025-01-06 | Stop reason: HOSPADM

## 2025-01-05 RX ORDER — LABETALOL HYDROCHLORIDE 5 MG/ML
20 INJECTION, SOLUTION INTRAVENOUS ONCE AS NEEDED
Status: DISCONTINUED | OUTPATIENT
Start: 2025-01-05 | End: 2025-01-06 | Stop reason: HOSPADM

## 2025-01-05 RX ORDER — ACETAMINOPHEN 325 MG/1
975 TABLET ORAL ONCE
Status: DISCONTINUED | OUTPATIENT
Start: 2025-01-05 | End: 2025-01-05

## 2025-01-05 RX ORDER — HYDROMORPHONE HYDROCHLORIDE 0.2 MG/ML
0.2 INJECTION INTRAMUSCULAR; INTRAVENOUS; SUBCUTANEOUS ONCE
Status: COMPLETED | OUTPATIENT
Start: 2025-01-05 | End: 2025-01-05

## 2025-01-05 RX ORDER — SIMETHICONE 80 MG
80 TABLET,CHEWABLE ORAL 4 TIMES DAILY PRN
Status: DISCONTINUED | OUTPATIENT
Start: 2025-01-05 | End: 2025-01-06 | Stop reason: HOSPADM

## 2025-01-05 RX ORDER — POLYETHYLENE GLYCOL 3350 17 G/17G
17 POWDER, FOR SOLUTION ORAL 2 TIMES DAILY PRN
Status: DISCONTINUED | OUTPATIENT
Start: 2025-01-05 | End: 2025-01-06 | Stop reason: HOSPADM

## 2025-01-05 RX ORDER — OXYCODONE HYDROCHLORIDE 5 MG/1
5 TABLET ORAL
Status: DISCONTINUED | OUTPATIENT
Start: 2025-01-05 | End: 2025-01-05

## 2025-01-05 RX ORDER — BISACODYL 10 MG/1
10 SUPPOSITORY RECTAL DAILY PRN
Status: DISCONTINUED | OUTPATIENT
Start: 2025-01-05 | End: 2025-01-06 | Stop reason: HOSPADM

## 2025-01-05 RX ORDER — ONDANSETRON HYDROCHLORIDE 2 MG/ML
4 INJECTION, SOLUTION INTRAVENOUS EVERY 4 HOURS PRN
Status: DISCONTINUED | OUTPATIENT
Start: 2025-01-05 | End: 2025-01-05 | Stop reason: HOSPADM

## 2025-01-05 RX ORDER — ADHESIVE BANDAGE
10 BANDAGE TOPICAL
Status: DISCONTINUED | OUTPATIENT
Start: 2025-01-05 | End: 2025-01-06 | Stop reason: HOSPADM

## 2025-01-05 RX ORDER — ACETAMINOPHEN 325 MG/1
975 TABLET ORAL EVERY 6 HOURS PRN
Status: DISCONTINUED | OUTPATIENT
Start: 2025-01-05 | End: 2025-01-06 | Stop reason: HOSPADM

## 2025-01-05 RX ORDER — ONDANSETRON 4 MG/1
4 TABLET, FILM COATED ORAL EVERY 6 HOURS PRN
Status: DISCONTINUED | OUTPATIENT
Start: 2025-01-05 | End: 2025-01-06 | Stop reason: HOSPADM

## 2025-01-05 RX ORDER — PROCHLORPERAZINE 25 MG/1
25 SUPPOSITORY RECTAL EVERY 12 HOURS PRN
Status: DISCONTINUED | OUTPATIENT
Start: 2025-01-05 | End: 2025-01-06 | Stop reason: HOSPADM

## 2025-01-05 RX ORDER — PROCHLORPERAZINE EDISYLATE 5 MG/ML
10 INJECTION INTRAMUSCULAR; INTRAVENOUS EVERY 6 HOURS PRN
Status: DISCONTINUED | OUTPATIENT
Start: 2025-01-05 | End: 2025-01-06 | Stop reason: HOSPADM

## 2025-01-05 RX ORDER — ASPIRIN 81 MG/1
162 TABLET ORAL DAILY
Status: DISCONTINUED | OUTPATIENT
Start: 2025-01-05 | End: 2025-01-06 | Stop reason: HOSPADM

## 2025-01-05 RX ORDER — ONDANSETRON HYDROCHLORIDE 2 MG/ML
4 INJECTION, SOLUTION INTRAVENOUS EVERY 6 HOURS PRN
Status: DISCONTINUED | OUTPATIENT
Start: 2025-01-05 | End: 2025-01-06 | Stop reason: HOSPADM

## 2025-01-05 RX ORDER — HYDROMORPHONE HYDROCHLORIDE 2 MG/1
1 TABLET ORAL EVERY 4 HOURS PRN
Status: DISCONTINUED | OUTPATIENT
Start: 2025-01-05 | End: 2025-01-06 | Stop reason: HOSPADM

## 2025-01-05 RX ORDER — HYDROMORPHONE HYDROCHLORIDE 1 MG/ML
0.2 INJECTION, SOLUTION INTRAMUSCULAR; INTRAVENOUS; SUBCUTANEOUS
Status: DISCONTINUED | OUTPATIENT
Start: 2025-01-05 | End: 2025-01-05

## 2025-01-05 RX ORDER — HYDRALAZINE HYDROCHLORIDE 20 MG/ML
5 INJECTION INTRAMUSCULAR; INTRAVENOUS ONCE AS NEEDED
Status: DISCONTINUED | OUTPATIENT
Start: 2025-01-05 | End: 2025-01-06 | Stop reason: HOSPADM

## 2025-01-05 RX ORDER — PROCHLORPERAZINE MALEATE 10 MG
10 TABLET ORAL EVERY 6 HOURS PRN
Status: DISCONTINUED | OUTPATIENT
Start: 2025-01-05 | End: 2025-01-06 | Stop reason: HOSPADM

## 2025-01-05 RX ADMIN — HYDROMORPHONE HYDROCHLORIDE 0.2 MG: 0.2 INJECTION, SOLUTION INTRAMUSCULAR; INTRAVENOUS; SUBCUTANEOUS at 22:09

## 2025-01-05 RX ADMIN — OXYCODONE HYDROCHLORIDE 10 MG: 10 TABLET ORAL at 15:37

## 2025-01-05 RX ADMIN — FAMOTIDINE 20 MG: 20 TABLET, FILM COATED ORAL at 21:26

## 2025-01-05 RX ADMIN — HYDROMORPHONE HYDROCHLORIDE 0.2 MG: 0.2 INJECTION, SOLUTION INTRAMUSCULAR; INTRAVENOUS; SUBCUTANEOUS at 16:41

## 2025-01-05 RX ADMIN — HYDROMORPHONE HYDROCHLORIDE 0.2 MG: 0.2 INJECTION, SOLUTION INTRAMUSCULAR; INTRAVENOUS; SUBCUTANEOUS at 20:08

## 2025-01-05 RX ADMIN — HYDROMORPHONE HYDROCHLORIDE 2 MG: 1 INJECTION, SOLUTION INTRAMUSCULAR; INTRAVENOUS; SUBCUTANEOUS at 12:51

## 2025-01-05 RX ADMIN — OXYCODONE HYDROCHLORIDE 10 MG: 10 TABLET ORAL at 18:37

## 2025-01-05 RX ADMIN — OXYCODONE HYDROCHLORIDE 10 MG: 10 TABLET ORAL at 21:24

## 2025-01-05 RX ADMIN — HYDROMORPHONE HYDROCHLORIDE 1 MG: 2 TABLET ORAL at 23:39

## 2025-01-05 RX ADMIN — ONDANSETRON 4 MG: 2 INJECTION INTRAMUSCULAR; INTRAVENOUS at 12:48

## 2025-01-05 RX ADMIN — TRIAMCINOLONE ACETONIDE 10 MG: 40 INJECTION, SUSPENSION INTRA-ARTICULAR; INTRAMUSCULAR at 16:58

## 2025-01-05 RX ADMIN — ONDANSETRON 4 MG: 2 INJECTION INTRAMUSCULAR; INTRAVENOUS at 18:14

## 2025-01-05 SDOH — SOCIAL STABILITY: SOCIAL INSECURITY: HAVE YOU HAD THOUGHTS OF HARMING ANYONE ELSE?: NO

## 2025-01-05 SDOH — SOCIAL STABILITY: SOCIAL INSECURITY: PHYSICAL ABUSE: DENIES

## 2025-01-05 SDOH — HEALTH STABILITY: MENTAL HEALTH: HAVE YOU USED ANY PRESCRIPTION DRUGS OTHER THAN PRESCRIBED IN THE PAST 12 MONTHS?: NO

## 2025-01-05 SDOH — HEALTH STABILITY: MENTAL HEALTH: HAVE YOU USED ANY PRESCRIPTION DRUGS OTHER THAN PRESCRIBED IN THE PAST 12 MONTHS?: YES

## 2025-01-05 SDOH — HEALTH STABILITY: MENTAL HEALTH: NON-SPECIFIC ACTIVE SUICIDAL THOUGHTS (PAST 1 MONTH): NO

## 2025-01-05 SDOH — HEALTH STABILITY: MENTAL HEALTH: SUICIDAL BEHAVIOR (LIFETIME): NO

## 2025-01-05 SDOH — SOCIAL STABILITY: SOCIAL INSECURITY: DOES ANYONE TRY TO KEEP YOU FROM HAVING/CONTACTING OTHER FRIENDS OR DOING THINGS OUTSIDE YOUR HOME?: NO

## 2025-01-05 SDOH — SOCIAL STABILITY: SOCIAL INSECURITY: HAVE YOU HAD ANY THOUGHTS OF HARMING ANYONE ELSE?: NO

## 2025-01-05 SDOH — SOCIAL STABILITY: SOCIAL INSECURITY: VERBAL ABUSE: DENIES

## 2025-01-05 SDOH — SOCIAL STABILITY: SOCIAL INSECURITY: ARE THERE ANY APPARENT SIGNS OF INJURIES/BEHAVIORS THAT COULD BE RELATED TO ABUSE/NEGLECT?: NO

## 2025-01-05 SDOH — HEALTH STABILITY: MENTAL HEALTH: WERE YOU ABLE TO COMPLETE ALL THE BEHAVIORAL HEALTH SCREENINGS?: YES

## 2025-01-05 SDOH — SOCIAL STABILITY: SOCIAL INSECURITY: ARE YOU OR HAVE YOU BEEN THREATENED OR ABUSED PHYSICALLY, EMOTIONALLY, OR SEXUALLY BY ANYONE?: NO

## 2025-01-05 SDOH — SOCIAL STABILITY: SOCIAL INSECURITY: HAVE YOU HAD THOUGHTS OF HARMING ANYONE ELSE?: YES

## 2025-01-05 SDOH — SOCIAL STABILITY: SOCIAL INSECURITY: DO YOU FEEL ANYONE HAS EXPLOITED OR TAKEN ADVANTAGE OF YOU FINANCIALLY OR OF YOUR PERSONAL PROPERTY?: NO

## 2025-01-05 SDOH — HEALTH STABILITY: MENTAL HEALTH: HAVE YOU USED ANY SUBSTANCES (CANABIS, COCAINE, HEROIN, HALLUCINOGENS, INHALANTS, ETC.) IN THE PAST 12 MONTHS?: YES

## 2025-01-05 SDOH — HEALTH STABILITY: MENTAL HEALTH: HAVE YOU USED ANY SUBSTANCES (CANABIS, COCAINE, HEROIN, HALLUCINOGENS, INHALANTS, ETC.) IN THE PAST 12 MONTHS?: NO

## 2025-01-05 SDOH — HEALTH STABILITY: MENTAL HEALTH: WISH TO BE DEAD (PAST 1 MONTH): NO

## 2025-01-05 SDOH — SOCIAL STABILITY: SOCIAL INSECURITY: ABUSE SCREEN: ADULT

## 2025-01-05 SDOH — SOCIAL STABILITY: SOCIAL INSECURITY: HAS ANYONE EVER THREATENED TO HURT YOUR FAMILY OR YOUR PETS?: NO

## 2025-01-05 SDOH — ECONOMIC STABILITY: HOUSING INSECURITY: DO YOU FEEL UNSAFE GOING BACK TO THE PLACE WHERE YOU ARE LIVING?: NO

## 2025-01-05 ASSESSMENT — PAIN SCALES - GENERAL
PAINLEVEL_OUTOF10: 9
PAINLEVEL_OUTOF10: 8
PAINLEVEL_OUTOF10: 8
PAINLEVEL_OUTOF10: 9

## 2025-01-05 ASSESSMENT — ENCOUNTER SYMPTOMS
FEVER: 0
WEAKNESS: 0
CHILLS: 0
EYE REDNESS: 0
SHORTNESS OF BREATH: 0
COLOR CHANGE: 1
ROS SKIN COMMENTS: TENDERNESS
CONFUSION: 0

## 2025-01-05 ASSESSMENT — LIFESTYLE VARIABLES
AUDIT-C TOTAL SCORE: 0
SKIP TO QUESTIONS 9-10: 1
AUDIT-C TOTAL SCORE: 0
AUDIT-C TOTAL SCORE: 0
HOW OFTEN DO YOU HAVE 6 OR MORE DRINKS ON ONE OCCASION: NEVER
HOW OFTEN DO YOU HAVE A DRINK CONTAINING ALCOHOL: NEVER
HOW OFTEN DO YOU HAVE A DRINK CONTAINING ALCOHOL: NEVER
AUDIT-C TOTAL SCORE: 0
HOW MANY STANDARD DRINKS CONTAINING ALCOHOL DO YOU HAVE ON A TYPICAL DAY: PATIENT DOES NOT DRINK
HOW OFTEN DO YOU HAVE 6 OR MORE DRINKS ON ONE OCCASION: NEVER
SKIP TO QUESTIONS 9-10: 1
HOW MANY STANDARD DRINKS CONTAINING ALCOHOL DO YOU HAVE ON A TYPICAL DAY: PATIENT DOES NOT DRINK

## 2025-01-05 ASSESSMENT — PATIENT HEALTH QUESTIONNAIRE - PHQ9
2. FEELING DOWN, DEPRESSED OR HOPELESS: NOT AT ALL
SUM OF ALL RESPONSES TO PHQ9 QUESTIONS 1 & 2: 0
1. LITTLE INTEREST OR PLEASURE IN DOING THINGS: NOT AT ALL
1. LITTLE INTEREST OR PLEASURE IN DOING THINGS: NOT AT ALL
2. FEELING DOWN, DEPRESSED OR HOPELESS: NOT AT ALL
SUM OF ALL RESPONSES TO PHQ9 QUESTIONS 1 & 2: 0

## 2025-01-05 ASSESSMENT — PAIN DESCRIPTION - LOCATION
LOCATION: GROIN

## 2025-01-05 ASSESSMENT — PAIN DESCRIPTION - ORIENTATION
ORIENTATION: RIGHT

## 2025-01-05 ASSESSMENT — PAIN - FUNCTIONAL ASSESSMENT
PAIN_FUNCTIONAL_ASSESSMENT: 0-10
PAIN_FUNCTIONAL_ASSESSMENT: 0-10

## 2025-01-05 ASSESSMENT — PAIN DESCRIPTION - DESCRIPTORS: DESCRIPTORS: ACHING;SORE
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
## 2025-01-05 NOTE — TELEPHONE ENCOUNTER
Telephone Note    Patient calling with new flare limiting mobility.  Instructed patient to present for evaluation - patient likely to present to [facility] due to lack of transportation and calling squad.  Will plan for transfer to [facility] if not taken here directly.    Leila Beatty MD  MFM Attending
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
## 2025-01-05 NOTE — CONSULTS
DERMATOLOGY DEPARTMENT CONSULTATION NOTE  Name: Sandrita Adams  MRN: [id number]  : 1990    Reason for consultation: HS flare    History of Present Illness  Sandrita Adams is a 34 y.o. female with a past medical history of hidradenitis suppurativa (HS, Rand Stage 3 s/p removal of sweat glands in 2017) and intrauterine pregnancy (currently at 34w2d; LUIS MIGUEL 25) presented to [facility] on 2024 for uncontrolled pain and HS flare.  Dermatology was consulted for management of HS flare.    During this pregnancy, patient has been evaluated 9 times by dermatology. Last seen 24. She is currently on Humira (started 24), s/p 5-week course of IV ertapenem (-24), and hibiclens daily. Patient has previously been treated with multiple therapies: certolizumab (discontinued due to headaches), IV ertapenem, adalimumab, infliximab, secukinumab apremilast, spironolactone, doxycycline, bactrim, clindamycin, levaquin, rifampin, IV dalbavancin, and IV vancomycin. She has also trialed ILK injections and PO prednisone.     Today, patient reports that she is experiencing another severe HS flare in her right axillae and right medial thigh which started yesterday. She notes severe pain and believes the Humira is not working. Today, she notes 3 areas of tenderness on the right medial thigh, and 1 area on the right axilla. Cosentyx has been the most effective treatment for her in the past. Currently taking oxycodone and breakthrough dilaudid for pain management as per primary.     Review of Systems  Review of Systems   Constitutional:  Negative for chills and fever.   Eyes:  Negative for redness.   Respiratory:  Negative for shortness of breath.    Skin:  Positive for color change.        Tenderness   Neurological:  Negative for weakness.   Psychiatric/Behavioral:  Negative for confusion.         Past Medical History  Past Medical History:   Diagnosis Date    Anemia 2024    Hidradenitis suppurativa  10/29/2020    Hidradenitis    Hidradenitis suppurativa     Lupus     UTI (urinary tract infection) during pregnancy, first trimester ([facility]) 07/16/2024    Vitamin D deficiency 06/16/2024       Past Surgical History   has a past surgical history that includes Other surgical history (09/19/2022); Other surgical history (Left, 2011); and Other surgical history (Left, 2021).     Allergies  Allergies   Allergen Reactions    Suboxone [Buprenorphine-Naloxone] Anaphylaxis, Hives and Itching    Clarithromycin Hives    Haloperidol Hallucinations and Psychosis    Keflex [Cephalexin] Hives    Levofloxacin Swelling     Ankle Joint/tendon pain    Metoclopramide Headache, Hallucinations and Psychosis    Reglan [Metoclopramide Hcl] Psychosis       Medications  Scheduled Meds: aspirin, 162 mg, oral, Daily  famotidine, 20 mg, oral, BID  oxyCODONE, 10 mg, oral, q3h  prenatal vitamin (iron-folic), 1 tablet, oral, Daily       Continuous Infusions:     PRN Meds: PRN medications: acetaminophen, bisacodyl, hydrALAZINE, labetaloL, lidocaine, magnesium hydroxide, NIFEdipine, ondansetron **OR** ondansetron, oxyCODONE, oxyCODONE, polyethylene glycol, prochlorperazine **OR** prochlorperazine **OR** prochlorperazine, psyllium, simethicone     Family History  No family history on file.    Social History   reports that she has quit smoking. Her smoking use included cigarettes. She has never used smokeless tobacco. She reports that she does not currently use alcohol. She reports that she does not use drugs.     Objective    Vitals:    01/05/25 1429 01/05/25 1430 01/05/25 1434 01/05/25 1439   BP:  114/62     Pulse: 101 98 89 88   Resp:  18     Temp:  36.2 °C (97.2 °F)     TempSrc:  Temporal     SpO2: 100% 100% 99% 100%        Exam    GEN: pregnant female in discomfort from pain  NEURO: moving all extremities   EYES: conjunctiva and eyelids normal. No conjunctival injection or erosions appreciated  ENT:   - Lips: normal  - Teeth/gums: normal  NECK:  normal and symmetric.   CV: no varicosities, warmth or tenderness of extremities.  EXTREMITIES: no distal digital clubbing, cyanosis, petechiae   SKIN: A focused body skin exam including face, eyes, neck, trunk, bilateral upper & lower extremities were examined with the following findings:  - In the bilateral axillae, there are several hyperpigmented nodules and atrophic plaques, with one larger tender 2cm nodule on the right axilla.   - On bilateral thighs and inguinal folds there are several sinus tracts, hyperpigmented nodules and patches, with 3 notable tender fluctuant nodules on the right medial thigh (2 are approximately 1.5cm, while 1 is ~3cm in diameter)    Laboratory and Data  Results for orders placed or performed during the hospital encounter of 01/05/25 (from the past 24 hours)   CBC and Auto Differential   Result Value Ref Range    WBC 12.0 (H) 4.4 - 11.3 x10*3/uL    nRBC 0.0 0.0 - 0.0 /100 WBCs    RBC 4.24 4.00 - 5.20 x10*6/uL    Hemoglobin 10.8 (L) 12.0 - 16.0 g/dL    Hematocrit 31.5 (L) 36.0 - 46.0 %    MCV 74 (L) 80 - 100 fL    MCH 25.5 (L) 26.0 - 34.0 pg    MCHC 34.3 32.0 - 36.0 g/dL    RDW 14.4 11.5 - 14.5 %    Platelets 327 150 - 450 x10*3/uL    Neutrophils % 66.6 40.0 - 80.0 %    Immature Granulocytes %, Automated 2.0 (H) 0.0 - 0.9 %    Lymphocytes % 22.5 13.0 - 44.0 %    Monocytes % 8.3 2.0 - 10.0 %    Eosinophils % 0.3 0.0 - 6.0 %    Basophils % 0.3 0.0 - 2.0 %    Neutrophils Absolute 7.97 (H) 1.20 - 7.70 x10*3/uL    Immature Granulocytes Absolute, Automated 0.24 0.00 - 0.70 x10*3/uL    Lymphocytes Absolute 2.70 1.20 - 4.80 x10*3/uL    Monocytes Absolute 1.00 0.10 - 1.00 x10*3/uL    Eosinophils Absolute 0.04 0.00 - 0.70 x10*3/uL    Basophils Absolute 0.04 0.00 - 0.10 x10*3/uL     *Note: Due to a large number of results and/or encounters for the requested time period, some results have not been displayed. A complete set of results can be found in Results Review.        Assessment/Plan   Sandrita  MONICA Adams is a 34 y.o. female with a past medical history of hidradenitis suppurativa (HS, Rand Stage 3 s/p removal of sweat glands in 2017) and intrauterine pregnancy (currently at 34w2d; LUIS MIGUEL 2/14/25) presented to [facility] on 1/5/2024 for uncontrolled pain and HS flare.  Dermatology was consulted for management of HS flare.    Differential diagnoses: Hidradenitis suppurativa flare     Impression: Patient has a long standing history of HS with several admissions for flares during this pregnancy. Has failed numerous treatment options in the past, but was previously well controlled on Cosentyx prior to pregnancy. During pregnancy, treatment options are limited. However, given the acute nature of tender nodules, we will opt for intralesional kenalog injections to the most affected areas at this time. Patient notes severe pain with injections, and only wanted to inject 2 of the most tender nodules/abscesses at this time. Patient informed that this typically takes 1-2 days for noticeable effect. Treatment options in pregnancy also include oral clindamycin, metformin, infliximab, or potential surgical unroofing. However, given that patient is in the third trimester with plans to immediately restart Cosentyx following delivery, will aim for acute management and pain control at this time.     Recommendations:  - ILK 10 mg/ml injected into 2 right medial thigh lesions; patient did not want to inject any others follow the first two injections due to pain  - Continue Hibiclens daily to affected areas  - Continue Humira q2 weeks (last dose 1/2/25)  - Will plan to restart Cosentyx following delivery (plan is already in place with Dr. Elizalde)  - Pain control as per primary team    The patient was discussed with attending physician Dr. Basurto. The assessment and plan was communicated to the care team.    Thank you for the consultation and for the opportunity to contribute to the care of this patient.      Tolu Cole MD    PGY4, Dermatology  Epic chat (preferred)  Team pager [phone]     Kenalog injection: After discussing risks of depressed scarring, patient consent was obtained. 10 mg/ml Intralesional. 0.4 ml per injection site, 2 number of sites, and 0.8 ml-total volume used. [Lot #[id number]; Exp date Feb 2026])

## 2025-01-06 ENCOUNTER — PHARMACY VISIT (OUTPATIENT)
Dept: PHARMACY | Facility: CLINIC | Age: 35
End: 2025-01-06
Payer: MEDICAID

## 2025-01-06 ENCOUNTER — APPOINTMENT (OUTPATIENT)
Dept: OBSTETRICS AND GYNECOLOGY | Facility: CLINIC | Age: 35
End: 2025-01-06
Payer: MEDICAID

## 2025-01-06 ENCOUNTER — APPOINTMENT (OUTPATIENT)
Dept: RADIOLOGY | Facility: HOSPITAL | Age: 35
End: 2025-01-06
Payer: MEDICAID

## 2025-01-06 ENCOUNTER — APPOINTMENT (OUTPATIENT)
Dept: MATERNAL FETAL MEDICINE | Facility: CLINIC | Age: 35
End: 2025-01-06
Payer: MEDICAID

## 2025-01-06 ENCOUNTER — APPOINTMENT (OUTPATIENT)
Dept: RADIOLOGY | Facility: CLINIC | Age: 35
End: 2025-01-06
Payer: MEDICAID

## 2025-01-06 VITALS
DIASTOLIC BLOOD PRESSURE: 78 MMHG | TEMPERATURE: 97.7 F | HEART RATE: 80 BPM | OXYGEN SATURATION: 97 % | RESPIRATION RATE: 17 BRPM | SYSTOLIC BLOOD PRESSURE: 116 MMHG

## 2025-01-06 PROBLEM — Z3A.34 34 WEEKS GESTATION OF PREGNANCY (HHS-HCC): Status: ACTIVE | Noted: 2024-06-16

## 2025-01-06 PROBLEM — D57.3 SICKLE CELL TRAIT (CMS-HCC): Status: ACTIVE | Noted: 2025-01-06

## 2025-01-06 PROCEDURE — 76819 FETAL BIOPHYS PROFIL W/O NST: CPT

## 2025-01-06 PROCEDURE — 76819 FETAL BIOPHYS PROFIL W/O NST: CPT | Performed by: OBSTETRICS & GYNECOLOGY

## 2025-01-06 PROCEDURE — RXMED WILLOW AMBULATORY MEDICATION CHARGE

## 2025-01-06 PROCEDURE — 2500000004 HC RX 250 GENERAL PHARMACY W/ HCPCS (ALT 636 FOR OP/ED): Mod: SE

## 2025-01-06 PROCEDURE — 76816 OB US FOLLOW-UP PER FETUS: CPT

## 2025-01-06 PROCEDURE — 76816 OB US FOLLOW-UP PER FETUS: CPT | Performed by: OBSTETRICS & GYNECOLOGY

## 2025-01-06 PROCEDURE — 2500000001 HC RX 250 WO HCPCS SELF ADMINISTERED DRUGS (ALT 637 FOR MEDICARE OP): Mod: SE

## 2025-01-06 RX ORDER — ONDANSETRON 4 MG/1
4 TABLET, ORALLY DISINTEGRATING ORAL EVERY 8 HOURS PRN
Qty: 20 TABLET | Refills: 1 | Status: SHIPPED | OUTPATIENT
Start: 2025-01-06 | End: 2025-01-12 | Stop reason: HOSPADM

## 2025-01-06 RX ORDER — OXYCODONE AND ACETAMINOPHEN 10; 325 MG/1; MG/1
1 TABLET ORAL
Qty: 56 TABLET | Refills: 0 | Status: ON HOLD | OUTPATIENT
Start: 2025-01-06 | End: 2025-01-12

## 2025-01-06 RX ADMIN — ASPIRIN 162 MG: 81 TABLET, COATED ORAL at 08:45

## 2025-01-06 RX ADMIN — OXYCODONE HYDROCHLORIDE 10 MG: 10 TABLET ORAL at 04:57

## 2025-01-06 RX ADMIN — HYDROMORPHONE HYDROCHLORIDE 0.2 MG: 0.2 INJECTION, SOLUTION INTRAMUSCULAR; INTRAVENOUS; SUBCUTANEOUS at 06:15

## 2025-01-06 RX ADMIN — HYDROMORPHONE HYDROCHLORIDE 0.2 MG: 0.2 INJECTION, SOLUTION INTRAMUSCULAR; INTRAVENOUS; SUBCUTANEOUS at 08:45

## 2025-01-06 RX ADMIN — ONDANSETRON HYDROCHLORIDE 4 MG: 4 TABLET, FILM COATED ORAL at 05:01

## 2025-01-06 RX ADMIN — PRENATAL VIT W/ FE FUMARATE-FA TAB 27-0.8 MG 1 TABLET: 27-0.8 TAB at 08:44

## 2025-01-06 RX ADMIN — FAMOTIDINE 20 MG: 20 TABLET, FILM COATED ORAL at 08:44

## 2025-01-06 RX ADMIN — HYDROMORPHONE HYDROCHLORIDE 0.2 MG: 0.2 INJECTION, SOLUTION INTRAMUSCULAR; INTRAVENOUS; SUBCUTANEOUS at 03:03

## 2025-01-06 RX ADMIN — OXYCODONE HYDROCHLORIDE 10 MG: 10 TABLET ORAL at 07:52

## 2025-01-06 RX ADMIN — OXYCODONE HYDROCHLORIDE 10 MG: 10 TABLET ORAL at 01:47

## 2025-01-06 RX ADMIN — HYDROMORPHONE HYDROCHLORIDE 1 MG: 2 TABLET ORAL at 10:17

## 2025-01-06 ASSESSMENT — PAIN SCALES - GENERAL
PAINLEVEL_OUTOF10: 0 - NO PAIN
PAINLEVEL_OUTOF10: 8
PAINLEVEL_OUTOF10: 7
PAINLEVEL_OUTOF10: 8
PAINLEVEL_OUTOF10: 9
PAINLEVEL_OUTOF10: 8

## 2025-01-06 ASSESSMENT — PAIN DESCRIPTION - LOCATION
LOCATION: GROIN

## 2025-01-06 ASSESSMENT — PAIN DESCRIPTION - ORIENTATION
ORIENTATION: RIGHT
ORIENTATION: RIGHT
ORIENTATION: LEFT
ORIENTATION: RIGHT

## 2025-01-06 ASSESSMENT — PAIN - FUNCTIONAL ASSESSMENT
PAIN_FUNCTIONAL_ASSESSMENT: 0-10
PAIN_FUNCTIONAL_ASSESSMENT: 0-10

## 2025-01-06 NOTE — PROGRESS NOTES
ANTEPARTUM PROGRESS NOTE   1/6/2025, 6:13 AM     SUBJECTIVE:  No acute events overnight. Received intralesional kenalog injections yesterday to thigh lesions with Derm. Reports ongoing thigh pain this AM, but states she understands injections may take 2-3 days to work.     OBJECTIVE:   BP 96/63   Pulse 74   Temp 36 °C (96.8 °F) (Temporal)   Resp 18   LMP 05/18/2024   SpO2 97%    Temp  Min: 36 °C (96.8 °F)  Max: 37.1 °C (98.8 °F)  Pulse  Min: 74  Max: 101  BP  Min: 96/63  Max: 130/82    Physical Exam   General: NAD, mood appropriate  Cardiopulmonary: warm and well perfused, breathing comfortably on room air  Abdomen: Gravid  Extremities: Right thigh with three areas of severe tenderness and induration, right axillary lesions mildly tender to palpation    NST: Baseline 130/ mod variability + accels/ - decels  [place]: quiet     Labs:   Results for orders placed or performed during the hospital encounter of 01/05/25 (from the past 24 hours)   Type And Screen   Result Value Ref Range    ABO TYPE A     Rh TYPE POS     ANTIBODY SCREEN NEG    CBC and Auto Differential   Result Value Ref Range    WBC 12.0 (H) 4.4 - 11.3 x10*3/uL    nRBC 0.0 0.0 - 0.0 /100 WBCs    RBC 4.24 4.00 - 5.20 x10*6/uL    Hemoglobin 10.8 (L) 12.0 - 16.0 g/dL    Hematocrit 31.5 (L) 36.0 - 46.0 %    MCV 74 (L) 80 - 100 fL    MCH 25.5 (L) 26.0 - 34.0 pg    MCHC 34.3 32.0 - 36.0 g/dL    RDW 14.4 11.5 - 14.5 %    Platelets 327 150 - 450 x10*3/uL    Neutrophils % 66.6 40.0 - 80.0 %    Immature Granulocytes %, Automated 2.0 (H) 0.0 - 0.9 %    Lymphocytes % 22.5 13.0 - 44.0 %    Monocytes % 8.3 2.0 - 10.0 %    Eosinophils % 0.3 0.0 - 6.0 %    Basophils % 0.3 0.0 - 2.0 %    Neutrophils Absolute 7.97 (H) 1.20 - 7.70 x10*3/uL    Immature Granulocytes Absolute, Automated 0.24 0.00 - 0.70 x10*3/uL    Lymphocytes Absolute 2.70 1.20 - 4.80 x10*3/uL    Monocytes Absolute 1.00 0.10 - 1.00 x10*3/uL    Eosinophils Absolute 0.04 0.00 - 0.70 x10*3/uL    Basophils  Absolute 0.04 0.00 - 0.10 x10*3/uL     *Note: Due to a large number of results and/or encounters for the requested time period, some results have not been displayed. A complete set of results can be found in Results Review.        ASSESSMENT AND PLAN:   Sandrita Adams is a 34 y.o.  at 34w3d. LUIS MIGUEL: 2025, by Ultrasound admitted for pain control in the setting of an HS flare      Hidradenitis Suppurativa  Extensive history of HS, s/p removal of axillary sweat glands in , which did not significantly improve her pain. Patient following with Dermatology, Pain Management, and Infectious Disease at [facility] and was previously well-controlled on Cosentix, Percocet, Gabapentin, and Naproxen which allowed her to complete her activities of daily living, including being able to work. During this pregnancy, trialed Cymzia but was unable to tolerate due to side effect of headaches. Plastics deferred definitive surgery of axillary or groin until postpartum. Currently on Humira q2 weeks (last dose ) and was on daily IV ertapenem from -.  - Admitted 9x this pregnancy for HS flares and pain control  - Pt coming in for new flare on right thigh, three areas of tenderness and induration  - Current pain regimen: Oxycodone 10 q4h PRN, PO oxy 10 q3hr for breakthrough; avoid IV pain medications  - s/p Derm consult with ILK 10 mg/ml injected into 2 right medial thigh lesions; patient did not want to inject any others follow the first two injections due to pain.   - Patient reports ongoing pain this AM, however, not expected to take affect until 2-3 days      Fetal status: Reassuring, NST AGA   - continue daily NSTs while inpatient   - Presentation: cephalic ()  - Last US 12/10, EFW 1543g 28%; for growth US in AM     Routine:  - TDAP declined  - Flu declined  - 1hr GTT  wnl  - BCM: undecided       Dispo: Possible dc today pending pain control    Pt to be seen and discussed with MFM Attending, Dr. Storey,      MEGHA Horvath MD  PGY-II, Obstetrics & Gynecology   [facility]     Principal Problem:    H/O hidradenitis suppurativa

## 2025-01-06 NOTE — CARE PLAN
The patient's goals for the shift include discharge with pain medications    The clinical goals for the shift include pain <5/10    VS and assessments stable. Pt discharged with follow-up appointments.

## 2025-01-06 NOTE — DISCHARGE INSTRUCTIONS
.If you are experiencing  -vaginal bleeding/spotting  - painful contractions /cramping  - big gush of fluid/leakage of fluid  - decreased fetal movement     Call you OB provider and go to L&D triage

## 2025-01-06 NOTE — SIGNIFICANT EVENT
TRANSFER TO MAC 4 NOTE:    S)Patient reports good fetal movement. Denies vaginal bleeding, leak of fluid, contractions. She had her Kenalog injections by dermatology in triage. Patient reports taking po Oxycodon recently, but has not had p[ain relief yet.    O)  Blood pressure 105/59, pulse 86, temperature 37.1 °C (98.8 °F), temperature source Temporal, resp. rate 17, last menstrual period 05/18/2024, SpO2 100%.    General: no acute distress  HEENT: normocephalic, atraumatic  Heart: warm and well perfused  Lungs: no increased WOB, CTAB  Abd: soft, gravid  Extremities: moving all extremities    A&P)  Sandrita Adams is a 34 y.o. female with a past medical history of hidradenitis suppurativa (HS, Rand Stage 3 s/p removal of sweat glands in 2017) and intrauterine pregnancy (currently at 34w2d; LUIS MIGUEL 2/14/25) presented to [facility] on 1/5/2024 for uncontrolled pain and HS flare.     Hidradenitis Suppurativa  Extensive history of HS, s/p removal of axillary sweat glands in 2017, which did not significantly improve her pain. Patient following with Dermatology, Pain Management, and Infectious Disease at [facility] [place] and was previously well-controlled on Cosentix, Percocet, Gabapentin, and Naproxen which allowed her to complete her activities of daily living, including being able to work. During this pregnancy, trialed Cymzia but was unable to tolerate due to side effect of headaches. Plastics deferred definitive surgery of axillary or groin until postpartum. Currently on Humira q2 weeks (last dose 1/2) and was on daily IV ertapenem from 11/20-12/16.  - Admitted 9x this pregnancy for HS flares and pain control  - Pt coming in for new flare on right thigh, three areas of tenderness and induration  - Current pain regimen: Oxycodone 10 q4h PRN, PO oxy 10 q3hr for breakthrough  - Derm cs: Kenalog injection 1/5/2024     Fetal status:   - Reassuring, NSTreactive  - continue daily NSTs while inpatient   - Presentation: cephalic  "(11/29)  - Last US 12/10, EFW 1543g 28%; for growth US in AM     Routine:  - TDAP declined  - Flu declined  - 1hr GTT 12/1 wnl  - BCM: discussed with the patient, wants to try abstinence, discussed some methods and recommended \"[email or web address],\" will continue to discuss     Appropriate tp transfer to MAC 4    Seen and discussed w/ Dr. Yari Estrada MD PGY2    "

## 2025-01-06 NOTE — HOSPITAL COURSE
Sandrita Adams is a 34 y.o.   admitted to the hospital at 34w2d on 2025 for hidra-adenitis suppurativa flare.. She was experiencing severe HS flare in her right axillae and right medial thigh which started one day ago. She believed the Humira was not working for her. She noted 3 areas of tenderness on the right medial thigh, and 1 area on the right axilla. Cosentyx has been the most effective treatment for her in the past. Currently taking oxycodone and breakthrough dilaudid for pain management.    Dermatology was consulted, Kenalog injected into 2 right medial thigh lesions; patient did not want to inject any others following the first two injections due to pain. Derm also recommended to continue Hibiclens daily, continue Humira weekly, and restart Cosentyx after delivery. Pain was managed by  ANDREA oxy 10mg q3hrs, PO dilaudid 1 mg q4h for breakthrough and PRN IV Dilaudid 0.2 q2 hrs.  She had a growth US on 2024. ###  Patient is stable for discharge on## with a plan to follow up with ##

## 2025-01-06 NOTE — CARE PLAN
The clinical goals for the shift include for patient to have adequate pain management    Over the shift, the patient did not make progress toward the following goals. Barriers to progression include discomfort d/t blisters. Recommendations to address these barriers include continue to administer scheduled and PRN pain medication regimen. VS remained stable. Patient denies having HDP s/sx, ctx/cramping, and VB or LOF. Patient is excited for her US this morning and in stable in her room resting.    Problem: Pain - Adult  Goal: Verbalizes/displays adequate comfort level or baseline comfort level  Outcome: Not Progressing     ....    Problem: Antepartum  Goal: Maintain pregnancy as long as maternal and/or fetal condition is stable  Outcome: Progressing     Problem: Antepartum  Goal: FHR remains reassuring  Outcome: Progressing     Problem: Safety - Adult  Goal: Free from fall injury  Outcome: Progressing     Problem: Fall/Injury  Goal: Not fall by end of shift  Outcome: Progressing     Problem: Fall/Injury  Goal: Be free from injury by end of the shift  Outcome: Progressing     Problem: Pain  Goal: Free from opioid side effects throughout the shift  Outcome: Progressing     Problem: Pain  Goal: Free from acute confusion related to pain meds throughout the shift  Outcome: Progressing

## 2025-01-07 NOTE — PROGRESS NOTES
Infectious Diseases Clinic Follow-up:  Transitioned to telephone as patient's virtual visit wasn't working  Reason for Visit: No chief complaint on file.      History of Current Issue  34 y.o. female PMH of pregnancy, HS, Lupus, Asthma, Hirsutism, Obesity and was treated with ertapenem course for HS. Patient did not tolerate the IV therapy and this was discontinued around 12/12.          Patient with HS and previously on cosentyx (Secukinumab). Unfortunately this was discontinued due to patient being pregnant. She has required multiple admission due to HS flare durin ghis pregnancy. Patient most recently seen in Hospital around 1/5/25 for HS flare. She was seen by Dermatology who completed kenalog injections. Patient notes that that made no improvements. She continues to have pain in the groin region. Patient notes that her due date is around Obregon's day this year    PAST MEDICAL HISTORY:  Past Medical History:   Diagnosis Date    Anemia 06/16/2024    Hidradenitis suppurativa 10/29/2020    Hidradenitis    Hidradenitis suppurativa     Lupus     UTI (urinary tract infection) during pregnancy, first trimester ([facility]) 07/16/2024    Vitamin D deficiency 06/16/2024       PAST SURGICAL HISTORY:  Past Surgical History:   Procedure Laterality Date    OTHER SURGICAL HISTORY  09/19/2022    Arm surgery    OTHER SURGICAL HISTORY Left 2011    Left wrist    OTHER SURGICAL HISTORY Left 2021    Left wrist procedure       ALLERGIES:    Allergies   Allergen Reactions    Suboxone [Buprenorphine-Naloxone] Anaphylaxis, Hives and Itching    Clarithromycin Hives    Haloperidol Hallucinations and Psychosis    Keflex [Cephalexin] Hives    Levofloxacin Swelling     Ankle Joint/tendon pain    Metoclopramide Headache, Hallucinations and Psychosis    Reglan [Metoclopramide Hcl] Psychosis       MEDICATIONS:      Current Outpatient Medications:     adalimumab (Humira,CF, Pen Crohns-UC-HS) 80 mg/0.8 mL pen injector kit pen-injector, Inject 2  pens (160mg) under the skin on day 0, then 1 pen (80mg) on day 15 (Patient not taking: Reported on 1/5/2025), Disp: 3 each, Rfl: 0    adalimumab (Humira,CF, Pen) 80 mg/0.8 mL pen injector kit pen-injector, Inject 1 Pen (80 mg) under the skin every 14 (fourteen) days., Disp: 2 each, Rfl: 5    aspirin 81 mg EC tablet, Take 2 tablets (162 mg) by mouth once daily., Disp: 180 tablet, Rfl: 1    Boost 0.04 gram- 1 kcal/mL liquid, Please dispense 14 bottles for patient to have BID for 7 days., Disp: 237 mL, Rfl: 3    chlorhexidine (Hibiclens) 4 % external liquid, Apply topically 2 times a day. Bathe in Hibiclens twice daily, Disp: 946 mL, Rfl: 3    clindamycin (Cleocin) 300 mg capsule, Take 1 capsule (300 mg) by mouth every 12 hours. (Patient not taking: Reported on 1/5/2025), Disp: 60 capsule, Rfl: 0    famotidine (Pepcid) 20 mg tablet, Take 1 tablet (20 mg) by mouth 2 times a day., Disp: 60 tablet, Rfl: 2    naloxone (Narcan) 4 mg/0.1 mL nasal spray, Administer 1 spray (4 mg) into affected nostril(s) if needed for opioid reversal or respiratory depression. May repeat every 2-3 minutes if needed, alternating nostrils, until medical assistance becomes available., Disp: 2 each, Rfl: 11    ondansetron ODT (Zofran-ODT) 4 mg disintegrating tablet, Take 1 tablet (4 mg) by mouth every 8 hours if needed for nausea or vomiting., Disp: 60 tablet, Rfl: 3    ondansetron ODT (Zofran-ODT) 4 mg disintegrating tablet, Dissolve 1 tablet (4 mg) in the mouth every 8 hours if needed for nausea or vomiting., Disp: 20 tablet, Rfl: 1    oxyCODONE-acetaminophen (Percocet)  mg tablet, Take 1 tablet by mouth every 3 hours if needed for severe pain (7 - 10) for up to 7 days., Disp: 56 tablet, Rfl: 0    -iron fum-folic acid (Prenatal 19) 29 mg iron- 1 mg tablet, Take 1 tablet by mouth once daily., Disp: 60 tablet, Rfl: 6    sodium chloride 0.9% (sodium chloride) flush, Infuse 10 mL into a venous catheter once daily. 20 ml after lab draws  (Patient not taking: Reported on 1/5/2025), Disp: , Rfl:   No current facility-administered medications for this visit.    REVIEW OF SYSTEMS:  Review of Systems    Review of systems otherwise negative    PHYSICAL EXAMINATION:       Visit Vitals  LMP 05/18/2024   OB Status Pregnant   Smoking Status Former        EXAM:   Exam unable to be completed as this was a telephone visit      DATA:  Laboratory Values and Test Results:   Lab Results   Component Value Date    WBC 12.0 (H) 01/05/2025    HGB 10.8 (L) 01/05/2025    HCT 31.5 (L) 01/05/2025    MCV 74 (L) 01/05/2025     01/05/2025     Lab Results   Component Value Date    GLUCOSE 106 (H) 12/29/2024    CALCIUM 8.6 12/29/2024     12/29/2024    K 3.9 12/29/2024    CO2 21 12/29/2024     (H) 12/29/2024    BUN 9 12/29/2024    CREATININE 0.75 12/29/2024     Lab Results   Component Value Date    ALT 10 12/29/2024    AST 13 12/29/2024    ALKPHOS 149 (H) 12/29/2024    BILITOT 0.2 12/29/2024         Microbiology:   C. Diff negative    Imaging Studies:    No new imaging    ASSESSMENT / RECOMMENDATIONS:  34 y.o. female PMH of pregnancy, HS, Lupus, Asthma, Hirsutism, Obesity,  presented on 10/8 for hidradenitis flare.     #Hidradenitis flare  #Many failed therapies with HS  #Pregnancy     Patient with many past medications with isotretinoin Adalimumab, Infliximab, Excision, spironolactone and multiple antibiotics, Doxycycline, bactrim, clindamycin, levaquin, rifampin, eratpenem. Patient continues to struggle with HS. Unfortunately she did not tolerate ertapenem and had to discontinue.  At this time the only safe option in the setting of her pregnancy is clindamycin.  At this time patient does not want the clindamycin.  Fortunately, it appears that her Cosentyx therapy was helpful and there are currently plans to restart this once she delivers around Valentine's Day.    - No antibiotics at this time  - Follow with dermatology  - If antibiotic is needed, clindamycin  is likely the only option.  Clindamycin does have side effects including C. difficile and other antibiotics side effects.    Follow up as needed    Lexa Galarza MD

## 2025-01-08 ENCOUNTER — APPOINTMENT (OUTPATIENT)
Dept: INFECTIOUS DISEASES | Facility: CLINIC | Age: 35
End: 2025-01-08
Payer: MEDICAID

## 2025-01-08 ENCOUNTER — PHARMACY VISIT (OUTPATIENT)
Dept: PHARMACY | Facility: CLINIC | Age: 35
End: 2025-01-08

## 2025-01-08 ENCOUNTER — SPECIALTY PHARMACY (OUTPATIENT)
Dept: PHARMACY | Facility: CLINIC | Age: 35
End: 2025-01-08

## 2025-01-08 DIAGNOSIS — Z87.2 H/O HIDRADENITIS SUPPURATIVA: Primary | ICD-10-CM

## 2025-01-08 PROCEDURE — 99212 OFFICE O/P EST SF 10 MIN: CPT | Performed by: STUDENT IN AN ORGANIZED HEALTH CARE EDUCATION/TRAINING PROGRAM

## 2025-01-09 PROCEDURE — RXMED WILLOW AMBULATORY MEDICATION CHARGE

## 2025-01-11 ENCOUNTER — HOSPITAL ENCOUNTER (EMERGENCY)
Facility: HOSPITAL | Age: 35
Discharge: STILL ADMITTED OR ADMINISTRATIVE | End: 2025-01-11
Payer: MEDICAID

## 2025-01-11 ENCOUNTER — TELEPHONE (OUTPATIENT)
Dept: OBSTETRICS AND GYNECOLOGY | Facility: HOSPITAL | Age: 35
End: 2025-01-11
Payer: MEDICAID

## 2025-01-11 ENCOUNTER — HOSPITAL ENCOUNTER (INPATIENT)
Facility: HOSPITAL | Age: 35
LOS: 1 days | Discharge: HOME | End: 2025-01-11
Attending: STUDENT IN AN ORGANIZED HEALTH CARE EDUCATION/TRAINING PROGRAM | Admitting: OBSTETRICS & GYNECOLOGY
Payer: MEDICAID

## 2025-01-11 ENCOUNTER — HOSPITAL ENCOUNTER (EMERGENCY)
Facility: HOSPITAL | Age: 35
Discharge: OTHER INSTITUTION | End: 2025-01-11
Attending: EMERGENCY MEDICINE
Payer: MEDICAID

## 2025-01-11 VITALS
HEART RATE: 81 BPM | OXYGEN SATURATION: 97 % | WEIGHT: 187.39 LBS | RESPIRATION RATE: 18 BRPM | DIASTOLIC BLOOD PRESSURE: 87 MMHG | TEMPERATURE: 98.2 F | BODY MASS INDEX: 35.38 KG/M2 | HEIGHT: 61 IN | SYSTOLIC BLOOD PRESSURE: 124 MMHG

## 2025-01-11 VITALS
RESPIRATION RATE: 16 BRPM | SYSTOLIC BLOOD PRESSURE: 134 MMHG | TEMPERATURE: 97.3 F | OXYGEN SATURATION: 99 % | DIASTOLIC BLOOD PRESSURE: 78 MMHG | HEART RATE: 104 BPM

## 2025-01-11 DIAGNOSIS — L73.2 HIDRADENITIS SUPPURATIVA OF MULTIPLE SITES: Primary | ICD-10-CM

## 2025-01-11 DIAGNOSIS — L73.2 HIDRADENITIS SUPPURATIVA: Primary | ICD-10-CM

## 2025-01-11 LAB
ALBUMIN SERPL BCP-MCNC: 3.3 G/DL (ref 3.4–5)
ALP SERPL-CCNC: 176 U/L (ref 33–110)
ALT SERPL W P-5'-P-CCNC: 12 U/L (ref 7–45)
ANION GAP SERPL CALC-SCNC: 12 MMOL/L (ref 10–20)
AST SERPL W P-5'-P-CCNC: 17 U/L (ref 9–39)
BASOPHILS # BLD AUTO: 0.02 X10*3/UL (ref 0–0.1)
BASOPHILS NFR BLD AUTO: 0.2 %
BILIRUB SERPL-MCNC: 0.3 MG/DL (ref 0–1.2)
BUN SERPL-MCNC: 6 MG/DL (ref 6–23)
CALCIUM SERPL-MCNC: 8.6 MG/DL (ref 8.6–10.3)
CHLORIDE SERPL-SCNC: 109 MMOL/L (ref 98–107)
CO2 SERPL-SCNC: 18 MMOL/L (ref 21–32)
CREAT SERPL-MCNC: 0.62 MG/DL (ref 0.5–1.05)
CRP SERPL-MCNC: 1.17 MG/DL
EGFRCR SERPLBLD CKD-EPI 2021: >90 ML/MIN/1.73M*2
EOSINOPHIL # BLD AUTO: 0.07 X10*3/UL (ref 0–0.7)
EOSINOPHIL NFR BLD AUTO: 0.6 %
ERYTHROCYTE [DISTWIDTH] IN BLOOD BY AUTOMATED COUNT: 14.7 % (ref 11.5–14.5)
ERYTHROCYTE [SEDIMENTATION RATE] IN BLOOD BY WESTERGREN METHOD: 29 MM/H (ref 0–20)
GLUCOSE SERPL-MCNC: 113 MG/DL (ref 74–99)
HCT VFR BLD AUTO: 31.1 % (ref 36–46)
HGB BLD-MCNC: 10.6 G/DL (ref 12–16)
IMM GRANULOCYTES # BLD AUTO: 0.13 X10*3/UL (ref 0–0.7)
IMM GRANULOCYTES NFR BLD AUTO: 1.1 % (ref 0–0.9)
LYMPHOCYTES # BLD AUTO: 2.16 X10*3/UL (ref 1.2–4.8)
LYMPHOCYTES NFR BLD AUTO: 19 %
MAGNESIUM SERPL-MCNC: 1.76 MG/DL (ref 1.6–2.4)
MCH RBC QN AUTO: 25.3 PG (ref 26–34)
MCHC RBC AUTO-ENTMCNC: 34.1 G/DL (ref 32–36)
MCV RBC AUTO: 74 FL (ref 80–100)
MONOCYTES # BLD AUTO: 0.57 X10*3/UL (ref 0.1–1)
MONOCYTES NFR BLD AUTO: 5 %
NEUTROPHILS # BLD AUTO: 8.4 X10*3/UL (ref 1.2–7.7)
NEUTROPHILS NFR BLD AUTO: 74.1 %
NRBC BLD-RTO: 0 /100 WBCS (ref 0–0)
PLATELET # BLD AUTO: 313 X10*3/UL (ref 150–450)
POTASSIUM SERPL-SCNC: 3.9 MMOL/L (ref 3.5–5.3)
PROT SERPL-MCNC: 6.6 G/DL (ref 6.4–8.2)
RBC # BLD AUTO: 4.19 X10*6/UL (ref 4–5.2)
SODIUM SERPL-SCNC: 135 MMOL/L (ref 136–145)
WBC # BLD AUTO: 11.4 X10*3/UL (ref 4.4–11.3)

## 2025-01-11 PROCEDURE — 86140 C-REACTIVE PROTEIN: CPT | Performed by: EMERGENCY MEDICINE

## 2025-01-11 PROCEDURE — 85025 COMPLETE CBC W/AUTO DIFF WBC: CPT | Performed by: EMERGENCY MEDICINE

## 2025-01-11 PROCEDURE — G0378 HOSPITAL OBSERVATION PER HR: HCPCS

## 2025-01-11 PROCEDURE — 99215 OFFICE O/P EST HI 40 MIN: CPT

## 2025-01-11 PROCEDURE — 2500000004 HC RX 250 GENERAL PHARMACY W/ HCPCS (ALT 636 FOR OP/ED): Mod: SE

## 2025-01-11 PROCEDURE — 85652 RBC SED RATE AUTOMATED: CPT | Performed by: EMERGENCY MEDICINE

## 2025-01-11 PROCEDURE — 76815 OB US LIMITED FETUS(S): CPT | Performed by: EMERGENCY MEDICINE

## 2025-01-11 PROCEDURE — 2500000004 HC RX 250 GENERAL PHARMACY W/ HCPCS (ALT 636 FOR OP/ED): Performed by: EMERGENCY MEDICINE

## 2025-01-11 PROCEDURE — 99285 EMERGENCY DEPT VISIT HI MDM: CPT | Performed by: EMERGENCY MEDICINE

## 2025-01-11 PROCEDURE — 3E00X3Z INTRODUCTION OF ANTI-INFLAMMATORY INTO SKIN AND MUCOUS MEMBRANES, EXTERNAL APPROACH: ICD-10-PCS | Performed by: STUDENT IN AN ORGANIZED HEALTH CARE EDUCATION/TRAINING PROGRAM

## 2025-01-11 PROCEDURE — 96375 TX/PRO/DX INJ NEW DRUG ADDON: CPT

## 2025-01-11 PROCEDURE — 36415 COLL VENOUS BLD VENIPUNCTURE: CPT | Performed by: EMERGENCY MEDICINE

## 2025-01-11 PROCEDURE — 11900 INJECT SKIN LESIONS </W 7: CPT | Performed by: STUDENT IN AN ORGANIZED HEALTH CARE EDUCATION/TRAINING PROGRAM

## 2025-01-11 PROCEDURE — 96374 THER/PROPH/DIAG INJ IV PUSH: CPT

## 2025-01-11 PROCEDURE — 83735 ASSAY OF MAGNESIUM: CPT | Performed by: EMERGENCY MEDICINE

## 2025-01-11 PROCEDURE — 99252 IP/OBS CONSLTJ NEW/EST SF 35: CPT | Performed by: STUDENT IN AN ORGANIZED HEALTH CARE EDUCATION/TRAINING PROGRAM

## 2025-01-11 PROCEDURE — 80053 COMPREHEN METABOLIC PANEL: CPT | Performed by: EMERGENCY MEDICINE

## 2025-01-11 PROCEDURE — 1220000001 HC OB SEMI-PRIVATE ROOM DAILY

## 2025-01-11 PROCEDURE — 2500000001 HC RX 250 WO HCPCS SELF ADMINISTERED DRUGS (ALT 637 FOR MEDICARE OP): Mod: SE

## 2025-01-11 PROCEDURE — 4500999001 HC ED NO CHARGE

## 2025-01-11 RX ORDER — HYDROMORPHONE HYDROCHLORIDE 1 MG/ML
1 INJECTION, SOLUTION INTRAMUSCULAR; INTRAVENOUS; SUBCUTANEOUS ONCE
Status: COMPLETED | OUTPATIENT
Start: 2025-01-11 | End: 2025-01-11

## 2025-01-11 RX ORDER — ONDANSETRON 4 MG/1
4 TABLET, FILM COATED ORAL EVERY 6 HOURS PRN
Status: DISCONTINUED | OUTPATIENT
Start: 2025-01-11 | End: 2025-01-11 | Stop reason: HOSPADM

## 2025-01-11 RX ORDER — ONDANSETRON HYDROCHLORIDE 2 MG/ML
4 INJECTION, SOLUTION INTRAVENOUS EVERY 6 HOURS PRN
Status: DISCONTINUED | OUTPATIENT
Start: 2025-01-11 | End: 2025-01-11 | Stop reason: HOSPADM

## 2025-01-11 RX ORDER — OXYCODONE HYDROCHLORIDE 10 MG/1
10 TABLET ORAL
Status: DISCONTINUED | OUTPATIENT
Start: 2025-01-11 | End: 2025-01-11 | Stop reason: HOSPADM

## 2025-01-11 RX ORDER — ONDANSETRON 4 MG/1
4 TABLET, FILM COATED ORAL ONCE
Status: DISCONTINUED | OUTPATIENT
Start: 2025-01-11 | End: 2025-01-11 | Stop reason: HOSPADM

## 2025-01-11 RX ORDER — ACETAMINOPHEN 325 MG/1
975 TABLET ORAL EVERY 6 HOURS PRN
Status: DISCONTINUED | OUTPATIENT
Start: 2025-01-11 | End: 2025-01-11 | Stop reason: HOSPADM

## 2025-01-11 RX ORDER — HYDROMORPHONE HYDROCHLORIDE 1 MG/ML
2 INJECTION, SOLUTION INTRAMUSCULAR; INTRAVENOUS; SUBCUTANEOUS ONCE
Status: COMPLETED | OUTPATIENT
Start: 2025-01-11 | End: 2025-01-11

## 2025-01-11 RX ORDER — ONDANSETRON HYDROCHLORIDE 2 MG/ML
4 INJECTION, SOLUTION INTRAVENOUS ONCE
Status: COMPLETED | OUTPATIENT
Start: 2025-01-11 | End: 2025-01-11

## 2025-01-11 RX ORDER — OXYCODONE HYDROCHLORIDE 10 MG/1
10 TABLET ORAL EVERY 4 HOURS PRN
Status: DISCONTINUED | OUTPATIENT
Start: 2025-01-11 | End: 2025-01-11 | Stop reason: HOSPADM

## 2025-01-11 RX ORDER — LIDOCAINE HYDROCHLORIDE 10 MG/ML
0.5 INJECTION, SOLUTION INFILTRATION; PERINEURAL ONCE AS NEEDED
Status: DISCONTINUED | OUTPATIENT
Start: 2025-01-11 | End: 2025-01-11 | Stop reason: HOSPADM

## 2025-01-11 RX ORDER — NIFEDIPINE 10 MG/1
10 CAPSULE ORAL ONCE AS NEEDED
Status: DISCONTINUED | OUTPATIENT
Start: 2025-01-11 | End: 2025-01-11 | Stop reason: HOSPADM

## 2025-01-11 RX ADMIN — HYDROMORPHONE HYDROCHLORIDE 2 MG: 1 INJECTION, SOLUTION INTRAMUSCULAR; INTRAVENOUS; SUBCUTANEOUS at 16:22

## 2025-01-11 RX ADMIN — ONDANSETRON 4 MG: 2 INJECTION, SOLUTION INTRAMUSCULAR; INTRAVENOUS at 08:57

## 2025-01-11 RX ADMIN — HYDROMORPHONE HYDROCHLORIDE 1 MG: 1 INJECTION, SOLUTION INTRAMUSCULAR; INTRAVENOUS; SUBCUTANEOUS at 09:19

## 2025-01-11 RX ADMIN — OXYCODONE HYDROCHLORIDE 10 MG: 10 TABLET ORAL at 14:24

## 2025-01-11 RX ADMIN — HYDROMORPHONE HYDROCHLORIDE 1 MG: 1 INJECTION, SOLUTION INTRAMUSCULAR; INTRAVENOUS; SUBCUTANEOUS at 08:57

## 2025-01-11 SDOH — SOCIAL STABILITY: SOCIAL INSECURITY: DO YOU FEEL ANYONE HAS EXPLOITED OR TAKEN ADVANTAGE OF YOU FINANCIALLY OR OF YOUR PERSONAL PROPERTY?: NO

## 2025-01-11 SDOH — HEALTH STABILITY: MENTAL HEALTH: WISH TO BE DEAD (PAST 1 MONTH): NO

## 2025-01-11 SDOH — SOCIAL STABILITY: SOCIAL INSECURITY: ARE YOU OR HAVE YOU BEEN THREATENED OR ABUSED PHYSICALLY, EMOTIONALLY, OR SEXUALLY BY ANYONE?: NO

## 2025-01-11 SDOH — HEALTH STABILITY: MENTAL HEALTH: NON-SPECIFIC ACTIVE SUICIDAL THOUGHTS (PAST 1 MONTH): NO

## 2025-01-11 SDOH — HEALTH STABILITY: MENTAL HEALTH: SUICIDAL BEHAVIOR (LIFETIME): NO

## 2025-01-11 SDOH — HEALTH STABILITY: MENTAL HEALTH: WERE YOU ABLE TO COMPLETE ALL THE BEHAVIORAL HEALTH SCREENINGS?: YES

## 2025-01-11 SDOH — SOCIAL STABILITY: SOCIAL INSECURITY: HAVE YOU HAD THOUGHTS OF HARMING ANYONE ELSE?: NO

## 2025-01-11 SDOH — SOCIAL STABILITY: SOCIAL INSECURITY: ARE THERE ANY APPARENT SIGNS OF INJURIES/BEHAVIORS THAT COULD BE RELATED TO ABUSE/NEGLECT?: NO

## 2025-01-11 SDOH — ECONOMIC STABILITY: HOUSING INSECURITY: DO YOU FEEL UNSAFE GOING BACK TO THE PLACE WHERE YOU ARE LIVING?: NO

## 2025-01-11 SDOH — HEALTH STABILITY: MENTAL HEALTH: HAVE YOU USED ANY SUBSTANCES (CANABIS, COCAINE, HEROIN, HALLUCINOGENS, INHALANTS, ETC.) IN THE PAST 12 MONTHS?: NO

## 2025-01-11 SDOH — SOCIAL STABILITY: SOCIAL INSECURITY: HAS ANYONE EVER THREATENED TO HURT YOUR FAMILY OR YOUR PETS?: NO

## 2025-01-11 SDOH — HEALTH STABILITY: MENTAL HEALTH: HAVE YOU USED ANY PRESCRIPTION DRUGS OTHER THAN PRESCRIBED IN THE PAST 12 MONTHS?: NO

## 2025-01-11 SDOH — SOCIAL STABILITY: SOCIAL INSECURITY: DOES ANYONE TRY TO KEEP YOU FROM HAVING/CONTACTING OTHER FRIENDS OR DOING THINGS OUTSIDE YOUR HOME?: NO

## 2025-01-11 SDOH — SOCIAL STABILITY: SOCIAL INSECURITY: PHYSICAL ABUSE: DENIES

## 2025-01-11 SDOH — SOCIAL STABILITY: SOCIAL INSECURITY: ABUSE SCREEN: ADULT

## 2025-01-11 SDOH — SOCIAL STABILITY: SOCIAL INSECURITY: VERBAL ABUSE: DENIES

## 2025-01-11 SDOH — SOCIAL STABILITY: SOCIAL INSECURITY: HAVE YOU HAD ANY THOUGHTS OF HARMING ANYONE ELSE?: NO

## 2025-01-11 ASSESSMENT — ENCOUNTER SYMPTOMS
ABDOMINAL PAIN: 0
FEVER: 0
DYSURIA: 0
COUGH: 0

## 2025-01-11 ASSESSMENT — PAIN SCALES - GENERAL
PAINLEVEL_OUTOF10: 9
PAINLEVEL_OUTOF10: 6
PAINLEVEL_OUTOF10: 6
PAINLEVEL_OUTOF10: 9
PAINLEVEL_OUTOF10: 9

## 2025-01-11 ASSESSMENT — PAIN DESCRIPTION - DESCRIPTORS
DESCRIPTORS: ITCHING

## 2025-01-11 ASSESSMENT — LIFESTYLE VARIABLES
HOW OFTEN DO YOU HAVE 6 OR MORE DRINKS ON ONE OCCASION: NEVER
HOW OFTEN DO YOU HAVE A DRINK CONTAINING ALCOHOL: NEVER
SKIP TO QUESTIONS 9-10: 1
AUDIT-C TOTAL SCORE: 0
AUDIT-C TOTAL SCORE: 0
HOW MANY STANDARD DRINKS CONTAINING ALCOHOL DO YOU HAVE ON A TYPICAL DAY: PATIENT DOES NOT DRINK

## 2025-01-11 ASSESSMENT — PAIN DESCRIPTION - PAIN TYPE: TYPE: ACUTE PAIN;CHRONIC PAIN

## 2025-01-11 ASSESSMENT — PAIN - FUNCTIONAL ASSESSMENT: PAIN_FUNCTIONAL_ASSESSMENT: 0-10

## 2025-01-11 NOTE — CONSULTS
DERMATOLOGY DEPARTMENT CONSULTATION NOTE  Name: Sandrita Adams  MRN: [id number]  : 1990    Reason for consultation: HS flare    History of Present Illness  Sandrita Adams is a 34 y.o. female with a past medical history of hidradenitis suppurativa (HS, Rand Stage 3 s/p removal of sweat glands in 2017), currently 35 weeks pregnant, presented on 2025 with an HS flare. Dermatology was consulted for the flare.    She's been seen by the dermatology team 10 times during this pregnancy, most recently was 2025, at which time she received ILK, which helped those lesions resolve, but she is now presenting with 3 new very painful lesions in her left medial proximal thigh, right mons pubis, and right mid buttock.  The primary team is managing her pain with Dilaudid, Tylenol, and Oxycodone.    She is currently on Humira (started 24), s/p 5-week course of IV ertapenem (-24), and hibiclens daily. Patient has previously been treated with multiple therapies: certolizumab (discontinued due to headaches), IV ertapenem, adalimumab, infliximab, secukinumab apremilast, spironolactone, doxycycline, bactrim, clindamycin, levaquin, rifampin, IV dalbavancin, and IV vancomycin. She has also trialed ILK injections and PO prednisone.       Review of Systems  Review of Systems   Constitutional:  Negative for fever.   HENT:  Negative for congestion.    Respiratory:  Negative for cough.    Gastrointestinal:  Negative for abdominal pain.   Genitourinary:  Negative for dysuria.   Skin:         Painful nodules in groin        Past Medical History  Past Medical History:   Diagnosis Date    Anemia 2024    Hidradenitis suppurativa 10/29/2020    Hidradenitis    Hidradenitis suppurativa     Lupus     UTI (urinary tract infection) during pregnancy, first trimester ([facility]) 2024    Vitamin D deficiency 2024       Past Surgical History   has a past surgical history that includes Other surgical  history (09/19/2022); Other surgical history (Left, 2011); and Other surgical history (Left, 2021).     Allergies  Allergies   Allergen Reactions    Suboxone [Buprenorphine-Naloxone] Anaphylaxis, Hives and Itching    Clarithromycin Hives    Haloperidol Hallucinations and Psychosis    Keflex [Cephalexin] Hives    Levofloxacin Swelling     Ankle Joint/tendon pain    Metoclopramide Headache, Hallucinations and Psychosis    Reglan [Metoclopramide Hcl] Psychosis       Medications  Scheduled Meds: ondansetron, 4 mg, oral, Once       Continuous Infusions:     PRN Meds: PRN medications: acetaminophen, oxyCODONE, oxyCODONE     Family History  No family history on file.    Social History   reports that she has quit smoking. Her smoking use included cigarettes. She has never used smokeless tobacco. She reports that she does not currently use alcohol. She reports that she does not use drugs.     Objective    Vitals:    01/11/25 1108 01/11/25 1113 01/11/25 1118 01/11/25 1654   BP:   106/59 134/78   Pulse: 82 81 85 104   Resp:   16 16   Temp:   36.3 °C (97.3 °F) 36.3 °C (97.3 °F)   TempSrc:   Temporal Oral   SpO2: 99% 99% 99% 99%        Exam    GEN: no acute distress  NEURO: moving all extremities   EYES: conjunctiva and eyelids normal. No conjunctival injection or erosions appreciated  ENT:   - Lips: normal  - Teeth/gums: normal  - Oropharynx: normal tongue and mucosa  NECK: normal and symmetric.   CV: no varicosities, warmth or tenderness of extremities.  GI: Non-tender.  EXTREMITIES: no distal digital clubbing, cyanosis, petechiae   SKIN: A full body skin exam including scalp, face, eyes, ears, neck, trunk, bilateral upper & lower extremities, toenails and fingernails were examined with the following findings:  Three tender, firm subcutaneous nodules located her left medial proximal thigh, right mons pubis, and right mid buttock, along with extensive scarring and sinus tracts in the groin.      Laboratory and Data  Results for  orders placed or performed during the hospital encounter of 01/11/25 (from the past 24 hours)   CBC and Auto Differential   Result Value Ref Range    WBC 11.4 (H) 4.4 - 11.3 x10*3/uL    nRBC 0.0 0.0 - 0.0 /100 WBCs    RBC 4.19 4.00 - 5.20 x10*6/uL    Hemoglobin 10.6 (L) 12.0 - 16.0 g/dL    Hematocrit 31.1 (L) 36.0 - 46.0 %    MCV 74 (L) 80 - 100 fL    MCH 25.3 (L) 26.0 - 34.0 pg    MCHC 34.1 32.0 - 36.0 g/dL    RDW 14.7 (H) 11.5 - 14.5 %    Platelets 313 150 - 450 x10*3/uL    Neutrophils % 74.1 40.0 - 80.0 %    Immature Granulocytes %, Automated 1.1 (H) 0.0 - 0.9 %    Lymphocytes % 19.0 13.0 - 44.0 %    Monocytes % 5.0 2.0 - 10.0 %    Eosinophils % 0.6 0.0 - 6.0 %    Basophils % 0.2 0.0 - 2.0 %    Neutrophils Absolute 8.40 (H) 1.20 - 7.70 x10*3/uL    Immature Granulocytes Absolute, Automated 0.13 0.00 - 0.70 x10*3/uL    Lymphocytes Absolute 2.16 1.20 - 4.80 x10*3/uL    Monocytes Absolute 0.57 0.10 - 1.00 x10*3/uL    Eosinophils Absolute 0.07 0.00 - 0.70 x10*3/uL    Basophils Absolute 0.02 0.00 - 0.10 x10*3/uL   Magnesium   Result Value Ref Range    Magnesium 1.76 1.60 - 2.40 mg/dL   Comprehensive metabolic panel   Result Value Ref Range    Glucose 113 (H) 74 - 99 mg/dL    Sodium 135 (L) 136 - 145 mmol/L    Potassium 3.9 3.5 - 5.3 mmol/L    Chloride 109 (H) 98 - 107 mmol/L    Bicarbonate 18 (L) 21 - 32 mmol/L    Anion Gap 12 10 - 20 mmol/L    Urea Nitrogen 6 6 - 23 mg/dL    Creatinine 0.62 0.50 - 1.05 mg/dL    eGFR >90 >60 mL/min/1.73m*2    Calcium 8.6 8.6 - 10.3 mg/dL    Albumin 3.3 (L) 3.4 - 5.0 g/dL    Alkaline Phosphatase 176 (H) 33 - 110 U/L    Total Protein 6.6 6.4 - 8.2 g/dL    AST 17 9 - 39 U/L    Bilirubin, Total 0.3 0.0 - 1.2 mg/dL    ALT 12 7 - 45 U/L   Sedimentation rate, automated   Result Value Ref Range    Sedimentation Rate 29 (H) 0 - 20 mm/h   C-reactive protein   Result Value Ref Range    C-Reactive Protein 1.17 (H) <1.00 mg/dL        Assessment/Plan   Sandrita MONICA Adams is a 34 y.o. female with a  past medical history of hidradenitis suppurativa (HS, Rand Stage 3 s/p removal of sweat glands in 2017), currently 35 weeks pregnant, presented on 1/11/2025 with an HS flare. Dermatology was consulted for the flare.    Differential diagnoses:  HS Flare    Impression:  She has had several admissions for HS flares during the course of this pregnancy.  She follows outpatient with Dr. Elizalde.  Prior to her pregnancy, she was well controlled on Cosentyx, and is planning to restart it immediately following delivery.  Given the limited treatment options during pregnancy, as well as the ILK working well last week on other lesions, will plan to repeat ILK today.       Recommendations:  - ILK 10 mg/ml injected into the lesions of the left medial proximal thigh and right mid buttock.  Declined injection of the lesion of the right mons pubis  - Continue Hibiclens daily to affected areas  - Continue Humira (last dose 1/9/25)  - Will plan to restart Cosentyx following delivery (plan is already in place with Dr. Elizalde)  - Pain control as per primary team  - Ok to discharge from dermatology's perspective    The patient was seen and discussed with attending physician Dr. Marion. The assessment and plan was communicated to the care team.    Thank you for the consultation and for the opportunity to contribute to the care of this patient.      Mychal Mccormick MD   PGY-2, Dermatology    I saw and evaluated the patient. I personally obtained the key and critical portions of the history and physical exam or was physically present for key and critical portions performed by the resident. I reviewed the resident's documentation and discussed the patient with the resident. I agree with the resident's medical decision making as documented in the note.    Maria Victoria Marion MD

## 2025-01-11 NOTE — ED PROVIDER NOTES
History/Exam limitations: none.   Additional history was obtained from patient and past medical records.          HPI:    Sandrita Adams is a 34 y.o. female PMH at bedtime, 35W1D GA presenting for concerns of hidradenitis flare.  He states that she is getting Kenalog injections ever helping recently.  States that she is currently on antibiotics, did finish a course of ertapenem recently.  Reached out to OB due to worsening lesions in her left groin area since discharged and likely plan for [place] Kenalog injections at [facility].  Was only able to get here to [facility].  Denies any fever currently.  Has felt some hot and cold sensations recently.  Symptoms have been worsening over the last 3 days approximately.  Has nausea as well.  Has been taking her oral oxycodone without improvement.  No active drainage.  Denies any vaginal bleeding, change in fetal movement, vaginal discharge, leakage of fluid, or abdominal pain.        Physical Exam:  ED Triage Vitals [01/11/25 0754]   Temperature Heart Rate Respirations BP   36.8 °C (98.2 °F) 87 18 129/87      Pulse Ox Temp Source Heart Rate Source Patient Position   98 % Tympanic Monitor Sitting      BP Location FiO2 (%)     Right arm --        GEN:      Alert, NAD  Eyes:       PERRL, EOMI  HENT:      NC/AT, OP clear, airway patent, MM  CV:      RRR, no MRG, no LE pitting edema, 2+ radial and pedal pulses  PULM:     CTAB, no w/r/r, easy WOB, symmetric chest rise  ABD:      Soft, gravid abdomen, nontender, no masses, BS +  :       No CVA TTP  NEURO:   A&Ox3, no focal deficits    MSK:      FROM, no joint deformities or swelling, no e/o trauma  SKIN:       Warm and dry, numerous hyperpigmented lesions to the left and right inguinal pelvic regions, tender to palpation, mild fluctuance to some lesions, no palpable crepitus, no marked surrounding erythema or warmth  PSYCH:    Appropriate mood and affect         MDM/ED Course:   Sandrita Adams is a 34 y.o. female PMH at bedtime, 35W1D  GA presenting for concerns of hidradenitis flare.  Vitals reassuring.  Exam as documented above.  Patient states that her symptoms are consistent with a hidradenitis flare.  States that it is affecting her left inguinal/groin area.  States that she previously had 1 on the right side that improved with Kenalog injections.  Patient's OB/GYN team is aware and recommend she come to [facility] [place]alog injections however patient was only able to make it to [facility].  Fetal heart tones reassuring at 138.  Positive fetal movement noted on ultrasound.  Patient denies any change in fetal movement.  She denies any vaginal bleeding, discharge, abdominal pain, leakage of fluid.  IV placed and labs drawn.  A CBC was obtained with mild leukocytosis to 11.4 consistent with prior with left shift.  Hemoglobin baseline.  ESR mildly elevated at 29.  CRP mildly elevated 1.7 on last 2.07.  Chemistry with mild hyponatremia 135, most recently 136, reassuring renal function.  No evidence of necrotizing soft tissue infection on exam.  Suspect hidradenitis flare.  No significant cellulitis on exam.  Patient was given 1 mg IV Dilaudid x 2, 4 mg IV Zofran.     ED Course as of 01/13/25 1155   Sat Jan 11, 2025   0824 Fetal heart tone 138. [JM]   0837 I spoke to labor and delivery charge nurse, they requesting we send patient upstairs so she can be monitored and they will facilitate transfer to [facility].  They are requesting that we place an IV prior to transferring her upstairs. [JM]   0833 Patient initially going to be transferred to L&D triage however no OB complaints currently.  Spoke to Dr. Carr from OB, planning to defer NST and get patient transferred to  [facility] MAC house as to not delay transfer given patient has no OB complaints.  Fetal heart tones 138 on bedside ultrasound.  Patient was accepted by Dr. Dukes from OB. [JM]      ED Course User Index  [JM] Mychal Christopher MD         Diagnoses as of 01/13/25 1155   Hidradenitis suppurativa of  multiple sites     Patient was transferred to [facility] labor and delivery for continued management in stable condition.    Chronic medical conditions affecting care listed in MDM. I independently reviewed imaging studies and agreed with radiology reads. I reviewed recent and relevant outside records including PCP notes, Prior discharge summaries, and prior radiology reports.    Procedure    Performed by: Mychal Christopher MD  Authorized by: Mychal Christopher MD        Genitourinary Indications: evaluation for fetal cardiac activity          Procedure: Pelvic Ultrasound    Exam: transabdominal exam  Findings:  IUP: The pelvis was visualized and there was an INTRAUTERINE PREGNANCY visualized (a yolk sac, fetal pole or fetus was seen).  Fetal Heart Rate: 138 bpm      Impression:      Comments: Fetal heart rate 138, fetal movement visualized      Diagnosis:   1.  Hidradenitis suppurativa flare    Dispo: Transferred in stable condition      Disclaimer: Portions of this note were dictated by speech recognition. An attempt at proof reading was made to minimize errors. Minor errors in transcription may be present.  Please call if questions.     Mychal Christopher MD  01/13/25 2217

## 2025-01-11 NOTE — PROCEDURES
Diagnosis: HS- Location:  Left medial proximal thigh and right mid buttock  Procedure: Intralesional injection  Informed consent:  Discussed risks (infection, pain, bleeding, bruising, thinning of the skin, loss of skin pigment, lack of resolution, and recurrence of lesion) and benefits of the procedure, as well as the alternatives.  Informed consent was obtained.  The area was prepared in a standard fashion.  An intralesional injection was performed with Kenalog 10 mg/mL.  2 cc in total were injected (1 ml in each lesion)  The patient was instructed on post-op care.    Number of lesions injected:  2    Mychal Mccormick MD  PGY-2, Dermatology

## 2025-01-11 NOTE — ED TRIAGE NOTES
Pt here for autoimmune disease flare-up; pt report new rash to right hand; pt 35wks pregnant, denies issues with pregnancy; also CO increased nausea with this flare up
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
## 2025-01-11 NOTE — TELEPHONE ENCOUNTER
Sandrita Adams is a 34 y.o. year old female patient  at 35w1d with h/o multiple admissions for poorly controlled hydradenitis suppurativa in pregnancy, most recently discharged 2025 after receiving intra-lesional kenalog injections in 2 R thigh lesions.    She reports new worsening lesions in her axillae and groin since discharge. States she had good relief with intralesional kenalog once she got home, and desires this in her other bothersome lesions. Also reports significant foul smelling drainage.     Discussed that as it is the weekend, this care can only be achieved in the hospital. We would likely be able to provide IL kenalog in L&D triage at main [place]. Patient reports transportation difficulty, and only able to get to [facility]. She is aware she will likely need transfer to [facility] for care, and is understanding.     [facility] attending and charge notified. [facility] L&D providers notified.     Merlin Wilson MD

## 2025-01-11 NOTE — DISCHARGE INSTRUCTIONS
If you are experiencing  -vaginal bleeding/spotting  - painful contractions /cramping  - big gush of fluid/leakage of fluid  - decreased fetal movement     Call you OB provider and go to L&D triage

## 2025-01-12 ENCOUNTER — TELEPHONE (OUTPATIENT)
Dept: OBSTETRICS AND GYNECOLOGY | Facility: HOSPITAL | Age: 35
End: 2025-01-12
Payer: MEDICAID

## 2025-01-12 ENCOUNTER — HOSPITAL ENCOUNTER (OUTPATIENT)
Facility: HOSPITAL | Age: 35
End: 2025-01-12
Attending: STUDENT IN AN ORGANIZED HEALTH CARE EDUCATION/TRAINING PROGRAM | Admitting: STUDENT IN AN ORGANIZED HEALTH CARE EDUCATION/TRAINING PROGRAM
Payer: MEDICAID

## 2025-01-12 ENCOUNTER — HOSPITAL ENCOUNTER (OUTPATIENT)
Facility: HOSPITAL | Age: 35
Discharge: HOME | End: 2025-01-12
Attending: STUDENT IN AN ORGANIZED HEALTH CARE EDUCATION/TRAINING PROGRAM | Admitting: STUDENT IN AN ORGANIZED HEALTH CARE EDUCATION/TRAINING PROGRAM
Payer: MEDICAID

## 2025-01-12 VITALS
RESPIRATION RATE: 18 BRPM | SYSTOLIC BLOOD PRESSURE: 130 MMHG | OXYGEN SATURATION: 99 % | HEART RATE: 79 BPM | TEMPERATURE: 97.2 F | DIASTOLIC BLOOD PRESSURE: 73 MMHG

## 2025-01-12 DIAGNOSIS — L73.2 HIDRADENITIS SUPPURATIVA: ICD-10-CM

## 2025-01-12 PROCEDURE — 59025 FETAL NON-STRESS TEST: CPT

## 2025-01-12 PROCEDURE — 99214 OFFICE O/P EST MOD 30 MIN: CPT

## 2025-01-12 PROCEDURE — 59025 FETAL NON-STRESS TEST: CPT | Mod: GC

## 2025-01-12 RX ORDER — LABETALOL HYDROCHLORIDE 5 MG/ML
20 INJECTION, SOLUTION INTRAVENOUS ONCE AS NEEDED
Status: DISCONTINUED | OUTPATIENT
Start: 2025-01-12 | End: 2025-01-12 | Stop reason: HOSPADM

## 2025-01-12 RX ORDER — LIDOCAINE HYDROCHLORIDE 10 MG/ML
0.5 INJECTION, SOLUTION INFILTRATION; PERINEURAL ONCE AS NEEDED
Status: DISCONTINUED | OUTPATIENT
Start: 2025-01-12 | End: 2025-01-12 | Stop reason: HOSPADM

## 2025-01-12 RX ORDER — HYDRALAZINE HYDROCHLORIDE 20 MG/ML
5 INJECTION INTRAMUSCULAR; INTRAVENOUS ONCE AS NEEDED
Status: DISCONTINUED | OUTPATIENT
Start: 2025-01-12 | End: 2025-01-12 | Stop reason: HOSPADM

## 2025-01-12 RX ORDER — OXYCODONE AND ACETAMINOPHEN 10; 325 MG/1; MG/1
1 TABLET ORAL
Qty: 56 TABLET | Refills: 0 | Status: SHIPPED | OUTPATIENT
Start: 2025-01-12

## 2025-01-12 RX ORDER — ONDANSETRON HYDROCHLORIDE 2 MG/ML
4 INJECTION, SOLUTION INTRAVENOUS EVERY 6 HOURS PRN
Status: DISCONTINUED | OUTPATIENT
Start: 2025-01-12 | End: 2025-01-12 | Stop reason: HOSPADM

## 2025-01-12 RX ORDER — NIFEDIPINE 10 MG/1
10 CAPSULE ORAL ONCE AS NEEDED
Status: DISCONTINUED | OUTPATIENT
Start: 2025-01-12 | End: 2025-01-12 | Stop reason: HOSPADM

## 2025-01-12 RX ORDER — ONDANSETRON 4 MG/1
4 TABLET, FILM COATED ORAL EVERY 6 HOURS PRN
Status: DISCONTINUED | OUTPATIENT
Start: 2025-01-12 | End: 2025-01-12 | Stop reason: HOSPADM

## 2025-01-12 SDOH — HEALTH STABILITY: MENTAL HEALTH: NON-SPECIFIC ACTIVE SUICIDAL THOUGHTS (PAST 1 MONTH): NO

## 2025-01-12 SDOH — SOCIAL STABILITY: SOCIAL INSECURITY: DOES ANYONE TRY TO KEEP YOU FROM HAVING/CONTACTING OTHER FRIENDS OR DOING THINGS OUTSIDE YOUR HOME?: NO

## 2025-01-12 SDOH — SOCIAL STABILITY: SOCIAL INSECURITY: ABUSE SCREEN: ADULT

## 2025-01-12 SDOH — HEALTH STABILITY: MENTAL HEALTH: HAVE YOU USED ANY SUBSTANCES (CANABIS, COCAINE, HEROIN, HALLUCINOGENS, INHALANTS, ETC.) IN THE PAST 12 MONTHS?: NO

## 2025-01-12 SDOH — SOCIAL STABILITY: SOCIAL INSECURITY: HAS ANYONE EVER THREATENED TO HURT YOUR FAMILY OR YOUR PETS?: NO

## 2025-01-12 SDOH — SOCIAL STABILITY: SOCIAL INSECURITY: HAVE YOU HAD THOUGHTS OF HARMING ANYONE ELSE?: NO

## 2025-01-12 SDOH — ECONOMIC STABILITY: HOUSING INSECURITY: DO YOU FEEL UNSAFE GOING BACK TO THE PLACE WHERE YOU ARE LIVING?: NO

## 2025-01-12 SDOH — SOCIAL STABILITY: SOCIAL INSECURITY: PHYSICAL ABUSE: DENIES

## 2025-01-12 SDOH — HEALTH STABILITY: MENTAL HEALTH: WERE YOU ABLE TO COMPLETE ALL THE BEHAVIORAL HEALTH SCREENINGS?: YES

## 2025-01-12 SDOH — HEALTH STABILITY: MENTAL HEALTH: WISH TO BE DEAD (PAST 1 MONTH): NO

## 2025-01-12 SDOH — SOCIAL STABILITY: SOCIAL INSECURITY: VERBAL ABUSE: DENIES

## 2025-01-12 SDOH — SOCIAL STABILITY: SOCIAL INSECURITY: ARE YOU OR HAVE YOU BEEN THREATENED OR ABUSED PHYSICALLY, EMOTIONALLY, OR SEXUALLY BY ANYONE?: NO

## 2025-01-12 SDOH — HEALTH STABILITY: MENTAL HEALTH: SUICIDAL BEHAVIOR (LIFETIME): NO

## 2025-01-12 SDOH — HEALTH STABILITY: MENTAL HEALTH: HAVE YOU USED ANY PRESCRIPTION DRUGS OTHER THAN PRESCRIBED IN THE PAST 12 MONTHS?: NO

## 2025-01-12 SDOH — SOCIAL STABILITY: SOCIAL INSECURITY: ARE THERE ANY APPARENT SIGNS OF INJURIES/BEHAVIORS THAT COULD BE RELATED TO ABUSE/NEGLECT?: NO

## 2025-01-12 SDOH — SOCIAL STABILITY: SOCIAL INSECURITY: HAVE YOU HAD ANY THOUGHTS OF HARMING ANYONE ELSE?: NO

## 2025-01-12 SDOH — SOCIAL STABILITY: SOCIAL INSECURITY: DO YOU FEEL ANYONE HAS EXPLOITED OR TAKEN ADVANTAGE OF YOU FINANCIALLY OR OF YOUR PERSONAL PROPERTY?: NO

## 2025-01-12 ASSESSMENT — LIFESTYLE VARIABLES
AUDIT-C TOTAL SCORE: 0
SKIP TO QUESTIONS 9-10: 1
AUDIT-C TOTAL SCORE: 0
HOW OFTEN DO YOU HAVE 6 OR MORE DRINKS ON ONE OCCASION: NEVER
HOW MANY STANDARD DRINKS CONTAINING ALCOHOL DO YOU HAVE ON A TYPICAL DAY: PATIENT DOES NOT DRINK
HOW OFTEN DO YOU HAVE A DRINK CONTAINING ALCOHOL: NEVER
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
## 2025-01-12 NOTE — TELEPHONE ENCOUNTER
Called patient after contacted by answering service.    Patient reports she was seen at  yesterday and told someone that she felt some leakage of fluid though it was minimal. She reports today that she has had continued LOF and it has increased to the point where she needs to change her pad every few hours. She is not having contractions. She did note some pink streaking on the toilet paper after wiping once, though no jerry blood. No vaginal bleeding like that of a period. She is feeling fetal movement. She denied any fevers or chills or abdominal pain. I encouraged her to come to [facility] to be evaluated for PPROM. She reports she is currently as Religion and would like to get an overnight bag together before she comes in though will head in.     Karlie Gallagher MD

## 2025-01-13 ENCOUNTER — APPOINTMENT (OUTPATIENT)
Dept: MATERNAL FETAL MEDICINE | Facility: CLINIC | Age: 35
End: 2025-01-13
Payer: MEDICAID

## 2025-01-13 ENCOUNTER — PHARMACY VISIT (OUTPATIENT)
Dept: PHARMACY | Facility: CLINIC | Age: 35
End: 2025-01-13
Payer: MEDICAID

## 2025-01-13 ENCOUNTER — APPOINTMENT (OUTPATIENT)
Dept: OBSTETRICS AND GYNECOLOGY | Facility: CLINIC | Age: 35
End: 2025-01-13
Payer: MEDICAID

## 2025-01-14 ENCOUNTER — APPOINTMENT (OUTPATIENT)
Dept: INFECTIOUS DISEASES | Facility: CLINIC | Age: 35
End: 2025-01-14
Payer: MEDICAID

## 2025-01-16 ENCOUNTER — TELEPHONE (OUTPATIENT)
Dept: OBSTETRICS AND GYNECOLOGY | Facility: HOSPITAL | Age: 35
End: 2025-01-16

## 2025-01-16 ENCOUNTER — HOSPITAL ENCOUNTER (OUTPATIENT)
Facility: HOSPITAL | Age: 35
Discharge: HOSPICE | End: 2025-01-17
Attending: OBSTETRICS & GYNECOLOGY | Admitting: OBSTETRICS & GYNECOLOGY
Payer: MEDICAID

## 2025-01-16 ENCOUNTER — APPOINTMENT (OUTPATIENT)
Dept: DERMATOLOGY | Facility: CLINIC | Age: 35
End: 2025-01-16
Payer: MEDICAID

## 2025-01-16 VITALS
HEART RATE: 97 BPM | TEMPERATURE: 98.1 F | RESPIRATION RATE: 18 BRPM | SYSTOLIC BLOOD PRESSURE: 133 MMHG | OXYGEN SATURATION: 98 % | DIASTOLIC BLOOD PRESSURE: 85 MMHG

## 2025-01-16 PROCEDURE — 99214 OFFICE O/P EST MOD 30 MIN: CPT | Performed by: OBSTETRICS & GYNECOLOGY

## 2025-01-16 PROCEDURE — 59025 FETAL NON-STRESS TEST: CPT | Performed by: OBSTETRICS & GYNECOLOGY

## 2025-01-16 PROCEDURE — 2500000004 HC RX 250 GENERAL PHARMACY W/ HCPCS (ALT 636 FOR OP/ED): Performed by: OBSTETRICS & GYNECOLOGY

## 2025-01-16 RX ORDER — HYDROMORPHONE HYDROCHLORIDE 1 MG/ML
0.4 INJECTION, SOLUTION INTRAMUSCULAR; INTRAVENOUS; SUBCUTANEOUS ONCE
Status: COMPLETED | OUTPATIENT
Start: 2025-01-16 | End: 2025-01-16

## 2025-01-16 RX ORDER — HYDROMORPHONE HYDROCHLORIDE 1 MG/ML
0.2 INJECTION, SOLUTION INTRAMUSCULAR; INTRAVENOUS; SUBCUTANEOUS ONCE
Status: COMPLETED | OUTPATIENT
Start: 2025-01-17 | End: 2025-01-16

## 2025-01-16 RX ADMIN — HYDROMORPHONE HYDROCHLORIDE 0.2 MG: 1 INJECTION, SOLUTION INTRAMUSCULAR; INTRAVENOUS; SUBCUTANEOUS at 23:59

## 2025-01-16 RX ADMIN — HYDROMORPHONE HYDROCHLORIDE 0.4 MG: 1 INJECTION, SOLUTION INTRAMUSCULAR; INTRAVENOUS; SUBCUTANEOUS at 21:59

## 2025-01-16 ASSESSMENT — PAIN SCALES - GENERAL
PAINLEVEL_OUTOF10: 9
PAINLEVEL_OUTOF10: 9

## 2025-01-16 NOTE — ED PROVIDER NOTES
HPI   No chief complaint on file.      HPI: []  34-year-old female  2 para 1 about a weeks pregnant has advanced hidradenitis of the left upper lobe and both axilla and groin ER for which she has been on a multitude of modality in the past currently on Humira, comes in with [place] in her groin and axillary area.  She has no OB complaints.  She has no abdominal pain cramping contractions or leakage of fluid.    Past history: Hidradenitis suppurativa, surgery for sweat gland removal  Social: Patient denies current tobacco alcohol drug use.  REVIEW OF SYSTEMS:    GENERAL.: No weight loss, fatigue, anorexia, insomnia, fever.    EYES: No vision loss, double vision, drainage, eye pain.    ENT: No pharyngitis, dry mouth.    CARDIOPULMONARY: No chest pain, palpitations, syncope, near syncope. No shortness of breath, cough, hemoptysis.    GI: No abdominal pain, change in bowel habits, melena, hematemesis, hematochezia, nausea, vomiting, diarrhea.    : No discharge, dysuria, frequency, urgency, hematuria.    MS: No limb pain, joint pain, joint swelling.    SKIN: No rashes.  Positive for flareup of bilateral axillary and groin hidradenitis suppurativa    PSYCH: No depression, anxiety, suicidality, homicidality.    Review of systems is otherwise negative unless stated above or in history of present illness.  Social history, family history, allergies reviewed.  PHYSICAL EXAM:    GENERAL: Vitals noted, no distress. Alert and oriented  x 3. Non-toxic.      EENT: TMs clear. Posterior oropharynx unremarkable. No meningismus. No LAD.     NECK: Supple. Nontender. No midline tenderness.     CARDIAC: Regular, rate, rhythm. No murmurs rubs or gallops. No JVD    PULMONARY: Lungs clear bilaterally with good aeration. No wheezes rales or rhonchi. No respiratory distress.     ABDOMEN: Soft, nonsurgical. Nontender.  Gravid uterus.  Nontender no rebound or guarding good bowel sounds.  No peritoneal signs. Normoactive bowel sounds.  No pulsatile masses.     EXTREMITIES: No peripheral edema. Negative Homans bilaterally, no cords.  2+ bounding pulses well-perfused.    SKIN: Female chaperone present, I performed exam of her both axilla and groin area and perineum there was evidence of chronic appearing hidradenitis with no active lesions no active drainage no active abscess no erythema redness or cellulitis    NEURO: No focal neurologic deficits, NIH score of 0. Cranial nerves normal as tested from II through XII.     MEDICAL DECISION MAKING:  We performed a medical screening examination, currently patient will be complaints no spotting bleeding leakage of fluid we talked to the OB charge nurse.  Labor and delivery and patient to be transferred to labor and delivery for further care.    Impression: #1 chronic stable hidradenitis suppurativa with no clinical evidence of active abscess or drainage or sepsis  Plan/MDM: 34-year-old female  2 para 1 about 7 weeks pregnant comes in with chronic appearing hidradenitis suppurativa she has no OB complaints she has no spotting bleeding, no signs of eclampsia or preeclampsia no evidence of or signs of imminent delivery no spotting bleeding leakage of fluid or cramping or contractions she is afebrile she has no sign of sepsis or bacteremia clinically at least no signs of septic shock, signs of active abscess, patient be transferred to labor delivery for further evaluation for fetal and maternal monitoring and if needed hospitalization to [facility] labor and delivery for further treatment of her hidradenitis suppurativa.              Patient History   Past Medical History:   Diagnosis Date   • Anemia 2024   • Hidradenitis suppurativa 10/29/2020    Hidradenitis   • Hidradenitis suppurativa    • Lupus    • UTI (urinary tract infection) during pregnancy, first trimester ([facility]) 2024   • Vitamin D deficiency 2024     Past Surgical History:   Procedure Laterality Date   • OTHER SURGICAL HISTORY   09/19/2022    Arm surgery   • OTHER SURGICAL HISTORY Left 2011    Left wrist   • OTHER SURGICAL HISTORY Left 2021    Left wrist procedure     No family history on file.  Social History     Tobacco Use   • Smoking status: Former     Types: Cigarettes   • Smokeless tobacco: Never   Vaping Use   • Vaping status: Every Day   Substance Use Topics   • Alcohol use: Not Currently   • Drug use: Never       Physical Exam   ED Triage Vitals   Temp Pulse Resp BP   -- -- -- --      SpO2 Temp src Heart Rate Source Patient Position   -- -- -- --      BP Location FiO2 (%)     -- --       Physical Exam      ED Course & MDM   Diagnoses as of 01/16/25 0835   Hidradenitis suppurativa                 No data recorded                                 Medical Decision Making      Procedure  Procedures     David Chase MD  01/16/25 0868

## 2025-01-16 NOTE — TELEPHONE ENCOUNTER
Pt reports she has been leaking fluid since last Saturday.  She states she was told last Sunday that her amniotic fluid was normal, but that she is now having lower abdominal pain.  Recommend evaluation in triage at [facility].  Pt states she cannot make it to [facility] but will go to [facility].    Kayla Angulo MD

## 2025-01-17 ENCOUNTER — ANESTHESIA EVENT (OUTPATIENT)
Dept: OBSTETRICS AND GYNECOLOGY | Facility: HOSPITAL | Age: 35
End: 2025-01-17
Payer: MEDICAID

## 2025-01-17 ENCOUNTER — ANESTHESIA (OUTPATIENT)
Dept: OBSTETRICS AND GYNECOLOGY | Facility: HOSPITAL | Age: 35
End: 2025-01-17
Payer: MEDICAID

## 2025-01-17 ENCOUNTER — HOSPITAL ENCOUNTER (INPATIENT)
Facility: HOSPITAL | Age: 35
End: 2025-01-17
Attending: OBSTETRICS & GYNECOLOGY | Admitting: OBSTETRICS & GYNECOLOGY
Payer: MEDICAID

## 2025-01-17 DIAGNOSIS — G89.29 OTHER CHRONIC PAIN: Primary | ICD-10-CM

## 2025-01-17 PROBLEM — O42.919 PRETERM PREMATURE RUPTURE OF MEMBRANES (PPROM) WITH UNKNOWN ONSET OF LABOR (HHS-HCC): Status: ACTIVE | Noted: 2025-01-17

## 2025-01-17 PROBLEM — O13.3 GESTATIONAL HYPERTENSION, THIRD TRIMESTER (HHS-HCC): Status: ACTIVE | Noted: 2025-01-17

## 2025-01-17 LAB
ABO GROUP (TYPE) IN BLOOD: NORMAL
ALBUMIN SERPL BCP-MCNC: 3.4 G/DL (ref 3.4–5)
ALP SERPL-CCNC: 199 U/L (ref 33–110)
ALT SERPL W P-5'-P-CCNC: 12 U/L (ref 7–45)
ANION GAP SERPL CALC-SCNC: 13 MMOL/L (ref 10–20)
ANTIBODY SCREEN: NORMAL
AST SERPL W P-5'-P-CCNC: 14 U/L (ref 9–39)
BILIRUB SERPL-MCNC: 0.3 MG/DL (ref 0–1.2)
BUN SERPL-MCNC: 8 MG/DL (ref 6–23)
CALCIUM SERPL-MCNC: 9.1 MG/DL (ref 8.6–10.6)
CHLORIDE SERPL-SCNC: 107 MMOL/L (ref 98–107)
CO2 SERPL-SCNC: 22 MMOL/L (ref 21–32)
COMMENTS - MP RESULT TYPE: NORMAL
CREAT SERPL-MCNC: 0.77 MG/DL (ref 0.5–1.05)
CREAT UR-MCNC: 62.8 MG/DL (ref 20–320)
EGFRCR SERPLBLD CKD-EPI 2021: >90 ML/MIN/1.73M*2
ERYTHROCYTE [DISTWIDTH] IN BLOOD BY AUTOMATED COUNT: 15 % (ref 11.5–14.5)
ERYTHROCYTE [DISTWIDTH] IN BLOOD BY AUTOMATED COUNT: 15.3 % (ref 11.5–14.5)
GLUCOSE SERPL-MCNC: 98 MG/DL (ref 74–99)
HCT VFR BLD AUTO: 31.8 % (ref 36–46)
HCT VFR BLD AUTO: 33.7 % (ref 36–46)
HGB BLD-MCNC: 10.6 G/DL (ref 12–16)
HGB BLD-MCNC: 10.7 G/DL (ref 12–16)
MCH RBC QN AUTO: 25 PG (ref 26–34)
MCH RBC QN AUTO: 25.5 PG (ref 26–34)
MCHC RBC AUTO-ENTMCNC: 31.5 G/DL (ref 32–36)
MCHC RBC AUTO-ENTMCNC: 33.6 G/DL (ref 32–36)
MCV RBC AUTO: 76 FL (ref 80–100)
MCV RBC AUTO: 80 FL (ref 80–100)
NRBC BLD-RTO: 0 /100 WBCS (ref 0–0)
NRBC BLD-RTO: 0 /100 WBCS (ref 0–0)
PLATELET # BLD AUTO: 296 X10*3/UL (ref 150–450)
PLATELET # BLD AUTO: 345 X10*3/UL (ref 150–450)
POTASSIUM SERPL-SCNC: 3.9 MMOL/L (ref 3.5–5.3)
PROT SERPL-MCNC: 7 G/DL (ref 6.4–8.2)
PROT UR-ACNC: 9 MG/DL (ref 5–24)
PROT/CREAT UR: 0.14 MG/MG CREAT (ref 0–0.17)
RBC # BLD AUTO: 4.19 X10*6/UL (ref 4–5.2)
RBC # BLD AUTO: 4.24 X10*6/UL (ref 4–5.2)
RH FACTOR (ANTIGEN D): NORMAL
SCAN RESULT: NORMAL
SODIUM SERPL-SCNC: 138 MMOL/L (ref 136–145)
TREPONEMA PALLIDUM IGG+IGM AB [PRESENCE] IN SERUM OR PLASMA BY IMMUNOASSAY: NONREACTIVE
WBC # BLD AUTO: 11.8 X10*3/UL (ref 4.4–11.3)
WBC # BLD AUTO: 11.8 X10*3/UL (ref 4.4–11.3)

## 2025-01-17 PROCEDURE — 1120000001 HC OB PRIVATE ROOM DAILY

## 2025-01-17 PROCEDURE — 2500000004 HC RX 250 GENERAL PHARMACY W/ HCPCS (ALT 636 FOR OP/ED): Mod: SE

## 2025-01-17 PROCEDURE — 84075 ASSAY ALKALINE PHOSPHATASE: CPT

## 2025-01-17 PROCEDURE — 2500000001 HC RX 250 WO HCPCS SELF ADMINISTERED DRUGS (ALT 637 FOR MEDICARE OP): Mod: SE

## 2025-01-17 PROCEDURE — 99215 OFFICE O/P EST HI 40 MIN: CPT

## 2025-01-17 PROCEDURE — 3E033VJ INTRODUCTION OF OTHER HORMONE INTO PERIPHERAL VEIN, PERCUTANEOUS APPROACH: ICD-10-PCS | Performed by: OBSTETRICS & GYNECOLOGY

## 2025-01-17 PROCEDURE — 2500000004 HC RX 250 GENERAL PHARMACY W/ HCPCS (ALT 636 FOR OP/ED): Performed by: OBSTETRICS & GYNECOLOGY

## 2025-01-17 PROCEDURE — 86780 TREPONEMA PALLIDUM: CPT | Performed by: STUDENT IN AN ORGANIZED HEALTH CARE EDUCATION/TRAINING PROGRAM

## 2025-01-17 PROCEDURE — 59050 FETAL MONITOR W/REPORT: CPT

## 2025-01-17 PROCEDURE — 2500000004 HC RX 250 GENERAL PHARMACY W/ HCPCS (ALT 636 FOR OP/ED): Mod: JZ,SE,TB | Performed by: STUDENT IN AN ORGANIZED HEALTH CARE EDUCATION/TRAINING PROGRAM

## 2025-01-17 PROCEDURE — 86901 BLOOD TYPING SEROLOGIC RH(D): CPT | Performed by: STUDENT IN AN ORGANIZED HEALTH CARE EDUCATION/TRAINING PROGRAM

## 2025-01-17 PROCEDURE — 51701 INSERT BLADDER CATHETER: CPT

## 2025-01-17 PROCEDURE — 36415 COLL VENOUS BLD VENIPUNCTURE: CPT

## 2025-01-17 PROCEDURE — 0UQMXZZ REPAIR VULVA, EXTERNAL APPROACH: ICD-10-PCS | Performed by: OBSTETRICS & GYNECOLOGY

## 2025-01-17 PROCEDURE — 7210000002 HC LABOR PER HOUR

## 2025-01-17 PROCEDURE — 99223 1ST HOSP IP/OBS HIGH 75: CPT

## 2025-01-17 PROCEDURE — 87081 CULTURE SCREEN ONLY: CPT | Performed by: STUDENT IN AN ORGANIZED HEALTH CARE EDUCATION/TRAINING PROGRAM

## 2025-01-17 PROCEDURE — 85027 COMPLETE CBC AUTOMATED: CPT | Performed by: STUDENT IN AN ORGANIZED HEALTH CARE EDUCATION/TRAINING PROGRAM

## 2025-01-17 PROCEDURE — 36415 COLL VENOUS BLD VENIPUNCTURE: CPT | Performed by: STUDENT IN AN ORGANIZED HEALTH CARE EDUCATION/TRAINING PROGRAM

## 2025-01-17 PROCEDURE — 10907ZC DRAINAGE OF AMNIOTIC FLUID, THERAPEUTIC FROM PRODUCTS OF CONCEPTION, VIA NATURAL OR ARTIFICIAL OPENING: ICD-10-PCS | Performed by: OBSTETRICS & GYNECOLOGY

## 2025-01-17 PROCEDURE — 82570 ASSAY OF URINE CREATININE: CPT

## 2025-01-17 PROCEDURE — 85027 COMPLETE CBC AUTOMATED: CPT

## 2025-01-17 PROCEDURE — 3700000014 EPIDURAL BLOCK: Mod: GC

## 2025-01-17 RX ORDER — OXYCODONE AND ACETAMINOPHEN 10; 325 MG/1; MG/1
1 TABLET ORAL EVERY 4 HOURS PRN
Status: DISCONTINUED | OUTPATIENT
Start: 2025-01-17 | End: 2025-01-17

## 2025-01-17 RX ORDER — OXYCODONE AND ACETAMINOPHEN 5; 325 MG/1; MG/1
2 TABLET ORAL EVERY 4 HOURS PRN
Status: DISCONTINUED | OUTPATIENT
Start: 2025-01-17 | End: 2025-01-17

## 2025-01-17 RX ORDER — FAMOTIDINE 20 MG/1
20 TABLET, FILM COATED ORAL DAILY
Status: DISCONTINUED | OUTPATIENT
Start: 2025-01-17 | End: 2025-01-17

## 2025-01-17 RX ORDER — WATER
250 LIQUID (ML) MISCELLANEOUS EVERY 8 HOURS SCHEDULED
Status: DISCONTINUED | OUTPATIENT
Start: 2025-01-17 | End: 2025-01-18 | Stop reason: HOSPADM

## 2025-01-17 RX ORDER — MISOPROSTOL 200 UG/1
800 TABLET ORAL ONCE AS NEEDED
Status: DISCONTINUED | OUTPATIENT
Start: 2025-01-17 | End: 2025-01-18 | Stop reason: HOSPADM

## 2025-01-17 RX ORDER — LIDOCAINE HYDROCHLORIDE 10 MG/ML
30 INJECTION, SOLUTION INFILTRATION; PERINEURAL ONCE AS NEEDED
Status: DISCONTINUED | OUTPATIENT
Start: 2025-01-17 | End: 2025-01-18 | Stop reason: HOSPADM

## 2025-01-17 RX ORDER — ONDANSETRON 4 MG/1
4 TABLET, FILM COATED ORAL EVERY 6 HOURS PRN
Status: DISCONTINUED | OUTPATIENT
Start: 2025-01-17 | End: 2025-01-18

## 2025-01-17 RX ORDER — LABETALOL HYDROCHLORIDE 5 MG/ML
20 INJECTION, SOLUTION INTRAVENOUS ONCE AS NEEDED
Status: DISCONTINUED | OUTPATIENT
Start: 2025-01-17 | End: 2025-01-18 | Stop reason: HOSPADM

## 2025-01-17 RX ORDER — FAMOTIDINE 10 MG/ML
20 INJECTION INTRAVENOUS DAILY PRN
Status: DISCONTINUED | OUTPATIENT
Start: 2025-01-17 | End: 2025-01-18

## 2025-01-17 RX ORDER — OXYTOCIN/0.9 % SODIUM CHLORIDE 30/500 ML
60 PLASTIC BAG, INJECTION (ML) INTRAVENOUS ONCE AS NEEDED
Status: DISCONTINUED | OUTPATIENT
Start: 2025-01-17 | End: 2025-01-18 | Stop reason: HOSPADM

## 2025-01-17 RX ORDER — HYDROMORPHONE HYDROCHLORIDE 1 MG/ML
0.8 INJECTION, SOLUTION INTRAMUSCULAR; INTRAVENOUS; SUBCUTANEOUS EVERY 4 HOURS PRN
Status: DISCONTINUED | OUTPATIENT
Start: 2025-01-17 | End: 2025-01-18

## 2025-01-17 RX ORDER — HYDROMORPHONE HYDROCHLORIDE 1 MG/ML
0.4 INJECTION, SOLUTION INTRAMUSCULAR; INTRAVENOUS; SUBCUTANEOUS ONCE
Status: COMPLETED | OUTPATIENT
Start: 2025-01-17 | End: 2025-01-17

## 2025-01-17 RX ORDER — OXYTOCIN 10 [USP'U]/ML
10 INJECTION, SOLUTION INTRAMUSCULAR; INTRAVENOUS ONCE AS NEEDED
Status: DISCONTINUED | OUTPATIENT
Start: 2025-01-17 | End: 2025-01-18 | Stop reason: HOSPADM

## 2025-01-17 RX ORDER — ONDANSETRON HYDROCHLORIDE 2 MG/ML
4 INJECTION, SOLUTION INTRAVENOUS EVERY 6 HOURS PRN
Status: DISCONTINUED | OUTPATIENT
Start: 2025-01-17 | End: 2025-01-18

## 2025-01-17 RX ORDER — HYDROMORPHONE HYDROCHLORIDE 1 MG/ML
0.4 INJECTION, SOLUTION INTRAMUSCULAR; INTRAVENOUS; SUBCUTANEOUS
Status: DISPENSED | OUTPATIENT
Start: 2025-01-17 | End: 2025-01-17

## 2025-01-17 RX ORDER — FENTANYL/ROPIVACAINE/NS/PF 2MCG/ML-.2
0-25 PLASTIC BAG, INJECTION (ML) INJECTION CONTINUOUS
Status: DISCONTINUED | OUTPATIENT
Start: 2025-01-17 | End: 2025-01-18

## 2025-01-17 RX ORDER — OXYTOCIN/0.9 % SODIUM CHLORIDE 30/500 ML
2-30 PLASTIC BAG, INJECTION (ML) INTRAVENOUS CONTINUOUS
Status: DISCONTINUED | OUTPATIENT
Start: 2025-01-17 | End: 2025-01-18

## 2025-01-17 RX ORDER — CARBOPROST TROMETHAMINE 250 UG/ML
250 INJECTION, SOLUTION INTRAMUSCULAR ONCE AS NEEDED
Status: DISCONTINUED | OUTPATIENT
Start: 2025-01-17 | End: 2025-01-18 | Stop reason: HOSPADM

## 2025-01-17 RX ORDER — PENICILLIN G 3000000 [IU]/50ML
3 INJECTION, SOLUTION INTRAVENOUS EVERY 4 HOURS
Status: DISCONTINUED | OUTPATIENT
Start: 2025-01-17 | End: 2025-01-18

## 2025-01-17 RX ORDER — TERBUTALINE SULFATE 1 MG/ML
0.25 INJECTION SUBCUTANEOUS ONCE AS NEEDED
Status: DISCONTINUED | OUTPATIENT
Start: 2025-01-17 | End: 2025-01-18 | Stop reason: HOSPADM

## 2025-01-17 RX ORDER — CLINDAMYCIN PHOSPHATE 900 MG/50ML
900 INJECTION, SOLUTION INTRAVENOUS EVERY 8 HOURS
Status: DISCONTINUED | OUTPATIENT
Start: 2025-01-17 | End: 2025-01-17

## 2025-01-17 RX ORDER — NIFEDIPINE 10 MG/1
10 CAPSULE ORAL ONCE AS NEEDED
Status: DISCONTINUED | OUTPATIENT
Start: 2025-01-17 | End: 2025-01-18 | Stop reason: HOSPADM

## 2025-01-17 RX ORDER — HYDROXYZINE HYDROCHLORIDE 25 MG/1
25 TABLET, FILM COATED ORAL EVERY 4 HOURS PRN
Status: DISCONTINUED | OUTPATIENT
Start: 2025-01-17 | End: 2025-01-20 | Stop reason: HOSPADM

## 2025-01-17 RX ORDER — LOPERAMIDE HYDROCHLORIDE 2 MG/1
4 CAPSULE ORAL EVERY 2 HOUR PRN
Status: DISCONTINUED | OUTPATIENT
Start: 2025-01-17 | End: 2025-01-18 | Stop reason: HOSPADM

## 2025-01-17 RX ORDER — HYDRALAZINE HYDROCHLORIDE 20 MG/ML
5 INJECTION INTRAMUSCULAR; INTRAVENOUS ONCE AS NEEDED
Status: DISCONTINUED | OUTPATIENT
Start: 2025-01-17 | End: 2025-01-18 | Stop reason: HOSPADM

## 2025-01-17 RX ORDER — TRANEXAMIC ACID 100 MG/ML
1000 INJECTION, SOLUTION INTRAVENOUS ONCE AS NEEDED
Status: DISCONTINUED | OUTPATIENT
Start: 2025-01-17 | End: 2025-01-18 | Stop reason: HOSPADM

## 2025-01-17 RX ORDER — METHYLERGONOVINE MALEATE 0.2 MG/ML
0.2 INJECTION INTRAVENOUS ONCE AS NEEDED
Status: DISCONTINUED | OUTPATIENT
Start: 2025-01-17 | End: 2025-01-18 | Stop reason: HOSPADM

## 2025-01-17 RX ADMIN — HYDROMORPHONE HYDROCHLORIDE 0.4 MG: 1 INJECTION, SOLUTION INTRAMUSCULAR; INTRAVENOUS; SUBCUTANEOUS at 05:54

## 2025-01-17 RX ADMIN — HYDROMORPHONE HYDROCHLORIDE 0.8 MG: 1 INJECTION, SOLUTION INTRAMUSCULAR; INTRAVENOUS; SUBCUTANEOUS at 14:24

## 2025-01-17 RX ADMIN — ONDANSETRON 4 MG: 2 INJECTION INTRAMUSCULAR; INTRAVENOUS at 13:30

## 2025-01-17 RX ADMIN — HYDROMORPHONE HYDROCHLORIDE 0.4 MG: 1 INJECTION, SOLUTION INTRAMUSCULAR; INTRAVENOUS; SUBCUTANEOUS at 05:32

## 2025-01-17 RX ADMIN — PENICILLIN G 3 MILLION UNITS: 3000000 INJECTION, SOLUTION INTRAVENOUS at 18:24

## 2025-01-17 RX ADMIN — PENICILLIN G 3 MILLION UNITS: 3000000 INJECTION, SOLUTION INTRAVENOUS at 23:26

## 2025-01-17 RX ADMIN — Medication 5 ML: at 05:58

## 2025-01-17 RX ADMIN — HYDROMORPHONE HYDROCHLORIDE 0.8 MG: 1 INJECTION, SOLUTION INTRAMUSCULAR; INTRAVENOUS; SUBCUTANEOUS at 18:24

## 2025-01-17 RX ADMIN — PENICILLIN G POTASSIUM 5 MILLION UNITS: 5000000 INJECTION, POWDER, FOR SOLUTION INTRAMUSCULAR; INTRAVENOUS at 13:35

## 2025-01-17 RX ADMIN — HYDROMORPHONE HYDROCHLORIDE 0.4 MG: 1 INJECTION, SOLUTION INTRAMUSCULAR; INTRAVENOUS; SUBCUTANEOUS at 05:17

## 2025-01-17 RX ADMIN — Medication 10 ML/HR: at 05:59

## 2025-01-17 RX ADMIN — HYDROMORPHONE HYDROCHLORIDE 0.8 MG: 1 INJECTION, SOLUTION INTRAMUSCULAR; INTRAVENOUS; SUBCUTANEOUS at 10:24

## 2025-01-17 RX ADMIN — Medication 250 ML: at 13:39

## 2025-01-17 RX ADMIN — HYDROXYZINE HYDROCHLORIDE 25 MG: 25 TABLET ORAL at 23:16

## 2025-01-17 RX ADMIN — Medication 2 MILLI-UNITS/MIN: at 12:50

## 2025-01-17 RX ADMIN — HYDROMORPHONE HYDROCHLORIDE 0.8 MG: 1 INJECTION, SOLUTION INTRAMUSCULAR; INTRAVENOUS; SUBCUTANEOUS at 22:06

## 2025-01-17 RX ADMIN — FAMOTIDINE 20 MG: 10 INJECTION INTRAVENOUS at 23:16

## 2025-01-17 RX ADMIN — CLINDAMYCIN PHOSPHATE 900 MG: 900 INJECTION, SOLUTION INTRAVENOUS at 06:08

## 2025-01-17 SDOH — SOCIAL STABILITY: SOCIAL INSECURITY: PHYSICAL ABUSE: YES, PAST (COMMENT)

## 2025-01-17 SDOH — SOCIAL STABILITY: SOCIAL INSECURITY: DOES ANYONE TRY TO KEEP YOU FROM HAVING/CONTACTING OTHER FRIENDS OR DOING THINGS OUTSIDE YOUR HOME?: NO

## 2025-01-17 SDOH — ECONOMIC STABILITY: HOUSING INSECURITY: DO YOU FEEL UNSAFE GOING BACK TO THE PLACE WHERE YOU ARE LIVING?: NO

## 2025-01-17 SDOH — SOCIAL STABILITY: SOCIAL INSECURITY: ABUSE SCREEN: ADULT

## 2025-01-17 SDOH — SOCIAL STABILITY: SOCIAL INSECURITY: DO YOU FEEL ANYONE HAS EXPLOITED OR TAKEN ADVANTAGE OF YOU FINANCIALLY OR OF YOUR PERSONAL PROPERTY?: NO

## 2025-01-17 SDOH — SOCIAL STABILITY: SOCIAL INSECURITY: HAS ANYONE EVER THREATENED TO HURT YOUR FAMILY OR YOUR PETS?: NO

## 2025-01-17 SDOH — ECONOMIC STABILITY: FOOD INSECURITY: WITHIN THE PAST 12 MONTHS, YOU WORRIED THAT YOUR FOOD WOULD RUN OUT BEFORE YOU GOT THE MONEY TO BUY MORE.: NEVER TRUE

## 2025-01-17 SDOH — SOCIAL STABILITY: SOCIAL INSECURITY: VERBAL ABUSE: YES, PAST (COMMENT)

## 2025-01-17 SDOH — SOCIAL STABILITY: SOCIAL INSECURITY: ARE THERE ANY APPARENT SIGNS OF INJURIES/BEHAVIORS THAT COULD BE RELATED TO ABUSE/NEGLECT?: NO

## 2025-01-17 SDOH — HEALTH STABILITY: MENTAL HEALTH: CURRENT SMOKER: 1

## 2025-01-17 SDOH — ECONOMIC STABILITY: FOOD INSECURITY: WITHIN THE PAST 12 MONTHS, THE FOOD YOU BOUGHT JUST DIDN'T LAST AND YOU DIDN'T HAVE MONEY TO GET MORE.: NEVER TRUE

## 2025-01-17 SDOH — SOCIAL STABILITY: SOCIAL INSECURITY: HAVE YOU HAD THOUGHTS OF HARMING ANYONE ELSE?: YES

## 2025-01-17 SDOH — HEALTH STABILITY: MENTAL HEALTH: SUICIDAL BEHAVIOR (LIFETIME): NO

## 2025-01-17 SDOH — HEALTH STABILITY: MENTAL HEALTH: NON-SPECIFIC ACTIVE SUICIDAL THOUGHTS (PAST 1 MONTH): NO

## 2025-01-17 SDOH — HEALTH STABILITY: MENTAL HEALTH: WERE YOU ABLE TO COMPLETE ALL THE BEHAVIORAL HEALTH SCREENINGS?: YES

## 2025-01-17 SDOH — SOCIAL STABILITY: SOCIAL INSECURITY: ARE YOU OR HAVE YOU BEEN THREATENED OR ABUSED PHYSICALLY, EMOTIONALLY, OR SEXUALLY BY ANYONE?: YES

## 2025-01-17 SDOH — SOCIAL STABILITY: SOCIAL INSECURITY: HAVE YOU HAD ANY THOUGHTS OF HARMING ANYONE ELSE?: NO

## 2025-01-17 SDOH — HEALTH STABILITY: MENTAL HEALTH: WISH TO BE DEAD (PAST 1 MONTH): NO

## 2025-01-17 ASSESSMENT — PAIN SCALES - GENERAL
PAINLEVEL_OUTOF10: 8
PAINLEVEL_OUTOF10: 5 - MODERATE PAIN
PAINLEVEL_OUTOF10: 10 - WORST POSSIBLE PAIN
PAINLEVEL_OUTOF10: 0 - NO PAIN
PAINLEVEL_OUTOF10: 10 - WORST POSSIBLE PAIN
PAINLEVEL_OUTOF10: 10 - WORST POSSIBLE PAIN
PAINLEVEL_OUTOF10: 0 - NO PAIN
PAINLEVEL_OUTOF10: 5 - MODERATE PAIN
PAINLEVEL_OUTOF10: 5 - MODERATE PAIN
PAINLEVEL_OUTOF10: 8
PAINLEVEL_OUTOF10: 8
PAINLEVEL_OUTOF10: 0 - NO PAIN
PAINLEVEL_OUTOF10: 0 - NO PAIN
PAINLEVEL_OUTOF10: 8
PAINLEVEL_OUTOF10: 5 - MODERATE PAIN
PAINLEVEL_OUTOF10: 8
PAINLEVEL_OUTOF10: 0 - NO PAIN
PAINLEVEL_OUTOF10: 0 - NO PAIN
PAINLEVEL_OUTOF10: 5 - MODERATE PAIN
PAINLEVEL_OUTOF10: 8
PAINLEVEL_OUTOF10: 0 - NO PAIN
PAINLEVEL_OUTOF10: 5 - MODERATE PAIN
PAINLEVEL_OUTOF10: 10 - WORST POSSIBLE PAIN
PAINLEVEL_OUTOF10: 0 - NO PAIN
PAINLEVEL_OUTOF10: 5 - MODERATE PAIN
PAINLEVEL_OUTOF10: 0 - NO PAIN
PAINLEVEL_OUTOF10: 0 - NO PAIN
PAINLEVEL_OUTOF10: 8
PAINLEVEL_OUTOF10: 6
PAINLEVEL_OUTOF10: 0 - NO PAIN
PAINLEVEL_OUTOF10: 0 - NO PAIN
PAINLEVEL_OUTOF10: 5 - MODERATE PAIN
PAINLEVEL_OUTOF10: 5 - MODERATE PAIN
PAINLEVEL_OUTOF10: 0 - NO PAIN
PAINLEVEL_OUTOF10: 9
PAINLEVEL_OUTOF10: 0 - NO PAIN
PAINLEVEL_OUTOF10: 5 - MODERATE PAIN

## 2025-01-17 ASSESSMENT — LIFESTYLE VARIABLES
HOW OFTEN DO YOU HAVE 6 OR MORE DRINKS ON ONE OCCASION: NEVER
HOW MANY STANDARD DRINKS CONTAINING ALCOHOL DO YOU HAVE ON A TYPICAL DAY: PATIENT DOES NOT DRINK
AUDIT-C TOTAL SCORE: 0
AUDIT-C TOTAL SCORE: 0
SKIP TO QUESTIONS 9-10: 1
HOW OFTEN DO YOU HAVE A DRINK CONTAINING ALCOHOL: NEVER

## 2025-01-17 ASSESSMENT — ACTIVITIES OF DAILY LIVING (ADL): LACK_OF_TRANSPORTATION: NO
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
## 2025-01-17 NOTE — ANESTHESIA PROCEDURE NOTES
Cardiac Rehab: 58 yo male admitted with SOB (3/10). Per Dr Awilda Cleveland, dx includes: CHF exacerbation. LVEF 26-30% by echo (3/10/19). Patient is familiar to Cardiac Rehab RN from past admission with NSTEMI following cocaine use (5/4/18). He subsequently required CABG (5/8/18). Pt resides with his wife in [place], and has worked as a . Met briefly with patient's wife on [facility], as he was lying in bed with eyes closed. Provided \"Living with Heart Failure\" book to wife. Encouraged med compliance. Also explained efforts to contact pt last May regarding his eligibility for outpatient cardiac rehab were unsuccessful. Wife verbalized basic understanding. She had no questions. Epidural Block    Patient location during procedure: OB  Start time: 1/17/2025 4:45 AM  End time: 1/17/2025 6:02 AM  Reason for block: labor analgesia  Staffing  Performed: resident   Authorized by: Anjelica Murphy DO    Performed by: Anjelica Murphy DO    Preanesthetic Checklist  Completed: patient identified, IV checked, risks and benefits discussed, surgical consent, pre-op evaluation, timeout performed and sterile techniques followed  Block Timeout  RN/Licensed healthcare professional reads aloud to the Anesthesia provider and entire team: Patient identity, procedure with side and site, patient position, and as applicable the availability of implants/special equipment/special requirements.  Patient on coagulant treatment: no  Timeout performed at: 1/17/2025 5:44 AM  Block Placement  Patient position: sitting  Prep: ChloraPrep  Sterility prep: drape, gloves, hand, mask and cap  Sedation level: no sedation  Patient monitoring: blood pressure, continuous pulse oximetry and heart rate  Approach: midline  Local numbing: lidocaine 1% to skin and subcutaneous tissues  Vertebral space: lumbar  Lumbar location: L2-L3  Epidural  Loss of resistance technique: saline  Guidance: landmark technique        Needle  Needle type: Tuohy   Needle gauge: 18  Needle length: 8.9cm  Needle insertion depth: 6.5 cm  Catheter type: multi-orifice  Catheter size: 22 G  Catheter at skin depth: 11.5 cm  Catheter securement method: clear occlusive dressing and liquid medical adhesive    Test dose: lidocaine 1.5% with epinephrine 1-to-200,000  Test dose: lidocaine 1.5% with epinephrine 1-to-200,000  Test dose result: no positive test dose    PCEA  Medication concentration used: 0.2% Ropivacaine with 2 mcg/mL Fentanyl  Dose (mL): 5  Lockout (minutes): 30  1-Hour Limit (boluses/hr): 2  Basal Rate: 10        Assessment  Sensory level: other  Number of attempts: 2  Events: no positive test dose  Procedure assessment: patient tolerated  procedure well with no immediate complications  Additional Notes  First attempt: Pt had difficulty with positioning. Achieved loss at 8cm, difficult to thread catheter but was able to successfully thread. On removal of Tuohy from skin, pt abruptly changed positions, dislodging both the Tuohy and catheter. Pt unwilling to sit for second epidural attempt, and we will retry epidural placement at a later time.     Second attempt: Pt given 0.8mg Dilaudid for pain control due to MARQUIS. Easy placement at 6.5cm in space L2-L3. Pt tolerated the procedure well.     T8 Bilaterally.

## 2025-01-17 NOTE — ANESTHESIA PREPROCEDURE EVALUATION
Patient: Sandrita Adams    Evaluation Method: In-person visit    Procedure Information    Date: 01/17/25  Procedure: Labor Analgesia         Relevant Problems   Pulmonary   (+) Asthma affecting pregnancy in second trimester ([facility])      Hematology   (+) Anemia of mother in pregnancy, delivered with postpartum condition ([facility])      ID   (+) Hidradenitis suppurativa      GYN   (+) 34 weeks gestation of pregnancy ([facility])   (+) AMA (advanced maternal age) multigravida 35+ ([facility])       Clinical information reviewed:   Tobacco  Allergies  Meds   Med Hx  Surg Hx   Fam Hx  Soc Hx        NPO Detail:  No data recorded     OB/Gyn Evaluation    Present Pregnancy    Patient is pregnant now.   Obstetric History                Physical Exam    Airway  Mallampati: II  TM distance: >3 FB     Cardiovascular   Rhythm: regular  Rate: normal     Dental - normal exam     Pulmonary   Breath sounds clear to auscultation     Abdominal            Anesthesia Plan    History of general anesthesia?: yes  History of complications of general anesthesia?: no    ASA 3     epidural     The patient is a current smoker.    intravenous induction   Use of blood products discussed with patient who.    Plan discussed with attending.
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
## 2025-01-17 NOTE — H&P
OB Admission H&P    Assessment/Plan    Sandrita Adams is a 34 y.o.  at 36w0d by 8 wk US who is admitted for Ruptured Membranes >/+= 34 weeks.    PPROM  -confirmed on admission by positive nitrazine, positive ferning, and pooling  -Admit to L&D, consented  -T&S, CBC, and Syphilis  -Epidural at patient request  -Recheck as clinically indicated by maternal or fetal status    Fetal Status  -NST reactive, reassuring   -Presentation cephalic based on ultrasound  -EFW 2585g extrapolated from  Growth US  -GBS unknown, collected on admission, on Clindamycin    Postpartum  Contraception Plan: patient declined    Seen and discussed with Dr. Kev Guerin MD PGY-3    Subjective   34 y.o.  at 36w0d by 8 wk US transferred from [facility] due to PPROM. Patient reports feeling leakage of fluid 5 days ago however then noticed brown discharge while taking a shower around 5 pm this evening. Also having spotting. Good fetal movement reported.    Pregnancy Notable For  - HS, multiple flares, currently on Humira, previously on IV Ertapenem -   - Asthma moderate persistent, on flonase and prn albuterol  - AMA  - IPV s/p event  with FOB, has new home alone      Prenatal Provider M    OB History    Para Term  AB Living   2 0 0 0 1 0   SAB IAB Ectopic Multiple Live Births   1 0 0 0 0      # Outcome Date GA Lbr Anoop/2nd Weight Sex Type Anes PTL Lv   2 Current            1 SAB 22 5w0d    SAB   DEC      Birth Comments: [facility]       Past Surgical History:   Procedure Laterality Date    OTHER SURGICAL HISTORY  2022    Arm surgery    OTHER SURGICAL HISTORY Left     Left wrist    OTHER SURGICAL HISTORY Left     Left wrist procedure       Social History     Tobacco Use    Smoking status: Some Days     Types: Cigarettes    Smokeless tobacco: Never   Substance Use Topics    Alcohol use: Not Currently       Allergies   Allergen Reactions    Suboxone  [Buprenorphine-Naloxone] Anaphylaxis, Hives and Itching    Clarithromycin Hives    Haloperidol Hallucinations and Psychosis    Keflex [Cephalexin] Hives    Levofloxacin Swelling     Ankle Joint/tendon pain    Metoclopramide Headache, Hallucinations and Psychosis    Reglan [Metoclopramide Hcl] Psychosis       Medications Prior to Admission   Medication Sig Dispense Refill Last Dose/Taking    adalimumab (Humira,CF, Pen Crohns-UC-HS) 80 mg/0.8 mL pen injector kit pen-injector Inject 2 pens (160mg) under the skin on day 0, then 1 pen (80mg) on day 15 3 each 0     adalimumab (Humira,CF, Pen) 80 mg/0.8 mL pen injector kit pen-injector Inject 1 Pen (80 mg) under the skin every 14 (fourteen) days. 2 each 5     aspirin 81 mg EC tablet Take 2 tablets (162 mg) by mouth once daily. 180 tablet 1     Boost 0.04 gram- 1 kcal/mL liquid Please dispense 14 bottles for patient to have BID for 7 days. 237 mL 3     famotidine (Pepcid) 20 mg tablet Take 1 tablet (20 mg) by mouth 2 times a day. 60 tablet 2     ondansetron ODT (Zofran-ODT) 4 mg disintegrating tablet Take 1 tablet (4 mg) by mouth every 8 hours if needed for nausea or vomiting. 60 tablet 3     oxyCODONE-acetaminophen (Percocet)  mg tablet Take 1 tablet by mouth every 3 hours if needed for severe pain (7 - 10). 56 tablet 0     -iron fum-folic acid (Prenatal 19) 29 mg iron- 1 mg tablet Take 1 tablet by mouth once daily. 60 tablet 6      Objective     Last Vitals  Temp Pulse Resp BP MAP O2 Sat   36.4 °C (97.5 °F) 85 16 111/66 84 100 %     Blood Pressures         1/17/2025  0230 1/17/2025  0357 1/17/2025  0529       BP: 119/79 117/75 111/66              Physical Exam  General: NAD, mood appropriate  Cardiopulmonary: warm and well perfused, breathing comfortably on room air  Abdomen: Gravid, non-tender  Extremities: Symmetric  Speculum Exam: pooled fluid appearing pink tinged, Nitrazine test is positive, Ferning test is positive  Cervix: 3 /70 /-3      Fetal  Monitoring  Baseline: 135 bpm, Variability: moderate,  Accelerations: present and Decelerations: none  Uterine Activity: Irregular contractions  Interpretation: Reactive    Bedside ultrasound: Yes    Labs in chart were reviewed.   Results from last 7 days   Lab Units 01/17/25  0251 01/11/25  0850   WBC AUTO x10*3/uL 11.8* 11.4*   HEMOGLOBIN g/dL 10.6* 10.6*   HEMATOCRIT % 33.7* 31.1*   PLATELETS AUTO x10*3/uL 296 313   AST U/L  --  17   ALT U/L  --  12   CREATININE mg/dL  --  0.62        PNL reviewed and unremarkable

## 2025-01-17 NOTE — SIGNIFICANT EVENT
S is continue to have issues with pain.  Patient noted to be chronic pain patient and narcotic dependent.  Patient has hidradenitis which is caused a great deal of issues with her in the past.  Patient does take narcotics at home.  Has been seen in triage multiple times requiring IV Dilaudid for treatment.  Patient currently is complaining of pain with contractions.  Monitor does not show any consistent contractions there infrequent.  Patient has requested IV Dilaudid multiple times.  Small doses have been given 0.4 mg and 0.2 mg for now.  [facility]    O vital signs are stable  Fetal heart rate 140-150 positive celebrations no decelerations moderate variability.  Patient often removes the monitor but it is category 1 while it is on.  Some irritability occasional contractions.  Lungs are clear cardiac exam shows normal sinus rhythm  Abdomen is soft without tenderness no rigidity.  No palpable contractions this point  Pelvic examination deferred.  Patient has passed some brownish material.  Reported by nurse to be possibly meconium.  Pelvic examination deferred for now as patient is ruptured not in labor  Extremities without edema or cyanosis no focal neurologic defects or complaints    A premature rupture of membranes at 35.6 weeks  History of chronic pain  History of narcotic dependency  History of hidradenitis    P  [facility]  Maternal-fetal medicine service.

## 2025-01-18 LAB
BASE EXCESS BLDCOA CALC-SCNC: -7.7 MMOL/L (ref -10.8–-0.5)
BASE EXCESS BLDCOV CALC-SCNC: -7.1 MMOL/L (ref -8.1–-0.5)
BODY TEMPERATURE: 37 DEGREES CELSIUS
BODY TEMPERATURE: 37 DEGREES CELSIUS
HCO3 BLDCOA-SCNC: 21.5 MMOL/L (ref 15–29)
HCO3 BLDCOV-SCNC: 18.7 MMOL/L (ref 16–26)
INHALED O2 CONCENTRATION: 21 %
INHALED O2 CONCENTRATION: 21 %
OXYHGB MFR BLDCOA: 50.5 % (ref 94–98)
OXYHGB MFR BLDCOV: 77.2 % (ref 94–98)
PCO2 BLDCOA: 59 MM HG (ref 31–75)
PCO2 BLDCOV: 38 MM HG (ref 22–53)
PH BLDCOA: 7.17 PH (ref 7.08–7.39)
PH BLDCOV: 7.3 PH (ref 7.19–7.47)
PO2 BLDCOA: 29 MM HG (ref 5–31)
PO2 BLDCOV: 39 MM HG (ref 13–37)
SAO2 % BLDCOA: 51 % (ref 3–69)
SAO2 % BLDCOV: 79 % (ref 16–84)

## 2025-01-18 PROCEDURE — 2500000001 HC RX 250 WO HCPCS SELF ADMINISTERED DRUGS (ALT 637 FOR MEDICARE OP): Mod: SE

## 2025-01-18 PROCEDURE — 82805 BLOOD GASES W/O2 SATURATION: CPT | Performed by: STUDENT IN AN ORGANIZED HEALTH CARE EDUCATION/TRAINING PROGRAM

## 2025-01-18 PROCEDURE — 59409 OBSTETRICAL CARE: CPT | Performed by: OBSTETRICS & GYNECOLOGY

## 2025-01-18 PROCEDURE — 7210000002 HC LABOR PER HOUR

## 2025-01-18 PROCEDURE — 2500000004 HC RX 250 GENERAL PHARMACY W/ HCPCS (ALT 636 FOR OP/ED): Mod: SE

## 2025-01-18 PROCEDURE — 7100000016 HC LABOR RECOVERY PER HOUR

## 2025-01-18 PROCEDURE — 1210000001 HC SEMI-PRIVATE ROOM DAILY

## 2025-01-18 PROCEDURE — 2500000005 HC RX 250 GENERAL PHARMACY W/O HCPCS: Mod: SE

## 2025-01-18 PROCEDURE — 59409 OBSTETRICAL CARE: CPT

## 2025-01-18 PROCEDURE — 88305 TISSUE EXAM BY PATHOLOGIST: CPT | Performed by: PATHOLOGY

## 2025-01-18 PROCEDURE — 88305 TISSUE EXAM BY PATHOLOGIST: CPT | Mod: TC,SUR

## 2025-01-18 PROCEDURE — 51701 INSERT BLADDER CATHETER: CPT

## 2025-01-18 RX ORDER — ACETAMINOPHEN 325 MG/1
975 TABLET ORAL EVERY 6 HOURS
Status: DISCONTINUED | OUTPATIENT
Start: 2025-01-18 | End: 2025-01-20

## 2025-01-18 RX ORDER — MISOPROSTOL 200 UG/1
800 TABLET ORAL ONCE AS NEEDED
Status: DISCONTINUED | OUTPATIENT
Start: 2025-01-18 | End: 2025-01-20 | Stop reason: HOSPADM

## 2025-01-18 RX ORDER — NIFEDIPINE 10 MG/1
10 CAPSULE ORAL ONCE AS NEEDED
Status: DISCONTINUED | OUTPATIENT
Start: 2025-01-18 | End: 2025-01-20 | Stop reason: HOSPADM

## 2025-01-18 RX ORDER — LIDOCAINE 560 MG/1
1 PATCH PERCUTANEOUS; TOPICAL; TRANSDERMAL
Status: DISCONTINUED | OUTPATIENT
Start: 2025-01-18 | End: 2025-01-20 | Stop reason: HOSPADM

## 2025-01-18 RX ORDER — ENOXAPARIN SODIUM 100 MG/ML
40 INJECTION SUBCUTANEOUS EVERY 24 HOURS
Status: DISCONTINUED | OUTPATIENT
Start: 2025-01-18 | End: 2025-01-20 | Stop reason: HOSPADM

## 2025-01-18 RX ORDER — METHYLERGONOVINE MALEATE 0.2 MG/ML
0.2 INJECTION INTRAVENOUS ONCE AS NEEDED
Status: DISCONTINUED | OUTPATIENT
Start: 2025-01-18 | End: 2025-01-20 | Stop reason: HOSPADM

## 2025-01-18 RX ORDER — OXYCODONE HYDROCHLORIDE 5 MG/1
5 TABLET ORAL ONCE
Status: COMPLETED | OUTPATIENT
Start: 2025-01-18 | End: 2025-01-18

## 2025-01-18 RX ORDER — DIPHENHYDRAMINE HCL 25 MG
25 CAPSULE ORAL EVERY 6 HOURS PRN
Status: DISCONTINUED | OUTPATIENT
Start: 2025-01-18 | End: 2025-01-19

## 2025-01-18 RX ORDER — OXYTOCIN 10 [USP'U]/ML
10 INJECTION, SOLUTION INTRAMUSCULAR; INTRAVENOUS ONCE AS NEEDED
Status: DISCONTINUED | OUTPATIENT
Start: 2025-01-18 | End: 2025-01-20 | Stop reason: HOSPADM

## 2025-01-18 RX ORDER — OXYCODONE HYDROCHLORIDE 5 MG/1
5 TABLET ORAL ONCE AS NEEDED
Status: DISCONTINUED | OUTPATIENT
Start: 2025-01-18 | End: 2025-01-18

## 2025-01-18 RX ORDER — ADHESIVE BANDAGE
10 BANDAGE TOPICAL
Status: DISCONTINUED | OUTPATIENT
Start: 2025-01-18 | End: 2025-01-20 | Stop reason: HOSPADM

## 2025-01-18 RX ORDER — TRANEXAMIC ACID 100 MG/ML
1000 INJECTION, SOLUTION INTRAVENOUS ONCE AS NEEDED
Status: DISCONTINUED | OUTPATIENT
Start: 2025-01-18 | End: 2025-01-20 | Stop reason: HOSPADM

## 2025-01-18 RX ORDER — HYDRALAZINE HYDROCHLORIDE 20 MG/ML
5 INJECTION INTRAMUSCULAR; INTRAVENOUS ONCE AS NEEDED
Status: DISCONTINUED | OUTPATIENT
Start: 2025-01-18 | End: 2025-01-20 | Stop reason: HOSPADM

## 2025-01-18 RX ORDER — ONDANSETRON HYDROCHLORIDE 2 MG/ML
4 INJECTION, SOLUTION INTRAVENOUS EVERY 6 HOURS PRN
Status: DISCONTINUED | OUTPATIENT
Start: 2025-01-18 | End: 2025-01-20 | Stop reason: HOSPADM

## 2025-01-18 RX ORDER — DIPHENHYDRAMINE HYDROCHLORIDE 50 MG/ML
25 INJECTION INTRAMUSCULAR; INTRAVENOUS EVERY 6 HOURS PRN
Status: DISCONTINUED | OUTPATIENT
Start: 2025-01-18 | End: 2025-01-19 | Stop reason: SDUPTHER

## 2025-01-18 RX ORDER — SIMETHICONE 80 MG
80 TABLET,CHEWABLE ORAL 4 TIMES DAILY PRN
Status: DISCONTINUED | OUTPATIENT
Start: 2025-01-18 | End: 2025-01-20 | Stop reason: HOSPADM

## 2025-01-18 RX ORDER — OXYTOCIN/0.9 % SODIUM CHLORIDE 30/500 ML
60 PLASTIC BAG, INJECTION (ML) INTRAVENOUS ONCE AS NEEDED
Status: DISCONTINUED | OUTPATIENT
Start: 2025-01-18 | End: 2025-01-20 | Stop reason: HOSPADM

## 2025-01-18 RX ORDER — IBUPROFEN 600 MG/1
600 TABLET ORAL EVERY 6 HOURS
Status: DISCONTINUED | OUTPATIENT
Start: 2025-01-18 | End: 2025-01-20 | Stop reason: HOSPADM

## 2025-01-18 RX ORDER — BISACODYL 10 MG/1
10 SUPPOSITORY RECTAL DAILY PRN
Status: DISCONTINUED | OUTPATIENT
Start: 2025-01-18 | End: 2025-01-20 | Stop reason: HOSPADM

## 2025-01-18 RX ORDER — POLYETHYLENE GLYCOL 3350 17 G/17G
17 POWDER, FOR SOLUTION ORAL 2 TIMES DAILY
Status: DISCONTINUED | OUTPATIENT
Start: 2025-01-18 | End: 2025-01-20 | Stop reason: HOSPADM

## 2025-01-18 RX ORDER — LOPERAMIDE HYDROCHLORIDE 2 MG/1
4 CAPSULE ORAL EVERY 2 HOUR PRN
Status: DISCONTINUED | OUTPATIENT
Start: 2025-01-18 | End: 2025-01-20 | Stop reason: HOSPADM

## 2025-01-18 RX ORDER — POLYETHYLENE GLYCOL 3350 17 G/17G
17 POWDER, FOR SOLUTION ORAL 2 TIMES DAILY PRN
Status: DISCONTINUED | OUTPATIENT
Start: 2025-01-18 | End: 2025-01-18

## 2025-01-18 RX ORDER — CARBOPROST TROMETHAMINE 250 UG/ML
250 INJECTION, SOLUTION INTRAMUSCULAR ONCE AS NEEDED
Status: DISCONTINUED | OUTPATIENT
Start: 2025-01-18 | End: 2025-01-20 | Stop reason: HOSPADM

## 2025-01-18 RX ORDER — LABETALOL HYDROCHLORIDE 5 MG/ML
20 INJECTION, SOLUTION INTRAVENOUS ONCE AS NEEDED
Status: DISCONTINUED | OUTPATIENT
Start: 2025-01-18 | End: 2025-01-20 | Stop reason: HOSPADM

## 2025-01-18 RX ORDER — OXYCODONE HYDROCHLORIDE 5 MG/1
5 TABLET ORAL ONCE AS NEEDED
Status: CANCELLED | OUTPATIENT
Start: 2025-01-18

## 2025-01-18 RX ORDER — OXYCODONE AND ACETAMINOPHEN 5; 325 MG/1; MG/1
2 TABLET ORAL EVERY 4 HOURS PRN
Status: DISCONTINUED | OUTPATIENT
Start: 2025-01-18 | End: 2025-01-19

## 2025-01-18 RX ORDER — ONDANSETRON 4 MG/1
4 TABLET, FILM COATED ORAL EVERY 6 HOURS PRN
Status: DISCONTINUED | OUTPATIENT
Start: 2025-01-18 | End: 2025-01-20 | Stop reason: HOSPADM

## 2025-01-18 RX ADMIN — OXYCODONE HYDROCHLORIDE AND ACETAMINOPHEN 2 TABLET: 5; 325 TABLET ORAL at 14:52

## 2025-01-18 RX ADMIN — BENZOCAINE AND LEVOMENTHOL 1 APPLICATION: 200; 5 SPRAY TOPICAL at 06:22

## 2025-01-18 RX ADMIN — IBUPROFEN 600 MG: 600 TABLET, FILM COATED ORAL at 18:52

## 2025-01-18 RX ADMIN — OXYCODONE HYDROCHLORIDE 5 MG: 5 TABLET ORAL at 13:26

## 2025-01-18 RX ADMIN — WITCH HAZEL 1 EACH: 500 SOLUTION RECTAL; TOPICAL at 06:22

## 2025-01-18 RX ADMIN — OXYCODONE HYDROCHLORIDE AND ACETAMINOPHEN 2 TABLET: 5; 325 TABLET ORAL at 23:01

## 2025-01-18 RX ADMIN — POLYETHYLENE GLYCOL 3350 17 G: 17 POWDER, FOR SOLUTION ORAL at 13:26

## 2025-01-18 RX ADMIN — IBUPROFEN 600 MG: 600 TABLET, FILM COATED ORAL at 06:22

## 2025-01-18 RX ADMIN — ENOXAPARIN SODIUM 40 MG: 100 INJECTION SUBCUTANEOUS at 14:52

## 2025-01-18 RX ADMIN — LIDOCAINE 4% 1 PATCH: 40 PATCH TOPICAL at 09:28

## 2025-01-18 RX ADMIN — OXYCODONE HYDROCHLORIDE AND ACETAMINOPHEN 2 TABLET: 5; 325 TABLET ORAL at 18:52

## 2025-01-18 RX ADMIN — OXYCODONE HYDROCHLORIDE AND ACETAMINOPHEN 2 TABLET: 5; 325 TABLET ORAL at 10:36

## 2025-01-18 RX ADMIN — IBUPROFEN 600 MG: 600 TABLET, FILM COATED ORAL at 12:16

## 2025-01-18 RX ADMIN — IBUPROFEN 600 MG: 600 TABLET, FILM COATED ORAL at 23:01

## 2025-01-18 RX ADMIN — OXYCODONE HYDROCHLORIDE AND ACETAMINOPHEN 2 TABLET: 5; 325 TABLET ORAL at 06:22

## 2025-01-18 RX ADMIN — HYDROMORPHONE HYDROCHLORIDE 0.8 MG: 1 INJECTION, SOLUTION INTRAMUSCULAR; INTRAVENOUS; SUBCUTANEOUS at 03:11

## 2025-01-18 ASSESSMENT — PAIN SCALES - GENERAL
PAINLEVEL_OUTOF10: 10 - WORST POSSIBLE PAIN
PAINLEVEL_OUTOF10: 0 - NO PAIN
PAINLEVEL_OUTOF10: 8
PAINLEVEL_OUTOF10: 0 - NO PAIN
PAINLEVEL_OUTOF10: 8
PAINLEVEL_OUTOF10: 0 - NO PAIN
PAINLEVEL_OUTOF10: 0 - NO PAIN
PAINLEVEL_OUTOF10: 6
PAINLEVEL_OUTOF10: 0 - NO PAIN
PAINLEVEL_OUTOF10: 0 - NO PAIN
PAINLEVEL_OUTOF10: 9
PAINLEVEL_OUTOF10: 0 - NO PAIN
PAINLEVEL_OUTOF10: 9
PAINLEVEL_OUTOF10: 0 - NO PAIN
PAINLEVEL_OUTOF10: 9

## 2025-01-18 ASSESSMENT — PAIN DESCRIPTION - DESCRIPTORS
DESCRIPTORS: ACHING;CRAMPING;DISCOMFORT;SORE
DESCRIPTORS: ACHING;CRAMPING;DISCOMFORT;SORE

## 2025-01-18 NOTE — ANESTHESIA POSTPROCEDURE EVALUATION
Patient: Sandrita Adams    Procedure Summary       Date: 25 Room / Location:     Anesthesia Start: 5 Anesthesia Stop: 25    Procedure: Labor Analgesia Diagnosis:     Scheduled Providers:  Responsible Provider: Jaz Flores MD    Anesthesia Type: epidural ASA Status: 3            Anesthesia Type: epidural    Anesthesia Post Evaluation    Patient location during evaluation: bedside  Patient participation: complete - patient participated  Level of consciousness: awake  Pain management: adequate  Airway patency: patent  Cardiovascular status: acceptable  Respiratory status: acceptable  Hydration status: acceptable  Postoperative Nausea and Vomiting: none      Sandrita Adams is a 34 y.o., , who had a Vaginal, Spontaneous delivery on 2025 at 36w1d and is now POD0.    She had Neuraxial Anesthesia without immediate complications noted.       Pain well controlled    Vitals:    25 0843   BP: 102/65   Pulse: 85   Resp: 16   Temp: 36.8 °C (98.2 °F)   SpO2: 95%       Neuraxial site assessed. No visible redness or swelling or drainage. Patient able to ambulate and move all extremities without difficulty. Able to void. No complaints of nausea/vomiting. Tolerating PO intake well. No s/sx of PDPH.     Anesthesia will sign off     Deb Chester MD       No notable events documented.

## 2025-01-18 NOTE — SIGNIFICANT EVENT
Labor Progress Note    Subjective: Reports increased pelvic pressure    Objective:  Vitals: /77   Pulse 83   Temp 36.5 °C (97.7 °F) (Temporal)   Resp 18   LMP 05/18/2024   SpO2 99%   SVE: 8/90/0  FHT: 150/mod/+accel/occasional variable decel  [place]: ctx q 2-3 min    Active Labor  Pitocin paused, making cervical change, will augment if clinically indicated   CEFM, Cat 2 currently though overall reassuring with low suspicion for acid-base disturbance of the fetus given moderate variability and accelerations, continue to monitor.  Epidural infusing    Marly Guerin MD PGY-3  OB/GYN

## 2025-01-18 NOTE — PROGRESS NOTES
Postpartum Progress Note    Assessment/Plan   Sandrita Adams is a 34 y.o., , who delivered at 36w1d gestation and is now postpartum day 0.    Postpartum Care   - Pain controlled as below  - DVT risk score 5, ppx w/ lovenox, SCDs and ambulation  - Rh +, no indication for rhogam  - PPBC: declines  - Breastfeeding/pumping encouraged; lactation consult prn    Hydradenitis Suppurativa   - Multiple flares this pregnancy, was on Humira during pregnancy; previous requirement for IV ertapenem daily > planning to reestablish with dermatology outpatient  - Postpartum pain plan: percocet  q4hr prn, Ibuprofen 600mg q6 hr, plan to transition to methadone one week postpartum with Dr Angulo   - Pt afebrile without concern for acute infection at this time will continue to monitor    Dispo: continue inpatient management until meeting postpartum milestones    Seen and discussed with Dr. Fabián Guerin MD PGY-3      Objective   Allergies:   Suboxone [buprenorphine-naloxone], Clarithromycin, Haloperidol, Keflex [cephalexin], Levofloxacin, Metoclopramide, and Reglan [metoclopramide hcl]         Last Vitals:  Temp Pulse Resp BP MAP Pulse Ox   36.7 °C (98.1 °F) 87 16 103/65 78 96 %     Vitals Min/Max Last 24 Hours:  Temp  Min: 36.1 °C (97 °F)  Max: 36.8 °C (98.2 °F)  Pulse  Min: 67  Max: 163  Resp  Min: 16  Max: 18  BP  Min: 103/65  Max: 154/73  MAP (mmHg)  Min: 71  Max: 97    Intake/Output:     Intake/Output Summary (Last 24 hours) at 2025 0708  Last data filed at 2025 0504  Gross per 24 hour   Intake 250 ml   Output 2262 ml   Net -2012 ml       Physical Exam:  General: no acute distress  HEENT: normocephalic, atraumatic  Heart: warm and well perfused  Lungs: breathing comfortably on room air  Abdomen: soft, fundus firm and below umbilicus  Extremities: moving all extremities  Neuro: awake and conversant  Psych: appropriate mood and affect    Lab Data:  Labs in chart were reviewed.

## 2025-01-18 NOTE — SIGNIFICANT EVENT
Labor Progress Note    Subjective: Reports increased pelvic pressure    Objective:  Vitals: /81   Pulse 83   Temp 36.5 °C (97.7 °F) (Temporal)   Resp 18   LMP 05/18/2024   SpO2 99%   SVE: 6/90/-1  FHT: 150/mod/+accel/-decel  [place]: ctx 2-4 min    A/P:  Pitocin paused, making cervical change, will augment if clinically indicated  CEFM, currently Category I  Epidural infusing    Marly Guerin MD PGY-3  OB/GYN

## 2025-01-18 NOTE — L&D DELIVERY NOTE
Vaginal Delivery Note    Patient Name: Sandrita Adams  : 1990  MRN: [id number]  Age: 34 y.o.    /Para:   Gestational Age: 36w1d    Date of Delivery: 2025    Procedure: Normal Spontaneous Vaginal Delivery    Delivery Provider: Dr. Armenta    Resident/Fellow/Other Assistant: Marly Guerin MD and Silvano Palmer MD    Description of Procedure:  Delivery of viable infant under epidural anesthesia. Delayed clamping was performed. The infant was placed skin to skin.. Cord gases were sent.  Cord blood was collected. Placenta delivered intact and fundus was firm    Left labial Laceration identified and repaired.    Complications: None    Quantitative Blood Loss:   Delivery Blood Loss   Intrapartum & Postpartum: 25 1425 - 25 0300    Delivery Admission: 25 0219 - 25 0300         Intrapartum & Postpartum Delivery Admission    None               Blood products:  None    Uterotonics/Hemostatic Agent: IV Pitocin 30 units    Specimen:   Placenta  Delivered: 2025  2:28 AM  Appearance: Intact  Removal: Spontaneous    Disposition: pathology    Sponge/Instrument/Needle Counts: The sponge, lap and needle counts were correct.    Patient Disposition: Patient recovering on labor and delivery in stable condition.    Additional Procedures:  None    Leonardo Adams [[id number]]      Labor Events    Rupture type: Spontaneous  Fluid color: Clear, Bloody  Fluid odor: None  Labor type: Spontaneous Onset of Labor  Labor allowed to proceed with plans for an attempted vaginal birth?: Yes  Augmentation: Oxytocin  Augmentation date/time: 2025 1250  Augmentation indications: Ineffective Contraction Pattern  Complications: None       Labor Event Times    Labor onset date/time: 2025 1250  Dilation complete date/time: 2025 0129  Start pushing date/time: 2025 0135       Labor Length    1st stage: 12h 39m  2nd stage: 0h 56m  3rd stage: 0h 03m       Placenta    Placenta  delivery date/time: 2025 0228  Placenta removal: Spontaneous  Placenta appearance: Intact  Placenta disposition: pathology       Cord    Vessels: 3 vessels  Complications: None  Delayed cord clamping?: Yes  Cord clamped date/time: 2025 02:26:00  Cord blood disposition: Lab  Gases sent?: Yes  Stem cell collection (by provider): No       Lacerations    Episiotomy: None  Perineal laceration: None  Labial laceration?: Yes  Labial laceration location: left  Labial laceration repaired?: Yes  Repair suture: 3-0 Synthetic Suture       Anesthesia    Method: Epidural       Operative Delivery    Forceps attempted?: No  Vacuum extractor attempted?: No       Shoulder Dystocia    Shoulder dystocia present?: No       [place] Delivery    Time head delivered: 2025 02:24:00  Birth date/time: 2025 02:25:00  Delivery type: Vaginal, Spontaneous  Complications: None       Resuscitation    Method: Suctioning, Tactile stimulation       Apgars    Living status: Living  Apgar Component Scores:  1 min.:  5 min.:  10 min.:  15 min.:  20 min.:    Skin color:  1  1       Heart rate:  2  2       Reflex irritability:  2  2       Muscle tone:  2  2       Respiratory effort:  2  2       Total:  9  9       Apgars assigned by: ZHENG RIVAS       Delivery Providers    Delivering clinician: Leila Armenta MD   Provider Role    Lluvia Bingham RN Delivery Nurse    Hussein Moore, RN Nursery Nurse     Resident

## 2025-01-18 NOTE — CARE PLAN
The patient's goals for the shift include Get some sleep    The clinical goals for the shift include adequate pain control    Patient ambulating and voiding with no complaints. Assessment per flowsheet. Pain medication given per MAR. Patient has no unanswered questions or concerns at this time.

## 2025-01-18 NOTE — LACTATION NOTE
This note was copied from a baby's chart.  Lactation Consultant Note  Lactation Consultation  Reason for Consult: Initial assessment, Late  infant  Consultant Name: JOHN Gupta RN IBCLC    Maternal Information  Has mother  before?: No  Infant to breast within first 2 hours of birth?: No  Exclusive Pump and Bottle Feed: Yes    Maternal Assessment  Breast Assessment: Medium, Symmetrical, Soft, Compressible  Nipple Assessment: Intact, Erect  Areola Assessment: Normal    Infant Assessment  Infant Behavior: Active alert, Readiness to feed, Feeding cues observed, Rooting response, Sucking  Infant Assessment: Good cupping of tongue, Palate - high/arch/bubble/normal    Feeding Assessment  Nutrition Source: Breastmilk, Formula (per mother’s request)  Feeding Method: Nursing at the breast, Paced bottle  Feeding Position: Cross - cradle, Skin to skin, One side per feeding, Nipple to nose, Mother needs assistance with latch/positioning, Nose lightly touching breast, Chin tucked into breast  Suck/Feeding: Sustained, Coordinated suck/swallow/breathe, Baby led rhythmically  Latch Assessment: Moderate assistance is needed, Instructed on deep latch, Eagerly grasped on to latch, Areolar attachment, Deep latch obtained, Optimal angle of mouth opening, Sucking and swallowing, Chin and lower lip contact breast first, Flanged lips, Chin moves in rhythmic motion, Bursts of sucking, swallowing, and rest    LATCH TOOL  Latch: Grasps breast, tongue down, lips flanged, rhythmic sucking  Audible Swallowing: A few with stimulation  Type of Nipple: Everted (After stimulation)  Comfort (Breast/Nipple): Soft/non-tender  Hold (Positioning): Minimal assist, teach one side, mother does other, staff holds  LATCH Score: 8    Breast Pump       Other OB Lactation Tools       Patient Follow-up  Inpatient Lactation Follow-up Needed : Yes    Other OB Lactation Documentation  Maternal Risk Factors: Age over 30, primiparity,  delivery  "<37 weeks  Infant Risk Factors: Prematurity <37 weeks, Prelacteal feeds, Low birth weight <2500 g (prenatal exposure to opioids)    Recommendations/Summary    After briefly with this mother earlier during the day, the mother called out for assistance with latching her infant to the breast. The infant was about 13 hours old. He had received several formula bottles and one breastfeeding. The infant had remained at the breast for about 30 minutes, however, the mother described the feeding as painful. She described the pain as \"pinching\" and rated it as a \"8\" on a scale out of 0-10. I encouraged the mother use skin to skin for breastfeeding. We used a right cross-cradle hold with pillow support for latching. The mother was instructed on correct hand positions for supporting the breast and infant's head, proper alignment of the infant's body, and positioning the infant's nose opposite the maternal nipple. After a few attempts, the infant latched deeply with well-flanged lips, rhythmic sucking and intermittent swallowing. The mother described the latch as painful. After further questioning, she denied  any pinching and thought that it was a painful tug. The infant's father, who was present throughout, described the mother as having sensitive nipples. At 10 minutes into the feeding session, the mother requested a bottle and asked for assistance in removing the infant from the breast. The mother states that she would like to begin pumping to establishing her milk supply. I asked her RN to begin pumping with the mother. A breast pump has been ordered for the mother from [employer]. She would like a \"hands-free\" pump. She was given written information on outpatient lactation services.  "

## 2025-01-18 NOTE — PROGRESS NOTES
Intrapartum Progress Note    Assessment/Plan   Sandrita Adams is a 34 y.o.  at 36w0d, LUIS MIGUEL: 2025, by 8w Ultrasound admitted for PPROM admitted for labor.     Labor   Pitocin paused due to patient request, now contraction q2 mins, will restart pitocin as clinically indicated  Pain management: epidural infusing and 0.8mg dilaudid q4 hours with continuous pulse ox after extensive discussion and shared decision making with patient, obstetric and anesthesia team  KENJI, currently Category I  GBS: unknown, on PCN   Next exam: Will recheck as clinically indicated by maternal or fetal status or per pt request     Hydradenitis Suppurativa   Pt without any new lesions noted on admission exam, only current new lesion noted in the L ear canal will consult derm postpartum   Pain control as above   Pt afebrile without concern for acute infection at this time will monitor   Multiple flares this pregnancy, currently on Humira; previous requirement for IV ertapenem daily    Pregnancy notables   HS see above  Asthma moderate persistent, on flonase and prn albuterol  AMA  IPV s/p event  with FOB, has new home alone    Seen and discussed with Dr. Kathi Guerin MD PGY-3  OBGYN    Subjective   Pt reports increased pelvic pressure    Objective   Last Vitals:  Temp Pulse Resp BP MAP Pulse Ox   36.7 °C (98.1 °F) 79 17 111/67   99 %     Vitals Min/Max Last 24 Hours:  Temp  Min: 36.2 °C (97.2 °F)  Max: 36.7 °C (98.1 °F)  Pulse  Min: 67  Max: 101  Resp  Min: 16  Max: 18  BP  Min: 103/57  Max: 154/73    Intake/Output:    Intake/Output Summary (Last 24 hours) at 2025  Last data filed at 2025 1631  Gross per 24 hour   Intake 250 ml   Output 1200 ml   Net -950 ml       Physical Examination:  General: no acute distress   Heart: warm and well perfused  Lungs: breathing comfortably on room air  Abdomen: gravid  Extremities: moving all extremities  Neuro: awake and conversant  Psych: appropriate mood and  affect    SVE 4/90/-2, forebag ruptured  FHT: 150/mod/+accel/-decel  [place]: ctx q2-3 min

## 2025-01-19 VITALS
OXYGEN SATURATION: 99 % | RESPIRATION RATE: 17 BRPM | DIASTOLIC BLOOD PRESSURE: 60 MMHG | SYSTOLIC BLOOD PRESSURE: 93 MMHG | TEMPERATURE: 96.8 F | HEART RATE: 68 BPM

## 2025-01-19 LAB — GP B STREP GENITAL QL CULT: NORMAL

## 2025-01-19 PROCEDURE — 99232 SBSQ HOSP IP/OBS MODERATE 35: CPT

## 2025-01-19 PROCEDURE — 2500000001 HC RX 250 WO HCPCS SELF ADMINISTERED DRUGS (ALT 637 FOR MEDICARE OP): Mod: SE

## 2025-01-19 PROCEDURE — 2500000004 HC RX 250 GENERAL PHARMACY W/ HCPCS (ALT 636 FOR OP/ED): Mod: SE

## 2025-01-19 PROCEDURE — 1210000001 HC SEMI-PRIVATE ROOM DAILY

## 2025-01-19 RX ORDER — SUMATRIPTAN SUCCINATE 50 MG/1
50 TABLET ORAL ONCE
Status: COMPLETED | OUTPATIENT
Start: 2025-01-19 | End: 2025-01-19

## 2025-01-19 RX ORDER — SUMATRIPTAN 6 MG/.5ML
6 INJECTION, SOLUTION SUBCUTANEOUS ONCE
Status: DISCONTINUED | OUTPATIENT
Start: 2025-01-19 | End: 2025-01-19

## 2025-01-19 RX ORDER — OXYCODONE AND ACETAMINOPHEN 10; 325 MG/1; MG/1
1 TABLET ORAL EVERY 4 HOURS PRN
Status: DISCONTINUED | OUTPATIENT
Start: 2025-01-19 | End: 2025-01-19

## 2025-01-19 RX ORDER — DIPHENHYDRAMINE HCL 25 MG
25 CAPSULE ORAL EVERY 6 HOURS PRN
Status: DISCONTINUED | OUTPATIENT
Start: 2025-01-19 | End: 2025-01-20 | Stop reason: HOSPADM

## 2025-01-19 RX ORDER — OXYCODONE AND ACETAMINOPHEN 5; 325 MG/1; MG/1
2 TABLET ORAL EVERY 4 HOURS PRN
Status: DISCONTINUED | OUTPATIENT
Start: 2025-01-19 | End: 2025-01-20 | Stop reason: HOSPADM

## 2025-01-19 RX ADMIN — OXYCODONE HYDROCHLORIDE AND ACETAMINOPHEN 2 TABLET: 5; 325 TABLET ORAL at 11:42

## 2025-01-19 RX ADMIN — OXYCODONE HYDROCHLORIDE AND ACETAMINOPHEN 2 TABLET: 5; 325 TABLET ORAL at 20:23

## 2025-01-19 RX ADMIN — HYDROXYZINE HYDROCHLORIDE 25 MG: 25 TABLET ORAL at 17:49

## 2025-01-19 RX ADMIN — OXYCODONE HYDROCHLORIDE AND ACETAMINOPHEN 2 TABLET: 5; 325 TABLET ORAL at 03:07

## 2025-01-19 RX ADMIN — ENOXAPARIN SODIUM 40 MG: 100 INJECTION SUBCUTANEOUS at 15:45

## 2025-01-19 RX ADMIN — POLYETHYLENE GLYCOL 3350 17 G: 17 POWDER, FOR SOLUTION ORAL at 20:41

## 2025-01-19 RX ADMIN — IBUPROFEN 600 MG: 600 TABLET, FILM COATED ORAL at 12:32

## 2025-01-19 RX ADMIN — OXYCODONE HYDROCHLORIDE AND ACETAMINOPHEN 2 TABLET: 5; 325 TABLET ORAL at 15:45

## 2025-01-19 RX ADMIN — OXYCODONE HYDROCHLORIDE AND ACETAMINOPHEN 2 TABLET: 5; 325 TABLET ORAL at 06:58

## 2025-01-19 RX ADMIN — IBUPROFEN 600 MG: 600 TABLET, FILM COATED ORAL at 18:36

## 2025-01-19 RX ADMIN — IBUPROFEN 600 MG: 600 TABLET, FILM COATED ORAL at 06:59

## 2025-01-19 RX ADMIN — POLYETHYLENE GLYCOL 3350 17 G: 17 POWDER, FOR SOLUTION ORAL at 08:37

## 2025-01-19 RX ADMIN — SUMATRIPTAN SUCCINATE 50 MG: 50 TABLET ORAL at 18:49

## 2025-01-19 ASSESSMENT — PAIN SCALES - GENERAL
PAINLEVEL_OUTOF10: 8
PAINLEVEL_OUTOF10: 9
PAINLEVEL_OUTOF10: 2
PAINLEVEL_OUTOF10: 7
PAINLEVEL_OUTOF10: 7
PAINLEVEL_OUTOF10: 8
PAINLEVEL_OUTOF10: 0 - NO PAIN
PAINLEVEL_OUTOF10: 3
PAINLEVEL_OUTOF10: 8
PAINLEVEL_OUTOF10: 4
PAINLEVEL_OUTOF10: 9
PAINLEVEL_OUTOF10: 4
PAINLEVEL_OUTOF10: 3

## 2025-01-19 ASSESSMENT — PAIN DESCRIPTION - DESCRIPTORS
DESCRIPTORS: ACHING
DESCRIPTORS: SHARP
DESCRIPTORS: ACHING
DESCRIPTORS: ACHING
DESCRIPTORS: SHARP
DESCRIPTORS: SHARP
DESCRIPTORS: ACHING
DESCRIPTORS: ACHING
DESCRIPTORS: SHARP
DESCRIPTORS: SHARP

## 2025-01-19 ASSESSMENT — PAIN DESCRIPTION - ORIENTATION: ORIENTATION: RIGHT;LEFT

## 2025-01-19 NOTE — CARE PLAN
"Problem: Postpartum  Goal: Experiences normal postpartum course  Outcome: Progressing  Goal: Appropriate maternal -  bonding  Outcome: Progressing  Goal: Establish and maintain infant feeding pattern for adequate nutrition  Outcome: Progressing  Goal: Incisions, wounds, or drain sites healing without S/S of infection  Outcome: Progressing  Goal: No s/sx infection  Outcome: Progressing  Goal: No s/sx of hemorrhage  Outcome: Progressing     Problem: Pain  Goal: Walks with improved pain control throughout the shift  Outcome: Progressing  Goal: Performs ADL's with improved pain control throughout shift  Outcome: Progressing     Patient progressing towards all goals as planned. Pt voiding and ambulating independently. Patient plans to breastfeed and formula feed baby. Pt in agreement with plan of care.     The patient's goals for the shift include \"to rest\"    The clinical goals for the shift include Maintain BP <160/110        "

## 2025-01-19 NOTE — PROGRESS NOTES
Postpartum Progress Note    Subjective   Denies HA, vision changes, SOB, CP, or RUQ pain. Endorsing back pain and vaginal pain but overall moderately controlled. Bleeding light. Voiding and passing Bms. Passing gas. Ambulating well.     Assessment/Plan   Sandrita Adams is a 34 y.o., , who delivered at 36w1d gestation and is now postpartum day 1.    Postpartum Care   - Pain controlled as below  - DVT risk score 5, ppx w/ lovenox, SCDs and ambulation  - Rh +, no indication for rhogam  - PPBC: declines  - Breastfeeding/pumping encouraged; lactation consult prn    Hydradenitis Suppurativa   - Multiple flares this pregnancy, was on Humira during pregnancy; previous requirement for IV ertapenem daily > planning to reestablish with dermatology outpatient  - Postpartum pain plan: percocet  q4hr prn, Ibuprofen 600mg q6 hr, plan to transition to methadone one week postpartum with Dr Angulo   - Pt afebrile without concern for acute infection at this time will continue to monitor    Gestational Hypertension  - dx by MRBP > 4 hrs apart  - normotensive here  - asx  - HELLP labs neg x1  - patient has BP cuff at home    Pregnancy notables:   - Asthma moderate persistent, on flonase and prn albuterol  - AMA  - IPV s/p event  with FOB, has new home alone    Dispo: continue inpatient management until meeting postpartum milestones    Seen and discussed with Dr. Fabián Lora MD PGY-1      Objective   Allergies:   Suboxone [buprenorphine-naloxone], Clarithromycin, Haloperidol, Keflex [cephalexin], Levofloxacin, Metoclopramide, and Reglan [metoclopramide hcl]         Last Vitals:  Temp Pulse Resp BP MAP Pulse Ox   36.7 °C (98.1 °F) 95 17 138/83 101 98 %     Vitals Min/Max Last 24 Hours:  Temp  Min: 36 °C (96.8 °F)  Max: 37.1 °C (98.8 °F)  Pulse  Min: 77  Max: 95  Resp  Min: 16  Max: 18  BP  Min: 91/56  Max: 138/83  MAP (mmHg)  Min: 68  Max: 101    Intake/Output:   No intake or output data in the 24 hours ending  01/19/25 0721      Physical Exam:  General: no acute distress  HEENT: normocephalic, atraumatic  Heart: warm and well perfused, RRR  Lungs: breathing comfortably on room air, CTAB  Abdomen: soft, fundus firm and below umbilicus  Extremities: moving all extremities  Neuro: awake and conversant  Psych: appropriate mood and affect    Lab Data:  Labs in chart were reviewed.

## 2025-01-19 NOTE — CARE PLAN
Problem: Postpartum  Goal: Experiences normal postpartum course  Outcome: Progressing  Goal: Appropriate maternal -  bonding  Outcome: Progressing  Goal: Establish and maintain infant feeding pattern for adequate nutrition  Outcome: Progressing  Goal: Incisions, wounds, or drain sites healing without S/S of infection  Outcome: Progressing  Goal: No s/sx infection  Outcome: Progressing  Goal: No s/sx of hemorrhage  Outcome: Progressing     Problem: Pain  Goal: Walks with improved pain control throughout the shift  Outcome: Progressing  Goal: Performs ADL's with improved pain control throughout shift  Outcome: Progressing    Patient's VS and assessment stable during this shift. Patient's pain has improved throughout shift. She was rating her pain at 15/10 this morning and is now rating it 8-9/10. She is requesting Percocet q4 hours. Also given Motrin q6 as well as Lidocaine patch for c/o back pain. Encouraged heat packs and hot showers. Patient has been participating in care of infant and resting in between. Will continue to monitor.

## 2025-01-19 NOTE — LACTATION NOTE
Lactation Consultant Note  Lactation Consultation       Maternal Information       Maternal Assessment       Infant Assessment       Feeding Assessment       LATCH TOOL       Breast Pump       Other OB Lactation Tools       Patient Follow-up       Other OB Lactation Documentation       Recommendations/Summary  Mom resting at this time.   She has been feeding the baby formula and stated the last time she pumped was yesterday.   I reviewed the importance of 8-12 stimulations in a 24 hour period for establishing a good long term milk supply. Mom verbalized she is tired and hasn't felt up to pumping recently.   Offered to assist with pumping but, mom stated she was resting and that she hasn't slept much. She deferred wanting to pump at this time.     Encouraged her to call for assistance from her bedside RN if needed later in the day.     Denies any questions at this time.

## 2025-01-19 NOTE — SIGNIFICANT EVENT
Called to bedside by RN as patient is having back pain. Patient reports she is having back pain at site of epidural. Also notes vaginal pain and mild burning. Notes her HS pain is consistent with previously. She has tried a lidocaine patch for her back pain which she didn't find helpful. Also tried a heating pad without relief. On exam, skin appears normal without bruising or other lesions, mildly tender to palpation.     -Patient due for next dose of Percocet at 2300  -Recently received IBU, next dose due at 0100   -Discussed use of ice pack instead of heat pack. Encouraged to lie on side instead of directly on back when able. Discussed can try lidocaine gel in place of patch if desired. Patient would like to defer at this time given she didn't feel the patch helped.   -Discussed use of rita bottle for  vaginal pain. Reassured that vaginal pain is normal after delivery and will improve with time.     Mouna Parker MD  PGY1, Obstetrics and Gynecology

## 2025-01-19 NOTE — PROGRESS NOTES
NETTIEK met with patient. Consult placed for risk screen. ROCKY reviewed chart; Patient seen by social work multiple times during admission in October.     Patient delivered a baby boy named Tano on 1/18/25. Patient states she and baby are doing well. She appears to be bonding appropriately.     Patient states she resides at [address] [place] [address]. Patient states she lives alone. She denies any concerns for her safety.     Patient states she will add Tano to her [facility] plan. SWRK discussed how to go about doing this. Patient made aware that she can use the crib card as proof of birth. Patient reports she is connected with [employer]. Patient also aware to add Tano to food stamps once he goes home.     Patient states she has a car seat and that father bought a pack n play but it will not be here until Friday. SWRK offered baby box if pack n play does not arrive before Tano goes home. SWRK also provided list of baby resources/supplies. SWRK discussed safe sleep.     SWRK discussed PPD with patient and provided list of local MH agencies if patient is interested. Patient denies any concerns at this time.    Seen and cleared by social work.     ROSEANN Wilcox  Ext [phone] [facility]

## 2025-01-20 ENCOUNTER — TELEPHONE (OUTPATIENT)
Dept: DERMATOLOGY | Facility: CLINIC | Age: 35
End: 2025-01-20
Payer: MEDICAID

## 2025-01-20 ENCOUNTER — PHARMACY VISIT (OUTPATIENT)
Dept: PHARMACY | Facility: CLINIC | Age: 35
End: 2025-01-20
Payer: MEDICAID

## 2025-01-20 ENCOUNTER — APPOINTMENT (OUTPATIENT)
Dept: OBSTETRICS AND GYNECOLOGY | Facility: CLINIC | Age: 35
End: 2025-01-20
Payer: MEDICAID

## 2025-01-20 VITALS
TEMPERATURE: 99 F | DIASTOLIC BLOOD PRESSURE: 78 MMHG | HEART RATE: 83 BPM | RESPIRATION RATE: 18 BRPM | HEIGHT: 61 IN | OXYGEN SATURATION: 99 % | WEIGHT: 173.94 LBS | BODY MASS INDEX: 32.84 KG/M2 | SYSTOLIC BLOOD PRESSURE: 132 MMHG

## 2025-01-20 PROCEDURE — RXMED WILLOW AMBULATORY MEDICATION CHARGE

## 2025-01-20 PROCEDURE — 2500000001 HC RX 250 WO HCPCS SELF ADMINISTERED DRUGS (ALT 637 FOR MEDICARE OP): Mod: SE

## 2025-01-20 PROCEDURE — 99239 HOSP IP/OBS DSCHRG MGMT >30: CPT

## 2025-01-20 RX ORDER — OXYCODONE AND ACETAMINOPHEN 5; 325 MG/1; MG/1
2 TABLET ORAL EVERY 4 HOURS PRN
Qty: 42 TABLET | Refills: 0 | Status: SHIPPED | OUTPATIENT
Start: 2025-01-20 | End: 2025-01-20 | Stop reason: DRUGHIGH

## 2025-01-20 RX ORDER — IBUPROFEN 600 MG/1
600 TABLET ORAL EVERY 6 HOURS
Qty: 28 TABLET | Refills: 0 | Status: SHIPPED | OUTPATIENT
Start: 2025-01-20 | End: 2025-01-27

## 2025-01-20 RX ORDER — ACETAMINOPHEN 325 MG/1
650 TABLET ORAL EVERY 6 HOURS
Status: DISCONTINUED | OUTPATIENT
Start: 2025-01-20 | End: 2025-01-20

## 2025-01-20 RX ORDER — OXYCODONE AND ACETAMINOPHEN 10; 325 MG/1; MG/1
1 TABLET ORAL
Qty: 56 TABLET | Refills: 0 | Status: SHIPPED | OUTPATIENT
Start: 2025-01-20 | End: 2025-01-24 | Stop reason: WASHOUT

## 2025-01-20 RX ORDER — AMOXICILLIN 250 MG
2 CAPSULE ORAL 2 TIMES DAILY PRN
Qty: 30 TABLET | Refills: 1 | Status: SHIPPED | OUTPATIENT
Start: 2025-01-20

## 2025-01-20 RX ADMIN — OXYCODONE HYDROCHLORIDE AND ACETAMINOPHEN 2 TABLET: 5; 325 TABLET ORAL at 01:30

## 2025-01-20 RX ADMIN — OXYCODONE HYDROCHLORIDE AND ACETAMINOPHEN 2 TABLET: 5; 325 TABLET ORAL at 06:44

## 2025-01-20 RX ADMIN — IBUPROFEN 600 MG: 600 TABLET, FILM COATED ORAL at 01:28

## 2025-01-20 RX ADMIN — IBUPROFEN 600 MG: 600 TABLET, FILM COATED ORAL at 07:04

## 2025-01-20 RX ADMIN — OXYCODONE HYDROCHLORIDE AND ACETAMINOPHEN 2 TABLET: 5; 325 TABLET ORAL at 10:47

## 2025-01-20 RX ADMIN — IBUPROFEN 600 MG: 600 TABLET, FILM COATED ORAL at 12:49

## 2025-01-20 ASSESSMENT — PAIN SCALES - PAIN ASSESSMENT IN ADVANCED DEMENTIA (PAINAD): TOTALSCORE: MEDICATION (SEE MAR)

## 2025-01-20 ASSESSMENT — PAIN SCALES - GENERAL
PAINLEVEL_OUTOF10: 8
PAINLEVEL_OUTOF10: 9
PAINLEVEL_OUTOF10: 8

## 2025-01-20 ASSESSMENT — PAIN DESCRIPTION - DESCRIPTORS
DESCRIPTORS: SORE
DESCRIPTORS: SHARP

## 2025-01-20 ASSESSMENT — PAIN DESCRIPTION - LOCATION
LOCATION: GROIN
LOCATION: GROIN

## 2025-01-20 NOTE — DISCHARGE SUMMARY
Discharge Summary    Admission Date: 2025  Discharge Date: 2025    Discharge Diagnosis   premature rupture of membranes (PPROM) with unknown onset of labor ([facility])    Hospital Course  Sandrita Adams is a 34 y.o. G2 now  who is PPD#2 s/p  at 36.1 wga for PPROM at 36.0 wga. Patient was admitted on 25. See L&D note for delivery details. Patient's pregnancy was complicated by known Hydradenitis Suppurative with multiple flares this pregnancy that was managed with Humira and adjunctive measures with Dermatology, most recently receiving IV Kentalog for pain control ( and ). Pt postpartum course was complicated by a new diagnosis of gHTN and HS pain that was managed on her outpatient pain  regimen of scheduled Percocet 5-325mg two tablets q4hr prn. HELLP labs neg x1. Pt blood pressure remained normotensive >24 hrs and she was medically stable for discharge home on PPD#2. Patient plan to follow up with Dr. Elizalde to restart Cosentyx for HS treatment. Patient also plans to follow up outpatient with established [facility] Pain specialist. Will arrange for outpatient follow up with [facility] provider, Dr Mccann on  and blood pressure check. Prescription for Percocet 5-325mg two tablets q4hr prn x 7 days was sent at discharge.     Contraception plan:  abstinence     Pertinent Physical Exam At Time of Discharge  General: no acute distress  HEENT: normocephalic, atraumatic  Heart: warm and well perfused, RRR  Lungs: breathing comfortably on room air, CTAB  Abdomen: soft, fundus firm and below umbilicus  Extremities: moving all extremities  Neuro: awake and conversant  Psych: appropriate mood and affect    Last Vitals:  Temp Pulse Resp BP MAP Pulse Ox   37.2 °C (99 °F) 83 18 132/78 96 99 %     Discharge Meds     Your medication list        START taking these medications        Instructions Last Dose Given Next Dose Due   ibuprofen 600 mg tablet      Take 1 tablet (600 mg) by mouth every 6  hours for 7 days.       oxyCODONE-acetaminophen 5-325 mg tablet  Commonly known as: Percocet  Replaces: oxyCODONE-acetaminophen  mg tablet      Take 2 tablets by mouth every 4 hours if needed for severe pain (7 - 10) for up to 7 days.       sennosides-docusate sodium 8.6-50 mg tablet  Commonly known as: Kathleen-Colace      Take 2 tablets by mouth 2 times a day as needed for constipation.              CONTINUE taking these medications        Instructions Last Dose Given Next Dose Due   aspirin 81 mg EC tablet      Take 2 tablets (162 mg) by mouth once daily.       Boost 0.04 gram- 1 kcal/mL liquid  Generic drug: food supplemt, lactose-reduced      Please dispense 14 bottles for patient to have BID for 7 days.       famotidine 20 mg tablet  Commonly known as: Pepcid      Take 1 tablet (20 mg) by mouth 2 times a day.       Humira(CF) Pen Crohns-UC-HS 80 mg/0.8 mL pen injector kit pen-injector  Generic drug: adalimumab      Inject 2 pens (160mg) under the skin on day 0, then 1 pen (80mg) on day 15       Humira(CF) Pen 80 mg/0.8 mL pen injector kit pen-injector  Generic drug: adalimumab      Inject 1 Pen (80 mg) under the skin every 14 (fourteen) days.       ondansetron ODT 4 mg disintegrating tablet  Commonly known as: Zofran-ODT      Take 1 tablet (4 mg) by mouth every 8 hours if needed for nausea or vomiting.       Prenatal 19 29 mg iron- 1 mg tablet  Generic drug: -iron fum-folic acid      Take 1 tablet by mouth once daily.              STOP taking these medications      oxyCODONE-acetaminophen  mg tablet  Commonly known as: Percocet  Replaced by: oxyCODONE-acetaminophen 5-325 mg tablet                  Where to Get Your Medications        These medications were sent to [facility]  [address], [address], [place] [address]      Hours: 8AM to 6PM Mon-Fri, 8AM to 4PM Sat, 9AM to 1PM Sun Phone: [phone]   ibuprofen 600 mg tablet  oxyCODONE-acetaminophen 5-325 mg  tablet  sennosides-docusate sodium 8.6-50 mg tablet          Complications Requiring Follow-Up  -Hidradenitis Suppurative   -postpartum care   -gestational hypertension     Test Results Pending At Discharge  Pending Labs       Order Current Status    Surgical Pathology Exam - [facility]AC[facility]A In process            Outpatient Follow-Up  Future Appointments   Date Time Provider Department Center   1/20/2025  3:00 PM Dharmesh Mccann MD [id number]   1/20/2025  3:30 PM [facility] [id number] OBGYNIMG ULTRASOUND 3 [id number]   6/25/2025  8:45 AM Spenser Elizalde MD [id number]       Seen and d/w Dr. Lockwood,     Madelaine Moya MD, PGY-3

## 2025-01-20 NOTE — CARE PLAN
The patient's goals for the shift include sleep    The clinical goals for the shift include maintain BPs <160/110    Over the shift, the patient was able to rest.  Her vs and assessment were WNL.

## 2025-01-20 NOTE — LACTATION NOTE
Lactation Consultant Note  Lactation Consultation  Reason for Consult: Follow-up assessment  Consultant Name: Hui Timmons, RN, IBCLC    Maternal Information  Has mother  before?: No  Infant to breast within first 2 hours of birth?: Yes    Maternal Assessment  Breast Assessment: Medium, Symmetrical, Filling, Firm, Hard, Plugged duct(s), Lump (indicate location) (knots in upper breast)  Nipple Assessment: Intact, Erect with stimulation  Areola Assessment: Normal    Infant Assessment  Infant Behavior: Sleepy  Infant Assessment:  (d/f)    Feeding Assessment  Nutrition Source: Breastmilk, Formula (per mother’s request)  Feeding Method: Feeding expressed breastmilk, Paced bottle    LATCH TOOL       Breast Pump  Pump: Hospital grade electric pump  Frequency: 8-10 times per day  Duration: Initiate phase  Volume of Milk Production: 15 (on left side during one sessioon)    Other OB Lactation Tools  Lactation Tools: Flanges, Lanolin    Patient Follow-up  Inpatient Lactation Follow-up Needed : No  Outpatient Lactation Follow-up: Recommended    Other OB Lactation Documentation  Maternal Risk Factors: Age over 30, primiparity,  delivery <37 weeks, Hypertension, Prolonged labor  Infant Risk Factors: Prematurity <37 weeks    Recommendations/Summary  LC in room for follow up visit. Baby is  born at 36 and 1 days. Baby is deeply sleeping in basinette when LC arrived. Mom wanting to pump and is requesting help. Mom is day 2 PP and has not been frequently pumping. Mom did try a few attempts to DBF baby but reports that baby has been sleepy. Baby is also on ESC protocol from mom being on oxycodone for pain management. Mom has been putting baby STS often. Pediatrician came into room during consult and LC was able to discuss feeding plan. Per Peds since mom is on oxycodone for management she is able to still DBF or give baby pumped milk, however, once milk supply comes in in the next few days it is recommended that  mom give baby half of what she pumped out to help with withdrawal symptoms. Mom's breasts are hard with a few knots of each side. LC recommended mom pump once side at a time so she can use the other hand to provide heated massage. LC sized mom's nipples to be 18mm - 21mm flange used. LC stayed at bedside during 1 pumping session. Mom pumped out 15mls of yellow/white colostrum. LC encouraged mom to do the other side to help with relief. LC also encouraged mom to apply ice packs for 10 minutes afterwards to help with inflammation. LC encouraged mom to continue taking scheduled ibuprofen. LC encouraged mom to pump every 3 hours using initiate phase. LC reviewed pump and how to use, milk storage and pump cleaning.   Mom states she is going to be starting a new med called Cosentyx and she is unsure if she is able to breastfeed with this new med. Peds is going to look into this. Cosentyx is a L3 med and there is not much information on breastfeeding while using this med.     Mom plans to be discharged today / border while infant finishes ESC. A pump was previously ordered through [employer] for mom.   LC encouraged mom seek out outpatient lactation for further assistance.

## 2025-01-20 NOTE — PROGRESS NOTES
Postpartum Progress Note    Subjective   Reporting vaginal pain that is overall tolerable. Bleeding is scant. Voiding, passing flatus, and ambulating without difficulty.        Assessment/Plan   Sandrita Adams is a 34 y.o., , who delivered at 36w1d gestation and is now postpartum day 2 from an     Postpartum Care   - Pain controlled as below  - DVT risk score 5, ppx w/ lovenox, SCDs and ambulation  - Rh +, no indication for rhogam  - PPBC: declines; reports abstinence   - Breastfeeding/pumping encouraged; lactation consult prn    Hydradenitis Suppurativa   - Multiple flares this pregnancy, was on Humira during pregnancy; previous requirement for IV ertapenem daily > planning to reestablish with dermatology outpatient  - Postpartum pain plan: percocet 5-325 q4hr prn, Ibuprofen 600mg q6 hr, plan to transition to methadone one week postpartum with Dr Angulo   - Pt afebrile without concern for acute infection at this time will continue to monitor    Gestational Hypertension  - dx by MRBP > 4 hrs apart  - Currently normotensive   - asx  - HELLP labs neg x1  - patient has BP cuff at home    Pregnancy notables:   - Asthma moderate persistent, on flonase and prn albuterol  - AMA  - IPV s/p event  with FOB, has new home alone    Dispo: continue routine PP care     Pt to be seen and discussed with Dr. Fabián Horvath MD  PGY-II, Obstetrics & Gynecology   [facility]       Objective   Allergies:   Suboxone [buprenorphine-naloxone], Clarithromycin, Haloperidol, Keflex [cephalexin], Levofloxacin, Metoclopramide, and Reglan [metoclopramide hcl]         Last Vitals:  Temp Pulse Resp BP MAP Pulse Ox   36.5 °C (97.7 °F) 82 17 127/78 92 99 %     Vitals Min/Max Last 24 Hours:  Temp  Min: 36 °C (96.8 °F)  Max: 37.4 °C (99.3 °F)  Pulse  Min: 68  Max: 95  Resp  Min: 15  Max: 18  BP  Min: 93/60  Max: 138/79  MAP (mmHg)  Min: 71  Max: 99    Intake/Output:   No intake or output data in the  24 hours ending 01/20/25 0641      Physical Exam:  General: no acute distress  HEENT: normocephalic, atraumatic  Heart: warm and well perfused, RRR  Lungs: breathing comfortably on room air, CTAB  Abdomen: soft, fundus firm and below umbilicus  Extremities: moving all extremities  Neuro: awake and conversant  Psych: appropriate mood and affect    Lab Data:  No results found. However, due to the size of the patient record, not all encounters were searched. Please check Results Review for a complete set of results.

## 2025-01-20 NOTE — DISCHARGE INSTRUCTIONS
.If you are experiencing  -vaginal bleeding/spotting  - painful contractions /cramping  - big gush of fluid/leakage of fluid  - decreased fetal movement   -blood pressure greater than 150/100  - persistnet headache that is not improving with pain medications, fluids or rest   - shortness of breath, chest pain and/or pain in the upper right abdomen     Call you OB provider and go to L&D triage

## 2025-01-20 NOTE — HOSPITAL COURSE
Sandrita Adams is a 34 y.o. G2 now  who is PPD#2 s/p  at 36.1 wga for PPROM at 36.0 wga. Patient was admitted on 25. See L&D note for delivery details. Patient's pregnancy was complicated by known Hydradenitis Suppurative with multiple flares this pregnancy that was managed with Humira and adjunctive measures with Dermatology, most recently receiving IV Kentalog for pain measures ( and ). Pt postpartum course was complicated by a new diagnosis of gHTN and HS pain that was managed on her outpatient pain  regimen of scheduled Percocet 5-325mg two tablets q4hr prn. Pt blood pressure remained normotensive >24 hrs and she was medically stable for discharge home on PPD#2. Patient plan to follow up with Dr. Elizalde to restart Cosentyx for HS treatment. Patient also plans to follow up outpatient with established [facility] Pain specialist. Will arrange for outpatient follow up with [facility] provider, Dr Mccann on  and blood pressure check. Prescription for Percocet 5-325mg two tablets q4hr prn x 7 days was sent at discharge.

## 2025-01-20 NOTE — TELEPHONE ENCOUNTER
An attempt was made today to reach out to the patient schedule a follow up visit with Dr. Spenser Elizalde in  dermatology, but the patient does not have a voicemail that is set up. Unable to leave a message.

## 2025-01-21 ENCOUNTER — LACTATION ENCOUNTER (OUTPATIENT)
Dept: NURSERY | Facility: HOSPITAL | Age: 35
End: 2025-01-21

## 2025-01-21 NOTE — LACTATION NOTE
"This note was copied from a baby's chart.  Lactation Consultant Note  Lactation Consultation       Maternal Information       Maternal Assessment       Infant Assessment       Feeding Assessment       LATCH TOOL       Breast Pump       Other OB Lactation Tools       Patient Follow-up       Other OB Lactation Documentation       Recommendations/Summary    Today, this mother is experiencing extreme breast fullness with leaking. She states that she pumped a few hours ago, but is still uncomfortable. I assisted her with pumping again using breast massage/warm during pumping. I created a tube top from mesh panties which could hold the warm packs next to the mother's breasts. She then massaged her breast prior to/ and during pumping. After 15 minutes of double pumping, she had pumped out about 5 ounces of colostrum. She plans to shower after pumping. The mother is asked to apply the cold packs to her breasts after showering. She is also asked to begin a regular pumping schedule every three hours to avoid engorgement. The mother has not been contacted by [employer] regarding her breast pump. When I called them, they stated that they did not see the order in their system. The order had been placed through that company because the mother wanted a \"hands-free\" pump. The mother was updated on the status of the order. I offered to order a pump from [employer] in an effort to get a pump to her prior to discharge. She is agreeable to ordering a Medela breast pump from [employer]. The order has been placed and the mother informed.  "

## 2025-01-23 ENCOUNTER — TELEPHONE (OUTPATIENT)
Dept: OBSTETRICS AND GYNECOLOGY | Facility: CLINIC | Age: 35
End: 2025-01-23
Payer: MEDICAID

## 2025-01-23 NOTE — TELEPHONE ENCOUNTER
Complains of back pain from epidural, mid back, slightly to right. Denies urinary issues, headache or fever. Also, with a half-dollar sized clot last night, but only light bleeding today. Also, with some pain at vaginal stitches. Discussed would need evaluation. If unable to wait for appointment tomorrow, to go to the hospital. Patient states she was just calling to let us know. Will keep appointment for tomorrow.

## 2025-01-24 ENCOUNTER — ROUTINE PRENATAL (OUTPATIENT)
Dept: MATERNAL FETAL MEDICINE | Facility: CLINIC | Age: 35
End: 2025-01-24
Payer: MEDICAID

## 2025-01-24 ENCOUNTER — TELEPHONE (OUTPATIENT)
Dept: OBSTETRICS AND GYNECOLOGY | Facility: CLINIC | Age: 35
End: 2025-01-24
Payer: MEDICAID

## 2025-01-24 VITALS — SYSTOLIC BLOOD PRESSURE: 131 MMHG | HEART RATE: 100 BPM | DIASTOLIC BLOOD PRESSURE: 76 MMHG

## 2025-01-24 DIAGNOSIS — G89.29 OTHER CHRONIC PAIN: ICD-10-CM

## 2025-01-24 DIAGNOSIS — L73.2 HIDRADENITIS SUPPURATIVA: ICD-10-CM

## 2025-01-24 LAB
LABORATORY COMMENT REPORT: NORMAL
PATH REPORT.FINAL DX SPEC: NORMAL
PATH REPORT.GROSS SPEC: NORMAL
PATH REPORT.RELEVANT HX SPEC: NORMAL
PATH REPORT.TOTAL CANCER: NORMAL

## 2025-01-24 PROCEDURE — 99215 OFFICE O/P EST HI 40 MIN: CPT | Performed by: OBSTETRICS & GYNECOLOGY

## 2025-01-24 RX ORDER — OXYCODONE AND ACETAMINOPHEN 10; 325 MG/1; MG/1
1 TABLET ORAL
Qty: 56 TABLET | Refills: 0 | Status: SHIPPED | OUTPATIENT
Start: 2025-01-24 | End: 2025-01-31

## 2025-01-24 ASSESSMENT — ENCOUNTER SYMPTOMS
CONSTITUTIONAL NEGATIVE: 0
ENDOCRINE NEGATIVE: 0
NEUROLOGICAL NEGATIVE: 0
CARDIOVASCULAR NEGATIVE: 0
MUSCULOSKELETAL NEGATIVE: 0
RESPIRATORY NEGATIVE: 0
HEMATOLOGIC/LYMPHATIC NEGATIVE: 0
ALLERGIC/IMMUNOLOGIC NEGATIVE: 0
EYES NEGATIVE: 0
GASTROINTESTINAL NEGATIVE: 0
PSYCHIATRIC NEGATIVE: 0

## 2025-01-24 NOTE — TELEPHONE ENCOUNTER
Patient is one day too early for refill, per Barbara.  May purchase today if patient prefers, ok per Dr. Mccann.  Will not split prescription to partially pay.    Pharmacist will talk to patient.

## 2025-01-24 NOTE — PROGRESS NOTES
Subjective   Sandrita Adams is a 34 y.o. female who presents for a postpartum visit. She is   postpartum following a . I have fully reviewed the prenatal and intrapartum course. The delivery was at 36 gestational weeks following PPROM.  Anesthesia: epidural. Baby's course has been uncomplicated. Bleeding thin lochia. Bowel function is normal. Bladder function is normal. Patient is not sexually active. Contraception method is none.     Postpartum her course was complicated by significant back pain near her epidural site, that is still causing significant discomfort despite scheduled ibuprofen, lidocaine patches, stretching as well as oxycodone.  She denies weakness, paresthesias.  She also reports some pain related to her HS, though not as active as it has been previously.  Also feeling some tugging discomfort related to the sutures from her repair.    Objective   /76   Pulse 100   LMP 05/18/2024    General:  alert and oriented, in no acute distress   Abdomen: soft, non-tender; bowel sounds normal; no masses, no organomegaly    Vulva: normal and left labial laceration appears well healing without evidence of infection.   Groin: Scaring consistent with history of HS.  One recently healed lesion on left inner thigh.  One small area ~0.5 cm on mons.  No active drainage, induration, erythema or warmth.   Neuro: strength 5/5 in BLE proximally and distally, sensation intact.  MSK: point tenderness along paraspinal muscles and right lateral back just lateral to epidural site.    Assessment/Plan     Hidradenitis Suppurativa  - Does not yet have appointment with Derm to re-stat Cosentix.  Will reach out to to try to facilitate getting in to begin.  - Had previously planned on surgery with plastics at [facility], desires to have at ,  Referral placed today and will attempt to facilitate expediting appointment.  - OARRS reviewed and appropriate, percocet refilled for 1 week today  - Current MFM plan, will not be  prescribing oxycodone for any significant duration. Has appointment with prior pain specialist at [facility] in 4 days.  Reviewed that if she is unable to re-establish care, Dr. Angulo would be willing to help with transition while re-starting cosentix and getting surgery, though I did note that this would not be a permanent solution if she continues to have opiate needs beyond that point.    PP  -meeting milestones  - Encouraged to continue supportive measures and lidocaine patches for back pain form epidural site.  Suspect combination of bruising and muscle spasm from what sounds like  difficult placement.  Will also refer to PT.  -RTC 4 weeks for PP visit.    I spent 45 minutes in the professional and overall care of this patient.    Dharmesh Mccann MD  Maternal Fetal Medicine

## 2025-01-27 ENCOUNTER — TELEPHONE (OUTPATIENT)
Dept: OBSTETRICS AND GYNECOLOGY | Facility: CLINIC | Age: 35
End: 2025-01-27
Payer: MEDICAID

## 2025-01-27 NOTE — TELEPHONE ENCOUNTER
Spoke with patient, provided phone number for Dr. Coronel office, patient states she received a call from their  as well. Patient has appointment with [facility] pain clinic tomorrow, wanted to know plan for getting prescription this week. Reviewed with Dr. Mccann, states he will call patient on Wednesday to review plan. Patient also scheduled follow up with derm on 2/5.

## 2025-01-29 ENCOUNTER — PHARMACY VISIT (OUTPATIENT)
Dept: PHARMACY | Facility: CLINIC | Age: 35
End: 2025-01-29
Payer: MEDICAID

## 2025-01-29 DIAGNOSIS — G89.29 OTHER CHRONIC PAIN: ICD-10-CM

## 2025-01-29 PROCEDURE — RXMED WILLOW AMBULATORY MEDICATION CHARGE

## 2025-01-29 RX ORDER — OXYCODONE AND ACETAMINOPHEN 10; 325 MG/1; MG/1
1-2 TABLET ORAL
Qty: 56 TABLET | Refills: 0 | Status: SHIPPED | OUTPATIENT
Start: 2025-01-29 | End: 2025-02-05

## 2025-01-29 NOTE — Clinical Note
Hey!  I spoke with Sandrita today (my note should be forwarded to you as well).  Basically she has seen the pain team at [facility], I can't see their ntoe yet, but she reported they would take over after she has surgery for her HS, but not until after that.  She is getting in with Derm to start cosentix next week and I got her in with surgery on 2/11.  She has been having some back pain related to her epidural site and she is doing supportive measures/lidocaine patches etc.  And I've referred there to PT as well as Dileep.  Do you think you might be annmarie to see her in the next week or so?  Thanks!  Adolph

## 2025-01-29 NOTE — PROGRESS NOTES
Called patient to follow up after visit with pain team at [facility].  Reported that they will be able to take over her chronic pain management, though not until after she undergoes planned surgery for her HS.  Has appt with Derm to restart cosentix next week and appt with surgery on 2/11.    Has been using percocet more frequently due to back pain following epidural placement.  Last week had no neurologic findings and denies any today.  Udoes not have childcare and so unable to present for evaluation in triage, though low suspicion for acute complication given exam on recent visit and 2 weeks following epidural placement.  Refill for oxycodone provided today, though advised if she still has higher requirements over the next few days and/or Rx not able to last 1 week due to continued pain then will need to present to triage for repeat evaluation.    Scheduling with PT for back pain and referred to Dileep hoff.  Will work to facilitate establishing care with Dr. Angulo to help supprot bridgeing pain regimen and potential weaning after restarting cosentix    Adolph

## 2025-01-31 ENCOUNTER — TELEPHONE (OUTPATIENT)
Dept: OBSTETRICS AND GYNECOLOGY | Facility: CLINIC | Age: 35
End: 2025-01-31
Payer: MEDICAID

## 2025-01-31 NOTE — ED PROVIDER NOTES
HPI   No chief complaint on file.      HPI: []  34-year-old female  2 para 1 about a weeks pregnant has advanced hidradenitis of the left upper lobe and both axilla and groin ER for which she has been on a multitude of modality in the past currently on Humira, comes in with [place] in her groin and axillary area.  She has no OB complaints.  She has no abdominal pain cramping contractions or leakage of fluid.    Past history: Hidradenitis suppurativa, surgery for sweat gland removal  Social: Patient denies current tobacco alcohol drug use.  REVIEW OF SYSTEMS:    GENERAL.: No weight loss, fatigue, anorexia, insomnia, fever.    EYES: No vision loss, double vision, drainage, eye pain.    ENT: No pharyngitis, dry mouth.    CARDIOPULMONARY: No chest pain, palpitations, syncope, near syncope. No shortness of breath, cough, hemoptysis.    GI: No abdominal pain, change in bowel habits, melena, hematemesis, hematochezia, nausea, vomiting, diarrhea.    : No discharge, dysuria, frequency, urgency, hematuria.    MS: No limb pain, joint pain, joint swelling.    SKIN: No rashes.  Positive for flareup of bilateral axillary and groin hidradenitis suppurativa    PSYCH: No depression, anxiety, suicidality, homicidality.    Review of systems is otherwise negative unless stated above or in history of present illness.  Social history, family history, allergies reviewed.  PHYSICAL EXAM:    GENERAL: Vitals noted, no distress. Alert and oriented  x 3. Non-toxic.      EENT: TMs clear. Posterior oropharynx unremarkable. No meningismus. No LAD.     NECK: Supple. Nontender. No midline tenderness.     CARDIAC: Regular, rate, rhythm. No murmurs rubs or gallops. No JVD    PULMONARY: Lungs clear bilaterally with good aeration. No wheezes rales or rhonchi. No respiratory distress.     ABDOMEN: Soft, nonsurgical. Nontender.  Gravid uterus.  Nontender no rebound or guarding good bowel sounds.  No peritoneal signs. Normoactive bowel sounds.  No pulsatile masses.     EXTREMITIES: No peripheral edema. Negative Homans bilaterally, no cords.  2+ bounding pulses well-perfused.    SKIN: Female chaperone present, I performed exam of her both axilla and groin area and perineum there was evidence of chronic appearing hidradenitis with no active lesions no active drainage no active abscess no erythema redness or cellulitis    NEURO: No focal neurologic deficits, NIH score of 0. Cranial nerves normal as tested from II through XII.     MEDICAL DECISION MAKING:  We performed a medical screening examination, currently patient will be complaints no spotting bleeding leakage of fluid we talked to the OB charge nurse.  Labor and delivery and patient to be transferred to labor and delivery for further care.    Impression: #1 chronic stable hidradenitis suppurativa with no clinical evidence of active abscess or drainage or sepsis  Plan/MDM: 34-year-old female  2 para 1 about 7 weeks pregnant comes in with chronic appearing hidradenitis suppurativa she has no OB complaints she has no spotting bleeding, no signs of eclampsia or preeclampsia no evidence of or signs of imminent delivery no spotting bleeding leakage of fluid or cramping or contractions she is afebrile she has no sign of sepsis or bacteremia clinically at least no signs of septic shock, signs of active abscess, patient be transferred to labor delivery for further evaluation for fetal and maternal monitoring and if needed hospitalization to [facility] labor and delivery for further treatment of her hidradenitis suppurativa.              Patient History   Past Medical History:   Diagnosis Date    Anemia 2024    Hidradenitis suppurativa 10/29/2020    Hidradenitis    Hidradenitis suppurativa     Lupus     UTI (urinary tract infection) during pregnancy, first trimester ([facility]) 2024    Vitamin D deficiency 2024     Past Surgical History:   Procedure Laterality Date    OTHER SURGICAL HISTORY   09/19/2022    Arm surgery    OTHER SURGICAL HISTORY Left 2011    Left wrist    OTHER SURGICAL HISTORY Left 2021    Left wrist procedure     No family history on file.  Social History     Tobacco Use    Smoking status: Former     Types: Cigarettes    Smokeless tobacco: Never   Vaping Use    Vaping status: Every Day   Substance Use Topics    Alcohol use: Not Currently    Drug use: Never       Physical Exam   ED Triage Vitals   Temp Pulse Resp BP   -- -- -- --      SpO2 Temp src Heart Rate Source Patient Position   -- -- -- --      BP Location FiO2 (%)     -- --       Physical Exam      ED Course & MDM   Diagnoses as of 01/16/25 0835   Hidradenitis suppurativa                 No data recorded                                 Medical Decision Making      Procedure  Procedures         David Chase MD  01/31/25 2943

## 2025-01-31 NOTE — TELEPHONE ENCOUNTER
TELEPHONE CALL TO PATIENT  Identified by name and date of birth.  Scheduled with Dr Angulo for 2/4/25 at [place]  Patient verbalizes understanding

## 2025-01-31 NOTE — TELEPHONE ENCOUNTER
----- Message from Kayla Angulo sent at 1/30/2025  6:06 PM EST -----  Regarding: RE: appointment  Sure, should be quick, basically MFM just want me to prescribe her her oxycodone until she gets her HD surgery on 2/11/25.  ----- Message -----  From: Maggy Hernandez RN  Sent: 1/30/2025  10:00 AM EST  To: Kayla Angulo MD  Subject: appointment                                      Do you want to see her 2/4?  ----- Message -----  From: Kayla Angulo MD  Sent: 1/29/2025   4:35 PM EST  To: Dharmesh Mccann MD; EVELYN Redd, yes, happy to see her until 2/11.  ----- Message -----  From: Dharmesh Mccann MD  Sent: 1/29/2025  12:44 PM EST  To: Kayla Angulo MD    Hey!  I spoke with Sandrita today (my note should be forwarded to you as well).  Basically she has seen the pain team at [facility], I can't see their ntoe yet, but she reported they would take over after she has surgery for her HS, but not until after that.    She is getting in with Derm to start cosentix next week and I got her in with surgery on 2/11.  She has been having some back pain related to her epidural site and she is doing supportive measures/lidocaine patches etc.  And I've referred there to PT as well as [facility].    Do you think you might be annmarie to see her in the next week or so?    Thanks!    Adolph

## 2025-02-04 ENCOUNTER — PHARMACY VISIT (OUTPATIENT)
Dept: PHARMACY | Facility: CLINIC | Age: 35
End: 2025-02-04
Payer: MEDICAID

## 2025-02-04 ENCOUNTER — OFFICE VISIT (OUTPATIENT)
Dept: OBSTETRICS AND GYNECOLOGY | Facility: CLINIC | Age: 35
End: 2025-02-04
Payer: MEDICAID

## 2025-02-04 VITALS
BODY MASS INDEX: 33.35 KG/M2 | HEART RATE: 61 BPM | WEIGHT: 176.4 LBS | SYSTOLIC BLOOD PRESSURE: 138 MMHG | DIASTOLIC BLOOD PRESSURE: 88 MMHG

## 2025-02-04 DIAGNOSIS — F11.90 CHRONIC, CONTINUOUS USE OF OPIOIDS: Primary | ICD-10-CM

## 2025-02-04 DIAGNOSIS — L73.2 HIDRADENITIS SUPPURATIVA: ICD-10-CM

## 2025-02-04 DIAGNOSIS — G89.29 OTHER CHRONIC PAIN: ICD-10-CM

## 2025-02-04 PROCEDURE — 99215 OFFICE O/P EST HI 40 MIN: CPT | Mod: TH | Performed by: OBSTETRICS & GYNECOLOGY

## 2025-02-04 PROCEDURE — RXMED WILLOW AMBULATORY MEDICATION CHARGE

## 2025-02-04 RX ORDER — NALOXONE HYDROCHLORIDE 4 MG/.1ML
1 SPRAY NASAL AS NEEDED
Qty: 2 EACH | Refills: 0 | Status: ON HOLD | OUTPATIENT
Start: 2025-02-04

## 2025-02-04 RX ORDER — OXYCODONE AND ACETAMINOPHEN 10; 325 MG/1; MG/1
1 TABLET ORAL EVERY 4 HOURS PRN
Qty: 84 TABLET | Refills: 0 | Status: ON HOLD | OUTPATIENT
Start: 2025-02-04 | End: 2025-02-18

## 2025-02-04 NOTE — LETTER
"2025     Dharmesh Mccann MD  [address]  Ob/Gyn  [facility] [id number]    Patient: Sandrita Adams   YOB: 1990   Date of Visit: 2025       Dear Dr. Dharmesh Mccann MD:    Thank you for referring Sandrita Adams to me for evaluation. Below are my notes for this consultation.  If you have questions, please do not hesitate to call me. I look forward to following your patient along with you.       Sincerely,     Kayla Angulo MD      CC: MD Ted Thakur MD  ______________________________________________________________________________________    Assessment  35 YO  with chronic pain 2/2 HS, sp  on 25  Discussed today the risks of long term opioid use for chronic pain, including but not limited to development of hyperalgesia, physiological tolerance and dependency, desensitization of her mu opioid receptors, accidental overdose.  At this time, pt has no evidence of a MARQUIS. It is not known whether she is physiologically dependent as she has only uptitrated over time, reportedly due to poorly managed HS, and has not attempted weaning.  It is notable, however, that even when she was working and feeling her best on HS treatment, she still felt that the Percocet TID \"barely\" managed the pain at times. She also seemed surprised to hear that Percocet 10mg TID was a significant amount of daily opioids.    I started the conversation today that her history raises the concern that she would have a difficult time weaning down on her Percocet, and that if she is unable to do so, I would personally recommend she consider switching to an agent like buprenorphine. Patient is reluctant, as she associates that medication with addiction and she is not addicted, and also because she had an adverse reaction when switching abrupt from Percocet 10mg TID --> Suboxone 2mg TID in the past. If she were to switch, I would recommend micro-dosing with Subutex. We will defer " "that conversation to postop.    I did express to Sandrita that for me to continue her Pain Management:  - I would require regular drug screens  - I would require attendance at appointments for refills  - I will not do early refills   - I will not be going up on her Percocet dose beyond the q4h dosing, even if she has acute post-op pain from the HS surgery. It would be my suggestion that she stay in the hospital until her pain control is adequate to go home on the q4h dosing.    PLAN  - Decrease dosing back down to Percocet 10/325mg q4 hours instead of q3 hours as her goal is to eventually wean down to TID after surgery  - Narcan prescribed  - UDS today  - Maximize non-opioid strategies for breakthrough pain  - Start Cosentyx ASAP  - Get HS surgery ASAP  - FU 2 weeks    Kayla Angulo MD     Subjective  35 YO  sp  on 25 at 36+1 weeks for baby boy \"Tano\" after presenting with PPROM.    Pregnancy Notable For  - HS, multiple flares, currently on Humira, previously on Cosentyx q2 weeks pre-pregnancy with better control. Pt has an apptmt with Derm tomorrow with Dr. Elizalde and it is planned for her to resume Cosentyx at that time. She is scheduled to have a preop consult for HS surgery with Dr. Coronel on 25.  - Chronic use of opioids for HS-related pain. Per PDMP, pt was being prescribed Percocet 10/325mg TID by [facility]. In pregnancy, her HS symptoms worsened, and by 2024 (11 weeks), pt had been uptitrated to Percocet 10/325mg Q4H by [facility]. Now patient is taking the Percocet 10mg q3 hours, since she \"doesn't have anything for breakthrough anymore\" since she's not longer in the hospital and getting Dilaudid. Reviewing her notes from [facility], it appears patient was previously trialed on morphine in 2024 (I believe as part of a process to help her wean down her opioid use) and Suboxone in Oct 2023 (2mg TID, \"had a reaction\"). She is planning to transition back to [facility] pain " "clinic after her HS surgery, having already met with her old Pain provider Margo Uriarte on 1/28/25. The plan per that note had been to go back down to Percocet 10mg TID max should she resume care with them.  - Asthma moderate persistent, on flonase and prn albuterol  - AMA  - IPV s/p event 8/8 with FOB, has new home alone    Feeding: formula  Contraception: none, pt states she is not sexually active x 6 months, no current partner.    Interval Hx:  Baby did not experience NOWS.  Patient is taking Percocet 10mg q3 hours.  Patient reports that the \"best\" she has ever done was during Cosentyx induction, and even at that time time, she was requiring Percocet 10mg TID, which kept her pain at a 5/10.  For her, that is good, bc she was able to go back to work, whereas previously she had been disabled x 5 years.  She states she plans to wean down off her Percocet once her HS pain is better post-op and post-Cosentyx.  There is no strict timeline for this in her mind.  Patient feels confident she can do what she puts her mind to, and seems to have some good resilience factors.    Past Medical History:   Diagnosis Date   • Asthma, mild intermittent ([facility])     albuterol prn   • Hidradenitis suppurativa 10/29/2020    Hidradenitis   • History of gestational hypertension    • Lupus     diagnosed by her PCP in 2017 based on symptoms of joint pain and facial rash. She states she is in remission   • MDD (major depressive disorder)     not on meds   • PTSD (post-traumatic stress disorder)     resulting from IPV     Objective  /88   Pulse 61   Wt 80 kg (176 lb 6.4 oz)   LMP 05/18/2024   Breastfeeding No   BMI 33.35 kg/m²      General:   Alert and oriented, in no acute distress   Skin: No significant acne. No hirsutism.   Neck: Supple. No visible thyromegaly.    Psych Normal affect. Normal mood.    "

## 2025-02-04 NOTE — PROGRESS NOTES
"Assessment   35 YO  with chronic pain 2/2 HS, sp  on 25  Discussed today the risks of long term opioid use for chronic pain, including but not limited to development of hyperalgesia, physiological tolerance and dependency, desensitization of her mu opioid receptors, accidental overdose.  At this time, pt has no evidence of a MARQUIS. It is not known whether she is physiologically dependent as she has only uptitrated over time, reportedly due to poorly managed HS, and has not attempted weaning.  It is notable, however, that even when she was working and feeling her best on HS treatment, she still felt that the Percocet TID \"barely\" managed the pain at times. She also seemed surprised to hear that Percocet 10mg TID was a significant amount of daily opioids.    I started the conversation today that her history raises the concern that she would have a difficult time weaning down on her Percocet, and that if she is unable to do so, I would personally recommend she consider switching to an agent like buprenorphine. Patient is reluctant, as she associates that medication with addiction and she is not addicted, and also because she had an adverse reaction when switching abrupt from Percocet 10mg TID --> Suboxone 2mg TID in the past. If she were to switch, I would recommend micro-dosing with Subutex. We will defer that conversation to postop.    I did express to Sandrita that for me to continue her Pain Management:  - I would require regular drug screens  - I would require attendance at appointments for refills  - I will not do early refills   - I will not be going up on her Percocet dose beyond the q4h dosing, even if she has acute post-op pain from the HS surgery. It would be my suggestion that she stay in the hospital until her pain control is adequate to go home on the q4h dosing.    PLAN  - Decrease dosing back down to Percocet 10/325mg q4 hours instead of q3 hours as her goal is to eventually wean down to TID " "after surgery  - Narcan prescribed  - UDS today  - Maximize non-opioid strategies for breakthrough pain  - Start Cosentyx ASAP  - Get HS surgery ASAP  - FU 2 weeks    Kayla Angulo MD     Subjective   33 YO  sp  on 25 at 36+1 weeks for baby boy \"Tano\" after presenting with PPROM.    Pregnancy Notable For  - HS, multiple flares, currently on Humira, previously on Cosentyx q2 weeks pre-pregnancy with better control. Pt has an apptmt with Derm tomorrow with Dr. Elizalde and it is planned for her to resume Cosentyx at that time. She is scheduled to have a preop consult for HS surgery with Dr. Coronel on 25.  - Chronic use of opioids for HS-related pain. Per PDMP, pt was being prescribed Percocet 10/325mg TID by [facility]. In pregnancy, her HS symptoms worsened, and by 2024 (11 weeks), pt had been uptitrated to Percocet 10/325mg Q4H by [facility]. Now patient is taking the Percocet 10mg q3 hours, since she \"doesn't have anything for breakthrough anymore\" since she's not longer in the hospital and getting Dilaudid. Reviewing her notes from [facility], it appears patient was previously trialed on morphine in 2024 (I believe as part of a process to help her wean down her opioid use) and Suboxone in Oct 2023 (2mg TID, \"had a reaction\"). She is planning to transition back to [facility] pain clinic after her HS surgery, having already met with her old Pain provider Margo Uriarte on 25. The plan per that note had been to go back down to Percocet 10mg TID max should she resume care with them.  - Asthma moderate persistent, on flonase and prn albuterol  - AMA  - IPV s/p event  with FOB, has new home alone    Feeding: formula  Contraception: none, pt states she is not sexually active x 6 months, no current partner.    Interval Hx:  Baby did not experience NOWS.  Patient is taking Percocet 10mg q3 hours.  Patient reports that the \"best\" she has ever done was during Cosentyx induction, and " even at that time time, she was requiring Percocet 10mg TID, which kept her pain at a 5/10.  For her, that is good, bc she was able to go back to work, whereas previously she had been disabled x 5 years.  She states she plans to wean down off her Percocet once her HS pain is better post-op and post-Cosentyx.  There is no strict timeline for this in her mind.  Patient feels confident she can do what she puts her mind to, and seems to have some good resilience factors.    Past Medical History:   Diagnosis Date    Asthma, mild intermittent ([facility])     albuterol prn    Hidradenitis suppurativa 10/29/2020    Hidradenitis    History of gestational hypertension     Lupus     diagnosed by her PCP in 2017 based on symptoms of joint pain and facial rash. She states she is in remission    MDD (major depressive disorder)     not on meds    PTSD (post-traumatic stress disorder)     resulting from IPV     Objective   /88   Pulse 61   Wt 80 kg (176 lb 6.4 oz)   LMP 05/18/2024   Breastfeeding No   BMI 33.35 kg/m²      General:   Alert and oriented, in no acute distress   Skin: No significant acne. No hirsutism.   Neck: Supple. No visible thyromegaly.    Psych Normal affect. Normal mood.

## 2025-02-05 ENCOUNTER — APPOINTMENT (OUTPATIENT)
Dept: DERMATOLOGY | Facility: CLINIC | Age: 35
End: 2025-02-05
Payer: MEDICAID

## 2025-02-05 DIAGNOSIS — L73.2 HIDRADENITIS SUPPURATIVA: Primary | ICD-10-CM

## 2025-02-05 DIAGNOSIS — Z79.899 OTHER LONG TERM (CURRENT) DRUG THERAPY: ICD-10-CM

## 2025-02-05 RX ORDER — CLINDAMYCIN PHOSPHATE 10 UG/ML
LOTION TOPICAL
Qty: 60 ML | Refills: 11 | Status: SHIPPED | OUTPATIENT
Start: 2025-02-05

## 2025-02-05 ASSESSMENT — DERMATOLOGY QUALITY OF LIFE (QOL) ASSESSMENT
RATE HOW BOTHERED YOU ARE BY EFFECTS OF YOUR SKIN PROBLEMS ON YOUR ACTIVITIES (EG, GOING OUT, ACCOMPLISHING WHAT YOU WANT, WORK ACTIVITIES OR YOUR RELATIONSHIPS WITH OTHERS): 6 - ALWAYS BOTHERED
ARE THERE EXCLUSIONS OR EXCEPTIONS FOR THE QUALITY OF LIFE ASSESSMENT: NO
WHAT SINGLE SKIN CONDITION LISTED BELOW IS THE PATIENT ANSWERING THE QUALITY-OF-LIFE ASSESSMENT QUESTIONS ABOUT: HIDRADENITIS SUPPURATIVA
RATE HOW BOTHERED YOU ARE BY SYMPTOMS OF YOUR SKIN PROBLEM (EG, ITCHING, STINGING BURNING, HURTING OR SKIN IRRITATION): 4
RATE HOW EMOTIONALLY BOTHERED YOU ARE BY YOUR SKIN PROBLEM (FOR EXAMPLE, WORRY, EMBARRASSMENT, FRUSTRATION): 6 - ALWAYS BOTHERED
DATE THE QUALITY-OF-LIFE ASSESSMENT WAS COMPLETED: 67241

## 2025-02-05 ASSESSMENT — DERMATOLOGY PATIENT ASSESSMENT
DO YOU USE A TANNING BED: NO
ARE YOU AN ORGAN TRANSPLANT RECIPIENT: NO
ARE YOU TRYING TO GET PREGNANT: NO
DO YOU HAVE IRREGULAR MENSTRUAL CYCLES: NO
HAVE YOU HAD OR DO YOU HAVE VASCULAR DISEASE: NO
DO YOU USE SUNSCREEN: OCCASIONALLY
DO YOU HAVE ANY NEW OR CHANGING LESIONS: YES
ARE YOU ON BIRTH CONTROL: NO
HAVE YOU HAD OR DO YOU HAVE A STAPH INFECTION: YES

## 2025-02-05 ASSESSMENT — PATIENT GLOBAL ASSESSMENT (PGA): PATIENT GLOBAL ASSESSMENT: PATIENT GLOBAL ASSESSMENT:  1 - CLEAR

## 2025-02-05 ASSESSMENT — ITCH NUMERIC RATING SCALE: HOW SEVERE IS YOUR ITCHING?: 3

## 2025-02-05 NOTE — PROGRESS NOTES
Subjective     Sandrita Adams is a 34 y.o. female who presents for the following: Hidradenitis Suppurativa (Follow up, get RX for Cosentyx ).     Patient has an extensive history of severe HS. Previously, well controlled on Cosentyx but discontinued due to pregnancy. During pregnancy, she had several flares warranting 8 hospital admissions. She was treated with Cimzia, prednisone, and IV ertapanem. She was also on Humira with no improvement. Her HS continues to flare. She is not breast feeding at this time. Plan to have her restart today with Cosentyx, first injection.     Review of Systems:  No other skin or systemic complaints other than what is documented elsewhere in the note.    The following portions of the chart were reviewed this encounter and updated as appropriate:          Skin Cancer History  No skin cancer on file.      Specialty Problems          Dermatology Problems    Hidradenitis suppurativa     -Extensive history of HS, s/p removal of axillary sweat glands in 2017, which did not significantly improve her pain. Patient previously followed with Dermatology, Pain Management, and Infectious Disease at [facility] and was previously well-controlled on Cosentix, Percocet, Gabapentin, and Naproxen which allowed her to complete her activities of daily living, including being able to work.   - s/p admission 7/22-24 for flare - RUE US demonstrated 3cm pocket in axilla, evaluated by [facility] that admission, indurated without anything to drain     - Current pain regimen: oxycodone-acetaminophen 10/325 q4hr PRN, keflex.    Discontinued gabapentin due to parasthesias    Derm recs: cont clindamycin, humira injections started 11/7  Plastics recs: defer definitive surgery of axillary or groin until postpartum   Pain recs: signed off in pregnancy, re-established with [facility], has appt end of October    Update 11/20:  - admission, started on IV Ertapenam per derm and ID given stable housing now. Anticipate 6-16wk course  (varying recs between inpatient and outpatient derm, ordered for 90ds), continue to address duration outpatient  - established with home care  - continues to remain on oxy [id number], refilling rx weekly.     Admitted 11/29-12/2 after being instructed to present to [facility] for r/o sepsis by outpt ID attending given leukocytosis and elevated CRP. IV Zoysn 11/29-12/5, to resume daily IV Ertapenem on 12/6-2/19    Update1/5/2024  Treated with IV dilaudid 2 mg and zofran 4 mg today at [facility] Triage  Last seen in ER on 12/29/24 for HS flare up  Required multiple doses of Dilaudid 2 mg every 2 hours for pain control.             H/O hidradenitis suppurativa        Objective   Well appearing patient in no apparent distress; mood and affect are within normal limits.    A focused skin examination was performed. All findings within normal limits unless otherwise noted below.    Scattered scaring, abscess, and draining sinus tracts of the bilateral axilla, groin, and buttocks           Assessment/Plan   Hidradenitis suppurativa    Sandrita has a history of stage 3 hidradenitis suppurativa and is currently pregnant (27w4d). She has previously been treated with multiple therapies: certolizumab, IV ertapenem, adalimumab, infliximab, secukinumab apremilast, spironolactone, doxycycline, bactrim, clindamycin, levaquin, rifampin, IV dalbavancin, and IV vancomycin. She has also trialed ILK injections and PO prednisone without success.      Prior to pregnancy, patient was on cosentyx. She reports the most significant improvement while on cosentyx, however it was discontinued in June after finding out she was pregnant given lack of safety data.      During this pregnancy, patient has been admitted/evaluated 8 times by dermatology. During previous admissions, patient was started on certolizumab (Cimzia). Patient reports she did not notice significant improvement on certolizumab and discontinued due to headaches (last dose 10/6). During previous  admissions, patient also received 5 total doses of IV ertapenem. However, patient stated she was unable to manage a PICC line at home due to her current housing situation and refused outpatient IV antibiotics.      Patient was last seen 11/12/24. Plan was to continue PO clindamycin, topicals hibiclens, and Humira.  Patient s/p first injection of Humira 11/7/24.  No improvement with Humira and so we will restart her with Cosentyx today.      Plan  -Previous tx: ILK, prednisone, IV Ertapenem, Humira. Previously well controlled on Consentyx but discontinued due to pregnancy. She has given birth to a health baby boy and is not breastfeeding at this time. We plan to restart her on Cosentyx at this time.   -Continue topical hibiclens daily.   -START clindamycin 1% lotion 1-2x a day to the affected area  - START Cosentyx 30 mg, week 0 given today. Then for week 1,2,3,4 for loading dose. Maintenance dose is then 300mg q4 weeks. We will send prescription to  specialty, while awaiting approval we provide the patient with an additionally 2 additional Cosentyx samples for week 1 and 2. If Cosentyx is no longer effective we will consider Bimzelx as another option.     Related Medications  adalimumab (Humira,CF, Pen Crohns-UC-HS) 80 mg/0.8 mL pen injector kit pen-injector  Inject 2 pens (160mg) under the skin on day 0, then 1 pen (80mg) on day 15    adalimumab (Humira,CF, Pen) 80 mg/0.8 mL pen injector kit pen-injector  Inject 1 Pen (80 mg) under the skin every 14 (fourteen) days.    oxyCODONE-acetaminophen (Percocet)  mg tablet  Take 1 tablet by mouth every 4 hours if needed for severe pain (7 - 10) for up to 14 days.    clindamycin (Cleocin T) 1 % lotion  Apply one to two times daily to the affected areas.    secukinumab (Cosentyx) injection 300 mg      secukinumab 300 mg/2 mL (150 mg/mL) pen injector  Inject 300 mg under the skin see administration instructions. Loading dose: inject at week 3,4.    secukinumab 300 mg/2  mL (150 mg/mL) pen injector  Inject 300 mg under the skin see administration instructions. Maintenance dose: Every 4 weeks after loading dose.    Other long term (current) drug therapy    Related Medications  secukinumab (Cosentyx) injection 300 mg      secukinumab 300 mg/2 mL (150 mg/mL) pen injector  Inject 300 mg under the skin see administration instructions. Loading dose: inject at week 3,4.    secukinumab 300 mg/2 mL (150 mg/mL) pen injector  Inject 300 mg under the skin see administration instructions. Maintenance dose: Every 4 weeks after loading dose.    My DO Kat  Department of Dermatology    I was present during all key portions of visit including history, exam, discussion/plan and/or procedures and directly supervised our resident during all portions of the visit, follow up care, medications and more    Spenser Elizalde MD

## 2025-02-06 ENCOUNTER — SPECIALTY PHARMACY (OUTPATIENT)
Dept: PHARMACY | Facility: CLINIC | Age: 35
End: 2025-02-06

## 2025-02-06 ENCOUNTER — APPOINTMENT (OUTPATIENT)
Dept: DERMATOLOGY | Facility: CLINIC | Age: 35
End: 2025-02-06
Payer: MEDICAID

## 2025-02-06 PROCEDURE — RXMED WILLOW AMBULATORY MEDICATION CHARGE

## 2025-02-07 ENCOUNTER — SPECIALTY PHARMACY (OUTPATIENT)
Dept: PHARMACY | Facility: CLINIC | Age: 35
End: 2025-02-07

## 2025-02-07 LAB
AMPHETAMINES UR QL: NEGATIVE NG/ML
BARBITURATES UR QL: NEGATIVE NG/ML
BENZODIAZ UR QL: NEGATIVE NG/ML
BZE UR QL: NEGATIVE NG/ML
CODEINE UR-MCNC: NEGATIVE NG/ML
CREAT UR-MCNC: 159.8 MG/DL
DRUG SCREEN COMMENT UR-IMP: ABNORMAL
DRUG SCREEN COMMENT UR-IMP: ABNORMAL
HYDROCODONE UR-MCNC: NEGATIVE NG/ML
HYDROMORPHONE UR-MCNC: NEGATIVE NG/ML
METHADONE UR QL: NEGATIVE NG/ML
MORPHINE UR-MCNC: NEGATIVE NG/ML
NORHYDROCODONE UR CFM-MCNC: NEGATIVE NG/ML
NOROXYCODONE UR CFM-MCNC: 177 NG/ML
OPIATES UR QL: ABNORMAL NG/ML
OXIDANTS UR QL: NEGATIVE MCG/ML
OXYCODONE UR QL: POSITIVE NG/ML
OXYCODONE UR-MCNC: NEGATIVE NG/ML
OXYMORPHONE UR-MCNC: 210 NG/ML
PCP UR QL: NEGATIVE NG/ML
PH UR: 6.2 [PH] (ref 4.5–9)
QUEST NOTES AND COMMENTS: ABNORMAL
THC UR QL: NEGATIVE NG/ML

## 2025-02-09 ENCOUNTER — APPOINTMENT (OUTPATIENT)
Dept: RADIOLOGY | Facility: HOSPITAL | Age: 35
End: 2025-02-09
Payer: MEDICAID

## 2025-02-09 ENCOUNTER — HOSPITAL ENCOUNTER (OUTPATIENT)
Facility: HOSPITAL | Age: 35
Setting detail: OBSERVATION
Discharge: HOME | End: 2025-02-10
Attending: EMERGENCY MEDICINE | Admitting: STUDENT IN AN ORGANIZED HEALTH CARE EDUCATION/TRAINING PROGRAM
Payer: MEDICAID

## 2025-02-09 DIAGNOSIS — R51.9 ACUTE NONINTRACTABLE HEADACHE, UNSPECIFIED HEADACHE TYPE: ICD-10-CM

## 2025-02-09 DIAGNOSIS — M54.50 ACUTE BILATERAL LOW BACK PAIN WITHOUT SCIATICA: Primary | ICD-10-CM

## 2025-02-09 PROBLEM — M54.9 INTRACTABLE BACK PAIN: Status: ACTIVE | Noted: 2025-02-09

## 2025-02-09 LAB
ALBUMIN SERPL BCP-MCNC: 3.8 G/DL (ref 3.4–5)
ALP SERPL-CCNC: 67 U/L (ref 33–110)
ALT SERPL W P-5'-P-CCNC: 11 U/L (ref 7–45)
ANION GAP SERPL CALC-SCNC: 9 MMOL/L (ref 10–20)
APPEARANCE UR: CLEAR
APTT PPP: 29 SECONDS (ref 27–38)
AST SERPL W P-5'-P-CCNC: 19 U/L (ref 9–39)
BASOPHILS # BLD AUTO: 0.03 X10*3/UL (ref 0–0.1)
BASOPHILS NFR BLD AUTO: 0.3 %
BILIRUB SERPL-MCNC: 0.3 MG/DL (ref 0–1.2)
BILIRUB UR STRIP.AUTO-MCNC: NEGATIVE MG/DL
BUN SERPL-MCNC: 12 MG/DL (ref 6–23)
CALCIUM SERPL-MCNC: 8.8 MG/DL (ref 8.6–10.3)
CARDIAC TROPONIN I PNL SERPL HS: 3 NG/L (ref 0–13)
CARDIAC TROPONIN I PNL SERPL HS: 3 NG/L (ref 0–13)
CHLORIDE SERPL-SCNC: 109 MMOL/L (ref 98–107)
CO2 SERPL-SCNC: 26 MMOL/L (ref 21–32)
COLOR UR: NORMAL
CREAT SERPL-MCNC: 1.08 MG/DL (ref 0.5–1.05)
CRP SERPL-MCNC: 0.41 MG/DL
EGFRCR SERPLBLD CKD-EPI 2021: 68 ML/MIN/1.73M*2
EOSINOPHIL # BLD AUTO: 0.06 X10*3/UL (ref 0–0.7)
EOSINOPHIL NFR BLD AUTO: 0.6 %
ERYTHROCYTE [DISTWIDTH] IN BLOOD BY AUTOMATED COUNT: 15.4 % (ref 11.5–14.5)
ERYTHROCYTE [SEDIMENTATION RATE] IN BLOOD BY WESTERGREN METHOD: 14 MM/H (ref 0–20)
GLUCOSE SERPL-MCNC: 96 MG/DL (ref 74–99)
GLUCOSE UR STRIP.AUTO-MCNC: NORMAL MG/DL
HCT VFR BLD AUTO: 32.1 % (ref 36–46)
HGB BLD-MCNC: 10.7 G/DL (ref 12–16)
IMM GRANULOCYTES # BLD AUTO: 0.05 X10*3/UL (ref 0–0.7)
IMM GRANULOCYTES NFR BLD AUTO: 0.5 % (ref 0–0.9)
INR PPP: 1.1 (ref 0.9–1.1)
KETONES UR STRIP.AUTO-MCNC: NEGATIVE MG/DL
LEUKOCYTE ESTERASE UR QL STRIP.AUTO: NEGATIVE
LYMPHOCYTES # BLD AUTO: 3.33 X10*3/UL (ref 1.2–4.8)
LYMPHOCYTES NFR BLD AUTO: 36 %
MCH RBC QN AUTO: 25.1 PG (ref 26–34)
MCHC RBC AUTO-ENTMCNC: 33.3 G/DL (ref 32–36)
MCV RBC AUTO: 75 FL (ref 80–100)
MONOCYTES # BLD AUTO: 0.61 X10*3/UL (ref 0.1–1)
MONOCYTES NFR BLD AUTO: 6.6 %
NEUTROPHILS # BLD AUTO: 5.16 X10*3/UL (ref 1.2–7.7)
NEUTROPHILS NFR BLD AUTO: 56 %
NITRITE UR QL STRIP.AUTO: NEGATIVE
NRBC BLD-RTO: 0 /100 WBCS (ref 0–0)
PH UR STRIP.AUTO: 5.5 [PH]
PLATELET # BLD AUTO: 336 X10*3/UL (ref 150–450)
POTASSIUM SERPL-SCNC: 4.3 MMOL/L (ref 3.5–5.3)
PROT SERPL-MCNC: 6.8 G/DL (ref 6.4–8.2)
PROT UR STRIP.AUTO-MCNC: NEGATIVE MG/DL
PROTHROMBIN TIME: 12.8 SECONDS (ref 9.8–12.8)
RBC # BLD AUTO: 4.26 X10*6/UL (ref 4–5.2)
RBC # UR STRIP.AUTO: NEGATIVE MG/DL
SODIUM SERPL-SCNC: 140 MMOL/L (ref 136–145)
SP GR UR STRIP.AUTO: 1.01
UROBILINOGEN UR STRIP.AUTO-MCNC: NORMAL MG/DL
WBC # BLD AUTO: 9.2 X10*3/UL (ref 4.4–11.3)

## 2025-02-09 PROCEDURE — 84075 ASSAY ALKALINE PHOSPHATASE: CPT | Performed by: EMERGENCY MEDICINE

## 2025-02-09 PROCEDURE — 2500000004 HC RX 250 GENERAL PHARMACY W/ HCPCS (ALT 636 FOR OP/ED): Performed by: EMERGENCY MEDICINE

## 2025-02-09 PROCEDURE — 2500000004 HC RX 250 GENERAL PHARMACY W/ HCPCS (ALT 636 FOR OP/ED): Mod: JZ | Performed by: INTERNAL MEDICINE

## 2025-02-09 PROCEDURE — 85025 COMPLETE CBC W/AUTO DIFF WBC: CPT | Performed by: EMERGENCY MEDICINE

## 2025-02-09 PROCEDURE — 84484 ASSAY OF TROPONIN QUANT: CPT | Performed by: EMERGENCY MEDICINE

## 2025-02-09 PROCEDURE — 85730 THROMBOPLASTIN TIME PARTIAL: CPT | Performed by: EMERGENCY MEDICINE

## 2025-02-09 PROCEDURE — 70450 CT HEAD/BRAIN W/O DYE: CPT

## 2025-02-09 PROCEDURE — 81003 URINALYSIS AUTO W/O SCOPE: CPT | Performed by: EMERGENCY MEDICINE

## 2025-02-09 PROCEDURE — 70450 CT HEAD/BRAIN W/O DYE: CPT | Performed by: RADIOLOGY

## 2025-02-09 PROCEDURE — 86140 C-REACTIVE PROTEIN: CPT | Performed by: EMERGENCY MEDICINE

## 2025-02-09 PROCEDURE — 96376 TX/PRO/DX INJ SAME DRUG ADON: CPT

## 2025-02-09 PROCEDURE — 85610 PROTHROMBIN TIME: CPT | Performed by: EMERGENCY MEDICINE

## 2025-02-09 PROCEDURE — 36415 COLL VENOUS BLD VENIPUNCTURE: CPT | Performed by: EMERGENCY MEDICINE

## 2025-02-09 PROCEDURE — 96375 TX/PRO/DX INJ NEW DRUG ADDON: CPT

## 2025-02-09 PROCEDURE — 85652 RBC SED RATE AUTOMATED: CPT | Performed by: EMERGENCY MEDICINE

## 2025-02-09 PROCEDURE — 96374 THER/PROPH/DIAG INJ IV PUSH: CPT

## 2025-02-09 PROCEDURE — G0378 HOSPITAL OBSERVATION PER HR: HCPCS

## 2025-02-09 PROCEDURE — 99285 EMERGENCY DEPT VISIT HI MDM: CPT | Mod: 25 | Performed by: EMERGENCY MEDICINE

## 2025-02-09 PROCEDURE — 2500000001 HC RX 250 WO HCPCS SELF ADMINISTERED DRUGS (ALT 637 FOR MEDICARE OP): Performed by: INTERNAL MEDICINE

## 2025-02-09 RX ORDER — ONDANSETRON HYDROCHLORIDE 2 MG/ML
4 INJECTION, SOLUTION INTRAVENOUS EVERY 8 HOURS PRN
Status: DISCONTINUED | OUTPATIENT
Start: 2025-02-09 | End: 2025-02-10 | Stop reason: HOSPADM

## 2025-02-09 RX ORDER — ASPIRIN 81 MG/1
162 TABLET ORAL DAILY
Status: DISCONTINUED | OUTPATIENT
Start: 2025-02-10 | End: 2025-02-10 | Stop reason: HOSPADM

## 2025-02-09 RX ORDER — PANTOPRAZOLE SODIUM 40 MG/1
40 TABLET, DELAYED RELEASE ORAL
Status: DISCONTINUED | OUTPATIENT
Start: 2025-02-10 | End: 2025-02-10 | Stop reason: HOSPADM

## 2025-02-09 RX ORDER — CYCLOBENZAPRINE HCL 10 MG
10 TABLET ORAL ONCE
Status: COMPLETED | OUTPATIENT
Start: 2025-02-09 | End: 2025-02-09

## 2025-02-09 RX ORDER — ACETAMINOPHEN 325 MG/1
650 TABLET ORAL EVERY 4 HOURS PRN
Status: DISCONTINUED | OUTPATIENT
Start: 2025-02-09 | End: 2025-02-10 | Stop reason: HOSPADM

## 2025-02-09 RX ORDER — SODIUM CHLORIDE 9 MG/ML
100 INJECTION, SOLUTION INTRAVENOUS CONTINUOUS
Status: DISCONTINUED | OUTPATIENT
Start: 2025-02-09 | End: 2025-02-10 | Stop reason: HOSPADM

## 2025-02-09 RX ORDER — HYDROMORPHONE HYDROCHLORIDE 1 MG/ML
1 INJECTION, SOLUTION INTRAMUSCULAR; INTRAVENOUS; SUBCUTANEOUS ONCE
Status: COMPLETED | OUTPATIENT
Start: 2025-02-09 | End: 2025-02-09

## 2025-02-09 RX ORDER — ONDANSETRON 4 MG/1
4 TABLET, FILM COATED ORAL EVERY 8 HOURS PRN
Status: DISCONTINUED | OUTPATIENT
Start: 2025-02-09 | End: 2025-02-10 | Stop reason: HOSPADM

## 2025-02-09 RX ORDER — AMOXICILLIN 250 MG
2 CAPSULE ORAL 2 TIMES DAILY PRN
Status: DISCONTINUED | OUTPATIENT
Start: 2025-02-09 | End: 2025-02-10 | Stop reason: HOSPADM

## 2025-02-09 RX ORDER — ACETAMINOPHEN 650 MG/1
650 SUPPOSITORY RECTAL EVERY 4 HOURS PRN
Status: DISCONTINUED | OUTPATIENT
Start: 2025-02-09 | End: 2025-02-10 | Stop reason: HOSPADM

## 2025-02-09 RX ORDER — ONDANSETRON HYDROCHLORIDE 2 MG/ML
4 INJECTION, SOLUTION INTRAVENOUS ONCE
Status: COMPLETED | OUTPATIENT
Start: 2025-02-09 | End: 2025-02-09

## 2025-02-09 RX ORDER — ACETAMINOPHEN 160 MG/5ML
650 SOLUTION ORAL EVERY 4 HOURS PRN
Status: DISCONTINUED | OUTPATIENT
Start: 2025-02-09 | End: 2025-02-10 | Stop reason: HOSPADM

## 2025-02-09 RX ORDER — PANTOPRAZOLE SODIUM 40 MG/10ML
40 INJECTION, POWDER, LYOPHILIZED, FOR SOLUTION INTRAVENOUS
Status: DISCONTINUED | OUTPATIENT
Start: 2025-02-10 | End: 2025-02-10 | Stop reason: HOSPADM

## 2025-02-09 RX ORDER — HYDROMORPHONE HYDROCHLORIDE 0.2 MG/ML
0.2 INJECTION INTRAMUSCULAR; INTRAVENOUS; SUBCUTANEOUS EVERY 4 HOURS PRN
Status: DISCONTINUED | OUTPATIENT
Start: 2025-02-09 | End: 2025-02-10

## 2025-02-09 RX ADMIN — HYDROMORPHONE HYDROCHLORIDE 0.2 MG: 0.2 INJECTION, SOLUTION INTRAMUSCULAR; INTRAVENOUS; SUBCUTANEOUS at 23:11

## 2025-02-09 RX ADMIN — ONDANSETRON 4 MG: 2 INJECTION INTRAMUSCULAR; INTRAVENOUS at 21:00

## 2025-02-09 RX ADMIN — HYDROMORPHONE HYDROCHLORIDE 1 MG: 1 INJECTION, SOLUTION INTRAMUSCULAR; INTRAVENOUS; SUBCUTANEOUS at 22:00

## 2025-02-09 RX ADMIN — HYDROMORPHONE HYDROCHLORIDE 1 MG: 1 INJECTION, SOLUTION INTRAMUSCULAR; INTRAVENOUS; SUBCUTANEOUS at 20:59

## 2025-02-09 RX ADMIN — CYCLOBENZAPRINE 10 MG: 10 TABLET, FILM COATED ORAL at 23:11

## 2025-02-09 SDOH — SOCIAL STABILITY: SOCIAL INSECURITY: ARE YOU OR HAVE YOU BEEN THREATENED OR ABUSED PHYSICALLY, EMOTIONALLY, OR SEXUALLY BY ANYONE?: NO

## 2025-02-09 SDOH — ECONOMIC STABILITY: FOOD INSECURITY: WITHIN THE PAST 12 MONTHS, YOU WORRIED THAT YOUR FOOD WOULD RUN OUT BEFORE YOU GOT THE MONEY TO BUY MORE.: NEVER TRUE

## 2025-02-09 SDOH — SOCIAL STABILITY: SOCIAL INSECURITY: WITHIN THE LAST YEAR, HAVE YOU BEEN AFRAID OF YOUR PARTNER OR EX-PARTNER?: NO

## 2025-02-09 SDOH — SOCIAL STABILITY: SOCIAL INSECURITY: DO YOU FEEL UNSAFE GOING BACK TO THE PLACE WHERE YOU ARE LIVING?: NO

## 2025-02-09 SDOH — SOCIAL STABILITY: SOCIAL INSECURITY: DO YOU FEEL ANYONE HAS EXPLOITED OR TAKEN ADVANTAGE OF YOU FINANCIALLY OR OF YOUR PERSONAL PROPERTY?: NO

## 2025-02-09 SDOH — HEALTH STABILITY: MENTAL HEALTH: HOW MANY DRINKS CONTAINING ALCOHOL DO YOU HAVE ON A TYPICAL DAY WHEN YOU ARE DRINKING?: PATIENT DOES NOT DRINK

## 2025-02-09 SDOH — ECONOMIC STABILITY: TRANSPORTATION INSECURITY: IN THE PAST 12 MONTHS, HAS LACK OF TRANSPORTATION KEPT YOU FROM MEDICAL APPOINTMENTS OR FROM GETTING MEDICATIONS?: NO

## 2025-02-09 SDOH — ECONOMIC STABILITY: FOOD INSECURITY: WITHIN THE PAST 12 MONTHS, THE FOOD YOU BOUGHT JUST DIDN'T LAST AND YOU DIDN'T HAVE MONEY TO GET MORE.: NEVER TRUE

## 2025-02-09 SDOH — SOCIAL STABILITY: SOCIAL INSECURITY: ABUSE: ADULT

## 2025-02-09 SDOH — ECONOMIC STABILITY: FOOD INSECURITY: HOW HARD IS IT FOR YOU TO PAY FOR THE VERY BASICS LIKE FOOD, HOUSING, MEDICAL CARE, AND HEATING?: SOMEWHAT HARD

## 2025-02-09 SDOH — HEALTH STABILITY: MENTAL HEALTH: HOW OFTEN DO YOU HAVE A DRINK CONTAINING ALCOHOL?: NEVER

## 2025-02-09 SDOH — SOCIAL STABILITY: SOCIAL INSECURITY: DOES ANYONE TRY TO KEEP YOU FROM HAVING/CONTACTING OTHER FRIENDS OR DOING THINGS OUTSIDE YOUR HOME?: NO

## 2025-02-09 SDOH — HEALTH STABILITY: MENTAL HEALTH: HOW OFTEN DO YOU HAVE SIX OR MORE DRINKS ON ONE OCCASION?: NEVER

## 2025-02-09 SDOH — SOCIAL STABILITY: SOCIAL INSECURITY: ARE THERE ANY APPARENT SIGNS OF INJURIES/BEHAVIORS THAT COULD BE RELATED TO ABUSE/NEGLECT?: NO

## 2025-02-09 SDOH — SOCIAL STABILITY: SOCIAL INSECURITY: WITHIN THE LAST YEAR, HAVE YOU BEEN HUMILIATED OR EMOTIONALLY ABUSED IN OTHER WAYS BY YOUR PARTNER OR EX-PARTNER?: NO

## 2025-02-09 SDOH — SOCIAL STABILITY: SOCIAL INSECURITY: HAVE YOU HAD THOUGHTS OF HARMING ANYONE ELSE?: NO

## 2025-02-09 SDOH — SOCIAL STABILITY: SOCIAL INSECURITY: HAS ANYONE EVER THREATENED TO HURT YOUR FAMILY OR YOUR PETS?: NO

## 2025-02-09 SDOH — SOCIAL STABILITY: SOCIAL INSECURITY: WERE YOU ABLE TO COMPLETE ALL THE BEHAVIORAL HEALTH SCREENINGS?: YES

## 2025-02-09 ASSESSMENT — LIFESTYLE VARIABLES
SKIP TO QUESTIONS 9-10: 1
SKIP TO QUESTIONS 9-10: 1
HOW OFTEN DO YOU HAVE A DRINK CONTAINING ALCOHOL: NEVER
HOW MANY STANDARD DRINKS CONTAINING ALCOHOL DO YOU HAVE ON A TYPICAL DAY: PATIENT DOES NOT DRINK
AUDIT-C TOTAL SCORE: 0
HOW OFTEN DO YOU HAVE 6 OR MORE DRINKS ON ONE OCCASION: NEVER

## 2025-02-09 ASSESSMENT — PAIN DESCRIPTION - LOCATION
LOCATION: HEAD
LOCATION: BACK

## 2025-02-09 ASSESSMENT — PAIN SCALES - GENERAL
PAINLEVEL_OUTOF10: 9
PAINLEVEL_OUTOF10: 0 - NO PAIN
PAINLEVEL_OUTOF10: 8
PAINLEVEL_OUTOF10: 10 - WORST POSSIBLE PAIN

## 2025-02-09 ASSESSMENT — ACTIVITIES OF DAILY LIVING (ADL): LACK_OF_TRANSPORTATION: NO

## 2025-02-09 ASSESSMENT — PAIN - FUNCTIONAL ASSESSMENT: PAIN_FUNCTIONAL_ASSESSMENT: 0-10

## 2025-02-09 ASSESSMENT — PAIN DESCRIPTION - PAIN TYPE: TYPE: ACUTE PAIN

## 2025-02-10 ENCOUNTER — APPOINTMENT (OUTPATIENT)
Dept: RADIOLOGY | Facility: HOSPITAL | Age: 35
End: 2025-02-10
Payer: MEDICAID

## 2025-02-10 VITALS
HEIGHT: 61 IN | TEMPERATURE: 97 F | DIASTOLIC BLOOD PRESSURE: 95 MMHG | WEIGHT: 176 LBS | BODY MASS INDEX: 33.23 KG/M2 | SYSTOLIC BLOOD PRESSURE: 151 MMHG | HEART RATE: 67 BPM | RESPIRATION RATE: 20 BRPM | OXYGEN SATURATION: 98 %

## 2025-02-10 LAB
ANION GAP SERPL CALC-SCNC: 9 MMOL/L (ref 10–20)
B-HCG SERPL-ACNC: <2 MIU/ML
BUN SERPL-MCNC: 12 MG/DL (ref 6–23)
CALCIUM SERPL-MCNC: 8.1 MG/DL (ref 8.6–10.3)
CHLORIDE SERPL-SCNC: 113 MMOL/L (ref 98–107)
CO2 SERPL-SCNC: 24 MMOL/L (ref 21–32)
CREAT SERPL-MCNC: 0.89 MG/DL (ref 0.5–1.05)
EGFRCR SERPLBLD CKD-EPI 2021: 86 ML/MIN/1.73M*2
ERYTHROCYTE [DISTWIDTH] IN BLOOD BY AUTOMATED COUNT: 15.6 % (ref 11.5–14.5)
GLUCOSE SERPL-MCNC: 93 MG/DL (ref 74–99)
HCT VFR BLD AUTO: 30.6 % (ref 36–46)
HGB BLD-MCNC: 9.9 G/DL (ref 12–16)
MCH RBC QN AUTO: 24.8 PG (ref 26–34)
MCHC RBC AUTO-ENTMCNC: 32.4 G/DL (ref 32–36)
MCV RBC AUTO: 77 FL (ref 80–100)
NRBC BLD-RTO: 0 /100 WBCS (ref 0–0)
PLATELET # BLD AUTO: 307 X10*3/UL (ref 150–450)
POTASSIUM SERPL-SCNC: 3.9 MMOL/L (ref 3.5–5.3)
RBC # BLD AUTO: 4 X10*6/UL (ref 4–5.2)
SODIUM SERPL-SCNC: 142 MMOL/L (ref 136–145)
WBC # BLD AUTO: 8.6 X10*3/UL (ref 4.4–11.3)

## 2025-02-10 PROCEDURE — 96376 TX/PRO/DX INJ SAME DRUG ADON: CPT | Mod: 59

## 2025-02-10 PROCEDURE — 84702 CHORIONIC GONADOTROPIN TEST: CPT | Performed by: NURSE PRACTITIONER

## 2025-02-10 PROCEDURE — 2500000004 HC RX 250 GENERAL PHARMACY W/ HCPCS (ALT 636 FOR OP/ED): Mod: JZ | Performed by: INTERNAL MEDICINE

## 2025-02-10 PROCEDURE — 72148 MRI LUMBAR SPINE W/O DYE: CPT | Performed by: RADIOLOGY

## 2025-02-10 PROCEDURE — 72148 MRI LUMBAR SPINE W/O DYE: CPT

## 2025-02-10 PROCEDURE — G0378 HOSPITAL OBSERVATION PER HR: HCPCS

## 2025-02-10 PROCEDURE — 85027 COMPLETE CBC AUTOMATED: CPT | Performed by: INTERNAL MEDICINE

## 2025-02-10 PROCEDURE — 36415 COLL VENOUS BLD VENIPUNCTURE: CPT | Performed by: INTERNAL MEDICINE

## 2025-02-10 PROCEDURE — 96375 TX/PRO/DX INJ NEW DRUG ADDON: CPT | Mod: 59

## 2025-02-10 PROCEDURE — 80048 BASIC METABOLIC PNL TOTAL CA: CPT | Performed by: INTERNAL MEDICINE

## 2025-02-10 PROCEDURE — 72132 CT LUMBAR SPINE W/DYE: CPT | Performed by: RADIOLOGY

## 2025-02-10 PROCEDURE — 2500000001 HC RX 250 WO HCPCS SELF ADMINISTERED DRUGS (ALT 637 FOR MEDICARE OP): Performed by: NURSE PRACTITIONER

## 2025-02-10 PROCEDURE — 2500000004 HC RX 250 GENERAL PHARMACY W/ HCPCS (ALT 636 FOR OP/ED): Performed by: NURSE PRACTITIONER

## 2025-02-10 PROCEDURE — 72132 CT LUMBAR SPINE W/DYE: CPT

## 2025-02-10 PROCEDURE — 2550000001 HC RX 255 CONTRASTS: Performed by: STUDENT IN AN ORGANIZED HEALTH CARE EDUCATION/TRAINING PROGRAM

## 2025-02-10 RX ORDER — KETOROLAC TROMETHAMINE 30 MG/ML
30 INJECTION, SOLUTION INTRAMUSCULAR; INTRAVENOUS ONCE
Status: COMPLETED | OUTPATIENT
Start: 2025-02-10 | End: 2025-02-10

## 2025-02-10 RX ORDER — AMITRIPTYLINE HYDROCHLORIDE 10 MG/1
10 TABLET, FILM COATED ORAL NIGHTLY
Status: DISCONTINUED | OUTPATIENT
Start: 2025-02-10 | End: 2025-02-10 | Stop reason: HOSPADM

## 2025-02-10 RX ORDER — OXYCODONE AND ACETAMINOPHEN 5; 325 MG/1; MG/1
1 TABLET ORAL EVERY 6 HOURS PRN
Status: DISCONTINUED | OUTPATIENT
Start: 2025-02-10 | End: 2025-02-10 | Stop reason: HOSPADM

## 2025-02-10 RX ORDER — OXYCODONE HYDROCHLORIDE 5 MG/1
5 TABLET ORAL EVERY 4 HOURS PRN
Status: DISCONTINUED | OUTPATIENT
Start: 2025-02-10 | End: 2025-02-10

## 2025-02-10 RX ORDER — HYDROMORPHONE HYDROCHLORIDE 1 MG/ML
0.8 INJECTION, SOLUTION INTRAMUSCULAR; INTRAVENOUS; SUBCUTANEOUS
Status: DISCONTINUED | OUTPATIENT
Start: 2025-02-10 | End: 2025-02-10

## 2025-02-10 RX ORDER — OXYCODONE HYDROCHLORIDE 5 MG/1
5 TABLET ORAL EVERY 6 HOURS PRN
Status: DISCONTINUED | OUTPATIENT
Start: 2025-02-10 | End: 2025-02-10 | Stop reason: HOSPADM

## 2025-02-10 RX ORDER — HYDROMORPHONE HYDROCHLORIDE 1 MG/ML
0.8 INJECTION, SOLUTION INTRAMUSCULAR; INTRAVENOUS; SUBCUTANEOUS
Status: DISCONTINUED | OUTPATIENT
Start: 2025-02-10 | End: 2025-02-10 | Stop reason: HOSPADM

## 2025-02-10 RX ORDER — HYDROMORPHONE HYDROCHLORIDE 1 MG/ML
1 INJECTION, SOLUTION INTRAMUSCULAR; INTRAVENOUS; SUBCUTANEOUS
Status: DISCONTINUED | OUTPATIENT
Start: 2025-02-10 | End: 2025-02-10

## 2025-02-10 RX ORDER — OXYCODONE AND ACETAMINOPHEN 5; 325 MG/1; MG/1
1 TABLET ORAL EVERY 4 HOURS PRN
Status: DISCONTINUED | OUTPATIENT
Start: 2025-02-10 | End: 2025-02-10

## 2025-02-10 RX ORDER — BACLOFEN 10 MG/1
10 TABLET ORAL 3 TIMES DAILY PRN
Status: DISCONTINUED | OUTPATIENT
Start: 2025-02-10 | End: 2025-02-10 | Stop reason: HOSPADM

## 2025-02-10 RX ADMIN — BACLOFEN 10 MG: 10 TABLET ORAL at 12:13

## 2025-02-10 RX ADMIN — OXYCODONE HYDROCHLORIDE 5 MG: 5 TABLET ORAL at 08:34

## 2025-02-10 RX ADMIN — HYDROMORPHONE HYDROCHLORIDE 1 MG: 1 INJECTION, SOLUTION INTRAMUSCULAR; INTRAVENOUS; SUBCUTANEOUS at 05:12

## 2025-02-10 RX ADMIN — HYDROMORPHONE HYDROCHLORIDE 0.8 MG: 1 INJECTION, SOLUTION INTRAMUSCULAR; INTRAVENOUS; SUBCUTANEOUS at 09:29

## 2025-02-10 RX ADMIN — KETOROLAC TROMETHAMINE 30 MG: 30 INJECTION, SOLUTION INTRAMUSCULAR at 12:13

## 2025-02-10 RX ADMIN — DEXAMETHASONE SODIUM PHOSPHATE 4 MG: 4 INJECTION, SOLUTION INTRAMUSCULAR; INTRAVENOUS at 12:13

## 2025-02-10 RX ADMIN — HYDROMORPHONE HYDROCHLORIDE 0.8 MG: 1 INJECTION, SOLUTION INTRAMUSCULAR; INTRAVENOUS; SUBCUTANEOUS at 12:35

## 2025-02-10 RX ADMIN — HYDROMORPHONE HYDROCHLORIDE 1 MG: 1 INJECTION, SOLUTION INTRAMUSCULAR; INTRAVENOUS; SUBCUTANEOUS at 02:12

## 2025-02-10 RX ADMIN — IOHEXOL 75 ML: 350 INJECTION, SOLUTION INTRAVENOUS at 10:41

## 2025-02-10 RX ADMIN — HYDROMORPHONE HYDROCHLORIDE 0.8 MG: 1 INJECTION, SOLUTION INTRAMUSCULAR; INTRAVENOUS; SUBCUTANEOUS at 15:44

## 2025-02-10 ASSESSMENT — COGNITIVE AND FUNCTIONAL STATUS - GENERAL
DAILY ACTIVITIY SCORE: 24
MOBILITY SCORE: 24
DAILY ACTIVITIY SCORE: 24
MOBILITY SCORE: 24
PATIENT BASELINE BEDBOUND: NO

## 2025-02-10 ASSESSMENT — ACTIVITIES OF DAILY LIVING (ADL)
BATHING: INDEPENDENT
WALKS IN HOME: INDEPENDENT
TOILETING: INDEPENDENT
HEARING - RIGHT EAR: FUNCTIONAL
GROOMING: INDEPENDENT
FEEDING YOURSELF: INDEPENDENT
DRESSING YOURSELF: INDEPENDENT
PATIENT'S MEMORY ADEQUATE TO SAFELY COMPLETE DAILY ACTIVITIES?: YES
HEARING - LEFT EAR: FUNCTIONAL
LACK_OF_TRANSPORTATION: NO
ADEQUATE_TO_COMPLETE_ADL: YES
JUDGMENT_ADEQUATE_SAFELY_COMPLETE_DAILY_ACTIVITIES: YES

## 2025-02-10 ASSESSMENT — PAIN SCALES - GENERAL
PAINLEVEL_OUTOF10: 10 - WORST POSSIBLE PAIN
PAINLEVEL_OUTOF10: 10 - WORST POSSIBLE PAIN
PAINLEVEL_OUTOF10: 9
PAINLEVEL_OUTOF10: 10 - WORST POSSIBLE PAIN
PAINLEVEL_OUTOF10: 9
PAINLEVEL_OUTOF10: 10 - WORST POSSIBLE PAIN

## 2025-02-10 ASSESSMENT — PAIN DESCRIPTION - LOCATION
LOCATION: BACK

## 2025-02-10 ASSESSMENT — PAIN SCALES - PAIN ASSESSMENT IN ADVANCED DEMENTIA (PAINAD)
TOTALSCORE: MEDICATION (SEE MAR)

## 2025-02-10 ASSESSMENT — ENCOUNTER SYMPTOMS
BACK PAIN: 1
NUMBNESS: 1

## 2025-02-10 ASSESSMENT — PAIN - FUNCTIONAL ASSESSMENT
PAIN_FUNCTIONAL_ASSESSMENT: 0-10
PAIN_FUNCTIONAL_ASSESSMENT: 0-10

## 2025-02-10 NOTE — H&P
History Of Present Illness  Sandrita Adams is a 34 y.o. female presenting with acute on chronic back pain and headache. Endorses 1x episode pof urinary incontinence, denies and loss of bowel or changes to ambulation    Recent vaginal delivery with epidural. She reports symptoms  have been consistent since the epidural where she had to have 2 epidurals done. Her pain has been increasing in intensity and her headache is to the top of her head and the back of her head and feels as if someone is hitting her head with bricks. Also endorses worsening back pain, tenderness note to lumbar spine on exam       PMHX: HS. asthma  Past Medical History  Past Medical History:   Diagnosis Date    Asthma, mild intermittent (HHS-HCC)     albuterol prn    Hidradenitis suppurativa 10/29/2020    Hidradenitis    History of gestational hypertension     Lupus     diagnosed by her PCP in 2017 based on symptoms of joint pain and facial rash. 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 She has never used smokeless tobacco. She reports that she does not currently use alcohol. She reports that she does not use drugs.    Family History  No family history on file.     Allergies  Suboxone [buprenorphine-naloxone], Clarithromycin, Haloperidol, Keflex [cephalexin], Levofloxacin, Metoclopramide, and Reglan [metoclopramide hcl]    Review of Systems   Musculoskeletal:  Positive for back pain.   Neurological:  Positive for numbness.   All other systems reviewed and are negative.       Physical Exam  Constitutional:       Appearance: Normal  "appearance.   Cardiovascular:      Rate and Rhythm: Normal rate and regular rhythm.      Pulses: Normal pulses.      Heart sounds: Normal heart sounds. No murmur heard.     No gallop.   Pulmonary:      Effort: Pulmonary effort is normal. No respiratory distress.      Breath sounds: Normal breath sounds. No wheezing or rhonchi.   Abdominal:      General: Abdomen is flat. Bowel sounds are normal. There is no distension.      Palpations: Abdomen is soft.      Tenderness: There is no abdominal tenderness. There is no guarding.   Musculoskeletal:      Comments: Tenderness to lumbar spine    Skin:     General: Skin is warm.      Capillary Refill: Capillary refill takes less than 2 seconds.   Neurological:      Mental Status: She is alert and oriented to person, place, and time.   Psychiatric:         Mood and Affect: Mood normal.         Thought Content: Thought content normal.         Judgment: Judgment normal.          Last Recorded Vitals  Blood pressure 127/72, pulse 86, temperature 36.5 °C (97.7 °F), temperature source Temporal, resp. rate 18, height 1.549 m (5' 1\"), weight 79.8 kg (176 lb), last menstrual period 05/18/2024, SpO2 96%, not currently breastfeeding.    Relevant Results  Scheduled medications  aspirin, 162 mg, oral, Daily  pantoprazole, 40 mg, oral, Daily before breakfast   Or  pantoprazole, 40 mg, intravenous, Daily before breakfast  sodium chloride, 1,000 mL, intravenous, Once      Continuous medications  sodium chloride 0.9%, 100 mL/hr      PRN medications  PRN medications: acetaminophen **OR** acetaminophen **OR** acetaminophen, HYDROmorphone, ondansetron **OR** ondansetron, oxyCODONE-acetaminophen **AND** oxyCODONE, sennosides-docusate sodium    Results for orders placed or performed during the hospital encounter of 02/09/25 (from the past 24 hours)   CBC and Auto Differential   Result Value Ref Range    WBC 9.2 4.4 - 11.3 x10*3/uL    nRBC 0.0 0.0 - 0.0 /100 WBCs    RBC 4.26 4.00 - 5.20 x10*6/uL    " Hemoglobin 10.7 (L) 12.0 - 16.0 g/dL    Hematocrit 32.1 (L) 36.0 - 46.0 %    MCV 75 (L) 80 - 100 fL    MCH 25.1 (L) 26.0 - 34.0 pg    MCHC 33.3 32.0 - 36.0 g/dL    RDW 15.4 (H) 11.5 - 14.5 %    Platelets 336 150 - 450 x10*3/uL    Neutrophils % 56.0 40.0 - 80.0 %    Immature Granulocytes %, Automated 0.5 0.0 - 0.9 %    Lymphocytes % 36.0 13.0 - 44.0 %    Monocytes % 6.6 2.0 - 10.0 %    Eosinophils % 0.6 0.0 - 6.0 %    Basophils % 0.3 0.0 - 2.0 %    Neutrophils Absolute 5.16 1.20 - 7.70 x10*3/uL    Immature Granulocytes Absolute, Automated 0.05 0.00 - 0.70 x10*3/uL    Lymphocytes Absolute 3.33 1.20 - 4.80 x10*3/uL    Monocytes Absolute 0.61 0.10 - 1.00 x10*3/uL    Eosinophils Absolute 0.06 0.00 - 0.70 x10*3/uL    Basophils Absolute 0.03 0.00 - 0.10 x10*3/uL   Comprehensive metabolic panel   Result Value Ref Range    Glucose 96 74 - 99 mg/dL    Sodium 140 136 - 145 mmol/L    Potassium 4.3 3.5 - 5.3 mmol/L    Chloride 109 (H) 98 - 107 mmol/L    Bicarbonate 26 21 - 32 mmol/L    Anion Gap 9 (L) 10 - 20 mmol/L    Urea Nitrogen 12 6 - 23 mg/dL    Creatinine 1.08 (H) 0.50 - 1.05 mg/dL    eGFR 68 >60 mL/min/1.73m*2    Calcium 8.8 8.6 - 10.3 mg/dL    Albumin 3.8 3.4 - 5.0 g/dL    Alkaline Phosphatase 67 33 - 110 U/L    Total Protein 6.8 6.4 - 8.2 g/dL    AST 19 9 - 39 U/L    Bilirubin, Total 0.3 0.0 - 1.2 mg/dL    ALT 11 7 - 45 U/L   C-reactive protein   Result Value Ref Range    C-Reactive Protein 0.41 <1.00 mg/dL   Sedimentation Rate   Result Value Ref Range    Sedimentation Rate 14 0 - 20 mm/h   Troponin I, High Sensitivity, Initial   Result Value Ref Range    Troponin I, High Sensitivity 3 0 - 13 ng/L   Protime-INR   Result Value Ref Range    Protime 12.8 9.8 - 12.8 seconds    INR 1.1 0.9 - 1.1   aPTT   Result Value Ref Range    aPTT 29 27 - 38 seconds   Urinalysis with Reflex Culture and Microscopic   Result Value Ref Range    Color, Urine Light-Yellow Light-Yellow, Yellow, Dark-Yellow    Appearance, Urine Clear Clear     Specific Gravity, Urine 1.015 1.005 - 1.035    pH, Urine 5.5 5.0, 5.5, 6.0, 6.5, 7.0, 7.5, 8.0    Protein, Urine NEGATIVE NEGATIVE, 10 (TRACE), 20 (TRACE) mg/dL    Glucose, Urine Normal Normal mg/dL    Blood, Urine NEGATIVE NEGATIVE mg/dL    Ketones, Urine NEGATIVE NEGATIVE mg/dL    Bilirubin, Urine NEGATIVE NEGATIVE mg/dL    Urobilinogen, Urine Normal Normal mg/dL    Nitrite, Urine NEGATIVE NEGATIVE    Leukocyte Esterase, Urine NEGATIVE NEGATIVE   Troponin, High Sensitivity, 1 Hour   Result Value Ref Range    Troponin I, High Sensitivity 3 0 - 13 ng/L   CBC   Result Value Ref Range    WBC 8.6 4.4 - 11.3 x10*3/uL    nRBC 0.0 0.0 - 0.0 /100 WBCs    RBC 4.00 4.00 - 5.20 x10*6/uL    Hemoglobin 9.9 (L) 12.0 - 16.0 g/dL    Hematocrit 30.6 (L) 36.0 - 46.0 %    MCV 77 (L) 80 - 100 fL    MCH 24.8 (L) 26.0 - 34.0 pg    MCHC 32.4 32.0 - 36.0 g/dL    RDW 15.6 (H) 11.5 - 14.5 %    Platelets 307 150 - 450 x10*3/uL   Basic metabolic panel   Result Value Ref Range    Glucose 93 74 - 99 mg/dL    Sodium 142 136 - 145 mmol/L    Potassium 3.9 3.5 - 5.3 mmol/L    Chloride 113 (H) 98 - 107 mmol/L    Bicarbonate 24 21 - 32 mmol/L    Anion Gap 9 (L) 10 - 20 mmol/L    Urea Nitrogen 12 6 - 23 mg/dL    Creatinine 0.89 0.50 - 1.05 mg/dL    eGFR 86 >60 mL/min/1.73m*2    Calcium 8.1 (L) 8.6 - 10.3 mg/dL     *Note: Due to a large number of results and/or encounters for the requested time period, some results have not been displayed. A complete set of results can be found in Results Review.       CT head wo IV contrast    Result Date: 2/9/2025  Interpreted By:  Erick Hodges, STUDY: CT HEAD WO IV CONTRAST;  2/9/2025 9:08 pm   INDICATION: Signs/Symptoms:headache.     COMPARISON: 03/2024   ACCESSION NUMBER(S): [id number]   ORDERING CLINICIAN: [facility]   TECHNIQUE: Noncontrast axial CT scan of head was performed. Angled reformats in brain and bone windows were generated. The images were reviewed in bone, brain, blood and soft tissue  windows.   FINDINGS: CSF Spaces: The ventricles, sulci and basal cisterns are are stable slight prominence of the right temporal horn. There is no extraaxial fluid collection.   Parenchyma:  The grey-white differentiation is intact. There is no mass effect or midline shift.  There is no intracranial hemorrhage. Unchanged left frontal hypoattenuating focus noted.   Calvarium: The calvarium is unremarkable.   Paranasal sinuses and mastoids: Visualized paranasal sinuses and mastoids are clear.       No evidence of acute cortical infarct or intracranial hemorrhage.   No change when compared to prior. If persistent concern, MRI can be considered.   MACRO: None   Signed by: Erick Hodges 2/9/2025 9:55 PM Dictation workstation:   [id number]    Point of Care Ultrasound    Result Date: 1/11/2025  Mychal Christopher MD     1/13/2025 11:55 AM Performed by: Mychal Christopher MD Authorized by: Mychal Christopher MD  Genitourinary Indications: evaluation for fetal cardiac activity Procedure: Pelvic Ultrasound Exam: transabdominal exam Findings: IUP: The pelvis was visualized and there was an INTRAUTERINE PREGNANCY visualized (a yolk sac, fetal pole or fetus was seen). Fetal Heart Rate: 138 bpm Impression: Comments: Fetal heart rate 138, fetal movement visualized        Assessment/Plan   Assessment & Plan  Intractable back pain      Acute on chronic back pain  -on chronic Percocet at home (10 mg oxy)-> percocet (5mg) + 5 mg additional oxy ordered here as equivalent  -breakthrough dilaudid  -MRI ordered- no acute finding  -CT spine ordered - no acute finding  -endorses she has already tried gabapentin and lyrica, both with no relief   -has seen pain management in past- both at  in 2020 and [facility] in 2021   -does see pain management at [facility], last seen 1/28/25: they did discuss restarting gabapentin 400 BID/TID      Headache  -continue percocet  -CT head neg  -given epidural was 28 days ago, low suspicion for spinal headache, if  she was leaking any CSF from the epidural it should have self resolved by now      DVT prophylaxis:  Low risk, SCD     DC plan:  DC home when medically stable    Labs/Testing reviewed    Interdisciplinary team rounding completed with hospitalist, nurse, TCC    NP discussed plan and lab/testing results with Dr. Oneil        I spent 45 minutes in the professional and overall care of this patient.      Maricruz Portillo, APRN-CNP

## 2025-02-10 NOTE — ED TRIAGE NOTES
Pt c/o back pain and headache that started in the past few weeks. Pt states recently she started having a hard time holding her urine. Pt recently delivered 1/18/25.

## 2025-02-10 NOTE — PROGRESS NOTES
Pharmacy Medication History Review    Sandrita Adams is a 34 y.o. female admitted for Intractable back pain. Pharmacy reviewed the patient's [id number] medications and allergies for accuracy.    The list below reflectives the updated PTA list. Please review each medication in order reconciliation for additional clarification and justification.  Prior to Admission Medications   Prescriptions   adalimumab (Humira,CF, Pen) 80 mg/0.8 mL pen injector kit pen-injector   Sig: Inject 1 Pen (80 mg) under the skin every 14 (fourteen) days.   aspirin 81 mg EC tablet   Sig: Take 2 tablets (162 mg) by mouth once daily.   clindamycin (Cleocin T) 1 % lotion   Sig: Apply one to two times daily to the affected areas.   ondansetron ODT (Zofran-ODT) 4 mg disintegrating tablet   Sig: Take 1 tablet (4 mg) by mouth every 8 hours if needed for nausea or vomiting.   oxyCODONE-acetaminophen (Percocet)  mg tablet   Sig: Take 1 tablet by mouth every 4 hours if needed for severe pain (7 - 10) for up to 14 days.   secukinumab 300 mg/2 mL (150 mg/mL) pen injector   Sig: Inject 300 mg under the skin see administration instructions. Maintenance dose: Every 4 weeks after loading dose.   sennosides-docusate sodium (Kathleen-Colace) 8.6-50 mg tablet   Sig: Take 2 tablets by mouth 2 times a day as needed for constipation.      Facility-Administered Medications: None          The list below reflectives the updated allergy list. Please review each documented allergy for additional clarification and justification.  Allergies  Reviewed by Rock Kirk RN on 2/9/2025        Severity Reactions Comments    Suboxone [buprenorphine-naloxone] High Anaphylaxis, Hives, Itching     Clarithromycin Medium Hives     Haloperidol Medium Hallucinations, Psychosis     Keflex [cephalexin] Medium Hives     Levofloxacin Medium Swelling Ankle Joint/tendon pain    Metoclopramide Medium Headache, Hallucinations, Psychosis     Reglan [metoclopramide Hcl]  Medium Psychosis           Hailee Paul, PharmD

## 2025-02-10 NOTE — NURSING NOTE
Patient remains upset regarding medication and asking for dilaudid.  RN explained that we have to give the oral medication an hour to reassess and determine if its working.     Standing/Walking/Toileting

## 2025-02-10 NOTE — DISCHARGE SUMMARY
Discharge Diagnosis  Intractable back pain    Issues Requiring Follow-Up  PCP  Pain management     Discharge Meds     Medication List      CONTINUE taking these medications     aspirin 81 mg EC tablet; Take 2 tablets (162 mg) by mouth once daily.   Boost 0.04 gram- 1 kcal/mL liquid; Generic drug: food supplemt,   lactose-reduced; Please dispense 14 bottles for patient to have BID for 7   days.   clindamycin 1 % lotion; Commonly known as: Cleocin T; Apply one to two   times daily to the affected areas.   famotidine 20 mg tablet; Commonly known as: Pepcid; Take 1 tablet (20   mg) by mouth 2 times a day.   * Humira(CF) Pen Crohns-UC-HS 80 mg/0.8 mL pen injector kit   pen-injector; Generic drug: adalimumab; Inject 2 pens (160mg) under the   skin on day 0, then 1 pen (80mg) on day 15   * Humira(CF) Pen 80 mg/0.8 mL pen injector kit pen-injector; Generic   drug: adalimumab; Inject 1 Pen (80 mg) under the skin every 14 (fourteen)   days.   naloxone 4 mg/0.1 mL nasal spray; Commonly known as: Narcan; Administer   1 spray (4 mg) into affected nostril(s) if needed for opioid reversal. May   repeat every 2-3 minutes if needed, alternating nostrils, until medical   assistance becomes available.   ondansetron ODT 4 mg disintegrating tablet; Commonly known as:   Zofran-ODT; Take 1 tablet (4 mg) by mouth every 8 hours if needed for   nausea or vomiting.   oxyCODONE-acetaminophen  mg tablet; Commonly known as: Percocet;   Take 1 tablet by mouth every 4 hours if needed for severe pain (7 - 10)   for up to 14 days.   Prenatal 19 29 mg iron- 1 mg tablet; Generic drug: -iron   fum-folic acid; Take 1 tablet by mouth once daily.   * secukinumab 300 mg/2 mL (150 mg/mL) pen injector; Inject 300 mg (1   pen) under the skin at week 3 and 4 to finish loading dose. Patient   received samples for week 0, 1, and 2   * secukinumab 300 mg/2 mL (150 mg/mL) pen injector; Inject 300 mg under   the skin see administration instructions.  Maintenance dose: Every 4 weeks   after loading dose.   Senexon-S 8.6-50 mg tablet; Generic drug: sennosides-docusate sodium;   Take 2 tablets by mouth 2 times a day as needed for constipation.  * This list has 4 medication(s) that are the same as other medications   prescribed for you. Read the directions carefully, and ask your doctor or   other care provider to review them with you.       Test Results Pending At Discharge  Pending Labs       Order Current Status    Extra Urine Gray Tube Collected (02/09/25 2158)    Urinalysis with Reflex Culture and Microscopic In process            Hospital Course  Sandrita Adams is a 34 y.o. female presenting with acute on chronic back pain and headache. Endorses 1x episode pof urinary incontinence, denies and loss of bowel or changes to ambulation    Recent vaginal delivery with epidural. She reports symptoms  have been consistent since the epidural where she had to have 2 epidurals done. Her pain has been increasing in intensity and her headache is to the top of her head and the back of her head and feels as if someone is hitting her head with bricks. Also endorses worsening back pain, tenderness note to lumbar spine on exam     She was admitted for further monitoring and work up. She went underwent and MRI and CT of her spine, both of which did not show any acute abnormality to be source of pain following epidural. She is already on long term Percocet. Did discuss adding gabapentin or lyrica to regimen but patient endorses she was on both of these previously and they did not help. A migraine cocktail was ordered for her migraine. Given this is over 2 weeks past her epidural, low suspicion for spinal headache but regardless any leak should have self- resolved by now. Given imaging is negative for any acute process, this could just be her chronic pain or some residual pain from epidural, however given imaging is negative for any acute pathology no further work up is  needed. She is able to ambulate without difficulty, seen ambulating the unit. She is to follow with her pain management team at [facility]. Did offer gabapentin as it was also recommended by pain management to which she declined. Also declined amitriptyline and lyrica. Recently dispensed 84 percocets on 2/4/25, no RX needed at DC. Stable for DC with follow up for PCP and pain management       Labs/Testing reviewed    Interdisciplinary team rounding completed with hospitalist, nurse, TCC    NP discussed plan and lab/testing results with Dr. Oneil        Pertinent Physical Exam At Time of Discharge  Physical Exam    Outpatient Follow-Up  Future Appointments   Date Time Provider Department Center   2/11/2025  2:00 PM [facility]   2/18/2025 11:30 AM Kayla Angulo MD [id number]   4/3/2025 11:15 AM MD Magalie [id number] [facility]   4/29/2025 11:15 AM Jacek Lucio [facility]   6/25/2025  8:45 AM MD Magalie [id number] [facility]         Maricruz Portillo, APRN-CNP

## 2025-02-10 NOTE — HOSPITAL COURSE
Sandrita Adams is a 34 y.o. female presenting with acute on chronic back pain and headache. Endorses 1x episode pof urinary incontinence, denies and loss of bowel or changes to ambulation    Recent vaginal delivery with epidural. She reports symptoms  have been consistent since the epidural where she had to have 2 epidurals done. Her pain has been increasing in intensity and her headache is to the top of her head and the back of her head and feels as if someone is hitting her head with bricks. Also endorses worsening back pain, tenderness note to lumbar spine on exam     She was admitted for further monitoring and work up. She went underwent and MRI and CT of her spine, both of which did not show any acute abnormality to be source of pain following epidural. She is already on long term Percocet. Did discuss adding gabapentin or lyrica to regimen but patient endorses she was on both of these previously and they did not help. A migraine cocktail was ordered for her migraine. Given this is over 2 weeks past her epidural, low suspicion for spinal headache but regardless any leak should have self- resolved by now. Given imaging is negative for any acute process, this could just be her chronic pain or some residual pain from epidural, however given imaging is negative for any acute pathology no further work up is needed. She is able to ambulate without difficulty, seen ambulating the unit. She is to follow with her pain management team at [facility]. Did offer gabapentin as it was also recommended by pain management to which she declined. Also declined amitriptyline and lyrica. Recently dispensed 84 percocets on 2/4/25, no RX needed at DC. Stable for DC with follow up for PCP and pain management       Labs/Testing reviewed    Interdisciplinary team rounding completed with hospitalist, nurse, TCC    NP discussed plan and lab/testing results with Dr. Oneil

## 2025-02-10 NOTE — PROGRESS NOTES
02/10/25 1449   Discharge Planning   Home or Post Acute Services None   Expected Discharge Disposition Home

## 2025-02-10 NOTE — CARE PLAN
The patient's goals for the shift include comfort    The clinical goals for the shift include mri    Problem: Pain - Adult  Goal: Verbalizes/displays adequate comfort level or baseline comfort level  Outcome: Progressing     Problem: Safety - Adult  Goal: Free from fall injury  Outcome: Progressing     Problem: Discharge Planning  Goal: Discharge to home or other facility with appropriate resources  Outcome: Progressing     Problem: Chronic Conditions and Co-morbidities  Goal: Patient's chronic conditions and co-morbidity symptoms are monitored and maintained or improved  Outcome: Progressing     Problem: Nutrition  Goal: Nutrient intake appropriate for maintaining nutritional needs  Outcome: Progressing

## 2025-02-10 NOTE — NURSING NOTE
Patient requesting pain medication.  RN informed patient that medication had been changed by physician.  Pt was unhappy and asked that RN tell the doctor that she wanted to talk to her.  Patient ambulating in hallway to bathroom without difficulty.  Roxicodone administered per orders. Patient requesting dilaudid,  RN explained that note is placed by physician that oxy must be given first.

## 2025-02-10 NOTE — NURSING NOTE
MD changed the order according to patients request.  Patient then requested a new nurse.  Charge nurse spoke with this RN and RN explained the situation.  RN is not able to change orders per the patients request.  Medication administered per new orders.

## 2025-02-10 NOTE — ED PROVIDER NOTES
HPI   Chief Complaint   Patient presents with   • Back Pain   • Headache       This is a 34-year-old woman with past medical history of -1-1-1 with chronic pain secondary to at bedtime, status post  on 2025 who does present with complaint of acute on worsening pain on the lower back status post difficult placement for epidural.  She was treated symptomatically following the procedure but states she is been having worsening pain associated headaches and 1 episode of urinary incontinence.  Denies any fever or chills.  Denies any vaginal pain or bleeding or discharge.  Denies any dysuria hematuria flank pain.  Denies any bloody stools.  She is able to ambulate.  Her motor and sensory intact distal down the legs.            Patient History   Past Medical History:   Diagnosis Date   • Asthma, mild intermittent ([facility])     albuterol prn   • Hidradenitis suppurativa 10/29/2020    Hidradenitis   • History of gestational hypertension    • Lupus     diagnosed by her PCP in 2017 based on symptoms of joint pain and facial rash. She states she is in remission   • MDD (major depressive disorder)     not on meds   • PTSD (post-traumatic stress disorder)     resulting from IPV     Past Surgical History:   Procedure Laterality Date   • OTHER SURGICAL HISTORY  2022    Arm surgery   • OTHER SURGICAL HISTORY Left     Left wrist   • OTHER SURGICAL HISTORY Left     Left wrist procedure     No family history on file.  Social History     Tobacco Use   • Smoking status: Some Days     Types: Cigarettes   • Smokeless tobacco: Never   Vaping Use   • Vaping status: Every Day   Substance Use Topics   • Alcohol use: Not Currently   • Drug use: Never       Physical Exam   ED Triage Vitals [25]   Temperature Heart Rate Respirations BP   36.6 °C (97.8 °F) 77 18 138/81      Pulse Ox Temp Source Heart Rate Source Patient Position   98 % Temporal Monitor Sitting      BP Location FiO2 (%)     Left arm --        Physical Exam  Vitals and nursing note reviewed.   Constitutional:       Appearance: She is well-developed and normal weight.   HENT:      Head: Normocephalic.      Mouth/Throat:      Mouth: Mucous membranes are moist.      Pharynx: Oropharynx is clear.   Eyes:      Extraocular Movements: Extraocular movements intact.      Pupils: Pupils are equal, round, and reactive to light.   Cardiovascular:      Rate and Rhythm: Normal rate and regular rhythm.      Heart sounds: Normal heart sounds.   Pulmonary:      Effort: Pulmonary effort is normal.      Breath sounds: Normal breath sounds.   Abdominal:      General: Bowel sounds are normal.      Palpations: Abdomen is soft.   Musculoskeletal:         General: Normal range of motion.      Cervical back: Normal range of motion and neck supple.   Skin:     General: Skin is warm and dry.      Capillary Refill: Capillary refill takes less than 2 seconds.   Neurological:      Mental Status: She is alert and oriented to person, place, and time.      GCS: GCS eye subscore is 4. GCS verbal subscore is 5. GCS motor subscore is 6.      Sensory: No sensory deficit.      Motor: No weakness.      Gait: Gait normal.   Psychiatric:         Mood and Affect: Mood normal.         Speech: Speech normal.         Behavior: Behavior normal.           ED Course & MDM   Diagnoses as of 02/09/25 2100   Acute bilateral low back pain without sciatica   Acute nonintractable headache, unspecified headache type                 No data recorded     Monik Coma Scale Score: 15 (02/09/25 2005 : Rock Kirk RN)                           Medical Decision Making  IV is placed and labs are drawn including CBC, CMP, ESR, CRP, UA and patient is sent for CT scan of the head and will require admission for MRI of the lower back for further management of her acute on chronic back pain.  I do not see any saddle anesthesia.  There is no clear motor or sensory deficit on the legs.  Does report 1 episode of  urinary incontinence.  She does not have any constipation reported.  Patient is given 1 mg of Dilaudid for pain control    Amount and/or Complexity of Data Reviewed  Labs: ordered. Decision-making details documented in ED Course.  Radiology: ordered. Decision-making details documented in ED Course.        Procedure  Procedures     Johnathan Quarles MD  02/09/25 0202

## 2025-02-10 NOTE — CARE PLAN
The patient's goals for the shift include      The clinical goals for the shift include pain management    Over the shift, the patient did not make progress toward the following goals.       Problem: Pain - Adult  Goal: Verbalizes/displays adequate comfort level or baseline comfort level  Outcome: Progressing     Problem: Discharge Planning  Goal: Discharge to home or other facility with appropriate resources  Outcome: Progressing     Problem: Chronic Conditions and Co-morbidities  Goal: Patient's chronic conditions and co-morbidity symptoms are monitored and maintained or improved  Outcome: Progressing

## 2025-02-10 NOTE — PROGRESS NOTES
02/10/25 0820   [facility] Disability Status   Are you deaf or do you have serious difficulty hearing? N   Are you blind or do you have serious difficulty seeing, even when wearing glasses? N   Because of a physical, mental, or emotional condition, do you have serious difficulty concentrating, remembering, or making decisions? (5 years old or older) N   Do you have serious difficulty walking or climbing stairs? Y  (back pain)   Do you have serious difficulty dressing or bathing? N   Because of a physical, mental, or emotional condition, do you have serious difficulty doing errands alone such as visiting the doctor? N

## 2025-02-11 ENCOUNTER — PHARMACY VISIT (OUTPATIENT)
Dept: PHARMACY | Facility: CLINIC | Age: 35
End: 2025-02-11
Payer: MEDICAID

## 2025-02-11 ENCOUNTER — APPOINTMENT (OUTPATIENT)
Dept: SURGERY | Facility: HOSPITAL | Age: 35
End: 2025-02-11
Payer: MEDICAID

## 2025-02-13 ENCOUNTER — TELEPHONE (OUTPATIENT)
Dept: PEDIATRICS | Facility: CLINIC | Age: 35
End: 2025-02-13
Payer: MEDICAID

## 2025-02-13 NOTE — TELEPHONE ENCOUNTER
CHRISTI/KHALIF called patient to verify contact information and time to schedule transportation for patient's upcoming appointment. CHRISTI explained that CHRISTI will schedule transportation to pick patient up at 11am.

## 2025-02-14 DIAGNOSIS — L73.2 HIDRADENITIS SUPPURATIVA: ICD-10-CM

## 2025-02-14 DIAGNOSIS — Z79.899 OTHER LONG TERM (CURRENT) DRUG THERAPY: ICD-10-CM

## 2025-02-14 RX ORDER — SECUKINUMAB 300 MG/2ML
300 INJECTION SUBCUTANEOUS SEE ADMIN INSTRUCTIONS
Qty: 4 ML | Refills: 0 | Status: CANCELLED | OUTPATIENT
Start: 2025-02-14

## 2025-02-17 ENCOUNTER — HOSPITAL ENCOUNTER (EMERGENCY)
Facility: HOSPITAL | Age: 35
Discharge: HOME | End: 2025-02-18
Attending: STUDENT IN AN ORGANIZED HEALTH CARE EDUCATION/TRAINING PROGRAM
Payer: MEDICAID

## 2025-02-17 ENCOUNTER — SPECIALTY PHARMACY (OUTPATIENT)
Dept: PHARMACY | Facility: CLINIC | Age: 35
End: 2025-02-17

## 2025-02-17 VITALS
HEIGHT: 61 IN | WEIGHT: 175 LBS | OXYGEN SATURATION: 97 % | DIASTOLIC BLOOD PRESSURE: 91 MMHG | HEART RATE: 75 BPM | TEMPERATURE: 97.2 F | SYSTOLIC BLOOD PRESSURE: 124 MMHG | BODY MASS INDEX: 33.04 KG/M2 | RESPIRATION RATE: 18 BRPM

## 2025-02-17 DIAGNOSIS — L73.2 HIDRADENITIS SUPPURATIVA: Primary | ICD-10-CM

## 2025-02-17 LAB
ALBUMIN SERPL BCP-MCNC: 4 G/DL (ref 3.4–5)
ALP SERPL-CCNC: 68 U/L (ref 33–110)
ALT SERPL W P-5'-P-CCNC: 14 U/L (ref 7–45)
ANION GAP SERPL CALC-SCNC: 11 MMOL/L (ref 10–20)
AST SERPL W P-5'-P-CCNC: 12 U/L (ref 9–39)
BASOPHILS # BLD AUTO: 0.03 X10*3/UL (ref 0–0.1)
BASOPHILS NFR BLD AUTO: 0.4 %
BILIRUB SERPL-MCNC: 0.3 MG/DL (ref 0–1.2)
BUN SERPL-MCNC: 14 MG/DL (ref 6–23)
CALCIUM SERPL-MCNC: 9 MG/DL (ref 8.6–10.3)
CHLORIDE SERPL-SCNC: 107 MMOL/L (ref 98–107)
CO2 SERPL-SCNC: 24 MMOL/L (ref 21–32)
CREAT SERPL-MCNC: 0.88 MG/DL (ref 0.5–1.05)
EGFRCR SERPLBLD CKD-EPI 2021: 87 ML/MIN/1.73M*2
EOSINOPHIL # BLD AUTO: 0.09 X10*3/UL (ref 0–0.7)
EOSINOPHIL NFR BLD AUTO: 1.1 %
ERYTHROCYTE [DISTWIDTH] IN BLOOD BY AUTOMATED COUNT: 16.1 % (ref 11.5–14.5)
GLUCOSE SERPL-MCNC: 95 MG/DL (ref 74–99)
HCT VFR BLD AUTO: 37 % (ref 36–46)
HGB BLD-MCNC: 12 G/DL (ref 12–16)
IMM GRANULOCYTES # BLD AUTO: 0.02 X10*3/UL (ref 0–0.7)
IMM GRANULOCYTES NFR BLD AUTO: 0.2 % (ref 0–0.9)
LYMPHOCYTES # BLD AUTO: 3.09 X10*3/UL (ref 1.2–4.8)
LYMPHOCYTES NFR BLD AUTO: 36.6 %
MCH RBC QN AUTO: 24.5 PG (ref 26–34)
MCHC RBC AUTO-ENTMCNC: 32.4 G/DL (ref 32–36)
MCV RBC AUTO: 76 FL (ref 80–100)
MONOCYTES # BLD AUTO: 0.64 X10*3/UL (ref 0.1–1)
MONOCYTES NFR BLD AUTO: 7.6 %
NEUTROPHILS # BLD AUTO: 4.57 X10*3/UL (ref 1.2–7.7)
NEUTROPHILS NFR BLD AUTO: 54.1 %
NRBC BLD-RTO: 0 /100 WBCS (ref 0–0)
PLATELET # BLD AUTO: 321 X10*3/UL (ref 150–450)
POTASSIUM SERPL-SCNC: 4 MMOL/L (ref 3.5–5.3)
PROT SERPL-MCNC: 7.4 G/DL (ref 6.4–8.2)
RBC # BLD AUTO: 4.89 X10*6/UL (ref 4–5.2)
SODIUM SERPL-SCNC: 138 MMOL/L (ref 136–145)
WBC # BLD AUTO: 8.4 X10*3/UL (ref 4.4–11.3)

## 2025-02-17 PROCEDURE — 99284 EMERGENCY DEPT VISIT MOD MDM: CPT | Mod: 25 | Performed by: STUDENT IN AN ORGANIZED HEALTH CARE EDUCATION/TRAINING PROGRAM

## 2025-02-17 PROCEDURE — 96376 TX/PRO/DX INJ SAME DRUG ADON: CPT

## 2025-02-17 PROCEDURE — 80053 COMPREHEN METABOLIC PANEL: CPT | Performed by: EMERGENCY MEDICINE

## 2025-02-17 PROCEDURE — 2500000004 HC RX 250 GENERAL PHARMACY W/ HCPCS (ALT 636 FOR OP/ED): Performed by: EMERGENCY MEDICINE

## 2025-02-17 PROCEDURE — 85025 COMPLETE CBC W/AUTO DIFF WBC: CPT | Performed by: EMERGENCY MEDICINE

## 2025-02-17 PROCEDURE — 2500000004 HC RX 250 GENERAL PHARMACY W/ HCPCS (ALT 636 FOR OP/ED): Mod: JZ | Performed by: STUDENT IN AN ORGANIZED HEALTH CARE EDUCATION/TRAINING PROGRAM

## 2025-02-17 PROCEDURE — 96375 TX/PRO/DX INJ NEW DRUG ADDON: CPT

## 2025-02-17 PROCEDURE — 36415 COLL VENOUS BLD VENIPUNCTURE: CPT | Performed by: EMERGENCY MEDICINE

## 2025-02-17 PROCEDURE — 96365 THER/PROPH/DIAG IV INF INIT: CPT

## 2025-02-17 RX ORDER — ONDANSETRON HYDROCHLORIDE 2 MG/ML
4 INJECTION, SOLUTION INTRAVENOUS ONCE
Status: COMPLETED | OUTPATIENT
Start: 2025-02-17 | End: 2025-02-17

## 2025-02-17 RX ORDER — HYDROMORPHONE HYDROCHLORIDE 1 MG/ML
1 INJECTION, SOLUTION INTRAMUSCULAR; INTRAVENOUS; SUBCUTANEOUS
Status: DISCONTINUED | OUTPATIENT
Start: 2025-02-17 | End: 2025-02-17

## 2025-02-17 RX ORDER — HYDROMORPHONE HYDROCHLORIDE 2 MG/ML
2 INJECTION, SOLUTION INTRAMUSCULAR; INTRAVENOUS; SUBCUTANEOUS ONCE
Status: DISCONTINUED | OUTPATIENT
Start: 2025-02-17 | End: 2025-02-17

## 2025-02-17 RX ORDER — CLINDAMYCIN PHOSPHATE 900 MG/50ML
900 INJECTION, SOLUTION INTRAVENOUS ONCE
Status: COMPLETED | OUTPATIENT
Start: 2025-02-17 | End: 2025-02-17

## 2025-02-17 RX ADMIN — ONDANSETRON 4 MG: 2 INJECTION, SOLUTION INTRAMUSCULAR; INTRAVENOUS at 20:58

## 2025-02-17 RX ADMIN — HYDROMORPHONE HYDROCHLORIDE 2 MG: 2 INJECTION, SOLUTION INTRAMUSCULAR; INTRAVENOUS; SUBCUTANEOUS at 22:04

## 2025-02-17 RX ADMIN — HYDROMORPHONE HYDROCHLORIDE 1 MG: 1 INJECTION, SOLUTION INTRAMUSCULAR; INTRAVENOUS; SUBCUTANEOUS at 20:58

## 2025-02-17 RX ADMIN — CLINDAMYCIN PHOSPHATE 900 MG: 900 INJECTION, SOLUTION INTRAVENOUS at 22:04

## 2025-02-17 RX ADMIN — HYDROMORPHONE HYDROCHLORIDE 2 MG: 2 INJECTION, SOLUTION INTRAMUSCULAR; INTRAVENOUS; SUBCUTANEOUS at 23:22

## 2025-02-17 ASSESSMENT — PAIN SCALES - GENERAL
PAINLEVEL_OUTOF10: 9
PAINLEVEL_OUTOF10: 10 - WORST POSSIBLE PAIN
PAINLEVEL_OUTOF10: 9
PAINLEVEL_OUTOF10: 9

## 2025-02-17 ASSESSMENT — PAIN DESCRIPTION - DESCRIPTORS: DESCRIPTORS: SHOOTING

## 2025-02-17 ASSESSMENT — PAIN - FUNCTIONAL ASSESSMENT
PAIN_FUNCTIONAL_ASSESSMENT: 0-10
PAIN_FUNCTIONAL_ASSESSMENT: 0-10

## 2025-02-17 ASSESSMENT — PAIN DESCRIPTION - ONSET: ONSET: GRADUAL

## 2025-02-17 ASSESSMENT — PAIN DESCRIPTION - PROGRESSION: CLINICAL_PROGRESSION: NOT CHANGED

## 2025-02-17 ASSESSMENT — PAIN DESCRIPTION - LOCATION: LOCATION: ARM

## 2025-02-17 ASSESSMENT — PAIN DESCRIPTION - FREQUENCY: FREQUENCY: CONSTANT/CONTINUOUS

## 2025-02-17 ASSESSMENT — PAIN DESCRIPTION - PAIN TYPE: TYPE: ACUTE PAIN

## 2025-02-17 NOTE — ED TRIAGE NOTES
Hs flare up with pain in armpits, groin, and side. Skin boils present. Recently taken off of IV antibiotics and concerned for infection.

## 2025-02-17 NOTE — ED TRIAGE NOTES
TRIAGE NOTE   I saw the patient as the Clinician in Triage and performed a brief history and physical exam, established acuity, and ordered appropriate tests to develop basic plan of care. Patient will be seen by an TIM, resident and/or physician who will independently evaluate the patient. Please see subsequent provider notes for further details and disposition.     Chief complaint: Pain    History of present illness: Patient 34-year-old female with history stated adenitis suppurativa presenting to the emergency department with complaints of pain under her armpits and in her groin.  The patient has a history of opiate dependence.  The patient recently delivered a child and as result she has been off of her biologic medications.  The patient states that she is not breast-feeding.  The patient states that she has been having worsening pain secondary to this had adenitis suppurativa flare.  Concern, the patient presents to the emergency department for further evaluation and therapy she denies the use of any recent p.o. antibiotic therapy.    Physical examination: General: Patient is an age-appropriate well-appearing female resting comfortably in the examination table  Cardiovascular: Patient has a regular rate and rhythm  Lungs: Lungs are clear to auscultation bilaterally  Abdomen: Patient's abdomen is soft nontender nondistended.  Patient has normal bowel sounds. There is no guarding or rebound tenderness.  Neuro: Patient is alert she moves all 4 extremities spontaneously there are no lateralizing neurologic deficits cranial nerves III through XII are intact.  Skin: Patient has skin findings consistent with adenitis suppurativa in her armpits as well as groin.  There is no active cellulitis appreciated.    Patient presents to the emergency department with complaints of pain secondary to set head adenitis suppurativa.  I ordered basic blood work for the patient as well as several doses of pain medication.  The  patient appears clinically well she has been prescribed opiates as an outpatient by a primary care physician and being evaluated by pain management in the near future.  I ordered a dose of clindamycin I expect the patient to be safely discharged home with clindamycin as long as she does not have any concerning lab findings.

## 2025-02-18 ENCOUNTER — OFFICE VISIT (OUTPATIENT)
Dept: OBSTETRICS AND GYNECOLOGY | Facility: CLINIC | Age: 35
End: 2025-02-18
Payer: MEDICAID

## 2025-02-18 VITALS
DIASTOLIC BLOOD PRESSURE: 95 MMHG | BODY MASS INDEX: 32.77 KG/M2 | HEART RATE: 86 BPM | HEIGHT: 61 IN | SYSTOLIC BLOOD PRESSURE: 142 MMHG | WEIGHT: 173.6 LBS

## 2025-02-18 DIAGNOSIS — G89.29 OTHER CHRONIC PAIN: ICD-10-CM

## 2025-02-18 DIAGNOSIS — F11.20 OPIOID DEPENDENCE, DAILY USE (MULTI): Primary | ICD-10-CM

## 2025-02-18 DIAGNOSIS — L73.2 HIDRADENITIS SUPPURATIVA: ICD-10-CM

## 2025-02-18 PROCEDURE — 3077F SYST BP >= 140 MM HG: CPT | Performed by: OBSTETRICS & GYNECOLOGY

## 2025-02-18 PROCEDURE — 99214 OFFICE O/P EST MOD 30 MIN: CPT | Performed by: OBSTETRICS & GYNECOLOGY

## 2025-02-18 PROCEDURE — 99214 OFFICE O/P EST MOD 30 MIN: CPT | Mod: TH | Performed by: OBSTETRICS & GYNECOLOGY

## 2025-02-18 PROCEDURE — 3008F BODY MASS INDEX DOCD: CPT | Performed by: OBSTETRICS & GYNECOLOGY

## 2025-02-18 PROCEDURE — 3080F DIAST BP >= 90 MM HG: CPT | Performed by: OBSTETRICS & GYNECOLOGY

## 2025-02-18 RX ORDER — OXYCODONE AND ACETAMINOPHEN 10; 325 MG/1; MG/1
1 TABLET ORAL EVERY 4 HOURS PRN
Qty: 84 TABLET | Refills: 0 | Status: SHIPPED | OUTPATIENT
Start: 2025-02-18 | End: 2025-03-04

## 2025-02-18 ASSESSMENT — ENCOUNTER SYMPTOMS
RESPIRATORY NEGATIVE: 0
ALLERGIC/IMMUNOLOGIC NEGATIVE: 0
CARDIOVASCULAR NEGATIVE: 0
HEMATOLOGIC/LYMPHATIC NEGATIVE: 0
EYES NEGATIVE: 0
GASTROINTESTINAL NEGATIVE: 0
NEUROLOGICAL NEGATIVE: 0
PSYCHIATRIC NEGATIVE: 0
CONSTITUTIONAL NEGATIVE: 0
ENDOCRINE NEGATIVE: 0
MUSCULOSKELETAL NEGATIVE: 0

## 2025-02-18 ASSESSMENT — PAIN SCALES - GENERAL: PAINLEVEL_OUTOF10: 9

## 2025-02-18 ASSESSMENT — EDINBURGH POSTNATAL DEPRESSION SCALE (EPDS)
I HAVE BLAMED MYSELF UNNECESSARILY WHEN THINGS WENT WRONG: NO, NEVER
I HAVE FELT SAD OR MISERABLE: NO, NOT AT ALL
I HAVE LOOKED FORWARD WITH ENJOYMENT TO THINGS: AS MUCH AS I EVER DID
THINGS HAVE BEEN GETTING ON TOP OF ME: NO, I HAVE BEEN COPING AS WELL AS EVER
I HAVE BEEN ABLE TO LAUGH AND SEE THE FUNNY SIDE OF THINGS: AS MUCH AS I ALWAYS COULD
I HAVE BEEN ANXIOUS OR WORRIED FOR NO GOOD REASON: NO, NOT AT ALL
THE THOUGHT OF HARMING MYSELF HAS OCCURRED TO ME: NEVER
I HAVE BEEN SO UNHAPPY THAT I HAVE HAD DIFFICULTY SLEEPING: NOT AT ALL
TOTAL SCORE: 0
I HAVE FELT SCARED OR PANICKY FOR NO GOOD REASON: NO, NOT AT ALL
I HAVE BEEN SO UNHAPPY THAT I HAVE BEEN CRYING: NO, NEVER

## 2025-02-18 NOTE — ED PROVIDER NOTES
Emergency Department Provider Note        History of Present Illness     Chief Complaint: HS flare up   History provided by: Patient  Limitations to History: None  External Chart Review: See ED Course    HPI:  Sandrita Adams is a 34 y.o. female with PMHx of hidradenitis suppurativa presenting to the ED for pain.  Patient reports that she was off of her Biologics for hidradenitis due to pregnancy.  Over the past few days she has had flareup of her hidradenitis in her bilateral axilla, left flank, and groin.  She denies any overlying erythema.  Denies any drainage.  Denies any fevers or chills.  She was recently seen by dermatology and restarted on her biologic.  Was also previously on ertapenem through a PICC line but this is since been removed.  She is following with pain management and has an appointment tomorrow.  Also has an upcoming appointment with general surgery for consideration of possible excision.    Physical Exam   Triage vitals:  T 36.2 °C (97.2 °F)  HR 72  /85  RR 18  O2 98 % None (Room air)    Constitutional: Awake, alert, not in acute distress  HENT: Head atraumatic, mucous membranes moist  Eyes:  Conjunctivae normal  Neck:  Supple  Lungs: Clear to auscultation, breath sounds equal and symmetric, no wheezes, rales, or rhonchi  Heart: Regular rate and rhythm, no murmur, rub or gallop  Abdomen: Soft, nontender, nondistended, no rebound or guarding  Extremities: No lower extremity edema  Neuro: Alert, no focal deficit  Skin: Hidradenitis changes in bilateral axilla, 1 small area on left flank, and groin.  No overlying erythema.  No appreciable drainage.  Psych: Calm, cooperative       Medical Decision Making & ED Course   Medical Decision Making:  Sandrita Adams is a 34 y.o. female with PMHx as above in HPI who presented to the ED for hidradenitis flare. Patient was afebrile, hemodynamically stable, and satting on room air on arrival.     Exam as above.  She has multiple areas affected  by her hidradenitis however no superimposed cellulitis on exam.    Clinical course as below in ED course:  ED Course as of 02/18/25 1732 Mon Feb 17, 2025 2056 External chart review:  DC summary 2/10/25  Admitted for intractable back pain     [SS]   2106 CBC and Auto Differential(!)  No leukocytosis, anemia, or thrombocytopenia     [SS]   2106 Comprehensive metabolic panel  No clinically significant electrolyte abnormalities, no ALESSANDRA, not suggestive of obstructive biliary pathology or acute liver injury   [SS]      ED Course User Index  [SS] Steffen Simerlink, MD         Diagnoses as of 02/18/25 1732   Hidradenitis suppurativa       A/P:    She was given a dose of clindamycin by triage provider.  No evidence of superimposed cellulitis on exam therefore will hold further doses of antibiotics.  She was provided with analgesia.    On re-examination, they are well-appearing and have remained hemodynamically stable. They are ambulating without difficulty and tolerating PO.  She has requested discharge home and reports she is hopeful for future symptom control as she was recently restarted on her Biologics and has upcoming appointments to consider possible surgical excision.  They are appropriate for discharge at this time. They will follow up with PCP, Derm, surgery. Return precautions were reviewed. Plan was discussed with patient and they are agreeable.  ------------------------------------------------  Independent Test Interpretation: See ED Course    Disposition   As a result of the work-up, the patient was discharged home.  she was informed of her diagnosis and instructed to come back with any concerns or worsening of condition.  she and was agreeable to the plan as discussed above.  she was given the opportunity to ask questions.  All of the patient's questions were answered.    Procedures   Procedures    Steffen Simerlink, MD Steffen Simerlink, MD  02/18/25 1734

## 2025-02-18 NOTE — PROGRESS NOTES
"Assessment   33 YO  with chronic pain 2/2 HS, sp  on 25    Previously discussed today the risks of long term opioid use for chronic pain, including but not limited to development of hyperalgesia, physiological tolerance and dependency, desensitization of her mu opioid receptors, accidental overdose.  At this time, pt has no evidence of a MARQUIS. It is not known whether she is physiologically dependent as she has only uptitrated over time, reportedly due to poorly managed HS, and has not attempted weaning.  It is notable, however, that even when she was working and feeling her best on HS treatment, she still felt that the Percocet TID \"barely\" managed the pain at times. She also seemed surprised to hear that Percocet 10mg TID was a significant amount of daily opioids.    We had the conversation that her history raises the concern that she would have a difficult time weaning down on her Percocet, and that if she is unable to do so, I would personally recommend she consider switching to an agent like buprenorphine. Patient is reluctant, as she associates that medication with addiction and she is not addicted, and also because she had an adverse reaction when switching abrupt from Percocet 10mg TID --> Suboxone 2mg TID in the past. If she were to switch, I would recommend micro-dosing with Subutex. We will defer that conversation to postop.    I did express to Sandrita that for me to continue her Pain Management:  - I would require regular drug screens  - I would require attendance at appointments for refills  - I will not do early refills   - I will not be going up on her Percocet dose beyond the q4h dosing, even if she has acute post-op pain from the HS surgery. It would be my suggestion that she stay in the hospital until her pain control is adequate to go home on the q4h dosing.    PLAN  - Cont Percocet 10/325mg q4 hours until postop from HD surgery (at which pt is to go back to [facility])  - " "Narcan prescribed on 25  - UDS today with fentanyl confirm  - Cont following with Derm (next apptmt 2025). She asked for home injections of Cosenytx and says they only gave her one instead of weekly.  - Get HS surgery ASAP (preop scheduled 3/4/25)  - FU 2 weeks    Kayla Angulo MD     Subjective   33 YO  sp  on 25 at 36+1 weeks for baby boy \"Tano\" after presenting with PPROM.    Pregnancy Notable For  - HS, multiple flares, currently on Humira, previously on Cosentyx q2 weeks pre-pregnancy with better control. Pt has an apptmt with Derm tomorrow with Dr. Elizalde and it is planned for her to resume Cosentyx at that time. She is scheduled to have a preop consult for HS surgery with Dr. Coronel on 25.  - Chronic use of opioids for HS-related pain. Per PDMP, pt was being prescribed Percocet 10/325mg TID by [facility]. In pregnancy, her HS symptoms worsened, and by 2024 (11 weeks), pt had been uptitrated to Percocet 10/325mg Q4H by [facility]. Now patient is taking the Percocet 10mg q3 hours, since she \"doesn't have anything for breakthrough anymore\" since she's not longer in the hospital and getting Dilaudid. Reviewing her notes from [facility], it appears patient was previously trialed on morphine in 2024 (I believe as part of a process to help her wean down her opioid use) and Suboxone in Oct 2023 (2mg TID, \"had a reaction\"). She is planning to transition back to [facility] pain clinic after her HS surgery, having already met with her old Pain provider Margo Uriarte on 25. The plan per that note had been to go back down to Percocet 10mg TID max should she resume care with them.  - Asthma moderate persistent, on flonase and prn albuterol  - AMA  - IPV s/p event  with FOB, has new home alone    Feeding: formula  Contraception: none, pt states she is not sexually active x 6 months, no current partner.    Interval Hx:  Feels like she is not doing well.   Went to the ED " "3 times since our LV. Was given extra IV antibiotics for HD (but not outpatient antibiotics) and extra IV and PO opioids.  Has an apptmt with Gen Surg 3/4, had to reschedule the original apptmt bc baby was in the hospital for GERD sx.    Feels like the Cosenytx is not working well yet - \"last time, it worked good for me in the induction phase, but it's not working well this time.\"  She mentioned that Dr. Elizalde mentioned there is an option of a new biologic too for her HD if the Cosenytx doesn't work.    Reports having pain in her arms, sides, and underneath her stomach.  Reports pain was so bad she could not utilize her arms.  Still taking her oxycodone 10/325mg q4 hours.  Back pain from epidural is improved. Sp nl MRI/CT of the lumbar spine. Still having some headaches, with a band-like distribution.   Has some days when she does not flare ups and feels \"blessed\" on those days.  Has a Tempurpedic mattress.    PDMP: #84 tabs of oxycodone 10mg/325mg prescribed on 2/4/25  Last UDS 2/4: oxycodone metabolites    Past Medical History:   Diagnosis Date    Asthma, mild intermittent ([facility])     albuterol prn    Hidradenitis suppurativa 10/29/2020    Hidradenitis    History of gestational hypertension     Lupus     diagnosed by her PCP in 2017 based on symptoms of joint pain and facial rash. She states she is in remission    MDD (major depressive disorder)     not on meds    PTSD (post-traumatic stress disorder)     resulting from IPV     Objective   BP (!) 142/95   Pulse 86   Ht 1.549 m (5' 1\")   Wt 78.7 kg (173 lb 9.6 oz)   LMP 02/18/2025   Breastfeeding No   BMI 32.80 kg/m²   Pt reports BP is high bc of pain      General:   Alert and oriented, in no acute distress   Skin: No significant acne. No hirsutism.   Neck: Supple. No visible thyromegaly.    Psych Normal affect. Normal mood.        "

## 2025-02-19 LAB
FENTANYL UR QL SCN: NEGATIVE NG/ML
QUEST NOTES AND COMMENTS: NORMAL

## 2025-02-21 ENCOUNTER — SPECIALTY PHARMACY (OUTPATIENT)
Dept: PHARMACY | Facility: CLINIC | Age: 35
End: 2025-02-21

## 2025-02-24 ENCOUNTER — SPECIALTY PHARMACY (OUTPATIENT)
Dept: PHARMACY | Facility: CLINIC | Age: 35
End: 2025-02-24

## 2025-02-24 DIAGNOSIS — Z79.899 OTHER LONG TERM (CURRENT) DRUG THERAPY: ICD-10-CM

## 2025-02-24 DIAGNOSIS — L73.2 HIDRADENITIS SUPPURATIVA: ICD-10-CM

## 2025-02-24 RX ORDER — SECUKINUMAB 300 MG/2ML
300 INJECTION SUBCUTANEOUS SEE ADMIN INSTRUCTIONS
Qty: 4 ML | Refills: 0 | Status: CANCELLED | OUTPATIENT
Start: 2025-02-14

## 2025-02-25 ENCOUNTER — HOSPITAL ENCOUNTER (EMERGENCY)
Facility: HOSPITAL | Age: 35
Discharge: LEFT BEFORE DISCHARGE | End: 2025-02-25
Payer: MEDICAID

## 2025-02-25 PROCEDURE — 4500999001 HC ED NO CHARGE

## 2025-02-27 ENCOUNTER — HOSPITAL ENCOUNTER (EMERGENCY)
Facility: HOSPITAL | Age: 35
Discharge: HOME | End: 2025-02-27
Attending: STUDENT IN AN ORGANIZED HEALTH CARE EDUCATION/TRAINING PROGRAM
Payer: MEDICAID

## 2025-02-27 VITALS
OXYGEN SATURATION: 97 % | DIASTOLIC BLOOD PRESSURE: 68 MMHG | RESPIRATION RATE: 14 BRPM | WEIGHT: 173 LBS | HEIGHT: 63 IN | TEMPERATURE: 97.5 F | HEART RATE: 76 BPM | SYSTOLIC BLOOD PRESSURE: 122 MMHG | BODY MASS INDEX: 30.65 KG/M2

## 2025-02-27 DIAGNOSIS — L73.2 HIDRADENITIS SUPPURATIVA: Primary | ICD-10-CM

## 2025-02-27 LAB
ALBUMIN SERPL BCP-MCNC: 3.8 G/DL (ref 3.4–5)
ANION GAP SERPL CALC-SCNC: 10 MMOL/L (ref 10–20)
BASOPHILS # BLD AUTO: 0.03 X10*3/UL (ref 0–0.1)
BASOPHILS NFR BLD AUTO: 0.4 %
BUN SERPL-MCNC: 15 MG/DL (ref 6–23)
CALCIUM SERPL-MCNC: 8.9 MG/DL (ref 8.6–10.3)
CHLORIDE SERPL-SCNC: 110 MMOL/L (ref 98–107)
CO2 SERPL-SCNC: 24 MMOL/L (ref 21–32)
CREAT SERPL-MCNC: 0.97 MG/DL (ref 0.5–1.05)
EGFRCR SERPLBLD CKD-EPI 2021: 78 ML/MIN/1.73M*2
EOSINOPHIL # BLD AUTO: 0.09 X10*3/UL (ref 0–0.7)
EOSINOPHIL NFR BLD AUTO: 1.3 %
ERYTHROCYTE [DISTWIDTH] IN BLOOD BY AUTOMATED COUNT: 15.8 % (ref 11.5–14.5)
GLUCOSE SERPL-MCNC: 102 MG/DL (ref 74–99)
HCT VFR BLD AUTO: 36.1 % (ref 36–46)
HGB BLD-MCNC: 11.5 G/DL (ref 12–16)
IMM GRANULOCYTES # BLD AUTO: 0.01 X10*3/UL (ref 0–0.7)
IMM GRANULOCYTES NFR BLD AUTO: 0.1 % (ref 0–0.9)
LYMPHOCYTES # BLD AUTO: 2.91 X10*3/UL (ref 1.2–4.8)
LYMPHOCYTES NFR BLD AUTO: 40.8 %
MCH RBC QN AUTO: 24 PG (ref 26–34)
MCHC RBC AUTO-ENTMCNC: 31.9 G/DL (ref 32–36)
MCV RBC AUTO: 75 FL (ref 80–100)
MONOCYTES # BLD AUTO: 0.48 X10*3/UL (ref 0.1–1)
MONOCYTES NFR BLD AUTO: 6.7 %
NEUTROPHILS # BLD AUTO: 3.61 X10*3/UL (ref 1.2–7.7)
NEUTROPHILS NFR BLD AUTO: 50.7 %
NRBC BLD-RTO: 0 /100 WBCS (ref 0–0)
PHOSPHATE SERPL-MCNC: 2.9 MG/DL (ref 2.5–4.9)
PLATELET # BLD AUTO: 320 X10*3/UL (ref 150–450)
POTASSIUM SERPL-SCNC: 3.8 MMOL/L (ref 3.5–5.3)
RBC # BLD AUTO: 4.8 X10*6/UL (ref 4–5.2)
SODIUM SERPL-SCNC: 140 MMOL/L (ref 136–145)
WBC # BLD AUTO: 7.1 X10*3/UL (ref 4.4–11.3)

## 2025-02-27 PROCEDURE — 2500000001 HC RX 250 WO HCPCS SELF ADMINISTERED DRUGS (ALT 637 FOR MEDICARE OP)

## 2025-02-27 PROCEDURE — 2500000004 HC RX 250 GENERAL PHARMACY W/ HCPCS (ALT 636 FOR OP/ED): Mod: JZ

## 2025-02-27 PROCEDURE — 96376 TX/PRO/DX INJ SAME DRUG ADON: CPT

## 2025-02-27 PROCEDURE — 99284 EMERGENCY DEPT VISIT MOD MDM: CPT | Mod: 25 | Performed by: STUDENT IN AN ORGANIZED HEALTH CARE EDUCATION/TRAINING PROGRAM

## 2025-02-27 PROCEDURE — 85025 COMPLETE CBC W/AUTO DIFF WBC: CPT

## 2025-02-27 PROCEDURE — 96374 THER/PROPH/DIAG INJ IV PUSH: CPT

## 2025-02-27 PROCEDURE — 36415 COLL VENOUS BLD VENIPUNCTURE: CPT

## 2025-02-27 PROCEDURE — 80069 RENAL FUNCTION PANEL: CPT

## 2025-02-27 RX ORDER — HYDROMORPHONE HYDROCHLORIDE 1 MG/ML
2 INJECTION, SOLUTION INTRAMUSCULAR; INTRAVENOUS; SUBCUTANEOUS ONCE
Status: COMPLETED | OUTPATIENT
Start: 2025-02-27 | End: 2025-02-27

## 2025-02-27 RX ORDER — DOXYCYCLINE 100 MG/1
100 TABLET ORAL 2 TIMES DAILY
Qty: 14 TABLET | Refills: 0 | Status: SHIPPED | OUTPATIENT
Start: 2025-02-27 | End: 2025-03-13

## 2025-02-27 RX ORDER — DOXYCYCLINE HYCLATE 100 MG
100 TABLET ORAL ONCE
Status: COMPLETED | OUTPATIENT
Start: 2025-02-27 | End: 2025-02-27

## 2025-02-27 RX ADMIN — HYDROMORPHONE HYDROCHLORIDE 2 MG: 1 INJECTION, SOLUTION INTRAMUSCULAR; INTRAVENOUS; SUBCUTANEOUS at 14:59

## 2025-02-27 RX ADMIN — HYDROMORPHONE HYDROCHLORIDE 2 MG: 1 INJECTION, SOLUTION INTRAMUSCULAR; INTRAVENOUS; SUBCUTANEOUS at 13:00

## 2025-02-27 RX ADMIN — DOXYCYCLINE HYCLATE 100 MG: 100 TABLET, COATED ORAL at 13:59

## 2025-02-27 ASSESSMENT — PAIN DESCRIPTION - ORIENTATION: ORIENTATION: LEFT

## 2025-02-27 ASSESSMENT — PAIN SCALES - GENERAL
PAINLEVEL_OUTOF10: 9
PAINLEVEL_OUTOF10: 0 - NO PAIN

## 2025-02-27 ASSESSMENT — PAIN - FUNCTIONAL ASSESSMENT: PAIN_FUNCTIONAL_ASSESSMENT: 0-10

## 2025-02-27 ASSESSMENT — PAIN DESCRIPTION - PAIN TYPE: TYPE: ACUTE PAIN

## 2025-02-27 ASSESSMENT — PAIN DESCRIPTION - LOCATION
LOCATION: ARM
LOCATION: ARM

## 2025-02-27 NOTE — ED PROVIDER NOTES
HPI   Chief Complaint   Patient presents with    Abscess       Patient is a 34-year-old female with past medical history of hidradenitis suppurativa, vaginal delivery on 1/18/2025 presenting with concern for worsening of hidradenitis symptoms.  Reports particular concern for worsening pain in left armpit.  Does not report worsening discharge, drainage, fevers, chills or systemic symptoms.  Additionally reporting worsening rectal symptoms as well.  Reports still on biologic and Percocet at home.  Reports she has not been on antibiotics recently she was on this for approximately 10 years and ID decided to discontinue.  Reports she does have presurgical appointment scheduled on 3/4.  Does not endorse any other current complaints.  Does report she has a care plan that usually does 2 mg of IV Dilaudid to get pain under control.            Patient History   Past Medical History:   Diagnosis Date    Asthma, mild intermittent ([facility])     albuterol prn    Hidradenitis suppurativa 10/29/2020    Hidradenitis    History of gestational hypertension     Lupus     diagnosed by her PCP in 2017 based on symptoms of joint pain and facial rash. She states she is in remission    MDD (major depressive disorder)     not on meds    PTSD (post-traumatic stress disorder)     resulting from IPV     Past Surgical History:   Procedure Laterality Date    OTHER SURGICAL HISTORY  09/19/2022    Arm surgery    OTHER SURGICAL HISTORY Left 2011    Left wrist    OTHER SURGICAL HISTORY Left 2021    Left wrist procedure     Family History   Problem Relation Name Age of Onset    COPD Father      Heart disease Father       Social History     Tobacco Use    Smoking status: Some Days     Types: Cigarettes    Smokeless tobacco: Never   Vaping Use    Vaping status: Every Day   Substance Use Topics    Alcohol use: Not Currently    Drug use: Never       Physical Exam   ED Triage Vitals   Temperature Heart Rate Respirations BP   02/27/25 1123 02/27/25 1123  02/27/25 1123 02/27/25 1123   36.6 °C (97.8 °F) 77 17 125/74      Pulse Ox Temp src Heart Rate Source Patient Position   02/27/25 1123 -- 02/27/25 1145 --   100 %  Monitor       BP Location FiO2 (%)     -- --             Physical Exam  Exam conducted with a chaperone present.   Constitutional:       Appearance: Normal appearance.   HENT:      Head: Normocephalic and atraumatic.   Eyes:      Extraocular Movements: Extraocular movements intact.   Cardiovascular:      Rate and Rhythm: Normal rate.   Pulmonary:      Effort: Pulmonary effort is normal.   Genitourinary:     Comments: Lesions in left rectal and bilateral inguinal region.  Minimally tender to palpation, no fluctuance.  Musculoskeletal:      Cervical back: Normal range of motion.   Skin:     Comments: Nodules in bilateral armpits pictured below.  No erythema.  Whitish substance pictured consistent with deodorant.  Minimal appreciable fluctuance.   Neurological:      General: No focal deficit present.      Mental Status: She is alert and oriented to person, place, and time.   Psychiatric:         Mood and Affect: Mood normal.         Behavior: Behavior normal.                 ED Course & MDM   ED Course as of 02/27/25 1500   Thu Feb 27, 2025   1343 Emergency Medicine Attending Attestation:     [unfilled]    The patient was seen by the resident/fellow.  I have personally performed a substantive portion of the encounter.  I have seen and examined the patient; agree with the workup, evaluation, MDM, management and diagnosis.  The care plan has been discussed with the resident; I have reviewed the resident's note and agree with the documented findings.      Patient is a 34-year-old with a past medical history of hidradenitis who presents the emergency department for with bilateral axillary pain consistent with her underlying hidradenitis.  She reports possibly having a fever yesterday.  She denies any nausea/vomiting or additional symptoms at this time.  She  reports pain in her bilateral axilla as well as her groin and near gluteus.  On my assessment, patient appears well, nontoxic, sitting comfortably in the gurney no acute distress.  She does have signs of hidradenitis in her bilateral axilla as well as her gluteal cleft with small pockets suggestive of abscess without any signs of overlying infection.  No overlying erythema, no obvious purulent drainage, no warmth.  Typically tender to palpation.  Remainder physical exam is unremarkable.  Patient recently had a child, is not breast-feeding.  She has follow-up with general surgery next week.    I independently interpreted patient's EKG and agree with the above mentioned interpretation.      Michael Starr MD   [AM]      ED Course User Index  [AM] Michael Starr MD         Diagnoses as of 02/27/25 1500   Hidradenitis suppurativa                 No data recorded     Monik Coma Scale Score: 15 (02/27/25 1235 : Yelena Blevins, EVELYN)                           Medical Decision Making  Patient is a 34-year-old female with past medical history of hidradenitis suppurativa, vaginal delivery on 1/18/2025 presenting with concern for worsening of hidradenitis symptoms.  Exam consistent with hidradenitis.  No systemic signs of infection.  No clear fluctuance, erythema, warmth and benefits of I&D not felt to outweigh risks particularly given history of worsening after prior drainage no clear change in symptoms.  Treated symptomatically with Dilaudid after discussion of concerns about patient's long-term opiate use.  Patient has follow-up appointment with surgery scheduled on Tuesday, no indication for surgical consultation given no emergent changes, impression that this will not expedite care.  Patient taken off antibiotics during pregnancy and decision made to try restarting doxycycline to see if this improves symptoms.  Return precautions, appropriate follow-up, at home care discussed with patient and patient discharged  home.    Patient seen and discussed with Dr. Ro De Anda MD, PhD  Emergency Medicine PGY3          Procedure  Procedures     Javy De Anda MD  Resident  02/27/25 4869

## 2025-02-27 NOTE — DISCHARGE INSTRUCTIONS
Please follow-up with your scheduled appointment on Tuesday.  Please continue using her Percocet in the meantime.  We also think it is reasonable to try an additional course of doxycycline as prescribed.

## 2025-02-27 NOTE — ED TRIAGE NOTES
C/O boil under left arms, pt states she has multiple, pt has surgery consult on march 4th, denies cp/sob/palpitations/n/v/d/f/chills, pt here for pain control

## 2025-03-04 ENCOUNTER — OFFICE VISIT (OUTPATIENT)
Dept: OBSTETRICS AND GYNECOLOGY | Facility: CLINIC | Age: 35
End: 2025-03-04
Payer: MEDICAID

## 2025-03-04 ENCOUNTER — OFFICE VISIT (OUTPATIENT)
Dept: SURGERY | Facility: HOSPITAL | Age: 35
End: 2025-03-04
Payer: MEDICAID

## 2025-03-04 ENCOUNTER — PHARMACY VISIT (OUTPATIENT)
Dept: PHARMACY | Facility: CLINIC | Age: 35
End: 2025-03-04
Payer: MEDICAID

## 2025-03-04 VITALS — TEMPERATURE: 97.9 F | HEART RATE: 61 BPM | SYSTOLIC BLOOD PRESSURE: 141 MMHG | DIASTOLIC BLOOD PRESSURE: 87 MMHG

## 2025-03-04 VITALS
BODY MASS INDEX: 31.83 KG/M2 | DIASTOLIC BLOOD PRESSURE: 87 MMHG | HEIGHT: 62 IN | SYSTOLIC BLOOD PRESSURE: 140 MMHG | WEIGHT: 173 LBS | HEART RATE: 63 BPM

## 2025-03-04 DIAGNOSIS — F11.90 CHRONIC, CONTINUOUS USE OF OPIOIDS: Primary | ICD-10-CM

## 2025-03-04 DIAGNOSIS — L73.2 HIDRADENITIS SUPPURATIVA: Primary | ICD-10-CM

## 2025-03-04 DIAGNOSIS — G89.29 OTHER CHRONIC PAIN: ICD-10-CM

## 2025-03-04 DIAGNOSIS — L73.2 HIDRADENITIS SUPPURATIVA: ICD-10-CM

## 2025-03-04 PROCEDURE — 3079F DIAST BP 80-89 MM HG: CPT | Performed by: SURGERY

## 2025-03-04 PROCEDURE — RXMED WILLOW AMBULATORY MEDICATION CHARGE

## 2025-03-04 PROCEDURE — 99214 OFFICE O/P EST MOD 30 MIN: CPT | Performed by: SURGERY

## 2025-03-04 PROCEDURE — 99214 OFFICE O/P EST MOD 30 MIN: CPT | Performed by: OBSTETRICS & GYNECOLOGY

## 2025-03-04 PROCEDURE — 99214 OFFICE O/P EST MOD 30 MIN: CPT | Mod: TH | Performed by: OBSTETRICS & GYNECOLOGY

## 2025-03-04 PROCEDURE — 3079F DIAST BP 80-89 MM HG: CPT | Performed by: OBSTETRICS & GYNECOLOGY

## 2025-03-04 PROCEDURE — 3077F SYST BP >= 140 MM HG: CPT | Performed by: OBSTETRICS & GYNECOLOGY

## 2025-03-04 PROCEDURE — 3008F BODY MASS INDEX DOCD: CPT | Performed by: OBSTETRICS & GYNECOLOGY

## 2025-03-04 PROCEDURE — 3077F SYST BP >= 140 MM HG: CPT | Performed by: SURGERY

## 2025-03-04 RX ORDER — OXYCODONE AND ACETAMINOPHEN 10; 325 MG/1; MG/1
1 TABLET ORAL EVERY 4 HOURS PRN
Qty: 84 TABLET | Refills: 0 | Status: SHIPPED | OUTPATIENT
Start: 2025-03-04 | End: 2025-03-18

## 2025-03-04 RX ORDER — OXYCODONE AND ACETAMINOPHEN 10; 325 MG/1; MG/1
1 TABLET ORAL EVERY 4 HOURS PRN
Qty: 84 TABLET | Refills: 0 | Status: SHIPPED | OUTPATIENT
Start: 2025-03-18 | End: 2025-04-01

## 2025-03-04 ASSESSMENT — ENCOUNTER SYMPTOMS
CARDIOVASCULAR NEGATIVE: 0
RESPIRATORY NEGATIVE: 0
NEUROLOGICAL NEGATIVE: 0
HEMATOLOGIC/LYMPHATIC NEGATIVE: 0
GASTROINTESTINAL NEGATIVE: 0
ENDOCRINE NEGATIVE: 0
EYES NEGATIVE: 0
MUSCULOSKELETAL NEGATIVE: 0
CONSTITUTIONAL NEGATIVE: 0
ALLERGIC/IMMUNOLOGIC NEGATIVE: 0
PSYCHIATRIC NEGATIVE: 0

## 2025-03-04 ASSESSMENT — PAIN SCALES - GENERAL
PAINLEVEL_OUTOF10: 8
PAINLEVEL_OUTOF10: 9

## 2025-03-04 NOTE — LETTER
March 4, 2025     Dharmesh Mccann MD  [address]  [facility] [address]    Patient: Sandrita Adams   YOB: 1990   Date of Visit: 3/4/2025       Dear Dr. Dharmesh Mccann MD:    Thank you for referring Sandrita Adams to me for evaluation. Below are my notes for this consultation.  If you have questions, please do not hesitate to call me. I look forward to following your patient along with you.       Sincerely,     Ted Coronel MD      CC: No Recipients  ______________________________________________________________________________________    History Of Present Illness  Sandrita Adams is a 34 y.o. female presenting with long history of chronic hidradenitis suppurativa involving bilateral axilla, groin and perineum.  She is on Cosentyx which really helps with her acute flareups especially down in the groin region.  She just had a baby in January and so she was off her Cosentyx.  She had numerous acute flares during her pregnancy.  She said she had some sort of surgery on her axilla on the right side years ago but it came back.  Here to discuss surgical treatment options..     Past Medical History  Past Medical History:   Diagnosis Date   • Asthma, mild intermittent ([facility])     albuterol prn   • Hidradenitis suppurativa 10/29/2020    Hidradenitis   • History of gestational hypertension    • Lupus     diagnosed by her PCP in 2017 based on symptoms of joint pain and facial rash. She states she is in remission   • MDD (major depressive disorder)     not on meds   • PTSD (post-traumatic stress disorder)     resulting from IPV       Surgical History  Past Surgical History:   Procedure Laterality Date   • OTHER SURGICAL HISTORY  09/19/2022    Arm surgery   • OTHER SURGICAL HISTORY Left 2011    Left wrist   • OTHER SURGICAL HISTORY Left 2021    Left wrist procedure        Social History  She reports that she has been smoking cigarettes. She has never used smokeless tobacco. She reports  that she does not currently use alcohol. She reports that she does not use drugs.    Family History  Family History   Problem Relation Name Age of Onset   • COPD Father     • Heart disease Father          Allergies  Suboxone [buprenorphine-naloxone], Clarithromycin, Haloperidol, Keflex [cephalexin], Levofloxacin, Metoclopramide, and Reglan [metoclopramide hcl]    Review of Systems  Constitutional: no weight loss, no fevers, no malaise  HEENT: negative  Neck: negative  Pulmonary: no SOB, no cough  CV: no chest pain, otherwise negative  GI: no pain, no diarrhea, no bloody stools, no constipation  : no hematuria, retention.  MS: no aches/pains  Neurologic: negative  Skin: HS  HEME: no bleeding tendency, no bruising  Psych: no mood issues    Physical Exam  General: well appearing, no acute distress, well nourished  HEENT: normal  Neck: supple  Pulmonary: lungs clear to auscultation bilaterally  CV: RR, S1S2, no murmurs.  Pulses palpable and equal.  Good capillary refill  Abdomen: soft, non tender, no masses  : grossly normal external genitalia  MS: grossly normal  Neurologic: alert and oriented, strength/sensation intact  Skin: Rand stage III axillary hidradenitis bilaterally.  No acute abscesses.  She also has milder disease seen in bilateral inner thighs inguinal region and mons pubis.  Psych: mood appropriate    Last Recorded Vitals  Last menstrual period 02/18/2025, not currently breastfeeding.    Relevant Results        Assessment/Plan      Patient with Rand stage III bilateral axillary hidradenitis.  I think the consent excision seems to be helping her a lot with her inguinal disease.  We discussed surgical treatment options for axillary hidradenitis.  Wide surgical excision has been shown to have good long-term efficacy for disease control and cure.  We discussed the different techniques for surgery.  Wide excision with primary closure over a drain is always ideal if possible.  Sometimes incomplete  closure is necessary given with of disease present which sometimes requires chronic long-term wound therapy.  Risks of surgery including bleeding, infection, dehiscence etc. were reviewed.  She does have interest in axillary surgery and she would prefer to have both sides done at the same time.  Given that she just recently had a baby and does not have a ton of support at this time but will later in the year were going to [facility] late summer for surgery.       I spent 40 minutes in the professional and overall care of this patient.      Ted Coronel MD

## 2025-03-04 NOTE — PROGRESS NOTES
"Assessment   33 YO  with chronic pain 2/2 HS, sp  on 25    Previously discussed today the risks of long term opioid use for chronic pain, including but not limited to development of hyperalgesia, physiological tolerance and dependency, desensitization of her mu opioid receptors, accidental overdose.  At this time, pt has no evidence of a MARQUIS. It is not known whether she is physiologically dependent as she has only uptitrated over time, reportedly due to poorly managed HS, and has not attempted weaning.  It is notable, however, that even when she was working and feeling her best on HS treatment, she still felt that the Percocet TID \"barely\" managed the pain at times. She also seemed surprised to hear that Percocet 10mg TID was a significant amount of daily opioids.    We had the conversation that her history raises the concern that she would have a difficult time weaning down on her Percocet, and that if she is unable to do so, I would personally recommend she consider switching to an agent like buprenorphine. Patient is reluctant, as she associates that medication with addiction and she is not addicted, and also because she had an adverse reaction when switching abrupt from Percocet 10mg TID --> Suboxone 2mg TID in the past. If she were to switch, I would recommend micro-dosing with Subutex. We will defer that conversation to postop.    I did express to Sandrita that for me to continue her Pain Management:  - I would require regular drug screens  - I would require attendance at appointments for refills  - I will not do early refills   - I will not be going up on her Percocet dose beyond the q4h dosing, even if she has acute post-op pain from the HS surgery. It would be my suggestion that she stay in the hospital until her pain control is adequate to go home on the q4h dosing.    PLAN  - Cont Percocet 10/325mg q4 hours until postop from HD surgery (at which pt is to go back to [facility]).  " "Patient working with general surgeon, Dr. Coronel, to schedule time for surgery.  Planning for 2025 or earlier.  - Narcan prescribed on 25  - UDS today with fentanyl confirm  - Cont following with Derm (next apptmt 2025). Reports she is getting home injections of Cosentyx.  - Get HS surgery ASAP.  Working with her General Surgeon to schedule.  - FU 4 weeks    Dewayne Harp, DO     Subjective   35 YO  sp  on 25 at 36+1 weeks for baby boy \"Tano\" after presenting with PPROM.    Pregnancy Notable For  - HS, multiple flares, currently on Cosentyx.  She is currently scheduling to undergo HS surgery with Dr. Coronel in the coming months.  - Chronic use of opioids for HS-related pain. Per PDMP, pt was being prescribed Percocet 10/325mg TID by [facility]. In pregnancy, her HS symptoms worsened, and by 2024 (11 weeks), pt had been uptitrated to Percocet 10/325mg Q4H by [facility]. More recently, patient was taking the Percocet 10mg q3 hours, since she \"didn't have anything for breakthrough anymore\" since she's not longer in the hospital and getting Dilaudid. Reviewing her notes from [facility], it appears patient was previously trialed on morphine in 2024 (I believe as part of a process to help her wean down her opioid use) and Suboxone in Oct 2023 (2mg TID, \"had a reaction\"). She is planning to transition back to [facility] pain clinic after her HS surgery, having already met with her old Pain provider Margo Uriarte on 25. The plan per that note had been to go back down to Percocet 10mg TID max should she resume care with them.  - Asthma moderate persistent, on flonase and prn albuterol  - AMA  - IPV s/p event  with FOB, has new home alone    Feeding: formula  Contraception: none, pt states she is not sexually active x 6 months, no current partner.    Interval Hx:  -Feels like she is overall doing \"Okay\".  -Met with her general surgeon today and felt the meeting went well.  " "Is working to schedule her HS surgery.  Ideally before June 2025.  -Went to the ED 2 times since our LV due to HS pain.     -Feels like the Cosenytx is still not working well yet.  -She mentioned that Dr. Elizalde mentioned there is an option of a new biologic too for her HS if the Cosenytx doesn't work.    -Reports ongoing HS pain in her arms, sides, and underneath her stomach.  -Finding it difficult to use her arms at times.   -Hoping for family members to come and help her with care of her son in the coming months, particularly in the post op period.  -Still taking her oxycodone 10/325mg q4 hours.    -First day back at work is today.  She works at [employer].  Is excited to be returning to work.    PDMP: Reviewed  Last UDS 2/4: oxycodone metabolites    Past Medical History:   Diagnosis Date    Asthma, mild intermittent ([facility])     albuterol prn    Hidradenitis suppurativa 10/29/2020    Hidradenitis    History of gestational hypertension     Lupus     diagnosed by her PCP in 2017 based on symptoms of joint pain and facial rash. She states she is in remission    MDD (major depressive disorder)     not on meds    PTSD (post-traumatic stress disorder)     resulting from IPV     Objective   /87   Pulse 63   Ht 1.575 m (5' 2\")   Wt 78.5 kg (173 lb)   LMP 02/18/2025   Breastfeeding No   BMI 31.64 kg/m²   Pt reports BP is high bc of pain      General:   Alert and oriented, in no acute distress   Skin: No significant acne. No hirsutism.   Neck: Supple. No visible thyromegaly.    Psych Normal affect. Normal mood.        "

## 2025-03-04 NOTE — PROGRESS NOTES
History Of Present Illness  Sandrita Adams is a 34 y.o. female presenting with long history of chronic hidradenitis suppurativa involving bilateral axilla, groin and perineum.  She is on Cosentyx which really helps with her acute flareups especially down in the groin region.  She just had a baby in January and so she was off her Cosentyx.  She had numerous acute flares during her pregnancy.  She said she had some sort of surgery on her axilla on the right side years ago but it came back.  Here to discuss surgical treatment options..     Past Medical History  Past Medical History:   Diagnosis Date    Asthma, mild intermittent (HHS-HCC)     albuterol prn    Hidradenitis suppurativa 10/29/2020    Hidradenitis    History of gestational hypertension     Lupus     diagnosed by her PCP in 2017 based on symptoms of joint pain and facial rash. 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 She has never used smokeless tobacco. She reports that she does not currently use alcohol. She reports that she does not use drugs.    Family History  Family History   Problem Relation Name Age of Onset    COPD Father      Heart disease Father          Allergies  Suboxone [buprenorphine-naloxone], Clarithromycin, Haloperidol, Keflex [cephalexin], Levofloxacin, Metoclopramide, and Reglan [metoclopramide hcl]    Review of Systems  Constitutional: no weight loss, no fevers, no malaise  HEENT: negative  Neck: negative  Pulmonary: no SOB, no cough  CV: no chest pain, otherwise negative  GI: no pain, no diarrhea, no bloody stools, no  constipation  : no hematuria, retention.  MS: no aches/pains  Neurologic: negative  Skin: HS  HEME: no bleeding tendency, no bruising  Psych: no mood issues    Physical Exam  General: well appearing, no acute distress, well nourished  HEENT: normal  Neck: supple  Pulmonary: lungs clear to auscultation bilaterally  CV: RR, S1S2, no murmurs.  Pulses palpable and equal.  Good capillary refill  Abdomen: soft, non tender, no masses  : grossly normal external genitalia  MS: grossly normal  Neurologic: alert and oriented, strength/sensation intact  Skin: Rand stage III axillary hidradenitis bilaterally.  No acute abscesses.  She also has milder disease seen in bilateral inner thighs inguinal region and mons pubis.  Psych: mood appropriate    Last Recorded Vitals  Last menstrual period 02/18/2025, not currently breastfeeding.    Relevant Results        Assessment/Plan       Patient with Rand stage III bilateral axillary hidradenitis.  I think the consent excision seems to be helping her a lot with her inguinal disease.  We discussed surgical treatment options for axillary hidradenitis.  Wide surgical excision has been shown to have good long-term efficacy for disease control and cure.  We discussed the different techniques for surgery.  Wide excision with primary closure over a drain is always ideal if possible.  Sometimes incomplete closure is necessary given with of disease present which sometimes requires chronic long-term wound therapy.  Risks of surgery including bleeding, infection, dehiscence etc. were reviewed.  She does have interest in axillary surgery and she would prefer to have both sides done at the same time.  Given that she just recently had a baby and does not have a ton of support at this time but will later in the year were going to [facility] late summer for surgery.       I spent 40 minutes in the professional and overall care of this patient.      Ted Coronel MD

## 2025-03-09 ENCOUNTER — HOSPITAL ENCOUNTER (EMERGENCY)
Facility: HOSPITAL | Age: 35
Discharge: HOME | End: 2025-03-09
Attending: GENERAL PRACTICE
Payer: MEDICAID

## 2025-03-09 VITALS
BODY MASS INDEX: 31.83 KG/M2 | WEIGHT: 173 LBS | OXYGEN SATURATION: 99 % | HEART RATE: 65 BPM | HEIGHT: 62 IN | SYSTOLIC BLOOD PRESSURE: 133 MMHG | DIASTOLIC BLOOD PRESSURE: 109 MMHG | RESPIRATION RATE: 16 BRPM | TEMPERATURE: 97.5 F

## 2025-03-09 DIAGNOSIS — L73.2 HIDRADENITIS: ICD-10-CM

## 2025-03-09 DIAGNOSIS — M79.629 PAIN IN AXILLA, UNSPECIFIED LATERALITY: Primary | ICD-10-CM

## 2025-03-09 LAB
ANION GAP SERPL CALC-SCNC: 8 MMOL/L (ref 10–20)
B-HCG SERPL-ACNC: <2 MIU/ML
BASOPHILS # BLD AUTO: 0.03 X10*3/UL (ref 0–0.1)
BASOPHILS NFR BLD AUTO: 0.5 %
BUN SERPL-MCNC: 11 MG/DL (ref 6–23)
CALCIUM SERPL-MCNC: 8.1 MG/DL (ref 8.6–10.3)
CHLORIDE SERPL-SCNC: 113 MMOL/L (ref 98–107)
CO2 SERPL-SCNC: 24 MMOL/L (ref 21–32)
CREAT SERPL-MCNC: 0.67 MG/DL (ref 0.5–1.05)
EGFRCR SERPLBLD CKD-EPI 2021: >90 ML/MIN/1.73M*2
EOSINOPHIL # BLD AUTO: 0.06 X10*3/UL (ref 0–0.7)
EOSINOPHIL NFR BLD AUTO: 1 %
ERYTHROCYTE [DISTWIDTH] IN BLOOD BY AUTOMATED COUNT: 15.7 % (ref 11.5–14.5)
GLUCOSE SERPL-MCNC: 85 MG/DL (ref 74–99)
HCT VFR BLD AUTO: 38 % (ref 36–46)
HGB BLD-MCNC: 12.6 G/DL (ref 12–16)
IMM GRANULOCYTES # BLD AUTO: 0.01 X10*3/UL (ref 0–0.7)
IMM GRANULOCYTES NFR BLD AUTO: 0.2 % (ref 0–0.9)
LYMPHOCYTES # BLD AUTO: 2.7 X10*3/UL (ref 1.2–4.8)
LYMPHOCYTES NFR BLD AUTO: 43.6 %
MCH RBC QN AUTO: 24.3 PG (ref 26–34)
MCHC RBC AUTO-ENTMCNC: 33.2 G/DL (ref 32–36)
MCV RBC AUTO: 73 FL (ref 80–100)
MONOCYTES # BLD AUTO: 0.47 X10*3/UL (ref 0.1–1)
MONOCYTES NFR BLD AUTO: 7.6 %
NEUTROPHILS # BLD AUTO: 2.92 X10*3/UL (ref 1.2–7.7)
NEUTROPHILS NFR BLD AUTO: 47.1 %
NRBC BLD-RTO: 0 /100 WBCS (ref 0–0)
PLATELET # BLD AUTO: 315 X10*3/UL (ref 150–450)
POTASSIUM SERPL-SCNC: 3.3 MMOL/L (ref 3.5–5.3)
RBC # BLD AUTO: 5.19 X10*6/UL (ref 4–5.2)
SODIUM SERPL-SCNC: 142 MMOL/L (ref 136–145)
WBC # BLD AUTO: 6.2 X10*3/UL (ref 4.4–11.3)

## 2025-03-09 PROCEDURE — 2500000004 HC RX 250 GENERAL PHARMACY W/ HCPCS (ALT 636 FOR OP/ED): Mod: JZ

## 2025-03-09 PROCEDURE — 2500000004 HC RX 250 GENERAL PHARMACY W/ HCPCS (ALT 636 FOR OP/ED): Mod: JZ | Performed by: PHARMACIST

## 2025-03-09 PROCEDURE — 99284 EMERGENCY DEPT VISIT MOD MDM: CPT | Mod: 25 | Performed by: GENERAL PRACTICE

## 2025-03-09 PROCEDURE — 2500000001 HC RX 250 WO HCPCS SELF ADMINISTERED DRUGS (ALT 637 FOR MEDICARE OP)

## 2025-03-09 PROCEDURE — 36415 COLL VENOUS BLD VENIPUNCTURE: CPT

## 2025-03-09 PROCEDURE — 96376 TX/PRO/DX INJ SAME DRUG ADON: CPT

## 2025-03-09 PROCEDURE — 85025 COMPLETE CBC W/AUTO DIFF WBC: CPT

## 2025-03-09 PROCEDURE — 2500000002 HC RX 250 W HCPCS SELF ADMINISTERED DRUGS (ALT 637 FOR MEDICARE OP, ALT 636 FOR OP/ED)

## 2025-03-09 PROCEDURE — 96374 THER/PROPH/DIAG INJ IV PUSH: CPT

## 2025-03-09 PROCEDURE — 84702 CHORIONIC GONADOTROPIN TEST: CPT

## 2025-03-09 PROCEDURE — 80048 BASIC METABOLIC PNL TOTAL CA: CPT | Performed by: GENERAL PRACTICE

## 2025-03-09 RX ORDER — ONDANSETRON 4 MG/1
4 TABLET, ORALLY DISINTEGRATING ORAL ONCE
Status: COMPLETED | OUTPATIENT
Start: 2025-03-09 | End: 2025-03-09

## 2025-03-09 RX ORDER — DOXYCYCLINE 100 MG/1
100 CAPSULE ORAL 2 TIMES DAILY
Qty: 14 CAPSULE | Refills: 0 | Status: SHIPPED | OUTPATIENT
Start: 2025-03-09 | End: 2025-03-16

## 2025-03-09 RX ORDER — POTASSIUM CHLORIDE 20 MEQ/1
40 TABLET, EXTENDED RELEASE ORAL ONCE
Status: COMPLETED | OUTPATIENT
Start: 2025-03-09 | End: 2025-03-09

## 2025-03-09 RX ORDER — DOXYCYCLINE HYCLATE 100 MG
100 TABLET ORAL ONCE
Status: COMPLETED | OUTPATIENT
Start: 2025-03-09 | End: 2025-03-09

## 2025-03-09 RX ORDER — HYDROMORPHONE HYDROCHLORIDE 2 MG/ML
2 INJECTION, SOLUTION INTRAMUSCULAR; INTRAVENOUS; SUBCUTANEOUS ONCE
Status: DISCONTINUED | OUTPATIENT
Start: 2025-03-09 | End: 2025-03-09 | Stop reason: CLARIF

## 2025-03-09 RX ADMIN — ONDANSETRON 4 MG: 4 TABLET, ORALLY DISINTEGRATING ORAL at 22:03

## 2025-03-09 RX ADMIN — DOXYCYCLINE HYCLATE 100 MG: 100 TABLET, COATED ORAL at 21:17

## 2025-03-09 RX ADMIN — HYDROMORPHONE HYDROCHLORIDE 2 MG: 2 INJECTION, SOLUTION INTRAMUSCULAR; INTRAVENOUS; SUBCUTANEOUS at 22:05

## 2025-03-09 RX ADMIN — POTASSIUM CHLORIDE 40 MEQ: 1500 TABLET, EXTENDED RELEASE ORAL at 21:36

## 2025-03-09 RX ADMIN — HYDROMORPHONE HYDROCHLORIDE 2 MG: 2 INJECTION, SOLUTION INTRAMUSCULAR; INTRAVENOUS; SUBCUTANEOUS at 21:17

## 2025-03-09 ASSESSMENT — PAIN DESCRIPTION - LOCATION: LOCATION: ARM

## 2025-03-09 ASSESSMENT — PAIN DESCRIPTION - PAIN TYPE: TYPE: CHRONIC PAIN

## 2025-03-09 ASSESSMENT — PAIN - FUNCTIONAL ASSESSMENT: PAIN_FUNCTIONAL_ASSESSMENT: 0-10

## 2025-03-09 ASSESSMENT — PAIN DESCRIPTION - ORIENTATION: ORIENTATION: RIGHT;LEFT

## 2025-03-09 ASSESSMENT — PAIN SCALES - GENERAL: PAINLEVEL_OUTOF10: 9

## 2025-03-09 ASSESSMENT — PAIN DESCRIPTION - FREQUENCY: FREQUENCY: CONSTANT/CONTINUOUS

## 2025-03-09 ASSESSMENT — PAIN DESCRIPTION - ONSET: ONSET: GRADUAL

## 2025-03-09 ASSESSMENT — PAIN DESCRIPTION - PROGRESSION: CLINICAL_PROGRESSION: GRADUALLY WORSENING

## 2025-03-10 NOTE — PROGRESS NOTES
Identified pt with two pt identifiers(name and ). Reviewed record in preparation for visit and have obtained necessary documentation.  Chief Complaint   Patient presents with    New Patient   Not fasting.      Health Maintenance Due   Topic    Depression Screen     Varicella vaccine (1 of 2 - 13+ 2-dose series)    Pap smear     DTaP/Tdap/Td vaccine (6 - Td or Tdap)    Flu vaccine (1)    COVID-19 Vaccine (3 - 2024-25 season)       Vitals:    03/10/25 1348 03/10/25 1401 03/10/25 1402 03/10/25 1403   BP: 100/64 124/74 103/65 117/75   BP Site: Left Upper Arm Right Upper Arm Right Upper Arm Right Upper Arm   Patient Position: Sitting Supine Sitting Standing   BP Cuff Size: Small Adult Small Adult Small Adult Small Adult   Pulse: 77 78 78 79   Resp: 20      Temp: 97.7 °F (36.5 °C)      TempSrc: Oral      SpO2: 100%      Weight: 56.8 kg (125 lb 3.2 oz)      Height: 1.575 m (5' 2\")            \"Have you been to the ER, urgent care clinic since your last visit?  Hospitalized since your last visit?\"    NO    “Have you seen or consulted any other health care providers outside of [facility] since your last visit?”    NO     “Have you had a pap smear?”    NO    No cervical cancer screening on file             Click Here for Release of Records Request     This patient is accompanied in the office by her self.  I have received verbal consent from Lizeth Weiner to discuss any/all medical information while they are present in the room.   Transitional Care Coordination Progress Note:  Plan per Medical/Surgical team: treatment of back pain with Pain management & MRI pending   Status: Observation  Payor source: [employer] medicaid  Discharge disposition: Home with baby  Potential Barriers: /94  ADOD: 2/11/2025  JOE Good RN, BSN Transitional Care Coordinator ED# [phone]      02/10/25 0821   Discharge Planning   Living Arrangements Children   Support Systems Family members   Assistance Needed Pain management   MRI pending   Type of Residence Private residence   Number of Stairs to Enter Residence 12   Number of Stairs Within Residence 0   Home or Post Acute Services None   Expected Discharge Disposition Home   Does the patient need discharge transport arranged? Yes   RoundTrip coordination needed? Yes   Has discharge transport been arranged? No   Financial Resource Strain   How hard is it for you to pay for the very basics like food, housing, medical care, and heating? Not hard   Transportation Needs   In the past 12 months, has lack of transportation kept you from medical appointments or from getting medications? no   In the past 12 months, has lack of transportation kept you from meetings, work, or from getting things needed for daily living? No   Stroke Family Assessment   Stroke Family Assessment Needed No   Intensity of Service   Intensity of Service 0-30 min        Ferritin; Future  - Vitamin D 25 Hydroxy; Future  - Vitamin B12 & Folate; Future    2. Anxiety  Currently well controlled without any medications. Will follow up in 3 months.     3. Benign systolic murmur  - Faint, systolic murmur heard today on exam. Pt has Hx of anemia, most likely the cause. Denies any red flag symptoms (denied chest pain, shortness of breath, intermittent palpitations, dizziness, or lightheadedness, syncope in the past, sudden cardiac death in the family). Will CTM.    4. Diabetes mellitus screening  Hx of abnormal GTT 1h test during pregnancy. Strong FmHx of DMT2. Will screen today.   - Hemoglobin A1C; Future    5. History of iron deficiency anemia  6. History of anemia  Hx of anemia, iron deficiency. Not taking any supplements currently. Will check today.   - Iron and TIBC; Future  - Ferritin; Future  - Vitamin B12 & Folate; Future    7. History of vitamin D deficiency  Not on any supplements currently. Will check today.   - Vitamin D 25 Hydroxy; Future    Records release form filled out to update health maintenance.    Return in about 3 months (around 6/10/2025), or for anxiety with Dr. Mcpherson.       Pt was discussed with Dr. Sepulveda  (attending physician).    I have reviewed patient medical and social history and medications.  I have reviewed pertinent labs results and other data. I have discussed the diagnosis with the patient and the intended plan as seen in the above orders. The patient has received an after-visit summary and questions were answered concerning future plans. I have discussed medication side effects and warnings with the patient as well.    Helena Mcpherson MD  Resident, [facility]  03/10/25

## 2025-03-10 NOTE — ED PROVIDER NOTES
HPI   Chief Complaint   Patient presents with    HS flare up       34-year-old female presents to the ED today with a chief concern of hidradenitis flareup.  Patient reports that she is at hidradenitis since she was a child and reports that she is having a flareup.  Recently started her biologic back up.  Recently had a child.  Denies any breast-feeding or chance of pregnancy.  Reports that she has been hospitalized for hidradenitis in the past but this was mainly during her pregnancy when she was not on her biologic.  No fevers.  Nausea or vomiting.  No chest pain or shortness of breath.  Reports pain in bilateral axilla and groin.  No other symptoms or concerns this time.      History provided by:  Patient   used: No            Patient History   Past Medical History:   Diagnosis Date    Asthma, mild intermittent (HHS-HCC)     albuterol prn    Hidradenitis suppurativa 10/29/2020    Hidradenitis    History of gestational hypertension     Lupus     diagnosed by her PCP in 2017 based on symptoms of joint pain and facial rash. She states she is in remission    MDD (major depressive disorder)     not on meds    PTSD (post-traumatic stress disorder)     resulting from IPV     Past Surgical History:   Procedure Laterality Date    OTHER SURGICAL HISTORY  09/19/2022    Arm surgery    OTHER SURGICAL HISTORY Left 2011    Left wrist    OTHER SURGICAL HISTORY Left 2021    Left wrist procedure     Family History   Problem Relation Name Age of Onset    COPD Father      Heart disease Father       Social History     Tobacco Use    Smoking status: Some Days     Types: Cigarettes    Smokeless tobacco: Never   Vaping Use    Vaping status: Every Day   Substance Use Topics    Alcohol use: Not Currently    Drug use: Never       Physical Exam   ED Triage Vitals [03/09/25 1701]   Temperature Heart Rate Respirations BP   36.4 °C (97.5 °F) 65 16 (!) 133/109      Pulse Ox Temp Source Heart Rate Source Patient Position   99  % Temporal Monitor Sitting      BP Location FiO2 (%)     Left arm --       Physical Exam  Constitutional: Vital signs per nursing notes.  Well developed, well nourished.  No acute distress.    Eyes:  conjunctivae and lids normal  ENT: Ears normal externally; face symmetric. voice normal  Neck: neck supple, no meningismus; trachea midline without deviation  Respiratory: normal respiratory effort and excursion; no rales, rhonchi, or wheezes; equal air entry  Cardiovascular: RRR, 2+ pulses extremities   Neurological: normal speech; CN II-XII grossly intact, normal motor and sensory function  GI: no distention, soft, nontender  : Deferred  Musculoskeletal: normal gait and station; normal digits and nails; normal to palpation; normal strength/tone; neurovascular status intact.  Skin: Chronic lesions in bilateral axilla and groin. No abscess or erythema.  No crepitus.      ED Course & MDM   ED Course as of 03/11/25 1935   Sun Mar 09, 2025   2039 CBC shows no evidence of leukocytosis or anemia. []   2115 POTASSIUM(!): 3.3 []      ED Course User Index  [MC] Marck Dunham PA-C         Diagnoses as of 03/11/25 1935   Pain in axilla, unspecified laterality   Hidradenitis                 No data recorded     Monik Coma Scale Score: 15 (03/09/25 1703 : Leila Irwin, EMT)                           Medical Decision Making  34-year-old female presents to the ED today with a chief concern of hidradenitis.  Vital signs reassuring.  Patient overall appears well and is nontoxic-appearing.  Patient was given Dilaudid in the ED.  She has a ride home today will not be driving her self home.  Was also given doxycycline.  Labs reassuring.  No leukocytosis.  No systemic signs or symptoms.  Do not think hospitalization is indicated at this time.  Patient was given Dilaudid per her care plan. Low suspicion for sepsis or bacteremia.  At the time of signout we were pending reevaluation.  Patient is getting her second dose of Dilaudid.   Patient tells me that she usually gets 2 doses of Dilaudid and this is per her care plan.  I was having difficulty finding the care plan however I did see previous ED visits and she usually does get 2 doses of Dilaudid.  I sent doxycycline to her pharmacy.  Her potassium was found to be 3.3.  This was repleted in the ED.  Will have her follow-up with dermatology.  At the time of signout we are pending reevaluation.  Handoff and staff with Dr. Juan Carlos Santos.  Handoff at 9:45 PM    Differential diagnosis: Abscess, deep space infection, cellulitis, sepsis, bacteremia    Disposition/treatment  1.  See diagnosis                Procedure  Procedures     Marck Dunham PA-C  03/09/25 2142       Marck Dunham PA-C  03/11/25 1931

## 2025-03-15 ENCOUNTER — HOSPITAL ENCOUNTER (EMERGENCY)
Facility: HOSPITAL | Age: 35
Discharge: HOME | End: 2025-03-15
Attending: EMERGENCY MEDICINE
Payer: MEDICAID

## 2025-03-15 VITALS
HEIGHT: 62 IN | DIASTOLIC BLOOD PRESSURE: 93 MMHG | OXYGEN SATURATION: 100 % | SYSTOLIC BLOOD PRESSURE: 126 MMHG | WEIGHT: 176 LBS | BODY MASS INDEX: 32.39 KG/M2 | RESPIRATION RATE: 15 BRPM | TEMPERATURE: 97.7 F | HEART RATE: 72 BPM

## 2025-03-15 DIAGNOSIS — L73.2 HYDRADENITIS: Primary | ICD-10-CM

## 2025-03-15 LAB
ALBUMIN SERPL BCP-MCNC: 3.9 G/DL (ref 3.4–5)
ALP SERPL-CCNC: 60 U/L (ref 33–110)
ALT SERPL W P-5'-P-CCNC: 11 U/L (ref 7–45)
ANION GAP SERPL CALC-SCNC: 9 MMOL/L (ref 10–20)
APTT PPP: 28 SECONDS (ref 26–36)
AST SERPL W P-5'-P-CCNC: 13 U/L (ref 9–39)
BASOPHILS # BLD AUTO: 0.02 X10*3/UL (ref 0–0.1)
BASOPHILS NFR BLD AUTO: 0.4 %
BILIRUB SERPL-MCNC: 0.2 MG/DL (ref 0–1.2)
BUN SERPL-MCNC: 8 MG/DL (ref 6–23)
CALCIUM SERPL-MCNC: 8.9 MG/DL (ref 8.6–10.3)
CHLORIDE SERPL-SCNC: 110 MMOL/L (ref 98–107)
CO2 SERPL-SCNC: 25 MMOL/L (ref 21–32)
CREAT SERPL-MCNC: 0.8 MG/DL (ref 0.5–1.05)
EGFRCR SERPLBLD CKD-EPI 2021: >90 ML/MIN/1.73M*2
EOSINOPHIL # BLD AUTO: 0.06 X10*3/UL (ref 0–0.7)
EOSINOPHIL NFR BLD AUTO: 1.2 %
ERYTHROCYTE [DISTWIDTH] IN BLOOD BY AUTOMATED COUNT: 15.8 % (ref 11.5–14.5)
GLUCOSE SERPL-MCNC: 90 MG/DL (ref 74–99)
HCT VFR BLD AUTO: 37.8 % (ref 36–46)
HGB BLD-MCNC: 12.5 G/DL (ref 12–16)
IMM GRANULOCYTES # BLD AUTO: 0.01 X10*3/UL (ref 0–0.7)
IMM GRANULOCYTES NFR BLD AUTO: 0.2 % (ref 0–0.9)
INR PPP: 1 (ref 0.9–1.1)
LYMPHOCYTES # BLD AUTO: 2.41 X10*3/UL (ref 1.2–4.8)
LYMPHOCYTES NFR BLD AUTO: 47.6 %
MCH RBC QN AUTO: 24.3 PG (ref 26–34)
MCHC RBC AUTO-ENTMCNC: 33.1 G/DL (ref 32–36)
MCV RBC AUTO: 74 FL (ref 80–100)
MONOCYTES # BLD AUTO: 0.26 X10*3/UL (ref 0.1–1)
MONOCYTES NFR BLD AUTO: 5.1 %
NEUTROPHILS # BLD AUTO: 2.3 X10*3/UL (ref 1.2–7.7)
NEUTROPHILS NFR BLD AUTO: 45.5 %
NRBC BLD-RTO: 0 /100 WBCS (ref 0–0)
PLATELET # BLD AUTO: 322 X10*3/UL (ref 150–450)
POTASSIUM SERPL-SCNC: 4.2 MMOL/L (ref 3.5–5.3)
PROT SERPL-MCNC: 6.8 G/DL (ref 6.4–8.2)
PROTHROMBIN TIME: 11.3 SECONDS (ref 9.8–12.4)
RBC # BLD AUTO: 5.14 X10*6/UL (ref 4–5.2)
SODIUM SERPL-SCNC: 140 MMOL/L (ref 136–145)
WBC # BLD AUTO: 5.1 X10*3/UL (ref 4.4–11.3)

## 2025-03-15 PROCEDURE — 85730 THROMBOPLASTIN TIME PARTIAL: CPT | Performed by: EMERGENCY MEDICINE

## 2025-03-15 PROCEDURE — 2500000004 HC RX 250 GENERAL PHARMACY W/ HCPCS (ALT 636 FOR OP/ED): Mod: JZ | Performed by: EMERGENCY MEDICINE

## 2025-03-15 PROCEDURE — 85610 PROTHROMBIN TIME: CPT | Performed by: EMERGENCY MEDICINE

## 2025-03-15 PROCEDURE — 36415 COLL VENOUS BLD VENIPUNCTURE: CPT | Performed by: EMERGENCY MEDICINE

## 2025-03-15 PROCEDURE — 85025 COMPLETE CBC W/AUTO DIFF WBC: CPT | Performed by: EMERGENCY MEDICINE

## 2025-03-15 PROCEDURE — 99284 EMERGENCY DEPT VISIT MOD MDM: CPT | Mod: 25 | Performed by: EMERGENCY MEDICINE

## 2025-03-15 PROCEDURE — 80053 COMPREHEN METABOLIC PANEL: CPT | Performed by: EMERGENCY MEDICINE

## 2025-03-15 PROCEDURE — 96374 THER/PROPH/DIAG INJ IV PUSH: CPT

## 2025-03-15 RX ORDER — HYDROMORPHONE HYDROCHLORIDE 1 MG/ML
1 INJECTION, SOLUTION INTRAMUSCULAR; INTRAVENOUS; SUBCUTANEOUS ONCE
Status: DISCONTINUED | OUTPATIENT
Start: 2025-03-15 | End: 2025-03-15

## 2025-03-15 RX ADMIN — HYDROMORPHONE HYDROCHLORIDE 0.5 MG: 1 INJECTION, SOLUTION INTRAMUSCULAR; INTRAVENOUS; SUBCUTANEOUS at 16:22

## 2025-03-15 ASSESSMENT — PAIN SCALES - GENERAL
PAINLEVEL_OUTOF10: 9
PAINLEVEL_OUTOF10: 6
PAINLEVEL_OUTOF10: 8

## 2025-03-15 ASSESSMENT — PAIN - FUNCTIONAL ASSESSMENT: PAIN_FUNCTIONAL_ASSESSMENT: 0-10

## 2025-03-15 ASSESSMENT — PAIN DESCRIPTION - ORIENTATION: ORIENTATION: LEFT

## 2025-03-15 ASSESSMENT — PAIN DESCRIPTION - LOCATION
LOCATION_2: HEAD
LOCATION: ARM

## 2025-03-15 ASSESSMENT — PAIN DESCRIPTION - DESCRIPTORS
DESCRIPTORS_2: HEADACHE
DESCRIPTORS: BURNING

## 2025-03-15 NOTE — ED PROVIDER NOTES
HPI   Chief Complaint   Patient presents with    Arm Pain       HPI: []  34-year-old Afro-American female with a history of hidradenitis suppurative particularly at both axilla and groin area, lupus, about 8 weeks postpartum comes in with a flareup of hidradenitis in her axilla and groin area.  No fever chills nausea vomit diarrhea cough congestion incontinence seizures syncope or near syncope, seen in the ED recently.    Past history: Lupus, hidradenitis suppurativa  Social: Pat denies current tobacco alcohol drug abuse.  REVIEW OF SYSTEMS:    GENERAL.: No weight loss, fatigue, anorexia, insomnia, fever.    EYES: No vision loss, double vision, drainage, eye pain.    ENT: No pharyngitis, dry mouth.    CARDIOPULMONARY: No chest pain, palpitations, syncope, near syncope. No shortness of breath, cough, hemoptysis.    GI: No abdominal pain, change in bowel habits, melena, hematemesis, hematochezia, nausea, vomiting, diarrhea.    : No discharge, dysuria, frequency, urgency, hematuria.    MS: No limb pain, joint pain, joint swelling.    SKIN: No rashes.  Positive for left axilla and groin pain    PSYCH: No depression, anxiety, suicidality, homicidality.    Review of systems is otherwise negative unless stated above or in history of present illness.  Social history, family history, allergies reviewed.  PHYSICAL EXAM:    GENERAL: Vitals noted, no distress. Alert and oriented  x 3. Non-toxic.      EENT: TMs clear. Posterior oropharynx unremarkable. No meningismus. No LAD.     NECK: Supple. Nontender. No midline tenderness.     CARDIAC: Regular, rate, rhythm. No murmurs rubs or gallops. No JVD    PULMONARY: Lungs clear bilaterally with good aeration. No wheezes rales or rhonchi. No respiratory distress.     ABDOMEN: Soft, nonsurgical. Nontender. No peritoneal signs. Normoactive bowel sounds. No pulsatile masses.     EXTREMITIES: No peripheral edema. Negative Homans bilaterally, no cords.  2+ bounding pulses  well-perfused.    SKIN: No rash. Intact.  Female chaperone present, I examined both axilla both groin and mons pubis area she has evidence of chronic scarring and keloids from prior hidradenitis I do not appreciate any obvious erythema redness fluctuation draining sinuses or any evidence of active infection or inflammation.    NEURO: No focal neurologic deficits, NIH score of 0. Cranial nerves normal as tested from II through XII.     MEDICAL DECISION MAKING:  CBC with differential and chemistries LFTs are within normal limits.  Treatment in ED: Patient was given 0.5 mg Dilaudid  ED course: Patient remains a stable hemodynamic.  Impression: Hidradenitis suppurativa  Plan set MDM: 34-year-old female history of hidradenitis suppurativa who does see dermatology is currently on immunomodulators comes in with a flareup on exam she is afebrile exam is fairly reassuring with no signs of active inflammation infection abscess formation labs reassuring no leukocytosis will be discharged home advised outpatient follow with her primary care doctor her ID specialist her plastics specialist and her dermatologist with strict return precaution.              Patient History   Past Medical History:   Diagnosis Date    Asthma, mild intermittent (HHS-HCC)     albuterol prn    Hidradenitis suppurativa 10/29/2020    Hidradenitis    History of gestational hypertension     Lupus     diagnosed by her PCP in 2017 based on symptoms of joint pain and facial rash. She states she is in remission    MDD (major depressive disorder)     not on meds    PTSD (post-traumatic stress disorder)     resulting from IPV     Past Surgical History:   Procedure Laterality Date    OTHER SURGICAL HISTORY  09/19/2022    Arm surgery    OTHER SURGICAL HISTORY Left 2011    Left wrist    OTHER SURGICAL HISTORY Left 2021    Left wrist procedure     Family History   Problem Relation Name Age of Onset    COPD Father      Heart disease Father       Social History      Tobacco Use    Smoking status: Some Days     Types: Cigarettes    Smokeless tobacco: Never   Vaping Use    Vaping status: Every Day   Substance Use Topics    Alcohol use: Not Currently    Drug use: Never       Physical Exam   ED Triage Vitals [03/15/25 1307]   Temperature Heart Rate Respirations BP   36.5 °C (97.7 °F) 72 15 (!) 126/93      Pulse Ox Temp Source Heart Rate Source Patient Position   100 % Temporal Monitor Sitting      BP Location FiO2 (%)     Right arm --       Physical Exam      ED Course & MDM   ED Course as of 03/15/25 1627   Sat Mar 15, 2025   1604 Patient exam is fairly unremarkable she does have hidradenitis suppurativa over both axilla and groin area but none of them are inflamed I do not see any abscesses or draining sinuses CBC with differential is normal patient was given 0.5 mL of Dilaudid will be discharged home advised outpatient follow-up as scheduled. [MT]      ED Course User Index  [MT] David Chase MD         Diagnoses as of 03/15/25 1627   Hydradenitis                 No data recorded     Monik Coma Scale Score: 15 (03/15/25 1558 : Anastacia Castillo, EVELYN)                           Medical Decision Making      Procedure  Procedures     David Chase MD  03/15/25 3715

## 2025-03-15 NOTE — ED TRIAGE NOTES
Pt presented to ED with c/o left arm pain. Pt has hx of hs. Pt is postpartum 8 weeks and reports having headache for 2 days. Pt also reports having pain relayed to hs in groin.

## 2025-03-18 ENCOUNTER — PHARMACY VISIT (OUTPATIENT)
Dept: PHARMACY | Facility: CLINIC | Age: 35
End: 2025-03-18
Payer: MEDICAID

## 2025-03-18 PROCEDURE — RXMED WILLOW AMBULATORY MEDICATION CHARGE

## 2025-03-19 PROCEDURE — RXMED WILLOW AMBULATORY MEDICATION CHARGE

## 2025-03-20 ENCOUNTER — PHARMACY VISIT (OUTPATIENT)
Dept: PHARMACY | Facility: CLINIC | Age: 35
End: 2025-03-20
Payer: MEDICAID

## 2025-04-01 ENCOUNTER — PHARMACY VISIT (OUTPATIENT)
Dept: PHARMACY | Facility: CLINIC | Age: 35
End: 2025-04-01
Payer: MEDICAID

## 2025-04-01 ENCOUNTER — OFFICE VISIT (OUTPATIENT)
Dept: OBSTETRICS AND GYNECOLOGY | Facility: CLINIC | Age: 35
End: 2025-04-01
Payer: MEDICAID

## 2025-04-01 VITALS
DIASTOLIC BLOOD PRESSURE: 86 MMHG | SYSTOLIC BLOOD PRESSURE: 154 MMHG | BODY MASS INDEX: 31.77 KG/M2 | HEART RATE: 58 BPM | WEIGHT: 173.7 LBS

## 2025-04-01 DIAGNOSIS — L73.2 HIDRADENITIS SUPPURATIVA: ICD-10-CM

## 2025-04-01 DIAGNOSIS — F43.10 PTSD (POST-TRAUMATIC STRESS DISORDER): ICD-10-CM

## 2025-04-01 DIAGNOSIS — F11.20 OPIOID DEPENDENCE, DAILY USE (MULTI): Primary | ICD-10-CM

## 2025-04-01 PROCEDURE — 3079F DIAST BP 80-89 MM HG: CPT | Performed by: OBSTETRICS & GYNECOLOGY

## 2025-04-01 PROCEDURE — 99214 OFFICE O/P EST MOD 30 MIN: CPT | Mod: TH | Performed by: OBSTETRICS & GYNECOLOGY

## 2025-04-01 PROCEDURE — 3077F SYST BP >= 140 MM HG: CPT | Performed by: OBSTETRICS & GYNECOLOGY

## 2025-04-01 PROCEDURE — RXMED WILLOW AMBULATORY MEDICATION CHARGE

## 2025-04-01 PROCEDURE — 99214 OFFICE O/P EST MOD 30 MIN: CPT | Performed by: OBSTETRICS & GYNECOLOGY

## 2025-04-01 RX ORDER — OXYCODONE AND ACETAMINOPHEN 10; 325 MG/1; MG/1
1 TABLET ORAL EVERY 4 HOURS PRN
Qty: 84 TABLET | Refills: 0 | Status: SHIPPED | OUTPATIENT
Start: 2025-04-01 | End: 2025-04-15

## 2025-04-01 RX ORDER — OXYCODONE AND ACETAMINOPHEN 10; 325 MG/1; MG/1
1 TABLET ORAL EVERY 4 HOURS PRN
Qty: 5 TABLET | Refills: 0 | Status: SHIPPED | OUTPATIENT
Start: 2025-04-15 | End: 2025-04-29

## 2025-04-01 ASSESSMENT — ENCOUNTER SYMPTOMS
CONSTITUTIONAL NEGATIVE: 0
CARDIOVASCULAR NEGATIVE: 0
GASTROINTESTINAL NEGATIVE: 0
EYES NEGATIVE: 0
ALLERGIC/IMMUNOLOGIC NEGATIVE: 0
PSYCHIATRIC NEGATIVE: 0
MUSCULOSKELETAL NEGATIVE: 0
NEUROLOGICAL NEGATIVE: 0
ENDOCRINE NEGATIVE: 0
HEMATOLOGIC/LYMPHATIC NEGATIVE: 0
RESPIRATORY NEGATIVE: 0

## 2025-04-01 ASSESSMENT — PAIN SCALES - GENERAL: PAINLEVEL_OUTOF10: 8

## 2025-04-01 NOTE — PROGRESS NOTES
"Assessment   33 YO  with chronic pain 2/2 HS, sp  on 25    Previously discussed today the risks of long term opioid use for chronic pain, including but not limited to development of hyperalgesia, physiological tolerance and dependency, desensitization of her mu opioid receptors, accidental overdose.  At this time, pt has no evidence of a MARQUIS. It is not known whether she is physiologically dependent as she has only uptitrated over time, reportedly due to poorly managed HS, and has not attempted weaning.  It is notable, however, that even when she was working and feeling her best on HS treatment, she still felt that the Percocet TID \"barely\" managed the pain at times. She also seemed surprised to hear that Percocet 10mg TID was a significant amount of daily opioids.    We had the conversation that her history raises the concern that she would have a difficult time weaning down on her Percocet, and that if she is unable to do so, I would personally recommend she consider switching to an agent like buprenorphine. Patient is reluctant, as she associates that medication with addiction and she is not addicted, and also because she had an adverse reaction when switching abrupt from Percocet 10mg TID --> Suboxone 2mg TID in the past. If she were to switch, I would recommend micro-dosing with Subutex. We will defer that conversation to postop.    I did express to Sandrita that for me to continue her Pain Management:  - I would require regular drug screens  - I would require attendance at appointments for refills  - I will not do early refills   - I will not be going up on her Percocet dose beyond the q4h dosing, even if she has acute post-op pain from the HS surgery. It would be my suggestion that she stay in the hospital until her pain control is adequate to go home on the q4h dosing.    PLAN  - Cont Percocet 10/325mg q4 hours until postop from HD surgery (at which pt is to go back to [facility])  - " "Referred to Dr. Harrison with  Pain Med as pt is not sure she wants to go back to [facility] for her pain mgmt care  - Narcan prescribed on 25  - UDS today with fentanyl confirm  - Cont following with Derm (next apptmt 2025). She asked for home injections of Cosenytx and says they only gave her one instead of weekly.  - Reschedule HS surgery to as soon as logistically possible (currently scheduled for 2025)    FU 4 weeks    Kayla Angulo MD     Subjective   35 YO  sp  on 25 at 36+1 weeks for baby boy \"Tano\" after presenting with PPROM.    Pregnancy Notable For  - HS, multiple flares, currently on Humira, previously on Cosentyx q2 weeks pre-pregnancy with better control. Pt has an apptmt with Derm tomorrow with Dr. Elizalde and it is planned for her to resume Cosentyx at that time. She is scheduled to have a preop consult for HS surgery with Dr. Coronel on 25.  - Chronic use of opioids for HS-related pain. Per PDMP, pt was being prescribed Percocet 10/325mg TID by [facility]. In pregnancy, her HS symptoms worsened, and by 2024 (11 weeks), pt had been uptitrated to Percocet 10/325mg Q4H by [facility]. Now patient is taking the Percocet 10mg q3 hours, since she \"doesn't have anything for breakthrough anymore\" since she's not longer in the hospital and getting Dilaudid. Reviewing her notes from [facility], it appears patient was previously trialed on morphine in 2024 (I believe as part of a process to help her wean down her opioid use) and Suboxone in Oct 2023 (2mg TID, \"had a reaction\"). She is planning to transition back to [facility] pain clinic after her HS surgery, having already met with her old Pain provider Margo Uriarte on 25. The plan per that note had been to go back down to Percocet 10mg TID max should she resume care with them.  - Asthma moderate persistent, on flonase and prn albuterol  - AMA  - IPV s/p event  with FOB, has new home " alone    Feeding: formula  Contraception: none, pt states she is not sexually active x 6 months, no current partner.    Interval Hx:  Just came back from [facility] bc her cousin passed.  Had a bad anxiety attack on the airplane and has continued to have residual anxiety symptoms since.  She reached back out to her psychiatrist, Dr. Nelson Rodriguez at [facility], and has scheduled to see him in a few weeks.  She was previously on alprazolam for anxiety, but has not used for years. She states she still has some leftover pills from 2023 but isn't really wanting to get back on medication. May want psychotherapy as her primary treatment.   She also has an apptmt with [facility] with Dr. Quevedo for a PCP, but since Dr. Quevedo also has a psych background, she might be able to help her with her anxiety as well.    Notes that the Cosentyx is not working. She is hoping to get on the new medication via trial in the Derm department.  She hasn't moved up her surgery yet bc she is trying to work out the logistics with her family.  [facility] had wanted to see her postop, but I wonder if they could see before surgery since I will be departing in June.    She is back at work, working 3 PM - 11PM. She works for [employer].    PDMP: #84 tabs of oxycodone 10mg/325mg dispensed on 3/18/25  Last UDS 2/18: neg for fentanyl, pain panel not yet resulted    Past Medical History:   Diagnosis Date    Asthma, mild intermittent ([facility])     albuterol prn    Hidradenitis suppurativa 10/29/2020    Hidradenitis    History of gestational hypertension     Lupus     diagnosed by her PCP in 2017 based on symptoms of joint pain and facial rash. She states she is in remission    MDD (major depressive disorder)     not on meds    PTSD (post-traumatic stress disorder)     resulting from IPV     Objective   /86   Pulse 58   Wt 78.8 kg (173 lb 11.2 oz)   LMP 05/18/2024   Breastfeeding No   BMI 31.77 kg/m²       General:   Alert and oriented, in no  acute distress   Skin: No significant acne. No hirsutism.   Neck: Supple. No visible thyromegaly.    Psych Normal affect. Normal mood.

## 2025-04-03 ENCOUNTER — APPOINTMENT (OUTPATIENT)
Dept: DERMATOLOGY | Facility: CLINIC | Age: 35
End: 2025-04-03
Payer: MEDICAID

## 2025-04-03 ENCOUNTER — TELEPHONE (OUTPATIENT)
Dept: DERMATOLOGY | Facility: CLINIC | Age: 35
End: 2025-04-03

## 2025-04-03 LAB
1OH-MIDAZOLAM UR-MCNC: NEGATIVE NG/ML
7AMINOCLONAZEPAM UR-MCNC: NEGATIVE NG/ML
A-OH ALPRAZ UR-MCNC: 72 NG/ML
A-OH-TRIAZOLAM UR-MCNC: NEGATIVE NG/ML
AMPHETAMINES UR QL: NEGATIVE NG/ML
BARBITURATES UR QL: NEGATIVE NG/ML
BZE UR QL: NEGATIVE NG/ML
CODEINE UR-MCNC: NEGATIVE NG/ML
CREAT UR-MCNC: 65.3 MG/DL
DRUG SCREEN COMMENT UR-IMP: ABNORMAL
EDDP UR-MCNC: NEGATIVE NG/ML
FENTANYL UR-MCNC: NEGATIVE NG/ML
HYDROCODONE UR-MCNC: NEGATIVE NG/ML
HYDROMORPHONE UR-MCNC: NEGATIVE NG/ML
LORAZEPAM UR-MCNC: NEGATIVE NG/ML
METHADONE UR-MCNC: NEGATIVE NG/ML
MORPHINE UR-MCNC: NEGATIVE NG/ML
NORDIAZEPAM UR-MCNC: NEGATIVE NG/ML
NORFENTANYL UR-MCNC: NEGATIVE NG/ML
NORHYDROCODONE UR CFM-MCNC: NEGATIVE NG/ML
NOROXYCODONE UR CFM-MCNC: 96 NG/ML
NORTRAMADOL UR-MCNC: NEGATIVE NG/ML
OH-ETHYLFLURAZ UR-MCNC: NEGATIVE NG/ML
OXAZEPAM UR-MCNC: NEGATIVE NG/ML
OXIDANTS UR QL: NEGATIVE MCG/ML
OXYCODONE UR CFM-MCNC: NEGATIVE NG/ML
OXYMORPHONE UR CFM-MCNC: 430 NG/ML
PCP UR QL: NEGATIVE NG/ML
PH UR: 5.5 [PH] (ref 4.5–9)
QUEST 6 ACETYLMORPHINE: NEGATIVE NG/ML
QUEST NOTES AND COMMENTS: ABNORMAL
QUEST ZOLPIDEM: NEGATIVE NG/ML
TEMAZEPAM UR-MCNC: NEGATIVE NG/ML
THC UR QL: NEGATIVE NG/ML
TRAMADOL UR-MCNC: NEGATIVE NG/ML
ZOLPIDEM PHENYL-4-CARB UR CFM-MCNC: NEGATIVE NG/ML

## 2025-04-03 NOTE — TELEPHONE ENCOUNTER
Patient's provider, Dr. Jus Espino from [facility] called wanting to touch base with Dr. Elizalde on Sandrita's treatment plan in regards to pain control regimen. He would like Dr. Elizalde to give him a call or email to discuss. He said it won't take long.     Phone number he can be reached at is [phone]    Email: [email or web address]

## 2025-04-06 ENCOUNTER — HOSPITAL ENCOUNTER (EMERGENCY)
Facility: HOSPITAL | Age: 35
Discharge: HOME | End: 2025-04-06
Attending: INTERNAL MEDICINE
Payer: MEDICAID

## 2025-04-06 VITALS
RESPIRATION RATE: 17 BRPM | DIASTOLIC BLOOD PRESSURE: 112 MMHG | WEIGHT: 173 LBS | OXYGEN SATURATION: 98 % | TEMPERATURE: 98.2 F | HEIGHT: 62 IN | SYSTOLIC BLOOD PRESSURE: 158 MMHG | BODY MASS INDEX: 31.83 KG/M2 | HEART RATE: 90 BPM

## 2025-04-06 DIAGNOSIS — I10 HYPERTENSION, UNSPECIFIED TYPE: ICD-10-CM

## 2025-04-06 DIAGNOSIS — L73.2 HIDRADENITIS SUPPURATIVA: Primary | ICD-10-CM

## 2025-04-06 LAB
ALBUMIN SERPL BCP-MCNC: 3.8 G/DL (ref 3.4–5)
ALP SERPL-CCNC: 51 U/L (ref 33–110)
ALT SERPL W P-5'-P-CCNC: 12 U/L (ref 7–45)
ANION GAP SERPL CALC-SCNC: 11 MMOL/L (ref 10–20)
AST SERPL W P-5'-P-CCNC: 39 U/L (ref 9–39)
BASOPHILS # BLD AUTO: 0.02 X10*3/UL (ref 0–0.1)
BASOPHILS NFR BLD AUTO: 0.4 %
BILIRUB SERPL-MCNC: 0.4 MG/DL (ref 0–1.2)
BUN SERPL-MCNC: 13 MG/DL (ref 6–23)
CALCIUM SERPL-MCNC: 9.2 MG/DL (ref 8.6–10.3)
CHLORIDE SERPL-SCNC: 108 MMOL/L (ref 98–107)
CO2 SERPL-SCNC: 22 MMOL/L (ref 21–32)
CREAT SERPL-MCNC: 0.77 MG/DL (ref 0.5–1.05)
EGFRCR SERPLBLD CKD-EPI 2021: >90 ML/MIN/1.73M*2
EOSINOPHIL # BLD AUTO: 0.04 X10*3/UL (ref 0–0.7)
EOSINOPHIL NFR BLD AUTO: 0.7 %
ERYTHROCYTE [DISTWIDTH] IN BLOOD BY AUTOMATED COUNT: 15.5 % (ref 11.5–14.5)
GLUCOSE SERPL-MCNC: 105 MG/DL (ref 74–99)
HCT VFR BLD AUTO: 35.7 % (ref 36–46)
HGB BLD-MCNC: 11.6 G/DL (ref 12–16)
IMM GRANULOCYTES # BLD AUTO: 0.01 X10*3/UL (ref 0–0.7)
IMM GRANULOCYTES NFR BLD AUTO: 0.2 % (ref 0–0.9)
LYMPHOCYTES # BLD AUTO: 1.66 X10*3/UL (ref 1.2–4.8)
LYMPHOCYTES NFR BLD AUTO: 30.2 %
MCH RBC QN AUTO: 23.6 PG (ref 26–34)
MCHC RBC AUTO-ENTMCNC: 32.5 G/DL (ref 32–36)
MCV RBC AUTO: 73 FL (ref 80–100)
MONOCYTES # BLD AUTO: 0.3 X10*3/UL (ref 0.1–1)
MONOCYTES NFR BLD AUTO: 5.5 %
NEUTROPHILS # BLD AUTO: 3.47 X10*3/UL (ref 1.2–7.7)
NEUTROPHILS NFR BLD AUTO: 63 %
NRBC BLD-RTO: 0 /100 WBCS (ref 0–0)
PLATELET # BLD AUTO: 310 X10*3/UL (ref 150–450)
POTASSIUM SERPL-SCNC: 5.4 MMOL/L (ref 3.5–5.3)
PROT SERPL-MCNC: 7.4 G/DL (ref 6.4–8.2)
RBC # BLD AUTO: 4.91 X10*6/UL (ref 4–5.2)
SODIUM SERPL-SCNC: 136 MMOL/L (ref 136–145)
WBC # BLD AUTO: 5.5 X10*3/UL (ref 4.4–11.3)

## 2025-04-06 PROCEDURE — 80053 COMPREHEN METABOLIC PANEL: CPT

## 2025-04-06 PROCEDURE — 36415 COLL VENOUS BLD VENIPUNCTURE: CPT

## 2025-04-06 PROCEDURE — 96375 TX/PRO/DX INJ NEW DRUG ADDON: CPT

## 2025-04-06 PROCEDURE — 36415 COLL VENOUS BLD VENIPUNCTURE: CPT | Performed by: INTERNAL MEDICINE

## 2025-04-06 PROCEDURE — 99284 EMERGENCY DEPT VISIT MOD MDM: CPT | Mod: 25 | Performed by: INTERNAL MEDICINE

## 2025-04-06 PROCEDURE — 96376 TX/PRO/DX INJ SAME DRUG ADON: CPT

## 2025-04-06 PROCEDURE — 2500000004 HC RX 250 GENERAL PHARMACY W/ HCPCS (ALT 636 FOR OP/ED): Mod: JZ | Performed by: INTERNAL MEDICINE

## 2025-04-06 PROCEDURE — 96374 THER/PROPH/DIAG INJ IV PUSH: CPT

## 2025-04-06 PROCEDURE — 2500000004 HC RX 250 GENERAL PHARMACY W/ HCPCS (ALT 636 FOR OP/ED): Mod: JZ

## 2025-04-06 PROCEDURE — 85025 COMPLETE CBC W/AUTO DIFF WBC: CPT | Performed by: INTERNAL MEDICINE

## 2025-04-06 RX ORDER — HYDROMORPHONE HYDROCHLORIDE 2 MG/ML
2 INJECTION, SOLUTION INTRAMUSCULAR; INTRAVENOUS; SUBCUTANEOUS ONCE
Status: COMPLETED | OUTPATIENT
Start: 2025-04-06 | End: 2025-04-06

## 2025-04-06 RX ORDER — ONDANSETRON HYDROCHLORIDE 2 MG/ML
4 INJECTION, SOLUTION INTRAVENOUS ONCE
Status: COMPLETED | OUTPATIENT
Start: 2025-04-06 | End: 2025-04-06

## 2025-04-06 RX ORDER — IBUPROFEN 600 MG/1
600 TABLET ORAL EVERY 6 HOURS PRN
Qty: 100 TABLET | Refills: 0 | Status: SHIPPED | OUTPATIENT
Start: 2025-04-06 | End: 2025-05-01

## 2025-04-06 RX ORDER — KETOROLAC TROMETHAMINE 30 MG/ML
15 INJECTION, SOLUTION INTRAMUSCULAR; INTRAVENOUS ONCE
Status: COMPLETED | OUTPATIENT
Start: 2025-04-06 | End: 2025-04-06

## 2025-04-06 RX ORDER — ONDANSETRON 4 MG/1
4 TABLET, ORALLY DISINTEGRATING ORAL EVERY 8 HOURS PRN
Qty: 20 TABLET | Refills: 0 | Status: SHIPPED | OUTPATIENT
Start: 2025-04-06 | End: 2025-04-13

## 2025-04-06 RX ADMIN — ONDANSETRON 4 MG: 2 INJECTION, SOLUTION INTRAMUSCULAR; INTRAVENOUS at 10:03

## 2025-04-06 RX ADMIN — HYDROMORPHONE HYDROCHLORIDE 2 MG: 2 INJECTION INTRAMUSCULAR; INTRAVENOUS; SUBCUTANEOUS at 10:03

## 2025-04-06 RX ADMIN — HYDROMORPHONE HYDROCHLORIDE 2 MG: 2 INJECTION INTRAMUSCULAR; INTRAVENOUS; SUBCUTANEOUS at 11:17

## 2025-04-06 RX ADMIN — ONDANSETRON 4 MG: 2 INJECTION, SOLUTION INTRAMUSCULAR; INTRAVENOUS at 12:17

## 2025-04-06 RX ADMIN — HYDROMORPHONE HYDROCHLORIDE 2 MG: 2 INJECTION INTRAMUSCULAR; INTRAVENOUS; SUBCUTANEOUS at 12:16

## 2025-04-06 RX ADMIN — KETOROLAC TROMETHAMINE 15 MG: 30 INJECTION, SOLUTION INTRAMUSCULAR at 12:16

## 2025-04-06 ASSESSMENT — PAIN - FUNCTIONAL ASSESSMENT
PAIN_FUNCTIONAL_ASSESSMENT: 0-10

## 2025-04-06 ASSESSMENT — PAIN DESCRIPTION - PROGRESSION
CLINICAL_PROGRESSION: NOT CHANGED
CLINICAL_PROGRESSION: NOT CHANGED

## 2025-04-06 ASSESSMENT — PAIN SCALES - GENERAL
PAINLEVEL_OUTOF10: 9

## 2025-04-06 ASSESSMENT — PAIN DESCRIPTION - LOCATION: LOCATION: ARM

## 2025-04-06 NOTE — ED PROVIDER NOTES
CC: Upper Extremity Issue     HPI:  Patient is a 34-year-old female with a past medical history of chronic hidradenitis suppurativa of the axillas and groin and 12 weeks postpartum who presented to the ED for hidradenitis flare.  Patient noted that her hidradenitis flare began yesterday.  She notes that most significantly in the left axilla.  She notes chronic pain of the right axilla as well as the groin.  Denies any change in coloration of her hidradenitis.  Noted that she had a fever yesterday as well as episode of nonbloody emesis.  Has been taking her home Percocet without significant proving of her pain.  Patient noted previously be on Cosentyx but was noted to be taken off for safety during pregnancy.  Stated that the Cosentyx was the most effective medication for hidradenitis.  Patient notes subjective swelling of the left upper extremity.  Notes normal range of motion no pain to left upper extremity.  Notes that swelling can occur after hidradenitis.  Notes that she has been followed by infectious diseases, dermatology, OB/GYN for pain management, and surgery.  Patient has noted to have hidradenitis surgery of the axilla scheduled for September 2025.  Patient was last to see OB/GYN on 9/1/2025 and advised to have hidradenitis surgery scheduled soon as possible.  Patient noted to have Derm follow-up scheduled for/17/25.  Denied trauma, falls, chest pain difficulty breathing, headache, neck pain, abdominal pain, back pain.    Limitations to history: None  Independent historian(s): Patient  Records Reviewed: Recent available ED and inpatient notes reviewed in EMR.    PMHx/PSHx:  Per HPI.   - has a past medical history of Asthma, mild intermittent ([facility]-HCC), Hidradenitis suppurativa (10/29/2020), History of gestational hypertension, Lupus, MDD (major depressive disorder), and PTSD (post-traumatic stress disorder).  - has a past surgical history that includes Other surgical history (09/19/2022); Other surgical  history (Left, 2011); and Other surgical history (Left, 2021).    Medications:  Reviewed in EMR. See EMR for complete list of medications and doses.    Allergies:  Suboxone [buprenorphine-naloxone], Clarithromycin, Haloperidol, Keflex [cephalexin], Levofloxacin, Metoclopramide, and Reglan [metoclopramide hcl]    Social History:  - Tobacco:  reports that she has been smoking cigarettes. She has never used smokeless tobacco.   - Alcohol:  reports that she does not currently use alcohol.   - Illicit Drugs:  reports no history of drug use.     ROS:  Per HPI.       ???????????????????????????????????????????????????????????????  Triage Vitals:  T 36.8 °C (98.2 °F)  HR 61  BP (!) 145/98  RR 17  O2 99 %      Physical Exam  Vitals and nursing note reviewed.   Constitutional:       General: She is not in acute distress.  HENT:      Head: Normocephalic and atraumatic.      Nose: Nose normal.      Mouth/Throat:      Mouth: Mucous membranes are moist.   Eyes:      Conjunctiva/sclera: Conjunctivae normal.   Cardiovascular:      Rate and Rhythm: Normal rate and regular rhythm.      Pulses: Normal pulses.   Pulmonary:      Effort: Pulmonary effort is normal. No respiratory distress.      Breath sounds: Normal breath sounds.   Abdominal:      Palpations: Abdomen is soft.      Tenderness: There is no abdominal tenderness.   Musculoskeletal:      Comments: Dark lesions of the axilla groin without fluctuance, active drainage, or erythema.  Lesions appear similar to previously documented pictures.  No edema of the left upper extremity.  No TTP of the left upper extremity.  LUE NVI.   strength, elbow flexion, and elbow extension intact.  Patient able to range shoulder but notes that range of motion is painful.   Skin:     General: Skin is warm.   Neurological:      General: No focal deficit present.      Mental Status: She is alert.               ???????????????????????????????????????????????????????????????  Labs:   Labs  Reviewed   CBC WITH AUTO DIFFERENTIAL   COMPREHENSIVE METABOLIC PANEL        Imaging:   No orders to display        MDM:  Patient is a 34-year-old female with a past medical history of chronic hidradenitis suppurativa of the axillas and groin and 12 weeks postpartum who presented to the ED for hidradenitis flare.  Patient presented hypertensive at 145/98 with remainder of vitals WNL.  Patient noted to be hypertensive in the past secondary to hidradenitis flares.  Will monitor blood pressure after analgesia.  Low clinical concern for sepsis, necrotizing infection, acute traumatic injury, and acute osseous process.  Patient ordered analgesia.  Basic labs obtained.  Antibiotics to be held at this time.  No abscess noted for drainage.  Fever and vomiting likely secondary to significant pain.  Hidradenitis likely is not secondary to an acute bacterial process.  With her hidradenitis improving with Cosentyx, more likely secondary to autoimmune etiology that patient mentioned.  Left upper extremity without findings concerning for DVT.  Venous duplex not indicated at this time.  Please see ED course and disposition for remainder of care.    ED Course:  ED Course as of 04/06/25 1843   Sun Apr 06, 2025   1141 CBC and Auto Differential(!)  CBC without leukocytosis and mild anemia with a hemoglobin 11.6.  Hemoglobins are similar to previous. [MH]   1141 Comprehensive metabolic panel(!)  Metabolic panel unremarkable.  Potassium noted markedly hemolyzed at 5.4.  Low clinical concern for hyperkalemia. [MH]   1207 Patient notes that she is now having her first menstrual cycle since pregnancy. []      ED Course User Index  [MH] Rick Leal MD         Diagnoses as of 04/06/25 1843   Hidradenitis suppurativa   Hypertension, unspecified type       Social Determinants Limiting Care:  None identified    Disposition:  Patient noted to have Derm follow-up scheduled for 4/17/2025.  Discussed ED findings, plan for outpatient Derm  follow-up as scheduled, general surgery bilateral axilla lesion excision as soon as possible, primary care follow-up within next 2 to 3 days, and strict return precautions for any new or worsening symptoms including but limited to concerning pain and concerning infectious symptoms.  Patient also notified of elevated blood pressure which is likely secondary to pain.  Discussed follow-up with primary care in regards to high blood pressure.  Patient stated understanding and agreed with the plan.  All questions were answered.  Patient discharged in stable condition.    Rick Leal MD   Emergency Medicine PGY-3  [facility]    Comment: Please note this report has been produced using speech recognition software and may contain errors related to that system including errors in grammar, punctuation, and spelling as well as words and phrases that may be inappropriate.  If there are any questions or concerns please feel free to contact the dictating provider for clarification.    Procedures ? SmartLinks last updated 4/6/2025 9:55 AM        Rick Leal MD  Resident  04/06/25 8312

## 2025-04-06 NOTE — ED TRIAGE NOTES
Pt presents to the ED via EMS with c/o arm pain. Pt has hx of HS and is having a flare at this time. States she vomited last night and this morning. Had a fever yesterday.

## 2025-04-06 NOTE — DISCHARGE INSTRUCTIONS
You presented to the ED for pain that is an exacerbation from your hidradenitis suppurativa.  Administered analgesia in the emergency department.  No findings concerning for infection at this time.  Follow-up with dermatology as scheduled.  Follow-up with primary care within the next 2 to 3 days.  Please return to the emergency department for any new or worsening symptoms including but not limited to concerning pain and concern for infection.  You are prescribed ibuprofen for pain and Zofran as needed for nausea.  You were also noted to have an elevated blood pressure in the emergency department.  Please follow-up with primary care in regards to elevated blood pressure.  Elevated blood pressures increase your risk of vascular disease including strokes and heart disease.

## 2025-04-06 NOTE — Clinical Note
Sandrita Adams was seen and treated in our emergency department on 4/6/2025.  She may return to work on 04/08/2025.       If you have any questions or concerns, please don't hesitate to call.      Arlen Gaviria MD

## 2025-04-10 ENCOUNTER — TELEPHONE (OUTPATIENT)
Dept: OBSTETRICS AND GYNECOLOGY | Facility: CLINIC | Age: 35
End: 2025-04-10
Payer: MEDICAID

## 2025-04-10 NOTE — TELEPHONE ENCOUNTER
Patient stated the DR made an error on her mediation, and she would like to speak with a nurse about getting that fixed.       Pt can be reached at- [phone]

## 2025-04-10 NOTE — TELEPHONE ENCOUNTER
Dr. Angulo states she will fix the prescription. Message was sent to the patient via [employer].

## 2025-04-10 NOTE — TELEPHONE ENCOUNTER
Patient called and said there is an error for her refill prescription for oxy on 4/15. Said it is supposed to be for 14 days and it is only for one day. Message sent to Dr. Angulo.

## 2025-04-11 ENCOUNTER — TELEPHONE (OUTPATIENT)
Dept: RADIOLOGY | Facility: CLINIC | Age: 35
End: 2025-04-11

## 2025-04-11 ENCOUNTER — PHARMACY VISIT (OUTPATIENT)
Dept: PHARMACY | Facility: CLINIC | Age: 35
End: 2025-04-11
Payer: MEDICAID

## 2025-04-11 PROCEDURE — RXMED WILLOW AMBULATORY MEDICATION CHARGE

## 2025-04-11 NOTE — TELEPHONE ENCOUNTER
This is  patient and she said Kev wrote a prescription for her and the pharmacy said it's two notes on the prescription so he need to know what the right ine to use

## 2025-04-14 NOTE — PROGRESS NOTES
History of Present Complaint:  The patient was referred to us by Referring Provider: ***. this is 34 y.o.  female {Accompanied by:[id number]}with a past history of smoker, obesity BMI 32 anxiety/depression, PTSD, opioid dependency, lupus, fibromyalgia presenting with ***    Pain started {pain onset:23106}  Pain is {Better or worse:21206}   Patient history significant for the following red flags: {Red flags:40056}  The pain is described as {Pain description:43225} and is relieved by {pain relief:95324}      Prior Pain Therapies: {Prior pain therapy:92807}    Past surgical history:  {Past surgical history:40454}           Procedures:   *** the patient has had a ***% improvement in pain.    Portions of record reviewed for pertinent issues: active problem list, medication list, allergies, family history, social history, notes from last encounter, encounters, lab results, imaging and other available records.    I have personally reviewed the OARRS report for this patient. This report is scanned into the electronic medical record. I have considered the risks of abuse, dependence, addiction and diversion. It showed: Percocet 10 mg QID  OPIOID RISK ASSESSMENT SCORE ***/26  Opioid agreement: ***  Activities of daily living: ***  Adverse effects: ***  Analgesia: W/O ***/10, W ***/10  {***}  Toxicology screen: ***  Aberrant behavior: ***  My patient has no underlying substance abuse or alcohol abuse and there's no mental health conditions contributing to the patient's pain.        Diagnostic studies:  ***      Employment/disability/litigation: {ktlitigation:15258}    Social History:  {KT social history:07556}       Review of Systems       Physical Exam       Assessment  ***           Plan  At least 50% of the visit was involved in the discussion of the options for treatment. We discussed exercises, medication, interventional therapies and surgery. Healthy life style is essential with patient hard work to achieve the wellness. In  addition; discussion with the patient and/or family about any of the diagnostic results, impressions and/or recommended diagnostic studies, prognosis, risks and benefits of treatment options, instructions for treatment and/or follow-up, importance of compliance with chosen treatment options, risk-factor reduction, and patient/family education.           Recommended Pool therapy, walking in the pool, at least 3x per week for 30 minutes  Recommend self-directed physical therapy with at least daily exercises for minimum of 20-minute, brochure was handed to the patient  *** Smoking cessation  Healthy lifestyle and anti-inflammatory diet in addition to weight control discussed with the patient  Alternative chronic pain therapies was discussed, encouraged and information was handed  Return to Clinic 3 months       *Please note this report has been produced using speech recognition software and may contain errors related to that system including grammar, punctuation and spelling as well as words and phrases that may be inappropriate. If there are questions or concerns, please feel free to contact me to clarify.    Jose Harrison MD

## 2025-04-15 ENCOUNTER — APPOINTMENT (OUTPATIENT)
Dept: PAIN MEDICINE | Facility: CLINIC | Age: 35
End: 2025-04-15
Payer: MEDICAID

## 2025-04-15 ENCOUNTER — HOSPITAL ENCOUNTER (EMERGENCY)
Facility: HOSPITAL | Age: 35
Discharge: HOME | End: 2025-04-15
Attending: EMERGENCY MEDICINE
Payer: MEDICAID

## 2025-04-15 VITALS
HEART RATE: 72 BPM | OXYGEN SATURATION: 98 % | BODY MASS INDEX: 31.65 KG/M2 | HEIGHT: 62 IN | TEMPERATURE: 98.1 F | RESPIRATION RATE: 18 BRPM | DIASTOLIC BLOOD PRESSURE: 83 MMHG | SYSTOLIC BLOOD PRESSURE: 126 MMHG | WEIGHT: 172 LBS

## 2025-04-15 DIAGNOSIS — L73.2 HIDRADENITIS SUPPURATIVA OF MULTIPLE SITES: Primary | ICD-10-CM

## 2025-04-15 LAB
ALBUMIN SERPL BCP-MCNC: 3.9 G/DL (ref 3.4–5)
ALP SERPL-CCNC: 62 U/L (ref 33–110)
ALT SERPL W P-5'-P-CCNC: 10 U/L (ref 7–45)
ANION GAP SERPL CALC-SCNC: 12 MMOL/L (ref 10–20)
AST SERPL W P-5'-P-CCNC: 14 U/L (ref 9–39)
BASOPHILS # BLD AUTO: 0.03 X10*3/UL (ref 0–0.1)
BASOPHILS NFR BLD AUTO: 0.5 %
BILIRUB SERPL-MCNC: 0.2 MG/DL (ref 0–1.2)
BUN SERPL-MCNC: 17 MG/DL (ref 6–23)
CALCIUM SERPL-MCNC: 9.1 MG/DL (ref 8.6–10.3)
CHLORIDE SERPL-SCNC: 111 MMOL/L (ref 98–107)
CO2 SERPL-SCNC: 22 MMOL/L (ref 21–32)
CREAT SERPL-MCNC: 0.85 MG/DL (ref 0.5–1.05)
EGFRCR SERPLBLD CKD-EPI 2021: >90 ML/MIN/1.73M*2
EOSINOPHIL # BLD AUTO: 0.09 X10*3/UL (ref 0–0.7)
EOSINOPHIL NFR BLD AUTO: 1.6 %
ERYTHROCYTE [DISTWIDTH] IN BLOOD BY AUTOMATED COUNT: 16.4 % (ref 11.5–14.5)
GLUCOSE SERPL-MCNC: 101 MG/DL (ref 74–99)
HCT VFR BLD AUTO: 38.7 % (ref 36–46)
HGB BLD-MCNC: 12.4 G/DL (ref 12–16)
IMM GRANULOCYTES # BLD AUTO: 0.01 X10*3/UL (ref 0–0.7)
IMM GRANULOCYTES NFR BLD AUTO: 0.2 % (ref 0–0.9)
LACTATE SERPL-SCNC: 0.7 MMOL/L (ref 0.4–2)
LYMPHOCYTES # BLD AUTO: 2.3 X10*3/UL (ref 1.2–4.8)
LYMPHOCYTES NFR BLD AUTO: 40.8 %
MCH RBC QN AUTO: 23.4 PG (ref 26–34)
MCHC RBC AUTO-ENTMCNC: 32 G/DL (ref 32–36)
MCV RBC AUTO: 73 FL (ref 80–100)
MONOCYTES # BLD AUTO: 0.38 X10*3/UL (ref 0.1–1)
MONOCYTES NFR BLD AUTO: 6.7 %
NEUTROPHILS # BLD AUTO: 2.83 X10*3/UL (ref 1.2–7.7)
NEUTROPHILS NFR BLD AUTO: 50.2 %
NRBC BLD-RTO: 0 /100 WBCS (ref 0–0)
PLATELET # BLD AUTO: 369 X10*3/UL (ref 150–450)
POTASSIUM SERPL-SCNC: 4.2 MMOL/L (ref 3.5–5.3)
PROT SERPL-MCNC: 7.1 G/DL (ref 6.4–8.2)
RBC # BLD AUTO: 5.3 X10*6/UL (ref 4–5.2)
SODIUM SERPL-SCNC: 141 MMOL/L (ref 136–145)
WBC # BLD AUTO: 5.6 X10*3/UL (ref 4.4–11.3)

## 2025-04-15 PROCEDURE — 84075 ASSAY ALKALINE PHOSPHATASE: CPT

## 2025-04-15 PROCEDURE — 83605 ASSAY OF LACTIC ACID: CPT

## 2025-04-15 PROCEDURE — 96375 TX/PRO/DX INJ NEW DRUG ADDON: CPT

## 2025-04-15 PROCEDURE — 85025 COMPLETE CBC W/AUTO DIFF WBC: CPT

## 2025-04-15 PROCEDURE — 36415 COLL VENOUS BLD VENIPUNCTURE: CPT

## 2025-04-15 PROCEDURE — 99284 EMERGENCY DEPT VISIT MOD MDM: CPT | Performed by: EMERGENCY MEDICINE

## 2025-04-15 PROCEDURE — 2500000004 HC RX 250 GENERAL PHARMACY W/ HCPCS (ALT 636 FOR OP/ED): Mod: JZ | Performed by: EMERGENCY MEDICINE

## 2025-04-15 PROCEDURE — 96365 THER/PROPH/DIAG IV INF INIT: CPT

## 2025-04-15 RX ORDER — CLINDAMYCIN PHOSPHATE 600 MG/50ML
600 INJECTION, SOLUTION INTRAVENOUS ONCE
Status: COMPLETED | OUTPATIENT
Start: 2025-04-15 | End: 2025-04-15

## 2025-04-15 RX ORDER — CLINDAMYCIN HYDROCHLORIDE 300 MG/1
300 CAPSULE ORAL 3 TIMES DAILY
Qty: 21 CAPSULE | Refills: 0 | Status: SHIPPED | OUTPATIENT
Start: 2025-04-15 | End: 2025-04-24

## 2025-04-15 RX ORDER — HYDROMORPHONE HYDROCHLORIDE 1 MG/ML
1 INJECTION, SOLUTION INTRAMUSCULAR; INTRAVENOUS; SUBCUTANEOUS ONCE
Status: COMPLETED | OUTPATIENT
Start: 2025-04-15 | End: 2025-04-15

## 2025-04-15 RX ADMIN — CLINDAMYCIN PHOSPHATE 600 MG: 600 INJECTION, SOLUTION INTRAVENOUS at 22:21

## 2025-04-15 RX ADMIN — HYDROMORPHONE HYDROCHLORIDE 1 MG: 1 INJECTION, SOLUTION INTRAMUSCULAR; INTRAVENOUS; SUBCUTANEOUS at 22:21

## 2025-04-15 RX ADMIN — HYDROMORPHONE HYDROCHLORIDE 1 MG: 1 INJECTION, SOLUTION INTRAMUSCULAR; INTRAVENOUS; SUBCUTANEOUS at 22:34

## 2025-04-15 ASSESSMENT — PAIN SCALES - PAIN ASSESSMENT IN ADVANCED DEMENTIA (PAINAD): TOTALSCORE: MEDICATION (SEE MAR)

## 2025-04-15 ASSESSMENT — PAIN SCALES - GENERAL
PAINLEVEL_OUTOF10: 9
PAINLEVEL_OUTOF10: 8

## 2025-04-15 ASSESSMENT — PAIN DESCRIPTION - PAIN TYPE: TYPE: ACUTE PAIN

## 2025-04-15 ASSESSMENT — PAIN DESCRIPTION - LOCATION: LOCATION: GROIN

## 2025-04-15 ASSESSMENT — PAIN - FUNCTIONAL ASSESSMENT: PAIN_FUNCTIONAL_ASSESSMENT: 0-10

## 2025-04-15 NOTE — ED TRIAGE NOTES
As provider-in-triage, I performed a medical screening history and physical exam on this patient.  HISTORY OF PRESENT ILLNESS  Patient is a 34-year-old female presenting to ED with concern for an HS flare.  For the past couple days she has worsening at HS in her bilateral axilla worse on the left side and bilateral groin worse on the right side.  She has a lesion on the inner right thigh that has been draining a purulent material.  She endorses subjective chills but denies any fevers, weakness, vomiting, myalgias.  Denies any immunocompromise.  Patient states that this is a chronic problem for her and she is seen in this ED frequently for pain control.  She does have an appoint with dermatology coming up 4/17/25.  She has taken oxycodone this morning at home which states it did not help with pain.  States Dilaudid 2 mg works well for her pain.     PHYSICAL EXAM  Vital Signs reviewed.  Cardiovascular: Regular rate and rhythm. No m/g/r  Respiratory: No respiratory distress. No accessory muscle use. Breath sounds are present and equal b/l. No adventitious sounds.   Dermatologic: HS present in bilateral axilla and groin.  Draining wound located on inner right thigh with purulent material.       MDM  Workup initiated. Pt stable pending bed availability and further evaluation. Please see subsequent provider note for further details and disposition.         I evaluated this patient in triage with the RN. Due to the patients complaint labs and or imaging were ordered by myself in an attempt to expedite patient care however I am not participating in care after evaluation. This is a preliminary assessment. Pt does not appear in acute distress at this time. They will have a full evaluation as soon as possible. They will be cared for by another provider who will possibly order more labs, imaging or interventions. Pt did not have a full ROS or PE completed by myself however below is a summary with reasons for orders.  For the  remainder of the patient's workup and ED course, please refer to the main ED provider note. We discussed need for diagnostic testing including laboratory studies and imaging.  We also discussed that patient may be asked to wait in the waiting room while these tests are pending.  They understand that if they choose to leave without having the testing completed or resulted that we cannot rule out acute life threatening illnesses and the risks involved could lead to worsening condition, permanent disability or even death.

## 2025-04-16 ENCOUNTER — APPOINTMENT (OUTPATIENT)
Dept: PAIN MEDICINE | Facility: CLINIC | Age: 35
End: 2025-04-16
Payer: MEDICAID

## 2025-04-16 PROCEDURE — RXMED WILLOW AMBULATORY MEDICATION CHARGE

## 2025-04-16 NOTE — ED PROVIDER NOTES
HPI   Chief Complaint   Patient presents with    Groin Injury    Groin Pain       This patient has chronic hidradenitis suppurativa and chronic pain with this.  She takes oxycodone is given by her OB gynecology doctor.  She states that her left thigh is a more swollen is worse than her right.  He states her axilla (right has been bothering her as well.  She states she is not on any antibiotics currently.              Patient History   Medical History[1]  Surgical History[2]  Family History[3]  Social History[4]    Physical Exam   ED Triage Vitals   Temperature Heart Rate Respirations BP   04/15/25 1726 04/15/25 1726 04/15/25 1726 04/15/25 1726   37 °C (98.6 °F) 77 18 126/83      Pulse Ox Temp Source Heart Rate Source Patient Position   04/15/25 1726 04/15/25 2243 04/15/25 2243 --   98 % Oral Monitor       BP Location FiO2 (%)     -- --             Physical Exam  Vitals and nursing note reviewed.   Constitutional:       General: She is not in acute distress.     Appearance: She is well-developed.   HENT:      Head: Normocephalic and atraumatic.   Eyes:      Conjunctiva/sclera: Conjunctivae normal.   Cardiovascular:      Rate and Rhythm: Normal rate and regular rhythm.      Heart sounds: No murmur heard.  Pulmonary:      Effort: Pulmonary effort is normal. No respiratory distress.      Breath sounds: Normal breath sounds.   Abdominal:      Palpations: Abdomen is soft.      Tenderness: There is no abdominal tenderness.   Musculoskeletal:         General: No swelling.      Cervical back: Neck supple.   Skin:     General: Skin is warm and dry.      Capillary Refill: Capillary refill takes less than 2 seconds.      Comments: Left greater than right scattered thickened skin and induration, no erythema of the axilla, on the left leg there is a proximal 3 cm fluctuant area without any surrounding erythema, there is a draining area on the right medial thigh as well   Neurological:      Mental Status: She is alert.    Psychiatric:         Mood and Affect: Mood normal.           ED Course & MDM   Diagnoses as of 04/16/25 0602   Hidradenitis suppurativa of multiple sites                 No data recorded     [place] Coma Scale Score: 15 (04/15/25 1726 : David Dwyer RN)                           Medical Decision Making  Patient does not want an I&D.  She is essentially asking for a dose of pain medications to help her out with the pain.  The absence of any signs of sepsis do not feel she requires admission for IV antibiotics.  As dose of IV antibiotics was given but ultimately patient to be discharged home.  When I gave her 1 mg Dilaudid she did ask for 2 for her care plan.  I cannot see a care plan currently listed.  Ultimately I feel that pain management would be the best option for this patient.  Follow-up with dermatology and general surgery started given.  The patient states she would likely have her surgical procedure sometime in June for her axilla.    Procedure  Procedures       [1]   Past Medical History:  Diagnosis Date    Asthma, mild intermittent ([facility])     albuterol prn    Hidradenitis suppurativa 10/29/2020    Hidradenitis    History of gestational hypertension     Lupus     diagnosed by her PCP in 2017 based on symptoms of joint pain and facial rash. 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

## 2025-04-17 ENCOUNTER — APPOINTMENT (OUTPATIENT)
Dept: PAIN MEDICINE | Facility: CLINIC | Age: 35
End: 2025-04-17
Payer: MEDICAID

## 2025-04-17 ENCOUNTER — APPOINTMENT (OUTPATIENT)
Dept: DERMATOLOGY | Facility: CLINIC | Age: 35
End: 2025-04-17
Payer: MEDICAID

## 2025-04-17 ENCOUNTER — PHARMACY VISIT (OUTPATIENT)
Dept: PHARMACY | Facility: CLINIC | Age: 35
End: 2025-04-17
Payer: MEDICAID

## 2025-04-17 DIAGNOSIS — L68.9 HYPERTRICHOSIS: ICD-10-CM

## 2025-04-17 DIAGNOSIS — L73.2 HIDRADENITIS SUPPURATIVA: Primary | ICD-10-CM

## 2025-04-17 PROCEDURE — RXMED WILLOW AMBULATORY MEDICATION CHARGE

## 2025-04-17 PROCEDURE — 99214 OFFICE O/P EST MOD 30 MIN: CPT | Performed by: STUDENT IN AN ORGANIZED HEALTH CARE EDUCATION/TRAINING PROGRAM

## 2025-04-17 RX ORDER — BIMEKIZUMAB 320 MG/2ML
320 INJECTION, SOLUTION SUBCUTANEOUS SEE ADMIN INSTRUCTIONS
Qty: 4 ML | Refills: 3 | Status: SHIPPED | OUTPATIENT
Start: 2025-04-17

## 2025-04-17 RX ORDER — BIMEKIZUMAB 320 MG/2ML
320 INJECTION, SOLUTION SUBCUTANEOUS SEE ADMIN INSTRUCTIONS
Qty: 2 ML | Refills: 11 | Status: SHIPPED | OUTPATIENT
Start: 2025-04-17

## 2025-04-17 RX ORDER — SPIRONOLACTONE 100 MG/1
TABLET, FILM COATED ORAL
Qty: 30 TABLET | Refills: 3 | Status: SHIPPED | OUTPATIENT
Start: 2025-04-17

## 2025-04-17 ASSESSMENT — DERMATOLOGY PATIENT ASSESSMENT: DO YOU HAVE ANY NEW OR CHANGING LESIONS: NO

## 2025-04-17 ASSESSMENT — ITCH NUMERIC RATING SCALE: HOW SEVERE IS YOUR ITCHING?: 0

## 2025-04-17 ASSESSMENT — DERMATOLOGY QUALITY OF LIFE (QOL) ASSESSMENT
RATE HOW BOTHERED YOU ARE BY EFFECTS OF YOUR SKIN PROBLEMS ON YOUR ACTIVITIES (EG, GOING OUT, ACCOMPLISHING WHAT YOU WANT, WORK ACTIVITIES OR YOUR RELATIONSHIPS WITH OTHERS): 6 - ALWAYS BOTHERED
RATE HOW EMOTIONALLY BOTHERED YOU ARE BY YOUR SKIN PROBLEM (FOR EXAMPLE, WORRY, EMBARRASSMENT, FRUSTRATION): 6 - ALWAYS BOTHERED
RATE HOW BOTHERED YOU ARE BY SYMPTOMS OF YOUR SKIN PROBLEM (EG, ITCHING, STINGING BURNING, HURTING OR SKIN IRRITATION): 6 - ALWAYS BOTHERED
WHAT SINGLE SKIN CONDITION LISTED BELOW IS THE PATIENT ANSWERING THE QUALITY-OF-LIFE ASSESSMENT QUESTIONS ABOUT: HIDRADENITIS SUPPURATIVA
ARE THERE EXCLUSIONS OR EXCEPTIONS FOR THE QUALITY OF LIFE ASSESSMENT: NO
DATE THE QUALITY-OF-LIFE ASSESSMENT WAS COMPLETED: 67312

## 2025-04-17 ASSESSMENT — PATIENT GLOBAL ASSESSMENT (PGA): PATIENT GLOBAL ASSESSMENT: PATIENT GLOBAL ASSESSMENT:  2 - MILD

## 2025-04-17 NOTE — Clinical Note
Sandrita has a history of stage 3 hidradenitis suppurativa     She has previously been treated with multiple therapies: certolizumab, IV ertapenem, adalimumab, infliximab, secukinumab apremilast, spironolactone, doxycycline, bactrim, clindamycin, levaquin, rifampin, IV dalbavancin, and IV vancomycin. She has also trialed ILK injections and PO prednisone without success.      Cosentyx is not helping any longer  It did before she was pregnant but not any longer  She is having painful flares in groin and axilla  Still with severe disease and associated pain symptoms  Therefore, we will try Bimzelex 320mg every 2 weeks for 9 doses then every 4 weeks  Rx sent  Discussed to continue cosentyx for now until she can start bimzelex *she has shipment pending for today    She has a  baby, not breast feeding  She mentioned she has excessive hair on her face and has been using spironolactone previously so we renewed it today (100mg daily)    Photos taken today so we can compare to next visit in 3 month

## 2025-04-17 NOTE — PROGRESS NOTES
"2025 Kayla's note:  33 YO  with chronic pain 2/2 HS, sp  on 25     Previously discussed today the risks of long term opioid use for chronic pain, including but not limited to development of hyperalgesia, physiological tolerance and dependency, desensitization of her mu opioid receptors, accidental overdose.  At this time, pt has no evidence of a MARQUIS. It is not known whether she is physiologically dependent as she has only uptitrated over time, reportedly due to poorly managed HS, and has not attempted weaning.  It is notable, however, that even when she was working and feeling her best on HS treatment, she still felt that the Percocet TID \"barely\" managed the pain at times. She also seemed surprised to hear that Percocet 10mg TID was a significant amount of daily opioids.     We had the conversation that her history raises the concern that she would have a difficult time weaning down on her Percocet, and that if she is unable to do so, I would personally recommend she consider switching to an agent like buprenorphine. Patient is reluctant, as she associates that medication with addiction and she is not addicted, and also because she had an adverse reaction when switching abrupt from Percocet 10mg TID --> Suboxone 2mg TID in the past. If she were to switch, I would recommend micro-dosing with Subutex. We will defer that conversation to postop.     I did express to Sandrita that for me to continue her Pain Management:  - I would require regular drug screens  - I would require attendance at appointments for refills  - I will not do early refills   - I will not be going up on her Percocet dose beyond the q4h dosing, even if she has acute post-op pain from the HS surgery. It would be my suggestion that she stay in the hospital until her pain control is adequate to go home on the q4h dosing.     PLAN  - Cont Percocet 10/325mg q4 hours until postop from HD surgery (at which pt is to [facility] back to [facility] " Pain Mgmt)  - Referred to Dr. Harrison with  Pain Med as pt is not sure she wants to go back to [facility] for her pain mgmt care  - Narcan prescribed on 2/5/25  - UDS today with fentanyl confirm  - Cont following with Derm (next apptmt April 2025). She asked for home injections of Cosenytx and says they only gave her one instead of weekly.  - Reschedule HS surgery to as soon as logistically possible (currently scheduled for September 2025)     FU 4 weeks     Kayla Angulo MD     History of Present Complaint:  The patient was referred to us by Referring Provider: Kayla Angulo MD GYN . this is 34 y.o.  female {Accompanied by:17260}with a past history of smoker, anxiety/depression, PTSD, fibromyalgia, headaches, presenting with {kt mrdica symptoms:93718}    Pain started {pain onset:22437}  Pain is {Better or worse:53952}   Patient history significant for the following red flags: {Red flags:71889}  The pain is described as {Pain description:11682} and is relieved by {pain relief:19127}      Prior Pain Therapies: {Prior pain therapy:06110}    Past surgical history:  {Past surgical history:61005}           Procedures:   *** the patient has had a ***% improvement in pain.    Portions of record reviewed for pertinent issues: active problem list, medication list, allergies, family history, social history, notes from last encounter, encounters, lab results, imaging and other available records.    I have personally reviewed the OARRS report for this patient. This report is scanned into the electronic medical record. I have considered the risks of abuse, dependence, addiction and diversion. It showed: Percocet 10 mg 3 times daily from Kayla Angulo   OPIOID RISK ASSESSMENT SCORE ***/26  Opioid agreement: ***  Activities of daily living: ***  Adverse effects: ***  Analgesia: W/O ***/10, W ***/10  {***}  Toxicology screen: ***  Aberrant behavior: ***  My patient has no underlying substance abuse or alcohol abuse and there's no  mental health conditions contributing to the patient's pain.        Diagnostic studies:  ***      Employment/disability/litigation: {ktlitigation:96556}    Social History:  {KT social history:85261}       Review of Systems       Physical Exam       Assessment  ***           Plan  At least 50% of the visit was involved in the discussion of the options for treatment. We discussed exercises, medication, interventional therapies and surgery. Healthy life style is essential with patient hard work to achieve the wellness. In addition; discussion with the patient and/or family about any of the diagnostic results, impressions and/or recommended diagnostic studies, prognosis, risks and benefits of treatment options, instructions for treatment and/or follow-up, importance of compliance with chosen treatment options, risk-factor reduction, and patient/family education.           Recommend self-directed physical therapy with at least daily exercises for minimum of 20-minute  *** Smoking cessation  Healthy lifestyle and anti-inflammatory diet in addition to weight control discussed with the patient  Alternative chronic pain therapies was discussed, encouraged and information was handed  Return to Clinic 3 months       *Please note this report has been produced using speech recognition software and may contain errors related to that system including grammar, punctuation and spelling as well as words and phrases that may be inappropriate. If there are questions or concerns, please feel free to contact me to clarify.    Jose Harrison MD

## 2025-04-17 NOTE — Clinical Note
Spironolactone as discussed    S/e reviewed , potassium was normal at last check  If you get light headed, dizzy, urinating excessively, feel muscle cramps or funny heart beats, stop medication run away  Not safe to be pregnnat while taking this medication

## 2025-04-17 NOTE — PATIENT INSTRUCTIONS
If you get light headed, dizzy, urinating excessively, feel muscle cramps or funny heart beats, stop medication run away  Not safe to be pregnnat while taking this medication    I'm starting you on bimzelex today  As far as your pain symptoms I am hoping to see how well bimzelex will work in the next 2 month so the pain doctors should know in 2 month whether the bimzelex is working or not since cosentyx which you are on now is not working

## 2025-04-17 NOTE — Clinical Note
Scattered scaring, abscess, and draining sinus tracts of the bilateral axilla, groin, and buttocks

## 2025-04-17 NOTE — PROGRESS NOTES
Subjective     Sandrita Adams is a 34 y.o. female who presents for the following: Hidradenitis Suppurativa (Fares at Bilat Groin & Bilat Axilla (Lt Axilla is worse). Pt reports pain 8/10. Tx: Cosentyx, pt states it's not working well for her at the moment.).     Review of Systems:  No other skin or systemic complaints other than what is documented elsewhere in the note.    The following portions of the chart were reviewed this encounter and updated as appropriate:          Skin Cancer History  Biopsy Log Book  No skin cancers from Specimen Tracking.    Additional History      Specialty Problems          Dermatology Problems    Hidradenitis suppurativa    -Extensive history of HS, s/p removal of axillary sweat glands in 2017, which did not significantly improve her pain. Patient previously followed with Dermatology, Pain Management, and Infectious Disease at [facility] and was previously well-controlled on Cosentix, Percocet, Gabapentin, and Naproxen which allowed her to complete her activities of daily living, including being able to work.   - s/p admission 7/22-24 for flare - RUE US demonstrated 3cm pocket in axilla, evaluated by [facility] that admission, indurated without anything to drain     - Current pain regimen: oxycodone-acetaminophen 10/325 q4hr PRN, keflex.    Discontinued gabapentin due to parasthesias    Derm recs: cont clindamycin, humira injections started 11/7  Plastics recs: defer definitive surgery of axillary or groin until postpartum   Pain recs: signed off in pregnancy, re-established with [facility], has appt end of October    Update 11/20:  - admission, started on IV Ertapenam per derm and ID given stable housing now. Anticipate 6-16wk course (varying recs between inpatient and outpatient derm, ordered for 90ds), continue to address duration outpatient  - established with home care  - continues to remain on oxy [id number], refilling rx weekly.     Admitted 11/29-12/2 after being instructed to present to  [facility] for r/o sepsis by outpt ID attending given leukocytosis and elevated CRP. IV Zoysn -, to resume daily IV Ertapenem on -    Update2024  Treated with IV dilaudid 2 mg and zofran 4 mg today at [facility]  Last seen in ER on 24 for HS flare up  Required multiple doses of Dilaudid 2 mg every 2 hours for pain control.             H/O hidradenitis suppurativa        Objective   Well appearing patient in no apparent distress; mood and affect are within normal limits.    A focused skin examination was performed. All findings within normal limits unless otherwise noted below.    Assessment/Plan   Skin Exam  1. HIDRADENITIS SUPPURATIVA  Generalized  Scattered scaring, abscess, and draining sinus tracts of the bilateral axilla, groin, and buttocks  Sandrita has a history of stage 3 hidradenitis suppurativa     She has previously been treated with multiple therapies: certolizumab, IV ertapenem, adalimumab, infliximab, secukinumab apremilast, spironolactone, doxycycline, bactrim, clindamycin, levaquin, rifampin, IV dalbavancin, and IV vancomycin. She has also trialed ILK injections and PO prednisone without success.      Cosentyx is not helping any longer  It did before she was pregnant but not any longer  She is having painful flares in groin and axilla  Still with severe disease and associated pain symptoms  Therefore, we will try Bimzelex 320mg every 2 weeks for 9 doses then every 4 weeks  Rx sent  Discussed to continue cosentyx for now until she can start bimzelex *she has shipment pending for today    She has a  baby, not breast feeding  She mentioned she has excessive hair on her face and has been using spironolactone previously so we renewed it today (100mg daily)    Photos taken today so we can compare to next visit in 3 month  Related Medications  clindamycin (Cleocin T) 1 % lotion  Apply one to two times daily to the affected areas.  secukinumab 300 mg/2 mL (150 mg/mL) pen  injector  Inject 300 mg (1 pen) under the skin at week 3 and 4 to finish loading dose. Patient received samples for week 0, 1, and 2  secukinumab 300 mg/2 mL pen injector  Inject 300mg (1 pen) beneath the skin once every 4 weeks.  ibuprofen 600 mg tablet  Take 1 tablet (600 mg) by mouth every 6 hours if needed for mild pain (1 - 3) for up to 25 days.  oxyCODONE-acetaminophen (Percocet)  mg tablet  Take 1 tablet by mouth every 4 hours if needed for severe pain (7 - 10) for up to 14 days.  spironolactone (Aldactone) 100 mg tablet  Take 1 pill by mouth daily  2. HYPERTRICHOSIS  Head - Anterior (Face)  Terminal hairs on chin area  Spironolactone as discussed    S/e reviewed , potassium was normal at last check  If you get light headed, dizzy, urinating excessively, feel muscle cramps or funny heart beats, stop medication run away  Not safe to be pregnnat while taking this medication

## 2025-04-18 ENCOUNTER — SPECIALTY PHARMACY (OUTPATIENT)
Dept: PHARMACY | Facility: CLINIC | Age: 35
End: 2025-04-18

## 2025-04-21 ENCOUNTER — SPECIALTY PHARMACY (OUTPATIENT)
Dept: PHARMACY | Facility: CLINIC | Age: 35
End: 2025-04-21

## 2025-04-21 PROCEDURE — RXMED WILLOW AMBULATORY MEDICATION CHARGE
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
## 2025-04-23 ENCOUNTER — PHARMACY VISIT (OUTPATIENT)
Dept: PHARMACY | Facility: CLINIC | Age: 35
End: 2025-04-23
Payer: MEDICAID

## 2025-04-24 ENCOUNTER — SPECIALTY PHARMACY (OUTPATIENT)
Dept: PHARMACY | Facility: CLINIC | Age: 35
End: 2025-04-24

## 2025-04-24 NOTE — PROGRESS NOTES
"[facility] Clinical Note  Initial Patient Education     Introduction  Sandrita Adams is a 34 y.o. female who is on the specialty pharmacy service for management of: Dermatology Core.    Sandrita Adams is initiating the following therapy: bimekizumab-bkzx (Bimzelx Autoinjector) 320 mg/2 mL auto-injector  Inject 320 mg under the skin see administration instructions.  Inject 320mg (1 pen) under the skin at week 0, 2, 4, 6, 8, 10, 12 and 14.        Medication receipt date: 04/24/25    Duration of therapy: Maintenance    The most recent encounter visit with the referring prescriber Spenser Elizalde MD on 04/17/25 was reviewed.  Pharmacy will continue to collaborate in the care of this patient with the referring prescriber.    Clinical Background  An initial assessment was conducted prior to first fill of the medication to determine the appropriateness of therapy given the patient's diagnosis, medication list, comorbidities, allergies, medical history, patient's ability to self administer medication, and therapeutic goals based on possible outcomes of therapy. Refer to initial assessment task completed on 04/21/25.    Labs for clinical appropriateness that were reviewed include:   Dermatology- For Biologics- TB:   Lab Results   Component Value Date    TBSIN Negative 08/29/2024   , Hepatitis B panel: No results found for: \"HEPBCAB\", \"HEPBCIGM\", \"HEPBSAG\", \"HEPBSAB\", Hepatitis C antibody: No results found for: \"HEPCABINIT\", \"HEPCINTERP\", \"HEPCAB\", HIV: No results found for: \"HIV1X2\", \"HIVCONF\", \"HIVITP\", and LFTs:   Lab Results   Component Value Date    ALT 10 04/15/2025    AST 14 04/15/2025       Education/Discussion  Sandrita was contacted on 4/24/2025 at 3:08 PM for a pharmacy visit with encounter number [id number] from:   [facility]  [address] [place] [id number]  Dept: [phone]  Dept Fax: [phone]  Sandrita consented to a/an Telephone " visit, which was performed.    Medication Start Date (planned or actual): 04/25/25         Education was conducted prior to start of therapy? Yes    Education discussed includes the following:  Patient Education  Counseled the Patient on the Following : Theraputic rationale and expected outcomes, Doses and administration, Possible side effects and management, Pharmacy contact information, Possible drug interactions, Associated vaccinations, Safe handling, storage, and disposal  Learner: Patient  Education Method: Explanation  Education Response: Verbalizes understanding  Additional details of the medication specific counseling are found within the linked patient education flowsheet.     The follow up timeline was discussed. Every person responds to and reacts to therapy differently. Patient should be assessed for efficacy and tolerability in approximately: other - 4 months    Provided education on goals and possible outcomes of therapy:  Adherence with therapy  Timely completion of appropriate labs  Timely and appropriate follow up with provider  Identify and address medication interactions with presciption medications, OTC medications and supplements  Optimize or maintain quality of life  Dermatology: Prevent or reduce disease flares  Reduce track depth, pain, inflammation, skin lesions (HS)           The importance of adherence was discussed and they were advised to take the medication as prescribed by their provider.         Impression/Plan  Review and Assessment   Reviewed During This Encounter: Medications  Medications Assessed for Appropriate Use, Dose, Route, Frequency, and Duration: Yes  Medication Reconciliation Completed: Yes  Drug Interactions Evaluated: Yes  Clinically Relevant Drug Interactions Identified: No    This patient has not been identified as high risk due to Lack of high risk qualifiers.  The following action was taken: N/A.    QOL/Patient Satisfaction  Rate your quality of life on scale of  1-10: 8  Rate your satisfaction with  Specialty Pharmacy on scale of 1-10: 10 - Completely satisfied    The  Specialty Pharmacy Welcome packet may be viewed here:   Specialty Pharmacy Welcome Packet     Or by scanning QR code:      Provided contact information ([phone]) for [facility] and reviewed dispensing process, refill timeline and patient management follow up. Advised to contact the pharmacy if there are any adverse effects and/or changes to medication list, including prescriptions, OTC medications, herbal products, or supplements. Confirmed understanding of education conducted during assessment. All questions and concerns were addressed and patient was encouraged to reach out for additional questions or concerns.      Myra Bell, YolandaD

## 2025-04-28 ENCOUNTER — APPOINTMENT (OUTPATIENT)
Dept: PAIN MEDICINE | Facility: CLINIC | Age: 35
End: 2025-04-28
Payer: MEDICAID

## 2025-04-28 VITALS
BODY MASS INDEX: 31.65 KG/M2 | TEMPERATURE: 99.3 F | OXYGEN SATURATION: 98 % | DIASTOLIC BLOOD PRESSURE: 90 MMHG | HEART RATE: 98 BPM | WEIGHT: 172 LBS | HEIGHT: 62 IN | RESPIRATION RATE: 16 BRPM | SYSTOLIC BLOOD PRESSURE: 149 MMHG

## 2025-04-28 DIAGNOSIS — L73.2 HIDRADENITIS SUPPURATIVA: ICD-10-CM

## 2025-04-28 DIAGNOSIS — F11.20 OPIOID DEPENDENCE, DAILY USE (MULTI): ICD-10-CM

## 2025-04-28 PROCEDURE — 3080F DIAST BP >= 90 MM HG: CPT | Performed by: ANESTHESIOLOGY

## 2025-04-28 PROCEDURE — 3077F SYST BP >= 140 MM HG: CPT | Performed by: ANESTHESIOLOGY

## 2025-04-28 PROCEDURE — 99204 OFFICE O/P NEW MOD 45 MIN: CPT | Performed by: ANESTHESIOLOGY

## 2025-04-28 PROCEDURE — 99214 OFFICE O/P EST MOD 30 MIN: CPT | Performed by: ANESTHESIOLOGY

## 2025-04-28 PROCEDURE — 3008F BODY MASS INDEX DOCD: CPT | Performed by: ANESTHESIOLOGY

## 2025-04-28 SDOH — ECONOMIC STABILITY: FOOD INSECURITY: WITHIN THE PAST 12 MONTHS, THE FOOD YOU BOUGHT JUST DIDN'T LAST AND YOU DIDN'T HAVE MONEY TO GET MORE.: NEVER TRUE

## 2025-04-28 SDOH — ECONOMIC STABILITY: FOOD INSECURITY: WITHIN THE PAST 12 MONTHS, YOU WORRIED THAT YOUR FOOD WOULD RUN OUT BEFORE YOU GOT MONEY TO BUY MORE.: NEVER TRUE

## 2025-04-28 ASSESSMENT — ENCOUNTER SYMPTOMS
HEMATOLOGIC/LYMPHATIC NEGATIVE: 1
DEPRESSION: 0
EYES NEGATIVE: 1
CARDIOVASCULAR NEGATIVE: 1
NEUROLOGICAL NEGATIVE: 1
ENDOCRINE NEGATIVE: 1
PSYCHIATRIC NEGATIVE: 1
OCCASIONAL FEELINGS OF UNSTEADINESS: 0
GASTROINTESTINAL NEGATIVE: 1
MYALGIAS: 1
RESPIRATORY NEGATIVE: 1
LOSS OF SENSATION IN FEET: 0

## 2025-04-28 ASSESSMENT — PAIN SCALES - GENERAL
PAINLEVEL_OUTOF10: 9
PAINLEVEL_OUTOF10: 9

## 2025-04-28 ASSESSMENT — PAIN DESCRIPTION - DESCRIPTORS: DESCRIPTORS: ACHING;SHARP;BURNING

## 2025-04-28 ASSESSMENT — PAIN - FUNCTIONAL ASSESSMENT: PAIN_FUNCTIONAL_ASSESSMENT: 0-10

## 2025-04-28 ASSESSMENT — LIFESTYLE VARIABLES: TOTAL SCORE: 2

## 2025-04-28 NOTE — PROGRESS NOTES
No chief complaint on file.    History of Present Complaint:  The patient was referred to us by Referring Provider: Kayla Angulo MD GYN.  . this is 34 y.o.  female  with a past history of Smoker, depression, PTSD, obesity BMI 32, fibromyalgia, Rand stage III hidradenitis suppurativa and lupus on Bimzelex and chronic pain on Percocet 10 mg 3 times daily  presenting with Under arm ,groin and back pain .    Pain started 20 years  , and since she had a epidural for child birth in January . She had developed this back pain .  Pain is better with pain medication.  Pain is worse with infections.  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 3   Illegal drugs ?  2 ? 3   Rx drugs ?  4 ? 4   PERSONAL HISTORY OF SUBSTNACE ABUSE   Alcohol ?  3 ?  3   Illegal drugs ?  4 ?  4    Rx drugs ?  5 ?  5   Age Between 16-45 years x 1 ?  1   History of Preadolescent Sexual Abuse ?  3 ?  0   PSYCHOLOGIC DISEASE   ADD, OCD, bipolar, schizophrenia   ?  2 ?  2   Depression ?  1 ?  1   Scoring Totals 2      Scoring (Risk)  0-3 - Low  4-7 - Moderate  8 - High    Opioid Risk Assessment Score 2/26

## 2025-04-28 NOTE — PATIENT INSTRUCTIONS
Opioid sparing infusion     The infusion is consistent of : Lidocaine 300 mg + Ketamine 30 mg + Propofol 100 mg +/- Toradol 30 mg in 500 cc fluids  Opioid sparing infusion is used to treat chronic pain. This pain is caused by nerves that have been injured, damaged or not functioning right.    Disease state examples include but not limited:   Peripheral neuropathy  Fibromyalgia  Complex Regional Pain Syndrome  Post-herpetic neuralgia  Phantom limb pain  Pain following a stroke or a spinal cord injury  Cervical/thoracic/lumbar postlaminectomy syndrome  Other pain disorders    Opioid sparing infusion works by multiple mechanisms:  The lidocaine works by suppressing the refiring of nerve impulses that cause pain.   Ketorolac is anti-inflammatory medication which help with decreasing inflammation in the nervous system and the periphery by suppressing certain enzymes  Ketamine is an anesthetic agent which help suppress the chronic pain hormones at the level of the spine and the brain and stop the vicious cycle of stimulation that resulted in pain  Propofol is an anesthetic agent helps offset the side effect of the ketamine in addition it has a special effect on the hormones that involved with the pain at the level of the spine and the brain    Pain relief varies from patient to patient. Pain relief can start in as little as 15 minutes and last up to several months.  During the infusion, you will be monitored closely. You will be connected to a monitor and your blood pressure, heart rate and oxygen and carbon dioxide levels will be taken during the infusion. The infusion will be given over 30 minutes to an hour depending on your response.  You may experience fatigue, dizziness, and sleepiness.  Some temporary side effects may include but are not limited to: Numbness in arms or legs, tingling around the mouth, ringing in the ears, and confusion.  Rare risks include: Heart arrhythmias, seizures.  You must have a responsible  adult with you as well as a ride home or the infusion will be cancelled.

## 2025-04-29 ENCOUNTER — PHARMACY VISIT (OUTPATIENT)
Dept: PHARMACY | Facility: CLINIC | Age: 35
End: 2025-04-29
Payer: MEDICAID

## 2025-04-29 ENCOUNTER — APPOINTMENT (OUTPATIENT)
Dept: INTEGRATIVE MEDICINE | Facility: CLINIC | Age: 35
End: 2025-04-29
Payer: MEDICAID

## 2025-04-29 ENCOUNTER — OFFICE VISIT (OUTPATIENT)
Dept: OBSTETRICS AND GYNECOLOGY | Facility: CLINIC | Age: 35
End: 2025-04-29
Payer: MEDICAID

## 2025-04-29 VITALS — DIASTOLIC BLOOD PRESSURE: 85 MMHG | SYSTOLIC BLOOD PRESSURE: 136 MMHG | HEART RATE: 96 BPM

## 2025-04-29 DIAGNOSIS — L73.2 HIDRADENITIS SUPPURATIVA: ICD-10-CM

## 2025-04-29 DIAGNOSIS — F11.20 OPIOID DEPENDENCE, DAILY USE (MULTI): Primary | ICD-10-CM

## 2025-04-29 PROCEDURE — 99214 OFFICE O/P EST MOD 30 MIN: CPT | Performed by: OBSTETRICS & GYNECOLOGY

## 2025-04-29 PROCEDURE — 3075F SYST BP GE 130 - 139MM HG: CPT | Performed by: OBSTETRICS & GYNECOLOGY

## 2025-04-29 PROCEDURE — RXMED WILLOW AMBULATORY MEDICATION CHARGE

## 2025-04-29 PROCEDURE — 3079F DIAST BP 80-89 MM HG: CPT | Performed by: OBSTETRICS & GYNECOLOGY

## 2025-04-29 PROCEDURE — 99214 OFFICE O/P EST MOD 30 MIN: CPT | Mod: TH | Performed by: OBSTETRICS & GYNECOLOGY

## 2025-04-29 RX ORDER — OXYCODONE AND ACETAMINOPHEN 10; 325 MG/1; MG/1
1 TABLET ORAL EVERY 4 HOURS PRN
Qty: 84 TABLET | Refills: 0 | Status: SHIPPED | OUTPATIENT
Start: 2025-04-29 | End: 2025-05-13

## 2025-04-29 RX ORDER — OXYCODONE AND ACETAMINOPHEN 10; 325 MG/1; MG/1
1 TABLET ORAL EVERY 4 HOURS PRN
Qty: 84 TABLET | Refills: 0 | Status: SHIPPED | OUTPATIENT
Start: 2025-05-13 | End: 2025-05-27

## 2025-04-29 ASSESSMENT — EDINBURGH POSTNATAL DEPRESSION SCALE (EPDS)
I HAVE BLAMED MYSELF UNNECESSARILY WHEN THINGS WENT WRONG: NO, NEVER
I HAVE BEEN SO UNHAPPY THAT I HAVE HAD DIFFICULTY SLEEPING: NOT AT ALL
I HAVE BEEN ANXIOUS OR WORRIED FOR NO GOOD REASON: YES, SOMETIMES
THINGS HAVE BEEN GETTING ON TOP OF ME: YES, SOMETIMES I HAVEN'T BEEN COPING AS WELL AS USUAL
I HAVE FELT SAD OR MISERABLE: NO, NOT AT ALL
THE THOUGHT OF HARMING MYSELF HAS OCCURRED TO ME: NEVER
I HAVE LOOKED FORWARD WITH ENJOYMENT TO THINGS: AS MUCH AS I EVER DID
TOTAL SCORE: 4
I HAVE BEEN ABLE TO LAUGH AND SEE THE FUNNY SIDE OF THINGS: AS MUCH AS I ALWAYS COULD
I HAVE BEEN SO UNHAPPY THAT I HAVE BEEN CRYING: NO, NEVER
I HAVE FELT SCARED OR PANICKY FOR NO GOOD REASON: NO, NOT AT ALL

## 2025-04-29 ASSESSMENT — PAIN SCALES - GENERAL: PAINLEVEL_OUTOF10: 9

## 2025-04-29 NOTE — PROGRESS NOTES
Assessment   33 YO  with chronic pain 2/2 HS, sp  on 25, seeing me for opioid maintenance postpartum as she is expected to go into opioid withdrawal should her prescription be abruptly discontinued.     Pt has been on long term DAILY opioid use since at least  with [employer], and was uptitrated from 30mg daily to 60-80mg daily during her pregnancy by [facility] [place] as she reports her symptoms worsened once she was taken off her biologic.  She is now back on a biologic but continuing to report flares on a weekly basis.  She is able to work, care for her children, and perform her ADLs utilizing daily high dose opioids at about 60mg daily, though occasionally presents to the ED for breakthrough pain.    Her plan with me had to been to continue this dose postpartum just until she could get back on her biologic and get HS surgery. Originally, this was all to have been completed by 2025 prior to my departure from . However, now surgery has been delayed to September, which is when the patient reports her family is able to come help her watch her kids post-op, AND the patient has had insufficient relief from her old biologic and is now trying a new biologic.    I have concerns that Sandrita has developed a mild OUD and that her pain is more related to opioid-induced hyperalgesia and opioid withdrawal than HS. She has not made significant efforts to pursue options that would allow her to stop/decrease her Percocet, though she has been offered them. She states she would prefer to be off medications, without the insight that she is already on a highly dangerous medication at an aberrant dose for HS pain. Other options that have offered to her such as buprenorphine, SNRI, and non-opioid infusions would have less medical risk and have better potential for long term use.    Sandrita had an appointment at [place] at 11:15 AM this morning, and unfortunately I was not able to see her until 10:35 AM this morning due to  "running behind from my morning appointments. We therefore could not have a detailed discussion about my concerns about her opioid use. I did raise my concern to her that her HS flares are not flares but instead signs of opioid physiologic dependence and/or OUD. I messaged her after our appointment today that she should pursue the non-opioid infusion therapy with Dr. Christy CASTELLANO to best give her time to start weaning off her Percocet since I will be leaving  on 25, and at this time, there is no one who has agreed to continue her current dose of Percocet, and she is most certainly going to go into opioid withdrawal. The worst case scenario will be that she starts to use non-prescribed opioids, which is often fentanyl, in desperation, which will escalate her from having a mild OUD to a moderate-severe one. It usually takes months to wean off an opioid dependency, and unfortunately, Sandrita has not yet down-titrated in the times I have seen her because her self-prescribed milestones of \"feeling better\" have never been accomplished.    At this time, I don't think she is going to \"feel better\" because I am concerned her pain is less from the HS and more from the OUD. But certainly trying other pain management techniques can help clarify the picture, provide her alternative pain management options for true HS flares, and is important for her future health. I have messaged Sandrita that she should try to wean down her Percocet starting now, and that when I see her in May, what I will be offering her instead is a micro-induction onto buprenorphine for the treatment of mild OUD. She will then need to continue care with an addiction medicine provider.    If this plan is unacceptable to her, she is welcome to seek an alternative opinion at [facility] where she used to receive Pain Management care and opioid therapy.     FU 4 weeks    Kayla Angulo MD     Subjective   35 YO  sp  on 25 at 36+1 weeks for baby " "boy \"Tano\" after presenting with PPROM.    Pregnancy Notable For  - HS, multiple flares, currently on Humira, previously on Cosentyx q2 weeks pre-pregnancy with better control. Pt has an apptmt with Derm tomorrow with Dr. Elizalde and it is planned for her to resume Cosentyx at that time. She is scheduled to have a preop consult for HS surgery with Dr. Coronel on 2/11/25.  - Chronic use of opioids for HS-related pain. Per PDMP, pt was being prescribed Percocet 10/325mg TID by [facility]. In pregnancy, her HS symptoms worsened, and by July 2024 (11 weeks), pt had been uptitrated to Percocet 10/325mg Q4H by [facility]. Now patient is taking the Percocet 10mg q3 hours, since she \"doesn't have anything for breakthrough anymore\" since she's not longer in the hospital and getting Dilaudid. Reviewing her notes from [facility], it appears patient was previously trialed on morphine in March 2024 (I believe as part of a process to help her wean down her opioid use) and Suboxone in Oct 2023 (2mg TID, \"had a reaction\"). She is planning to transition back to [facility] pain clinic after her HS surgery, having already met with her old Pain provider Margo Uriarte on 1/28/25. The plan per that note had been to go back down to Percocet 10mg TID max should she resume care with them.  - Asthma moderate persistent, on flonase and prn albuterol  - AMA  - IPV s/p event 8/8 with FOB, has new home alone    Feeding: formula  Contraception: none, pt states she is not sexually active x 6 months, no current partner.    Interval Hx:  Met with Dr. Blank from  Pain Mgmt yesterday.  Was offered an opioid sparing infusion for pain management instead of oral opioids, which seems like a great option to me.  She said she was interested in it but hasn't called yet to schedule follow up.  She was also referred to [facility] for chiro and acupuncture. She has an apptmt today for acupuncture.    Has been back and forth from [facility].  May be moving there, " "but her lease is not up until November.  Not sure who will take over her Pain Mgmt if she moves there.    She ran out Sunday and reports she had a few leftover pills that she found at her sister's house.  She reports she is in a lot of pain today because she is having a flare.  I have never seen it before. There are old indurated tracts. No open tracts. No erythema or fluctuance or drainage.  I worry she may be in some degree of opioid withdrawal accounting for her increased pain, and I mentioned this possibility for her.  COWS minimal though.  I encouraged her to pursue the opioid sparing infusion therapy with Dr. Blank.    She met with a therapist at [facility].  Says her mood still isn't good, but doesn't want to go back on meds.  \"I just feel like I'm already on too many meds.\"  Has an apptmt with Dr. Quevedo in July.    PDMP: #84 tabs of oxycodone 10mg/325mg dispensed on 4/11/25  Last UDS 4/1: neg for fentanyl, + alphahydroxyalprazolam and oxycodone metabolites    Past Medical History:   Diagnosis Date    Asthma, mild intermittent (HHS-HCC)     albuterol prn    Hidradenitis suppurativa 10/29/2020    Hidradenitis    History of gestational hypertension     Lupus     diagnosed by her PCP in 2017 based on symptoms of joint pain and facial rash. She states she is in remission    MDD (major depressive disorder)     not on meds    PTSD (post-traumatic stress disorder)     resulting from IPV     Objective   /85 (BP Location: Right arm, Patient Position: Sitting, BP Cuff Size: Adult)   Pulse 96   Breastfeeding No       General:   Alert and oriented, in no acute distress   Skin: No significant acne. No hirsutism.   Neck: Supple. No visible thyromegaly.    Psych Normal affect. Normal mood.        "

## 2025-04-29 NOTE — LETTER
2025     Jose Harrison MD  [address]  Pain Center  [place] [address]    Patient: Sandrita Adams   YOB: 1990   Date of Visit: 2025       Dear Dr. Jose Harrison MD:    Thank you for seeing Sandrita Adams. I have just seen her today and have recommended that she follow up with you ASAP. I am concerned she has developed an OUD with her long term use of opioids and I have offered her management for an OUD.     If you have questions, please do not hesitate to call me. I look forward to following your patient along with you.       Sincerely,     Kayla Angulo MD      CC: MD Ted Thakur MD  ______________________________________________________________________________________    Assessment  35 YO  with chronic pain 2/2 HS, sp  on 25, seeing me for opioid maintenance postpartum as she is expected to go into opioid withdrawal should her prescription be abruptly discontinued.     Pt has been on long term DAILY opioid use since at least  with [employer], and was uptitrated from 30mg daily to 60-80mg daily during her pregnancy by [facility] as she reports her symptoms worsened once she was taken off her biologic.  She is now back on a biologic but continuing to report flares on a weekly basis.  She is able to work, care for her children, and perform her ADLs utilizing daily high dose opioids at about 60mg daily, though occasionally presents to the ED for breakthrough pain.    Her plan with me had to been to continue this dose postpartum just until she could get back on her biologic and get HS surgery. Originally, this was all to have been completed by 2025 prior to my departure from . However, now surgery has been delayed to September, which is when the patient reports her family is able to come help her watch her kids post-op, AND the patient has had insufficient relief from her old biologic and is now trying a new biologic.    I have concerns  "that Sandrita has developed a mild OUD and that her pain is more related to opioid-induced hyperalgesia and opioid withdrawal than HS. She has not made significant efforts to pursue options that would allow her to stop/decrease her Percocet, though she has been offered them. She states she would prefer to be off medications, without the insight that she is already on a highly dangerous medication at an aberrant dose for HS pain. Other options that have offered to her such as buprenorphine, SNRI, and non-opioid infusions would have less medical risk and have better potential for long term use.    Sandrita had an appointment at [place] at 11:15 AM this morning, and unfortunately I was not able to see her until 10:35 AM this morning due to running behind from my morning appointments. We therefore could not have a detailed discussion about my concerns about her opioid use. I did raise my concern to her that her HS flares are not flares but instead signs of opioid physiologic dependence and/or OUD. I messaged her after our appointment today that she should pursue the non-opioid infusion therapy with Dr. Christy CASTELLANO to best give her time to start weaning off her Percocet since I will be leaving  on 6/25/25, and at this time, there is no one who has agreed to continue her current dose of Percocet, and she is most certainly going to go into opioid withdrawal. The worst case scenario will be that she starts to use non-prescribed opioids, which is often fentanyl, in desperation, which will escalate her from having a mild OUD to a moderate-severe one. It usually takes months to wean off an opioid dependency, and unfortunately, Sandrita has not yet down-titrated in the times I have seen her because her self-prescribed milestones of \"feeling better\" have never been accomplished.    At this time, I don't think she is going to \"feel better\" because I am concerned her pain is less from the HS and more from the OUD. But certainly " "trying other pain management techniques can help clarify the picture, provide her alternative pain management options for true HS flares, and is important for her future health. I have messaged Sandrita that she should try to wean down her Percocet starting now, and that when I see her in May, what I will be offering her instead is a micro-induction onto buprenorphine for the treatment of mild OUD. She will then need to continue care with an addiction medicine provider.    If this plan is unacceptable to her, she is welcome to seek an alternative opinion at [facility] where she used to receive Pain Management care and opioid therapy.     FU 4 weeks    Kayla Angulo MD     Subjective  35 YO  sp  on 25 at 36+1 weeks for baby boy \"Tano\" after presenting with PPROM.    Pregnancy Notable For  - HS, multiple flares, currently on Humira, previously on Cosentyx q2 weeks pre-pregnancy with better control. Pt has an apptmt with Derm tomorrow with Dr. Elizalde and it is planned for her to resume Cosentyx at that time. She is scheduled to have a preop consult for HS surgery with Dr. Coronel on 25.  - Chronic use of opioids for HS-related pain. Per PDMP, pt was being prescribed Percocet 10/325mg TID by [facility]. In pregnancy, her HS symptoms worsened, and by 2024 (11 weeks), pt had been uptitrated to Percocet 10/325mg Q4H by [facility]. Now patient is taking the Percocet 10mg q3 hours, since she \"doesn't have anything for breakthrough anymore\" since she's not longer in the hospital and getting Dilaudid. Reviewing her notes from [facility], it appears patient was previously trialed on morphine in 2024 (I believe as part of a process to help her wean down her opioid use) and Suboxone in Oct 2023 (2mg TID, \"had a reaction\"). She is planning to transition back to [facility] pain clinic after her HS surgery, having already met with her old Pain provider Margo Uriarte on 25. The plan per that " "note had been to go back down to Percocet 10mg TID max should she resume care with them.  - Asthma moderate persistent, on flonase and prn albuterol  - AMA  - IPV s/p event 8/8 with FOB, has new home alone    Feeding: formula  Contraception: none, pt states she is not sexually active x 6 months, no current partner.    Interval Hx:  Met with Dr. Blank from  Pain Mgmt yesterday.  Was offered an opioid sparing infusion for pain management instead of oral opioids, which seems like a great option to me.  She said she was interested in it but hasn't called yet to schedule follow up.  She was also referred to [facility] for chiro and acupuncture. She has an apptmt today for acupuncture.    Has been back and forth from [facility].  May be moving there, but her lease is not up until November.  Not sure who will take over her Pain Mgmt if she moves there.    She ran out Sunday and reports she had a few leftover pills that she found at her sister's house.  She reports she is in a lot of pain today because she is having a flare.  I have never seen it before. There are old indurated tracts. No open tracts. No erythema or fluctuance or drainage.  I worry she may be in some degree of opioid withdrawal accounting for her increased pain, and I mentioned this possibility for her.  COWS minimal though.  I encouraged her to pursue the opioid sparing infusion therapy with Dr. Blank.    She met with a therapist at [facility].  Says her mood still isn't good, but doesn't want to go back on meds.  \"I just feel like I'm already on too many meds.\"  Has an apptmt with Dr. Quevedo in July.    PDMP: #84 tabs of oxycodone 10mg/325mg dispensed on 4/11/25  Last UDS 4/1: neg for fentanyl, + alphahydroxyalprazolam and oxycodone metabolites    Past Medical History:   Diagnosis Date   • Asthma, mild intermittent (HHS-HCC)     albuterol prn   • Hidradenitis suppurativa 10/29/2020    Hidradenitis   • History of gestational hypertension    • Lupus     " diagnosed by her PCP in 2017 based on symptoms of joint pain and facial rash. She states she is in remission   • MDD (major depressive disorder)     not on meds   • PTSD (post-traumatic stress disorder)     resulting from IPV     Objective  /85 (BP Location: Right arm, Patient Position: Sitting, BP Cuff Size: Adult)   Pulse 96   Breastfeeding No       General:   Alert and oriented, in no acute distress   Skin: No significant acne. No hirsutism.   Neck: Supple. No visible thyromegaly.    Psych Normal affect. Normal mood.

## 2025-05-01 LAB
AMPHETAMINES UR QL: NEGATIVE NG/ML
BACTERIA UR CULT: NORMAL
BARBITURATES UR QL: NEGATIVE NG/ML
BENZODIAZ UR QL: NEGATIVE NG/ML
BZE UR QL: NEGATIVE NG/ML
CODEINE UR-MCNC: NEGATIVE NG/ML
CREAT UR-MCNC: 203.2 MG/DL
DRUG SCREEN COMMENT UR-IMP: ABNORMAL
DRUG SCREEN COMMENT UR-IMP: ABNORMAL
FENTANYL UR QL SCN: NEGATIVE NG/ML
HYDROCODONE UR-MCNC: NEGATIVE NG/ML
HYDROMORPHONE UR-MCNC: NEGATIVE NG/ML
METHADONE UR QL: NEGATIVE NG/ML
MORPHINE UR-MCNC: NEGATIVE NG/ML
NORHYDROCODONE UR CFM-MCNC: NEGATIVE NG/ML
NOROXYCODONE UR CFM-MCNC: ABNORMAL NG/ML
OPIATES UR QL: ABNORMAL NG/ML
OXIDANTS UR QL: NEGATIVE MCG/ML
OXYCODONE UR QL: POSITIVE NG/ML
OXYCODONE UR-MCNC: 3870 NG/ML
OXYMORPHONE UR-MCNC: ABNORMAL NG/ML
PCP UR QL: NEGATIVE NG/ML
PH UR: 5.8 [PH] (ref 4.5–9)
QUEST NOTES AND COMMENTS: ABNORMAL
THC UR QL: NEGATIVE NG/ML

## 2025-05-06 ENCOUNTER — APPOINTMENT (OUTPATIENT)
Dept: OBSTETRICS AND GYNECOLOGY | Facility: CLINIC | Age: 35
End: 2025-05-06
Payer: MEDICAID

## 2025-05-11 ENCOUNTER — HOSPITAL ENCOUNTER (EMERGENCY)
Facility: HOSPITAL | Age: 35
Discharge: HOME | End: 2025-05-11
Attending: GENERAL PRACTICE
Payer: MEDICAID

## 2025-05-11 VITALS
DIASTOLIC BLOOD PRESSURE: 82 MMHG | BODY MASS INDEX: 31.47 KG/M2 | OXYGEN SATURATION: 98 % | RESPIRATION RATE: 16 BRPM | HEIGHT: 62 IN | TEMPERATURE: 98.2 F | WEIGHT: 171 LBS | HEART RATE: 80 BPM | SYSTOLIC BLOOD PRESSURE: 114 MMHG

## 2025-05-11 DIAGNOSIS — L73.2 HIDRADENITIS SUPPURATIVA: Primary | ICD-10-CM

## 2025-05-11 LAB
ALBUMIN SERPL BCP-MCNC: 4.5 G/DL (ref 3.4–5)
ALP SERPL-CCNC: 66 U/L (ref 33–110)
ALT SERPL W P-5'-P-CCNC: 11 U/L (ref 7–45)
ANION GAP SERPL CALC-SCNC: 15 MMOL/L (ref 10–20)
AST SERPL W P-5'-P-CCNC: 13 U/L (ref 9–39)
BASOPHILS # BLD AUTO: 0.03 X10*3/UL (ref 0–0.1)
BASOPHILS NFR BLD AUTO: 0.4 %
BILIRUB SERPL-MCNC: 0.3 MG/DL (ref 0–1.2)
BUN SERPL-MCNC: 14 MG/DL (ref 6–23)
CALCIUM SERPL-MCNC: 9.8 MG/DL (ref 8.6–10.3)
CHLORIDE SERPL-SCNC: 109 MMOL/L (ref 98–107)
CO2 SERPL-SCNC: 19 MMOL/L (ref 21–32)
CREAT SERPL-MCNC: 0.81 MG/DL (ref 0.5–1.05)
CRP SERPL-MCNC: 0.81 MG/DL
EGFRCR SERPLBLD CKD-EPI 2021: >90 ML/MIN/1.73M*2
EOSINOPHIL # BLD AUTO: 0.07 X10*3/UL (ref 0–0.7)
EOSINOPHIL NFR BLD AUTO: 1 %
ERYTHROCYTE [DISTWIDTH] IN BLOOD BY AUTOMATED COUNT: 17 % (ref 11.5–14.5)
ERYTHROCYTE [SEDIMENTATION RATE] IN BLOOD BY WESTERGREN METHOD: 49 MM/H (ref 0–20)
GLUCOSE SERPL-MCNC: 105 MG/DL (ref 74–99)
HCT VFR BLD AUTO: 38.8 % (ref 36–46)
HGB BLD-MCNC: 13.1 G/DL (ref 12–16)
IMM GRANULOCYTES # BLD AUTO: 0.03 X10*3/UL (ref 0–0.7)
IMM GRANULOCYTES NFR BLD AUTO: 0.4 % (ref 0–0.9)
LACTATE SERPL-SCNC: 1.2 MMOL/L (ref 0.4–2)
LYMPHOCYTES # BLD AUTO: 1.75 X10*3/UL (ref 1.2–4.8)
LYMPHOCYTES NFR BLD AUTO: 26 %
MCH RBC QN AUTO: 24.1 PG (ref 26–34)
MCHC RBC AUTO-ENTMCNC: 33.8 G/DL (ref 32–36)
MCV RBC AUTO: 71 FL (ref 80–100)
MONOCYTES # BLD AUTO: 0.42 X10*3/UL (ref 0.1–1)
MONOCYTES NFR BLD AUTO: 6.2 %
NEUTROPHILS # BLD AUTO: 4.43 X10*3/UL (ref 1.2–7.7)
NEUTROPHILS NFR BLD AUTO: 66 %
NRBC BLD-RTO: 0 /100 WBCS (ref 0–0)
PLATELET # BLD AUTO: 405 X10*3/UL (ref 150–450)
POTASSIUM SERPL-SCNC: 4.1 MMOL/L (ref 3.5–5.3)
PROT SERPL-MCNC: 7.8 G/DL (ref 6.4–8.2)
RBC # BLD AUTO: 5.44 X10*6/UL (ref 4–5.2)
SODIUM SERPL-SCNC: 139 MMOL/L (ref 136–145)
WBC # BLD AUTO: 6.7 X10*3/UL (ref 4.4–11.3)

## 2025-05-11 PROCEDURE — 2500000001 HC RX 250 WO HCPCS SELF ADMINISTERED DRUGS (ALT 637 FOR MEDICARE OP)

## 2025-05-11 PROCEDURE — 36415 COLL VENOUS BLD VENIPUNCTURE: CPT

## 2025-05-11 PROCEDURE — 80053 COMPREHEN METABOLIC PANEL: CPT

## 2025-05-11 PROCEDURE — 2500000004 HC RX 250 GENERAL PHARMACY W/ HCPCS (ALT 636 FOR OP/ED): Mod: JZ

## 2025-05-11 PROCEDURE — 96365 THER/PROPH/DIAG IV INF INIT: CPT

## 2025-05-11 PROCEDURE — 2500000005 HC RX 250 GENERAL PHARMACY W/O HCPCS

## 2025-05-11 PROCEDURE — 99284 EMERGENCY DEPT VISIT MOD MDM: CPT | Mod: 25 | Performed by: GENERAL PRACTICE

## 2025-05-11 PROCEDURE — 83605 ASSAY OF LACTIC ACID: CPT

## 2025-05-11 PROCEDURE — 2500000004 HC RX 250 GENERAL PHARMACY W/ HCPCS (ALT 636 FOR OP/ED): Mod: JZ | Performed by: GENERAL PRACTICE

## 2025-05-11 PROCEDURE — 85025 COMPLETE CBC W/AUTO DIFF WBC: CPT

## 2025-05-11 PROCEDURE — 96376 TX/PRO/DX INJ SAME DRUG ADON: CPT

## 2025-05-11 PROCEDURE — 96375 TX/PRO/DX INJ NEW DRUG ADDON: CPT

## 2025-05-11 PROCEDURE — 96366 THER/PROPH/DIAG IV INF ADDON: CPT

## 2025-05-11 PROCEDURE — 85652 RBC SED RATE AUTOMATED: CPT

## 2025-05-11 PROCEDURE — 86140 C-REACTIVE PROTEIN: CPT

## 2025-05-11 RX ORDER — HYDROMORPHONE HYDROCHLORIDE 1 MG/ML
1 INJECTION, SOLUTION INTRAMUSCULAR; INTRAVENOUS; SUBCUTANEOUS ONCE
Status: COMPLETED | OUTPATIENT
Start: 2025-05-11 | End: 2025-05-11

## 2025-05-11 RX ORDER — LEVOFLOXACIN 750 MG/1
750 TABLET, FILM COATED ORAL EVERY 24 HOURS
Qty: 4 TABLET | Refills: 0 | Status: SHIPPED | OUTPATIENT
Start: 2025-05-11 | End: 2025-05-15

## 2025-05-11 RX ORDER — LEVOFLOXACIN 750 MG/1
750 TABLET, FILM COATED ORAL ONCE
Status: COMPLETED | OUTPATIENT
Start: 2025-05-11 | End: 2025-05-11

## 2025-05-11 RX ORDER — VANCOMYCIN HYDROCHLORIDE 1 G/20ML
INJECTION, POWDER, LYOPHILIZED, FOR SOLUTION INTRAVENOUS DAILY PRN
Status: DISCONTINUED | OUTPATIENT
Start: 2025-05-11 | End: 2025-05-11

## 2025-05-11 RX ORDER — LEVOFLOXACIN 750 MG/1
750 TABLET, FILM COATED ORAL EVERY 24 HOURS
Qty: 4 TABLET | Refills: 0 | Status: SHIPPED | OUTPATIENT
Start: 2025-05-11 | End: 2025-05-11

## 2025-05-11 RX ADMIN — HYDROMORPHONE HYDROCHLORIDE 1 MG: 1 INJECTION, SOLUTION INTRAMUSCULAR; INTRAVENOUS; SUBCUTANEOUS at 12:54

## 2025-05-11 RX ADMIN — VANCOMYCIN HYDROCHLORIDE 1500 MG: 5 INJECTION, POWDER, LYOPHILIZED, FOR SOLUTION INTRAVENOUS at 10:20

## 2025-05-11 RX ADMIN — HYDROMORPHONE HYDROCHLORIDE 1 MG: 1 INJECTION, SOLUTION INTRAMUSCULAR; INTRAVENOUS; SUBCUTANEOUS at 10:16

## 2025-05-11 RX ADMIN — HYDROMORPHONE HYDROCHLORIDE 2 MG: 2 INJECTION INTRAMUSCULAR; INTRAVENOUS; SUBCUTANEOUS at 07:34

## 2025-05-11 RX ADMIN — LEVOFLOXACIN 750 MG: 750 TABLET, FILM COATED ORAL at 07:59

## 2025-05-11 ASSESSMENT — PAIN SCALES - GENERAL
PAINLEVEL_OUTOF10: 9

## 2025-05-11 ASSESSMENT — PAIN DESCRIPTION - LOCATION
LOCATION: GROIN
LOCATION: GROIN

## 2025-05-11 ASSESSMENT — PAIN - FUNCTIONAL ASSESSMENT
PAIN_FUNCTIONAL_ASSESSMENT: 0-10
PAIN_FUNCTIONAL_ASSESSMENT: 0-10

## 2025-05-11 ASSESSMENT — PAIN DESCRIPTION - PAIN TYPE: TYPE: ACUTE PAIN

## 2025-05-11 NOTE — ED TRIAGE NOTES
Pt to ed w c/o HS flare up since yesterday. Pt c/o groin pain. Pt states her abscess is leaking green drainage.

## 2025-05-11 NOTE — ED PROVIDER NOTES
Chief Complaint   Patient presents with    Abscess       34-year-old female arrives to the emergency department with a chief complaint of an exacerbation of her chronic pain related to her hidradenitis suppurativa.  The patient is on high dose opioid medication as well as a Biologics, and states that they have not been effective for her pain control.  Patient began having a flare yesterday and has been up in pain since 5 AM.  The patient has multiple lesions, abscesses of different ages throughout her groin with right worse than left as well as bilateral axilla.  The patient is well-known to both this emergency department and multiple others, stating that she has a care plan that states 2 mg of IV Dilaudid every 2 hours, however the care plan is currently not active, and possibly could have been while she was pregnant.  The patient is hemodynamically stable upon arrival, the patient has been on extremely high dose Percocet for more than 12 years, currently taking 60 to 80 mg a day p.o.           PmHx, PsHx, Allergies, Family Hx, social Hx reviewed as documented    A complete 10 point review of systems was performed and is negative except for as mentioned in the HPI.    Physical Exam:    General: Patient is AAOx3, appears well developed, well nourished, is a good historian, answers questions appropriately    HEENT: head normocephalic, atraumatic, PERRLA, EOMs intact, oropharynx without erythema or exudate, buccal mucosa intact without lesions, TMs unremarkable, nose is patent bilateral    Neck: supple, full ROM, negative for lymphadenopathy, JVD, thyromegaly, tracheal deviation, nuccal rigidity    Pulmonary: CTAB, no accessory muscle use, able to speak full clear sentences    Cardiac: HRRR, no murmurs, rubs or gallops    GI: soft, non-tender, non-distended, BS + x 4, no masses or organomegaly, no guarding or CVA tenderness noted, negative dean's, mcburney's    Musculoskeletal: full weight bearing, URBAN, no joint  effusions, clubbing or edema noted    Skin: intact, no lesions or rashes noted, turgor is good.    Neuro: patient follow commands, cranial nerves 2-12 grossly intact, motor strengths 5/5 upper and lower extremities, DTR's and sensation are symmetrical. No focal deficits.    Rectal/: No urinary burning, urgency, change in frequency.  Patient has no rectal complaints        Medical Decision Making  This patient was seen in the emergency department with an attending physician available at all times throughout their ED course    Primary consideration for this patient would be pain control, given the chronicity of her HS, however the patient does have a current drainage, unknown baseline however multiple abscesses.  The patient states that she does not have any I&D's done at this time, that she takes daily Bactrim for many years.  Patient is on extremely large doses of oxycodone at home 60 to 80 mg a day as well as on a biologic that she states is doing nothing for her.  Patient also states that she has a care plan that consists of 2 mg of IV Dilaudid every 2 hours, this is not active where it can be seen on my end.  Other consideration for the patient would be systemic infection although her vital signs are not indicative, diagnostic blood work will be used to further evaluate.  Patient initially given 2 mg of IV Dilaudid, the patient does have in her infant child with her however she did not drive here and her aunt is coming to pick the child up as she anticipates admission for pain control although she would initially prefer to be treated and go home.  The patient states that fluoroquinolones has helped her in the past and she was on long-term Levaquin however felt as if she was having ankle pain with the fluoroquinolones and stopped.  Patient requesting to be placed back on them stating that is the medication that has worked the best for her on an outpatient basis.  Patient given 750 mg of Levaquin through shared  decision making    The patient's diagnostic blood work shows mildly elevated sed rate, no leukocytosis, no other acute abnormality    Within 1/2-hour after her dose of Dilaudid, the patient states that her pain has had no improvement, however the patient is tired, difficult to keep her eyes open, and her systolic blood pressure dropped 40 points after the Dilaudid.  Patient also refusing to keep automatic blood pressure cuff and pulse ox on for the primary nurse    The patient is agitated, stating that her pain has not been controlled while in the emergency department, the patient is requesting admission at this time for pain management and treatment of her HS flare.  There is concern on my end for the patient having opiate dependence issues after being on large doses of opiates for more than 12 years, within 20 minutes of an IV dose of opiates, the patient is requesting more.  Surgical team will be consulted prior to admission per [facility]    Patient to be further treated, evaluated and dispositioned by the above attending physician      Amount and/or Complexity of Data Reviewed  Labs: ordered. Decision-making details documented in ED Course.       Diagnoses as of 05/11/25 1259   Hidradenitis suppurativa       The patient has had the following imaging during this ER visit: AMB REFERRAL TO PAIN MEDICINE     Patient History   Medical History[1]  Surgical History[2]  Family History[3]  Social History[4]    ED Triage Vitals [05/11/25 0649]   Temperature Heart Rate Respirations BP   36.8 °C (98.3 °F) 91 16 155/90      Pulse Ox Temp Source Heart Rate Source Patient Position   99 % Oral -- --      BP Location FiO2 (%)     -- --       Vitals:    05/11/25 0649 05/11/25 0800 05/11/25 0908 05/11/25 1014   BP: 155/90  101/75 114/82   BP Location:   Right arm Right arm   Patient Position:   Lying Sitting   Pulse: 91  79 80   Resp: 16  16 16   Temp: 36.8 °C (98.3 °F)  36.8 °C (98.2 °F) 36.8 °C (98.2 °F)   TempSrc: Oral  Oral Oral  "  SpO2: 99% 98% 100% 98%   Weight: 77.6 kg (171 lb)      Height: 1.575 m (5' 2\")                    [1]   Past Medical History:  Diagnosis Date    Asthma, mild intermittent (HHS-HCC)     albuterol prn    Hidradenitis suppurativa 10/29/2020    Hidradenitis    History of gestational hypertension     Lupus     diagnosed by her PCP in 2017 based on symptoms of joint pain and facial rash. She states she is in remission    MDD (major depressive disorder)     not on meds    PTSD (post-traumatic stress disorder)     resulting from IPV   [2]   Past Surgical History:  Procedure Laterality Date    OTHER SURGICAL HISTORY  09/19/2022    Arm surgery    OTHER SURGICAL HISTORY Left 2011    Left wrist    OTHER SURGICAL HISTORY Left 2021    Left wrist procedure   [3]   Family History  Problem Relation Name Age of Onset    COPD Father      Heart disease Father     [4]   Social History  Tobacco Use    Smoking status: Some Days     Types: Cigarettes    Smokeless tobacco: Never   Vaping Use    Vaping status: Every Day   Substance Use Topics    Alcohol use: Not Currently    Drug use: Never        ZAFAR Raygoza-CNP  05/11/25 1259    "

## 2025-05-11 NOTE — ED NOTES
"This RN entered this patients room and this patient's blood pressure cuff and SpO2 monitor had been removed by patient. This patient has removed these monitors multiple times even after education was provided as to why these monitors are important while receiving narcotics. Patient stated, \"I will put them back on when I receive my next pain medication\". This nurse reinforced education on the importance of these monitoring devices while using narcotics and placed the monitors back onto patient.      Francoise Ragland RN  05/11/25 0923    "

## 2025-05-12 ENCOUNTER — TELEPHONE (OUTPATIENT)
Dept: INFUSION THERAPY | Facility: CLINIC | Age: 35
End: 2025-05-12
Payer: MEDICAID

## 2025-05-12 ENCOUNTER — TELEPHONE (OUTPATIENT)
Dept: PAIN MEDICINE | Facility: CLINIC | Age: 35
End: 2025-05-12
Payer: MEDICAID

## 2025-05-12 PROBLEM — G89.4 CHRONIC PAIN SYNDROME: Status: ACTIVE | Noted: 2025-05-12

## 2025-05-12 PROCEDURE — RXMED WILLOW AMBULATORY MEDICATION CHARGE

## 2025-05-12 NOTE — TELEPHONE ENCOUNTER
Spoke with patient regarding infusions.  She received a letter today for her physician leaving the practice.  She is wanting for a referral/suggestion of Dr Harrison where she can go to help wean off the opioid pain medication.

## 2025-05-13 ENCOUNTER — PHARMACY VISIT (OUTPATIENT)
Dept: PHARMACY | Facility: CLINIC | Age: 35
End: 2025-05-13
Payer: MEDICAID

## 2025-05-13 PROCEDURE — RXMED WILLOW AMBULATORY MEDICATION CHARGE

## 2025-05-19 ENCOUNTER — SPECIALTY PHARMACY (OUTPATIENT)
Dept: PHARMACY | Facility: CLINIC | Age: 35
End: 2025-05-19

## 2025-05-19 PROCEDURE — RXMED WILLOW AMBULATORY MEDICATION CHARGE

## 2025-05-20 ENCOUNTER — SPECIALTY PHARMACY (OUTPATIENT)
Dept: PHARMACY | Facility: CLINIC | Age: 35
End: 2025-05-20

## 2025-05-21 ENCOUNTER — PHARMACY VISIT (OUTPATIENT)
Dept: PHARMACY | Facility: CLINIC | Age: 35
End: 2025-05-21
Payer: MEDICAID

## 2025-05-27 ENCOUNTER — APPOINTMENT (OUTPATIENT)
Dept: OBSTETRICS AND GYNECOLOGY | Facility: CLINIC | Age: 35
End: 2025-05-27
Payer: MEDICAID

## 2025-06-03 ENCOUNTER — APPOINTMENT (OUTPATIENT)
Dept: OBSTETRICS AND GYNECOLOGY | Facility: CLINIC | Age: 35
End: 2025-06-03
Payer: MEDICAID

## 2025-06-09 ENCOUNTER — HOSPITAL ENCOUNTER (EMERGENCY)
Facility: HOSPITAL | Age: 35
Discharge: HOME | End: 2025-06-09
Attending: EMERGENCY MEDICINE
Payer: MEDICAID

## 2025-06-09 VITALS
OXYGEN SATURATION: 98 % | RESPIRATION RATE: 18 BRPM | HEART RATE: 84 BPM | DIASTOLIC BLOOD PRESSURE: 74 MMHG | SYSTOLIC BLOOD PRESSURE: 120 MMHG | TEMPERATURE: 97.5 F

## 2025-06-09 DIAGNOSIS — F11.29 OPIOID DEPENDENCE WITH OPIOID-INDUCED DISORDER (MULTI): ICD-10-CM

## 2025-06-09 DIAGNOSIS — G89.29 OTHER CHRONIC PAIN: ICD-10-CM

## 2025-06-09 DIAGNOSIS — L73.2 HIDRADENITIS SUPPURATIVA: Primary | ICD-10-CM

## 2025-06-09 PROCEDURE — 99282 EMERGENCY DEPT VISIT SF MDM: CPT | Performed by: EMERGENCY MEDICINE

## 2025-06-09 PROCEDURE — 99283 EMERGENCY DEPT VISIT LOW MDM: CPT

## 2025-06-09 RX ORDER — OXYCODONE AND ACETAMINOPHEN 5; 325 MG/1; MG/1
1 TABLET ORAL EVERY 6 HOURS PRN
Qty: 6 TABLET | Refills: 0 | Status: SHIPPED | OUTPATIENT
Start: 2025-06-09 | End: 2025-06-09 | Stop reason: DRUGHIGH

## 2025-06-09 RX ORDER — LEVOFLOXACIN 500 MG/1
500 TABLET, FILM COATED ORAL DAILY
Qty: 10 TABLET | Refills: 0 | Status: SHIPPED | OUTPATIENT
Start: 2025-06-09 | End: 2025-06-19

## 2025-06-09 RX ORDER — OXYCODONE AND ACETAMINOPHEN 10; 325 MG/1; MG/1
1 TABLET ORAL EVERY 6 HOURS PRN
Qty: 4 TABLET | Refills: 0 | Status: SHIPPED | OUTPATIENT
Start: 2025-06-09 | End: 2025-06-12

## 2025-06-09 ASSESSMENT — PAIN - FUNCTIONAL ASSESSMENT: PAIN_FUNCTIONAL_ASSESSMENT: 0-10

## 2025-06-09 ASSESSMENT — PAIN SCALES - GENERAL: PAINLEVEL_OUTOF10: 9

## 2025-06-10 ENCOUNTER — HOSPITAL ENCOUNTER (EMERGENCY)
Facility: HOSPITAL | Age: 35
Discharge: HOME | End: 2025-06-10
Payer: MEDICAID

## 2025-06-10 VITALS
DIASTOLIC BLOOD PRESSURE: 74 MMHG | HEIGHT: 65 IN | SYSTOLIC BLOOD PRESSURE: 126 MMHG | WEIGHT: 170 LBS | RESPIRATION RATE: 16 BRPM | TEMPERATURE: 97.5 F | HEART RATE: 80 BPM | BODY MASS INDEX: 28.32 KG/M2 | OXYGEN SATURATION: 98 %

## 2025-06-10 DIAGNOSIS — G89.29 OTHER CHRONIC PAIN: Primary | ICD-10-CM

## 2025-06-10 PROCEDURE — 99281 EMR DPT VST MAYX REQ PHY/QHP: CPT

## 2025-06-10 PROCEDURE — 99283 EMERGENCY DEPT VISIT LOW MDM: CPT | Performed by: NURSE PRACTITIONER

## 2025-06-10 PROCEDURE — RXMED WILLOW AMBULATORY MEDICATION CHARGE

## 2025-06-10 ASSESSMENT — PAIN - FUNCTIONAL ASSESSMENT: PAIN_FUNCTIONAL_ASSESSMENT: 0-10

## 2025-06-10 ASSESSMENT — PAIN SCALES - GENERAL: PAINLEVEL_OUTOF10: 5 - MODERATE PAIN

## 2025-06-10 NOTE — ED TRIAGE NOTES
Pt to ED with complaint of flare up with their autoimmune issue. States hx of Hidradenitis Suppurativa. States is trying to lower their dose of oxycodone but their doctor retired and need new resources.

## 2025-06-10 NOTE — ED PROVIDER NOTES
Emergency Department Provider Note       HPI: []  34-year-old  female history of longstanding hidradenitis suppurativa in both of her axilla and groin and gluteal areas on Biologics and chronic pain medications history of well-documented opioid dependence her OB/GYN was trying to wean her off, because she was pregnant now she has delivered a child her OB/GYN has left [facility] and currently she has no provider who can prescribe opioids to her.  She is scheduled to see pain management.  In the interim she has come to the ER to get a refill of her pain medications.  She does state that she had a fever yesterday and she is feeling poorly.  But no vomiting or diarrhea constipation cough congestion incontinences.    Past history: Hidradenitis suppurativa, opioid dependence  Social: Patient denies tobacco alcohol or drug abuse.  REVIEW OF SYSTEMS:    GENERAL.: No weight loss, fatigue, anorexia, insomnia, fever.    EYES: No vision loss, double vision, drainage, eye pain.    ENT: No pharyngitis, dry mouth.    CARDIOPULMONARY: No chest pain, palpitations, syncope, near syncope. No shortness of breath, cough, hemoptysis.    GI: No abdominal pain, change in bowel habits, melena, hematemesis, hematochezia, nausea, vomiting, diarrhea.    : No discharge, dysuria, frequency, urgency, hematuria.    MS: No limb pain, joint pain, joint swelling.    SKIN: No rashes.  Positive skin lesions    PSYCH: No depression, anxiety, suicidality, homicidality.    Review of systems is otherwise negative unless stated above or in history of present illness.  Social history, family history, allergies reviewed.  PHYSICAL EXAM:    GENERAL: Vitals noted, no distress. Alert and oriented  x 3. Non-toxic.      EENT: TMs clear. Posterior oropharynx unremarkable. No meningismus. No LAD.     NECK: Supple. Nontender. No midline tenderness.     CARDIAC: Regular, rate, rhythm. No murmurs rubs or gallops. No JVD    PULMONARY: Lungs clear  bilaterally with good aeration. No wheezes rales or rhonchi. No respiratory distress.     ABDOMEN: Soft, nonsurgical. Nontender. No peritoneal signs. Normoactive bowel sounds. No pulsatile masses.     EXTREMITIES: No peripheral edema. Negative Homans bilaterally, no cords.  2+ bounding pulses well-perfused.    SKIN: No rash. Intact.     Female chaperone Marck Hankins present at bedside.  I performed a gentle examination patient has a chronic appearing lesions in her groin in her mons pubis gluteal areas and both axillary areas with no active lesions no erythema redness swelling ecchymosis bruising or drainage or abscess identified.  Very benign examination.    NEURO: No focal neurologic deficits, NIH score of 0. Cranial nerves normal as tested from II through XII.     MEDICAL DECISION MAKING:    Impression: #1 chronic hidradenitis suppurativa with no active lesions    Plan/MDM: Upon arrival I went to the patient's room patient was fully dressed with the child in the room I asked the patient to please get in dressed in a gown and I will back with a chaperone for examination got a call from nursing supervisor the patient does not want me as a provider I went back again with the nursing staff in the room and patient agreed to be seen and eval by myself and did not request a different provider hence appropriately I proceeded with my examination in the presence of Marck Hankins the female chaperone nurse, on my examination I do not see any red flags she has no fever no leuk no tachycardia she is normotensive her exam is very benign she has no signs of active lesions that require laboratory workup and/or IV to biotics and/or inpatient hospitalization.  Patient requesting pain medication I did explain to her that we do not refill chronic pain medication from the ED.  I will send her home with levofloxacin and as per her request, and 6 Percocets with no refills outpatient follow-up recommended outpatient referrals  provided with strict return precaution.                                                     David Chase MD  06/09/25 8820       David Chase MD  06/09/25 7625

## 2025-06-10 NOTE — ED PROVIDER NOTES
HPI   Chief Complaint   Patient presents with    Med Refill       HPI    This is a 34 year old female with history of longstanding hidradenitis suppurativa in both of her axilla and groin and gluteal areas on Biologics and chronic pain medications history of well-documented opioid dependence her OB/GYN was trying to wean her off, because she was pregnant now she has delivered a child her OB/GYN has left the practice therefore she has no one to prescribe her pain medications.   Per [facility], she received 120 of Percocet on 5/23, and 21 tabs on 5/23 and 4 Percocet yesterday after she was seen at [facility]. She has an appointment tomorrow with pain management and is worried that she will go through DT if she does not get Percocet. She left [facility] at 11 pm last night.   Currently she has her baby with her with no acute physical problems at this time therefore I am unable to give any narcotics.   Please see the message from 5/1/25 between her OB/GYN and the patient.   Her OB/GYN is at  until 6/25.     Patient History   Medical History[1]  Surgical History[2]  Family History[3]  Social History[4]    Physical Exam   ED Triage Vitals [06/10/25 1351]   Temperature Heart Rate Respirations BP   36.4 °C (97.5 °F) 80 16 126/74      Pulse Ox Temp src Heart Rate Source Patient Position   98 % -- -- --      BP Location FiO2 (%)     -- --       Physical Exam  Constitutional:       Appearance: Normal appearance.   HENT:      Head: Normocephalic and atraumatic.      Mouth/Throat:      Mouth: Mucous membranes are moist.   Eyes:      Extraocular Movements: Extraocular movements intact.      Pupils: Pupils are equal, round, and reactive to light.   Cardiovascular:      Rate and Rhythm: Normal rate and regular rhythm.      Pulses: Normal pulses.      Heart sounds: Normal heart sounds.   Pulmonary:      Effort: Pulmonary effort is normal.      Breath sounds: Normal breath sounds.   Abdominal:      Palpations: Abdomen is soft.   Musculoskeletal:          General: Normal range of motion.      Cervical back: Normal range of motion and neck supple.   Skin:     General: Skin is warm and dry.   Neurological:      General: No focal deficit present.      Mental Status: She is alert and oriented to person, place, and time.   Psychiatric:         Mood and Affect: Mood normal.         Behavior: Behavior normal.           ED Course & MDM   Diagnoses as of 06/10/25 1526   Other chronic pain                 No data recorded     [place] Coma Scale Score: 15 (06/10/25 1348 : Radha Johnson RN)                           Medical Decision Making  This is a 34 year old female with history of longstanding hidradenitis suppurativa in both of her axilla and groin and gluteal areas on Biologics and chronic pain medications history of well-documented opioid dependence her OB/GYN was trying to wean her off, because she was pregnant now she has delivered a child her OB/GYN has left the practice therefore she has no one to prescribe her pain medications.   She received 4 Percocet last night at [facility] and two different prescriptions for Percocet total of 141 tabs.   I did not give her any narcotics in the ED as she had her baby with her . She has an appointment tomorrow with pain management and I suggested that she'd follow up.  Please see the phone encounter between her GYN and the patient on 5/1/25.   I did not feel any Dts coming on and unless she inhaled the pills from last night, she should have pain medication left.  She was instructed to return to the ED with any concerning issues.         Procedure  Procedures         [1]   Past Medical History:  Diagnosis Date    Asthma, mild intermittent ([facility])     albuterol prn    Hidradenitis suppurativa 10/29/2020    Hidradenitis    History of gestational hypertension     Lupus     diagnosed by her PCP in 2017 based on symptoms of joint pain and facial rash. She states she is in remission    MDD (major depressive disorder)     not on meds     PTSD (post-traumatic stress disorder)     resulting from IPV   [2]   Past Surgical History:  Procedure Laterality Date    OTHER SURGICAL HISTORY  09/19/2022    Arm surgery    OTHER SURGICAL HISTORY Left 2011    Left wrist    OTHER SURGICAL HISTORY Left 2021    Left wrist procedure   [3]   Family History  Problem Relation Name Age of Onset    COPD Father      Heart disease Father     [4]   Social History  Tobacco Use    Smoking status: Some Days     Types: Cigarettes    Smokeless tobacco: Never   Vaping Use    Vaping status: Every Day   Substance Use Topics    Alcohol use: Not Currently    Drug use: Never        ZAFAR Rojo-CNP, DNP  06/10/25 1558

## 2025-06-10 NOTE — ED TRIAGE NOTES
Pt states she is out of pain medication because her physician that managed her autoimmune disorder left. States she wasn't tapered off her oxy. Appears she was seen at [facility] yesterday for same complaint. Per note given 6 percocet

## 2025-06-12 ENCOUNTER — PHARMACY VISIT (OUTPATIENT)
Dept: PHARMACY | Facility: CLINIC | Age: 35
End: 2025-06-12
Payer: MEDICAID

## 2025-06-12 DIAGNOSIS — G89.4 CHRONIC PAIN SYNDROME: Primary | ICD-10-CM

## 2025-06-12 RX ORDER — ALBUTEROL SULFATE 0.83 MG/ML
3 SOLUTION RESPIRATORY (INHALATION) AS NEEDED
OUTPATIENT
Start: 2025-06-19

## 2025-06-12 RX ORDER — KETOROLAC TROMETHAMINE 30 MG/ML
30 INJECTION, SOLUTION INTRAMUSCULAR; INTRAVENOUS ONCE
OUTPATIENT
Start: 2025-06-19 | End: 2025-06-19

## 2025-06-12 RX ORDER — DIPHENHYDRAMINE HYDROCHLORIDE 50 MG/ML
50 INJECTION, SOLUTION INTRAMUSCULAR; INTRAVENOUS AS NEEDED
OUTPATIENT
Start: 2025-06-19

## 2025-06-12 RX ORDER — FAMOTIDINE 10 MG/ML
20 INJECTION, SOLUTION INTRAVENOUS ONCE AS NEEDED
OUTPATIENT
Start: 2025-06-19

## 2025-06-12 RX ORDER — EPINEPHRINE 1 MG/ML
0.3 INJECTION, SOLUTION, CONCENTRATE INTRAVENOUS EVERY 5 MIN PRN
OUTPATIENT
Start: 2025-06-19

## 2025-06-15 ENCOUNTER — HOSPITAL ENCOUNTER (EMERGENCY)
Facility: HOSPITAL | Age: 35
Discharge: HOME | End: 2025-06-15
Payer: MEDICAID

## 2025-06-15 VITALS
OXYGEN SATURATION: 97 % | RESPIRATION RATE: 18 BRPM | TEMPERATURE: 98.6 F | HEIGHT: 65 IN | BODY MASS INDEX: 28.32 KG/M2 | WEIGHT: 170 LBS | SYSTOLIC BLOOD PRESSURE: 104 MMHG | DIASTOLIC BLOOD PRESSURE: 68 MMHG | HEART RATE: 82 BPM

## 2025-06-15 DIAGNOSIS — K08.89 PAIN, DENTAL: Primary | ICD-10-CM

## 2025-06-15 PROCEDURE — 99283 EMERGENCY DEPT VISIT LOW MDM: CPT

## 2025-06-15 PROCEDURE — 2500000005 HC RX 250 GENERAL PHARMACY W/O HCPCS: Performed by: NURSE PRACTITIONER

## 2025-06-15 RX ORDER — BENZOCAINE, BUTAMBEN, AND TETRACAINE HYDROCHLORIDE .028; .004; .004 G/.2G; G/.2G; G/.2G
1 AEROSOL, SPRAY TOPICAL AS NEEDED
Qty: 20 G | Refills: 0 | Status: SHIPPED | OUTPATIENT
Start: 2025-06-15

## 2025-06-15 RX ORDER — BENZOCAINE, BUTAMBEN, AND TETRACAINE HYDROCHLORIDE .028; .004; .004 G/.2G; G/.2G; G/.2G
1 AEROSOL, SPRAY TOPICAL AS NEEDED
Qty: 20 G | Refills: 0 | Status: SHIPPED | OUTPATIENT
Start: 2025-06-15 | End: 2025-06-15

## 2025-06-15 RX ADMIN — BENZOCAINE, BUTAMBEN, AND TETRACAINE HYDROCHLORIDE 1 SPRAY: .028; .004; .004 AEROSOL, SPRAY TOPICAL at 12:33

## 2025-06-15 ASSESSMENT — PAIN SCALES - GENERAL: PAINLEVEL_OUTOF10: 9

## 2025-06-15 ASSESSMENT — PAIN DESCRIPTION - LOCATION: LOCATION: MOUTH

## 2025-06-15 ASSESSMENT — PAIN - FUNCTIONAL ASSESSMENT: PAIN_FUNCTIONAL_ASSESSMENT: 0-10

## 2025-06-15 ASSESSMENT — PAIN DESCRIPTION - ORIENTATION: ORIENTATION: LEFT

## 2025-06-15 NOTE — ED TRIAGE NOTES
Pt presents to the ED from home with c/o dry socket. Pt states she had a tooth extracted Thursday and is suffering from dry socket and has been taking tylenol and ibuprofen but now it's not helping the pain.

## 2025-06-15 NOTE — ED PROVIDER NOTES
HPI   Chief Complaint   Patient presents with    Dental Pain       34-year-old female who just had her molars on her left lower jaw pulled last week and is presently still on amoxicillin presents today with dental pain.  She is rating the dental pain 6 out of 10 she believes she is experiencing some swelling.  She denies fever or chills.  She denies chest pain or dyspnea.  She denies skin rash.  She denies abdominal pain, nausea or vomiting.  She is in pain management.  She just received 120 Percocet at the beginning of the month.  She has an overdose risk score of 700.  Her narcotic score is 630.  Her sedative score is 340.  She has received prescriptions from 36 different prescribers in the last 24 months.  She has received narcotic medication x 93 times in the last 24 months.  She just received 4 tablets of oxycodone on June 9 in addition to the 120 tablets she just filled.  She has no other cause for concern or complaint.      History provided by:  Patient   used: No            Patient History   Medical History[1]  Surgical History[2]  Family History[3]  Social History[4]    Physical Exam   ED Triage Vitals [06/15/25 1132]   Temperature Heart Rate Respirations BP   37 °C (98.6 °F) 81 17 99/66      Pulse Ox Temp Source Heart Rate Source Patient Position   96 % Temporal -- --      BP Location FiO2 (%)     -- --       Physical Exam  Constitutional:       Appearance: Normal appearance.   HENT:      Head: Normocephalic and atraumatic.      Right Ear: Tympanic membrane normal.      Left Ear: Tympanic membrane normal.      Nose: Nose normal.      Mouth/Throat:      Mouth: Mucous membranes are dry.      Comments: No sign of infection or abscess appreciated  Eyes:      Extraocular Movements: Extraocular movements intact.      Pupils: Pupils are equal, round, and reactive to light.   Cardiovascular:      Rate and Rhythm: Normal rate and regular rhythm.      Pulses: Normal pulses.      Heart sounds:  Normal heart sounds.   Pulmonary:      Effort: Pulmonary effort is normal.      Breath sounds: Normal breath sounds.   Abdominal:      General: Abdomen is flat.      Palpations: Abdomen is soft.   Musculoskeletal:         General: Normal range of motion.      Cervical back: Normal range of motion.   Skin:     General: Skin is warm.      Capillary Refill: Capillary refill takes less than 2 seconds.   Neurological:      General: No focal deficit present.      Mental Status: She is alert and oriented to person, place, and time.   Psychiatric:         Mood and Affect: Mood normal.         Behavior: Behavior normal.           ED Course & MDM   Diagnoses as of 06/15/25 1226   Pain, dental                 No data recorded     [place] Coma Scale Score: 15 (06/15/25 1131 : Lou Garner, EVELYN)                           Medical Decision Making  Patient received Cetacaine for pain.  She is already on antibiotics from dentist for the tooth extraction.  She will follow-up with dentist as soon as possible.  There was no airway involvement.  There was no stridor.  I did not appreciate swelling.  She received Cetacaine for analgesic.  This usually can alleviate pain rapidly without adding to the amount of narcotic medication that patient has on board.  There was no neurologic deficit remaining physical exam was normal and she was safely discharged home with return precautions.        Procedure  Procedures       [1]   Past Medical History:  Diagnosis Date    Asthma, mild intermittent ([facility])     albuterol prn    Hidradenitis suppurativa 10/29/2020    Hidradenitis    History of gestational hypertension     Lupus     diagnosed by her PCP in 2017 based on symptoms of joint pain and facial rash. She states she is in remission    MDD (major depressive disorder)     not on meds    PTSD (post-traumatic stress disorder)     resulting from IPV   [2]   Past Surgical History:  Procedure Laterality Date    OTHER SURGICAL HISTORY  09/19/2022     Arm surgery    OTHER SURGICAL HISTORY Left 2011    Left wrist    OTHER SURGICAL HISTORY Left 2021    Left wrist procedure   [3]   Family History  Problem Relation Name Age of Onset    COPD Father      Heart disease Father     [4]   Social History  Tobacco Use    Smoking status: Some Days     Types: Cigarettes    Smokeless tobacco: Never   Vaping Use    Vaping status: Every Day   Substance Use Topics    Alcohol use: Not Currently    Drug use: Never        ZAFAR Portillo-CNP  06/15/25 1224

## 2025-06-19 ENCOUNTER — INFUSION (OUTPATIENT)
Dept: INFUSION THERAPY | Facility: CLINIC | Age: 35
End: 2025-06-19
Payer: MEDICAID

## 2025-06-19 VITALS
DIASTOLIC BLOOD PRESSURE: 87 MMHG | OXYGEN SATURATION: 99 % | SYSTOLIC BLOOD PRESSURE: 149 MMHG | RESPIRATION RATE: 18 BRPM | HEART RATE: 93 BPM

## 2025-06-19 DIAGNOSIS — G89.4 CHRONIC PAIN SYNDROME: ICD-10-CM

## 2025-06-19 PROCEDURE — 96375 TX/PRO/DX INJ NEW DRUG ADDON: CPT | Mod: INF

## 2025-06-19 PROCEDURE — 96365 THER/PROPH/DIAG IV INF INIT: CPT | Mod: INF

## 2025-06-19 PROCEDURE — 96368 THER/DIAG CONCURRENT INF: CPT | Mod: INF

## 2025-06-19 PROCEDURE — 2500000004 HC RX 250 GENERAL PHARMACY W/ HCPCS (ALT 636 FOR OP/ED): Mod: JZ | Performed by: NURSE PRACTITIONER

## 2025-06-19 RX ORDER — ALBUTEROL SULFATE 0.83 MG/ML
3 SOLUTION RESPIRATORY (INHALATION) AS NEEDED
OUTPATIENT
Start: 2025-07-03

## 2025-06-19 RX ORDER — KETOROLAC TROMETHAMINE 30 MG/ML
30 INJECTION, SOLUTION INTRAMUSCULAR; INTRAVENOUS ONCE
OUTPATIENT
Start: 2025-07-03 | End: 2025-07-03

## 2025-06-19 RX ORDER — EPINEPHRINE 1 MG/ML
0.3 INJECTION, SOLUTION, CONCENTRATE INTRAVENOUS EVERY 5 MIN PRN
OUTPATIENT
Start: 2025-07-03

## 2025-06-19 RX ORDER — DIPHENHYDRAMINE HYDROCHLORIDE 50 MG/ML
50 INJECTION, SOLUTION INTRAMUSCULAR; INTRAVENOUS AS NEEDED
OUTPATIENT
Start: 2025-07-03

## 2025-06-19 RX ORDER — KETOROLAC TROMETHAMINE 30 MG/ML
30 INJECTION, SOLUTION INTRAMUSCULAR; INTRAVENOUS ONCE
Status: COMPLETED | OUTPATIENT
Start: 2025-06-19 | End: 2025-06-19

## 2025-06-19 RX ORDER — FAMOTIDINE 10 MG/ML
20 INJECTION, SOLUTION INTRAVENOUS ONCE AS NEEDED
OUTPATIENT
Start: 2025-07-03

## 2025-06-19 RX ADMIN — PROPOFOL 100 MG: 10 INJECTION, EMULSION INTRAVENOUS at 10:25

## 2025-06-19 RX ADMIN — KETOROLAC TROMETHAMINE 30 MG: 30 INJECTION, SOLUTION INTRAMUSCULAR at 10:30

## 2025-06-19 RX ADMIN — Medication: at 10:25

## 2025-06-19 ASSESSMENT — PAIN - FUNCTIONAL ASSESSMENT
PAIN_FUNCTIONAL_ASSESSMENT: 0-10
PAIN_FUNCTIONAL_ASSESSMENT: 0-10

## 2025-06-19 ASSESSMENT — PAIN SCALES - GENERAL
PAINLEVEL_OUTOF10: 7
PAINLEVEL_OUTOF10: 0 - NO PAIN

## 2025-06-19 ASSESSMENT — ENCOUNTER SYMPTOMS
OCCASIONAL FEELINGS OF UNSTEADINESS: 0
DEPRESSION: 0
LOSS OF SENSATION IN FEET: 0

## 2025-06-19 ASSESSMENT — PAIN DESCRIPTION - DESCRIPTORS: DESCRIPTORS: ACHING;BURNING

## 2025-06-19 NOTE — PATIENT INSTRUCTIONS
Today :We administered (ketamine 30 mg, lidocaine (Xylocaine) 300 mg in sodium chloride 0.9% 518 mL IV), propofol (Diprivan) 100 mg in dextrose 5% 25 mL IV, and ketorolac.     For:   1. Chronic pain syndrome         Your next appointment is due in:  2 weeks        Please read the  Medication Guide that was given to you and reviewed during todays visit.     (Tell all doctors including dentists that you are taking this medication)     Go to the emergency room or call 911 if:  -You have signs of allergic reaction:   -Rash, hives, itching.   -Swollen, blistered, peeling skin.   -Swelling of face, lips, mouth, tongue or throat.   -Tightness of chest, trouble breathing, swallowing or talking     Call your doctor:  - If IV / injection site gets red, warm, swollen, itchy or leaks fluid or pus.     (Leave dressing on your IV site for at least 2 hours and keep area clean and dry  - If you get sick or have symptoms of infection or are not feeling well for any reason.    (Wash your hands often, stay away from people who are sick)  - If you have side effects from your medication that do not go away or are bothersome.     (Refer to the teaching your nurse gave you for side effects to call your doctor about)    - Common side effects may include:  stuffy nose, headache, feeling tired, muscle aches, upset stomach  - Before receiving any vaccines     - Call the [facility] at   If:  - You get sick, are on antibiotics, have had a recent vaccine, have surgery or dental work and your doctor wants your visit rescheduled.  - You need to cancel and reschedule your visit for any reason. Call at least 2 days before your visit if you need to cancel.   - Your insurance changes before your next visit.    (We will need to get approval from your new insurance. This can take up to two weeks.)     The [facility] is opened Monday thru Friday. We are closed on weekends and holidays.   Voice mail will take your call if  the center is closed. If you leave a message please allow 24 hours for a call back during weekdays. If you leave a message on a weekend/holiday, we will call you back the next business day.    A pharmacist is available Monday - Friday from 8:30AM to 3:30PM to help answer any questions you may have about your prescriptions(s). Please call pharmacy at:    [facility]: [phone]  [facility]: [phone]  [facility]: [phone]              [facility]      Pain Infusion Aftercare Instructions      1. It is normal to feel sedated, tired and low in energy after a pain infusion. DO NOT DRIVE, OPERATE ANY MACHINERY, OR MAKE ANY IMPORTANT DECISIONS FOR AT LEAST 24 HOURS AFTER THE INFUSION.     2. Call the pain center at [phone] with any problems, questions, or concerns.     3. Eat light after the infusion. If you feel queasy or sick to your stomach, laying down with your eyes closed may help. When you resume eating start with something mild like clear liquids, yogurt, applesauce, crackers, etc… Gradually advance to a regular diet.     4. Do not leave your house alone the evening of your pain infusion.     5. No alcohol or sedative medications, such as sleeping pills, for 24 hours after your pain infusion.     6. Resume all other prescribed medications unless directed otherwise by you physician.     7. If you have any medical emergencies, call 911 or go directly to the closest emergency room.

## 2025-06-19 NOTE — PROGRESS NOTES
S: Patient here for #1 opioid sparing pain infusion. Patient reports n/a% reduction in pain after last infusion that lasted n/a.    Purpose of pain infusion meds explained along with potential side effects.  Patient verbalized understanding.    B: Pain Issues. Is Patient breast feeding: ?No      Is Patient receiving any other form Ketamine from any other facility/ outside clinic ?: no  A: Patient currently has pain described on flow sheet documentation. Designated  is Aunt. Patient last ate solid food 8 hours ago, and had liquid 4 hours ago.    R: Plan; Obtain IV access, do patient risk assessment, and start opioid sparing infusion as ordered. Monitoring for S/S of adverse reactions.

## 2025-06-23 ENCOUNTER — SPECIALTY PHARMACY (OUTPATIENT)
Dept: PHARMACY | Facility: CLINIC | Age: 35
End: 2025-06-23

## 2025-06-25 ENCOUNTER — APPOINTMENT (OUTPATIENT)
Dept: DERMATOLOGY | Facility: CLINIC | Age: 35
End: 2025-06-25
Payer: MEDICAID

## 2025-06-28 PROCEDURE — RXMED WILLOW AMBULATORY MEDICATION CHARGE

## 2025-07-01 ENCOUNTER — APPOINTMENT (OUTPATIENT)
Dept: RADIOLOGY | Facility: HOSPITAL | Age: 35
End: 2025-07-01
Payer: MEDICAID

## 2025-07-01 ENCOUNTER — PHARMACY VISIT (OUTPATIENT)
Dept: PHARMACY | Facility: CLINIC | Age: 35
End: 2025-07-01
Payer: MEDICAID

## 2025-07-01 ENCOUNTER — HOSPITAL ENCOUNTER (EMERGENCY)
Facility: HOSPITAL | Age: 35
Discharge: HOME | End: 2025-07-01
Attending: EMERGENCY MEDICINE
Payer: MEDICAID

## 2025-07-01 VITALS
SYSTOLIC BLOOD PRESSURE: 113 MMHG | DIASTOLIC BLOOD PRESSURE: 80 MMHG | WEIGHT: 170 LBS | HEART RATE: 60 BPM | HEIGHT: 62 IN | OXYGEN SATURATION: 100 % | BODY MASS INDEX: 31.28 KG/M2 | TEMPERATURE: 98.6 F | RESPIRATION RATE: 18 BRPM

## 2025-07-01 DIAGNOSIS — K59.00 CONSTIPATION, UNSPECIFIED CONSTIPATION TYPE: ICD-10-CM

## 2025-07-01 DIAGNOSIS — L73.2 HYDRADENITIS: Primary | ICD-10-CM

## 2025-07-01 LAB
ALBUMIN SERPL BCP-MCNC: 4 G/DL (ref 3.4–5)
ALP SERPL-CCNC: 70 U/L (ref 33–110)
ALT SERPL W P-5'-P-CCNC: 16 U/L (ref 7–45)
ANION GAP SERPL CALC-SCNC: 11 MMOL/L (ref 10–20)
APPEARANCE UR: CLEAR
AST SERPL W P-5'-P-CCNC: 15 U/L (ref 9–39)
BASOPHILS # BLD AUTO: 0.03 X10*3/UL (ref 0–0.1)
BASOPHILS NFR BLD AUTO: 0.5 %
BILIRUB SERPL-MCNC: 0.3 MG/DL (ref 0–1.2)
BILIRUB UR STRIP.AUTO-MCNC: NEGATIVE MG/DL
BUN SERPL-MCNC: 14 MG/DL (ref 6–23)
CALCIUM SERPL-MCNC: 9.1 MG/DL (ref 8.6–10.3)
CHLORIDE SERPL-SCNC: 106 MMOL/L (ref 98–107)
CO2 SERPL-SCNC: 23 MMOL/L (ref 21–32)
COLOR UR: NORMAL
CREAT SERPL-MCNC: 0.83 MG/DL (ref 0.5–1.05)
CREAT SERPL-MCNC: 0.86 MG/DL (ref 0.5–1.05)
EGFRCR SERPLBLD CKD-EPI 2021: 90 ML/MIN/1.73M*2
EGFRCR SERPLBLD CKD-EPI 2021: >90 ML/MIN/1.73M*2
EOSINOPHIL # BLD AUTO: 0.07 X10*3/UL (ref 0–0.7)
EOSINOPHIL NFR BLD AUTO: 1.2 %
ERYTHROCYTE [DISTWIDTH] IN BLOOD BY AUTOMATED COUNT: 16.3 % (ref 11.5–14.5)
GLUCOSE SERPL-MCNC: 106 MG/DL (ref 74–99)
GLUCOSE UR STRIP.AUTO-MCNC: NORMAL MG/DL
HCG UR QL IA.RAPID: NEGATIVE
HCT VFR BLD AUTO: 37.6 % (ref 36–46)
HGB BLD-MCNC: 12.9 G/DL (ref 12–16)
IMM GRANULOCYTES # BLD AUTO: 0.02 X10*3/UL (ref 0–0.7)
IMM GRANULOCYTES NFR BLD AUTO: 0.3 % (ref 0–0.9)
KETONES UR STRIP.AUTO-MCNC: NEGATIVE MG/DL
LACTATE SERPL-SCNC: 1 MMOL/L (ref 0.4–2)
LEUKOCYTE ESTERASE UR QL STRIP.AUTO: NEGATIVE
LIPASE SERPL-CCNC: 28 U/L (ref 9–82)
LYMPHOCYTES # BLD AUTO: 1.79 X10*3/UL (ref 1.2–4.8)
LYMPHOCYTES NFR BLD AUTO: 30.9 %
MAGNESIUM SERPL-MCNC: 1.95 MG/DL (ref 1.6–2.4)
MCH RBC QN AUTO: 25.3 PG (ref 26–34)
MCHC RBC AUTO-ENTMCNC: 34.3 G/DL (ref 32–36)
MCV RBC AUTO: 74 FL (ref 80–100)
MONOCYTES # BLD AUTO: 0.42 X10*3/UL (ref 0.1–1)
MONOCYTES NFR BLD AUTO: 7.3 %
NEUTROPHILS # BLD AUTO: 3.46 X10*3/UL (ref 1.2–7.7)
NEUTROPHILS NFR BLD AUTO: 59.8 %
NITRITE UR QL STRIP.AUTO: NEGATIVE
NRBC BLD-RTO: 0 /100 WBCS (ref 0–0)
PH UR STRIP.AUTO: 5.5 [PH]
PLATELET # BLD AUTO: 333 X10*3/UL (ref 150–450)
POTASSIUM SERPL-SCNC: 4.3 MMOL/L (ref 3.5–5.3)
PROT SERPL-MCNC: 7.2 G/DL (ref 6.4–8.2)
PROT UR STRIP.AUTO-MCNC: NEGATIVE MG/DL
RBC # BLD AUTO: 5.1 X10*6/UL (ref 4–5.2)
RBC # UR STRIP.AUTO: NEGATIVE MG/DL
SODIUM SERPL-SCNC: 136 MMOL/L (ref 136–145)
SP GR UR STRIP.AUTO: 1.01
UROBILINOGEN UR STRIP.AUTO-MCNC: NORMAL MG/DL
WBC # BLD AUTO: 5.8 X10*3/UL (ref 4.4–11.3)

## 2025-07-01 PROCEDURE — 36415 COLL VENOUS BLD VENIPUNCTURE: CPT | Performed by: EMERGENCY MEDICINE

## 2025-07-01 PROCEDURE — 85025 COMPLETE CBC W/AUTO DIFF WBC: CPT | Performed by: EMERGENCY MEDICINE

## 2025-07-01 PROCEDURE — 83690 ASSAY OF LIPASE: CPT | Performed by: EMERGENCY MEDICINE

## 2025-07-01 PROCEDURE — 81003 URINALYSIS AUTO W/O SCOPE: CPT | Performed by: EMERGENCY MEDICINE

## 2025-07-01 PROCEDURE — 83735 ASSAY OF MAGNESIUM: CPT | Performed by: EMERGENCY MEDICINE

## 2025-07-01 PROCEDURE — 74177 CT ABD & PELVIS W/CONTRAST: CPT

## 2025-07-01 PROCEDURE — 2500000004 HC RX 250 GENERAL PHARMACY W/ HCPCS (ALT 636 FOR OP/ED): Mod: JW | Performed by: EMERGENCY MEDICINE

## 2025-07-01 PROCEDURE — 96374 THER/PROPH/DIAG INJ IV PUSH: CPT | Mod: 59,MUEWO

## 2025-07-01 PROCEDURE — 96374 THER/PROPH/DIAG INJ IV PUSH: CPT | Mod: 59

## 2025-07-01 PROCEDURE — 2550000001 HC RX 255 CONTRASTS: Performed by: EMERGENCY MEDICINE

## 2025-07-01 PROCEDURE — 83605 ASSAY OF LACTIC ACID: CPT | Performed by: EMERGENCY MEDICINE

## 2025-07-01 PROCEDURE — 82565 ASSAY OF CREATININE: CPT | Performed by: EMERGENCY MEDICINE

## 2025-07-01 PROCEDURE — 99285 EMERGENCY DEPT VISIT HI MDM: CPT | Mod: 25 | Performed by: EMERGENCY MEDICINE

## 2025-07-01 PROCEDURE — 80053 COMPREHEN METABOLIC PANEL: CPT | Performed by: EMERGENCY MEDICINE

## 2025-07-01 PROCEDURE — 96372 THER/PROPH/DIAG INJ SC/IM: CPT | Performed by: EMERGENCY MEDICINE

## 2025-07-01 PROCEDURE — 81025 URINE PREGNANCY TEST: CPT | Performed by: EMERGENCY MEDICINE

## 2025-07-01 PROCEDURE — 74177 CT ABD & PELVIS W/CONTRAST: CPT | Performed by: RADIOLOGY

## 2025-07-01 RX ORDER — POLYETHYLENE GLYCOL 3350 17 G/17G
17 POWDER, FOR SOLUTION ORAL DAILY
Qty: 500 G | Refills: 0 | Status: SHIPPED | OUTPATIENT
Start: 2025-07-01 | End: 2025-07-31

## 2025-07-01 RX ORDER — ONDANSETRON HYDROCHLORIDE 2 MG/ML
4 INJECTION, SOLUTION INTRAVENOUS ONCE
Status: COMPLETED | OUTPATIENT
Start: 2025-07-01 | End: 2025-07-01

## 2025-07-01 RX ORDER — KETOROLAC TROMETHAMINE 30 MG/ML
15 INJECTION, SOLUTION INTRAMUSCULAR; INTRAVENOUS ONCE
Status: COMPLETED | OUTPATIENT
Start: 2025-07-01 | End: 2025-07-01

## 2025-07-01 RX ORDER — HYDROMORPHONE HYDROCHLORIDE 1 MG/ML
1 INJECTION, SOLUTION INTRAMUSCULAR; INTRAVENOUS; SUBCUTANEOUS ONCE
Status: COMPLETED | OUTPATIENT
Start: 2025-07-01 | End: 2025-07-01

## 2025-07-01 RX ADMIN — IOHEXOL 75 ML: 350 INJECTION, SOLUTION INTRAVENOUS at 07:47

## 2025-07-01 RX ADMIN — ONDANSETRON 4 MG: 2 INJECTION INTRAMUSCULAR; INTRAVENOUS at 07:28

## 2025-07-01 RX ADMIN — HYDROMORPHONE HYDROCHLORIDE 1 MG: 1 INJECTION, SOLUTION INTRAMUSCULAR; INTRAVENOUS; SUBCUTANEOUS at 10:23

## 2025-07-01 RX ADMIN — KETOROLAC TROMETHAMINE 15 MG: 30 INJECTION, SOLUTION INTRAMUSCULAR at 08:46

## 2025-07-01 ASSESSMENT — PAIN SCALES - GENERAL
PAINLEVEL_OUTOF10: 9
PAINLEVEL_OUTOF10: 8
PAINLEVEL_OUTOF10: 0 - NO PAIN
PAINLEVEL_OUTOF10: 0 - NO PAIN

## 2025-07-01 ASSESSMENT — PAIN DESCRIPTION - PAIN TYPE: TYPE: ACUTE PAIN

## 2025-07-01 ASSESSMENT — PAIN - FUNCTIONAL ASSESSMENT
PAIN_FUNCTIONAL_ASSESSMENT: 0-10
PAIN_FUNCTIONAL_ASSESSMENT: 0-10

## 2025-07-01 ASSESSMENT — PAIN DESCRIPTION - FREQUENCY: FREQUENCY: INTERMITTENT

## 2025-07-01 ASSESSMENT — PAIN DESCRIPTION - DESCRIPTORS: DESCRIPTORS: SHARP

## 2025-07-01 ASSESSMENT — PAIN DESCRIPTION - ORIENTATION: ORIENTATION: RIGHT

## 2025-07-01 ASSESSMENT — PAIN DESCRIPTION - LOCATION: LOCATION: BACK

## 2025-07-01 NOTE — DISCHARGE INSTRUCTIONS
Below are the results of your CT imaging to discuss with your primary care physician.  Make sure to schedule follow-up appointment with Dr. Coronel.  Attend your pain management follow-up tomorrow as previously planned.  Return immediately if concerning symptoms, as discussed.        CT IMPRESSION:  Small involuting corpus luteum or hemorrhagic cyst in the left ovary.  Mild free pelvic fluid, most likely related to this .      Mild-to-moderate diffuse retained colonic stool.      Mild stable nonspecific bilateral inguinal adenopathy, most likely  reactive.      Remainder of the exam was negative.

## 2025-07-01 NOTE — ED TRIAGE NOTES
Patient arrived to ED via EMS from home with c/o right lower back pain that radiates to her right groin. Patient reports left lower back pain to the left groin but it's not as bad. Patient reports hx of autoimmune disorder HS flare-up on her buttocks and left armpit. Patient reports her ring finger and pinky finger on her left hand are going numb. Patient denies chest pain/SOB. Patient stable at this time.

## 2025-07-01 NOTE — ED PROVIDER NOTES
Emergency Department Provider Note     HPI  Patient is a 34-year-old female with a past medical history significant for hidradenitis suppurativa who follows with Dr. Coronel who presents to the emergency room with a chief complaint of buttock pain.  She states that she feels like she has a flareup of her hidradenitis discomfort.  She took oxycodone at home last night and it helped a little bit but this morning it did not help.  She also had a little bit of nausea with no vomiting.  This morning she started feeling like the pain was radiating from her buttock down her leg but she denies any incontinence of bowel or bladder, saddle anesthesia or urinary retention.  She presented for further evaluation.  Denies any fevers, purulent discharge.  Has no overt abdominal discomfort, chest pain or shortness of breath.  She also feels like the lesions in her axilla are bothering her although none are larger than usual and they are without any leaking at present time.      PMHx: As above  PSHx: Reviewed per EMR  FamilyHx: Denies pertinent  SocialHx: Denies  Allergies: Per EMR which includes opioids but patient states she can tolerate Dilaudid  Medications: See Medication Reconciliation     ROS  As above otherwise denies      Physical Exam    GENERAL: Awake and Alert, No Acute Distress  HEENT: AT/NC, PERRL, EOMI, Normal Oropharynx, No Signs of Dehydration  NECK: Normal Inspection, No JVD  CARDIOVASCULAR: RRR, No M/R/G  RESPIRATORY: CTA Bilaterally, No Wheezes, Rales or Rhonchi, Chest Wall Non-tender  ABDOMEN: Soft, non-tender abdomen, Normal Bowel Sounds, No Distention  BACK: No CVA Tenderness  SKIN: Multiple areas of skin induration in bilateral axilla, groin and buttock.  Not appear fluctuant, erythematosus or draining.  Normal Color, Warm, Dry, No Rashes   EXTREMITIES: Non-Tender, Full ROM, No Pedal Edema  NEURO: A&O x 3, Normal Motor and Sensation, Normal Mood and Affect    Nursing Assessment and Vitals Reviewed    Medical  Decision  Patient is a 34-year-old female with a past medical history significant for hidradenitis suppurativa who follows with Dr. Coronel who presents to the emergency room with a chief complaint of buttock pain.  She states that she feels like she has a flareup of her hidradenitis discomfort.  She took oxycodone at home last night and it helped a little bit but this morning it did not help.  She also had a little bit of nausea with no vomiting.  This morning she started feeling like the pain was radiating from her buttock down her leg but she denies any incontinence of bowel or bladder, saddle anesthesia or urinary retention.  She presented for further evaluation.  Denies any fevers, purulent discharge.  Has no overt abdominal discomfort, chest pain or shortness of breath.  She also feels like the lesions in her axilla are bothering her although none are larger than usual and they are without any leaking at present time.    On evaluation she is very well-appearing and in no acute distress.  Lungs are clear and heart is regular.  Abdomen is soft nontender nondistended.  She has no C, T, L-spine tenderness to palpation.  She has changes to the skin as above consistent with her history of hidradenitis suppurativa without signs of acute infection.  Patient does arrive to the emergency room with a baby and opioids are held until family member can arrive.  In the meantime patient is started on Zofran and Toradol.    Workup for patient included labs that did not reveal any emergent electrolyte imbalance.  Lactate is within normal limit and lipase is normal.  She has no leukocytosis or significant anemia.  Urinalysis is negative and she is negative for pregnancy.  CT of the abdomen and pelvis showed hemorrhagic cyst in the left ovary but patient is not expressing any discomfort there per history or on exam.  She has retained colonic stool diffusely and bilateral inguinal lymphadenopathy which is likely reactive.  Rest of  the exam is negative.    Patient was discussed with surgical team.    Patient was discussed with surgical team.  Patient family member arrived and patient to leave the ED but is feeling some improvement after IM Dilaudid.  They will see her in the office and per my discussion with surgical TIM patient is given number to call for follow-up within the next few days.  She is given information on all her results and started on MiraLAX for retained stool on CT.  She is educated on signs and symptoms that should prompt an immediate turn to emergency room.    Ratna Aldridge MD  Emergency Medicine                                                       Ratna Aldridge MD  07/01/25 1038       Ratna Aldridge MD  07/01/25 1038

## 2025-07-03 ENCOUNTER — INFUSION (OUTPATIENT)
Dept: INFUSION THERAPY | Facility: CLINIC | Age: 35
End: 2025-07-03
Payer: MEDICAID

## 2025-07-03 VITALS
DIASTOLIC BLOOD PRESSURE: 82 MMHG | HEART RATE: 75 BPM | RESPIRATION RATE: 16 BRPM | OXYGEN SATURATION: 100 % | SYSTOLIC BLOOD PRESSURE: 137 MMHG | TEMPERATURE: 97.9 F

## 2025-07-03 DIAGNOSIS — G89.4 CHRONIC PAIN SYNDROME: Primary | ICD-10-CM

## 2025-07-03 LAB — PREGNANCY TEST URINE, POC: NEGATIVE

## 2025-07-03 PROCEDURE — 96375 TX/PRO/DX INJ NEW DRUG ADDON: CPT | Mod: INF

## 2025-07-03 PROCEDURE — 96368 THER/DIAG CONCURRENT INF: CPT | Mod: INF

## 2025-07-03 PROCEDURE — 96365 THER/PROPH/DIAG IV INF INIT: CPT | Mod: INF

## 2025-07-03 PROCEDURE — 81025 URINE PREGNANCY TEST: CPT

## 2025-07-03 PROCEDURE — 2500000004 HC RX 250 GENERAL PHARMACY W/ HCPCS (ALT 636 FOR OP/ED): Mod: JZ | Performed by: NURSE PRACTITIONER

## 2025-07-03 RX ORDER — EPINEPHRINE 1 MG/ML
0.3 INJECTION, SOLUTION, CONCENTRATE INTRAVENOUS EVERY 5 MIN PRN
OUTPATIENT
Start: 2025-07-17

## 2025-07-03 RX ORDER — ALBUTEROL SULFATE 0.83 MG/ML
3 SOLUTION RESPIRATORY (INHALATION) AS NEEDED
OUTPATIENT
Start: 2025-07-17

## 2025-07-03 RX ORDER — KETOROLAC TROMETHAMINE 30 MG/ML
30 INJECTION, SOLUTION INTRAMUSCULAR; INTRAVENOUS ONCE
OUTPATIENT
Start: 2025-07-17 | End: 2025-07-17

## 2025-07-03 RX ORDER — FAMOTIDINE 10 MG/ML
20 INJECTION, SOLUTION INTRAVENOUS ONCE AS NEEDED
OUTPATIENT
Start: 2025-07-17

## 2025-07-03 RX ORDER — KETOROLAC TROMETHAMINE 30 MG/ML
30 INJECTION, SOLUTION INTRAMUSCULAR; INTRAVENOUS ONCE
Status: COMPLETED | OUTPATIENT
Start: 2025-07-03 | End: 2025-07-03

## 2025-07-03 RX ORDER — DIPHENHYDRAMINE HYDROCHLORIDE 50 MG/ML
50 INJECTION, SOLUTION INTRAMUSCULAR; INTRAVENOUS AS NEEDED
OUTPATIENT
Start: 2025-07-17

## 2025-07-03 RX ADMIN — PROPOFOL 100 MG: 10 INJECTION, EMULSION INTRAVENOUS at 09:42

## 2025-07-03 RX ADMIN — Medication: at 09:42

## 2025-07-03 RX ADMIN — KETOROLAC TROMETHAMINE 30 MG: 30 INJECTION, SOLUTION INTRAMUSCULAR at 09:41

## 2025-07-03 ASSESSMENT — ENCOUNTER SYMPTOMS
LOSS OF SENSATION IN FEET: 0
DEPRESSION: 1
OCCASIONAL FEELINGS OF UNSTEADINESS: 0

## 2025-07-03 ASSESSMENT — PAIN - FUNCTIONAL ASSESSMENT: PAIN_FUNCTIONAL_ASSESSMENT: 0-10

## 2025-07-03 ASSESSMENT — PAIN SCALES - GENERAL
PAINLEVEL_OUTOF10: 0 - NO PAIN
PAINLEVEL_OUTOF10: 8
PAINLEVEL_OUTOF10: 0 - NO PAIN

## 2025-07-03 ASSESSMENT — PAIN DESCRIPTION - DESCRIPTORS: DESCRIPTORS: SHARP;ACHING

## 2025-07-03 NOTE — PROGRESS NOTES
S: Patient here for #2 opioid sparing pain infusion. Patient reports 0% reduction in pain after last infusion that lasted 0.    Purpose of pain infusion meds explained along with potential side effects.  Patient verbalized understanding.    B: Pain Issues. Is Patient breast feeding: no      Is Patient receiving any other form Ketamine from any other facility/ outside clinic ?: no  A: Patient currently has pain described on flow sheet documentation. Designated  is Auntie Nano. Patient last ate solid food >12 hours ago, and had liquid >4 hours ago.    R: Plan; Obtain IV access, do patient risk assessment, and start opioid sparing infusion as ordered. Monitoring for S/S of adverse reactions.

## 2025-07-03 NOTE — PATIENT INSTRUCTIONS
[facility] OUTPATIENT CENTER      Pain Infusion Aftercare Instructions      1. It is normal to feel sedated, tired and low in energy after a pain infusion. DO NOT DRIVE, OPERATE ANY MACHINERY, OR MAKE ANY IMPORTANT DECISIONS FOR AT LEAST 24 HOURS AFTER THE INFUSION.     2. Call the pain center at [phone] with any problems, questions, or concerns.     3. Eat light after the infusion. If you feel queasy or sick to your stomach, laying down with your eyes closed may help. When you resume eating start with something mild like clear liquids, yogurt, applesauce, crackers, etc… Gradually advance to a regular diet.     4. Do not leave your house alone the evening of your pain infusion.     5. No alcohol or sedative medications, such as sleeping pills, for 24 hours after your pain infusion.     6. Resume all other prescribed medications unless directed otherwise by you physician.     7. If you have any medical emergencies, call 911 or go directly to the closest emergency room.Today :We administered (ketamine 30 mg, lidocaine (Xylocaine) 300 mg in sodium chloride 0.9% 518 mL IV), propofol (Diprivan) 100 mg in dextrose 5% 25 mL IV, and ketorolac.     For:   1. Chronic pain syndrome         Your next appointment is due in:  7/28/25 pain infusion        Please read the  Medication Guide that was given to you and reviewed during todays visit.     (Tell all doctors including dentists that you are taking this medication)     Go to the emergency room or call 911 if:  -You have signs of allergic reaction:   -Rash, hives, itching.   -Swollen, blistered, peeling skin.   -Swelling of face, lips, mouth, tongue or throat.   -Tightness of chest, trouble breathing, swallowing or talking     Call your doctor:  - If IV / injection site gets red, warm, swollen, itchy or leaks fluid or pus.     (Leave dressing on your IV site for at least 2 hours and keep area clean and dry  - If you get sick or have symptoms of infection or  are not feeling well for any reason.    (Wash your hands often, stay away from people who are sick)  - If you have side effects from your medication that do not go away or are bothersome.     (Refer to the teaching your nurse gave you for side effects to call your doctor about)    - Common side effects may include:  stuffy nose, headache, feeling tired, muscle aches, upset stomach  - Before receiving any vaccines     - Call the [facility] at   If:  - You get sick, are on antibiotics, have had a recent vaccine, have surgery or dental work and your doctor wants your visit rescheduled.  - You need to cancel and reschedule your visit for any reason. Call at least 2 days before your visit if you need to cancel.   - Your insurance changes before your next visit.    (We will need to get approval from your new insurance. This can take up to two weeks.)     The [facility] is opened Monday thru Friday. We are closed on weekends and holidays.   Voice mail will take your call if the center is closed. If you leave a message please allow 24 hours for a call back during weekdays. If you leave a message on a weekend/holiday, we will call you back the next business day.    A pharmacist is available Monday - Friday from 8:30AM to 3:30PM to help answer any questions you may have about your prescriptions(s). Please call pharmacy at:    [facility]: [phone]  [facility]: [phone]  [facility]: [phone]          Patient Instructions  IV Infusion  [facility]      1. A responsible adult must stay with you during your infusion and drive you home. If you do not have a responsible adult with transportation home, your appointment will be rescheduled. Patients must still have a responsible adult if they use Rides[employer]are, Uber, or Lyft. Uber, Ridesh[employer], or Lyft drivers do not qualify.   2. On the day of your infusion we ask that you please drink clear  liquids until your appointment.  You should NOT eat food 4 hours before your infusion.    3. Take all medications as prescribed before your infusion. Do not withhold blood pressure medication.   4. Patients who use a CPAP or BIPAP should bring it to the infusion appointment.   5. Children under 16 will not be permitted in the infusion clinic. Please do not bring young children or pets. For patients who must bring a service animal, paperwork will be required. NO EXCEPTIONS.  6.  All Women will be required to take a pregnancy test prior to the infusion unless they are above 55 years of age or have had a hysterectomy.   7. Patients who cancel their infusion should call the Infusion line at [phone] as soon as possible. We would appreciate a 48-hour notice. Please be on time for the appt. Patients who are more than 15 mins late will have to cancel and reschedule. Infusion appt times are minimal. Patients who do not show, cancel, or reschedule an appointment may have a long wait until we can get them back on the schedule.   8. We make every effort to schedule your infusions based on your physician's recommendations. However, factors will affect our infusion schedule and availability. We appreciate your understanding.  9. Appointments will only be scheduled for two appointments in the future.   10. No eating, drinking, or chewing gum during the infusion.   11. If you miss or cancel your infusion appointment it is YOUR responsibility to call us and reschedule.  Please call [phone] or [phone] to reschedule or cancel appointment

## 2025-07-06 ENCOUNTER — HOSPITAL ENCOUNTER (EMERGENCY)
Facility: HOSPITAL | Age: 35
Discharge: HOME | End: 2025-07-06
Attending: EMERGENCY MEDICINE
Payer: MEDICAID

## 2025-07-06 ENCOUNTER — APPOINTMENT (OUTPATIENT)
Dept: RADIOLOGY | Facility: HOSPITAL | Age: 35
End: 2025-07-06
Payer: MEDICAID

## 2025-07-06 VITALS
DIASTOLIC BLOOD PRESSURE: 67 MMHG | OXYGEN SATURATION: 99 % | TEMPERATURE: 97.6 F | HEIGHT: 61 IN | HEART RATE: 68 BPM | WEIGHT: 170 LBS | BODY MASS INDEX: 32.1 KG/M2 | SYSTOLIC BLOOD PRESSURE: 113 MMHG | RESPIRATION RATE: 16 BRPM

## 2025-07-06 DIAGNOSIS — R10.32 LEFT LOWER QUADRANT ABDOMINAL PAIN: ICD-10-CM

## 2025-07-06 DIAGNOSIS — Z87.2 H/O HIDRADENITIS SUPPURATIVA: Primary | ICD-10-CM

## 2025-07-06 LAB
ALBUMIN SERPL BCP-MCNC: 4 G/DL (ref 3.4–5)
ALP SERPL-CCNC: 74 U/L (ref 33–110)
ALT SERPL W P-5'-P-CCNC: 12 U/L (ref 7–45)
ANION GAP SERPL CALC-SCNC: 14 MMOL/L (ref 10–20)
APPEARANCE UR: CLEAR
AST SERPL W P-5'-P-CCNC: 16 U/L (ref 9–39)
B-HCG SERPL-ACNC: <2 MIU/ML
BACTERIA #/AREA URNS AUTO: ABNORMAL /HPF
BASOPHILS # BLD AUTO: 0.04 X10*3/UL (ref 0–0.1)
BASOPHILS NFR BLD AUTO: 0.6 %
BILIRUB SERPL-MCNC: 0.2 MG/DL (ref 0–1.2)
BILIRUB UR STRIP.AUTO-MCNC: NEGATIVE MG/DL
BUN SERPL-MCNC: 17 MG/DL (ref 6–23)
CALCIUM SERPL-MCNC: 9.5 MG/DL (ref 8.6–10.3)
CHLORIDE SERPL-SCNC: 109 MMOL/L (ref 98–107)
CO2 SERPL-SCNC: 19 MMOL/L (ref 21–32)
COLOR UR: COLORLESS
CREAT SERPL-MCNC: 0.9 MG/DL (ref 0.5–1.05)
EGFRCR SERPLBLD CKD-EPI 2021: 85 ML/MIN/1.73M*2
EOSINOPHIL # BLD AUTO: 0.09 X10*3/UL (ref 0–0.7)
EOSINOPHIL NFR BLD AUTO: 1.4 %
ERYTHROCYTE [DISTWIDTH] IN BLOOD BY AUTOMATED COUNT: 16.3 % (ref 11.5–14.5)
GLUCOSE SERPL-MCNC: 93 MG/DL (ref 74–99)
GLUCOSE UR STRIP.AUTO-MCNC: NORMAL MG/DL
HCT VFR BLD AUTO: 40.8 % (ref 36–46)
HGB BLD-MCNC: 12.9 G/DL (ref 12–16)
IMM GRANULOCYTES # BLD AUTO: 0.02 X10*3/UL (ref 0–0.7)
IMM GRANULOCYTES NFR BLD AUTO: 0.3 % (ref 0–0.9)
KETONES UR STRIP.AUTO-MCNC: NEGATIVE MG/DL
LACTATE SERPL-SCNC: 1.3 MMOL/L (ref 0.4–2)
LEUKOCYTE ESTERASE UR QL STRIP.AUTO: NEGATIVE
LIPASE SERPL-CCNC: 30 U/L (ref 9–82)
LYMPHOCYTES # BLD AUTO: 1.83 X10*3/UL (ref 1.2–4.8)
LYMPHOCYTES NFR BLD AUTO: 27.9 %
MCH RBC QN AUTO: 24.7 PG (ref 26–34)
MCHC RBC AUTO-ENTMCNC: 31.6 G/DL (ref 32–36)
MCV RBC AUTO: 78 FL (ref 80–100)
MONOCYTES # BLD AUTO: 0.49 X10*3/UL (ref 0.1–1)
MONOCYTES NFR BLD AUTO: 7.5 %
NEUTROPHILS # BLD AUTO: 4.1 X10*3/UL (ref 1.2–7.7)
NEUTROPHILS NFR BLD AUTO: 62.3 %
NITRITE UR QL STRIP.AUTO: NEGATIVE
NRBC BLD-RTO: 0 /100 WBCS (ref 0–0)
PH UR STRIP.AUTO: 5.5 [PH]
PLATELET # BLD AUTO: 346 X10*3/UL (ref 150–450)
POTASSIUM SERPL-SCNC: 4.7 MMOL/L (ref 3.5–5.3)
PROT SERPL-MCNC: 7.6 G/DL (ref 6.4–8.2)
PROT UR STRIP.AUTO-MCNC: NEGATIVE MG/DL
RBC # BLD AUTO: 5.23 X10*6/UL (ref 4–5.2)
RBC # UR STRIP.AUTO: ABNORMAL MG/DL
RBC #/AREA URNS AUTO: >20 /HPF
SODIUM SERPL-SCNC: 137 MMOL/L (ref 136–145)
SP GR UR STRIP.AUTO: 1.01
SQUAMOUS #/AREA URNS AUTO: ABNORMAL /HPF
UROBILINOGEN UR STRIP.AUTO-MCNC: NORMAL MG/DL
WBC # BLD AUTO: 6.6 X10*3/UL (ref 4.4–11.3)
WBC #/AREA URNS AUTO: ABNORMAL /HPF

## 2025-07-06 PROCEDURE — 83605 ASSAY OF LACTIC ACID: CPT

## 2025-07-06 PROCEDURE — 76830 TRANSVAGINAL US NON-OB: CPT | Mod: FOREIGN READ | Performed by: RADIOLOGY

## 2025-07-06 PROCEDURE — 96374 THER/PROPH/DIAG INJ IV PUSH: CPT

## 2025-07-06 PROCEDURE — 81001 URINALYSIS AUTO W/SCOPE: CPT

## 2025-07-06 PROCEDURE — 83690 ASSAY OF LIPASE: CPT

## 2025-07-06 PROCEDURE — 36415 COLL VENOUS BLD VENIPUNCTURE: CPT

## 2025-07-06 PROCEDURE — 76856 US EXAM PELVIC COMPLETE: CPT

## 2025-07-06 PROCEDURE — 76856 US EXAM PELVIC COMPLETE: CPT | Mod: FOREIGN READ | Performed by: RADIOLOGY

## 2025-07-06 PROCEDURE — 85025 COMPLETE CBC W/AUTO DIFF WBC: CPT

## 2025-07-06 PROCEDURE — 84702 CHORIONIC GONADOTROPIN TEST: CPT

## 2025-07-06 PROCEDURE — 99284 EMERGENCY DEPT VISIT MOD MDM: CPT | Mod: 25 | Performed by: EMERGENCY MEDICINE

## 2025-07-06 PROCEDURE — 2500000004 HC RX 250 GENERAL PHARMACY W/ HCPCS (ALT 636 FOR OP/ED): Mod: JZ

## 2025-07-06 PROCEDURE — 80053 COMPREHEN METABOLIC PANEL: CPT

## 2025-07-06 PROCEDURE — 96375 TX/PRO/DX INJ NEW DRUG ADDON: CPT

## 2025-07-06 RX ORDER — HYDROMORPHONE HYDROCHLORIDE 1 MG/ML
1 INJECTION, SOLUTION INTRAMUSCULAR; INTRAVENOUS; SUBCUTANEOUS ONCE
Status: COMPLETED | OUTPATIENT
Start: 2025-07-06 | End: 2025-07-06

## 2025-07-06 RX ORDER — ONDANSETRON HYDROCHLORIDE 2 MG/ML
4 INJECTION, SOLUTION INTRAVENOUS ONCE
Status: COMPLETED | OUTPATIENT
Start: 2025-07-06 | End: 2025-07-06

## 2025-07-06 RX ADMIN — ONDANSETRON 4 MG: 2 INJECTION INTRAMUSCULAR; INTRAVENOUS at 09:05

## 2025-07-06 RX ADMIN — HYDROMORPHONE HYDROCHLORIDE 1 MG: 1 INJECTION, SOLUTION INTRAMUSCULAR; INTRAVENOUS; SUBCUTANEOUS at 09:06

## 2025-07-06 ASSESSMENT — PAIN DESCRIPTION - PAIN TYPE: TYPE: ACUTE PAIN

## 2025-07-06 ASSESSMENT — PAIN DESCRIPTION - LOCATION
LOCATION: ABDOMEN
LOCATION: ABDOMEN

## 2025-07-06 ASSESSMENT — PAIN - FUNCTIONAL ASSESSMENT
PAIN_FUNCTIONAL_ASSESSMENT: 0-10
PAIN_FUNCTIONAL_ASSESSMENT: 0-10

## 2025-07-06 ASSESSMENT — PAIN SCALES - GENERAL
PAINLEVEL_OUTOF10: 0 - NO PAIN
PAINLEVEL_OUTOF10: 9
PAINLEVEL_OUTOF10: 9

## 2025-07-06 ASSESSMENT — PAIN DESCRIPTION - ORIENTATION
ORIENTATION: LEFT;LOWER
ORIENTATION: RIGHT

## 2025-07-06 NOTE — ED PROVIDER NOTES
HPI   Chief Complaint   Patient presents with    Abdominal Pain       34-year-old female past medical history of hidradenitis, opioid dependence presents to the ED today with a chief concern of multiple medical complaints.  Patient reports that for the past 5 days she is having left-sided abdominal pain.  She is concerned that her cyst may have ruptured.  She also reports that she is having hidradenitis flareup which started 2 days ago.  She reports worsening pain in the inner thighs.  Nothing in the axillary area.  No drainage from the wounds.  No fevers.  Mild nausea but no vomiting.  No chest pain or shortness of breath.  No back pain.  She has been taking her oxycodone at home.  She has no other symptoms or concerns at this time.      History provided by:  Patient   used: No            Patient History   Medical History[1]  Surgical History[2]  Family History[3]  Social History[4]    Physical Exam   ED Triage Vitals [07/06/25 0739]   Temperature Heart Rate Respirations BP   36.4 °C (97.6 °F) 68 16 125/81      Pulse Ox Temp Source Heart Rate Source Patient Position   100 % Temporal -- --      BP Location FiO2 (%)     -- --       Physical Exam  Constitutional: Vital signs per nursing notes.  Well developed, well nourished.  No acute distress.    Eyes:  conjunctivae and lids normal  ENT: Ears normal externally; face symmetric. voice normal  Neck: neck supple, no meningismus; trachea midline without deviation  Respiratory: normal respiratory effort and excursion; no rales, rhonchi, or wheezes; equal air entry  Cardiovascular: RRR, 2+ pulses extremities   Neurological: normal speech; CN II-XII grossly intact, normal motor and sensory function  GI: no distention, soft, nontender.  No rebound tenderness or guarding.  : Deferred  Musculoskeletal: normal gait and station; normal digits and nails; normal to palpation; normal strength/tone; neurovascular status intact.  Skin: Hidradenitis rash noted  inner thighs.  There is no redness or induration.  No drainage from the wounds.      ED Course & MDM   ED Course as of 07/06/25 1442   Sun Jul 06, 2025   1057 CBC shows no evidence of leukocytosis.  Normal kidney and liver function.  Lactate and lipase, hCG negative.  Urinalysis shows hematuria []   1057 IMPRESSION:  Unremarkable ultrasound of the pelvis.  Normal color and spectral Doppler flow within both ovaries.  Signed by Puma Chavez MD   []   1109 Spoke with Moncho  []      ED Course User Index  [] Marck Dunham PA-C         Diagnoses as of 07/06/25 1442   Left lower quadrant abdominal pain                 No data recorded     [place] Coma Scale Score: 15 (07/06/25 0803 : Honey Flores RN)                           Medical Decision Making  34-year-old female past medical history of hidradenitis, opioid dependence presents to the ED today with a chief concern of multiple medical complaints.Patient is full range of motion of the neck without meningismus.  Satting well on room air.  Not hypoxic.  Not tachycardic.  Afebrile.  U CBC shows no leukocytosis or anemia CMP shows normal kidney and liver function.  Urinalysis shows hematuria likely contamination from vaginal bleeding.  Her hCG is negative.  Her ultrasound shows no acute findings.  Her abdomen is completely soft and nontender.  The hidradenitis lesions are not draining.  She has no acute abscess.  Patient reports that she does not get these drained.  Her ID and dermatology specialists do not want these drained.  She was given Dilaudid and Zofran in the ED.  Has a ride home today will not be driving herself home.  Will treat outpatient with over-the-counter medications and refer to general surgery.  I did speak with general surgery in the ED who states that they can get patient close follow-up.  She recently had a CT within the past 5 days that showed a ruptured hemorrhagic cyst.  Low suspicion for other acute pathology do not think repeat imaging  is indicated at this time.  Discussed impression of findings with patient and she feels comfortable returning home.  We discussed very strict return precautions include returning for any new or worsening symptoms.  Patient is in agreement with this plan.  She will follow-up with the PCP within 3 days    Differential diagnosis: Ovarian torsion, cyst, hemorrhagic cyst, kidney stone, pancreatitis    Disposition/treatment  1.  See diagnosis    Shared decision-making was used patient feels comfortable returning home     Patient was advised to follow up with recommended provider in 1 day for another evaluation and exam. I advised patient/guardian to return or go to closest emergency room immediately if symptoms change, get worse, new symptoms develop prior to follow up. If there is no improvement in symptoms in the next 24 hours they are advised to return for further evaluation and exam. I also explained the plan and treatment course. Patient/guardian is in agreement with plan, treatment course, and follow up and states verbally that they will comply.    Patient is homegoing. I discussed the differential; results and discharge plan with the patient and/or family/friend/caregiver if present.  I emphasized the importance of follow-up with the physician I referred them to in the timeframe recommended.  I explained reasons for the patient to return to the Emergency Department. They agreed that if they feel their condition is worsening or if they have any other concern they should call 911 immediately for further assistance. I gave the patient an opportunity to ask all questions they had and answered all of them accordingly. They understand return precautions and discharge instructions. The patient and/or family/friend/caregiver expressed understanding verbally and that they would comply.        This note has been transcribed using voice recognition and may contain grammatical errors, misplaced words, incorrect words,  incorrect phrases or other errors.        Procedure  Procedures       [1]   Past Medical History:  Diagnosis Date    Asthma, mild intermittent ([facility])     albuterol prn    Hidradenitis suppurativa 10/29/2020    Hidradenitis    History of gestational hypertension     Lupus     diagnosed by her PCP in 2017 based on symptoms of joint pain and facial rash. She states she is in remission    MDD (major depressive disorder)     not on meds    PTSD (post-traumatic stress disorder)     resulting from IPV   [2]   Past Surgical History:  Procedure Laterality Date    OTHER SURGICAL HISTORY  09/19/2022    Arm surgery    OTHER SURGICAL HISTORY Left 2011    Left wrist    OTHER SURGICAL HISTORY Left 2021    Left wrist procedure   [3]   Family History  Problem Relation Name Age of Onset    COPD Father      Heart disease Father     [4]   Social History  Tobacco Use    Smoking status: Some Days     Types: Cigarettes    Smokeless tobacco: Never   Vaping Use    Vaping status: Every Day   Substance Use Topics    Alcohol use: Not Currently    Drug use: Never        Marck Dunham PA-C  07/06/25 1443       Marck Dunham PA-C  07/06/25 1443

## 2025-07-06 NOTE — DISCHARGE INSTRUCTIONS
Return to the ED immediately if new or worsening signs or symptoms  Please follow-up with your primary care doctor and general surgery within 3 days

## 2025-07-06 NOTE — ED TRIAGE NOTES
Pt to ED from home for LLQ abd pain/ pelvic pain. Pt endorses she started menstruating 2 days ago and the pain increased since then. Pt states she was seen here July 1st for hidradenitis stage 3 and had a CT done that showed a ovarian cyst. Pt endorses feeling nauseous. Pt is 5 months post partum.

## 2025-07-09 ENCOUNTER — APPOINTMENT (OUTPATIENT)
Dept: PRIMARY CARE | Facility: CLINIC | Age: 35
End: 2025-07-09
Payer: MEDICAID

## 2025-07-16 ENCOUNTER — APPOINTMENT (OUTPATIENT)
Dept: DERMATOLOGY | Facility: CLINIC | Age: 35
End: 2025-07-16
Payer: MEDICAID

## 2025-07-18 ENCOUNTER — SPECIALTY PHARMACY (OUTPATIENT)
Dept: PHARMACY | Facility: CLINIC | Age: 35
End: 2025-07-18

## 2025-07-18 RX ORDER — OXYCODONE AND ACETAMINOPHEN 10; 325 MG/1; MG/1
TABLET ORAL
COMMUNITY
Start: 2025-06-23

## 2025-07-18 NOTE — PROGRESS NOTES
[facility] Clinical Note  Patient Reassessment     Introduction  Sandrita Adams is a 34 y.o. female who is on the specialty pharmacy service for management of: Dermatology Core.      [facility] supplied medication: bimekizumab-bkzx (Bimzelx Autoinjector) 320 mg/2 mL auto-injector  Inject 320mg (1 pen) under the skin at week 0, 2, 4, 6, 8, 10, 12 and 14.        Duration of therapy: Maintenance    The most recent encounter visit with the referring prescriber Spenser Elizalde MD  on 04/17/25 was reviewed.  Pharmacy will continue to collaborate in the care of this patient with the referring prescriber.    Discussion  Sandrita was contacted on 7/18/2025 at 11:57 AM for a pharmacy visit with encounter number [id number] from:   [facility]  [address] [place] [id number]  Dept: [phone]  Dept Fax: [phone]  Sandrita consented to a/an Telephone visit, which was performed.    Efficacy  Patient has developed new symptoms of condition: No  Patient/caregiver feels medication is affecting the disease state: Patient reports seeing slight improvement in HS symptoms since starting Bimzelx in April 2025. She reports about a 10% improvement so far. She states she is still having flares, pain, and new nodules but did note that the severity of the flares has slightly improved in severity.    Goals  Provided education on goals and possible outcomes of therapy:  Adherence with therapy  Timely completion of appropriate labs  Timely and appropriate follow up with provider  Identify and address medication interactions with presciption medications, OTC medications and supplements  Optimize or maintain quality of life  Dermatology: Prevent or reduce disease flares  Reduce track depth, pain, inflammation, skin lesions (HS)  Patient has documented target(s) for goals of therapy: Yes    Targets       Target Due Progress Assessment Last Assessed Completed Completed By Outcome  Source     Goal: Prevent and reduce disease flares 11/18/2025 -- -- -- -- -- --     Goal: Prevent and reduce disease flares 11/18/2025 On Track 7/18/2025 7/18/2025 Myra Bell, PharmD On Track --    Patient notes about 10% improvement so far. States she is still having flares, pain,  and new nodules. Overall severity of flares has mildly improved.              Tolerance  Patient has experienced side effects from this medication: Yes - Stinging while injecting, lethargy  Changes to current therapy regimen: No    The follow-up timeline was discussed. Every person responds to and reacts to therapy differently. Patient should be assessed for efficacy and tolerability in approximately: other - 4 months            Adherence  Patient Information  Informant: Self (Patient)  Demonstrates Understanding of Importance of Adherence: Yes  Does the patient have any barriers to self-administration (including physical and mental?): No  Medication Information  Medication: bimekizumab-bkzx (Bimzelx Autoinjector)  Patient Reported Missed Doses in the Last 4 Weeks: 1 (Delayed dose (1) within the past 2 months since it did not come)  Estimated Medication Adherence Level: Variable  Adherence Estimation Source: Claims history  Barriers to Adherence: No Problems identified   The importance of adherence was discussed and patient/caregiver was advised to take the medication as prescribed by their provider. Encouraged patient/caregiver to call physician's office or specialty pharmacy if they have a question regarding a missed dose.    General Assessment  Changes to home medications, OTCs or supplements: Yes - pt reported taking Percocet (added to list) and a MVI  Current Medications[1]  Reported new allergies: No  Reported new medical conditions: No  Additional monitoring reviewed: Dermatology- For Biologics- TB:   Lab Results   Component Value Date    TBSIN Negative 08/29/2024     Is laboratory follow up needed? No    Advised to contact  the pharmacy if there are any changes to the patient's medication list, including prescriptions, OTC medications, herbal products, or supplements.    Impression/Plan  This patient has not been identified as high risk due to Lack of high risk qualifiers.  The following action was taken:N/A          QOL/Patient Satisfaction  Rate your quality of life on scale of 1-10: 8  Rate your satisfaction with  Specialty Pharmacy on scale of 1-10: 10 - Completely satisfied    Provided contact information ([phone]) for [facility] and reviewed dispensing process, refill timeline and patient management follow up. Confirmed understanding of education conducted during assessment. All questions and concerns were addressed and patient/caregiver was encouraged to reach out for additional questions or concerns.    Based on the patient's diagnosis, medication list, progress towards goals, adherence, tolerance, and medication list, medication remains appropriate: Therapy remains appropriate (I attest)    Myra Bell, PharmD       [1]   Current Outpatient Medications   Medication Sig Dispense Refill    bimekizumab-bkzx (Bimzelx Autoinjector) 320 mg/2 mL auto-injector Inject 320mg (1 pen) under the skin at week 0, 2, 4, 6, 8, 10, 12 and 14. 4 mL 3    bimekizumab-bkzx (Bimzelx Autoinjector) 320 mg/2 mL auto-injector Inject 320 mg under the skin see administration instructions. Inject 320mg (1 pen) subcutaneously at week 16, then every 4 weeks thereafter 2 mL 11    butamben-tetracaine-benzocaine (Cetacaine) 2 %-2 %-14 % (200 mg/sec) spray Apply 1 spray topically if needed for irritation. 20 g 0    clindamycin (Cleocin T) 1 % lotion Apply one to two times daily to the affected areas. (Patient not taking: Reported on 7/3/2025) 60 mL 11    naloxone (Narcan) 4 mg/0.1 mL nasal spray Administer 1 spray (4 mg) into affected nostril(s) if needed for opioid reversal. May repeat every 2-3 minutes if needed,  alternating nostrils, until medical assistance becomes available. (Patient not taking: Reported on 7/3/2025) 2 each 0    ondansetron ODT (Zofran-ODT) 4 mg disintegrating tablet Take 1 tablet (4 mg) by mouth every 8 hours if needed for nausea or vomiting. 60 tablet 3    -iron fum-folic acid (Prenatal 19) 29 mg iron- 1 mg tablet Take 1 tablet by mouth once daily. 60 tablet 6    polyethylene glycol (Miralax) 17 gram/dose powder Mix 17 g of powder and drink once daily. 500 g 0    prenatal vitamin, iron-folic, 27 mg iron-800 mcg folic acid tablet Take 1 tablet by mouth once daily. 90 tablet 2    sennosides-docusate sodium (Kathleen-Colace) 8.6-50 mg tablet Take 2 tablets by mouth 2 times a day as needed for constipation. 30 tablet 1    spironolactone (Aldactone) 100 mg tablet Take 1 tablet by mouth daily 30 tablet 3     No current facility-administered medications for this visit.

## 2025-07-19 ENCOUNTER — HOSPITAL ENCOUNTER (EMERGENCY)
Facility: HOSPITAL | Age: 35
Discharge: HOME | End: 2025-07-19
Payer: MEDICAID

## 2025-07-19 VITALS
RESPIRATION RATE: 16 BRPM | HEART RATE: 63 BPM | OXYGEN SATURATION: 97 % | DIASTOLIC BLOOD PRESSURE: 89 MMHG | TEMPERATURE: 97.9 F | SYSTOLIC BLOOD PRESSURE: 142 MMHG

## 2025-07-19 DIAGNOSIS — L73.2 HIDRADENITIS SUPPURATIVA: Primary | ICD-10-CM

## 2025-07-19 PROCEDURE — 2500000002 HC RX 250 W HCPCS SELF ADMINISTERED DRUGS (ALT 637 FOR MEDICARE OP, ALT 636 FOR OP/ED): Mod: SE | Performed by: NURSE PRACTITIONER

## 2025-07-19 PROCEDURE — 2500000001 HC RX 250 WO HCPCS SELF ADMINISTERED DRUGS (ALT 637 FOR MEDICARE OP): Mod: SE | Performed by: NURSE PRACTITIONER

## 2025-07-19 PROCEDURE — 99283 EMERGENCY DEPT VISIT LOW MDM: CPT

## 2025-07-19 PROCEDURE — 2500000004 HC RX 250 GENERAL PHARMACY W/ HCPCS (ALT 636 FOR OP/ED): Mod: SE | Performed by: NURSE PRACTITIONER

## 2025-07-19 PROCEDURE — 99284 EMERGENCY DEPT VISIT MOD MDM: CPT | Performed by: NURSE PRACTITIONER

## 2025-07-19 RX ORDER — ONDANSETRON 4 MG/1
4 TABLET, ORALLY DISINTEGRATING ORAL ONCE
Status: COMPLETED | OUTPATIENT
Start: 2025-07-19 | End: 2025-07-19

## 2025-07-19 RX ORDER — OXYCODONE HYDROCHLORIDE 5 MG/1
5 TABLET ORAL ONCE
Refills: 0 | Status: COMPLETED | OUTPATIENT
Start: 2025-07-19 | End: 2025-07-19

## 2025-07-19 RX ORDER — AMOXICILLIN 250 MG/1
500 CAPSULE ORAL EVERY 8 HOURS SCHEDULED
Status: DISCONTINUED | OUTPATIENT
Start: 2025-07-19 | End: 2025-07-19 | Stop reason: HOSPADM

## 2025-07-19 RX ORDER — ONDANSETRON 4 MG/1
4 TABLET, ORALLY DISINTEGRATING ORAL EVERY 8 HOURS PRN
Qty: 14 TABLET | Refills: 0 | Status: SHIPPED | OUTPATIENT
Start: 2025-07-19 | End: 2025-07-26

## 2025-07-19 RX ORDER — SULFAMETHOXAZOLE AND TRIMETHOPRIM 800; 160 MG/1; MG/1
1 TABLET ORAL ONCE
Status: COMPLETED | OUTPATIENT
Start: 2025-07-19 | End: 2025-07-19

## 2025-07-19 RX ORDER — AMOXICILLIN 500 MG/1
500 CAPSULE ORAL 2 TIMES DAILY
Qty: 20 CAPSULE | Refills: 0 | Status: SHIPPED | OUTPATIENT
Start: 2025-07-19 | End: 2025-07-29

## 2025-07-19 RX ORDER — SULFAMETHOXAZOLE AND TRIMETHOPRIM 800; 160 MG/1; MG/1
1 TABLET ORAL EVERY 12 HOURS
Qty: 20 TABLET | Refills: 0 | Status: SHIPPED | OUTPATIENT
Start: 2025-07-19 | End: 2025-07-29

## 2025-07-19 RX ADMIN — SULFAMETHOXAZOLE AND TRIMETHOPRIM 1 TABLET: 800; 160 TABLET ORAL at 13:27

## 2025-07-19 RX ADMIN — ONDANSETRON 4 MG: 4 TABLET, ORALLY DISINTEGRATING ORAL at 13:36

## 2025-07-19 RX ADMIN — OXYCODONE 5 MG: 5 TABLET ORAL at 13:36

## 2025-07-19 RX ADMIN — AMOXICILLIN 500 MG: 250 CAPSULE ORAL at 13:27

## 2025-07-19 RX ADMIN — OXYCODONE 5 MG: 5 TABLET ORAL at 13:28

## 2025-07-19 NOTE — ED PROVIDER NOTES
HPI   No chief complaint on file.      34-year-old female has a complicated history of hidradenitis suppurative, presents today after she ran out of her medication of amoxicillin and Bactrim Zofran and her biologic.  Her biologic is Bimzelex 320 mg.  She endorses constant 10 out of 10 pain from her hidradenitis.  She is managed very carefully by  at [facility].  She denies fever or chills.  She denies chest pain or dyspnea.  She denies pregnancy and usesev bedside with her.  She denies abdominal pain but she endorses constant nausea and like some Zofran along with oxycodone while she is here.  She is under pain management and she has an appointment with her pain management physician on Monday at [facility].      History provided by:  Patient   used: No            Patient History   Medical History[1]  Surgical History[2]  Family History[3]  Social History[4]    Physical Exam   ED Triage Vitals [07/19/25 1307]   Temperature Heart Rate Respirations BP   36.6 °C (97.9 °F) 63 16 142/89      Pulse Ox Temp Source Heart Rate Source Patient Position   97 % Oral -- --      BP Location FiO2 (%)     -- --       Physical Exam  Constitutional:       Appearance: Normal appearance.   HENT:      Head: Normocephalic.     Cardiovascular:      Rate and Rhythm: Normal rate and regular rhythm.      Pulses: Normal pulses.      Heart sounds: Normal heart sounds.   Pulmonary:      Effort: Pulmonary effort is normal.      Breath sounds: Normal breath sounds.   Abdominal:      General: Abdomen is flat.     Musculoskeletal:         General: Normal range of motion.      Cervical back: Normal range of motion and neck supple.     Skin:     General: Skin is warm.      Capillary Refill: Capillary refill takes less than 2 seconds.     Neurological:      General: No focal deficit present.      Mental Status: She is alert and oriented to person, place, and time.     Psychiatric:         Mood and Affect: Mood  "normal.         Behavior: Behavior normal.           ED Course & MDM   Diagnoses as of 07/19/25 1343   Hidradenitis suppurativa                 No data recorded     [place] Coma Scale Score: 15 (07/19/25 1309 : Jazmyne Ferro, EVELYN)                           Medical Decision Making  Patient's amoxicillin and Bactrim and Zofran were refilled.  I spoke with attending and she tried to have the Bimzelex 320 mg retrieved from [facility] pharmacy but it is only carried out [facility] pharmacy and the [facility] pharmacy is closed on the weekends.  I sent a letter to Dr. Victor to see if he can get patient in on Monday for biologic.  Patient was very concerned that she was unable to get her biologic on the specific day \"today\" that it was due.  Hopefully he can assist.  Patient's pain management doctor appointment is at [facility] on Monday and she will need to figure out a time that will be available for both Dr. Victor, and herself to receive injection on Monday.  Safely discharged home with return precautions.  While she was in our emergency department she received 1 dose of amoxicillin, 1 dose of Bactrim, Zofran ODT, and 10 mg of immediate release oxycodone which is patient's typical dosing amount.        Procedure  Procedures       [1]   Past Medical History:  Diagnosis Date    Asthma, mild intermittent (HHS-HCC)     albuterol prn    Hidradenitis suppurativa 10/29/2020    Hidradenitis    History of gestational hypertension     Lupus     diagnosed by her PCP in 2017 based on symptoms of joint pain and facial rash. She states she is in remission    MDD (major depressive disorder)     not on meds    PTSD (post-traumatic stress disorder)     resulting from IPV   [2]   Past Surgical History:  Procedure Laterality Date    OTHER SURGICAL HISTORY  09/19/2022    Arm surgery    OTHER SURGICAL HISTORY Left 2011    Left wrist    OTHER SURGICAL HISTORY Left 2021    Left wrist procedure   [3]   Family History  Problem Relation Name Age of " Onset    COPD Father      Heart disease Father     [4]   Social History  Tobacco Use    Smoking status: Some Days     Types: Cigarettes    Smokeless tobacco: Never   Vaping Use    Vaping status: Every Day   Substance Use Topics    Alcohol use: Not Currently    Drug use: Never        ZAFAR Portillo-CNP  07/19/25 9313

## 2025-07-19 NOTE — ED TRIAGE NOTES
Pt is here for medication refills, states she left her pain meds and atb in [place] when she was there for a .

## 2025-07-24 ENCOUNTER — SPECIALTY PHARMACY (OUTPATIENT)
Dept: PHARMACY | Facility: CLINIC | Age: 35
End: 2025-07-24

## 2025-07-24 DIAGNOSIS — G89.4 CHRONIC PAIN SYNDROME: Primary | ICD-10-CM

## 2025-07-24 RX ORDER — KETOROLAC TROMETHAMINE 30 MG/ML
30 INJECTION, SOLUTION INTRAMUSCULAR; INTRAVENOUS ONCE
OUTPATIENT
Start: 2025-07-28 | End: 2025-07-28

## 2025-07-24 RX ORDER — KETAMINE HCL IN NACL, ISO-OSM 100MG/10ML
SYRINGE (ML) INJECTION ONCE
OUTPATIENT
Start: 2025-07-28

## 2025-07-28 ENCOUNTER — INFUSION (OUTPATIENT)
Dept: INFUSION THERAPY | Facility: CLINIC | Age: 35
End: 2025-07-28
Payer: MEDICAID

## 2025-07-28 VITALS
HEART RATE: 78 BPM | RESPIRATION RATE: 16 BRPM | SYSTOLIC BLOOD PRESSURE: 128 MMHG | OXYGEN SATURATION: 99 % | TEMPERATURE: 98.1 F | DIASTOLIC BLOOD PRESSURE: 78 MMHG

## 2025-07-28 DIAGNOSIS — G89.4 CHRONIC PAIN SYNDROME: Primary | ICD-10-CM

## 2025-07-28 PROCEDURE — 2500000004 HC RX 250 GENERAL PHARMACY W/ HCPCS (ALT 636 FOR OP/ED): Mod: JZ | Performed by: NURSE PRACTITIONER

## 2025-07-28 PROCEDURE — 96368 THER/DIAG CONCURRENT INF: CPT | Mod: INF

## 2025-07-28 PROCEDURE — 96365 THER/PROPH/DIAG IV INF INIT: CPT | Mod: INF

## 2025-07-28 PROCEDURE — 96375 TX/PRO/DX INJ NEW DRUG ADDON: CPT | Mod: INF

## 2025-07-28 RX ORDER — DIPHENHYDRAMINE HYDROCHLORIDE 50 MG/ML
50 INJECTION, SOLUTION INTRAMUSCULAR; INTRAVENOUS AS NEEDED
OUTPATIENT
Start: 2025-08-11

## 2025-07-28 RX ORDER — KETAMINE HCL IN NACL, ISO-OSM 100MG/10ML
SYRINGE (ML) INJECTION ONCE
Status: COMPLETED | OUTPATIENT
Start: 2025-07-28 | End: 2025-07-28

## 2025-07-28 RX ORDER — EPINEPHRINE 1 MG/ML
0.3 INJECTION, SOLUTION, CONCENTRATE INTRAVENOUS EVERY 5 MIN PRN
OUTPATIENT
Start: 2025-08-11

## 2025-07-28 RX ORDER — FAMOTIDINE 10 MG/ML
20 INJECTION, SOLUTION INTRAVENOUS ONCE AS NEEDED
OUTPATIENT
Start: 2025-08-11

## 2025-07-28 RX ORDER — KETOROLAC TROMETHAMINE 30 MG/ML
30 INJECTION, SOLUTION INTRAMUSCULAR; INTRAVENOUS ONCE
OUTPATIENT
Start: 2025-08-11 | End: 2025-08-11

## 2025-07-28 RX ORDER — KETOROLAC TROMETHAMINE 30 MG/ML
30 INJECTION, SOLUTION INTRAMUSCULAR; INTRAVENOUS ONCE
Status: COMPLETED | OUTPATIENT
Start: 2025-07-28 | End: 2025-07-28

## 2025-07-28 RX ORDER — ALBUTEROL SULFATE 0.83 MG/ML
3 SOLUTION RESPIRATORY (INHALATION) AS NEEDED
OUTPATIENT
Start: 2025-08-11

## 2025-07-28 RX ADMIN — Medication: at 09:40

## 2025-07-28 RX ADMIN — KETOROLAC TROMETHAMINE 30 MG: 30 INJECTION, SOLUTION INTRAMUSCULAR at 09:40

## 2025-07-28 RX ADMIN — PROPOFOL 100 MG: 10 INJECTION, EMULSION INTRAVENOUS at 09:40

## 2025-07-28 ASSESSMENT — ENCOUNTER SYMPTOMS
LOSS OF SENSATION IN FEET: 0
DEPRESSION: 1
OCCASIONAL FEELINGS OF UNSTEADINESS: 0

## 2025-07-28 ASSESSMENT — PAIN - FUNCTIONAL ASSESSMENT
PAIN_FUNCTIONAL_ASSESSMENT: 0-10
PAIN_FUNCTIONAL_ASSESSMENT: 0-10

## 2025-07-28 ASSESSMENT — PAIN SCALES - GENERAL
PAINLEVEL_OUTOF10: 0 - NO PAIN
PAINLEVEL_OUTOF10: 7

## 2025-07-28 NOTE — PATIENT INSTRUCTIONS
Today :We administered ketamine 30 mg-lidocaine 300 mg, propofol (Diprivan), and ketorolac.     For:   1. Chronic pain syndrome         Your next appointment is due in:  2 weeks        Please read the  Medication Guide that was given to you and reviewed during todays visit.     (Tell all doctors including dentists that you are taking this medication)     Go to the emergency room or call 911 if:  -You have signs of allergic reaction:   -Rash, hives, itching.   -Swollen, blistered, peeling skin.   -Swelling of face, lips, mouth, tongue or throat.   -Tightness of chest, trouble breathing, swallowing or talking     Call your doctor:  - If IV / injection site gets red, warm, swollen, itchy or leaks fluid or pus.     (Leave dressing on your IV site for at least 2 hours and keep area clean and dry  - If you get sick or have symptoms of infection or are not feeling well for any reason.    (Wash your hands often, stay away from people who are sick)  - If you have side effects from your medication that do not go away or are bothersome.     (Refer to the teaching your nurse gave you for side effects to call your doctor about)    - Common side effects may include:  stuffy nose, headache, feeling tired, muscle aches, upset stomach  - Before receiving any vaccines     - Call the [facility] at   If:  - You get sick, are on antibiotics, have had a recent vaccine, have surgery or dental work and your doctor wants your visit rescheduled.  - You need to cancel and reschedule your visit for any reason. Call at least 2 days before your visit if you need to cancel.   - Your insurance changes before your next visit.    (We will need to get approval from your new insurance. This can take up to two weeks.)     The [facility] is opened Monday thru Friday. We are closed on weekends and holidays.   Voice mail will take your call if the center is closed. If you leave a message please allow 24 hours for a call  back during weekdays. If you leave a message on a weekend/holiday, we will call you back the next business day.    A pharmacist is available Monday - Friday from 8:30AM to 3:30PM to help answer any questions you may have about your prescriptions(s). Please call pharmacy at:    [facility]: [phone]  [facility]: [phone]  [facility]: [phone]              [facility] [place]      Pain Infusion Aftercare Instructions      1. It is normal to feel sedated, tired and low in energy after a pain infusion. DO NOT DRIVE, OPERATE ANY MACHINERY, OR MAKE ANY IMPORTANT DECISIONS FOR AT LEAST 24 HOURS AFTER THE INFUSION.     2. Call the pain center at [phone] with any problems, questions, or concerns.     3. Eat light after the infusion. If you feel queasy or sick to your stomach, laying down with your eyes closed may help. When you resume eating start with something mild like clear liquids, yogurt, applesauce, crackers, etc… Gradually advance to a regular diet.     4. Do not leave your house alone the evening of your pain infusion.     5. No alcohol or sedative medications, such as sleeping pills, for 24 hours after your pain infusion.     6. Resume all other prescribed medications unless directed otherwise by you physician.     7. If you have any medical emergencies, call 911 or go directly to the closest emergency room.

## 2025-07-28 NOTE — PROGRESS NOTES
S: Patient here for 3 opioid sparing pain infusion. Patient reports 30% reduction in pain after last infusion that lasted 2 days.    Purpose of pain infusion meds explained along with potential side effects.  Patient verbalized understanding.    B: Pain Issues. Is Patient breast feeding: ?      Is Patient receiving any other form Ketamine from any other facility/ outside clinic ?: no  A: Patient currently has pain described on flow sheet documentation. Designated  is friend. Patient last ate solid food 8 hours ago, and had liquid 4 hours ago.    R: Plan; Obtain IV access, do patient risk assessment, and start opioid sparing infusion as ordered. Monitoring for S/S of adverse reactions.

## 2025-07-31 ENCOUNTER — SPECIALTY PHARMACY (OUTPATIENT)
Dept: PHARMACY | Facility: CLINIC | Age: 35
End: 2025-07-31

## 2025-07-31 ENCOUNTER — APPOINTMENT (OUTPATIENT)
Dept: DERMATOLOGY | Facility: CLINIC | Age: 35
End: 2025-07-31
Payer: MEDICAID

## 2025-07-31 PROCEDURE — RXMED WILLOW AMBULATORY MEDICATION CHARGE

## 2025-08-04 ENCOUNTER — PHARMACY VISIT (OUTPATIENT)
Dept: PHARMACY | Facility: CLINIC | Age: 35
End: 2025-08-04
Payer: MEDICAID

## 2025-08-07 DIAGNOSIS — G89.4 CHRONIC PAIN SYNDROME: Primary | ICD-10-CM

## 2025-08-07 RX ORDER — KETOROLAC TROMETHAMINE 30 MG/ML
30 INJECTION, SOLUTION INTRAMUSCULAR; INTRAVENOUS ONCE
OUTPATIENT
Start: 2025-08-07 | End: 2025-08-07

## 2025-08-09 ENCOUNTER — APPOINTMENT (OUTPATIENT)
Dept: RADIOLOGY | Facility: HOSPITAL | Age: 35
End: 2025-08-09
Payer: MEDICAID

## 2025-08-09 ENCOUNTER — HOSPITAL ENCOUNTER (EMERGENCY)
Facility: HOSPITAL | Age: 35
Discharge: HOME | End: 2025-08-10
Attending: EMERGENCY MEDICINE
Payer: MEDICAID

## 2025-08-09 ENCOUNTER — APPOINTMENT (OUTPATIENT)
Dept: CARDIOLOGY | Facility: HOSPITAL | Age: 35
End: 2025-08-09
Payer: MEDICAID

## 2025-08-09 DIAGNOSIS — R10.9 LEFT FLANK PAIN: Primary | ICD-10-CM

## 2025-08-09 LAB
ALBUMIN SERPL BCP-MCNC: 4.1 G/DL (ref 3.4–5)
ALP SERPL-CCNC: 73 U/L (ref 33–110)
ALT SERPL W P-5'-P-CCNC: 10 U/L (ref 7–45)
ANION GAP SERPL CALC-SCNC: 9 MMOL/L (ref 10–20)
APPEARANCE UR: CLEAR
AST SERPL W P-5'-P-CCNC: 12 U/L (ref 9–39)
BASOPHILS # BLD AUTO: 0.03 X10*3/UL (ref 0–0.1)
BASOPHILS NFR BLD AUTO: 0.4 %
BILIRUB SERPL-MCNC: 0.2 MG/DL (ref 0–1.2)
BILIRUB UR STRIP.AUTO-MCNC: NEGATIVE MG/DL
BUN SERPL-MCNC: 16 MG/DL (ref 6–23)
CALCIUM SERPL-MCNC: 9.2 MG/DL (ref 8.6–10.3)
CHLORIDE SERPL-SCNC: 108 MMOL/L (ref 98–107)
CO2 SERPL-SCNC: 27 MMOL/L (ref 21–32)
COLOR UR: NORMAL
CREAT SERPL-MCNC: 1.13 MG/DL (ref 0.5–1.05)
EGFRCR SERPLBLD CKD-EPI 2021: 65 ML/MIN/1.73M*2
EOSINOPHIL # BLD AUTO: 0.11 X10*3/UL (ref 0–0.7)
EOSINOPHIL NFR BLD AUTO: 1.5 %
ERYTHROCYTE [DISTWIDTH] IN BLOOD BY AUTOMATED COUNT: 14.9 % (ref 11.5–14.5)
GLUCOSE SERPL-MCNC: 87 MG/DL (ref 74–99)
GLUCOSE UR STRIP.AUTO-MCNC: NORMAL MG/DL
HCG UR QL IA.RAPID: NEGATIVE
HCT VFR BLD AUTO: 37 % (ref 36–46)
HGB BLD-MCNC: 12.2 G/DL (ref 12–16)
IMM GRANULOCYTES # BLD AUTO: 0.02 X10*3/UL (ref 0–0.7)
IMM GRANULOCYTES NFR BLD AUTO: 0.3 % (ref 0–0.9)
KETONES UR STRIP.AUTO-MCNC: NEGATIVE MG/DL
LEUKOCYTE ESTERASE UR QL STRIP.AUTO: NEGATIVE
LIPASE SERPL-CCNC: 37 U/L (ref 9–82)
LYMPHOCYTES # BLD AUTO: 3.43 X10*3/UL (ref 1.2–4.8)
LYMPHOCYTES NFR BLD AUTO: 47.2 %
MCH RBC QN AUTO: 25.3 PG (ref 26–34)
MCHC RBC AUTO-ENTMCNC: 33 G/DL (ref 32–36)
MCV RBC AUTO: 77 FL (ref 80–100)
MONOCYTES # BLD AUTO: 0.53 X10*3/UL (ref 0.1–1)
MONOCYTES NFR BLD AUTO: 7.3 %
NEUTROPHILS # BLD AUTO: 3.15 X10*3/UL (ref 1.2–7.7)
NEUTROPHILS NFR BLD AUTO: 43.3 %
NITRITE UR QL STRIP.AUTO: NEGATIVE
NRBC BLD-RTO: 0 /100 WBCS (ref 0–0)
PH UR STRIP.AUTO: 6.5 [PH]
PLATELET # BLD AUTO: 301 X10*3/UL (ref 150–450)
POTASSIUM SERPL-SCNC: 4.2 MMOL/L (ref 3.5–5.3)
PROT SERPL-MCNC: 7.3 G/DL (ref 6.4–8.2)
PROT UR STRIP.AUTO-MCNC: NEGATIVE MG/DL
RBC # BLD AUTO: 4.82 X10*6/UL (ref 4–5.2)
RBC # UR STRIP.AUTO: NEGATIVE MG/DL
SODIUM SERPL-SCNC: 140 MMOL/L (ref 136–145)
SP GR UR STRIP.AUTO: 1.01
UROBILINOGEN UR STRIP.AUTO-MCNC: NORMAL MG/DL
WBC # BLD AUTO: 7.3 X10*3/UL (ref 4.4–11.3)

## 2025-08-09 PROCEDURE — 96374 THER/PROPH/DIAG INJ IV PUSH: CPT

## 2025-08-09 PROCEDURE — 99285 EMERGENCY DEPT VISIT HI MDM: CPT | Mod: 25 | Performed by: EMERGENCY MEDICINE

## 2025-08-09 PROCEDURE — 83690 ASSAY OF LIPASE: CPT | Performed by: PHYSICIAN ASSISTANT

## 2025-08-09 PROCEDURE — 81025 URINE PREGNANCY TEST: CPT | Performed by: PHYSICIAN ASSISTANT

## 2025-08-09 PROCEDURE — 2500000004 HC RX 250 GENERAL PHARMACY W/ HCPCS (ALT 636 FOR OP/ED): Performed by: EMERGENCY MEDICINE

## 2025-08-09 PROCEDURE — 80053 COMPREHEN METABOLIC PANEL: CPT | Performed by: PHYSICIAN ASSISTANT

## 2025-08-09 PROCEDURE — 74176 CT ABD & PELVIS W/O CONTRAST: CPT

## 2025-08-09 PROCEDURE — 93005 ELECTROCARDIOGRAM TRACING: CPT

## 2025-08-09 PROCEDURE — 74176 CT ABD & PELVIS W/O CONTRAST: CPT | Performed by: RADIOLOGY

## 2025-08-09 PROCEDURE — 36415 COLL VENOUS BLD VENIPUNCTURE: CPT | Performed by: PHYSICIAN ASSISTANT

## 2025-08-09 PROCEDURE — 81003 URINALYSIS AUTO W/O SCOPE: CPT | Performed by: PHYSICIAN ASSISTANT

## 2025-08-09 PROCEDURE — 85025 COMPLETE CBC W/AUTO DIFF WBC: CPT | Performed by: PHYSICIAN ASSISTANT

## 2025-08-09 PROCEDURE — 96375 TX/PRO/DX INJ NEW DRUG ADDON: CPT

## 2025-08-09 RX ORDER — MORPHINE SULFATE 4 MG/ML
4 INJECTION, SOLUTION INTRAMUSCULAR; INTRAVENOUS ONCE
Status: COMPLETED | OUTPATIENT
Start: 2025-08-09 | End: 2025-08-09

## 2025-08-09 RX ORDER — ONDANSETRON HYDROCHLORIDE 2 MG/ML
4 INJECTION, SOLUTION INTRAVENOUS ONCE
Status: COMPLETED | OUTPATIENT
Start: 2025-08-09 | End: 2025-08-09

## 2025-08-09 RX ADMIN — MORPHINE SULFATE 4 MG: 4 INJECTION, SOLUTION INTRAMUSCULAR; INTRAVENOUS at 23:36

## 2025-08-09 RX ADMIN — ONDANSETRON 4 MG: 2 INJECTION, SOLUTION INTRAMUSCULAR; INTRAVENOUS at 23:36

## 2025-08-09 ASSESSMENT — PAIN SCALES - GENERAL
PAINLEVEL_OUTOF10: 7
PAINLEVEL_OUTOF10: 0 - NO PAIN

## 2025-08-09 ASSESSMENT — PAIN - FUNCTIONAL ASSESSMENT: PAIN_FUNCTIONAL_ASSESSMENT: 0-10

## 2025-08-10 VITALS
OXYGEN SATURATION: 100 % | RESPIRATION RATE: 16 BRPM | HEART RATE: 73 BPM | SYSTOLIC BLOOD PRESSURE: 121 MMHG | TEMPERATURE: 97.5 F | BODY MASS INDEX: 32.1 KG/M2 | WEIGHT: 170 LBS | HEIGHT: 61 IN | DIASTOLIC BLOOD PRESSURE: 76 MMHG

## 2025-08-10 PROCEDURE — 96375 TX/PRO/DX INJ NEW DRUG ADDON: CPT

## 2025-08-10 PROCEDURE — 2500000004 HC RX 250 GENERAL PHARMACY W/ HCPCS (ALT 636 FOR OP/ED): Mod: JZ | Performed by: EMERGENCY MEDICINE

## 2025-08-10 RX ORDER — NAPROXEN 500 MG/1
500 TABLET ORAL
Qty: 30 TABLET | Refills: 0 | Status: SHIPPED | OUTPATIENT
Start: 2025-08-10 | End: 2025-08-25

## 2025-08-10 RX ORDER — KETOROLAC TROMETHAMINE 30 MG/ML
30 INJECTION, SOLUTION INTRAMUSCULAR; INTRAVENOUS ONCE
Status: COMPLETED | OUTPATIENT
Start: 2025-08-10 | End: 2025-08-10

## 2025-08-10 RX ADMIN — KETOROLAC TROMETHAMINE 30 MG: 30 INJECTION, SOLUTION INTRAMUSCULAR at 00:20

## 2025-08-10 ASSESSMENT — PAIN SCALES - GENERAL
PAINLEVEL_OUTOF10: 6
PAINLEVEL_OUTOF10: 7

## 2025-08-11 LAB
ATRIAL RATE: 79 BPM
P AXIS: 57 DEGREES
P OFFSET: 202 MS
P ONSET: 153 MS
PR INTERVAL: 138 MS
Q ONSET: 222 MS
QRS COUNT: 13 BEATS
QRS DURATION: 70 MS
QT INTERVAL: 394 MS
QTC CALCULATION(BAZETT): 451 MS
QTC FREDERICIA: 432 MS
R AXIS: 47 DEGREES
T AXIS: 47 DEGREES
T OFFSET: 419 MS
VENTRICULAR RATE: 79 BPM

## 2025-08-12 ENCOUNTER — INFUSION (OUTPATIENT)
Dept: INFUSION THERAPY | Facility: CLINIC | Age: 35
End: 2025-08-12
Payer: MEDICAID

## 2025-08-12 VITALS
RESPIRATION RATE: 16 BRPM | SYSTOLIC BLOOD PRESSURE: 148 MMHG | DIASTOLIC BLOOD PRESSURE: 89 MMHG | TEMPERATURE: 98.4 F | OXYGEN SATURATION: 100 % | HEART RATE: 72 BPM

## 2025-08-12 DIAGNOSIS — G89.4 CHRONIC PAIN SYNDROME: ICD-10-CM

## 2025-08-12 LAB — PREGNANCY TEST URINE, POC: NEGATIVE

## 2025-08-12 PROCEDURE — 96365 THER/PROPH/DIAG IV INF INIT: CPT | Mod: INF

## 2025-08-12 PROCEDURE — 81025 URINE PREGNANCY TEST: CPT

## 2025-08-12 PROCEDURE — 96368 THER/DIAG CONCURRENT INF: CPT | Mod: INF

## 2025-08-12 PROCEDURE — 96375 TX/PRO/DX INJ NEW DRUG ADDON: CPT | Mod: INF

## 2025-08-12 PROCEDURE — 2500000004 HC RX 250 GENERAL PHARMACY W/ HCPCS (ALT 636 FOR OP/ED): Performed by: NURSE PRACTITIONER

## 2025-08-12 RX ORDER — FAMOTIDINE 10 MG/ML
20 INJECTION, SOLUTION INTRAVENOUS ONCE AS NEEDED
OUTPATIENT
Start: 2025-08-21

## 2025-08-12 RX ORDER — ALBUTEROL SULFATE 0.83 MG/ML
3 SOLUTION RESPIRATORY (INHALATION) AS NEEDED
OUTPATIENT
Start: 2025-08-21

## 2025-08-12 RX ORDER — DIPHENHYDRAMINE HYDROCHLORIDE 50 MG/ML
50 INJECTION, SOLUTION INTRAMUSCULAR; INTRAVENOUS AS NEEDED
OUTPATIENT
Start: 2025-08-21

## 2025-08-12 RX ORDER — EPINEPHRINE 1 MG/ML
0.3 INJECTION, SOLUTION, CONCENTRATE INTRAVENOUS EVERY 5 MIN PRN
OUTPATIENT
Start: 2025-08-21

## 2025-08-12 RX ORDER — KETOROLAC TROMETHAMINE 30 MG/ML
30 INJECTION, SOLUTION INTRAMUSCULAR; INTRAVENOUS ONCE
OUTPATIENT
Start: 2025-08-21 | End: 2025-08-21

## 2025-08-12 RX ORDER — KETOROLAC TROMETHAMINE 30 MG/ML
30 INJECTION, SOLUTION INTRAMUSCULAR; INTRAVENOUS ONCE
Status: COMPLETED | OUTPATIENT
Start: 2025-08-12 | End: 2025-08-12

## 2025-08-12 RX ADMIN — KETOROLAC TROMETHAMINE 30 MG: 30 INJECTION, SOLUTION INTRAMUSCULAR at 09:13

## 2025-08-12 RX ADMIN — PROPOFOL 100 MG: 10 INJECTION, EMULSION INTRAVENOUS at 09:14

## 2025-08-12 RX ADMIN — Medication: at 09:14

## 2025-08-12 ASSESSMENT — ENCOUNTER SYMPTOMS
LOSS OF SENSATION IN FEET: 0
DEPRESSION: 1
OCCASIONAL FEELINGS OF UNSTEADINESS: 0

## 2025-08-12 ASSESSMENT — PAIN - FUNCTIONAL ASSESSMENT
PAIN_FUNCTIONAL_ASSESSMENT: 0-10
PAIN_FUNCTIONAL_ASSESSMENT: 0-10

## 2025-08-12 ASSESSMENT — PAIN DESCRIPTION - DESCRIPTORS
DESCRIPTORS: ACHING;SHARP
DESCRIPTORS: ACHING;SHARP

## 2025-08-12 ASSESSMENT — PAIN SCALES - GENERAL
PAINLEVEL_OUTOF10: 6
PAINLEVEL_OUTOF10: 6
PAINLEVEL_OUTOF10: 7

## 2025-08-19 ENCOUNTER — TELEPHONE (OUTPATIENT)
Dept: SURGERY | Facility: CLINIC | Age: 35
End: 2025-08-19
Payer: MEDICAID

## 2025-08-19 ENCOUNTER — HOSPITAL ENCOUNTER (EMERGENCY)
Facility: HOSPITAL | Age: 35
Discharge: HOME | End: 2025-08-19
Attending: EMERGENCY MEDICINE
Payer: MEDICAID

## 2025-08-19 VITALS
TEMPERATURE: 98.8 F | BODY MASS INDEX: 32.28 KG/M2 | OXYGEN SATURATION: 99 % | RESPIRATION RATE: 18 BRPM | DIASTOLIC BLOOD PRESSURE: 81 MMHG | WEIGHT: 171 LBS | HEIGHT: 61 IN | HEART RATE: 71 BPM | SYSTOLIC BLOOD PRESSURE: 124 MMHG

## 2025-08-19 DIAGNOSIS — L73.2 HIDRADENITIS SUPPURATIVA: Primary | ICD-10-CM

## 2025-08-19 PROCEDURE — 99284 EMERGENCY DEPT VISIT MOD MDM: CPT | Performed by: EMERGENCY MEDICINE

## 2025-08-19 PROCEDURE — 2500000004 HC RX 250 GENERAL PHARMACY W/ HCPCS (ALT 636 FOR OP/ED): Performed by: EMERGENCY MEDICINE

## 2025-08-19 PROCEDURE — 96372 THER/PROPH/DIAG INJ SC/IM: CPT | Performed by: EMERGENCY MEDICINE

## 2025-08-19 RX ORDER — HYDROMORPHONE HYDROCHLORIDE 1 MG/ML
1 INJECTION, SOLUTION INTRAMUSCULAR; INTRAVENOUS; SUBCUTANEOUS ONCE
Status: COMPLETED | OUTPATIENT
Start: 2025-08-19 | End: 2025-08-19

## 2025-08-19 RX ORDER — ONDANSETRON 4 MG/1
8 TABLET, ORALLY DISINTEGRATING ORAL ONCE
Status: COMPLETED | OUTPATIENT
Start: 2025-08-19 | End: 2025-08-19

## 2025-08-19 RX ADMIN — HYDROMORPHONE HYDROCHLORIDE 1 MG: 1 INJECTION, SOLUTION INTRAMUSCULAR; INTRAVENOUS; SUBCUTANEOUS at 14:44

## 2025-08-19 RX ADMIN — ONDANSETRON 8 MG: 4 TABLET, ORALLY DISINTEGRATING ORAL at 14:44

## 2025-08-19 ASSESSMENT — PAIN SCALES - GENERAL
PAINLEVEL_OUTOF10: 9
PAINLEVEL_OUTOF10: 8

## 2025-08-19 ASSESSMENT — PAIN DESCRIPTION - ORIENTATION: ORIENTATION: LEFT;RIGHT

## 2025-08-19 ASSESSMENT — PAIN DESCRIPTION - LOCATION: LOCATION: ARM

## 2025-08-19 ASSESSMENT — PAIN - FUNCTIONAL ASSESSMENT: PAIN_FUNCTIONAL_ASSESSMENT: 0-10

## 2025-08-20 ENCOUNTER — TELEPHONE (OUTPATIENT)
Dept: SURGERY | Facility: CLINIC | Age: 35
End: 2025-08-20
Payer: MEDICAID

## 2025-08-26 DIAGNOSIS — G89.4 CHRONIC PAIN SYNDROME: Primary | ICD-10-CM

## 2025-08-26 RX ORDER — KETOROLAC TROMETHAMINE 30 MG/ML
30 INJECTION, SOLUTION INTRAMUSCULAR; INTRAVENOUS ONCE
OUTPATIENT
Start: 2025-09-03 | End: 2025-09-03

## 2025-09-02 ENCOUNTER — SPECIALTY PHARMACY (OUTPATIENT)
Dept: PHARMACY | Facility: CLINIC | Age: 35
End: 2025-09-02

## 2025-09-03 ENCOUNTER — INFUSION (OUTPATIENT)
Dept: INFUSION THERAPY | Facility: CLINIC | Age: 35
End: 2025-09-03
Payer: MEDICAID

## 2025-09-03 VITALS
HEART RATE: 71 BPM | OXYGEN SATURATION: 100 % | DIASTOLIC BLOOD PRESSURE: 95 MMHG | TEMPERATURE: 98.2 F | SYSTOLIC BLOOD PRESSURE: 137 MMHG | RESPIRATION RATE: 15 BRPM

## 2025-09-03 DIAGNOSIS — G89.4 CHRONIC PAIN SYNDROME: ICD-10-CM

## 2025-09-03 LAB — PREGNANCY TEST URINE, POC: NEGATIVE

## 2025-09-03 PROCEDURE — 2500000004 HC RX 250 GENERAL PHARMACY W/ HCPCS (ALT 636 FOR OP/ED): Performed by: NURSE PRACTITIONER

## 2025-09-03 PROCEDURE — 96368 THER/DIAG CONCURRENT INF: CPT | Mod: INF

## 2025-09-03 PROCEDURE — 96375 TX/PRO/DX INJ NEW DRUG ADDON: CPT | Mod: INF

## 2025-09-03 PROCEDURE — 81025 URINE PREGNANCY TEST: CPT

## 2025-09-03 PROCEDURE — 96365 THER/PROPH/DIAG IV INF INIT: CPT | Mod: INF

## 2025-09-03 RX ORDER — FAMOTIDINE 10 MG/ML
20 INJECTION, SOLUTION INTRAVENOUS ONCE AS NEEDED
OUTPATIENT
Start: 2025-09-17

## 2025-09-03 RX ORDER — KETOROLAC TROMETHAMINE 30 MG/ML
30 INJECTION, SOLUTION INTRAMUSCULAR; INTRAVENOUS ONCE
Status: COMPLETED | OUTPATIENT
Start: 2025-09-03 | End: 2025-09-03

## 2025-09-03 RX ORDER — ALBUTEROL SULFATE 0.83 MG/ML
3 SOLUTION RESPIRATORY (INHALATION) AS NEEDED
OUTPATIENT
Start: 2025-09-17

## 2025-09-03 RX ORDER — EPINEPHRINE 1 MG/ML
0.3 INJECTION, SOLUTION, CONCENTRATE INTRAVENOUS EVERY 5 MIN PRN
OUTPATIENT
Start: 2025-09-17

## 2025-09-03 RX ORDER — KETOROLAC TROMETHAMINE 30 MG/ML
30 INJECTION, SOLUTION INTRAMUSCULAR; INTRAVENOUS ONCE
OUTPATIENT
Start: 2025-09-17 | End: 2025-09-17

## 2025-09-03 RX ORDER — DIPHENHYDRAMINE HYDROCHLORIDE 50 MG/ML
50 INJECTION, SOLUTION INTRAMUSCULAR; INTRAVENOUS AS NEEDED
OUTPATIENT
Start: 2025-09-17

## 2025-09-03 RX ADMIN — PROPOFOL 100 MG: 10 INJECTION, EMULSION INTRAVENOUS at 08:25

## 2025-09-03 RX ADMIN — Medication: at 08:25

## 2025-09-03 RX ADMIN — KETOROLAC TROMETHAMINE 30 MG: 30 INJECTION, SOLUTION INTRAMUSCULAR at 08:25

## 2025-09-03 ASSESSMENT — PAIN SCALES - GENERAL: PAINLEVEL_OUTOF10: 8

## 2025-09-03 ASSESSMENT — ENCOUNTER SYMPTOMS
DEPRESSION: 1
OCCASIONAL FEELINGS OF UNSTEADINESS: 0
LOSS OF SENSATION IN FEET: 0

## 2025-09-18 ENCOUNTER — APPOINTMENT (OUTPATIENT)
Dept: DERMATOLOGY | Facility: CLINIC | Age: 35
End: 2025-09-18
Payer: MEDICAID

## 2026-01-28 ENCOUNTER — APPOINTMENT (OUTPATIENT)
Dept: DERMATOLOGY | Facility: CLINIC | Age: 36
End: 2026-01-28
Payer: MEDICAID

## 2026-02-04 ENCOUNTER — APPOINTMENT (OUTPATIENT)
Dept: DERMATOLOGY | Facility: CLINIC | Age: 36
End: 2026-02-04
Payer: MEDICAID